# Patient Record
Sex: FEMALE | Race: WHITE | Employment: OTHER | URBAN - METROPOLITAN AREA
[De-identification: names, ages, dates, MRNs, and addresses within clinical notes are randomized per-mention and may not be internally consistent; named-entity substitution may affect disease eponyms.]

---

## 2017-01-05 ENCOUNTER — HOSPITAL ENCOUNTER (EMERGENCY)
Age: 82
Discharge: HOME | End: 2017-01-05
Attending: EMERGENCY MEDICINE
Payer: COMMERCIAL

## 2017-01-05 ENCOUNTER — APPOINTMENT (OUTPATIENT)
Dept: GENERAL RADIOLOGY | Age: 82
End: 2017-01-05
Attending: EMERGENCY MEDICINE
Payer: COMMERCIAL

## 2017-01-05 VITALS
WEIGHT: 157 LBS | DIASTOLIC BLOOD PRESSURE: 62 MMHG | BODY MASS INDEX: 30.82 KG/M2 | SYSTOLIC BLOOD PRESSURE: 123 MMHG | HEART RATE: 66 BPM | TEMPERATURE: 97.5 F | HEIGHT: 60 IN | RESPIRATION RATE: 16 BRPM | OXYGEN SATURATION: 97 %

## 2017-01-05 DIAGNOSIS — I49.3 PVC (PREMATURE VENTRICULAR CONTRACTION): Primary | ICD-10-CM

## 2017-01-05 LAB
ALBUMIN SERPL BCP-MCNC: 3.7 G/DL (ref 3.2–4.6)
ALBUMIN/GLOB SERPL: 1.1 {RATIO} (ref 1.2–3.5)
ALP SERPL-CCNC: 91 U/L (ref 50–136)
ALT SERPL-CCNC: 15 U/L (ref 12–65)
ANION GAP BLD CALC-SCNC: 9 MMOL/L (ref 7–16)
AST SERPL W P-5'-P-CCNC: 16 U/L (ref 15–37)
BASOPHILS # BLD AUTO: 0 K/UL (ref 0–0.2)
BASOPHILS # BLD: 0 % (ref 0–2)
BILIRUB DIRECT SERPL-MCNC: 0.1 MG/DL
BILIRUB SERPL-MCNC: 0.4 MG/DL (ref 0.2–1.1)
BUN SERPL-MCNC: 48 MG/DL (ref 8–23)
CALCIUM SERPL-MCNC: 9.2 MG/DL (ref 8.3–10.4)
CHLORIDE SERPL-SCNC: 108 MMOL/L (ref 98–107)
CO2 SERPL-SCNC: 26 MMOL/L (ref 21–32)
CREAT SERPL-MCNC: 1.64 MG/DL (ref 0.6–1)
DIFFERENTIAL METHOD BLD: ABNORMAL
EOSINOPHIL # BLD: 0.2 K/UL (ref 0–0.8)
EOSINOPHIL NFR BLD: 4 % (ref 0.5–7.8)
ERYTHROCYTE [DISTWIDTH] IN BLOOD BY AUTOMATED COUNT: 14.3 % (ref 11.9–14.6)
GLOBULIN SER CALC-MCNC: 3.4 G/DL (ref 2.3–3.5)
GLUCOSE SERPL-MCNC: 98 MG/DL (ref 65–100)
HCT VFR BLD AUTO: 36.3 % (ref 35.8–46.3)
HGB BLD-MCNC: 11.8 G/DL (ref 11.7–15.4)
IMM GRANULOCYTES # BLD: 0 K/UL (ref 0–0.5)
IMM GRANULOCYTES NFR BLD AUTO: 0 % (ref 0–5)
LYMPHOCYTES # BLD AUTO: 27 % (ref 13–44)
LYMPHOCYTES # BLD: 1.1 K/UL (ref 0.5–4.6)
MCH RBC QN AUTO: 32.2 PG (ref 26.1–32.9)
MCHC RBC AUTO-ENTMCNC: 32.5 G/DL (ref 31.4–35)
MCV RBC AUTO: 99.2 FL (ref 79.6–97.8)
MONOCYTES # BLD: 0.3 K/UL (ref 0.1–1.3)
MONOCYTES NFR BLD AUTO: 8 % (ref 4–12)
NEUTS SEG # BLD: 2.5 K/UL (ref 1.7–8.2)
NEUTS SEG NFR BLD AUTO: 61 % (ref 43–78)
PLATELET # BLD AUTO: 142 K/UL (ref 150–450)
PMV BLD AUTO: 13.6 FL (ref 10.8–14.1)
POTASSIUM SERPL-SCNC: 4.3 MMOL/L (ref 3.5–5.1)
PROT SERPL-MCNC: 7.1 G/DL (ref 6.3–8.2)
RBC # BLD AUTO: 3.66 M/UL (ref 4.05–5.25)
SODIUM SERPL-SCNC: 143 MMOL/L (ref 136–145)
TROPONIN I BLD-MCNC: 0 NG/ML (ref 0–0.08)
WBC # BLD AUTO: 4.1 K/UL (ref 4.3–11.1)

## 2017-01-05 PROCEDURE — 93005 ELECTROCARDIOGRAM TRACING: CPT | Performed by: EMERGENCY MEDICINE

## 2017-01-05 PROCEDURE — 84484 ASSAY OF TROPONIN QUANT: CPT

## 2017-01-05 PROCEDURE — 99284 EMERGENCY DEPT VISIT MOD MDM: CPT | Performed by: EMERGENCY MEDICINE

## 2017-01-05 PROCEDURE — 85025 COMPLETE CBC W/AUTO DIFF WBC: CPT | Performed by: EMERGENCY MEDICINE

## 2017-01-05 PROCEDURE — 80048 BASIC METABOLIC PNL TOTAL CA: CPT | Performed by: EMERGENCY MEDICINE

## 2017-01-05 PROCEDURE — 80076 HEPATIC FUNCTION PANEL: CPT | Performed by: EMERGENCY MEDICINE

## 2017-01-05 PROCEDURE — 71020 XR CHEST PA LAT: CPT

## 2017-01-05 NOTE — ED TRIAGE NOTES
Family reports patient was at PCP and they were unable to detect an oxygen saturation so they did a EKG. States EKG showed \"extra beats. \"  Patient denies chest pain. Oxygen saturation in triage 97% room air. Patient denies any chest pain or palpitations or SOB.

## 2017-01-06 LAB
ATRIAL RATE: 66 BPM
CALCULATED P AXIS, ECG09: 100 DEGREES
CALCULATED R AXIS, ECG10: 8 DEGREES
CALCULATED T AXIS, ECG11: 26 DEGREES
DIAGNOSIS, 93000: NORMAL
DIASTOLIC BP, ECG02: NORMAL MMHG
P-R INTERVAL, ECG05: NORMAL MS
Q-T INTERVAL, ECG07: 416 MS
QRS DURATION, ECG06: 94 MS
QTC CALCULATION (BEZET), ECG08: 436 MS
SYSTOLIC BP, ECG01: NORMAL MMHG
VENTRICULAR RATE, ECG03: 66 BPM

## 2017-01-06 NOTE — ED PROVIDER NOTES
HPI Comments: 51-year-old white female sent from primary care physician for evaluation of a PVC noted on EKG in the office today. Patient was therefore routine follow-up. She has had some nausea but no vomiting. No chest pain or shortness of breath. Felt an irregular beat earlier today. Patient is a 80 y.o. female presenting with abnormal lab results. The history is provided by the patient. Abnormal Lab Results   Pertinent negatives include no chest pain, no abdominal pain, no headaches and no shortness of breath. Past Medical History:   Diagnosis Date    Acute kidney failure, unspecified ([address]     Arthritis     CAD (coronary artery disease)     Cellulitis and abscess of other specified site     Coronary atherosclerosis of native coronary artery     Essential hypertension, benign 3/17/2015    Hypertension     Hypopotassemia     Mixed hyperlipidemia     Other and unspecified hyperlipidemia     Reflux esophagitis     Renal failure, unspecified     Shortness of breath     Unspecified essential hypertension        Past Surgical History:   Procedure Laterality Date    Hx cholecystectomy      Pr layr clos wnd face,facial <2.5cm      Pr cardiac surg procedure unlist       stent    Hx cataract removal Bilateral     Hx hysterectomy       complete         Family History:   Problem Relation Age of Onset    Heart Attack Mother     Heart Attack Father     Heart Disease Brother     Heart Disease Brother        Social History     Social History    Marital status:      Spouse name: N/A    Number of children: N/A    Years of education: N/A     Occupational History    Not on file.      Social History Main Topics    Smoking status: Never Smoker    Smokeless tobacco: Never Used    Alcohol use No    Drug use: No    Sexual activity: Not Currently     Other Topics Concern    Not on file     Social History Narrative         ALLERGIES: Bee pollen and Fire ant    Review of Systems Constitutional: Negative for fever. HENT: Negative for congestion. Respiratory: Negative for cough and shortness of breath. Cardiovascular: Positive for palpitations. Negative for chest pain. Gastrointestinal: Positive for nausea. Negative for abdominal pain and vomiting. Genitourinary: Negative for dysuria. Musculoskeletal: Negative for back pain and neck pain. Skin: Negative for rash. Neurological: Negative for headaches. Vitals:    01/05/17 1634 01/05/17 1947 01/05/17 2015   BP: 119/59 144/66    Pulse: 67 65    Resp: 16 16    Temp: 97.5 °F (36.4 °C)     SpO2: 97% 96% 96%   Weight: 71.2 kg (157 lb)     Height: 5' (1.524 m)              Physical Exam   Constitutional: She appears well-developed and well-nourished. No distress. HENT:   Head: Normocephalic and atraumatic. Eyes: Conjunctivae are normal. Pupils are equal, round, and reactive to light. Neck: Normal range of motion. Neck supple. Cardiovascular: Normal rate and regular rhythm. No murmur heard. Pulmonary/Chest: Effort normal and breath sounds normal.   Abdominal: Soft. She exhibits no distension. There is no tenderness. Musculoskeletal: Normal range of motion. Neurological: She is alert. Skin: Skin is warm and dry. Psychiatric: She has a normal mood and affect. Nursing note and vitals reviewed. MDM  Number of Diagnoses or Management Options  Diagnosis management comments: EKG shows normal sinus rhythm without ectopy or ST changes. Chest x-ray unremarkable. Lab work including troponin normal.  Has had no significant ectopy while being observed. This time she appears safe for discharge home.        Amount and/or Complexity of Data Reviewed  Clinical lab tests: ordered and reviewed  Tests in the radiology section of CPT®: ordered and reviewed    Risk of Complications, Morbidity, and/or Mortality  Presenting problems: moderate  Diagnostic procedures: low  Management options: low      ED Course Procedures

## 2017-01-06 NOTE — ED NOTES
I have reviewed medications, follow up provider options, and discharge instructions with the patient and daughter. The patient and daughter verbalized understanding. Copy of discharge information given to daughter upon discharge. Patient discharged in no distress. Patient taken to waiting area via wheelchair by family.  No questions at this time

## 2017-01-06 NOTE — DISCHARGE INSTRUCTIONS
Premature Heartbeat: Care Instructions  Your Care Instructions    A premature heartbeat happens when the heart beats earlier than it should. This briefly interrupts the heart's rhythm. You do not usually feel the early heartbeat, and the next beat is stronger. To many people, this feels like a skipped heartbeat or a flutter. If you have no heart problems, premature heartbeats are not a cause for concern. Most people have them at some time. They may happen more often if you drink a lot of caffeine or alcohol or are under stress. Usually, no cause for a premature heartbeat is found, and no treatment is needed. Follow-up care is a key part of your treatment and safety. Be sure to make and go to all appointments, and call your doctor if you are having problems. It's also a good idea to know your test results and keep a list of the medicines you take. How can you care for yourself at home? · Limit caffeine and other stimulants if they trigger premature heartbeats. · Reduce stress. Avoid people and places that make you feel anxious, if you can. Learn ways to reduce stress, such as biofeedback, guided imagery, and meditation. · Do not smoke or allow others to smoke around you. If you need help quitting, talk to your doctor about stop-smoking programs and medicines. These can increase your chances of quitting for good. · Get at least 30 minutes of exercise on most days of the week. Walking is a good choice. You also may want to do other activities, such as running, swimming, cycling, or playing tennis or team sports. · Get enough sleep. Keep your room dark and quiet, and try to go to bed at the same time every night. · Limit alcohol to 2 drinks a day for men and 1 drink a day for women. Too much alcohol can cause health problems. If drinking alcohol causes more premature heartbeats, do not drink it. · If your doctor prescribes medicine, take it exactly as prescribed.  Call your doctor if you think you are having a problem with your medicine. When should you call for help? Call 911 anytime you think you may need emergency care. For example, call if:  · You passed out (lost consciousness). · You have severe shortness of breath. Call your doctor now or seek immediate medical care if:  · You have a heart rate that stays above 120 beats per minute for more than a few minutes. · You are suddenly dizzy. Watch closely for changes in your health, and be sure to contact your doctor if you have any problems. Where can you learn more? Go to http://you-demarcus.info/. Enter X714 in the search box to learn more about \"Premature Heartbeat: Care Instructions. \"  Current as of: January 27, 2016  Content Version: 11.1  © 5293-8162 [employer]. Care instructions adapted under license by [employer] (which disclaims liability or warranty for this information). If you have questions about a medical condition or this instruction, always ask your healthcare professional. [address] any warranty or liability for your use of this information.

## 2017-01-07 ENCOUNTER — PATIENT OUTREACH (OUTPATIENT)
Dept: CASE MANAGEMENT | Age: 82
End: 2017-01-07

## 2017-01-07 NOTE — PROGRESS NOTES
Date/Time of Call:   01/06/2017 1:42 PM   What was the patient seen in the ED for? Patient was seen in ED for diagnosis of: PVC   Does the patient understand his/her diagnosis and/or treatment and what happened during the ED visit? Spoke with patient who stated understanding of the diagnosis and treatment plan. Did the patient receive discharge instructions from the ED? Yes discharge instructions received from the ED per patient. Review any discharge instructions (see notes in [facility]). Ask patient if they understand these. Do they have any questions? Care Coordinator reviewed DC instructions. Patient stated understanding. Were home services ordered (nursing, PT, OT, ST, etc.)? No HH services ordered at d/c. If so, has the first visit occurred? If not, why? (Assist with coordination of services if necessary.) N/A   Was any DME ordered? No DME ordered at D/C. If so, has it been received? If not, why?  (Assist with coordination of arranging DME orders if necessary.) N/A   Complete a review of all medications (new, continued and discontinued meds per the D/C instructions and medication tab in [address] [place] [address]). All medications reviewed and current per CC. Were all new prescriptions filled? If not, why?  (Assist with obtainment of medications if necessary.) N/A   Does the patient understand the purpose and dosing instructions for all medications? (If patient has questions, provide explanation and education.) Yes patient verbalized understanding and purpose of medications. Does the patient have any problems in performing ADLs? (If patient is unable to perform ADLs - what is the limiting factor(s)? Do they have a support system that can assist? If no support system is present, discuss possible assistance that they may be able to obtain.) No.  Patient states she is independent with all ADLs. Patient also states she has family support.    Does the patient have all follow-up appointments scheduled? Has transportation been arranged? [facility] follow-up should be within 7 days of discharge; all others should have PCP follow-up within 7 days of discharge; follow-ups with other specialists as appropriate or ordered.) Yes f/u scheduled per patient. Patient states she has transportation. Any other questions or concerns expressed by the patient? No further needs identified at this time. Daughter expressed gratitude for call.           CRISTIANO Call Completed By: Jodi Araiza LPN   Care Coordinator  [facility]

## 2017-07-20 PROBLEM — M19.90 OSTEOARTHRITIS: Status: ACTIVE | Noted: 2017-07-20

## 2017-07-20 PROBLEM — R73.03 PRE-DIABETES: Status: ACTIVE | Noted: 2017-07-20

## 2017-10-15 ENCOUNTER — HOSPITAL ENCOUNTER (EMERGENCY)
Age: 82
Discharge: HOME | End: 2017-10-15
Attending: EMERGENCY MEDICINE
Payer: COMMERCIAL

## 2017-10-15 VITALS
OXYGEN SATURATION: 97 % | BODY MASS INDEX: 31.41 KG/M2 | DIASTOLIC BLOOD PRESSURE: 67 MMHG | SYSTOLIC BLOOD PRESSURE: 146 MMHG | WEIGHT: 160 LBS | RESPIRATION RATE: 20 BRPM | HEIGHT: 60 IN | TEMPERATURE: 97.8 F | HEART RATE: 76 BPM

## 2017-10-15 DIAGNOSIS — N39.0 URINARY TRACT INFECTION WITHOUT HEMATURIA, SITE UNSPECIFIED: Primary | ICD-10-CM

## 2017-10-15 LAB
ALBUMIN SERPL-MCNC: 3.5 G/DL (ref 3.2–4.6)
ALBUMIN/GLOB SERPL: 1 {RATIO} (ref 1.2–3.5)
ALP SERPL-CCNC: 112 U/L (ref 50–136)
ALT SERPL-CCNC: 11 U/L (ref 12–65)
ANION GAP SERPL CALC-SCNC: 8 MMOL/L (ref 7–16)
AST SERPL-CCNC: 17 U/L (ref 15–37)
BACTERIA URNS QL MICRO: NORMAL /HPF
BASOPHILS # BLD: 0 K/UL (ref 0–0.2)
BASOPHILS NFR BLD: 0 % (ref 0–2)
BILIRUB SERPL-MCNC: 0.4 MG/DL (ref 0.2–1.1)
BUN SERPL-MCNC: 38 MG/DL (ref 8–23)
CALCIUM SERPL-MCNC: 9 MG/DL (ref 8.3–10.4)
CASTS URNS QL MICRO: NORMAL /LPF
CHLORIDE SERPL-SCNC: 108 MMOL/L (ref 98–107)
CO2 SERPL-SCNC: 27 MMOL/L (ref 21–32)
CREAT SERPL-MCNC: 1.4 MG/DL (ref 0.6–1)
DIFFERENTIAL METHOD BLD: ABNORMAL
EOSINOPHIL # BLD: 0.1 K/UL (ref 0–0.8)
EOSINOPHIL NFR BLD: 2 % (ref 0.5–7.8)
EPI CELLS #/AREA URNS HPF: NORMAL /HPF
ERYTHROCYTE [DISTWIDTH] IN BLOOD BY AUTOMATED COUNT: 14.9 % (ref 11.9–14.6)
GLOBULIN SER CALC-MCNC: 3.5 G/DL (ref 2.3–3.5)
GLUCOSE SERPL-MCNC: 103 MG/DL (ref 65–100)
HCT VFR BLD AUTO: 34 % (ref 35.8–46.3)
HGB BLD-MCNC: 11.6 G/DL (ref 11.7–15.4)
IMM GRANULOCYTES # BLD: 0 K/UL (ref 0–0.5)
IMM GRANULOCYTES NFR BLD: 0.2 % (ref 0–5)
LYMPHOCYTES # BLD: 1.2 K/UL (ref 0.5–4.6)
LYMPHOCYTES NFR BLD: 22 % (ref 13–44)
MCH RBC QN AUTO: 34.3 PG (ref 26.1–32.9)
MCHC RBC AUTO-ENTMCNC: 34.1 G/DL (ref 31.4–35)
MCV RBC AUTO: 100.6 FL (ref 79.6–97.8)
MONOCYTES # BLD: 0.4 K/UL (ref 0.1–1.3)
MONOCYTES NFR BLD: 7 % (ref 4–12)
NEUTS SEG # BLD: 3.8 K/UL (ref 1.7–8.2)
NEUTS SEG NFR BLD: 69 % (ref 43–78)
PLATELET # BLD AUTO: 147 K/UL (ref 150–450)
PMV BLD AUTO: 11.9 FL (ref 10.8–14.1)
POTASSIUM SERPL-SCNC: 4.5 MMOL/L (ref 3.5–5.1)
PROT SERPL-MCNC: 7 G/DL (ref 6.3–8.2)
RBC # BLD AUTO: 3.38 M/UL (ref 4.05–5.25)
RBC #/AREA URNS HPF: NORMAL /HPF
SODIUM SERPL-SCNC: 143 MMOL/L (ref 136–145)
WBC # BLD AUTO: 5.5 K/UL (ref 4.3–11.1)
WBC URNS QL MICRO: NORMAL /HPF

## 2017-10-15 PROCEDURE — 81015 MICROSCOPIC EXAM OF URINE: CPT | Performed by: EMERGENCY MEDICINE

## 2017-10-15 PROCEDURE — 74011250637 HC RX REV CODE- 250/637: Performed by: EMERGENCY MEDICINE

## 2017-10-15 PROCEDURE — 85025 COMPLETE CBC W/AUTO DIFF WBC: CPT | Performed by: EMERGENCY MEDICINE

## 2017-10-15 PROCEDURE — 80053 COMPREHEN METABOLIC PANEL: CPT | Performed by: EMERGENCY MEDICINE

## 2017-10-15 PROCEDURE — 99284 EMERGENCY DEPT VISIT MOD MDM: CPT | Performed by: EMERGENCY MEDICINE

## 2017-10-15 RX ORDER — CEPHALEXIN 500 MG/1
500 CAPSULE ORAL
Status: COMPLETED | OUTPATIENT
Start: 2017-10-15 | End: 2017-10-15

## 2017-10-15 RX ORDER — CEPHALEXIN 500 MG/1
500 CAPSULE ORAL 3 TIMES DAILY
Qty: 15 CAP | Refills: 0 | Status: SHIPPED | OUTPATIENT
Start: 2017-10-15 | End: 2017-10-20

## 2017-10-15 RX ADMIN — CEPHALEXIN 500 MG: 500 CAPSULE ORAL at 23:36

## 2017-10-16 NOTE — ED NOTES
I have reviewed discharge instructions with the patient and daughter. The patient and daughter verbalized understanding. Patient left ED via Discharge Method: wheelchair to Home with daughter, Jeremy Arthur. Opportunity for questions and clarification provided. Patient given 1 scripts.

## 2017-10-16 NOTE — ED TRIAGE NOTES
Pt arrives per EMS for complaints of lower abdominal pain, frequent and foul smelling urine x 1 week.

## 2017-10-16 NOTE — DISCHARGE INSTRUCTIONS
Urinary Tract Infection in Women: Care Instructions  Your Care Instructions    A urinary tract infection, or UTI, is a general term for an infection anywhere between the kidneys and the urethra (where urine comes out). Most UTIs are bladder infections. They often cause pain or burning when you urinate. UTIs are caused by bacteria and can be cured with antibiotics. Be sure to complete your treatment so that the infection goes away. Follow-up care is a key part of your treatment and safety. Be sure to make and go to all appointments, and call your doctor if you are having problems. It's also a good idea to know your test results and keep a list of the medicines you take. How can you care for yourself at home? · Take your antibiotics as directed. Do not stop taking them just because you feel better. You need to take the full course of antibiotics. · Drink extra water and other fluids for the next day or two. This may help wash out the bacteria that are causing the infection. (If you have kidney, heart, or liver disease and have to limit fluids, talk with your doctor before you increase your fluid intake.)  · Avoid drinks that are carbonated or have caffeine. They can irritate the bladder. · Urinate often. Try to empty your bladder each time. · To relieve pain, take a hot bath or lay a heating pad set on low over your lower belly or genital area. Never go to sleep with a heating pad in place. To prevent UTIs  · Drink plenty of water each day. This helps you urinate often, which clears bacteria from your system. (If you have kidney, heart, or liver disease and have to limit fluids, talk with your doctor before you increase your fluid intake.)  · Urinate when you need to. · Urinate right after you have sex. · Change sanitary pads often. · Avoid douches, bubble baths, feminine hygiene sprays, and other feminine hygiene products that have deodorants.   · After going to the bathroom, wipe from front to back.  When should you call for help? Call your doctor now or seek immediate medical care if:  · Symptoms such as fever, chills, nausea, or vomiting get worse or appear for the first time. · You have new pain in your back just below your rib cage. This is called flank pain. · There is new blood or pus in your urine. · You have any problems with your antibiotic medicine. Watch closely for changes in your health, and be sure to contact your doctor if:  · You are not getting better after taking an antibiotic for 2 days. · Your symptoms go away but then come back. Where can you learn more? Go to http://you-demarcus.info/. Enter P716 in the search box to learn more about \"Urinary Tract Infection in Women: Care Instructions. \"  Current as of: November 28, 2016  Content Version: 11.3  © 3063-2216 [employer]. Care instructions adapted under license by [employer] (which disclaims liability or warranty for this information). If you have questions about a medical condition or this instruction, always ask your healthcare professional. [address] any warranty or liability for your use of this information.

## 2017-10-16 NOTE — ED PROVIDER NOTES
HPI Comments: 51-year-old female presents to the emergency room with complaints of right lower flank pain that has been present for at least the last 3-4 days and is intermittently bothersome. She states that she has no recent trauma. Denies fever or chills. States that she is not having any difficulty with her urine or bowel. Patient states that she is unsure as to the cause of this pain but did note some discomfort in the suprapubic region a few days ago but has resolved in the interval.  Patient reports that the pain is worse with changing position and is better at rest but is never gone completely. Admits to having urinary tract infections in the past but states that she does not usually have very significant symptoms with these and is unsure if related. Patient is a 80 y.o. female presenting with abdominal pain. The history is provided by the patient and a relative. Abdominal Pain    Pertinent negatives include no fever, no frequency, no headaches, no arthralgias, no myalgias and no chest pain.         Past Medical History:   Diagnosis Date    Acute kidney failure, unspecified ([address]     Arthritis     CAD (coronary artery disease)     Cellulitis and abscess of other specified site     Coronary atherosclerosis of native coronary artery     Essential hypertension, benign 3/17/2015    Hypertension     Hypopotassemia     Mixed hyperlipidemia     Osteoarthritis 7/20/2017    Other and unspecified hyperlipidemia     Reflux esophagitis     Renal failure, unspecified     Shortness of breath     Unspecified essential hypertension        Past Surgical History:   Procedure Laterality Date    CARDIAC SURG PROCEDURE UNLIST      stent    HX CATARACT REMOVAL Bilateral     HX CHOLECYSTECTOMY      HX HYSTERECTOMY      complete    LAYR CLOS WND FACE,FACIAL <2.5CM           Family History:   Problem Relation Age of Onset    Heart Attack Mother     Heart Attack Father     Heart Disease Brother     Heart Disease Brother        Social History     Social History    Marital status:      Spouse name: N/A    Number of children: N/A    Years of education: N/A     Occupational History    Not on file. Social History Main Topics    Smoking status: Never Smoker    Smokeless tobacco: Never Used    Alcohol use No    Drug use: No    Sexual activity: Not Currently     Other Topics Concern    Not on file     Social History Narrative         ALLERGIES: Bee pollen and Fire ant    Review of Systems   Constitutional: Negative. Negative for chills and fever. HENT: Negative. Negative for congestion, ear pain, postnasal drip and rhinorrhea. Eyes: Negative for pain and visual disturbance. Respiratory: Negative for cough and wheezing. Cardiovascular: Negative for chest pain and leg swelling. Gastrointestinal: Positive for abdominal pain. Negative for abdominal distention. Endocrine: Negative. Negative for polydipsia, polyphagia and polyuria. Genitourinary: Negative. Negative for difficulty urinating, flank pain and frequency. Musculoskeletal: Negative. Negative for arthralgias and myalgias. Skin: Negative. Neurological: Negative. Negative for dizziness and headaches. Hematological: Negative. Vitals:    10/15/17 2236   BP: 148/76   Pulse: 82   Resp: 16   Temp: 97.6 °F (36.4 °C)   SpO2: 98%   Weight: 72.6 kg (160 lb)   Height: 5' (1.524 m)            Physical Exam   Constitutional: She is oriented to person, place, and time. She appears well-developed and well-nourished. No distress. HENT:   Head: Normocephalic and atraumatic. Cardiovascular: Intact distal pulses. Pulmonary/Chest: Effort normal. No respiratory distress. Abdominal: Soft. She exhibits no distension. There is no tenderness. There is no rebound and no guarding. Musculoskeletal:        Arms:  Area marked is where patient points discomfort, when present.    Neurological: She is alert and oriented to person, place, and time. Skin: Skin is warm and dry. Psychiatric: She has a normal mood and affect. Her behavior is normal.   Nursing note and vitals reviewed. MDM  Number of Diagnoses or Management Options  Urinary tract infection without hematuria, site unspecified: new and requires workup  Diagnosis management comments: Findings in the ER do appear to be most consistent with urinary tract infection. She understands that other concomitant causes of pain such as arthritis, osteoporosis, etc. Are not excluded entirely but at this point appears to be fairly well localized discomfort consistent with possible UTI especially with the preceding suprapubic discomfort earlier in the week. If the etiology of her discomfort, expect to be resolved quickly with antibiotics provided.        Amount and/or Complexity of Data Reviewed  Clinical lab tests: ordered and reviewed (Results for orders placed or performed during the hospital encounter of 10/15/17  -CBC WITH AUTOMATED DIFF       Result                                            Value                         Ref Range                       WBC                                               5.5                           4.3 - 11.1 K/uL                 RBC                                               3.38 (L)                      4.05 - 5.25 M/uL                HGB                                               11.6 (L)                      11.7 - 15.4 g/dL                HCT                                               34.0 (L)                      35.8 - 46.3 %                   MCV                                               100.6 (H)                     79.6 - 97.8 FL                  MCH                                               34.3 (H)                      26.1 - 32.9 PG                  MCHC                                              34.1                          31.4 - 35.0 g/dL                RDW                                               14.9 (H) 11.9 - 14.6 %                   PLATELET                                          147 (L)                       150 - 450 K/uL                  MPV                                               11.9                          10.8 - 14.1 FL                  DF                                                AUTOMATED                                                     NEUTROPHILS                                       69                            43 - 78 %                       LYMPHOCYTES                                       22                            13 - 44 %                       MONOCYTES                                         7                             4.0 - 12.0 %                    EOSINOPHILS                                       2                             0.5 - 7.8 %                     BASOPHILS                                         0                             0.0 - 2.0 %                     IMMATURE GRANULOCYTES                             0.2                           0.0 - 5.0 %                     ABS. NEUTROPHILS                                  3.8                           1.7 - 8.2 K/UL                  ABS. LYMPHOCYTES                                  1.2                           0.5 - 4.6 K/UL                  ABS. MONOCYTES                                    0.4                           0.1 - 1.3 K/UL                  ABS. EOSINOPHILS                                  0.1                           0.0 - 0.8 K/UL                  ABS. BASOPHILS                                    0.0                           0.0 - 0.2 K/UL                  ABS. IMM.  GRANS.                                  0.0                           0.0 - 0.5 K/UL             -METABOLIC PANEL, COMPREHENSIVE       Result                                            Value                         Ref Range                       Sodium                                            143 136 - 145 mmol/L                Potassium                                         4.5                           3.5 - 5.1 mmol/L                Chloride                                          108 (H)                       98 - 107 mmol/L                 CO2                                               27                            21 - 32 mmol/L                  Anion gap                                         8                             7 - 16 mmol/L                   Glucose                                           103 (H)                       65 - 100 mg/dL                  BUN                                               38 (H)                        8 - 23 MG/DL                    Creatinine                                        1.40 (H)                      0.6 - 1.0 MG/DL                 GFR est AA                                        45 (L)                        >60 ml/min/1.73m2               GFR est non-AA                                    38 (L)                        >60 ml/min/1.73m2               Calcium                                           9.0                           8.3 - 10.4 MG/DL                Bilirubin, total                                  PENDING                       MG/DL                           ALT (SGPT)                                        11 (L)                        12 - 65 U/L                     AST (SGOT)                                        17                            15 - 37 U/L                     Alk. phosphatase                                  PENDING                       U/L                             Protein, total                                    PENDING                       g/dL                            Albumin                                           3.5                           3.2 - 4.6 g/dL                  Globulin                                          PENDING                       g/dL                            A-G Ratio                                         PENDING                                                  -URINE MICROSCOPIC       Result                                            Value                         Ref Range                       WBC                                                                       0 /hpf                          RBC                                               0-3                           0 /hpf                          Epithelial cells                                  0-3                           0 /hpf                          Bacteria                                          TRACE                         0 /hpf                          Casts                                             3-5                           0 /lpf                     )      ED Course       Procedures

## 2017-10-27 ENCOUNTER — HOSPITAL ENCOUNTER (EMERGENCY)
Age: 82
Discharge: HOME | End: 2017-10-27
Attending: EMERGENCY MEDICINE
Payer: COMMERCIAL

## 2017-10-27 ENCOUNTER — APPOINTMENT (OUTPATIENT)
Dept: CT IMAGING | Age: 82
End: 2017-10-27
Attending: NURSE PRACTITIONER
Payer: COMMERCIAL

## 2017-10-27 VITALS
HEART RATE: 77 BPM | WEIGHT: 152 LBS | HEIGHT: 60 IN | TEMPERATURE: 98.2 F | OXYGEN SATURATION: 96 % | RESPIRATION RATE: 16 BRPM | BODY MASS INDEX: 29.84 KG/M2

## 2017-10-27 DIAGNOSIS — G89.29 CHRONIC LEFT-SIDED LOW BACK PAIN WITHOUT SCIATICA: Primary | ICD-10-CM

## 2017-10-27 DIAGNOSIS — M54.50 CHRONIC LEFT-SIDED LOW BACK PAIN WITHOUT SCIATICA: Primary | ICD-10-CM

## 2017-10-27 DIAGNOSIS — R30.0 DYSURIA: ICD-10-CM

## 2017-10-27 LAB
ALBUMIN SERPL-MCNC: 3.6 G/DL (ref 3.2–4.6)
ALBUMIN/GLOB SERPL: 1 {RATIO} (ref 1.2–3.5)
ALP SERPL-CCNC: 131 U/L (ref 50–136)
ALT SERPL-CCNC: 14 U/L (ref 12–65)
ANION GAP SERPL CALC-SCNC: 11 MMOL/L (ref 7–16)
AST SERPL-CCNC: 22 U/L (ref 15–37)
BACTERIA URNS QL MICRO: 0 /HPF
BASOPHILS # BLD: 0 K/UL (ref 0–0.2)
BASOPHILS NFR BLD: 1 % (ref 0–2)
BILIRUB SERPL-MCNC: 0.3 MG/DL (ref 0.2–1.1)
BUN SERPL-MCNC: 47 MG/DL (ref 8–23)
CALCIUM SERPL-MCNC: 9.1 MG/DL (ref 8.3–10.4)
CASTS URNS QL MICRO: NORMAL /LPF
CHLORIDE SERPL-SCNC: 106 MMOL/L (ref 98–107)
CO2 SERPL-SCNC: 25 MMOL/L (ref 21–32)
CREAT SERPL-MCNC: 1.75 MG/DL (ref 0.6–1)
DIFFERENTIAL METHOD BLD: ABNORMAL
EOSINOPHIL # BLD: 0.1 K/UL (ref 0–0.8)
EOSINOPHIL NFR BLD: 2 % (ref 0.5–7.8)
EPI CELLS #/AREA URNS HPF: NORMAL /HPF
ERYTHROCYTE [DISTWIDTH] IN BLOOD BY AUTOMATED COUNT: 14.7 % (ref 11.9–14.6)
GLOBULIN SER CALC-MCNC: 3.6 G/DL (ref 2.3–3.5)
GLUCOSE SERPL-MCNC: 107 MG/DL (ref 65–100)
HCT VFR BLD AUTO: 34.3 % (ref 35.8–46.3)
HGB BLD-MCNC: 11.5 G/DL (ref 11.7–15.4)
IMM GRANULOCYTES # BLD: 0 K/UL (ref 0–0.5)
IMM GRANULOCYTES NFR BLD: 0 % (ref 0–5)
LYMPHOCYTES # BLD: 1.2 K/UL (ref 0.5–4.6)
LYMPHOCYTES NFR BLD: 25 % (ref 13–44)
MCH RBC QN AUTO: 33.7 PG (ref 26.1–32.9)
MCHC RBC AUTO-ENTMCNC: 33.5 G/DL (ref 31.4–35)
MCV RBC AUTO: 100.6 FL (ref 79.6–97.8)
MONOCYTES # BLD: 0.3 K/UL (ref 0.1–1.3)
MONOCYTES NFR BLD: 7 % (ref 4–12)
MUCOUS THREADS URNS QL MICRO: 0 /LPF
NEUTS SEG # BLD: 3.1 K/UL (ref 1.7–8.2)
NEUTS SEG NFR BLD: 65 % (ref 43–78)
PLATELET # BLD AUTO: 170 K/UL (ref 150–450)
PMV BLD AUTO: 11.5 FL (ref 10.8–14.1)
POTASSIUM SERPL-SCNC: 4.2 MMOL/L (ref 3.5–5.1)
PROT SERPL-MCNC: 7.2 G/DL (ref 6.3–8.2)
RBC # BLD AUTO: 3.41 M/UL (ref 4.05–5.25)
RBC #/AREA URNS HPF: 0 /HPF
SODIUM SERPL-SCNC: 142 MMOL/L (ref 136–145)
WBC # BLD AUTO: 4.7 K/UL (ref 4.3–11.1)
WBC URNS QL MICRO: 0 /HPF

## 2017-10-27 PROCEDURE — 85025 COMPLETE CBC W/AUTO DIFF WBC: CPT | Performed by: NURSE PRACTITIONER

## 2017-10-27 PROCEDURE — 80053 COMPREHEN METABOLIC PANEL: CPT | Performed by: NURSE PRACTITIONER

## 2017-10-27 PROCEDURE — 74176 CT ABD & PELVIS W/O CONTRAST: CPT

## 2017-10-27 PROCEDURE — 99283 EMERGENCY DEPT VISIT LOW MDM: CPT | Performed by: NURSE PRACTITIONER

## 2017-10-27 PROCEDURE — 81015 MICROSCOPIC EXAM OF URINE: CPT | Performed by: NURSE PRACTITIONER

## 2017-10-27 PROCEDURE — 87086 URINE CULTURE/COLONY COUNT: CPT | Performed by: NURSE PRACTITIONER

## 2017-10-27 NOTE — ED PROVIDER NOTES
HPI Comments: Patient presents with dysuria for over the past 2 weeks. She has been taking keflex and started cipro 3 days ago. She states she continues to have left flank and dysuria. She has been seen by her pcp for problem. Patient states she is also have \"arthritis\" pain in her lower extremities. She states problems is chronic. She denies fever, abdominal pain, nausea and vomiting. Patient is a 80 y.o. female presenting with urinary pain. The history is provided by the patient. Urinary Pain    This is a new problem. The current episode started more than 1 week ago. The problem occurs every urination. The problem has not changed since onset. The quality of the pain is described as burning. The pain is at a severity of 5/10. The pain is moderate. There has been no fever. She is not sexually active. There is no history of pyelonephritis. Associated symptoms include flank pain and back pain. Pertinent negatives include no chills, no sweats, no nausea, no vomiting, no frequency, no hematuria, no hesitancy, no urgency, no vaginal discharge, no penile discharge and no abdominal pain. The patient is not pregnant. She has tried antibiotics for the symptoms. The treatment provided no relief. Her past medical history is significant for recurrent UTIs.         Past Medical History:   Diagnosis Date    Acute kidney failure, unspecified     Arthritis     CAD (coronary artery disease)     Cellulitis and abscess of other specified site     Coronary atherosclerosis of native coronary artery     Essential hypertension, benign 3/17/2015    Hypertension     Hypopotassemia     Mixed hyperlipidemia     Osteoarthritis 7/20/2017    Other and unspecified hyperlipidemia     Reflux esophagitis     Renal failure, unspecified     Shortness of breath     Unspecified essential hypertension        Past Surgical History:   Procedure Laterality Date    CARDIAC SURG PROCEDURE UNLIST      stent    HX CATARACT REMOVAL Bilateral  HX CHOLECYSTECTOMY      HX HYSTERECTOMY      complete    LAYR CLOS WND FACE,FACIAL <2.5CM           Family History:   Problem Relation Age of Onset    Heart Attack Mother     Heart Attack Father     Heart Disease Brother     Heart Disease Brother        Social History     Social History    Marital status:      Spouse name: N/A    Number of children: N/A    Years of education: N/A     Occupational History    Not on file. Social History Main Topics    Smoking status: Never Smoker    Smokeless tobacco: Never Used    Alcohol use No    Drug use: No    Sexual activity: Not Currently     Other Topics Concern    Not on file     Social History Narrative         ALLERGIES: Bee pollen and Fire ant    Review of Systems   Constitutional: Negative for chills and fever. Respiratory: Negative for cough and shortness of breath. Cardiovascular: Negative for chest pain. Gastrointestinal: Negative for abdominal pain, constipation, diarrhea, nausea and vomiting. Genitourinary: Positive for dysuria and flank pain. Negative for frequency, hematuria, hesitancy, penile discharge, urgency and vaginal discharge. Musculoskeletal: Positive for arthralgias and back pain. Neurological: Negative for dizziness, weakness and headaches. Vitals:    10/27/17 1424   Pulse: 77   Resp: 16   Temp: 98.2 °F (36.8 °C)   SpO2: 96%   Weight: 68.9 kg (152 lb)   Height: 5' (1.524 m)            Physical Exam   Constitutional: She is oriented to person, place, and time. No distress. Cardiovascular: Normal rate and regular rhythm. No murmur heard. Pulmonary/Chest: Effort normal and breath sounds normal. No respiratory distress. She has no wheezes. Abdominal: Soft. Bowel sounds are normal. She exhibits no distension. There is no tenderness. Musculoskeletal:        Left hip: She exhibits tenderness. Lumbar back: She exhibits tenderness and pain.         Legs:  Neurological: She is alert and oriented to person, place, and time. Skin: Skin is warm and dry. No rash noted. She is not diaphoretic. No erythema. Psychiatric: She has a normal mood and affect. Her behavior is normal.   Nursing note and vitals reviewed. 2:48 PM-I have reviewed patient's SC  Aware. Her had norco 7.5mg filled 6 days ago. Recent Results (from the past 12 hour(s))   CBC WITH AUTOMATED DIFF    Collection Time: 10/27/17  3:04 PM   Result Value Ref Range    WBC 4.7 4.3 - 11.1 K/uL    RBC 3.41 (L) 4.05 - 5.25 M/uL    HGB 11.5 (L) 11.7 - 15.4 g/dL    HCT 34.3 (L) 35.8 - 46.3 %    .6 (H) 79.6 - 97.8 FL    MCH 33.7 (H) 26.1 - 32.9 PG    MCHC 33.5 31.4 - 35.0 g/dL    RDW 14.7 (H) 11.9 - 14.6 %    PLATELET 299 489 - 799 K/uL    MPV 11.5 10.8 - 14.1 FL    DF AUTOMATED      NEUTROPHILS 65 43 - 78 %    LYMPHOCYTES 25 13 - 44 %    MONOCYTES 7 4.0 - 12.0 %    EOSINOPHILS 2 0.5 - 7.8 %    BASOPHILS 1 0.0 - 2.0 %    IMMATURE GRANULOCYTES 0 0.0 - 5.0 %    ABS. NEUTROPHILS 3.1 1.7 - 8.2 K/UL    ABS. LYMPHOCYTES 1.2 0.5 - 4.6 K/UL    ABS. MONOCYTES 0.3 0.1 - 1.3 K/UL    ABS. EOSINOPHILS 0.1 0.0 - 0.8 K/UL    ABS. BASOPHILS 0.0 0.0 - 0.2 K/UL    ABS. IMM. GRANS. 0.0 0.0 - 0.5 K/UL   METABOLIC PANEL, COMPREHENSIVE    Collection Time: 10/27/17  3:04 PM   Result Value Ref Range    Sodium 142 136 - 145 mmol/L    Potassium 4.2 3.5 - 5.1 mmol/L    Chloride 106 98 - 107 mmol/L    CO2 25 21 - 32 mmol/L    Anion gap 11 7 - 16 mmol/L    Glucose 107 (H) 65 - 100 mg/dL    BUN 47 (H) 8 - 23 MG/DL    Creatinine 1.75 (H) 0.6 - 1.0 MG/DL    GFR est AA 35 (L) >60 ml/min/1.73m2    GFR est non-AA 29 (L) >60 ml/min/1.73m2    Calcium 9.1 8.3 - 10.4 MG/DL    Bilirubin, total 0.3 0.2 - 1.1 MG/DL    ALT (SGPT) 14 12 - 65 U/L    AST (SGOT) 22 15 - 37 U/L    Alk.  phosphatase 131 50 - 136 U/L    Protein, total 7.2 6.3 - 8.2 g/dL    Albumin 3.6 3.2 - 4.6 g/dL    Globulin 3.6 (H) 2.3 - 3.5 g/dL    A-G Ratio 1.0 (L) 1.2 - 3.5     URINE MICROSCOPIC    Collection Time: 10/27/17  3:04 PM   Result Value Ref Range    WBC 0 0 /hpf    RBC 0 0 /hpf    Epithelial cells 0-3 0 /hpf    Bacteria 0 0 /hpf    Casts 5-10 0 /lpf    Mucus 0 0 /lpf     Ct Urogram Wo Cont    Result Date: 10/27/2017  CT of the abdomen and pelvis without contrast. CLINICAL INDICATION: Left flank pain PROCEDURE: Serial thin-section axial images obtained from the upper abdomen through the proximal femurs without the administration of intravenous or oral contrast.  Radiation dose reduction techniques were used for this study. Our CT scanners use one or all of the following: Automated exposure control, adjusted of the mA and/or kV according to patient size, iterative reconstruction COMPARISON: No prior FINDINGS:  Evaluation of the hollow and solid viscera is limited by the lack of intravenous contrast. CT ABDOMEN: Intrahepatic bile duct dilation is appreciated. Patient is status post cholecystectomy. No renal or ureteral calculi evident. Multiple small right-sided renal cysts are present. There is a hyperdense cyst off the lateral aspect of the right kidney measuring 1.1 cm most in keeping with a hemorrhagic cyst. There is likely an additional hemorrhagic cyst off the upper pole measuring only 7 mm. No hydronephrosis or hydroureter. Diverticulosis is noted along the sigmoid and descending colon. No acute diverticulitis evident. CT PELVIS: The appendix is normal. A 5.9 x 4.7 cm right adnexal cyst is present. The bladder is normal. No distal ureteral or UVJ calculi evident. There is no inguinal hernia. No aggressive osseous lesions identified. Degenerative changes are noted the SI joints and lumbar spine. IMPRESSION: 1. No renal or ureteral calculi evident. There is no hydronephrosis or hydroureter. 2. Multiple small right-sided renal cysts. At least two are likely hemorrhagic. 3. Diverticulosis without diverticulitis.  4. Intrahepatic bile duct dilation 5. 5.9 cm right adnexal cyst.    MDM  Number of Diagnoses or Management Options  Chronic left-sided low back pain without sciatica:   Dysuria:   Diagnosis management comments: No acute findings noted on lab results. CT negative for calculi, hydronephrosis and/or hydroureter. Patient discharged home to follow up with her pcp. Patient is to continue taking pain medication that she has at home. Continue cipro until urine culture is finalized.         Amount and/or Complexity of Data Reviewed  Clinical lab tests: ordered and reviewed  Tests in the radiology section of CPT®: ordered and reviewed  Discuss the patient with other providers: yes    Patient Progress  Patient progress: stable    ED Course       Procedures

## 2017-10-30 LAB
BACTERIA SPEC CULT: NORMAL
SERVICE CMNT-IMP: NORMAL

## 2017-11-28 ENCOUNTER — HOSPITAL ENCOUNTER (OUTPATIENT)
Dept: GENERAL RADIOLOGY | Age: 82
Discharge: HOME | End: 2017-11-28
Payer: COMMERCIAL

## 2017-11-28 DIAGNOSIS — M1A.4690 OTHER SECONDARY CHRONIC GOUT OF KNEE WITHOUT TOPHUS, UNSPECIFIED LATERALITY: ICD-10-CM

## 2017-11-28 DIAGNOSIS — M25.561 CHRONIC PAIN OF BOTH KNEES: ICD-10-CM

## 2017-11-28 DIAGNOSIS — G89.29 CHRONIC PAIN OF BOTH KNEES: ICD-10-CM

## 2017-11-28 DIAGNOSIS — M25.562 CHRONIC PAIN OF BOTH KNEES: ICD-10-CM

## 2017-11-28 PROCEDURE — 73562 X-RAY EXAM OF KNEE 3: CPT

## 2017-11-30 NOTE — PROGRESS NOTES
I called and notified the pts daughter, Nathan More (on [facility]), of these results/recommendations. She verbalized understanding and agreed to notify the pt.

## 2017-12-22 ENCOUNTER — HOSPITAL ENCOUNTER (INPATIENT)
Age: 82
LOS: 13 days | Discharge: NURSING, REHAB OR LONG-TERM CARE | DRG: 871 | End: 2018-01-05
Attending: EMERGENCY MEDICINE | Admitting: INTERNAL MEDICINE
Payer: COMMERCIAL

## 2017-12-22 DIAGNOSIS — R77.8 ELEVATED TROPONIN: ICD-10-CM

## 2017-12-22 DIAGNOSIS — A41.9 SEPSIS, DUE TO UNSPECIFIED ORGANISM: Primary | ICD-10-CM

## 2017-12-22 DIAGNOSIS — M17.0 PRIMARY OSTEOARTHRITIS OF BOTH KNEES: ICD-10-CM

## 2017-12-22 PROCEDURE — 99285 EMERGENCY DEPT VISIT HI MDM: CPT | Performed by: EMERGENCY MEDICINE

## 2017-12-22 PROCEDURE — 93005 ELECTROCARDIOGRAM TRACING: CPT | Performed by: EMERGENCY MEDICINE

## 2017-12-22 PROCEDURE — 85379 FIBRIN DEGRADATION QUANT: CPT | Performed by: INTERNAL MEDICINE

## 2017-12-22 NOTE — Clinical Note
Status[de-identified] Inpatient [101] Type of Bed: Medical [8] Inpatient Hospitalization Certified Necessary for the Following Reasons: 3. Patient receiving treatment that can only be provided in an inpatient setting (further clarification in H&P documentation) Admitting Diagnosis: Sepsis ([facility] [address] [[id number]] Admitting Physician: [address] [[id number]] Attending Physician: [address] [[id number]] Estimated Length of Stay: 2 Midnights Discharge Plan[de-identified] Home with Office Follow-up

## 2017-12-23 ENCOUNTER — APPOINTMENT (OUTPATIENT)
Dept: GENERAL RADIOLOGY | Age: 82
DRG: 871 | End: 2017-12-23
Attending: EMERGENCY MEDICINE
Payer: COMMERCIAL

## 2017-12-23 ENCOUNTER — APPOINTMENT (OUTPATIENT)
Dept: ULTRASOUND IMAGING | Age: 82
DRG: 871 | End: 2017-12-23
Attending: INTERNAL MEDICINE
Payer: COMMERCIAL

## 2017-12-23 ENCOUNTER — APPOINTMENT (OUTPATIENT)
Dept: CT IMAGING | Age: 82
DRG: 871 | End: 2017-12-23
Attending: INTERNAL MEDICINE
Payer: COMMERCIAL

## 2017-12-23 PROBLEM — D53.9 MACROCYTIC ANEMIA: Status: ACTIVE | Noted: 2017-12-23

## 2017-12-23 PROBLEM — J96.01 ACUTE RESPIRATORY FAILURE WITH HYPOXIA (HCC): Status: ACTIVE | Noted: 2017-12-23

## 2017-12-23 PROBLEM — I24.8 DEMAND ISCHEMIA (HCC): Status: ACTIVE | Noted: 2017-12-23

## 2017-12-23 PROBLEM — I10 HYPERTENSION: Chronic | Status: ACTIVE | Noted: 2017-12-23

## 2017-12-23 PROBLEM — R79.89 ELEVATED LACTIC ACID LEVEL: Status: ACTIVE | Noted: 2017-12-23

## 2017-12-23 PROBLEM — R77.8 ELEVATED TROPONIN: Status: ACTIVE | Noted: 2017-12-23

## 2017-12-23 PROBLEM — A41.9 SEPSIS (HCC): Status: ACTIVE | Noted: 2017-12-23

## 2017-12-23 PROBLEM — M19.90 OSTEOARTHRITIS: Chronic | Status: ACTIVE | Noted: 2017-07-20

## 2017-12-23 PROBLEM — N18.30 CKD (CHRONIC KIDNEY DISEASE) STAGE 3, GFR 30-59 ML/MIN (HCC): Status: ACTIVE | Noted: 2017-12-23

## 2017-12-23 PROBLEM — M79.604 PAIN OF RIGHT LOWER EXTREMITY: Status: ACTIVE | Noted: 2017-12-23

## 2017-12-23 PROBLEM — R65.10 SIRS (SYSTEMIC INFLAMMATORY RESPONSE SYNDROME) (HCC): Status: ACTIVE | Noted: 2017-12-23

## 2017-12-23 PROBLEM — E87.0 HYPERNATREMIA: Status: ACTIVE | Noted: 2017-12-23

## 2017-12-23 PROBLEM — R50.9 FEVER: Status: ACTIVE | Noted: 2017-12-23

## 2017-12-23 LAB
ALBUMIN SERPL-MCNC: 3.2 G/DL (ref 3.2–4.6)
ALBUMIN/GLOB SERPL: 1 {RATIO} (ref 1.2–3.5)
ALP SERPL-CCNC: 104 U/L (ref 50–136)
ALT SERPL-CCNC: 14 U/L (ref 12–65)
ANION GAP SERPL CALC-SCNC: 10 MMOL/L (ref 7–16)
APTT PPP: >200 SEC (ref 23.2–35.3)
AST SERPL-CCNC: 18 U/L (ref 15–37)
ATRIAL RATE: 112 BPM
BACTERIA URNS QL MICRO: 0 /HPF
BASOPHILS # BLD: 0 K/UL (ref 0–0.2)
BASOPHILS NFR BLD: 0 % (ref 0–2)
BILIRUB SERPL-MCNC: 0.5 MG/DL (ref 0.2–1.1)
BUN SERPL-MCNC: 34 MG/DL (ref 8–23)
CALCIUM SERPL-MCNC: 8.3 MG/DL (ref 8.3–10.4)
CALCULATED P AXIS, ECG09: 77 DEGREES
CALCULATED R AXIS, ECG10: -31 DEGREES
CALCULATED T AXIS, ECG11: 93 DEGREES
CASTS URNS QL MICRO: 0 /LPF
CHLORIDE SERPL-SCNC: 110 MMOL/L (ref 98–107)
CO2 SERPL-SCNC: 26 MMOL/L (ref 21–32)
CREAT SERPL-MCNC: 1.31 MG/DL (ref 0.6–1)
CRYSTALS URNS QL MICRO: 0 /LPF
D DIMER PPP FEU-MCNC: 7.4 UG/ML(FEU)
DIAGNOSIS, 93000: NORMAL
DIFFERENTIAL METHOD BLD: ABNORMAL
EOSINOPHIL # BLD: 0 K/UL (ref 0–0.8)
EOSINOPHIL NFR BLD: 0 % (ref 0.5–7.8)
EPI CELLS #/AREA URNS HPF: NORMAL /HPF
ERYTHROCYTE [DISTWIDTH] IN BLOOD BY AUTOMATED COUNT: 14.9 % (ref 11.9–14.6)
GLOBULIN SER CALC-MCNC: 3.2 G/DL (ref 2.3–3.5)
GLUCOSE SERPL-MCNC: 122 MG/DL (ref 65–100)
HCT VFR BLD AUTO: 32.7 % (ref 35.8–46.3)
HGB BLD-MCNC: 10.8 G/DL (ref 11.7–15.4)
IMM GRANULOCYTES # BLD: 0 K/UL (ref 0–0.5)
IMM GRANULOCYTES NFR BLD AUTO: 0 % (ref 0–5)
LACTATE BLD-SCNC: 2.2 MMOL/L (ref 0.5–1.9)
LACTATE SERPL-SCNC: 2.7 MMOL/L (ref 0.4–2)
LYMPHOCYTES # BLD: 0.3 K/UL (ref 0.5–4.6)
LYMPHOCYTES NFR BLD: 4 % (ref 13–44)
MCH RBC QN AUTO: 34.1 PG (ref 26.1–32.9)
MCHC RBC AUTO-ENTMCNC: 33 G/DL (ref 31.4–35)
MCV RBC AUTO: 103.2 FL (ref 79.6–97.8)
MONOCYTES # BLD: 0.2 K/UL (ref 0.1–1.3)
MONOCYTES NFR BLD: 2 % (ref 4–12)
MUCOUS THREADS URNS QL MICRO: 0 /LPF
NEUTS SEG # BLD: 6.6 K/UL (ref 1.7–8.2)
NEUTS SEG NFR BLD: 94 % (ref 43–78)
OTHER OBSERVATIONS,UCOM: NORMAL
P-R INTERVAL, ECG05: 162 MS
PLATELET # BLD AUTO: 142 K/UL (ref 150–450)
PMV BLD AUTO: 12.6 FL (ref 10.8–14.1)
POTASSIUM SERPL-SCNC: 4.1 MMOL/L (ref 3.5–5.1)
PROCALCITONIN SERPL-MCNC: 0.6 NG/ML
PROT SERPL-MCNC: 6.4 G/DL (ref 6.3–8.2)
Q-T INTERVAL, ECG07: 344 MS
QRS DURATION, ECG06: 94 MS
QTC CALCULATION (BEZET), ECG08: 469 MS
RBC # BLD AUTO: 3.17 M/UL (ref 4.05–5.25)
RBC #/AREA URNS HPF: NORMAL /HPF
SODIUM SERPL-SCNC: 146 MMOL/L (ref 136–145)
TROPONIN I BLD-MCNC: 0.82 NG/ML (ref 0.02–0.05)
TROPONIN I SERPL-MCNC: 1.41 NG/ML (ref 0.02–0.05)
TROPONIN I SERPL-MCNC: 3.72 NG/ML (ref 0.02–0.05)
TROPONIN I SERPL-MCNC: 6.4 NG/ML (ref 0.02–0.05)
TROPONIN I SERPL-MCNC: 6.41 NG/ML (ref 0.02–0.05)
VENTRICULAR RATE, ECG03: 112 BPM
WBC # BLD AUTO: 7.1 K/UL (ref 4.3–11.1)
WBC URNS QL MICRO: 0 /HPF

## 2017-12-23 PROCEDURE — 74011000302 HC RX REV CODE- 302: Performed by: INTERNAL MEDICINE

## 2017-12-23 PROCEDURE — 74011636320 HC RX REV CODE- 636/320: Performed by: INTERNAL MEDICINE

## 2017-12-23 PROCEDURE — 86580 TB INTRADERMAL TEST: CPT | Performed by: INTERNAL MEDICINE

## 2017-12-23 PROCEDURE — 85730 THROMBOPLASTIN TIME PARTIAL: CPT | Performed by: INTERNAL MEDICINE

## 2017-12-23 PROCEDURE — 97161 PT EVAL LOW COMPLEX 20 MIN: CPT

## 2017-12-23 PROCEDURE — 84484 ASSAY OF TROPONIN QUANT: CPT | Performed by: EMERGENCY MEDICINE

## 2017-12-23 PROCEDURE — 74011250637 HC RX REV CODE- 250/637: Performed by: INTERNAL MEDICINE

## 2017-12-23 PROCEDURE — 74011250636 HC RX REV CODE- 250/636: Performed by: INTERNAL MEDICINE

## 2017-12-23 PROCEDURE — 74011250636 HC RX REV CODE- 250/636: Performed by: EMERGENCY MEDICINE

## 2017-12-23 PROCEDURE — 74011000250 HC RX REV CODE- 250: Performed by: INTERNAL MEDICINE

## 2017-12-23 PROCEDURE — 36415 COLL VENOUS BLD VENIPUNCTURE: CPT | Performed by: NURSE PRACTITIONER

## 2017-12-23 PROCEDURE — 65660000000 HC RM CCU STEPDOWN

## 2017-12-23 PROCEDURE — 81003 URINALYSIS AUTO W/O SCOPE: CPT | Performed by: EMERGENCY MEDICINE

## 2017-12-23 PROCEDURE — 83605 ASSAY OF LACTIC ACID: CPT

## 2017-12-23 PROCEDURE — 94760 N-INVAS EAR/PLS OXIMETRY 1: CPT

## 2017-12-23 PROCEDURE — 74011250637 HC RX REV CODE- 250/637: Performed by: EMERGENCY MEDICINE

## 2017-12-23 PROCEDURE — 87205 SMEAR GRAM STAIN: CPT | Performed by: EMERGENCY MEDICINE

## 2017-12-23 PROCEDURE — 71260 CT THORAX DX C+: CPT

## 2017-12-23 PROCEDURE — 97165 OT EVAL LOW COMPLEX 30 MIN: CPT

## 2017-12-23 PROCEDURE — 80053 COMPREHEN METABOLIC PANEL: CPT | Performed by: EMERGENCY MEDICINE

## 2017-12-23 PROCEDURE — 0T9B70Z DRAINAGE OF BLADDER WITH DRAINAGE DEVICE, VIA NATURAL OR ARTIFICIAL OPENING: ICD-10-PCS | Performed by: EMERGENCY MEDICINE

## 2017-12-23 PROCEDURE — 77030011943

## 2017-12-23 PROCEDURE — 87040 BLOOD CULTURE FOR BACTERIA: CPT | Performed by: EMERGENCY MEDICINE

## 2017-12-23 PROCEDURE — 84145 PROCALCITONIN (PCT): CPT | Performed by: EMERGENCY MEDICINE

## 2017-12-23 PROCEDURE — 83605 ASSAY OF LACTIC ACID: CPT | Performed by: INTERNAL MEDICINE

## 2017-12-23 PROCEDURE — 81015 MICROSCOPIC EXAM OF URINE: CPT | Performed by: EMERGENCY MEDICINE

## 2017-12-23 PROCEDURE — 93970 EXTREMITY STUDY: CPT

## 2017-12-23 PROCEDURE — 93306 TTE W/DOPPLER COMPLETE: CPT

## 2017-12-23 PROCEDURE — 51701 INSERT BLADDER CATHETER: CPT | Performed by: EMERGENCY MEDICINE

## 2017-12-23 PROCEDURE — 71010 XR CHEST PORT: CPT

## 2017-12-23 PROCEDURE — 85025 COMPLETE CBC W/AUTO DIFF WBC: CPT | Performed by: EMERGENCY MEDICINE

## 2017-12-23 PROCEDURE — 87076 CULTURE ANAEROBE IDENT EACH: CPT

## 2017-12-23 PROCEDURE — 65270000029 HC RM PRIVATE

## 2017-12-23 PROCEDURE — 74011250636 HC RX REV CODE- 250/636: Performed by: NURSE PRACTITIONER

## 2017-12-23 PROCEDURE — 96360 HYDRATION IV INFUSION INIT: CPT | Performed by: EMERGENCY MEDICINE

## 2017-12-23 PROCEDURE — 77010033678 HC OXYGEN DAILY

## 2017-12-23 PROCEDURE — 87077 CULTURE AEROBIC IDENTIFY: CPT | Performed by: EMERGENCY MEDICINE

## 2017-12-23 PROCEDURE — 87186 SC STD MICRODIL/AGAR DIL: CPT | Performed by: EMERGENCY MEDICINE

## 2017-12-23 PROCEDURE — 87153 DNA/RNA SEQUENCING: CPT

## 2017-12-23 RX ORDER — NALOXONE HYDROCHLORIDE 0.4 MG/ML
0.4 INJECTION, SOLUTION INTRAMUSCULAR; INTRAVENOUS; SUBCUTANEOUS AS NEEDED
Status: DISCONTINUED | OUTPATIENT
Start: 2017-12-23 | End: 2018-01-05 | Stop reason: HOSPADM

## 2017-12-23 RX ORDER — CLOPIDOGREL BISULFATE 75 MG/1
75 TABLET ORAL DAILY
Status: DISCONTINUED | OUTPATIENT
Start: 2017-12-23 | End: 2018-01-05 | Stop reason: HOSPADM

## 2017-12-23 RX ORDER — ZOLPIDEM TARTRATE 5 MG/1
5 TABLET ORAL
Status: DISCONTINUED | OUTPATIENT
Start: 2017-12-23 | End: 2017-12-26

## 2017-12-23 RX ORDER — PRAVASTATIN SODIUM 20 MG/1
20 TABLET ORAL
Status: DISCONTINUED | OUTPATIENT
Start: 2017-12-23 | End: 2018-01-05 | Stop reason: HOSPADM

## 2017-12-23 RX ORDER — GUAIFENESIN/DEXTROMETHORPHAN 100-10MG/5
10 SYRUP ORAL
Status: DISCONTINUED | OUTPATIENT
Start: 2017-12-23 | End: 2018-01-05 | Stop reason: HOSPADM

## 2017-12-23 RX ORDER — POTASSIUM CHLORIDE 750 MG/1
10 TABLET, EXTENDED RELEASE ORAL DAILY
Status: DISCONTINUED | OUTPATIENT
Start: 2017-12-23 | End: 2017-12-28

## 2017-12-23 RX ORDER — ALLOPURINOL 100 MG/1
100 TABLET ORAL 2 TIMES DAILY
Status: DISCONTINUED | OUTPATIENT
Start: 2017-12-23 | End: 2018-01-05 | Stop reason: HOSPADM

## 2017-12-23 RX ORDER — FAMOTIDINE 20 MG/1
20 TABLET, FILM COATED ORAL DAILY
Status: DISCONTINUED | OUTPATIENT
Start: 2017-12-24 | End: 2018-01-02

## 2017-12-23 RX ORDER — ASPIRIN 81 MG/1
81 TABLET ORAL DAILY
Status: DISCONTINUED | OUTPATIENT
Start: 2017-12-23 | End: 2018-01-05 | Stop reason: HOSPADM

## 2017-12-23 RX ORDER — LORAZEPAM 2 MG/ML
1 INJECTION INTRAMUSCULAR
Status: DISCONTINUED | OUTPATIENT
Start: 2017-12-23 | End: 2017-12-26

## 2017-12-23 RX ORDER — POLYETHYLENE GLYCOL 3350 17 G/17G
17 POWDER, FOR SOLUTION ORAL
Status: DISCONTINUED | OUTPATIENT
Start: 2017-12-23 | End: 2018-01-05 | Stop reason: HOSPADM

## 2017-12-23 RX ORDER — GUAIFENESIN 100 MG/5ML
324 LIQUID (ML) ORAL
Status: COMPLETED | OUTPATIENT
Start: 2017-12-23 | End: 2017-12-23

## 2017-12-23 RX ORDER — LORAZEPAM 1 MG/1
1 TABLET ORAL
Status: DISCONTINUED | OUTPATIENT
Start: 2017-12-23 | End: 2017-12-26

## 2017-12-23 RX ORDER — HALOPERIDOL 5 MG/ML
4 INJECTION INTRAMUSCULAR
Status: DISCONTINUED | OUTPATIENT
Start: 2017-12-23 | End: 2017-12-26

## 2017-12-23 RX ORDER — SODIUM CHLORIDE 0.9 % (FLUSH) 0.9 %
10 SYRINGE (ML) INJECTION
Status: ACTIVE | OUTPATIENT
Start: 2017-12-23 | End: 2017-12-24

## 2017-12-23 RX ORDER — ACETAMINOPHEN 325 MG/1
650 TABLET ORAL
Status: DISCONTINUED | OUTPATIENT
Start: 2017-12-23 | End: 2018-01-05 | Stop reason: HOSPADM

## 2017-12-23 RX ORDER — ONDANSETRON 2 MG/ML
4 INJECTION INTRAMUSCULAR; INTRAVENOUS
Status: DISCONTINUED | OUTPATIENT
Start: 2017-12-23 | End: 2018-01-05 | Stop reason: HOSPADM

## 2017-12-23 RX ORDER — SODIUM CHLORIDE 0.9 % (FLUSH) 0.9 %
5-10 SYRINGE (ML) INJECTION EVERY 8 HOURS
Status: DISCONTINUED | OUTPATIENT
Start: 2017-12-23 | End: 2018-01-05 | Stop reason: HOSPADM

## 2017-12-23 RX ORDER — FAMOTIDINE 20 MG/1
20 TABLET, FILM COATED ORAL 2 TIMES DAILY
Status: DISCONTINUED | OUTPATIENT
Start: 2017-12-23 | End: 2017-12-23

## 2017-12-23 RX ORDER — BISACODYL 5 MG
5 TABLET, DELAYED RELEASE (ENTERIC COATED) ORAL DAILY PRN
Status: DISCONTINUED | OUTPATIENT
Start: 2017-12-23 | End: 2018-01-05 | Stop reason: HOSPADM

## 2017-12-23 RX ORDER — HEPARIN SODIUM 5000 [USP'U]/100ML
12-25 INJECTION, SOLUTION INTRAVENOUS
Status: DISCONTINUED | OUTPATIENT
Start: 2017-12-23 | End: 2017-12-25

## 2017-12-23 RX ORDER — HEPARIN SODIUM 5000 [USP'U]/ML
4000 INJECTION, SOLUTION INTRAVENOUS; SUBCUTANEOUS ONCE
Status: COMPLETED | OUTPATIENT
Start: 2017-12-23 | End: 2017-12-23

## 2017-12-23 RX ORDER — SODIUM CHLORIDE 450 MG/100ML
100 INJECTION, SOLUTION INTRAVENOUS CONTINUOUS
Status: DISCONTINUED | OUTPATIENT
Start: 2017-12-23 | End: 2017-12-23

## 2017-12-23 RX ORDER — COLCHICINE 0.6 MG/1
0.6 CAPSULE ORAL DAILY
Status: DISCONTINUED | OUTPATIENT
Start: 2017-12-23 | End: 2018-01-03

## 2017-12-23 RX ORDER — HYDROCODONE BITARTRATE AND ACETAMINOPHEN 5; 325 MG/1; MG/1
1 TABLET ORAL
Status: DISCONTINUED | OUTPATIENT
Start: 2017-12-23 | End: 2017-12-23

## 2017-12-23 RX ORDER — HYDROCODONE BITARTRATE AND ACETAMINOPHEN 7.5; 325 MG/1; MG/1
1 TABLET ORAL
Status: DISCONTINUED | OUTPATIENT
Start: 2017-12-23 | End: 2017-12-26

## 2017-12-23 RX ORDER — SODIUM CHLORIDE 9 MG/ML
1 INJECTION, SOLUTION INTRAVENOUS CONTINUOUS
Status: DISPENSED | OUTPATIENT
Start: 2017-12-23 | End: 2017-12-23

## 2017-12-23 RX ORDER — HEPARIN SODIUM 5000 [USP'U]/ML
5000 INJECTION, SOLUTION INTRAVENOUS; SUBCUTANEOUS EVERY 8 HOURS
Status: DISCONTINUED | OUTPATIENT
Start: 2017-12-23 | End: 2017-12-23

## 2017-12-23 RX ORDER — DEXTROSE MONOHYDRATE AND SODIUM CHLORIDE 5; .45 G/100ML; G/100ML
100 INJECTION, SOLUTION INTRAVENOUS CONTINUOUS
Status: DISPENSED | OUTPATIENT
Start: 2017-12-23 | End: 2017-12-24

## 2017-12-23 RX ORDER — AMOXICILLIN 250 MG
2 CAPSULE ORAL
Status: DISCONTINUED | OUTPATIENT
Start: 2017-12-23 | End: 2018-01-05 | Stop reason: HOSPADM

## 2017-12-23 RX ORDER — SODIUM CHLORIDE 9 MG/ML
1 INJECTION, SOLUTION INTRAVENOUS CONTINUOUS
Status: DISPENSED | OUTPATIENT
Start: 2017-12-23 | End: 2017-12-24

## 2017-12-23 RX ORDER — ACETAMINOPHEN 500 MG
1000 TABLET ORAL
Status: COMPLETED | OUTPATIENT
Start: 2017-12-23 | End: 2017-12-23

## 2017-12-23 RX ORDER — HYDRALAZINE HYDROCHLORIDE 20 MG/ML
20 INJECTION INTRAMUSCULAR; INTRAVENOUS
Status: DISCONTINUED | OUTPATIENT
Start: 2017-12-23 | End: 2018-01-05 | Stop reason: HOSPADM

## 2017-12-23 RX ORDER — DIPHENHYDRAMINE HYDROCHLORIDE 50 MG/ML
25 INJECTION, SOLUTION INTRAMUSCULAR; INTRAVENOUS
Status: DISCONTINUED | OUTPATIENT
Start: 2017-12-23 | End: 2017-12-26

## 2017-12-23 RX ORDER — SODIUM CHLORIDE 0.9 % (FLUSH) 0.9 %
5-10 SYRINGE (ML) INJECTION AS NEEDED
Status: DISCONTINUED | OUTPATIENT
Start: 2017-12-23 | End: 2018-01-05 | Stop reason: HOSPADM

## 2017-12-23 RX ORDER — TORSEMIDE 20 MG/1
20 TABLET ORAL DAILY
Status: DISCONTINUED | OUTPATIENT
Start: 2017-12-23 | End: 2017-12-23

## 2017-12-23 RX ADMIN — SODIUM CHLORIDE 1 ML/KG/HR: 900 INJECTION, SOLUTION INTRAVENOUS at 23:04

## 2017-12-23 RX ADMIN — HEPARIN SODIUM 4000 UNITS: 5000 INJECTION, SOLUTION INTRAVENOUS; SUBCUTANEOUS at 14:33

## 2017-12-23 RX ADMIN — HEPARIN SODIUM AND DEXTROSE 12 UNITS/KG/HR: 5000; 5 INJECTION INTRAVENOUS at 14:35

## 2017-12-23 RX ADMIN — CEFTRIAXONE SODIUM 1 G: 1 INJECTION, POWDER, FOR SOLUTION INTRAMUSCULAR; INTRAVENOUS at 02:39

## 2017-12-23 RX ADMIN — ALLOPURINOL 100 MG: 100 TABLET ORAL at 09:32

## 2017-12-23 RX ADMIN — HYDROCODONE BITARTRATE AND ACETAMINOPHEN 1 TABLET: 7.5; 325 TABLET ORAL at 17:47

## 2017-12-23 RX ADMIN — PRAVASTATIN SODIUM 20 MG: 20 TABLET ORAL at 23:30

## 2017-12-23 RX ADMIN — Medication 10 ML: at 05:18

## 2017-12-23 RX ADMIN — HYDROCODONE BITARTRATE AND ACETAMINOPHEN 1 TABLET: 7.5; 325 TABLET ORAL at 23:30

## 2017-12-23 RX ADMIN — ACETAMINOPHEN 1000 MG: 500 TABLET, FILM COATED ORAL at 00:39

## 2017-12-23 RX ADMIN — POTASSIUM CHLORIDE 10 MEQ: 750 TABLET, EXTENDED RELEASE ORAL at 09:37

## 2017-12-23 RX ADMIN — ACETAMINOPHEN 650 MG: 325 TABLET ORAL at 10:26

## 2017-12-23 RX ADMIN — IOPAMIDOL 100 ML: 755 INJECTION, SOLUTION INTRAVENOUS at 22:27

## 2017-12-23 RX ADMIN — ALLOPURINOL 100 MG: 100 TABLET ORAL at 17:47

## 2017-12-23 RX ADMIN — ASPIRIN 81 MG: 81 TABLET, COATED ORAL at 08:26

## 2017-12-23 RX ADMIN — SODIUM CHLORIDE 1000 ML: 900 INJECTION, SOLUTION INTRAVENOUS at 00:30

## 2017-12-23 RX ADMIN — Medication 10 ML: at 14:00

## 2017-12-23 RX ADMIN — SODIUM CHLORIDE 1 ML/KG/HR: 900 INJECTION, SOLUTION INTRAVENOUS at 14:54

## 2017-12-23 RX ADMIN — CLOPIDOGREL BISULFATE 75 MG: 75 TABLET ORAL at 09:37

## 2017-12-23 RX ADMIN — COLCHICINE 0.6 MG: 0.6 CAPSULE ORAL at 08:26

## 2017-12-23 RX ADMIN — HEPARIN SODIUM 5000 UNITS: 5000 INJECTION, SOLUTION INTRAVENOUS; SUBCUTANEOUS at 05:16

## 2017-12-23 RX ADMIN — TUBERCULIN PURIFIED PROTEIN DERIVATIVE 5 UNITS: 5 INJECTION INTRADERMAL at 05:17

## 2017-12-23 RX ADMIN — FAMOTIDINE 20 MG: 20 TABLET, FILM COATED ORAL at 09:32

## 2017-12-23 RX ADMIN — ASPIRIN 81 MG 324 MG: 81 TABLET ORAL at 00:39

## 2017-12-23 RX ADMIN — HYDROCODONE BITARTRATE AND ACETAMINOPHEN 1 TABLET: 7.5; 325 TABLET ORAL at 05:26

## 2017-12-23 NOTE — PROGRESS NOTES
Problem: Self Care Deficits Care Plan (Adult)  Goal: *Acute Goals and Plan of Care (Insert Text)  1. Patient will complete lower body bathing and dressing with minimal assistance and adaptive equipment as needed. 2. Patient will complete toileting with supervision. 3. Patient will tolerate 25 minutes of OT treatment with 2-3 rest breaks to increase activity tolerance for ADLs. 4. Patient will complete functional transfers with supervision and adaptive equipment as needed. 5. Patient will complete functional mobility with supervision for household distances and with minimal cues for safety. Timeframe: 7 visits       OCCUPATIONAL THERAPY: Initial Assessment and PM 12/23/2017  INPATIENT: Hospital Day: 2  Payor: [employer]US / [place] [employer] [place] / Product Type: Managed Care Medicare /      NAME/AGE/GENDER: Esteban Traore is a 80 y.o. female   PRIMARY DIAGNOSIS:  Sepsis ([address]  Sepsis ([address] Sepsis ([address] Sepsis ([address]        ICD-10: Treatment Diagnosis:    · Generalized Muscle Weakness (M62.81)  · Other lack of cordination (R27.8)   Precautions/Allergies:     Bee pollen and Fire ant      ASSESSMENT:     Ms. Narciso Meyer presents to the hospital with sepsis. Pt's recent ultrasound negative for DVT and nursing approved for therapy to work with patient. Upon arrival pt's IV beeping with nursing staff notified. Pt was alert and cooperative to work with therapy this afternoon. Pt reports she needs to use the restroom. Pt assisted out of bed with CGA to minimal assistance overall. Pt needs moderate safety cueing during functional transfers and for safety with walker management. Pt did well with functional mobility only requiring CGA and no loss of balance observed. Pt was able to complete her own bladder hygiene. Pt did need some additional time and assistance transferring from the toilet.  Pt completed functional mobility and returned back to supine in bed at end of session with bed alarm activated. Pt is currently functioning below baseline for ADL/functional transfers and will benefit from OT services to address stated goals and plan of care. This section established at most recent assessment   PROBLEM LIST (Impairments causing functional limitations):  1. Decreased Strength  2. Decreased ADL/Functional Activities  3. Decreased Transfer Abilities  4. Decreased Ambulation Ability/Technique  5. Decreased Balance  6. Decreased Activity Tolerance  7. Decreased Flexibility/Joint Mobility  8. Edema/Girth  9. Decreased [place] with Home Exercise Program  10. Decreased Cognition   INTERVENTIONS PLANNED: (Benefits and precautions of occupational therapy have been discussed with the patient.)  1. Activities of daily living training  2. Adaptive equipment training  3. Balance training  4. Clothing management  5. Cognitive training  6. Donning&doffing training  7. Group therapy  8. Neuromuscular re-eduation  9. Therapeutic activity  10. Therapeutic exercise     TREATMENT PLAN: Frequency/Duration: Follow patient 3 times per week to address above goals. Rehabilitation Potential For Stated Goals: Excellent     RECOMMENDED REHABILITATION/EQUIPMENT: (at time of discharge pending progress): Due to the probability of continued deficits (see above) this patient will likely need continued skilled occupational therapy after discharge. Equipment:    TBD              OCCUPATIONAL PROFILE AND HISTORY:   History of Present Injury/Illness (Reason for Referral):  See H&P  Past Medical History/Comorbidities:   Ms. Darrell Villatoro  has a past medical history of Acute kidney failure, unspecified; Arthritis; CAD (coronary artery disease); Cellulitis and abscess of other specified site; Coronary atherosclerosis of native coronary artery; Essential hypertension, benign (3/17/2015); Hypertension; Hypopotassemia; Mixed hyperlipidemia; Osteoarthritis (7/20/2017); Other and unspecified hyperlipidemia; Reflux esophagitis;  Renal failure, unspecified; Shortness of breath; and Unspecified essential hypertension. Ms. Jimenez Ours  has a past surgical history that includes hx cholecystectomy; pr layr clos wnd face,facial <2.5cm; pr cardiac surg procedure unlist; hx cataract removal (Bilateral); and hx hysterectomy. Social History/Living Environment:   Home Environment: Apartment  # Steps to Enter: 0  One/Two Story Residence: One story  Living Alone: No  Support Systems: Child(jayleen), Friends \ neighbors, Family member(s), [employer] community  Patient Expects to be Discharged to[de-identified] Private residence  Current DME Used/Available at Home: Monia Lundborg, straight, Shower chair  Tub or Shower Type: Tub/Shower combination  Prior Level of Function/Work/Activity:  Pt lives with her son. Pt's son is at home with her at all times. Pt has a walker but completes functional mobility with a cane primarily. Reports no recent falls. Pt completes ADL with occasional assistance from her daughter. Pt still participated in laundry and cooking tasks. Personal Factors:          Past/Current Experience:  Hx of knee pain with difficulty with prolonged standing        Other factors that influence how disability is experienced by the patient:  Multiple co-morbidities (see above)   Number of Personal Factors/Comorbidities that affect the Plan of Care: Expanded review of therapy/medical records (1-2):  MODERATE COMPLEXITY   ASSESSMENT OF OCCUPATIONAL PERFORMANCE[de-identified]   Activities of Daily Living:             Basic ADLs (From Assessment) Complex ADLs (From Assessment)   Basic ADL  Feeding: Stand-by assistance  Oral Facial Hygiene/Grooming: Minimum assistance  Bathing: Moderate assistance  Upper Body Dressing: Moderate assistance  Lower Body Dressing: Moderate assistance  Toileting:  Moderate assistance Instrumental ADL  Meal Preparation: Moderate assistance  Homemaking: Maximum assistance   Grooming/Bathing/Dressing Activities of Daily Living     Cognitive Retraining  Safety/Judgement: Fall prevention;Home safety                 Functional Transfers  Toilet Transfer : Minimum assistance  Tub Transfer: Moderate assistance  Shower Transfer: Minimum assistance     Bed/Mat Mobility  Rolling: Contact guard assistance  Supine to Sit: Minimum assistance  Sit to Supine: Contact guard assistance  Sit to Stand: Contact guard assistance  Bed to Chair: Contact guard assistance  Scooting: Stand-by asssistance       Most Recent Physical Functioning:   Gross Assessment:  AROM: Generally decreased, functional  Strength: Generally decreased, functional  Coordination: Generally decreased, functional               Posture:  Posture (WDL): Exceptions to WDL  Posture Assessment: Cervical, Forward head, Rounded shoulders  Balance:  Sitting: Impaired  Sitting - Static: Fair (occasional)  Sitting - Dynamic: Fair (occasional)  Standing: Impaired  Standing - Static: Fair  Standing - Dynamic : Fair Bed Mobility:  Rolling: Contact guard assistance  Supine to Sit: Minimum assistance  Sit to Supine: Contact guard assistance  Scooting: Stand-by asssistance  Wheelchair Mobility:     Transfers:  Sit to Stand: Contact guard assistance  Stand to Sit: Contact guard assistance  Bed to Chair: Contact guard assistance              Patient Vitals for the past 6 hrs:   BP BP Patient Position SpO2 Pulse   12/23/17 1122 (!) 87/55 At rest 99 % 72       Mental Status  Neurologic State: Alert  Orientation Level: Oriented to person  Cognition: Decreased attention/concentration, Follows commands, Poor safety awareness  Perception: Appears intact  Perseveration: No perseveration noted  Safety/Judgement: Fall prevention, Home safety                          Physical Skills Involved:  1. Balance  2. Strength  3. Activity Tolerance Cognitive Skills Affected (resulting in the inability to perform in a timely and safe manner):  1. Executive Function  2. Sustained Attention Psychosocial Skills Affected:  1. Habits/Routines  2.  Environmental Adaptation  3. Self-Awareness   Number of elements that affect the Plan of Care: 5+:  HIGH COMPLEXITY   CLINICAL DECISION MAKIN[address] [place] [address] AM-PAC 6 Clicks   Daily Activity Inpatient Short Form  How much help from another person does the patient currently need. .. Total A Lot A Little None   1. Putting on and taking off regular lower body clothing? [] 1   [x] 2   [] 3   [] 4   2. Bathing (including washing, rinsing, drying)? [] 1   [x] 2   [] 3   [] 4   3. Toileting, which includes using toilet, bedpan or urinal?   [] 1   [x] 2   [] 3   [] 4   4. Putting on and taking off regular upper body clothing? [] 1   [x] 2   [] 3   [] 4   5. Taking care of personal grooming such as brushing teeth? [] 1   [] 2   [x] 3   [] 4   6. Eating meals? [] 1   [] 2   [x] 3   [] 4   © , Trustees of [address] [place] [address], under license to [employer]. All rights reserved      Score:  Initial: 14 Most Recent: X (Date: -- )    Interpretation of Tool:  Represents activities that are increasingly more difficult (i.e. Bed mobility, Transfers, Gait). Score 24 23 22-20 19-15 14-10 9-7 6     Modifier CH CI CJ CK CL CM CN      ? Self Care:     - CURRENT STATUS: CL - 60%-79% impaired, limited or restricted    - GOAL STATUS: CK - 40%-59% impaired, limited or restricted    - D/C STATUS:  ---------------To be determined---------------  Payor: [employer]US / [place] [employer] [place] / Product Type: Managed Care Medicare /      Medical Necessity:     · Patient demonstrates excellent rehab potential due to higher previous functional level. Reason for Services/Other Comments:  · Patient continues to require skilled intervention due to decreased independence with ADL/functional transfers.    Use of outcome tool(s) and clinical judgement create a POC that gives a: LOW COMPLEXITY         TREATMENT:   (In addition to Assessment/Re-Assessment sessions the following treatments were rendered)     Pre-treatment Symptoms/Complaints:    Pain: Initial:   Pain Intensity 1: 0  Post Session:  0/10     Assessment/Reassessment only, no treatment provided today    Braces/Orthotics/Lines/Etc:   · IV  Treatment/Session Assessment:    · Response to Treatment:  Evaluation only. · Interdisciplinary Collaboration:   o Occupational Therapist  o Registered Nurse  · After treatment position/precautions:   o Supine in bed  o Bed alarm/tab alert on  o Bed/Chair-wheels locked  o Call light within reach  o RN notified   · Compliance with Program/Exercises: Will assess as treatment progresses. · Recommendations/Intent for next treatment session: \"Next visit will focus on advancements to more challenging activities and reduction in assistance provided\".   Total Treatment Duration:  OT Patient Time In/Time Out  Time In: 1600  Time Out: Marisa Pereira 912, OT

## 2017-12-23 NOTE — CONSULTS
[facility] Cardiology Consult                Date of  Admission: 12/22/2017 11:21 PM     Primary Care Physician: Dr. Sivan Flores  Primary Cardiologist: Dr. Carmen Vega  Referring Physician: [facility]  Consulting Physician: Dr. Carmen Vega    CC/Reason for consult:elevated trop      Nehemiah Mcmanus is a 80 y.o. female with prior h/o cDHF, mod to severe pHTN, CAD with prior stenting many yrs ago but last LHC by Dr. Martin Rodriguez  In 2014 showed mild to mod CAD with patent LAD stent. . Last echo 2016 showed preserved LV function, mod to severe TR and RVSP 60. Patient presented to [facility] with c/o fever, chills, SOB and N/V. She reports jaw/teeth pain at home prior to ED visit. In ED labs showed trop #b 1 of 1.41 then second at 6.40. She was admitted by hospital medicine for ? Sepsis and unclear source of infection. Temp earlier last night 101.9. She denies any chest pain.      Diagnosis    Essential hypertension, benign    Acute kidney failure, unspecified    Reflux esophagitis    Coronary atherosclerosis of native coronary artery    Hypopotassemia    Mixed hyperlipidemia    Cellulitis and abscess of other specified site    Shortness of breath    Diastolic CHF, chronic (HCC)    Pulmonary hypertension    Gout    Osteoarthritis    Pre-diabetes    [facility]    Demand ischemia ([facility]    Acute respiratory failure with hypoxia (HCC)    Fever    Elevated troponin    CKD (chronic kidney disease) stage 3, GFR 30-59 ml/min    Elevated lactic acid level    Hypernatremia    Macrocytic anemia    Pain of right lower extremity       Past Medical History:   Diagnosis Date    Acute kidney failure, unspecified     Arthritis     CAD (coronary artery disease)     Cellulitis and abscess of other specified site     Coronary atherosclerosis of native coronary artery     Essential hypertension, benign 3/17/2015    Hypertension     Hypopotassemia     Mixed hyperlipidemia     Osteoarthritis 7/20/2017    Other and unspecified hyperlipidemia     Reflux esophagitis     Renal failure, unspecified     Shortness of breath     Unspecified essential hypertension       Past Surgical History:   Procedure Laterality Date    CARDIAC SURG PROCEDURE UNLIST      stent    HX CATARACT REMOVAL Bilateral     HX CHOLECYSTECTOMY      HX HYSTERECTOMY      complete    LAYR CLOS WND FACE,FACIAL <2.5CM       Allergies   Allergen Reactions    Bee Pollen Swelling    Fire Ant Itching and Swelling      Family History   Problem Relation Age of Onset    Heart Attack Mother     Heart Attack Father     Heart Disease Brother     Heart Disease Brother         Current Facility-Administered Medications   Medication Dose Route Frequency    allopurinol (ZYLOPRIM) tablet 100 mg  100 mg Oral BID    aspirin delayed-release tablet 81 mg  81 mg Oral DAILY    clopidogrel (PLAVIX) tablet 75 mg  75 mg Oral DAILY    colchicine (MITIGARE) capsule 0.6 mg  0.6 mg Oral DAILY    HYDROcodone-acetaminophen (NORCO) 7.5-325 mg per tablet 1 Tab  1 Tab Oral Q6H PRN    potassium chloride (KLOR-CON) tablet 10 mEq  10 mEq Oral DAILY    pravastatin (PRAVACHOL) tablet 20 mg  20 mg Oral QHS    famotidine (PEPCID) tablet 20 mg  20 mg Oral BID    hydrALAZINE (APRESOLINE) 20 mg/mL injection 20 mg  20 mg IntraVENous Q6H PRN    haloperidol lactate (HALDOL) injection 4 mg  4 mg IntraVENous Q6H PRN    guaiFENesin-dextromethorphan (ROBITUSSIN DM) 100-10 mg/5 mL syrup 10 mL  10 mL Oral Q6H PRN    bisacodyl (DULCOLAX) tablet 5 mg  5 mg Oral DAILY PRN    LORazepam (ATIVAN) injection 1 mg  1 mg IntraVENous Q2H PRN    polyethylene glycol (MIRALAX) packet 17 g  17 g Oral DAILY PRN    sodium chloride (NS) flush 5-10 mL  5-10 mL IntraVENous Q8H    sodium chloride (NS) flush 5-10 mL  5-10 mL IntraVENous PRN    acetaminophen (TYLENOL) tablet 650 mg  650 mg Oral Q4H PRN    naloxone (NARCAN) injection 0.4 mg  0.4 mg IntraVENous PRN    diphenhydrAMINE (BENADRYL) injection 25 mg  25 mg IntraVENous Q4H PRN    ondansetron (ZOFRAN) injection 4 mg  4 mg IntraVENous Q4H PRN    senna-docusate (PERICOLACE) 8.6-50 mg per tablet 2 Tab  2 Tab Oral DAILY PRN    LORazepam (ATIVAN) tablet 1 mg  1 mg Oral Q4H PRN    zolpidem (AMBIEN) tablet 5 mg  5 mg Oral QHS PRN    heparin (porcine) injection 5,000 Units  5,000 Units SubCUTAneous Q8H    cefTRIAXone (ROCEPHIN) 1 g in sterile water (preservative free) 10 mL IV syringe  1 g IntraVENous Q24H    influenza vaccine 2017-18 (3 yrs+)(PF) (FLUZONE QUAD/FLUARIX QUAD) injection 0.5 mL  0.5 mL IntraMUSCular PRIOR TO DISCHARGE       Review of Systems   Constitution: Positive for chills, fever, weakness and malaise/fatigue. Negative for diaphoresis. HENT: Negative for congestion. Cardiovascular: Negative for chest pain, claudication, cyanosis, dyspnea on exertion, irregular heartbeat, leg swelling, near-syncope, orthopnea, palpitations, paroxysmal nocturnal dyspnea and syncope.        + jaw/teeth pain prior to ED visit. Respiratory: Negative for cough, shortness of breath and wheezing. Endocrine: Negative for cold intolerance and heat intolerance. Hematologic/Lymphatic: Does not bruise/bleed easily. Skin: Negative for nail changes. Gastrointestinal: Positive for abdominal pain. Neurological: Negative for dizziness and headaches.         Physical Exam  Vitals:    12/23/17 0259 12/23/17 0437 12/23/17 0740 12/23/17 1122   BP:  109/70 90/57 (!) 87/55   Pulse:  91 74 72   Resp:  20 19 19   Temp:  98.2 °F (36.8 °C) 97.8 °F (36.6 °C) 97.9 °F (36.6 °C)   SpO2: 97% 100% 97% 99%   Weight:       Height:           Physical Exam:  General: Well Developed, Well Nourished, No Acute Distress  HEENT: pupils equal and round, no abnormalities noted  Neck: supple, no JVD, no carotid bruits  Heart: S1S2 with RRR without murmurs or gallops  Lungs: Clear throughout auscultation bilaterally without adventitious sounds  Abd: soft, nontender, nondistended, with good bowel sounds  Ext: warm, no edema, calves supple/nontender, pulses 2+ bilaterally  Skin: warm and dry  Psychiatric: Normal mood and affect  Neurologic: Alert and oriented X 3    Cardiographics    Telemetry: Sr  ECG: ST with NSST   Echocardiogram: ordered    Labs:   Recent Labs      12/23/17   0101  12/23/17   0004   NA  146*   --    K  4.1   --    BUN  34*   --    CREA  1.31*   --    GLU  122*   --    WBC   --   7.1   HGB   --   10.8*   HCT   --   32.7*   PLT   --   142*        Assessment/Plan:     Assessment:      Principal Problem:    Sepsis ([facility] (12/23/2017)- per primary team; ? Source. On Abx    Active Problems:    Essential hypertension, benign (3/17/2015)- controlled      Diastolic CHF, chronic ([facility] (5/17/2016)      Demand ischemia ([facility] (12/23/2017)- see below     Fever (12/23/2017)      Elevated troponin (12/23/2017)- unsure if truly demand ischemia but has sepsis and hypotension. Will check echo, trend troponins. Start hep gtt. Continue asa, plavix, statin. Her ACE-I on hold d/t CKD. Unable to add BB with hypotension. Can reassess need once B/P improves. Also  Primary team working up ? PE. CKD (chronic kidney disease) stage 3, GFR 30-59 ml/min (12/23/2017)- improved; ACE-I on hold      Elevated lactic acid level (12/23/2017)- per primary team      Hypernatremia (12/23/2017)      Macrocytic anemia (12/23/2017)      Pain of right lower extremity (12/23/2017)- per primary team; pending US to r/u DVT, checking D. Dimer      Thank you very much for this referral. We appreciate the opportunity to participate in this patient's care. We will follow along with above stated plan.     Eros Baez, LAURA  Consulting MD: Dr. Azul Anderson

## 2017-12-23 NOTE — ED NOTES
TRANSFER - OUT REPORT:    Verbal report given to SAM Ren(name) on [employer]  being transferred to 729(unit) for routine progression of care       Report consisted of patients Situation, Background, Assessment and   Recommendations(SBAR). Information from the following report(s) SBAR, ED Summary, MAR, Recent Results and Cardiac Rhythm Sinus tach was reviewed with the receiving nurse. Lines:       Opportunity for questions and clarification was provided.       Patient transported with:   O2 @ 2 liters

## 2017-12-23 NOTE — ED PROVIDER NOTES
HPI Comments: Per nurse's notes: \"Patient arrives via EMS for weakness, nausea, vomiting, fever. EMS states this has been on going for the last 6 hours. EMS gave ~500ml NS, 3.75g zosyn. EMS . \"    Patient is a 80 y.o. female presenting with vomiting. The history is provided by the patient and the EMS personnel. Vomiting    This is a new problem. The current episode started 3 to 5 hours ago. The problem occurs 2 to 4 times per day. The problem has been resolved. The emesis has an appearance of stomach contents and clear. Maximum temperature: per EMS, patient 104 axillary on arrival. The fever has been present for less than 1 day. Associated symptoms include chills and a fever. Pertinent negatives include no sweats, no abdominal pain, no diarrhea, no headaches, no arthralgias, no myalgias, no cough, no URI and no headaches. Her pertinent negatives include no irritable bowel syndrome, no inflammatory bowel disease, no short gut syndrome, no bowel resection, no recent abdominal surgery, no malabsorption, no gastric bypass and no DM.         Past Medical History:   Diagnosis Date    Acute kidney failure, unspecified     Arthritis     CAD (coronary artery disease)     Cellulitis and abscess of other specified site     Coronary atherosclerosis of native coronary artery     Essential hypertension, benign 3/17/2015    Hypertension     Hypopotassemia     Mixed hyperlipidemia     Osteoarthritis 7/20/2017    Other and unspecified hyperlipidemia     Reflux esophagitis     Renal failure, unspecified     Shortness of breath     Unspecified essential hypertension        Past Surgical History:   Procedure Laterality Date    CARDIAC SURG PROCEDURE UNLIST      stent    HX CATARACT REMOVAL Bilateral     HX CHOLECYSTECTOMY      HX HYSTERECTOMY      complete    LAYR CLOS WND FACE,FACIAL <2.5CM           Family History:   Problem Relation Age of Onset    Heart Attack Mother     Heart Attack Father     Heart Disease Brother     Heart Disease Brother        Social History     Social History    Marital status:      Spouse name: N/A    Number of children: N/A    Years of education: N/A     Occupational History    Not on file. Social History Main Topics    Smoking status: Never Smoker    Smokeless tobacco: Never Used    Alcohol use No    Drug use: No    Sexual activity: Not Currently     Other Topics Concern    Not on file     Social History Narrative         ALLERGIES: Bee pollen and Fire ant    Review of Systems   Constitutional: Positive for chills, fatigue and fever. Negative for diaphoresis. HENT:        Patient complains of some shortness of breath and jaw pain as well today. Respiratory: Negative for cough. Cardiovascular: Positive for leg swelling (chronic and unchanged). Negative for chest pain and palpitations. Gastrointestinal: Positive for nausea and vomiting. Negative for abdominal pain, blood in stool, constipation and diarrhea. Musculoskeletal: Negative for arthralgias and myalgias. Neurological: Negative for headaches. All other systems reviewed and are negative. Vitals:    12/22/17 2330   BP: 112/65   Pulse: (!) 116   Resp: 24   Temp: 98.3 °F (36.8 °C)   SpO2: 90%   Weight: 69.9 kg (154 lb)   Height: 5' (1.524 m)            Physical Exam   Constitutional: She is oriented to person, place, and time. She appears well-developed and well-nourished. No distress. HENT:   Head: Normocephalic and atraumatic. Right Ear: Tympanic membrane and external ear normal.   Left Ear: Tympanic membrane and external ear normal.   Mouth/Throat: Oropharynx is clear and moist.   Eyes: Conjunctivae and EOM are normal. Pupils are equal, round, and reactive to light. Neck: Normal range of motion. Neck supple. No tracheal deviation present. Cardiovascular: Normal rate, regular rhythm, normal heart sounds and intact distal pulses. Exam reveals no gallop and no friction rub.     No murmur heard. Pulmonary/Chest: Effort normal and breath sounds normal. No respiratory distress. She has no wheezes. Abdominal: Soft. Bowel sounds are normal. She exhibits no distension and no mass. There is no hepatosplenomegaly. There is no tenderness. There is no rebound and no guarding. Musculoskeletal: Normal range of motion. She exhibits edema (1+ bilateral lower extremities). Lymphadenopathy:     She has no cervical adenopathy. Neurological: She is alert and oriented to person, place, and time. She displays normal reflexes. No cranial nerve deficit. Skin: Skin is warm and dry. No rash noted. She is not diaphoretic. No erythema. Psychiatric: She has a normal mood and affect. Nursing note and vitals reviewed. MDM  Number of Diagnoses or Management Options  Elevated troponin: new and requires workup  Sepsis, due to unspecified organism [facility]): new and requires workup     Amount and/or Complexity of Data Reviewed  Clinical lab tests: ordered and reviewed  Tests in the radiology section of CPT®: ordered and reviewed  Decide to obtain previous medical records or to obtain history from someone other than the patient: yes  Obtain history from someone other than the patient: yes  Review and summarize past medical records: yes  Independent visualization of images, tracings, or specimens: yes    Risk of Complications, Morbidity, and/or Mortality  Presenting problems: high  Diagnostic procedures: moderate  Management options: high    Patient Progress  Patient progress: improved    ED Course       Procedures    The patient was observed in the ED. Case was discussed with the hospitalist who will admit for further evaluation and treatment.     Results Reviewed:      Recent Results (from the past 24 hour(s))   CBC WITH AUTOMATED DIFF    Collection Time: 12/23/17 12:04 AM   Result Value Ref Range    WBC 7.1 4.3 - 11.1 K/uL    RBC 3.17 (L) 4.05 - 5.25 M/uL    HGB 10.8 (L) 11.7 - 15.4 g/dL    HCT 32.7 (L) 35.8 - 46.3 %    .2 (H) 79.6 - 97.8 FL    MCH 34.1 (H) 26.1 - 32.9 PG    MCHC 33.0 31.4 - 35.0 g/dL    RDW 14.9 (H) 11.9 - 14.6 %    PLATELET 255 (L) 275 - 450 K/uL    MPV 12.6 10.8 - 14.1 FL    DF AUTOMATED      NEUTROPHILS 94 (H) 43 - 78 %    LYMPHOCYTES 4 (L) 13 - 44 %    MONOCYTES 2 (L) 4.0 - 12.0 %    EOSINOPHILS 0 (L) 0.5 - 7.8 %    BASOPHILS 0 0.0 - 2.0 %    IMMATURE GRANULOCYTES 0 0.0 - 5.0 %    ABS. NEUTROPHILS 6.6 1.7 - 8.2 K/UL    ABS. LYMPHOCYTES 0.3 (L) 0.5 - 4.6 K/UL    ABS. MONOCYTES 0.2 0.1 - 1.3 K/UL    ABS. EOSINOPHILS 0.0 0.0 - 0.8 K/UL    ABS. BASOPHILS 0.0 0.0 - 0.2 K/UL    ABS. IMM. GRANS. 0.0 0.0 - 0.5 K/UL   PROCALCITONIN    Collection Time: 12/23/17 12:04 AM   Result Value Ref Range    Procalcitonin 0.6 ng/mL   TROPONIN I    Collection Time: 12/23/17 12:04 AM   Result Value Ref Range    Troponin-I, Qt. 1.41 (HH) 0.02 - 0.05 NG/ML   POC TROPONIN-I    Collection Time: 12/23/17 12:05 AM   Result Value Ref Range    Troponin-I (POC) 0.82 (H) 0.02 - 0.05 ng/ml   POC LACTIC ACID    Collection Time: 12/23/17 12:07 AM   Result Value Ref Range    Lactic Acid (POC) 2.2 (H) 0.5 - 1.9 mmol/L   METABOLIC PANEL, COMPREHENSIVE    Collection Time: 12/23/17  1:01 AM   Result Value Ref Range    Sodium 146 (H) 136 - 145 mmol/L    Potassium 4.1 3.5 - 5.1 mmol/L    Chloride 110 (H) 98 - 107 mmol/L    CO2 26 21 - 32 mmol/L    Anion gap 10 7 - 16 mmol/L    Glucose 122 (H) 65 - 100 mg/dL    BUN 34 (H) 8 - 23 MG/DL    Creatinine 1.31 (H) 0.6 - 1.0 MG/DL    GFR est AA 49 (L) >60 ml/min/1.73m2    GFR est non-AA 41 (L) >60 ml/min/1.73m2    Calcium 8.3 8.3 - 10.4 MG/DL    Bilirubin, total 0.5 0.2 - 1.1 MG/DL    ALT (SGPT) 14 12 - 65 U/L    AST (SGOT) 18 15 - 37 U/L    Alk.  phosphatase 104 50 - 136 U/L    Protein, total 6.4 6.3 - 8.2 g/dL    Albumin 3.2 3.2 - 4.6 g/dL    Globulin 3.2 2.3 - 3.5 g/dL    A-G Ratio 1.0 (L) 1.2 - 3.5         XR CHEST PORT   Final Result   IMPRESSION: Minimal linear atelectasis of left lower lobe.

## 2017-12-23 NOTE — ED TRIAGE NOTES
Patient arrives via EMS for weakness, nausea, vomiting, fever. EMS states this has been on going for the last 6 hours. EMS gave ~500ml NS, 3.75g zosyn. EMS .

## 2017-12-23 NOTE — PROGRESS NOTES
Problem: Mobility Impaired (Adult and Pediatric)  Goal: *Therapy Goal (Edit Goal, Insert Text)  STG:  (1.)Ms. Jamie Nova will move from supine to sit and sit to supine , scoot up and down and roll side to side with STAND BY ASSIST within 4 day(s). (2.)Ms. Jamie Nova will transfer from bed to chair and chair to bed with STAND BY ASSIST using the least restrictive device within 4 day(s). (3.)Ms. Jamie Nova will ambulate with STAND BY ASSIST for 100 feet with the least restrictive device within 4 day(s). LTG:  (1.)Ms. Jamie Nova will move from supine to sit and sit to supine , scoot up and down and roll side to side in bed with MODIFIED INDEPENDENT within 7 day(s). (2.)Ms. Jamie Nova will transfer from bed to chair and chair to bed with MODIFIED INDEPENDENCE using the least restrictive device within 7 day(s). (3.)Ms. Jamie Nova will ambulate with MODIFIED INDEPENDENCE for 250 feet with the least restrictive device within 7 day(s). ________________________________________________________________________________________________       PHYSICAL THERAPY: Initial Assessment, Treatment Day: Day of Assessment 12/23/2017  INPATIENT: Hospital Day: 2  Payor: [employer] VIP CARE PLUS / [place] [employer] VIP [place]ARE [place]US / Product Type: Managed Care Medicare /      NAME/AGE/GENDER: Austin Velásquez is a 80 y.o. female   PRIMARY DIAGNOSIS: Sepsis ([address]  Sepsis ([address] Sepsis ([address] Sepsis ([address]        ICD-10: Treatment Diagnosis:   · Difficulty in walking, Not elsewhere classified (R26.2)   Precaution/Allergies:  Bee pollen and Fire ant      ASSESSMENT:     Ms. Jamie Nova presents with decreased transfers, ambulation and mobility with above diagnosis. She had a period of hypotension earlier but is not symptomatic sitting or standing and has been cleared from presence of DVT for LE. She demonstrates transfers of SBA x 1 to CGA x 1 out of bed to sit and then SBA x 1 to CGA x 1 to stand.   Then, she ambulates for 40 feet without complaints of increased pain and mild fatigue reported with a 2 wheel rolling walker before return to seated edge of bed and return to supine with SBA x 1 to CGA x 1. Skilled PT is indicated for patient safety and mobility progression during current acute care episode. This section established at most recent assessment   PROBLEM LIST (Impairments causing functional limitations):  1. Decreased Strength  2. Decreased ADL/Functional Activities  3. Decreased Transfer Abilities  4. Decreased Ambulation Ability/Technique  5. Decreased Balance  6. Decreased Activity Tolerance  7. Decreased Pacing Skills  8. Decreased Flexibility/Joint Mobility  9. Decreased Orient with Home Exercise Program   INTERVENTIONS PLANNED: (Benefits and precautions of physical therapy have been discussed with the patient.)  1. Balance Exercise  2. Bed Mobility  3. Family Education  4. Gait Training  5. Home Exercise Program (HEP)  6. Range of Motion (ROM)  7. Therapeutic Activites  8. Therapeutic Exercise/Strengthening  9. Transfer Training     TREATMENT PLAN: Frequency/Duration: 3-5 times a week for duration of hospital stay  Rehabilitation Potential For Stated Goals: Good      RECOMMENDED REHABILITATION/EQUIPMENT: (at time of discharge pending progress): Due to the probability of continued deficits (see above) this patient will likely need continued skilled physical therapy after discharge. HISTORY:   History of Present Injury/Illness (Reason for Referral):  Patient is a 79 y/o F who presented with fever, chills/shaking, SOB, and a NBNB nausea/vomiting episode x1 that occurred a few hours ago. She is a very poor historian, limits history. No formal diagnosis of dementia. Says that she was brought here after she started shaking earlier this evening, and then had vomiting. Said she had a fever but cannot tell me the temperature. Denies any other new symptoms.   Denies CP, palpitations, cough, abd pain, urinary symptoms, diarrhea, new rashes/wounds.       10 systems reviewed and negative except as noted in HPI. Past Medical History/Comorbidities:   Ms. Leonel Romero  has a past medical history of Acute kidney failure, unspecified; Arthritis; CAD (coronary artery disease); Cellulitis and abscess of other specified site; Coronary atherosclerosis of native coronary artery; Essential hypertension, benign (3/17/2015); Hypertension; Hypopotassemia; Mixed hyperlipidemia; Osteoarthritis (7/20/2017); Other and unspecified hyperlipidemia; Reflux esophagitis; Renal failure, unspecified; Shortness of breath; and Unspecified essential hypertension. Ms. Leonel Romero  has a past surgical history that includes hx cholecystectomy; pr layr clos wnd face,facial <2.5cm; pr cardiac surg procedure unlist; hx cataract removal (Bilateral); and hx hysterectomy. Social History/Living Environment:   Home Environment: Apartment  # Steps to Enter: 0  One/Two Story Residence: One story  Living Alone: No  Support Systems: Child(jayleen), Friends \ neighbors, Family member(s), [employer]arsanism / jeffery community  Patient Expects to be Discharged to[de-identified] Private residence  Current DME Used/Available at Home: Cane, straight, Shower chair  Prior Level of Function/Work/Activity:  Patient was utilizing a 2 wheel rolling walker at home with ambulation. Dominant Side:         RIGHT  Personal Factors:          Sex:  female        Age:  80 y.o.    Number of Personal Factors/Comorbidities that affect the Plan of Care: 1-2: MODERATE COMPLEXITY   EXAMINATION:   Most Recent Physical Functioning:   Gross Assessment:  AROM: Generally decreased, functional  Strength: Generally decreased, functional  Coordination: Generally decreased, functional  Tone: Normal  Sensation: Intact               Posture:  Posture (WDL): Exceptions to WDL  Posture Assessment: Cervical, Forward head, Rounded shoulders  Balance:  Sitting: Impaired  Sitting - Static: Fair (occasional)  Sitting - Dynamic: Fair (occasional)  Standing: Impaired  Standing - Static: Fair  Standing - Dynamic : Fair Bed Mobility:  Rolling: Stand-by asssistance;Contact guard assistance  Supine to Sit: Stand-by asssistance;Contact guard assistance  Sit to Supine: Stand-by asssistance;Contact guard assistance  Scooting: Stand-by asssistance;Contact guard assistance  Wheelchair Mobility:     Transfers:  Sit to Stand: Stand-by asssistance;Contact guard assistance  Stand to Sit: Stand-by asssistance;Contact guard assistance  Bed to Chair: Stand-by asssistance;Contact guard assistance  Gait:     Base of Support: Center of gravity altered  Speed/Mary: Fluctuations; Pace decreased (<100 feet/min); Shuffled; Slow  Step Length: Left shortened;Right shortened  Swing Pattern: Left asymmetrical;Right asymmetrical  Stance: Left decreased;Right decreased;Time;Weight shift  Gait Abnormalities: Decreased step clearance;Shuffling gait; Steppage gait; Step to gait;Trunk sway increased  Distance (ft): 40 Feet (ft)  Assistive Device: Walker, rolling  Ambulation - Level of Assistance: Stand-by asssistance;Contact guard assistance  Interventions: Manual cues; Safety awareness training; Tactile cues; Verbal cues; Visual/Demos      Body Structures Involved:  1. Bones  2. Joints  3. Muscles  4. Ligaments Body Functions Affected:  1. Neuromusculoskeletal  2. Movement Related  3. Skin Related  4. Metobolic/Endocrine Activities and Participation Affected:  1. General Tasks and Demands  2. Mobility  3. Self Care  4. Community, Social and [place]   Number of elements that affect the Plan of Care: 1-2: LOW COMPLEXITY   CLINICAL PRESENTATION:   Presentation: Evolving clinical presentation with changing clinical characteristics: MODERATE COMPLEXITY   CLINICAL DECISION MAKIN [facility] Mobility Inpatient Short Form  How much difficulty does the patient currently have. .. Unable A Lot A Little None   1.   Turning over in bed (including adjusting bedclothes, sheets and blankets)? [] 1   [] 2   [x] 3   [] 4   2. Sitting down on and standing up from a chair with arms ( e.g., wheelchair, bedside commode, etc.)   [] 1   [] 2   [x] 3   [] 4   3. Moving from lying on back to sitting on the side of the bed? [] 1   [] 2   [x] 3   [] 4   How much help from another person does the patient currently need. .. Total A Lot A Little None   4. Moving to and from a bed to a chair (including a wheelchair)? [] 1   [] 2   [x] 3   [] 4   5. Need to walk in hospital room? [] 1   [] 2   [x] 3   [] 4   6. Climbing 3-5 steps with a railing? [] 1   [] 2   [x] 3   [] 4   © 2007, Trustees of [address] [place] [address], under license to [employer]. All rights reserved      Score:  Initial: 18 Most Recent: X (Date: -- )    Interpretation of Tool:  Represents activities that are increasingly more difficult (i.e. Bed mobility, Transfers, Gait). Score 24 23 22-20 19-15 14-10 9-7 6     Modifier CH CI CJ CK CL CM CN      ? Mobility - Walking and Moving Around:     - CURRENT STATUS: CK - 40%-59% impaired, limited or restricted    - GOAL STATUS: CJ - 20%-39% impaired, limited or restricted    - D/C STATUS:  ---------------To be determined---------------  Payor: [employer] VIP CARE PLUS [place] [employer] [place] / Product Type: Managed Care Medicare /      Medical Necessity:     · Patient demonstrates good rehab potential due to higher previous functional level. Reason for Services/Other Comments:  · Patient continues to require skilled intervention due to medical complications, patient unable to attend/participate in therapy as expected and decreased transfers, ambulation and mobility.    Use of outcome tool(s) and clinical judgement create a POC that gives a: Clear prediction of patient's progress: LOW COMPLEXITY            TREATMENT:   (In addition to Assessment/Re-Assessment sessions the following treatments were rendered)   Pre-treatment Symptoms/Complaints:  0/10 no specific pain reproted. Pain: Initial:   Pain Intensity 1: 0 (no specific pain reported at this time.  )  Post Session:  0/10 no specific pain reported. Assessment/Reassessment only, no treatment provided today    Braces/Orthotics/Lines/Etc:   · O2 Device: Nasal cannula  Treatment/Session Assessment:    · Response to Treatment:  Initial evaluation completed with successful mobility and ambulation in the room. · Interdisciplinary Collaboration:   o Physical Therapist  o Registered Nurse  · After treatment position/precautions:   o Supine in bed  o Bed alarm/tab alert on  o Bed/Chair-wheels locked  o Bed in low position  o Call light within reach  o RN notified  o Side rails x 3   · Compliance with Program/Exercises: Will assess as treatment progresses. · Recommendations/Intent for next treatment session: \"Next visit will focus on advancements to more challenging activities, reduction in assistance provided and transfers, ambulation and mobility. \".   Total Treatment Duration:  PT Patient Time In/Time Out  Time In: 1525  Time Out: [address] [place], [place]

## 2017-12-23 NOTE — PROGRESS NOTES
Patient is downstairs in radiology having ultrasound to rule out DVT. PT to return in afternoon for evaluation and treatment pending results of doppler ultrasound. Jagdeep Mallory, PT, DPT, OCS.

## 2017-12-23 NOTE — PROGRESS NOTES
Radiology hydration protocol verified with Dr. Aries Jameson , order to hold D5NS1/2 at 100 ml/hr infusion until hydration is finished.

## 2017-12-23 NOTE — PROGRESS NOTES
12/23/2017  Initiated OT evaluation, however nurse reports that patient is going to have ultrasound on LE to rule out DVT. Will hold until ultrasound is read.      Thank you,  Olman Oneill, OT

## 2017-12-23 NOTE — H&P
Hospitalist H&P Note     Admit Date:  2017 11:21 PM   Name:  Antoine Malave   Age:  80 y.o.  :  2/10/1927   MRN:  [id number]   PCP:  Allison Lopez MD  Treatment Team: Attending Provider: Rodo Del Angel MD; Primary Nurse: Mine Chow    HPI:   Patient is a 79 y/o F who presented with fever, chills/shaking, SOB, and a NBNB nausea/vomiting episode x1 that occurred a few hours ago. She is a very poor historian, limits history. No formal diagnosis of dementia. Says that she was brought here after she started shaking earlier this evening, and then had vomiting. Said she had a fever but cannot tell me the temperature. Denies any other new symptoms. Denies CP, palpitations, cough, abd pain, urinary symptoms, diarrhea, new rashes/wounds. 10 systems reviewed and negative except as noted in HPI.   Past Medical History:   Diagnosis Date    Acute kidney failure, unspecified     Arthritis     CAD (coronary artery disease)     Cellulitis and abscess of other specified site     Coronary atherosclerosis of native coronary artery     Essential hypertension, benign 3/17/2015    Hypertension     Hypopotassemia     Mixed hyperlipidemia     Osteoarthritis 2017    Other and unspecified hyperlipidemia     Reflux esophagitis     Renal failure, unspecified     Shortness of breath     Unspecified essential hypertension       Past Surgical History:   Procedure Laterality Date    CARDIAC SURG PROCEDURE UNLIST      stent    HX CATARACT REMOVAL Bilateral     HX CHOLECYSTECTOMY      HX HYSTERECTOMY      complete    LAYR CLOS WND FACE,FACIAL <2.5CM        Allergies   Allergen Reactions    Bee Pollen Swelling    Fire Ant Itching and Swelling      Social History   Substance Use Topics    Smoking status: Never Smoker    Smokeless tobacco: Never Used    Alcohol use No      Family History   Problem Relation Age of Onset    Heart Attack Mother     Heart Attack Father     Heart Disease Brother     Heart Disease Brother       Immunization History   Administered Date(s) Administered    Pneumococcal Conjugate (PCV-13) 03/17/2016    Tetanus Toxoid, Adsorbed 03/17/2016     PTA Medications:  Prior to Admission Medications   Prescriptions Last Dose Informant Patient Reported? Taking? HYDROcodone-acetaminophen (NORCO) 7.5-325 mg per tablet   No No   Sig: Take 1 Tab by mouth every eight (8) hours as needed for Pain. Max Daily Amount: 3 Tabs. allopurinol (ZYLOPRIM) 100 mg tablet   No No   Sig: Take 1 Tab by mouth two (2) times a day. aspirin 81 mg CpDR   Yes No   Sig: Take  by mouth daily. clopidogrel (PLAVIX) 75 mg tab   No No   Sig: Take 1 Tab by mouth daily. clotrimazole (LOTRIMIN AF, CLOTRIMAZOLE,) 1 % topical cream   No No   Sig: Apply  to affected area two (2) times a day. colchicine (MITIGARE) 0.6 mg capsule   No No   Sig: Take 1 Cap by mouth daily. Indications: prevention of acute gout attack   conjugated estrogens (PREMARIN) 0.625 mg/gram vaginal cream   No No   Sig: Insert 0.5 g into vagina daily. lisinopril (PRINIVIL, ZESTRIL) 30 mg tablet   No No   Sig: Take 1 Tab by mouth daily. loratadine (CLARITIN) 10 mg tablet   No No   Sig: Take 1 Tab by mouth daily. potassium chloride (KLOR-CON M10) 10 mEq tablet   No No   Sig: Take 1 Tab by mouth daily. pravastatin (PRAVACHOL) 20 mg tablet   No No   Sig: Take 1 Tab by mouth nightly. raNITIdine (ZANTAC) 150 mg tablet   No No   Sig: Take 1 Tab by mouth two (2) times a day. torsemide (DEMADEX) 20 mg tablet   No No   Sig: Take 1 Tab by mouth daily.       Facility-Administered Medications: None       Objective:     Patient Vitals for the past 24 hrs:   Temp Pulse Resp BP SpO2   12/22/17 2340 (!) 101.9 °F (38.8 °C) - - - -   12/22/17 2330 98.3 °F (36.8 °C) (!) 116 24 112/65 90 %     Oxygen Therapy  O2 Sat (%): 90 % (12/22/17 2330)  O2 Device: Room air (12/22/17 2330)  No intake or output data in the 24 hours ending 12/23/17 0157 Physical Exam:  General:    Well nourished. Alert. Eyes:   Normal sclera. Extraocular movements intact. ENT:  Normocephalic, atraumatic. Moist mucous membranes  CV:   Mild tachy. No m/r/g. Peripheral pulses 2+. Capillary refill <2s. Lungs:  CTAB. No wheezing, rhonchi, or rales. Abdomen: Soft, nontender, nondistended. Bowel sounds normal.   Extremities: Warm and dry. No cyanosis or edema. Neurologic: CN II-XII grossly intact. Sensation intact. Skin:     No rashes or jaundice. Normal coloration  Psych:  Normal mood and affect. I reviewed the labs, imaging, EKGs, telemetry, and other studies done this admission. Data Review:   Recent Results (from the past 24 hour(s))   CBC WITH AUTOMATED DIFF    Collection Time: 12/23/17 12:04 AM   Result Value Ref Range    WBC 7.1 4.3 - 11.1 K/uL    RBC 3.17 (L) 4.05 - 5.25 M/uL    HGB 10.8 (L) 11.7 - 15.4 g/dL    HCT 32.7 (L) 35.8 - 46.3 %    .2 (H) 79.6 - 97.8 FL    MCH 34.1 (H) 26.1 - 32.9 PG    MCHC 33.0 31.4 - 35.0 g/dL    RDW 14.9 (H) 11.9 - 14.6 %    PLATELET 782 (L) 530 - 450 K/uL    MPV 12.6 10.8 - 14.1 FL    DF AUTOMATED      NEUTROPHILS 94 (H) 43 - 78 %    LYMPHOCYTES 4 (L) 13 - 44 %    MONOCYTES 2 (L) 4.0 - 12.0 %    EOSINOPHILS 0 (L) 0.5 - 7.8 %    BASOPHILS 0 0.0 - 2.0 %    IMMATURE GRANULOCYTES 0 0.0 - 5.0 %    ABS. NEUTROPHILS 6.6 1.7 - 8.2 K/UL    ABS. LYMPHOCYTES 0.3 (L) 0.5 - 4.6 K/UL    ABS. MONOCYTES 0.2 0.1 - 1.3 K/UL    ABS. EOSINOPHILS 0.0 0.0 - 0.8 K/UL    ABS. BASOPHILS 0.0 0.0 - 0.2 K/UL    ABS. IMM.  GRANS. 0.0 0.0 - 0.5 K/UL   PROCALCITONIN    Collection Time: 12/23/17 12:04 AM   Result Value Ref Range    Procalcitonin 0.6 ng/mL   TROPONIN I    Collection Time: 12/23/17 12:04 AM   Result Value Ref Range    Troponin-I, Qt. 1.41 (HH) 0.02 - 0.05 NG/ML   POC TROPONIN-I    Collection Time: 12/23/17 12:05 AM   Result Value Ref Range    Troponin-I (POC) 0.82 (H) 0.02 - 0.05 ng/ml   POC LACTIC ACID    Collection Time: 12/23/17 12:07 AM   Result Value Ref Range    Lactic Acid (POC) 2.2 (H) 0.5 - 1.9 mmol/L   METABOLIC PANEL, COMPREHENSIVE    Collection Time: 12/23/17  1:01 AM   Result Value Ref Range    Sodium 146 (H) 136 - 145 mmol/L    Potassium 4.1 3.5 - 5.1 mmol/L    Chloride 110 (H) 98 - 107 mmol/L    CO2 26 21 - 32 mmol/L    Anion gap 10 7 - 16 mmol/L    Glucose 122 (H) 65 - 100 mg/dL    BUN 34 (H) 8 - 23 MG/DL    Creatinine 1.31 (H) 0.6 - 1.0 MG/DL    GFR est AA 49 (L) >60 ml/min/1.73m2    GFR est non-AA 41 (L) >60 ml/min/1.73m2    Calcium 8.3 8.3 - 10.4 MG/DL    Bilirubin, total 0.5 0.2 - 1.1 MG/DL    ALT (SGPT) 14 12 - 65 U/L    AST (SGOT) 18 15 - 37 U/L    Alk. phosphatase 104 50 - 136 U/L    Protein, total 6.4 6.3 - 8.2 g/dL    Albumin 3.2 3.2 - 4.6 g/dL    Globulin 3.2 2.3 - 3.5 g/dL    A-G Ratio 1.0 (L) 1.2 - 3.5         All Micro Results     Procedure Component Value Units Date/Time    CULTURE, BLOOD [[id number]] Collected:  12/23/17 0004    Order Status:  Completed Specimen:  Blood from Blood Updated:  12/23/17 0036    CULTURE, BLOOD [[id number]] Collected:  12/23/17 0004    Order Status:  Completed Specimen:  Blood from Blood Updated:  12/23/17 0036          Other Studies:  Xr Chest Port    Result Date: 12/23/2017  EXAM:  XR CHEST PORT INDICATION:  chest pain COMPARISON:  1/5/2017 FINDINGS: A portable AP radiograph of the chest was obtained at 0025 hours. The patient is on a cardiac monitor. Minimal linear atelectasis of left lower lobe. Mitral annular calcification. The bones and soft tissues are grossly within normal limits. IMPRESSION: Minimal linear atelectasis of left lower lobe.        Assessment and Plan:     Hospital Problems as of 12/23/2017  Date Reviewed: 11/30/2017          Codes Class Noted - Resolved POA    * (Principal)Sepsis ([facility] ICD-10-CM: A41.9  ICD-9-CM: 038.9, 995.91  12/23/2017 - Present Yes        Demand ischemia ([facility] ICD-10-CM: I24.8  ICD-9-CM: 411.89  12/23/2017 - Present Yes        Acute respiratory failure with hypoxia [facility]) ICD-10-CM: J96.01  ICD-9-CM: 518.81  12/23/2017 - Present Yes        Diastolic CHF, chronic (HCC) (Chronic) ICD-10-CM: I50.32  ICD-9-CM: 428.32, 428.0  5/17/2016 - Present Yes        Essential hypertension, benign (Chronic) ICD-10-CM: I10  ICD-9-CM: 401.1  3/17/2015 - Present Yes              PLAN:  · Admit to inpatient  · No clear source of infection. Pt does not appear septic despite meeting criteria. She is hemodynamically stable and does not look bad; smiling and appears comfortable. UA unremarkable. CXR no PNA. No coughing, diarrhea, acute pain (has chronic arthritis pain, not worse). im not convinced she has a bacterial infection but start rocephin for now and monitor. Follow cultures  · Trend troponins, possibly demand ischemia. If she did not have documented fever I might suspect all of her symptoms to be cardiac related e.g. atypical ACS picture. No overt CP     DVT ppx:  heparin  Anticipated DC needs:  Ppd ordered.   PT/OT/SW consulted  Code status:  Full  Estimated LOS:  Greater than 2 midnights  Risk:  high    Signed:  Deondre Antoine MD

## 2017-12-23 NOTE — PROGRESS NOTES
TRANSFER - IN REPORT:    [facility] [employer]  being received from ED for routine progression of care      Report consisted of patients Situation, Background, Assessment and   Recommendations(SBAR). Information from the following report(s) SBAR, Kardex, ED Summary, [facility] and Recent Results was reviewed with the receiving nurse. Opportunity for questions and clarification was provided. Assessment completed upon patients arrival to unit and care assumed.

## 2017-12-23 NOTE — PROGRESS NOTES
Dual skin assessment with this nurse and Fabiola Apgar RN. Patient's upper extremities are discolored/ciera. Patient has scattered moles and varicosities. Patient's bilateral feet are dry and flaky with thickened toenails. Patient has excoriation under her breasts. Patient has irritation/lesions in her nimesh area. Patient has small scars to back and abdomen. No other skin issues noted.

## 2017-12-23 NOTE — PROGRESS NOTES
Hospitalist progress note          Date of service 12/23/17 - 10:25 AM  Date of admission 12/22/2017  LOS: 0 days   Subjective/Interval History   Patient is a 81 y/o F who presented with fever, chills/shaking, SOB. No clear source of infection. UA unremarkable. CXR with no signs of PNA. No coughing, diarrhea, acute pain. She was placed on Rocephin for empiric coverage. Overnight, the patient developed some transient hypoxia and was placed on 2 L NC and had a temp 101.9, and subsequently received tylenol. Her HR has trended down since admission, initially 103, last check 74. Her troponin has trended up overnight from 1.4 to 6.4. Lactic acid was also elevated. She currently reports some RLE pain- which she states is chronic.       Objective/Physical Exam     Vitals:    12/23/17 0241 12/23/17 0259 12/23/17 0437 12/23/17 0740   BP:   109/70 90/57   Pulse: (!) 102  91 74   Resp: 24 20 19   Temp:   98.2 °F (36.8 °C) 97.8 °F (36.6 °C)   SpO2: 96% 97% 100% 97%   Weight:       Height:        No intake or output data in the 24 hours ending 12/23/17 1100 Oxygen Therapy  O2 Sat (%): 97 % (12/23/17 0740)  Pulse via Oximetry: 102 beats per minute (12/23/17 0241)  O2 Device: Nasal cannula (12/23/17 0259)  O2 Flow Rate (L/min): 2 l/min (12/23/17 0259)  General: NAD, conversant    Lungs: CTA bilaterally, nml resp effort    CV: RRR, no MGRs    Abd: soft, NT/ND, +BS    Skin: no rash, lesions, or ulcers    Ext: RLE ttp  Psych: A&O X3, appropriate affect      Recent Results (from the past 48 hour(s))   EKG, 12 LEAD, INITIAL    Collection Time: 12/22/17 11:35 PM   Result Value Ref Range    Ventricular Rate 112 BPM    Atrial Rate 112 BPM    P-R Interval 162 ms    QRS Duration 94 ms    Q-T Interval 344 ms    QTC Calculation (Bezet) 469 ms    Calculated P Axis 77 degrees    Calculated R Axis -31 degrees    Calculated T Axis 93 degrees    Diagnosis       Sinus tachycardia  Left axis deviation  Nonspecific ST and T wave abnormality  Abnormal ECG  When compared with ECG of 05-JAN-2017 19:42,  Vent. rate has increased BY  46 BPM  T wave inversion now evident in Lateral leads     CBC WITH AUTOMATED DIFF    Collection Time: 12/23/17 12:04 AM   Result Value Ref Range    WBC 7.1 4.3 - 11.1 K/uL    RBC 3.17 (L) 4.05 - 5.25 M/uL    HGB 10.8 (L) 11.7 - 15.4 g/dL    HCT 32.7 (L) 35.8 - 46.3 %    .2 (H) 79.6 - 97.8 FL    MCH 34.1 (H) 26.1 - 32.9 PG    MCHC 33.0 31.4 - 35.0 g/dL    RDW 14.9 (H) 11.9 - 14.6 %    PLATELET 775 (L) 147 - 450 K/uL    MPV 12.6 10.8 - 14.1 FL    DF AUTOMATED      NEUTROPHILS 94 (H) 43 - 78 %    LYMPHOCYTES 4 (L) 13 - 44 %    MONOCYTES 2 (L) 4.0 - 12.0 %    EOSINOPHILS 0 (L) 0.5 - 7.8 %    BASOPHILS 0 0.0 - 2.0 %    IMMATURE GRANULOCYTES 0 0.0 - 5.0 %    ABS. NEUTROPHILS 6.6 1.7 - 8.2 K/UL    ABS. LYMPHOCYTES 0.3 (L) 0.5 - 4.6 K/UL    ABS. MONOCYTES 0.2 0.1 - 1.3 K/UL    ABS. EOSINOPHILS 0.0 0.0 - 0.8 K/UL    ABS. BASOPHILS 0.0 0.0 - 0.2 K/UL    ABS. IMM.  GRANS. 0.0 0.0 - 0.5 K/UL   PROCALCITONIN    Collection Time: 12/23/17 12:04 AM   Result Value Ref Range    Procalcitonin 0.6 ng/mL   TROPONIN I    Collection Time: 12/23/17 12:04 AM   Result Value Ref Range    Troponin-I, Qt. 1.41 (HH) 0.02 - 0.05 NG/ML   POC TROPONIN-I    Collection Time: 12/23/17 12:05 AM   Result Value Ref Range    Troponin-I (POC) 0.82 (H) 0.02 - 0.05 ng/ml   POC LACTIC ACID    Collection Time: 12/23/17 12:07 AM   Result Value Ref Range    Lactic Acid (POC) 2.2 (H) 0.5 - 1.9 mmol/L   METABOLIC PANEL, COMPREHENSIVE    Collection Time: 12/23/17  1:01 AM   Result Value Ref Range    Sodium 146 (H) 136 - 145 mmol/L    Potassium 4.1 3.5 - 5.1 mmol/L    Chloride 110 (H) 98 - 107 mmol/L    CO2 26 21 - 32 mmol/L    Anion gap 10 7 - 16 mmol/L    Glucose 122 (H) 65 - 100 mg/dL    BUN 34 (H) 8 - 23 MG/DL    Creatinine 1.31 (H) 0.6 - 1.0 MG/DL    GFR est AA 49 (L) >60 ml/min/1.73m2    GFR est non-AA 41 (L) >60 ml/min/1.73m2    Calcium 8.3 8.3 - 10.4 MG/DL    Bilirubin, total 0.5 0.2 - 1.1 MG/DL    ALT (SGPT) 14 12 - 65 U/L    AST (SGOT) 18 15 - 37 U/L    Alk. phosphatase 104 50 - 136 U/L    Protein, total 6.4 6.3 - 8.2 g/dL    Albumin 3.2 3.2 - 4.6 g/dL    Globulin 3.2 2.3 - 3.5 g/dL    A-G Ratio 1.0 (L) 1.2 - 3.5     URINE MICROSCOPIC    Collection Time: 12/23/17  1:40 AM   Result Value Ref Range    WBC 0 0 /hpf    RBC 0-3 0 /hpf    Epithelial cells 0-3 0 /hpf    Bacteria 0 0 /hpf    Casts 0 0 /lpf    Crystals, urine 0 0 /LPF    Mucus 0 0 /lpf    Other observations RESULTS VERIFIED MANUALLY     TROPONIN I    Collection Time: 12/23/17  8:48 AM   Result Value Ref Range    Troponin-I, Qt. 6.40 (HH) 0.02 - 0.05 NG/ML     All Micro Results     Procedure Component Value Units Date/Time    CULTURE, BLOOD [[id number]] Collected:  12/23/17 0004    Order Status:  Completed Specimen:  Blood from Blood Updated:  12/23/17 0036    CULTURE, BLOOD [[id number]] Collected:  12/23/17 0004    Order Status:  Completed Specimen:  Blood from Blood Updated:  12/23/17 0036          XR CHEST PORT   Final Result   IMPRESSION: Minimal linear atelectasis of left lower lobe. DUPLEX LOWER EXT VENOUS BILAT    (Results Pending)       No results found for this visit on 12/22/17.   Labs and radiology reviewed above  Inpatient medications  Current Facility-Administered Medications   Medication Dose Route Frequency    allopurinol (ZYLOPRIM) tablet 100 mg  100 mg Oral BID    aspirin delayed-release tablet 81 mg  81 mg Oral DAILY    clopidogrel (PLAVIX) tablet 75 mg  75 mg Oral DAILY    colchicine (MITIGARE) capsule 0.6 mg  0.6 mg Oral DAILY    HYDROcodone-acetaminophen (NORCO) 7.5-325 mg per tablet 1 Tab  1 Tab Oral Q6H PRN    potassium chloride (KLOR-CON) tablet 10 mEq  10 mEq Oral DAILY    pravastatin (PRAVACHOL) tablet 20 mg  20 mg Oral QHS    famotidine (PEPCID) tablet 20 mg  20 mg Oral BID    hydrALAZINE (APRESOLINE) 20 mg/mL injection 20 mg  20 mg IntraVENous Q6H PRN    haloperidol lactate (HALDOL) injection 4 mg  4 mg IntraVENous Q6H PRN    guaiFENesin-dextromethorphan (ROBITUSSIN DM) 100-10 mg/5 mL syrup 10 mL  10 mL Oral Q6H PRN    bisacodyl (DULCOLAX) tablet 5 mg  5 mg Oral DAILY PRN    LORazepam (ATIVAN) injection 1 mg  1 mg IntraVENous Q2H PRN    polyethylene glycol (MIRALAX) packet 17 g  17 g Oral DAILY PRN    sodium chloride (NS) flush 5-10 mL  5-10 mL IntraVENous Q8H    sodium chloride (NS) flush 5-10 mL  5-10 mL IntraVENous PRN    acetaminophen (TYLENOL) tablet 650 mg  650 mg Oral Q4H PRN    naloxone (NARCAN) injection 0.4 mg  0.4 mg IntraVENous PRN    diphenhydrAMINE (BENADRYL) injection 25 mg  25 mg IntraVENous Q4H PRN    ondansetron (ZOFRAN) injection 4 mg  4 mg IntraVENous Q4H PRN    senna-docusate (PERICOLACE) 8.6-50 mg per tablet 2 Tab  2 Tab Oral DAILY PRN    LORazepam (ATIVAN) tablet 1 mg  1 mg Oral Q4H PRN    zolpidem (AMBIEN) tablet 5 mg  5 mg Oral QHS PRN    heparin (porcine) injection 5,000 Units  5,000 Units SubCUTAneous Q8H    cefTRIAXone (ROCEPHIN) 1 g in sterile water (preservative free) 10 mL IV syringe  1 g IntraVENous Q24H    influenza vaccine 2017-18 (3 yrs+)(PF) (FLUZONE QUAD/FLUARIX QUAD) injection 0.5 mL  0.5 mL IntraMUSCular PRIOR TO DISCHARGE       Principal Problem:    Sepsis (HCC) (12/23/2017)    Active Problems:    Essential hypertension, benign (1/30/5661)      Diastolic CHF, chronic (HCC) (5/17/2016)      Demand ischemia ([facility] (12/23/2017)      Fever (12/23/2017)      Elevated troponin (12/23/2017)      CKD (chronic kidney disease) stage 3, GFR 30-59 ml/min (12/23/2017)      Elevated lactic acid level (12/23/2017)      Hypernatremia (12/23/2017)      Macrocytic anemia (12/23/2017)      Pain of right lower extremity (12/23/2017)      Plan   1. Sepsis- based on fever, tachycardia- no clear source,?viral infection, cont empiric antibiotics, await culutes, will need to rule out thromboembolic disease as well.       2. Elevated troponin- no current CP, will ask cardiology to evaluate    3. RLE ttp- will check US to r/o DVT, check D-dimer, if elevated will CT to r/o PE    4. Elevated lactic acid- will repeat    5.  Macrocytic anemia- will check Vit B12 in AM    VTE Prophylaxis: heparin/SCDs  Code Status: Full Code   Adele Dior MD   Inpatient Medicine  12/23/17, 10:25 AM

## 2017-12-24 PROBLEM — I21.4 NSTEMI (NON-ST ELEVATED MYOCARDIAL INFARCTION) (HCC): Status: ACTIVE | Noted: 2017-12-24

## 2017-12-24 LAB
ANION GAP SERPL CALC-SCNC: 10 MMOL/L (ref 7–16)
APTT PPP: 159.2 SEC (ref 23.2–35.3)
APTT PPP: 76.9 SEC (ref 23.2–35.3)
BASOPHILS # BLD: 0 K/UL (ref 0–0.2)
BASOPHILS NFR BLD: 0 % (ref 0–2)
BNP SERPL-MCNC: 768 PG/ML
BUN SERPL-MCNC: 28 MG/DL (ref 8–23)
CALCIUM SERPL-MCNC: 8.5 MG/DL (ref 8.3–10.4)
CHLORIDE SERPL-SCNC: 113 MMOL/L (ref 98–107)
CO2 SERPL-SCNC: 22 MMOL/L (ref 21–32)
CREAT SERPL-MCNC: 0.97 MG/DL (ref 0.6–1)
CRP SERPL-MCNC: 5.1 MG/DL (ref 0–0.9)
DIFFERENTIAL METHOD BLD: ABNORMAL
EOSINOPHIL # BLD: 0.1 K/UL (ref 0–0.8)
EOSINOPHIL NFR BLD: 2 % (ref 0.5–7.8)
ERYTHROCYTE [DISTWIDTH] IN BLOOD BY AUTOMATED COUNT: 15.3 % (ref 11.9–14.6)
GLUCOSE SERPL-MCNC: 87 MG/DL (ref 65–100)
HCT VFR BLD AUTO: 32.4 % (ref 35.8–46.3)
HGB BLD-MCNC: 10.4 G/DL (ref 11.7–15.4)
IMM GRANULOCYTES # BLD: 0 K/UL (ref 0–0.5)
IMM GRANULOCYTES NFR BLD AUTO: 0 % (ref 0–5)
LACTATE SERPL-SCNC: 1.5 MMOL/L (ref 0.4–2)
LYMPHOCYTES # BLD: 1 K/UL (ref 0.5–4.6)
LYMPHOCYTES NFR BLD: 14 % (ref 13–44)
MAGNESIUM SERPL-MCNC: 2.2 MG/DL (ref 1.8–2.4)
MCH RBC QN AUTO: 33.2 PG (ref 26.1–32.9)
MCHC RBC AUTO-ENTMCNC: 32.1 G/DL (ref 31.4–35)
MCV RBC AUTO: 103.5 FL (ref 79.6–97.8)
MM INDURATION POC: 0 MM (ref 0–5)
MONOCYTES # BLD: 0.3 K/UL (ref 0.1–1.3)
MONOCYTES NFR BLD: 4 % (ref 4–12)
NEUTS SEG # BLD: 5.5 K/UL (ref 1.7–8.2)
NEUTS SEG NFR BLD: 80 % (ref 43–78)
PLATELET # BLD AUTO: 129 K/UL (ref 150–450)
PMV BLD AUTO: 12.3 FL (ref 10.8–14.1)
POTASSIUM SERPL-SCNC: 4 MMOL/L (ref 3.5–5.1)
PPD POC: NEGATIVE NEGATIVE
RBC # BLD AUTO: 3.13 M/UL (ref 4.05–5.25)
SODIUM SERPL-SCNC: 145 MMOL/L (ref 136–145)
TROPONIN I SERPL-MCNC: 3.63 NG/ML (ref 0.02–0.05)
VIT B12 SERPL-MCNC: 246 PG/ML (ref 193–986)
WBC # BLD AUTO: 6.9 K/UL (ref 4.3–11.1)

## 2017-12-24 PROCEDURE — 83880 ASSAY OF NATRIURETIC PEPTIDE: CPT | Performed by: INTERNAL MEDICINE

## 2017-12-24 PROCEDURE — 84484 ASSAY OF TROPONIN QUANT: CPT | Performed by: INTERNAL MEDICINE

## 2017-12-24 PROCEDURE — 74011000258 HC RX REV CODE- 258: Performed by: INTERNAL MEDICINE

## 2017-12-24 PROCEDURE — 74011000250 HC RX REV CODE- 250: Performed by: INTERNAL MEDICINE

## 2017-12-24 PROCEDURE — 74011250637 HC RX REV CODE- 250/637: Performed by: INTERNAL MEDICINE

## 2017-12-24 PROCEDURE — 74011250636 HC RX REV CODE- 250/636: Performed by: INTERNAL MEDICINE

## 2017-12-24 PROCEDURE — 36415 COLL VENOUS BLD VENIPUNCTURE: CPT | Performed by: INTERNAL MEDICINE

## 2017-12-24 PROCEDURE — 85025 COMPLETE CBC W/AUTO DIFF WBC: CPT | Performed by: INTERNAL MEDICINE

## 2017-12-24 PROCEDURE — 86140 C-REACTIVE PROTEIN: CPT | Performed by: INTERNAL MEDICINE

## 2017-12-24 PROCEDURE — 80048 BASIC METABOLIC PNL TOTAL CA: CPT | Performed by: INTERNAL MEDICINE

## 2017-12-24 PROCEDURE — 83605 ASSAY OF LACTIC ACID: CPT | Performed by: INTERNAL MEDICINE

## 2017-12-24 PROCEDURE — 94760 N-INVAS EAR/PLS OXIMETRY 1: CPT

## 2017-12-24 PROCEDURE — 94640 AIRWAY INHALATION TREATMENT: CPT

## 2017-12-24 PROCEDURE — 85730 THROMBOPLASTIN TIME PARTIAL: CPT | Performed by: INTERNAL MEDICINE

## 2017-12-24 PROCEDURE — 82607 VITAMIN B-12: CPT | Performed by: INTERNAL MEDICINE

## 2017-12-24 PROCEDURE — 83735 ASSAY OF MAGNESIUM: CPT | Performed by: INTERNAL MEDICINE

## 2017-12-24 PROCEDURE — 65660000000 HC RM CCU STEPDOWN

## 2017-12-24 PROCEDURE — 65270000029 HC RM PRIVATE

## 2017-12-24 RX ORDER — SAME BUTANEDISULFONATE/BETAINE 400-600 MG
250 POWDER IN PACKET (EA) ORAL 2 TIMES DAILY
Status: DISCONTINUED | OUTPATIENT
Start: 2017-12-24 | End: 2018-01-05 | Stop reason: HOSPADM

## 2017-12-24 RX ORDER — FUROSEMIDE 20 MG/1
40 TABLET ORAL ONCE
Status: COMPLETED | OUTPATIENT
Start: 2017-12-24 | End: 2017-12-24

## 2017-12-24 RX ORDER — IPRATROPIUM BROMIDE AND ALBUTEROL SULFATE 2.5; .5 MG/3ML; MG/3ML
3 SOLUTION RESPIRATORY (INHALATION)
Status: DISCONTINUED | OUTPATIENT
Start: 2017-12-24 | End: 2017-12-26

## 2017-12-24 RX ADMIN — POTASSIUM CHLORIDE 10 MEQ: 750 TABLET, EXTENDED RELEASE ORAL at 08:31

## 2017-12-24 RX ADMIN — HYDROCODONE BITARTRATE AND ACETAMINOPHEN 1 TABLET: 7.5; 325 TABLET ORAL at 22:17

## 2017-12-24 RX ADMIN — IPRATROPIUM BROMIDE AND ALBUTEROL SULFATE 3 ML: .5; 3 SOLUTION RESPIRATORY (INHALATION) at 22:32

## 2017-12-24 RX ADMIN — Medication 10 ML: at 12:19

## 2017-12-24 RX ADMIN — COLCHICINE 0.6 MG: 0.6 CAPSULE ORAL at 08:30

## 2017-12-24 RX ADMIN — PRAVASTATIN SODIUM 20 MG: 20 TABLET ORAL at 22:17

## 2017-12-24 RX ADMIN — FAMOTIDINE 20 MG: 20 TABLET, FILM COATED ORAL at 08:30

## 2017-12-24 RX ADMIN — DEXTROSE MONOHYDRATE AND SODIUM CHLORIDE 100 ML/HR: 5; .45 INJECTION, SOLUTION INTRAVENOUS at 02:15

## 2017-12-24 RX ADMIN — HYDROCODONE BITARTRATE AND ACETAMINOPHEN 1 TABLET: 7.5; 325 TABLET ORAL at 12:19

## 2017-12-24 RX ADMIN — ALLOPURINOL 100 MG: 100 TABLET ORAL at 08:31

## 2017-12-24 RX ADMIN — VANCOMYCIN HYDROCHLORIDE 1000 MG: 1 INJECTION, POWDER, LYOPHILIZED, FOR SOLUTION INTRAVENOUS at 09:30

## 2017-12-24 RX ADMIN — ALLOPURINOL 100 MG: 100 TABLET ORAL at 17:14

## 2017-12-24 RX ADMIN — Medication 5 ML: at 22:18

## 2017-12-24 RX ADMIN — ASPIRIN 81 MG: 81 TABLET, COATED ORAL at 08:30

## 2017-12-24 RX ADMIN — FUROSEMIDE 40 MG: 20 TABLET ORAL at 09:30

## 2017-12-24 RX ADMIN — RDII 250 MG CAPSULE 250 MG: at 17:14

## 2017-12-24 RX ADMIN — CLOPIDOGREL BISULFATE 75 MG: 75 TABLET ORAL at 08:30

## 2017-12-24 RX ADMIN — RDII 250 MG CAPSULE 250 MG: at 09:31

## 2017-12-24 RX ADMIN — CEFTRIAXONE SODIUM 1 G: 1 INJECTION, POWDER, FOR SOLUTION INTRAMUSCULAR; INTRAVENOUS at 04:04

## 2017-12-24 NOTE — PROGRESS NOTES
Problem: Falls - Risk of  Goal: *Absence of Falls  Document Vanda Fall Risk and appropriate interventions in the flowsheet.    Outcome: Progressing Towards Goal  Fall Risk Interventions:  Mobility Interventions: Bed/chair exit alarm, Assess mobility with egress test, OT consult for ADLs, Patient to call before getting OOB, PT Consult for mobility concerns, PT Consult for assist device competence         Medication Interventions: Bed/chair exit alarm, Evaluate medications/consider consulting pharmacy, Patient to call before getting OOB, Teach patient to arise slowly    Elimination Interventions: Call light in reach, Patient to call for help with toileting needs, Toilet paper/wipes in reach, Toileting schedule/hourly rounds, Bed/chair exit alarm    History of Falls Interventions: Bed/chair exit alarm, Consult care management for discharge planning, Door open when patient unattended, Evaluate medications/consider consulting pharmacy

## 2017-12-24 NOTE — PROGRESS NOTES
Problem: Nutrition Deficit  Goal: *Optimize nutritional status  Nutrition  Reason for assessment: Referral received from nursing admission Malnutrition Screening Tool for recently lost 14-23# \"from cutting out food groups with gout\" without trying and eating poorly due to decreased appetite. Assessment:   Diet order(s): cardiac  Food/Nutrition Patient History:  Patient seen in company of her daughter at bedside. She reports a UBW of ~190 pounds. Per patient, she has not been eating well over the past two days. However, states she had a couple of doughnuts that her daughter brought her this morning. She states that she had to [facility] some weight because of the gout. \"  She does not state specifically what she did. Patient receiving treatment for sepsis of unclear etiology. History notable for CKD, CAD, and CHF. Anthropometrics:Height: 5' (152.4 cm),  Weight: 73.4 kg (161 lb 14.4 oz), Weight Source: Bed, Body mass index is 31.62 kg/(m^2). BMI class of overweight. Edema: 1+ to BLEs  WT / BMI 12/24/2017 11/30/2017 10/27/2017 10/24/2017 10/15/2017   WEIGHT 161 lb 14.4 oz 154 lb 3.2 oz 152 lb 152 lb 160 lb     WT / BMI 8/28/2017 7/24/2017   WEIGHT 154 lb 150 lb   Potential for an ~11 pound weight gain over the past ~5 months. The patient last weighed 190 pounds in 2013. Macronutrient needs:  EER:  1709-4608 kcal /day (15-20 kcal/kg listed BW)  EPR:  41-50 grams protein/day (0.9-1.1 grams/kg IBW)(GFR 57)-h/o CKD  Intake/Comparative Standards: Average intake for past 2 day(s)/2 recorded meal(s): 63%. This potentially meets ~100% of kcal and ~100% of protein needs    Nutrition Diagnosis: No nutrition diagnosis. Intervention:  Meals and snacks: Continue current diet. Discharge nutrition plan: Too soon to determine.     Jason Robles [address], [address] [place] [address], [address] [place] [address], [phone]

## 2017-12-24 NOTE — PROGRESS NOTES
[facility] CARDIOLOGY PROGRESS NOTE           12/24/2017 12:19 PM    Admit Date: 12/22/2017    Subjective:   No CP, SOB better now. On IV heparin. Some back and leg pains now. Echo 12/23/17: EF 55%, mod MR, RVSP 60  5/16 echo showed normal EF, mod to severe TR, RVSP 60. Prior stenting, last [facility] 2014 by Dr. Avelino De La Torre showed mild to mod CAD with patent LAD stent. PCI was yrs and yrs ago. ROS:  GEN:  No fever or chills  Cardiovascular:  As noted above  Pulmonary:  As noted above  Neuro:  No new focal motor or sensory loss    Objective:      Vitals:    12/24/17 1130 12/24/17 1139 12/24/17 1145 12/24/17 1200   BP: 121/58 (!) 149/97 126/45 113/63   Pulse: 96 (!) 45 92 65   Resp:  18     Temp:  98.1 °F (36.7 °C)     SpO2: 100% 95% 94% 98%   Weight:       Height:           Physical Exam:  General-A and O x 3  Neck- supple, no JVD  CV- regular rate and rhythm no MRG  Lung- clear bilaterally  Abd- soft, nontender, nondistended  Ext- no edema bilaterally. Skin- warm and dry  Psychiatric:  Normal mood and affect. Neurologic:  Alert and oriented X 3      Data Review:   Recent Labs      12/24/17   0628  12/23/17   1902   12/23/17   0101  12/23/17   0004   NA  145   --    --   146*   --    K  4.0   --    --   4.1   --    MG  2.2   --    --    --    --    BUN  28*   --    --   34*   --    CREA  0.97   --    --   1.31*   --    GLU  87   --    --   122*   --    WBC  6.9   --    --    --   7.1   HGB  10.4*   --    --    --   10.8*   HCT  32.4*   --    --    --   32.7*   PLT  129*   --    --    --   142*   TROIQ  3.63*  3.72*   < >   --   1.41*    < > = values in this interval not displayed.        TELEMETRY:  sinus    Assessment/Plan:     Principal Problem:    Sepsis ([address] (12/23/2017)   On abx, follow, better now    Active Problems:    Essential hypertension, benign (3/17/2015)  Remain on meds now      Diastolic CHF, chronic ([facility] [address] (5/17/2016)  Follow, likely will need diuresis soon, check labs in the AM.  Follow      Fever (12/23/2017)  As above      Elevated troponin (12/23/2017)  Likely NSTEMI, continue IV heparin for another 24 hours. Remain on DAPT, statin. Follow for now. NO CP, plan for med mgmt for now, no C.   EF normal on the echo      CKD (chronic kidney disease) stage 3, GFR 30-59 ml/min (12/23/2017)  Follow      NSTEMI (non-ST elevated myocardial infarction) ([address] (12/24/2017)  As above          Abby Ponce DO  12/24/2017 12:19 PM

## 2017-12-24 NOTE — PROGRESS NOTES
Problem: Falls - Risk of  Goal: *Absence of Falls  Document Vanda Fall Risk and appropriate interventions in the flowsheet.    Outcome: Progressing Towards Goal  Fall Risk Interventions:  Mobility Interventions: PT Consult for mobility concerns         Medication Interventions: Bed/chair exit alarm, Evaluate medications/consider consulting pharmacy, Teach patient to arise slowly    Elimination Interventions: Bed/chair exit alarm, Call light in reach, Patient to call for help with toileting needs    History of Falls Interventions: Bed/chair exit alarm

## 2017-12-24 NOTE — PROGRESS NOTES
Problem: Falls - Risk of  Goal: *Absence of Falls  Document Vanda Fall Risk and appropriate interventions in the flowsheet.    Outcome: Progressing Towards Goal  Fall Risk Interventions:  Mobility Interventions: OT consult for ADLs, PT Consult for mobility concerns         Medication Interventions: Bed/chair exit alarm, Patient to call before getting OOB    Elimination Interventions: Call light in reach    History of Falls Interventions: Bed/chair exit alarm, Consult care management for discharge planning

## 2017-12-24 NOTE — PROGRESS NOTES
Hospitalist progress note          Date of service 12/24/17 - 10:25 AM  Date of admission 12/22/2017  LOS: 1 day   Subjective/Interval History   Patient is a 79 y/o F who presented with fever, chills/shaking, SOB. No clear source of infection. UA unremarkable. CXR with no signs of PNA. No coughing, diarrhea, acute pain. She was placed on Rocephin for empiric coverage. 12/23/17: the patient developed some transient hypoxia and was placed on 2 L NC and had a temp 101.9. Her HR has trended down since admission, initially 103, last check 74. Her troponin has trended up overnight from 1.4 to 6.4. Lactic acid was also elevated. She currently reports some RLE pain- which she states is chronic. Today, the patient reports a smothering sensation, but denies any other complaints. No CP. She continues to have pain in her legs. US LE was neg.     Objective/Physical Exam     Vitals:    12/23/17 2253 12/24/17 0036 12/24/17 0336 12/24/17 0721   BP: 111/68  92/55 117/80   Pulse: 84  74 (!) 106   Resp: 20  20 19   Temp: 98.2 °F (36.8 °C)  98.1 °F (36.7 °C) 98.5 °F (36.9 °C)   SpO2: 92%  97% 95%   Weight:  73.4 kg (161 lb 14.4 oz)     Height:            Intake/Output Summary (Last 24 hours) at 12/24/17 0401  Last data filed at 12/24/17 0724   Gross per 24 hour   Intake              240 ml   Output                0 ml   Net              240 ml    Oxygen Therapy  O2 Sat (%): 95 % (12/24/17 0721)  Pulse via Oximetry: 102 beats per minute (12/23/17 0241)  O2 Device: Room air (12/24/17 0721)  O2 Flow Rate (L/min): 2 l/min (12/23/17 0259)  General: NAD, conversant    Lungs: faint wheezes bilaterally  CV: RRR, no MGRs    Abd: soft, NT/ND, +BS    Skin: no rash, lesions, or ulcers    Ext: RLE ttp  Psych: A&O X3, appropriate affect      Recent Results (from the past 48 hour(s))   EKG, 12 LEAD, INITIAL    Collection Time: 12/22/17 11:35 PM   Result Value Ref Range    Ventricular Rate 112 BPM    Atrial Rate 112 BPM    P-R Interval 162 ms    QRS Duration 94 ms    Q-T Interval 344 ms    QTC Calculation (Bezet) 469 ms    Calculated P Axis 77 degrees    Calculated R Axis -31 degrees    Calculated T Axis 93 degrees    Diagnosis       Sinus tachycardia  Left axis deviation  Nonspecific ST and T wave abnormality  Abnormal ECG  When compared with ECG of 05-JAN-2017 19:42,  Vent. rate has increased BY  46 BPM  T wave inversion now evident in Lateral leads  Confirmed by Lottie Roman ([id number]) on 12/23/2017 5:42:41 PM     D DIMER    Collection Time: 12/22/17 11:58 PM   Result Value Ref Range    D DIMER 7.40 (HH) <0.56 ug/ml(FEU)   CBC WITH AUTOMATED DIFF    Collection Time: 12/23/17 12:04 AM   Result Value Ref Range    WBC 7.1 4.3 - 11.1 K/uL    RBC 3.17 (L) 4.05 - 5.25 M/uL    HGB 10.8 (L) 11.7 - 15.4 g/dL    HCT 32.7 (L) 35.8 - 46.3 %    .2 (H) 79.6 - 97.8 FL    MCH 34.1 (H) 26.1 - 32.9 PG    MCHC 33.0 31.4 - 35.0 g/dL    RDW 14.9 (H) 11.9 - 14.6 %    PLATELET 253 (L) 528 - 450 K/uL    MPV 12.6 10.8 - 14.1 FL    DF AUTOMATED      NEUTROPHILS 94 (H) 43 - 78 %    LYMPHOCYTES 4 (L) 13 - 44 %    MONOCYTES 2 (L) 4.0 - 12.0 %    EOSINOPHILS 0 (L) 0.5 - 7.8 %    BASOPHILS 0 0.0 - 2.0 %    IMMATURE GRANULOCYTES 0 0.0 - 5.0 %    ABS. NEUTROPHILS 6.6 1.7 - 8.2 K/UL    ABS. LYMPHOCYTES 0.3 (L) 0.5 - 4.6 K/UL    ABS. MONOCYTES 0.2 0.1 - 1.3 K/UL    ABS. EOSINOPHILS 0.0 0.0 - 0.8 K/UL    ABS. BASOPHILS 0.0 0.0 - 0.2 K/UL    ABS. IMM.  GRANS. 0.0 0.0 - 0.5 K/UL   CULTURE, BLOOD    Collection Time: 12/23/17 12:04 AM   Result Value Ref Range    Special Requests: NO SPECIAL REQUESTS  LEFT  Antecubital        Culture result: NO GROWTH 1 DAY     CULTURE, BLOOD    Collection Time: 12/23/17 12:04 AM   Result Value Ref Range    Special Requests: NO SPECIAL REQUESTS  RIGHT  Antecubital        GRAM STAIN GRAM POS COCCI IN CHAINS      GRAM STAIN ANAEROBIC BOTTLE POSITIVE      GRAM STAIN        RESULTS VERIFIED, PHONED TO AND READ BACK BY Robel Valente @ 7795 ON 12/23/17 BY GÉNESIS. Culture result: CULTURE IN PROGRESS,FURTHER UPDATES TO FOLLOW     PROCALCITONIN    Collection Time: 12/23/17 12:04 AM   Result Value Ref Range    Procalcitonin 0.6 ng/mL   TROPONIN I    Collection Time: 12/23/17 12:04 AM   Result Value Ref Range    Troponin-I, Qt. 1.41 (HH) 0.02 - 0.05 NG/ML   POC TROPONIN-I    Collection Time: 12/23/17 12:05 AM   Result Value Ref Range    Troponin-I (POC) 0.82 (H) 0.02 - 0.05 ng/ml   POC LACTIC ACID    Collection Time: 12/23/17 12:07 AM   Result Value Ref Range    Lactic Acid (POC) 2.2 (H) 0.5 - 1.9 mmol/L   METABOLIC PANEL, COMPREHENSIVE    Collection Time: 12/23/17  1:01 AM   Result Value Ref Range    Sodium 146 (H) 136 - 145 mmol/L    Potassium 4.1 3.5 - 5.1 mmol/L    Chloride 110 (H) 98 - 107 mmol/L    CO2 26 21 - 32 mmol/L    Anion gap 10 7 - 16 mmol/L    Glucose 122 (H) 65 - 100 mg/dL    BUN 34 (H) 8 - 23 MG/DL    Creatinine 1.31 (H) 0.6 - 1.0 MG/DL    GFR est AA 49 (L) >60 ml/min/1.73m2    GFR est non-AA 41 (L) >60 ml/min/1.73m2    Calcium 8.3 8.3 - 10.4 MG/DL    Bilirubin, total 0.5 0.2 - 1.1 MG/DL    ALT (SGPT) 14 12 - 65 U/L    AST (SGOT) 18 15 - 37 U/L    Alk.  phosphatase 104 50 - 136 U/L    Protein, total 6.4 6.3 - 8.2 g/dL    Albumin 3.2 3.2 - 4.6 g/dL    Globulin 3.2 2.3 - 3.5 g/dL    A-G Ratio 1.0 (L) 1.2 - 3.5     URINE MICROSCOPIC    Collection Time: 12/23/17  1:40 AM   Result Value Ref Range    WBC 0 0 /hpf    RBC 0-3 0 /hpf    Epithelial cells 0-3 0 /hpf    Bacteria 0 0 /hpf    Casts 0 0 /lpf    Crystals, urine 0 0 /LPF    Mucus 0 0 /lpf    Other observations RESULTS VERIFIED MANUALLY     TROPONIN I    Collection Time: 12/23/17  8:48 AM   Result Value Ref Range    Troponin-I, Qt. 6.40 (HH) 0.02 - 0.05 NG/ML   LACTIC ACID    Collection Time: 12/23/17  8:48 AM   Result Value Ref Range    Lactic acid 2.7 (HH) 0.4 - 2.0 MMOL/L   TROPONIN I    Collection Time: 12/23/17 12:56 PM   Result Value Ref Range    Troponin-I, Qt. 6.41 (HH) 0.02 - 0.05 NG/ML TROPONIN I    Collection Time: 12/23/17  7:02 PM   Result Value Ref Range    Troponin-I, Qt. 3.72 (HH) 0.02 - 0.05 NG/ML   PTT    Collection Time: 12/23/17  8:37 PM   Result Value Ref Range    aPTT >200.0 () 23.2 - 31.4 SEC   METABOLIC PANEL, BASIC    Collection Time: 12/24/17  6:28 AM   Result Value Ref Range    Sodium 145 136 - 145 mmol/L    Potassium 4.0 3.5 - 5.1 mmol/L    Chloride 113 (H) 98 - 107 mmol/L    CO2 22 21 - 32 mmol/L    Anion gap 10 7 - 16 mmol/L    Glucose 87 65 - 100 mg/dL    BUN 28 (H) 8 - 23 MG/DL    Creatinine 0.97 0.6 - 1.0 MG/DL    GFR est AA >60 >60 ml/min/1.73m2    GFR est non-AA 57 (L) >60 ml/min/1.73m2    Calcium 8.5 8.3 - 10.4 MG/DL   CBC WITH AUTOMATED DIFF    Collection Time: 12/24/17  6:28 AM   Result Value Ref Range    WBC 6.9 4.3 - 11.1 K/uL    RBC 3.13 (L) 4.05 - 5.25 M/uL    HGB 10.4 (L) 11.7 - 15.4 g/dL    HCT 32.4 (L) 35.8 - 46.3 %    .5 (H) 79.6 - 97.8 FL    MCH 33.2 (H) 26.1 - 32.9 PG    MCHC 32.1 31.4 - 35.0 g/dL    RDW 15.3 (H) 11.9 - 14.6 %    PLATELET 777 (L) 102 - 450 K/uL    MPV 12.3 10.8 - 14.1 FL    DF AUTOMATED      NEUTROPHILS 80 (H) 43 - 78 %    LYMPHOCYTES 14 13 - 44 %    MONOCYTES 4 4.0 - 12.0 %    EOSINOPHILS 2 0.5 - 7.8 %    BASOPHILS 0 0.0 - 2.0 %    IMMATURE GRANULOCYTES 0 0.0 - 5.0 %    ABS. NEUTROPHILS 5.5 1.7 - 8.2 K/UL    ABS. LYMPHOCYTES 1.0 0.5 - 4.6 K/UL    ABS. MONOCYTES 0.3 0.1 - 1.3 K/UL    ABS. EOSINOPHILS 0.1 0.0 - 0.8 K/UL    ABS. BASOPHILS 0.0 0.0 - 0.2 K/UL    ABS. IMM.  GRANS. 0.0 0.0 - 0.5 K/UL   C REACTIVE PROTEIN, QT    Collection Time: 12/24/17  6:28 AM   Result Value Ref Range    C-Reactive protein 5.1 (H) 0.0 - 0.9 mg/dL   MAGNESIUM    Collection Time: 12/24/17  6:28 AM   Result Value Ref Range    Magnesium 2.2 1.8 - 2.4 mg/dL   TROPONIN I    Collection Time: 12/24/17  6:28 AM   Result Value Ref Range    Troponin-I, Qt. 3.63 (HH) 0.02 - 0.05 NG/ML   PTT    Collection Time: 12/24/17  6:42 AM   Result Value Ref Range    aPTT 159.2 () 23.2 - 35.3 [address] [place] [address]     All Micro Results     Procedure Component Value Units Date/Time    CULTURE, BLOOD [[id number]] Collected:  17 0004    Order Status:  Completed Specimen:  Blood from Blood Updated:  17     Special Requests: --        NO SPECIAL REQUESTS  RIGHT  Antecubital       GRAM STAIN GRAM POS COCCI IN CHAINS         ANAEROBIC BOTTLE POSITIVE                 RESULTS VERIFIED, PHONED TO AND READ BACK BY Sera Hiraltiburcio @ 9656 ON 17 BY NKE. Culture result:         CULTURE IN PROGRESS,FURTHER UPDATES TO FOLLOW    CULTURE, BLOOD [[id number]] Collected:  17 0004    Order Status:  Completed Specimen:  Blood from Blood Updated:  17     Special Requests: --        NO SPECIAL REQUESTS  LEFT  Antecubital       Culture result: NO GROWTH 1 DAY             CT CHEST W CONT   Final Result   IMPRESSION:      No evidence of pulmonary embolism. Coronary artery disease. Small bilateral pleural effusions. Dependent atelectasis in left lower lobe. Status post cholecystectomy. Date of Dictation: 2017 10:46 PM      DUPLEX LOWER EXT VENOUS BILAT   Final Result   IMPRESSION: No evidence of deep venous thrombosis in either lower extremity. XR CHEST PORT   Final Result   IMPRESSION: Minimal linear atelectasis of left lower lobe.                 Results for orders placed or performed during the hospital encounter of 17   2D ECHO COMPLETE ADULT (TTE) W OR [address] [facility]  [address] [facility], [address] [facility]  [phone]    Transthoracic Echocardiogram  2D, M-mode, Doppler, and Color Doppler    Patient: Joseph Bhatia  MR #: [id number]  : 10-Feb-1927  Age: 80 years  Gender: Female  Study date: 23-Dec-2017  Account #: [de-identified]  Height: 60 in  Weight: 152.7 lb  BSA: 1.67 mï¾²  Status:Routine  Location: 729  BP: 87/ 55    Allergies: BEE POLLEN, FIRE ANT    Sonographer:  Beth Corbett [facility]  Group:  [facility]  Referring Physician:  Marcelino Kaplan. Alda Derrell, [address]  Reading Physician:  Abilio Darby. [facility] [place]    INDICATIONS: Chest pain    PROCEDURE: This was a routine study. A transthoracic echocardiogram was  performed. The study included complete 2D imaging, M-mode, complete spectral  Doppler, and color Doppler. Image quality was adequate. LEFT VENTRICLE: Size was normal. Systolic function was normal. Ejection  fraction was estimated in the range of 55 % to 60 %. There were no regional  wall motion abnormalities. Wall thickness was normal. Left ventricular  diastolic function was abnormal.    RIGHT VENTRICLE: The ventricle was mildly dilated. Systolic function was  normal. Estimated peak pressure was in the range of 55-60 mmHg. LEFT ATRIUM: The atrium was markedly dilated. RIGHT ATRIUM: The atrium was mildly to moderately dilated. SYSTEMIC VEINS: IVC: The inferior vena cava was normal in size and course. AORTIC VALVE: The valve was trileaflet. Leaflets exhibited moderate  calcification and reduced mobility. The aortic valve area by the continuity  equation was 1.6 cm2. The peak velocity was 2.28 m/s. The mean pressure  gradient was 11.92 mmHg. The findings were most consistent with mild aortic  stenosis. The peak pressure gradient was 20.75 mmHg. There was no   insufficiency. MITRAL VALVE: There was moderate annular calcification. There was no evidence  for stenosis. There was moderate regurgitation. TRICUSPID VALVE: The valve structure was normal. There was no evidence for  stenosis. There was moderate to severe regurgitation. PULMONIC VALVE: The valve structure was normal. There was no evidence for  stenosis. There was no insufficiency. PERICARDIUM: There was no pericardial effusion. AORTA: The root exhibited normal size. SUMMARY:    -  Left ventricle: Systolic function was normal. Ejection fraction was  estimated in the range of 55 % to 60 %.  There were no regional wall motion  abnormalities. -  Right ventricle: The ventricle was mildly dilated. -  Left atrium: The atrium was markedly dilated. -  Right atrium: The atrium was mildly to moderately dilated. -  Aortic valve: The valve was trileaflet. Leaflets exhibited moderate  calcification and reduced mobility. The aortic valve area by the continuity  equation was 1.6 cm2. The findings were most consistent with mild aortic  stenosis. -  Mitral valve: There was moderate annular calcification. There was moderate  regurgitation.    -  Tricuspid valve: There was moderate to severe regurgitation. SYSTEM MEASUREMENT TABLES    2D  Ao Diam: 2.5 cm  LA Diam: 3.7 cm  %FS: 40.2 %  IVSd: 0.9 cm  LVIDd: 4.6 cm  LVIDs: 2.8 cm  LVOT Diam: 2 cm  LVPWd: 1 cm    CW  TR Vmax: 3.6 m/s  TR maxP.9 mmHg    PW  RVSP: 53.9 mmHg    Prepared and signed by    Rylee Purdy.  Chantale Nieves DO [facility] [place] [facility]  Signed 23-Dec-2017 16:26:05       Labs and radiology reviewed above  Inpatient medications  Current Facility-Administered Medications   Medication Dose Route Frequency    albuterol-ipratropium (DUO-NEB) 2.5 MG-0.5 MG/3 ML  3 mL Nebulization Q4H PRN    allopurinol (ZYLOPRIM) tablet 100 mg  100 mg Oral BID    aspirin delayed-release tablet 81 mg  81 mg Oral DAILY    clopidogrel (PLAVIX) tablet 75 mg  75 mg Oral DAILY    colchicine (MITIGARE) capsule 0.6 mg  0.6 mg Oral DAILY    HYDROcodone-acetaminophen (NORCO) 7.5-325 mg per tablet 1 Tab  1 Tab Oral Q6H PRN    potassium chloride (KLOR-CON) tablet 10 mEq  10 mEq Oral DAILY    pravastatin (PRAVACHOL) tablet 20 mg  20 mg Oral QHS    hydrALAZINE (APRESOLINE) 20 mg/mL injection 20 mg  20 mg IntraVENous Q6H PRN    haloperidol lactate (HALDOL) injection 4 mg  4 mg IntraVENous Q6H PRN    guaiFENesin-dextromethorphan (ROBITUSSIN DM) 100-10 mg/5 mL syrup 10 mL  10 mL Oral Q6H PRN    bisacodyl (DULCOLAX) tablet 5 mg  5 mg Oral DAILY PRN    LORazepam (ATIVAN) injection 1 mg  1 mg IntraVENous Q2H PRN    polyethylene glycol (MIRALAX) packet 17 g  17 g Oral DAILY PRN    sodium chloride (NS) flush 5-10 mL  5-10 mL IntraVENous Q8H    sodium chloride (NS) flush 5-10 mL  5-10 mL IntraVENous PRN    acetaminophen (TYLENOL) tablet 650 mg  650 mg Oral Q4H PRN    naloxone (NARCAN) injection 0.4 mg  0.4 mg IntraVENous PRN    diphenhydrAMINE (BENADRYL) injection 25 mg  25 mg IntraVENous Q4H PRN    ondansetron (ZOFRAN) injection 4 mg  4 mg IntraVENous Q4H PRN    senna-docusate (PERICOLACE) 8.6-50 mg per tablet 2 Tab  2 Tab Oral DAILY PRN    LORazepam (ATIVAN) tablet 1 mg  1 mg Oral Q4H PRN    zolpidem (AMBIEN) tablet 5 mg  5 mg Oral QHS PRN    cefTRIAXone (ROCEPHIN) 1 g in sterile water (preservative free) 10 mL IV syringe  1 g IntraVENous Q24H    influenza vaccine 2017-18 (3 yrs+)(PF) (FLUZONE QUAD/FLUARIX QUAD) injection 0.5 mL  0.5 mL IntraMUSCular PRIOR TO DISCHARGE    heparin 25,000 units in dextrose 500 mL infusion  12-25 Units/kg/hr IntraVENous TITRATE    famotidine (PEPCID) tablet 20 mg  20 mg Oral DAILY       Principal Problem:    Sepsis (HCC) (12/23/2017)    Active Problems:    NSTEMI (non-ST elevated myocardial infarction) ([facility] [address] (12/24/2017)      Essential hypertension, benign (1/64/3981)      Diastolic CHF, chronic (HCC) (5/17/2016)      Fever (12/23/2017)      Elevated troponin (12/23/2017)      CKD (chronic kidney disease) stage 3, GFR 30-59 ml/min (12/23/2017)      Elevated lactic acid level (12/23/2017)      Hypernatremia (12/23/2017)      Macrocytic anemia (12/23/2017)      Pain of right lower extremity (12/23/2017)      Plan   1. Sepsis- based on fever, tachycardia-blood cultures with GPCs, will add vancomycin, cont rocephin. 2. Elevated troponin- CT neg for PE, remains on heparin drip for NSTEMI, echo stable, cardiology following    3. RLE ttp- pt reports chronic, no dvt, continue to monitor, maybe source for sepsis    4. Elevated lactic acid- remains elevated, will recheck    5. Macrocytic anemia- B12 level pending    VTE Prophylaxis: heparin/SCDs  Code Status: Full Code   Graciela Lofton MD   Inpatient Medicine  12/24/17, 10:25 AM

## 2017-12-24 NOTE — PROGRESS NOTES
IV heparin rate has been adjusted based on the most recent PTT results. Heparin drip was stopped for an hour and restarted. Rate was decreased by 3 units/kg/hr. New rate is 9 units/kg/hr which is 12.6 ml/hr. Next aPTT is scheduled for 12/24/17 at 0530. Will continue to monitor.     Lab Results   Component Value Date/Time    aPTT >200.0 12/23/2017 08:37 PM

## 2017-12-24 NOTE — PROGRESS NOTES
Pharmacokinetic Consult to Pharmacist    Daniel Walter is a 80 y.o. female being treated for BSI with vancomycin. Height: 5' (152.4 cm)  Weight: 73.4 kg (161 lb 14.4 oz)  Lab Results   Component Value Date/Time    BUN 28 12/24/2017 06:28 AM    Creatinine 0.97 12/24/2017 06:28 AM    WBC 6.9 12/24/2017 06:28 AM    Procalcitonin 0.6 12/23/2017 12:04 AM    Lactic acid 2.7 12/23/2017 08:48 AM    Lactic acid 2.0 02/08/2015 10:56 AM    Lactic Acid (POC) 2.2 12/23/2017 12:07 AM      Estimated Creatinine Clearance: 34.5 mL/min (based on Cr of 0.97). CULTURES:  pending    Day 1 of vancomycin. Goal trough is 15-20. Vancomycin dose initiated at 1000 mg q 24 hours. Will continue to follow patient.       Thank you,  Virgil Benedict, PharmD  Clinical Pharmacist  [phone]

## 2017-12-25 PROBLEM — R78.81 GRAM-POSITIVE BACTEREMIA: Status: ACTIVE | Noted: 2017-12-25

## 2017-12-25 LAB
ALBUMIN SERPL-MCNC: 2.9 G/DL (ref 3.2–4.6)
ALBUMIN/GLOB SERPL: 0.9 {RATIO} (ref 1.2–3.5)
ALP SERPL-CCNC: 89 U/L (ref 50–136)
ALT SERPL-CCNC: 18 U/L (ref 12–65)
ANION GAP SERPL CALC-SCNC: 9 MMOL/L (ref 7–16)
APTT PPP: 108.2 SEC (ref 23.2–35.3)
APTT PPP: 44.7 SEC (ref 23.2–35.3)
APTT PPP: 75.6 SEC (ref 23.2–35.3)
AST SERPL-CCNC: 35 U/L (ref 15–37)
BASOPHILS # BLD: 0 K/UL (ref 0–0.2)
BASOPHILS NFR BLD: 0 % (ref 0–2)
BILIRUB SERPL-MCNC: 0.4 MG/DL (ref 0.2–1.1)
BNP SERPL-MCNC: 1521 PG/ML
BUN SERPL-MCNC: 23 MG/DL (ref 8–23)
CALCIUM SERPL-MCNC: 8.8 MG/DL (ref 8.3–10.4)
CHLORIDE SERPL-SCNC: 111 MMOL/L (ref 98–107)
CO2 SERPL-SCNC: 25 MMOL/L (ref 21–32)
CREAT SERPL-MCNC: 1 MG/DL (ref 0.6–1)
CRP SERPL-MCNC: 3.5 MG/DL (ref 0–0.9)
DIFFERENTIAL METHOD BLD: ABNORMAL
EOSINOPHIL # BLD: 0 K/UL (ref 0–0.8)
EOSINOPHIL NFR BLD: 1 % (ref 0.5–7.8)
ERYTHROCYTE [DISTWIDTH] IN BLOOD BY AUTOMATED COUNT: 15.1 % (ref 11.9–14.6)
GLOBULIN SER CALC-MCNC: 3.3 G/DL (ref 2.3–3.5)
GLUCOSE SERPL-MCNC: 105 MG/DL (ref 65–100)
HCT VFR BLD AUTO: 30.9 % (ref 35.8–46.3)
HGB BLD-MCNC: 10.1 G/DL (ref 11.7–15.4)
IMM GRANULOCYTES # BLD: 0 K/UL (ref 0–0.5)
IMM GRANULOCYTES NFR BLD AUTO: 0 % (ref 0–5)
LYMPHOCYTES # BLD: 1 K/UL (ref 0.5–4.6)
LYMPHOCYTES NFR BLD: 16 % (ref 13–44)
MAGNESIUM SERPL-MCNC: 2.2 MG/DL (ref 1.8–2.4)
MCH RBC QN AUTO: 33.7 PG (ref 26.1–32.9)
MCHC RBC AUTO-ENTMCNC: 32.7 G/DL (ref 31.4–35)
MCV RBC AUTO: 103 FL (ref 79.6–97.8)
MM INDURATION POC: 0 MM (ref 0–5)
MONOCYTES # BLD: 0.5 K/UL (ref 0.1–1.3)
MONOCYTES NFR BLD: 8 % (ref 4–12)
NEUTS SEG # BLD: 4.5 K/UL (ref 1.7–8.2)
NEUTS SEG NFR BLD: 75 % (ref 43–78)
PLATELET # BLD AUTO: 120 K/UL (ref 150–450)
PMV BLD AUTO: 12.6 FL (ref 10.8–14.1)
POTASSIUM SERPL-SCNC: 3.7 MMOL/L (ref 3.5–5.1)
PPD POC: NEGATIVE NEGATIVE
PROT SERPL-MCNC: 6.2 G/DL (ref 6.3–8.2)
RBC # BLD AUTO: 3 M/UL (ref 4.05–5.25)
SODIUM SERPL-SCNC: 145 MMOL/L (ref 136–145)
WBC # BLD AUTO: 6.1 K/UL (ref 4.3–11.1)

## 2017-12-25 PROCEDURE — 74011000250 HC RX REV CODE- 250: Performed by: INTERNAL MEDICINE

## 2017-12-25 PROCEDURE — 74011250636 HC RX REV CODE- 250/636: Performed by: NURSE PRACTITIONER

## 2017-12-25 PROCEDURE — 83735 ASSAY OF MAGNESIUM: CPT | Performed by: INTERNAL MEDICINE

## 2017-12-25 PROCEDURE — 85730 THROMBOPLASTIN TIME PARTIAL: CPT | Performed by: INTERNAL MEDICINE

## 2017-12-25 PROCEDURE — 86140 C-REACTIVE PROTEIN: CPT | Performed by: INTERNAL MEDICINE

## 2017-12-25 PROCEDURE — 74011250637 HC RX REV CODE- 250/637: Performed by: INTERNAL MEDICINE

## 2017-12-25 PROCEDURE — 80053 COMPREHEN METABOLIC PANEL: CPT | Performed by: INTERNAL MEDICINE

## 2017-12-25 PROCEDURE — 77030034849

## 2017-12-25 PROCEDURE — 74011250636 HC RX REV CODE- 250/636: Performed by: INTERNAL MEDICINE

## 2017-12-25 PROCEDURE — 65660000000 HC RM CCU STEPDOWN

## 2017-12-25 PROCEDURE — 36415 COLL VENOUS BLD VENIPUNCTURE: CPT | Performed by: INTERNAL MEDICINE

## 2017-12-25 PROCEDURE — 83880 ASSAY OF NATRIURETIC PEPTIDE: CPT | Performed by: INTERNAL MEDICINE

## 2017-12-25 PROCEDURE — 65270000029 HC RM PRIVATE

## 2017-12-25 PROCEDURE — 94640 AIRWAY INHALATION TREATMENT: CPT

## 2017-12-25 PROCEDURE — 87040 BLOOD CULTURE FOR BACTERIA: CPT | Performed by: INTERNAL MEDICINE

## 2017-12-25 PROCEDURE — 85025 COMPLETE CBC W/AUTO DIFF WBC: CPT | Performed by: INTERNAL MEDICINE

## 2017-12-25 RX ORDER — LANOLIN ALCOHOL/MO/W.PET/CERES
1000 CREAM (GRAM) TOPICAL DAILY
Status: DISCONTINUED | OUTPATIENT
Start: 2017-12-25 | End: 2018-01-05 | Stop reason: HOSPADM

## 2017-12-25 RX ORDER — HEPARIN SODIUM 5000 [USP'U]/ML
5000 INJECTION, SOLUTION INTRAVENOUS; SUBCUTANEOUS EVERY 8 HOURS
Status: DISCONTINUED | OUTPATIENT
Start: 2017-12-26 | End: 2018-01-05 | Stop reason: HOSPADM

## 2017-12-25 RX ORDER — HEPARIN SODIUM 5000 [USP'U]/ML
1000 INJECTION, SOLUTION INTRAVENOUS; SUBCUTANEOUS ONCE
Status: COMPLETED | OUTPATIENT
Start: 2017-12-25 | End: 2017-12-25

## 2017-12-25 RX ORDER — FUROSEMIDE 10 MG/ML
40 INJECTION INTRAMUSCULAR; INTRAVENOUS 2 TIMES DAILY
Status: COMPLETED | OUTPATIENT
Start: 2017-12-25 | End: 2017-12-30

## 2017-12-25 RX ADMIN — HYDROCODONE BITARTRATE AND ACETAMINOPHEN 1 TABLET: 7.5; 325 TABLET ORAL at 09:50

## 2017-12-25 RX ADMIN — HEPARIN SODIUM 1000 UNITS: 5000 INJECTION, SOLUTION INTRAVENOUS; SUBCUTANEOUS at 03:28

## 2017-12-25 RX ADMIN — VANCOMYCIN HYDROCHLORIDE 1000 MG: 1 INJECTION, POWDER, LYOPHILIZED, FOR SOLUTION INTRAVENOUS at 09:55

## 2017-12-25 RX ADMIN — LORAZEPAM 1 MG: 1 TABLET ORAL at 23:43

## 2017-12-25 RX ADMIN — IPRATROPIUM BROMIDE AND ALBUTEROL SULFATE 3 ML: .5; 3 SOLUTION RESPIRATORY (INHALATION) at 17:04

## 2017-12-25 RX ADMIN — RDII 250 MG CAPSULE 250 MG: at 16:49

## 2017-12-25 RX ADMIN — RDII 250 MG CAPSULE 250 MG: at 09:50

## 2017-12-25 RX ADMIN — HYDROCODONE BITARTRATE AND ACETAMINOPHEN 1 TABLET: 7.5; 325 TABLET ORAL at 16:49

## 2017-12-25 RX ADMIN — LORAZEPAM 1 MG: 1 TABLET ORAL at 16:49

## 2017-12-25 RX ADMIN — CYANOCOBALAMIN TAB 1000 MCG 1000 MCG: 1000 TAB at 09:50

## 2017-12-25 RX ADMIN — CEFTRIAXONE SODIUM 1 G: 1 INJECTION, POWDER, FOR SOLUTION INTRAMUSCULAR; INTRAVENOUS at 03:27

## 2017-12-25 RX ADMIN — FUROSEMIDE 40 MG: 10 INJECTION, SOLUTION INTRAMUSCULAR; INTRAVENOUS at 20:42

## 2017-12-25 RX ADMIN — ALLOPURINOL 100 MG: 100 TABLET ORAL at 16:47

## 2017-12-25 RX ADMIN — POTASSIUM CHLORIDE 10 MEQ: 750 TABLET, EXTENDED RELEASE ORAL at 09:50

## 2017-12-25 RX ADMIN — HEPARIN SODIUM AND DEXTROSE 10 UNITS/KG/HR: 5000; 5 INJECTION INTRAVENOUS at 07:43

## 2017-12-25 RX ADMIN — Medication 10 ML: at 21:19

## 2017-12-25 RX ADMIN — FAMOTIDINE 20 MG: 20 TABLET, FILM COATED ORAL at 09:50

## 2017-12-25 RX ADMIN — Medication 10 ML: at 03:28

## 2017-12-25 RX ADMIN — HEPARIN SODIUM 5000 UNITS: 5000 INJECTION, SOLUTION INTRAVENOUS; SUBCUTANEOUS at 23:43

## 2017-12-25 RX ADMIN — PRAVASTATIN SODIUM 20 MG: 20 TABLET ORAL at 21:18

## 2017-12-25 RX ADMIN — ASPIRIN 81 MG: 81 TABLET, COATED ORAL at 09:50

## 2017-12-25 RX ADMIN — CLOPIDOGREL BISULFATE 75 MG: 75 TABLET ORAL at 09:50

## 2017-12-25 RX ADMIN — ALLOPURINOL 100 MG: 100 TABLET ORAL at 09:50

## 2017-12-25 RX ADMIN — COLCHICINE 0.6 MG: 0.6 CAPSULE ORAL at 09:49

## 2017-12-25 NOTE — PROGRESS NOTES
IV heparin rate has been adjusted based on the most recent PTT results. Bolus was given and rate was increased by 4 units/kg/hr. New rate is 10 units/kg/hr which is 14 ml/hr. Next aPTT is at 0930. Will continue to monitor.     Lab Results   Component Value Date/Time    aPTT 44.7 12/25/2017 12:54 AM

## 2017-12-25 NOTE — PROGRESS NOTES
Notified Dr. Kylee Barney of patient's aPTT of 44.7 and the need for a bolus dose of heparin. New orders received, see MAR.

## 2017-12-25 NOTE — PROGRESS NOTES
Hospitalist Progress Note     Admit Date:  2017 11:21 PM   Name:  Angela Dumont   Age:  80 y.o.  :  2/10/1927   MRN:  [id number]   PCP:  Leonel Patino MD  Treatment Team: Attending Provider: Gem Corbett MD; Consulting Provider: Indio Shirley DO; Charge Nurse: Jack Lloyd RN    Subjective:     Ms. Neo Garcia is a 79 yo female with PMH of CAD, HTN, admitted  with sepsis and elevated troponin. She has been found to have GPC bacteremia 1/2 BC, rocephin  to present and vancomycin  to present. ECHO no vegetation, mild AS, EF 55-60%. Seen by cardio and felt likely had NSTEMI with plans for medical management/ heparin drip and DAPT. Plans for home once stable        poor sleep overnight, denies dyspnea/cough/anorexia or BM change, no pain     Objective:   Patient Vitals for the past 24 hrs:   Temp Pulse Resp BP SpO2   17 0723 98.1 °F (36.7 °C) 82 17 130/77 97 %   17 0440 97.5 °F (36.4 °C) 81 16 131/82 97 %   17 2348 97.7 °F (36.5 °C) 95 18 124/66 95 %   17 - - - - 92 %   17 1953 97.8 °F (36.6 °C) 84 18 131/78 98 %   17 1438 98 °F (36.7 °C) 60 20 120/62 98 %   17 1200 - 65 - 113/63 98 %   17 1145 - 92 - 126/45 94 %   17 1139 98.1 °F (36.7 °C) (!) 45 18 (!) 149/97 95 %   17 1130 - 96 - 121/58 100 %   17 1115 - 88 - 118/55 100 %     Oxygen Therapy  O2 Sat (%): 97 % (17 07)  Pulse via Oximetry: 99 beats per minute (17)  O2 Device: Room air (17)  O2 Flow Rate (L/min): 2 l/min (17 0259)    Intake/Output Summary (Last 24 hours) at 17 0903  Last data filed at 17 1249   Gross per 24 hour   Intake              120 ml   Output                0 ml   Net              120 ml         General:    Well nourished. Alert.  elderly  CV:   RRR. No murmur, rub, or gallop. Lungs:   CTAB. No wheezing, rhonchi, or rales. Abdomen:   Soft, nontender, nondistended. obese  Extremities: Warm and dry. No cyanosis or edema. Skin:     No rashes or jaundice. Data Review:  I have reviewed all labs, meds, telemetry events, and studies from the last 24 hours. Recent Results (from the past 24 hour(s))   LACTIC ACID    Collection Time: 12/24/17  9:58 AM   Result Value Ref Range    Lactic acid 1.5 0.4 - 2.0 MMOL/L   PTT    Collection Time: 12/24/17  4:07 PM   Result Value Ref Range    aPTT 76.9 (H) 23.2 - 35.3 SEC   PTT    Collection Time: 12/25/17 12:54 AM   Result Value Ref Range    aPTT 44.7 (H) 23.2 - 35.3 SEC   PLEASE READ & DOCUMENT PPD TEST IN 48 HRS    Collection Time: 12/25/17  5:23 AM   Result Value Ref Range    PPD negative Negative    mm Induration 0 mm   CBC WITH AUTOMATED DIFF    Collection Time: 12/25/17  7:10 AM   Result Value Ref Range    WBC 6.1 4.3 - 11.1 K/uL    RBC 3.00 (L) 4.05 - 5.25 M/uL    HGB 10.1 (L) 11.7 - 15.4 g/dL    HCT 30.9 (L) 35.8 - 46.3 %    .0 (H) 79.6 - 97.8 FL    MCH 33.7 (H) 26.1 - 32.9 PG    MCHC 32.7 31.4 - 35.0 g/dL    RDW 15.1 (H) 11.9 - 14.6 %    PLATELET 978 (L) 283 - 450 K/uL    MPV 12.6 10.8 - 14.1 FL    DF AUTOMATED      NEUTROPHILS 75 43 - 78 %    LYMPHOCYTES 16 13 - 44 %    MONOCYTES 8 4.0 - 12.0 %    EOSINOPHILS 1 0.5 - 7.8 %    BASOPHILS 0 0.0 - 2.0 %    IMMATURE GRANULOCYTES 0 0.0 - 5.0 %    ABS. NEUTROPHILS 4.5 1.7 - 8.2 K/UL    ABS. LYMPHOCYTES 1.0 0.5 - 4.6 K/UL    ABS. MONOCYTES 0.5 0.1 - 1.3 K/UL    ABS. EOSINOPHILS 0.0 0.0 - 0.8 K/UL    ABS. BASOPHILS 0.0 0.0 - 0.2 K/UL    ABS. IMM.  GRANS. 0.0 0.0 - 0.5 K/UL   C REACTIVE PROTEIN, QT    Collection Time: 12/25/17  7:10 AM   Result Value Ref Range    C-Reactive protein 3.5 (H) 0.0 - 0.9 mg/dL   BNP    Collection Time: 12/25/17  7:10 AM   Result Value Ref Range    BNP 9372 pg/mL   METABOLIC PANEL, COMPREHENSIVE    Collection Time: 12/25/17  7:10 AM   Result Value Ref Range    Sodium 145 136 - 145 mmol/L    Potassium 3.7 3.5 - 5.1 mmol/L    Chloride 111 (H) 98 - 107 mmol/L    CO2 25 21 - 32 mmol/L    Anion gap 9 7 - 16 mmol/L    Glucose 105 (H) 65 - 100 mg/dL    BUN 23 8 - 23 MG/DL    Creatinine 1.00 0.6 - 1.0 MG/DL    GFR est AA >60 >60 ml/min/1.73m2    GFR est non-AA 55 (L) >60 ml/min/1.73m2    Calcium 8.8 8.3 - 10.4 MG/DL    Bilirubin, total 0.4 0.2 - 1.1 MG/DL    ALT (SGPT) 18 12 - 65 U/L    AST (SGOT) 35 15 - 37 U/L    Alk. phosphatase 89 50 - 136 U/L    Protein, total 6.2 (L) 6.3 - 8.2 g/dL    Albumin 2.9 (L) 3.2 - 4.6 g/dL    Globulin 3.3 2.3 - 3.5 g/dL    A-G Ratio 0.9 (L) 1.2 - 3.5     MAGNESIUM    Collection Time: 12/25/17  7:10 AM   Result Value Ref Range    Magnesium 2.2 1.8 - 2.4 mg/dL        All Micro Results     Procedure Component Value Units Date/Time    CULTURE, BLOOD [[id number]]     Order Status:  Sent Specimen:  Blood from Blood     CULTURE, BLOOD [[id number]]     Order Status:  Sent Specimen:  Blood from Blood     CULTURE, BLOOD [[id number]]  (Abnormal) Collected:  12/23/17 0004    Order Status:  Completed Specimen:  Blood from Blood Updated:  12/25/17 0745     Special Requests: --        NO SPECIAL REQUESTS  RIGHT  Antecubital       GRAM STAIN GRAM POS COCCI IN CHAINS         ANAEROBIC BOTTLE POSITIVE                 RESULTS VERIFIED, PHONED TO AND READ BACK BY Bijal Delcid @ [id number] ON 12/23/17 BY NKE.      Culture result:         ALPHA STREPTOCOCCUS IDENTIFICATION AND SUSCEPTIBILITY TO FOLLOW (A)    CULTURE, BLOOD [[id number]] Collected:  12/23/17 0004    Order Status:  Completed Specimen:  Blood from Blood Updated:  12/25/17 0647     Special Requests: --        NO SPECIAL REQUESTS  LEFT  Antecubital       Culture result: NO GROWTH 2 DAYS             Current Meds:  Current Facility-Administered Medications   Medication Dose Route Frequency    cyanocobalamin tablet 1,000 mcg  1,000 mcg Oral DAILY    albuterol-ipratropium (DUO-NEB) 2.5 MG-0.5 MG/3 ML  3 mL Nebulization Q4H PRN    Saccharomyces boulardii (FLORASTOR) capsule 250 mg  250 mg Oral BID    vancomycin (VANCOCIN) 1,000 mg in 0.9% sodium chloride (MBP/ADV) 250 mL  1 g IntraVENous Q24H    allopurinol (ZYLOPRIM) tablet 100 mg  100 mg Oral BID    aspirin delayed-release tablet 81 mg  81 mg Oral DAILY    clopidogrel (PLAVIX) tablet 75 mg  75 mg Oral DAILY    colchicine (MITIGARE) capsule 0.6 mg  0.6 mg Oral DAILY    HYDROcodone-acetaminophen (NORCO) 7.5-325 mg per tablet 1 Tab  1 Tab Oral Q6H PRN    potassium chloride (KLOR-CON) tablet 10 mEq  10 mEq Oral DAILY    pravastatin (PRAVACHOL) tablet 20 mg  20 mg Oral QHS    hydrALAZINE (APRESOLINE) 20 mg/mL injection 20 mg  20 mg IntraVENous Q6H PRN    haloperidol lactate (HALDOL) injection 4 mg  4 mg IntraVENous Q6H PRN    guaiFENesin-dextromethorphan (ROBITUSSIN DM) 100-10 mg/5 mL syrup 10 mL  10 mL Oral Q6H PRN    bisacodyl (DULCOLAX) tablet 5 mg  5 mg Oral DAILY PRN    LORazepam (ATIVAN) injection 1 mg  1 mg IntraVENous Q2H PRN    polyethylene glycol (MIRALAX) packet 17 g  17 g Oral DAILY PRN    sodium chloride (NS) flush 5-10 mL  5-10 mL IntraVENous Q8H    sodium chloride (NS) flush 5-10 mL  5-10 mL IntraVENous PRN    acetaminophen (TYLENOL) tablet 650 mg  650 mg Oral Q4H PRN    naloxone (NARCAN) injection 0.4 mg  0.4 mg IntraVENous PRN    diphenhydrAMINE (BENADRYL) injection 25 mg  25 mg IntraVENous Q4H PRN    ondansetron (ZOFRAN) injection 4 mg  4 mg IntraVENous Q4H PRN    senna-docusate (PERICOLACE) 8.6-50 mg per tablet 2 Tab  2 Tab Oral DAILY PRN    LORazepam (ATIVAN) tablet 1 mg  1 mg Oral Q4H PRN    zolpidem (AMBIEN) tablet 5 mg  5 mg Oral QHS PRN    cefTRIAXone (ROCEPHIN) 1 g in sterile water (preservative free) 10 mL IV syringe  1 g IntraVENous Q24H    influenza vaccine 2017-18 (3 yrs+)(PF) (FLUZONE QUAD/FLUARIX QUAD) injection 0.5 mL  0.5 mL IntraMUSCular PRIOR TO DISCHARGE    heparin 25,000 units in dextrose 500 mL infusion  12-25 Units/kg/hr IntraVENous TITRATE    famotidine (PEPCID) tablet 20 mg  20 mg Oral DAILY Other Studies (last 24 hours):  No results found.     Assessment and Plan:     Hospital Problems as of 12/25/2017  Date Reviewed: 11/30/2017          Codes Class Noted - Resolved POA    NSTEMI (non-ST elevated myocardial infarction) ([facility] ICD-10-CM: I21.4  ICD-9-CM: 410.70  12/24/2017 - Present Unknown        * (Principal)Sepsis ([facility] ICD-10-CM: A41.9  ICD-9-CM: 038.9, 995.91  12/23/2017 - Present Yes        Demand ischemia ([facility] ICD-10-CM: I24.8  ICD-9-CM: 411.89  12/23/2017 - Present         Acute respiratory failure with hypoxia (HCC) ICD-10-CM: J96.01  ICD-9-CM: 518.81  12/23/2017 - Present         Fever ICD-10-CM: R50.9  ICD-9-CM: 780.60  12/23/2017 - Present Unknown        Elevated troponin ICD-10-CM: R74.8  ICD-9-CM: 790.6  12/23/2017 - Present Unknown        CKD (chronic kidney disease) stage 3, GFR 30-59 ml/min ICD-10-CM: N18.3  ICD-9-CM: 585.3  12/23/2017 - Present Unknown        Elevated lactic acid level ICD-10-CM: R79.89  ICD-9-CM: 276.2  12/23/2017 - Present Unknown        Hypernatremia ICD-10-CM: E87.0  ICD-9-CM: 276.0  12/23/2017 - Present Unknown        Macrocytic anemia ICD-10-CM: D53.9  ICD-9-CM: 281.9  12/23/2017 - Present Unknown        Pain of right lower extremity ICD-10-CM: M79.604  ICD-9-CM: 729.5  12/23/2017 - Present Unknown        Diastolic CHF, chronic (HCC) (Chronic) ICD-10-CM: I50.32  ICD-9-CM: 428.32, 428.0  5/17/2016 - Present Yes        Essential hypertension, benign (Chronic) ICD-10-CM: I10  ICD-9-CM: 401.1  3/17/2015 - Present Yes              PLAN:    · Appreciate cardio for medical management  · Still on heparin drip   · followup positive BC, on vancomycin and rocephin and will repeat BC x 2   · Supplement lower B12 level   · PT/OT/PPD and case management for dispo     DC planning/Dispo:    DVT ppx:  heparin    Signed:  Maryanne Rogers MD

## 2017-12-25 NOTE — PROGRESS NOTES
Problem: Falls - Risk of  Goal: *Absence of Falls  Document Vanda Fall Risk and appropriate interventions in the flowsheet.    Outcome: Progressing Towards Goal  Fall Risk Interventions:  Mobility Interventions: Bed/chair exit alarm, Patient to call before getting OOB, PT Consult for assist device competence         Medication Interventions: Bed/chair exit alarm, Evaluate medications/consider consulting pharmacy    Elimination Interventions: Call light in reach, Patient to call for help with toileting needs    History of Falls Interventions: Bed/chair exit alarm, Consult care management for discharge planning, Evaluate medications/consider consulting pharmacy

## 2017-12-26 ENCOUNTER — APPOINTMENT (OUTPATIENT)
Dept: CT IMAGING | Age: 82
DRG: 871 | End: 2017-12-26
Attending: INTERNAL MEDICINE
Payer: COMMERCIAL

## 2017-12-26 ENCOUNTER — APPOINTMENT (OUTPATIENT)
Dept: GENERAL RADIOLOGY | Age: 82
DRG: 871 | End: 2017-12-26
Attending: INTERNAL MEDICINE
Payer: COMMERCIAL

## 2017-12-26 PROBLEM — I21.4 NSTEMI (NON-ST ELEVATED MYOCARDIAL INFARCTION) (HCC): Status: RESOLVED | Noted: 2017-12-24 | Resolved: 2017-12-26

## 2017-12-26 PROBLEM — I50.31 ACUTE DIASTOLIC CHF (CONGESTIVE HEART FAILURE) (HCC): Status: ACTIVE | Noted: 2017-12-26

## 2017-12-26 PROBLEM — I50.31 ACUTE DIASTOLIC CHF (CONGESTIVE HEART FAILURE) (HCC): Status: RESOLVED | Noted: 2017-12-26 | Resolved: 2017-12-26

## 2017-12-26 LAB
ANION GAP SERPL CALC-SCNC: 11 MMOL/L (ref 7–16)
ARTERIAL PATENCY WRIST A: POSITIVE
BACTERIA SPEC CULT: ABNORMAL
BASE EXCESS BLDA CALC-SCNC: 0.5 MMOL/L (ref 0–3)
BDY SITE: ABNORMAL
BNP SERPL-MCNC: 2754 PG/ML
BUN SERPL-MCNC: 22 MG/DL (ref 8–23)
CALCIUM SERPL-MCNC: 8.9 MG/DL (ref 8.3–10.4)
CHLORIDE SERPL-SCNC: 110 MMOL/L (ref 98–107)
CO2 SERPL-SCNC: 24 MMOL/L (ref 21–32)
COHGB MFR BLD: 0.3 % (ref 0.5–1.5)
CREAT SERPL-MCNC: 1 MG/DL (ref 0.6–1)
DO-HGB BLD-MCNC: 1 % (ref 0–5)
GAS FLOW.O2 O2 DELIVERY SYS: 4 L/MIN
GLUCOSE SERPL-MCNC: 113 MG/DL (ref 65–100)
GRAM STN SPEC: ABNORMAL
HCO3 BLDA-SCNC: 24 MMOL/L (ref 22–26)
HGB BLDMV-MCNC: 10.8 GM/DL (ref 11.7–15)
MAGNESIUM SERPL-MCNC: 2 MG/DL (ref 1.8–2.4)
METHGB MFR BLD: 0.7 % (ref 0–1.5)
OXYHGB MFR BLDA: 97.6 % (ref 94–97)
PCO2 BLDA: 36 MMHG (ref 35–45)
PH BLDA: 7.45 [PH] (ref 7.35–7.45)
PO2 BLDA: 136 MMHG (ref 80–105)
POTASSIUM SERPL-SCNC: 3.6 MMOL/L (ref 3.5–5.1)
SAO2 % BLD: 99 % (ref 92–98.5)
SERVICE CMNT-IMP: ABNORMAL
SODIUM SERPL-SCNC: 145 MMOL/L (ref 136–145)

## 2017-12-26 PROCEDURE — 74011250636 HC RX REV CODE- 250/636: Performed by: INTERNAL MEDICINE

## 2017-12-26 PROCEDURE — 74011000250 HC RX REV CODE- 250: Performed by: INTERNAL MEDICINE

## 2017-12-26 PROCEDURE — 82803 BLOOD GASES ANY COMBINATION: CPT

## 2017-12-26 PROCEDURE — 65660000000 HC RM CCU STEPDOWN

## 2017-12-26 PROCEDURE — 80048 BASIC METABOLIC PNL TOTAL CA: CPT | Performed by: INTERNAL MEDICINE

## 2017-12-26 PROCEDURE — 36600 WITHDRAWAL OF ARTERIAL BLOOD: CPT

## 2017-12-26 PROCEDURE — 83880 ASSAY OF NATRIURETIC PEPTIDE: CPT | Performed by: INTERNAL MEDICINE

## 2017-12-26 PROCEDURE — 74011250637 HC RX REV CODE- 250/637: Performed by: INTERNAL MEDICINE

## 2017-12-26 PROCEDURE — 36415 COLL VENOUS BLD VENIPUNCTURE: CPT | Performed by: INTERNAL MEDICINE

## 2017-12-26 PROCEDURE — 83735 ASSAY OF MAGNESIUM: CPT | Performed by: INTERNAL MEDICINE

## 2017-12-26 PROCEDURE — 94760 N-INVAS EAR/PLS OXIMETRY 1: CPT

## 2017-12-26 PROCEDURE — 94640 AIRWAY INHALATION TREATMENT: CPT

## 2017-12-26 PROCEDURE — 77010033678 HC OXYGEN DAILY

## 2017-12-26 PROCEDURE — 74176 CT ABD & PELVIS W/O CONTRAST: CPT

## 2017-12-26 PROCEDURE — 65270000029 HC RM PRIVATE

## 2017-12-26 PROCEDURE — 71010 XR CHEST SNGL V: CPT

## 2017-12-26 RX ORDER — CARVEDILOL 6.25 MG/1
6.25 TABLET ORAL 2 TIMES DAILY WITH MEALS
Status: DISCONTINUED | OUTPATIENT
Start: 2017-12-26 | End: 2017-12-26

## 2017-12-26 RX ORDER — CARVEDILOL 12.5 MG/1
12.5 TABLET ORAL 2 TIMES DAILY WITH MEALS
Status: DISCONTINUED | OUTPATIENT
Start: 2017-12-26 | End: 2018-01-02

## 2017-12-26 RX ORDER — IPRATROPIUM BROMIDE AND ALBUTEROL SULFATE 2.5; .5 MG/3ML; MG/3ML
3 SOLUTION RESPIRATORY (INHALATION)
Status: DISCONTINUED | OUTPATIENT
Start: 2017-12-26 | End: 2018-01-02

## 2017-12-26 RX ADMIN — ALLOPURINOL 100 MG: 100 TABLET ORAL at 17:35

## 2017-12-26 RX ADMIN — POTASSIUM CHLORIDE 10 MEQ: 750 TABLET, EXTENDED RELEASE ORAL at 08:29

## 2017-12-26 RX ADMIN — Medication 5 ML: at 21:35

## 2017-12-26 RX ADMIN — CEFTRIAXONE SODIUM 1 G: 1 INJECTION, POWDER, FOR SOLUTION INTRAMUSCULAR; INTRAVENOUS at 03:53

## 2017-12-26 RX ADMIN — RDII 250 MG CAPSULE 250 MG: at 08:29

## 2017-12-26 RX ADMIN — CARVEDILOL 12.5 MG: 12.5 TABLET, FILM COATED ORAL at 17:35

## 2017-12-26 RX ADMIN — Medication 5 ML: at 17:36

## 2017-12-26 RX ADMIN — IPRATROPIUM BROMIDE AND ALBUTEROL SULFATE 3 ML: .5; 3 SOLUTION RESPIRATORY (INHALATION) at 15:53

## 2017-12-26 RX ADMIN — ACETAMINOPHEN 650 MG: 325 TABLET ORAL at 14:06

## 2017-12-26 RX ADMIN — RDII 250 MG CAPSULE 250 MG: at 17:35

## 2017-12-26 RX ADMIN — FUROSEMIDE 40 MG: 10 INJECTION, SOLUTION INTRAMUSCULAR; INTRAVENOUS at 08:29

## 2017-12-26 RX ADMIN — IPRATROPIUM BROMIDE AND ALBUTEROL SULFATE 3 ML: .5; 3 SOLUTION RESPIRATORY (INHALATION) at 20:42

## 2017-12-26 RX ADMIN — IPRATROPIUM BROMIDE AND ALBUTEROL SULFATE 3 ML: .5; 3 SOLUTION RESPIRATORY (INHALATION) at 08:34

## 2017-12-26 RX ADMIN — Medication 10 ML: at 06:03

## 2017-12-26 RX ADMIN — PRAVASTATIN SODIUM 20 MG: 20 TABLET ORAL at 21:35

## 2017-12-26 RX ADMIN — HEPARIN SODIUM 5000 UNITS: 5000 INJECTION, SOLUTION INTRAVENOUS; SUBCUTANEOUS at 08:29

## 2017-12-26 RX ADMIN — HEPARIN SODIUM 5000 UNITS: 5000 INJECTION, SOLUTION INTRAVENOUS; SUBCUTANEOUS at 17:36

## 2017-12-26 RX ADMIN — FUROSEMIDE 40 MG: 10 INJECTION, SOLUTION INTRAMUSCULAR; INTRAVENOUS at 21:35

## 2017-12-26 NOTE — CONSULTS
Infectious Disease Consult    Today's Date: 12/26/2017   Admit Date: 12/22/2017    Impression:   · Strep anginosus bacteremia (12/23), unclear source, but possibly urinary given strep viridans in culture 11/27  · NSTEMI    Plan:   · Continue ceftriaxone. · Check CT Urogram  · Follow pending blood cultures. If negative tomorrow, PICC can be placed. · She will likely need a four week course. Anti-infectives:     Ceftriaxone (12/23 -  Vancomycin (12/24 -     Subjective:   Date of Consultation:  December 26, 2017  Referring Physician: Rachel Champagne Hospitalist    Patient is a 80 y.o. female who was admitted through the ED 12/22/17 with a six hour history of fevers, SOB and nausea with vomiting. Temperature on admission was 101.9. WBCs 7K. Blood cultures drawn on admission grew strep anginosus in one of two sets. Repeat cultures 12/25 are pending. CT chest showed small bilateral pleural effusions and LLL atelectasis. BLE venous US was negative for DVT. She was started on ceftriaxone and vancomycin. She had a TTE 12/23 that showed moderate MV /TV regurgitation, MV annular calcification, and AV calcification with reduced mobility. EF with 55% -60%. Cardiology has evaluated and believes she has had NSTEMI.      Patient Active Problem List   Diagnosis Code    Essential hypertension, benign I10    Acute kidney failure, unspecified N17.9    Reflux esophagitis K21.0    Coronary atherosclerosis of native coronary artery I25.10    Hypopotassemia E87.6    Mixed hyperlipidemia E78.2    Cellulitis and abscess of other specified site L03.818, L02.818    Shortness of breath Q92.17    Diastolic CHF, chronic (HCC) I50.32    Pulmonary hypertension I27.20    Gout M10.9    Osteoarthritis M19.90    Pre-diabetes R73.03    Sepsis ([address] A41.9    Demand ischemia ([address] I24.8    Acute respiratory failure with hypoxia ([address] J96.01    Fever R50.9    Elevated troponin R74.8    CKD (chronic kidney disease) stage 3, GFR 30-59 ml/min N18.3    Elevated lactic acid level R79.89    Hypernatremia E87.0    Macrocytic anemia D53.9    Pain of right lower extremity M79.604    NSTEMI (non-ST elevated myocardial infarction) ([facility] I21.4    Gram-positive bacteremia R78.81    Acute diastolic CHF (congestive heart failure) ([facility]) I50.31     Past Medical History:   Diagnosis Date    Acute kidney failure, unspecified     Arthritis     CAD (coronary artery disease)     Cellulitis and abscess of other specified site     Coronary atherosclerosis of native coronary artery     Essential hypertension, benign 3/17/2015    Hypertension     Hypopotassemia     Mixed hyperlipidemia     Osteoarthritis 7/20/2017    Other and unspecified hyperlipidemia     Reflux esophagitis     Renal failure, unspecified     Shortness of breath     Unspecified essential hypertension       Family History   Problem Relation Age of Onset    Heart Attack Mother     Heart Attack Father     Heart Disease Brother     Heart Disease Brother       Social History   Substance Use Topics    Smoking status: Never Smoker    Smokeless tobacco: Never Used    Alcohol use No     Past Surgical History:   Procedure Laterality Date    CARDIAC SURG PROCEDURE UNLIST      stent    HX CATARACT REMOVAL Bilateral     HX CHOLECYSTECTOMY      HX HYSTERECTOMY      complete    LAYR CLOS WND FACE,FACIAL <2.5CM        Prior to Admission medications    Medication Sig Start Date End Date Taking? Authorizing Provider   clotrimazole (LOTRIMIN AF, CLOTRIMAZOLE,) 1 % topical cream Apply  to affected area two (2) times a day. 11/27/17  Yes Diana Mccurdy MD   lisinopril (PRINIVIL, ZESTRIL) 30 mg tablet Take 1 Tab by mouth daily. 11/27/17  Yes Diana Mccurdy MD   torsemide [facility]) 20 mg tablet Take 1 Tab by mouth daily. 11/27/17  Yes Diana Mccurdy MD   allopurinol (ZYLOPRIM) 100 mg tablet Take 1 Tab by mouth two (2) times a day.  10/24/17  Yes Diana Mccurdy MD pravastatin (PRAVACHOL) 20 mg tablet Take 1 Tab by mouth nightly. 10/24/17  Yes Anny Welch MD   raNITIdine (ZANTAC) 150 mg tablet Take 1 Tab by mouth two (2) times a day. 10/24/17  Yes Anny Welch MD   clopidogrel (PLAVIX) 75 mg tab Take 1 Tab by mouth daily. 10/24/17  Yes Anny Welch MD   potassium chloride (KLOR-CON M10) 10 mEq tablet Take 1 Tab by mouth daily. 10/24/17  Yes Anny Welch MD   colchicine (MITIGARE) 0.6 mg capsule Take 1 Cap by mouth daily. Indications: prevention of acute gout attack 17  Yes Anny Welch MD   conjugated estrogens (PREMARIN) 0.625 mg/gram vaginal cream Insert 0.5 g into vagina daily. 17  Yes Anny Welch MD   loratadine (CLARITIN) 10 mg tablet Take 1 Tab by mouth daily. 17  Yes Anny Welch MD   aspirin 81 mg CpDR Take  by mouth daily. Yes Adelaida Tiwari MD   HYDROcodone-acetaminophen (NORCO) 7.5-325 mg per tablet Take 1 Tab by mouth every eight (8) hours as needed for Pain. Max Daily Amount: 3 Tabs. 10/24/17   Anny Welch MD       Allergies   Allergen Reactions    Bee Pollen Swelling    Fire Ant Itching and Swelling        Review of Systems: mild abdominal pain, nausea is improved, but food does not taste good, wheezing was worse this morning; daughter is bedside.  Dry mouth  Respiratory: positive for wheezing or dyspnea on exertion  Gastrointestinal: positive for nausea and abdominal pain  Musculoskeletal:positive for stiff joints and back pain    Objective:     Visit Vitals    BP (!) 166/92 (BP 1 Location: Right arm, BP Patient Position: At rest)    Pulse 100    Temp 99.8 °F (37.7 °C)    Resp 20    Ht 5' (1.524 m)    Wt 73.9 kg (163 lb)    SpO2 98%    Breastfeeding No    BMI 31.83 kg/m2     Temp (24hrs), Av.6 °F (37 °C), Min:97.7 °F (36.5 °C), Max:99.8 °F (37.7 °C)       Lines:  Peripheral IV:            Physical Exam:    General:  Alert, cooperative, overweight, appears stated age   Eyes: Sclera anicteric. Pupils equally round and reactive to light. Mouth/Throat: Mucous membranes dry, oral pharynx clear, edentulous   Neck: Supple   Lungs:   Diminished lung sounds in the bases, some scattered wheezes   CV:  Regular rate and rhythm, no murmur, click, rub or gallop   Abdomen:   Soft, mildly tender, bowel sounds normal. non-distended   Extremities: No cyanosis or edema   Skin: Skin color, texture, turgor normal. no acute rash or lesions   Lymph nodes: Cervical and supraclavicular normal   Musculoskeletal: RLE is slightly larger than LLE, no erythema, warmth (US negative for DVT)   Lines/Devices:  Intact, no erythema, drainage or tenderness   Psych: Alert and oriented, normal mood affect given the setting       Data Review:     CBC:  Recent Labs      12/25/17   0710  12/24/17   0628   WBC  6.1  6.9   GRANS  75  80*   MONOS  8  4   EOS  1  2   ANEU  4.5  5.5   ABL  1.0  1.0   HGB  10.1*  10.4*   HCT  30.9*  32.4*   PLT  120*  129*       BMP:  Recent Labs      12/26/17   0721  12/25/17   0710  12/24/17   0628   CREA  1.00  1.00  0.97   BUN  22  23  28*   NA  145  145  145   K  3.6  3.7  4.0   CL  110*  111*  113*   CO2  24  25  22   AGAP  11  9  10   GLU  113*  105*  87       LFTS:  Recent Labs      12/25/17   0710   TBILI  0.4   ALT  18   SGOT  35   AP  89   TP  6.2*   ALB  2.9*       Microbiology:     All Micro Results     Procedure Component Value Units Date/Time    CULTURE, BLOOD [[id number]]  (Abnormal)  (Susceptibility) Collected:  12/23/17 0004    Order Status:  Completed Specimen:  Blood from Blood Updated:  12/26/17 0701     Special Requests: --        NO SPECIAL REQUESTS  RIGHT  Antecubital       GRAM STAIN GRAM POS COCCI IN CHAINS         ANAEROBIC BOTTLE POSITIVE                 RESULTS VERIFIED, PHONED TO AND READ BACK BY Matty Mosley @ 3453 ON 12/23/17 BY NKE.      Culture result:         STREPTOCOCCUS ANGINOSUS (A)    CULTURE, BLOOD [[id number]] Collected:  12/25/17 1134    Order Status: Completed Specimen:  Blood from Blood Updated:  17     Special Requests: --        RIGHT  HAND       Culture result: NO GROWTH AFTER 18 HOURS       CULTURE, BLOOD [[id number]] Collected:  17 1134    Order Status:  Completed Specimen:  Blood from Blood Updated:  17     Special Requests: --        RIGHT  HAND       Culture result: NO GROWTH AFTER 18 HOURS       CULTURE, BLOOD [[id number]] Collected:  17 0004    Order Status:  Completed Specimen:  Blood from Blood Updated:  17     Special Requests: --        NO SPECIAL REQUESTS  LEFT  Antecubital       Culture result: NO GROWTH 3 DAYS             Imagin/26 CXR   IMPRESSION:  Stable mild volume overload.  CT Chest  IMPRESSION:  No evidence of pulmonary embolism. Coronary artery disease. Small bilateral pleural effusions.   Dependent atelectasis in left lower lobe.   Status post cholecystectomy.       Signed By: Oliva Vital NP     2017

## 2017-12-26 NOTE — PROGRESS NOTES
[facility] CARDIOLOGY PROGRESS NOTE           12/26/2017 10:45 AM    Admit Date: 12/22/2017      Subjective:   Not much output recorded, incontinent, worse SOB this AM placed on 2L O2, states was \"smothering\". No CP.      ROS:  Cardiovascular:  As noted above    Objective:      Vitals:    12/26/17 0017 12/26/17 0506 12/26/17 0738 12/26/17 0834   BP: 152/88 147/85 (!) 166/92    Pulse: (!) 104 (!) 103 100    Resp: 18 18 20    Temp: 99 °F (37.2 °C) 98.6 °F (37 °C) 99.8 °F (37.7 °C)    SpO2: 98% 93% 98% 98%   Weight:       Height:           Physical Exam:  General-No Acute Distress, 2L O2 by NC   Neck- supple, + JVD  CV- regular rate and rhythm   Lung- B crackles mid way up lungs   Abd- soft, nontender, nondistended  Ext- trace edema bilaterally. Skin- warm and dry    Data Review:   Recent Labs      12/26/17   0721  12/25/17   0710  12/24/17   0628   12/23/17   1902   NA  145  145  145   < >   --    K  3.6  3.7  4.0   < >   --    MG  2.0  2.2  2.2   < >   --    BUN  22  23  28*   < >   --    CREA  1.00  1.00  0.97   < >   --    GLU  113*  105*  87   < >   --    WBC   --   6.1  6.9   --    --    HGB   --   10.1*  10.4*   --    --    HCT   --   30.9*  32.4*   --    --    PLT   --   120*  129*   --    --    TROIQ   --    --   3.63*   --   3.72*    < > = values in this interval not displayed. Assessment/Plan:   Sepsis ([facility] (12/23/2017)- GPC bacteremia, cont Rocephin. Essential hypertension, benign (3/17/2015)- hydralazine prn, lisinopril held on admission w renal failure- will add BB w tachycardia. Chronic diastolic CHF, chronic- EF 55% w mod MR, RVSP 60- Lasix IV, add BB. Needs more diuresis w IV lasix. CKD (chronic kidney disease) stage 3, GFR 30-59 ml/min - Improved, ACE-I on hold, monitor w diuresis. Macrocytic anemia (12/23/2017)- Monitor, hgb stable, no signs of acute bleeding. Elevated troponin- [facility] Dr Hannah Rider 2014 w mild-mod CAD and patent stent in LAD.   Med management, cont ASA, plavix, statin, add BB, may try to add ACE-I now that renal function improved. Gram-positive bacteremia (12/25/2017)- SEDA Pelaez  12/26/2017 10:45 AM             [facility]     12/26/2017     3:48 PM    I have personally seen and examined Bossman Brown with [facility]. I agree and confirm findings in history, physical exam, and assessment/plan as outlined above with following pertinent additions/exceptions:   Patient with dyspnea but no chest pain. PE: CV: RRR  L: Coarse BS bilaterally E: No edema. ASS/plan:  Elevated troponin in setting of sepsis and tachycardia. Unlikely to be true acute coronary syndrome or NSTEMI given clinical scenerio and lack of angina. Suspect current volume overload due to chronic diastolic CHF and IV fluids from antibiotics. IV diuresis. Treat sepsis. Increase coreg as tolerated. On ASA and Plavix. IV lasix.        Kunal Dumont MD

## 2017-12-26 NOTE — PROGRESS NOTES
Hospitalist Progress Note     Admit Date:  2017 11:21 PM   Name:  Kelly Campbell   Age:  80 y.o.  :  2/10/1927   MRN:  [id number]   PCP:  Sanjana Zhao MD  Treatment Team: Attending Provider: Javed Rodriguez MD; Consulting Provider: Syed Oneill DO; Charge Nurse: Antonette Avery RN    Subjective:           Ms. Jadiel Junior is a 81 yo female with PMH of CAD, HTN, admitted  with sepsis and elevated troponin. She has been found to have GPC bacteremia 1/2 BC showing strep anginosus, rocephin  to present and vancomycin  to present. ECHO no vegetation, mild AS, EF 55-60%. Seen by cardio and felt likely had NSTEMI with plans for medical management/ heparin drip since stopped and DAPT continued. Receiving IV lasix. Plans for home once stable         Very lethargic today, received ativan overnight, has dyspnea and wheezing , ROS limited as sleepy           Objective:     Patient Vitals for the past 24 hrs:   Temp Pulse Resp BP SpO2   17 - - - - 98 %   17 0738 99.8 °F (37.7 °C) 100 20 (!) 166/92 98 %   17 0506 98.6 °F (37 °C) (!) 103 18 147/85 93 %   17 0017 99 °F (37.2 °C) (!) 104 18 152/88 98 %   17 1946 97.7 °F (36.5 °C) (!) 109 18 131/78 97 %   17 1704 - - - - 96 %   17 1504 98 °F (36.7 °C) 85 17 149/87 94 %   17 1032 98.3 °F (36.8 °C) 81 18 143/79 97 %     Oxygen Therapy  O2 Sat (%): 98 % (17)  Pulse via Oximetry: 91 beats per minute (17)  O2 Device: Nasal cannula (17)  O2 Flow Rate (L/min): 2 l/min (17)    Intake/Output Summary (Last 24 hours) at 17 0976  Last data filed at 17 0739   Gross per 24 hour   Intake              240 ml   Output                0 ml   Net              240 ml         General:    Well nourished. lethargic elderly  CV:   RRR. No murmur, rub, or gallop. Lungs:   Diffuse wheezing  Abdomen:   Soft, nontender, nondistended.  obese  Extremities: Warm and dry. No cyanosis or edema. Skin:     No rashes or jaundice. Data Review:  I have reviewed all labs, meds, telemetry events, and studies from the last 24 hours. Recent Results (from the past 24 hour(s))   PTT    Collection Time: 12/25/17 11:34 AM   Result Value Ref Range    aPTT 75.6 (H) 23.2 - 35.3 SEC   CULTURE, BLOOD    Collection Time: 12/25/17 11:34 AM   Result Value Ref Range    Special Requests: RIGHT  HAND        Culture result: NO GROWTH AFTER 18 HOURS     CULTURE, BLOOD    Collection Time: 12/25/17 11:34 AM   Result Value Ref Range    Special Requests: RIGHT  HAND        Culture result: NO GROWTH AFTER 18 HOURS     PTT    Collection Time: 12/25/17  7:27 PM   Result Value Ref Range    aPTT 108.2 (HH) 23.2 - 35.3 SEC   MAGNESIUM    Collection Time: 12/26/17  7:21 AM   Result Value Ref Range    Magnesium 2.0 1.8 - 2.4 mg/dL   METABOLIC PANEL, BASIC    Collection Time: 12/26/17  7:21 AM   Result Value Ref Range    Sodium 145 136 - 145 mmol/L    Potassium 3.6 3.5 - 5.1 mmol/L    Chloride 110 (H) 98 - 107 mmol/L    CO2 24 21 - 32 mmol/L    Anion gap 11 7 - 16 mmol/L    Glucose 113 (H) 65 - 100 mg/dL    BUN 22 8 - 23 MG/DL    Creatinine 1.00 0.6 - 1.0 MG/DL    GFR est AA >60 >60 ml/min/1.73m2    GFR est non-AA 55 (L) >60 ml/min/1.73m2    Calcium 8.9 8.3 - 10.4 MG/DL   BNP    Collection Time: 12/26/17  7:21 AM   Result Value Ref Range    BNP 2754 pg/mL        All Micro Results     Procedure Component Value Units Date/Time    CULTURE, BLOOD [[id number]]  (Abnormal)  (Susceptibility) Collected:  12/23/17 0004    Order Status:  Completed Specimen:  Blood from Blood Updated:  12/26/17 0701     Special Requests: --        NO SPECIAL REQUESTS  RIGHT  Antecubital       GRAM STAIN GRAM POS COCCI IN CHAINS         ANAEROBIC BOTTLE POSITIVE                 RESULTS VERIFIED, PHONED TO AND READ BACK BY Yessi Ng @ 1329 ON 12/23/17 BY GÉNESIS.      Culture result:         STREPTOCOCCUS ANGINOSUS (A)    CULTURE, BLOOD [[id number]] Collected:  12/25/17 1134    Order Status:  Completed Specimen:  Blood from Blood Updated:  12/26/17 0612     Special Requests: --        RIGHT  HAND       Culture result: NO GROWTH AFTER 18 HOURS       CULTURE, BLOOD [[id number]] Collected:  12/25/17 1134    Order Status:  Completed Specimen:  Blood from Blood Updated:  12/26/17 0612     Special Requests: --        RIGHT  HAND       Culture result: NO GROWTH AFTER 18 HOURS       CULTURE, BLOOD [[id number]] Collected:  12/23/17 0004    Order Status:  Completed Specimen:  Blood from Blood Updated:  12/26/17 0612     Special Requests: --        NO SPECIAL REQUESTS  LEFT  Antecubital       Culture result: NO GROWTH 3 DAYS             Current Meds:  Current Facility-Administered Medications   Medication Dose Route Frequency    albuterol-ipratropium (DUO-NEB) 2.5 MG-0.5 MG/3 ML  3 mL Nebulization Q6H RT    cyanocobalamin tablet 1,000 mcg  1,000 mcg Oral DAILY    heparin (porcine) injection 5,000 Units  5,000 Units SubCUTAneous Q8H    furosemide (LASIX) injection 40 mg  40 mg IntraVENous BID    Saccharomyces boulardii (FLORASTOR) capsule 250 mg  250 mg Oral BID    allopurinol (ZYLOPRIM) tablet 100 mg  100 mg Oral BID    aspirin delayed-release tablet 81 mg  81 mg Oral DAILY    clopidogrel (PLAVIX) tablet 75 mg  75 mg Oral DAILY    colchicine (MITIGARE) capsule 0.6 mg  0.6 mg Oral DAILY    potassium chloride (KLOR-CON) tablet 10 mEq  10 mEq Oral DAILY    pravastatin (PRAVACHOL) tablet 20 mg  20 mg Oral QHS    hydrALAZINE (APRESOLINE) 20 mg/mL injection 20 mg  20 mg IntraVENous Q6H PRN    guaiFENesin-dextromethorphan (ROBITUSSIN DM) 100-10 mg/5 mL syrup 10 mL  10 mL Oral Q6H PRN    bisacodyl (DULCOLAX) tablet 5 mg  5 mg Oral DAILY PRN    polyethylene glycol (MIRALAX) packet 17 g  17 g Oral DAILY PRN    sodium chloride (NS) flush 5-10 mL  5-10 mL IntraVENous Q8H    sodium chloride (NS) flush 5-10 mL  5-10 mL IntraVENous PRN    acetaminophen (TYLENOL) tablet 650 mg  650 mg Oral Q4H PRN    naloxone (NARCAN) injection 0.4 mg  0.4 mg IntraVENous PRN    ondansetron (ZOFRAN) injection 4 mg  4 mg IntraVENous Q4H PRN    senna-docusate (PERICOLACE) 8.6-50 mg per tablet 2 Tab  2 Tab Oral DAILY PRN    cefTRIAXone (ROCEPHIN) 1 g in sterile water (preservative free) 10 mL IV syringe  1 g IntraVENous Q24H    influenza vaccine 2017-18 (3 yrs+)(PF) (FLUZONE QUAD/FLUARIX QUAD) injection 0.5 mL  0.5 mL IntraMUSCular PRIOR TO DISCHARGE    famotidine (PEPCID) tablet 20 mg  20 mg Oral DAILY       Other Studies (last 24 hours):  Xr Chest Sngl V    Result Date: 12/26/2017   Portable view of the chest Comparison: 12/23/2017 Indication: Shortness of breath, wheezing this morning FINDINGS: Similar cardiac enlargement. Pulmonary artery enlargement also noted. Degenerative change shoulders, right greater than left. Congestion with small moderate effusions stable. No pneumothorax. No discrete acute osseous lesion seen. IMPRESSION: Stable mild volume overload.        Assessment and Plan:     Hospital Problems as of 12/26/2017  Date Reviewed: 11/30/2017          Codes Class Noted - Resolved POA    Gram-positive bacteremia ICD-10-CM: R78.81  ICD-9-CM: 790.7  12/25/2017 - Present Yes        NSTEMI (non-ST elevated myocardial infarction) [facility]) ICD-10-CM: I21.4  ICD-9-CM: 410.70  12/24/2017 - Present Unknown        * (Principal)Sepsis ([facility] ICD-10-CM: A41.9  ICD-9-CM: 038.9, 995.91  12/23/2017 - Present Yes        Demand ischemia ([facility] ICD-10-CM: I24.8  ICD-9-CM: 411.89  12/23/2017 - Present         Acute respiratory failure with hypoxia (HCC) ICD-10-CM: J96.01  ICD-9-CM: 518.81  12/23/2017 - Present         Fever ICD-10-CM: R50.9  ICD-9-CM: 780.60  12/23/2017 - Present Unknown        Elevated troponin ICD-10-CM: R74.8  ICD-9-CM: 790.6  12/23/2017 - Present Unknown        CKD (chronic kidney disease) stage 3, GFR 30-59 ml/min ICD-10-CM: N18.3  ICD-9-CM: 585.3  12/23/2017 - Present Unknown        Elevated lactic acid level ICD-10-CM: R79.89  ICD-9-CM: 276.2  12/23/2017 - Present Unknown        Hypernatremia ICD-10-CM: E87.0  ICD-9-CM: 276.0  12/23/2017 - Present Unknown        Macrocytic anemia ICD-10-CM: D53.9  ICD-9-CM: 281.9  12/23/2017 - Present Unknown        Pain of right lower extremity ICD-10-CM: M79.604  ICD-9-CM: 729.5  12/23/2017 - Present Unknown        Diastolic CHF, chronic (HCC) (Chronic) ICD-10-CM: I50.32  ICD-9-CM: 428.32, 428.0  5/17/2016 - Present Yes        Essential hypertension, benign (Chronic) ICD-10-CM: I10  ICD-9-CM: 401.1  3/17/2015 - Present Yes              PLAN:    · Appreciate cardio for medical management, ongoing lasix diuresis, followup In and OUT/ daily BMP  · Will have scheduled nebulizer treatments, CXR now   · Stop sedatives, monitor mentation    · Stop vancomycin and continue rocephin,  followup repeat BC x 2   · Supplementing lower B12 level   · PT/OT/PPD and case management for dispo     DC planning/Dispo:    DVT ppx:  heparin    Signed:  Maryanne Rogers MD

## 2017-12-26 NOTE — PROGRESS NOTES
Problem: Interdisciplinary Rounds  Goal: Interdisciplinary Rounds  Outcome: Progressing Towards Goal  Interdisciplinary team rounds were held 12/26/2017 with the following team members:Care Management, Occupational Therapy, Physician and . Plan of care discussed. See clinical pathway and/or care plan for interventions and desired outcomes.

## 2017-12-26 NOTE — PROGRESS NOTES
CM Specialist Lavern Mckoy met with patient and daughter at bedside. Patient is not alert or oriented. Patient has her own home but adult son lives with her. Patient daughter states that her brother is not a great support system for their mother and that he doesnt assist with her needs. Patient daughter states that she lives close by and assists her mother on a daily basis with her ALDs. Patient is unable to walk or stand for a long period of time. Patient has a cane that is currently broken. Patient daughter asked CM if patient can receive a new cane, walker, shower seat, and a wheelchair if able. Patient PCP is Dr. Annamarie Uribe at [address] [place] [address]. Patient has no breathing assistive or community services. Patient is not on Dialysis. Patient has no issues affording medications. Patient daughter states that [place] [address] [facility] once discharge. CM will relate information to RN Case Manager Tresa Flores. RN Case Manager will continue to follow for discharge needs. .Care Management Interventions  PCP Verified by CM: Yes  Mode of Transport at Discharge:  Other (see comment) (Family)  Transition of Care Consult (CM Consult): Discharge Planning  Physical Therapy Consult: Yes  Occupational Therapy Consult: Yes  Current Support Network: Lives Alone, Own Home, Family Lives Nearby  Confirm Follow Up Transport: Family

## 2017-12-26 NOTE — PROGRESS NOTES
[facility] CARDIOLOGY PROGRESS NOTE           12/25/2017 12:19 PM    Admit Date: 12/22/2017    Subjective:   More orthopnea and SOB now. Echo 12/23/17: EF 55%, mod MR, RVSP 60  5/16 echo showed normal EF, mod to severe TR, RVSP 60. Prior stenting, last [facility] 2014 by Dr. Sydni Aragon showed mild to mod CAD with patent LAD stent. PCI was yrs and yrs ago. ROS:  GEN:  No fever or chills  Cardiovascular:  As noted above  Pulmonary:  As noted above  Neuro:  No new focal motor or sensory loss    Objective:      Vitals:    12/25/17 0723 12/25/17 1032 12/25/17 1504 12/25/17 1704   BP: 130/77 143/79 149/87    Pulse: 82 81 85    Resp: 17 18 17    Temp: 98.1 °F (36.7 °C) 98.3 °F (36.8 °C) 98 °F (36.7 °C)    SpO2: 97% 97% 94% 96%   Weight:       Height:           Physical Exam:  General-A and O x 3  Neck- supple, no JVD  CV- regular rate and rhythm no MRG  Lung- clear bilaterally  Abd- soft, nontender, nondistended  Ext- no edema bilaterally. Skin- warm and dry  Psychiatric:  Normal mood and affect. Neurologic:  Alert and oriented X 3      Data Review:   Recent Labs      12/25/17   0710  12/24/17   0628  12/23/17   1902   NA  145  145   --    K  3.7  4.0   --    MG  2.2  2.2   --    BUN  23  28*   --    CREA  1.00  0.97   --    GLU  105*  87   --    WBC  6.1  6.9   --    HGB  10.1*  10.4*   --    HCT  30.9*  32.4*   --    PLT  120*  129*   --    TROIQ   --   3.63*  3.72*       TELEMETRY:  sinus    Assessment/Plan:     Principal Problem:    Sepsis ([facility] (12/23/2017)   On abx, follow, better now    Active Problems:    Essential hypertension, benign (3/17/2015)  Remain on meds now, follow BP with diuresis      Diastolic CHF, chronic ([facility] (5/17/2016)  WORSENING DHF now, add IV lasix and place travis for comfort with diuresis. Follow AM labs. Elevated troponin (12/23/2017)  Trop peak at 6.4. Likely NSTEMI, stop IV heparin gtt now. Remain on DAPT, statin. Follow for now.  NO CP, plan for med mgmt for now, no LHC. EF normal on the echo. Plan for LHC only for angina, CP. For now, med mgmt is best.       CKD (chronic kidney disease) stage 3, GFR 30-59 ml/min (12/23/2017)  Follow with adding IV lasix as above      NSTEMI (non-ST elevated myocardial infarction) ([facility] (12/24/2017)  As above, med mgmt best for now.            Mary Valdes,   12/25/2017 7:30pm

## 2017-12-26 NOTE — PROGRESS NOTES
Problem: Falls - Risk of  Goal: *Absence of Falls  Document Vanda Fall Risk and appropriate interventions in the flowsheet.    Outcome: Progressing Towards Goal  Fall Risk Interventions:  Mobility Interventions: Bed/chair exit alarm, Communicate number of staff needed for ambulation/transfer, OT consult for ADLs, PT Consult for mobility concerns, PT Consult for assist device competence         Medication Interventions: Bed/chair exit alarm, Evaluate medications/consider consulting pharmacy, Patient to call before getting OOB    Elimination Interventions: Call light in reach, Toileting schedule/hourly rounds    History of Falls Interventions: Bed/chair exit alarm, Consult care management for discharge planning, Door open when patient unattended

## 2017-12-26 NOTE — CDMP QUERY
Please clarify if this patient is being treated/managed for:    ACUTE DIASTOLIC CHF in the setting of NSTEMI requiring treatment with IV Lasix. =>Other Explanation of clinical findings  =>Unable to Determine (no explanation of clinical findings)    The medical record reflects the following:    Risk Factors: chronic diastolic CHF, advanced age, HTN, NSTEMI    Clinical Indicators: worsening SOB, orthopnea, BNP 2754    Treatment: IV Lasix     Please clarify and document your clinical opinion in the progress notes and discharge summary including the definitive and/or presumptive diagnosis, (suspected or probable), related to the above clinical findings. Please include clinical findings supporting your diagnosis.     Thanks,  Nini Corado RN, [address] [place] [address] Documentation Management Program  [phone]

## 2017-12-27 LAB
ANION GAP SERPL CALC-SCNC: 13 MMOL/L (ref 7–16)
BUN SERPL-MCNC: 29 MG/DL (ref 8–23)
CALCIUM SERPL-MCNC: 8.6 MG/DL (ref 8.3–10.4)
CHLORIDE SERPL-SCNC: 110 MMOL/L (ref 98–107)
CO2 SERPL-SCNC: 22 MMOL/L (ref 21–32)
CREAT SERPL-MCNC: 1.04 MG/DL (ref 0.6–1)
GLUCOSE SERPL-MCNC: 82 MG/DL (ref 65–100)
MAGNESIUM SERPL-MCNC: 2 MG/DL (ref 1.8–2.4)
MM INDURATION POC: NORMAL MM (ref 0–5)
POTASSIUM SERPL-SCNC: 4.3 MMOL/L (ref 3.5–5.1)
PPD POC: NORMAL NEGATIVE
SODIUM SERPL-SCNC: 145 MMOL/L (ref 136–145)

## 2017-12-27 PROCEDURE — 74011250637 HC RX REV CODE- 250/637: Performed by: INTERNAL MEDICINE

## 2017-12-27 PROCEDURE — 83735 ASSAY OF MAGNESIUM: CPT | Performed by: INTERNAL MEDICINE

## 2017-12-27 PROCEDURE — 97110 THERAPEUTIC EXERCISES: CPT

## 2017-12-27 PROCEDURE — 74011250636 HC RX REV CODE- 250/636: Performed by: INTERNAL MEDICINE

## 2017-12-27 PROCEDURE — 97530 THERAPEUTIC ACTIVITIES: CPT

## 2017-12-27 PROCEDURE — 74011000250 HC RX REV CODE- 250: Performed by: INTERNAL MEDICINE

## 2017-12-27 PROCEDURE — 65660000000 HC RM CCU STEPDOWN

## 2017-12-27 PROCEDURE — 94760 N-INVAS EAR/PLS OXIMETRY 1: CPT

## 2017-12-27 PROCEDURE — 80048 BASIC METABOLIC PNL TOTAL CA: CPT | Performed by: INTERNAL MEDICINE

## 2017-12-27 PROCEDURE — 36415 COLL VENOUS BLD VENIPUNCTURE: CPT | Performed by: INTERNAL MEDICINE

## 2017-12-27 PROCEDURE — 77010033678 HC OXYGEN DAILY

## 2017-12-27 PROCEDURE — 94640 AIRWAY INHALATION TREATMENT: CPT

## 2017-12-27 RX ADMIN — CARVEDILOL 12.5 MG: 12.5 TABLET, FILM COATED ORAL at 17:35

## 2017-12-27 RX ADMIN — RDII 250 MG CAPSULE 250 MG: at 08:21

## 2017-12-27 RX ADMIN — WATER 2 G: 1 INJECTION INTRAMUSCULAR; INTRAVENOUS; SUBCUTANEOUS at 08:23

## 2017-12-27 RX ADMIN — FUROSEMIDE 40 MG: 10 INJECTION, SOLUTION INTRAMUSCULAR; INTRAVENOUS at 20:38

## 2017-12-27 RX ADMIN — HEPARIN SODIUM 5000 UNITS: 5000 INJECTION, SOLUTION INTRAVENOUS; SUBCUTANEOUS at 17:35

## 2017-12-27 RX ADMIN — HEPARIN SODIUM 5000 UNITS: 5000 INJECTION, SOLUTION INTRAVENOUS; SUBCUTANEOUS at 08:21

## 2017-12-27 RX ADMIN — ACETAMINOPHEN 650 MG: 325 TABLET ORAL at 01:31

## 2017-12-27 RX ADMIN — IPRATROPIUM BROMIDE AND ALBUTEROL SULFATE 3 ML: .5; 3 SOLUTION RESPIRATORY (INHALATION) at 09:16

## 2017-12-27 RX ADMIN — IPRATROPIUM BROMIDE AND ALBUTEROL SULFATE 3 ML: .5; 3 SOLUTION RESPIRATORY (INHALATION) at 14:51

## 2017-12-27 RX ADMIN — ALLOPURINOL 100 MG: 100 TABLET ORAL at 08:22

## 2017-12-27 RX ADMIN — RDII 250 MG CAPSULE 250 MG: at 17:35

## 2017-12-27 RX ADMIN — CARVEDILOL 12.5 MG: 12.5 TABLET, FILM COATED ORAL at 08:22

## 2017-12-27 RX ADMIN — Medication 5 ML: at 05:06

## 2017-12-27 RX ADMIN — FUROSEMIDE 40 MG: 10 INJECTION, SOLUTION INTRAMUSCULAR; INTRAVENOUS at 08:23

## 2017-12-27 RX ADMIN — POTASSIUM CHLORIDE 10 MEQ: 750 TABLET, EXTENDED RELEASE ORAL at 08:21

## 2017-12-27 RX ADMIN — COLCHICINE 0.6 MG: 0.6 CAPSULE ORAL at 08:21

## 2017-12-27 RX ADMIN — Medication 5 ML: at 14:22

## 2017-12-27 RX ADMIN — ACETAMINOPHEN 650 MG: 325 TABLET ORAL at 20:38

## 2017-12-27 RX ADMIN — ASPIRIN 81 MG: 81 TABLET, COATED ORAL at 08:22

## 2017-12-27 RX ADMIN — PRAVASTATIN SODIUM 20 MG: 20 TABLET ORAL at 20:39

## 2017-12-27 RX ADMIN — Medication 10 ML: at 20:39

## 2017-12-27 RX ADMIN — ACETAMINOPHEN 650 MG: 325 TABLET ORAL at 08:22

## 2017-12-27 RX ADMIN — CLOPIDOGREL BISULFATE 75 MG: 75 TABLET ORAL at 08:22

## 2017-12-27 RX ADMIN — IPRATROPIUM BROMIDE AND ALBUTEROL SULFATE 3 ML: .5; 3 SOLUTION RESPIRATORY (INHALATION) at 01:17

## 2017-12-27 RX ADMIN — ACETAMINOPHEN 650 MG: 325 TABLET ORAL at 14:22

## 2017-12-27 RX ADMIN — ALLOPURINOL 100 MG: 100 TABLET ORAL at 17:35

## 2017-12-27 RX ADMIN — FAMOTIDINE 20 MG: 20 TABLET, FILM COATED ORAL at 08:22

## 2017-12-27 RX ADMIN — CYANOCOBALAMIN TAB 1000 MCG 1000 MCG: 1000 TAB at 08:22

## 2017-12-27 RX ADMIN — IPRATROPIUM BROMIDE AND ALBUTEROL SULFATE 3 ML: .5; 3 SOLUTION RESPIRATORY (INHALATION) at 19:47

## 2017-12-27 RX ADMIN — HEPARIN SODIUM 5000 UNITS: 5000 INJECTION, SOLUTION INTRAVENOUS; SUBCUTANEOUS at 00:03

## 2017-12-27 NOTE — PROGRESS NOTES
Problem: Self Care Deficits Care Plan (Adult)  Goal: *Acute Goals and Plan of Care (Insert Text)  1. Patient will complete lower body bathing and dressing with minimal assistance and adaptive equipment as needed. 2. Patient will complete toileting with supervision. 3. Patient will tolerate 25 minutes of OT treatment with 2-3 rest breaks to increase activity tolerance for ADLs. 4. Patient will complete functional transfers with supervision and adaptive equipment as needed. 5. Patient will complete functional mobility with supervision for household distances and with minimal cues for safety. Timeframe: 7 visits       OCCUPATIONAL THERAPY: Daily Note, Treatment Day: 1st and PM    12/27/2017  INPATIENT: Hospital Day: 6  Payor: [employer] VIP CARE PLUS / [place] [employer] VIP C[employer] [place] / Product Type: Managed Care Medicare /      NAME/AGE/GENDER: Gina Gamez is a 80 y.o. female   PRIMARY DIAGNOSIS:  Sepsis ([address]  Sepsis ([address] Sepsis ([address] Sepsis ([address]        ICD-10: Treatment Diagnosis:    · Generalized Muscle Weakness (M62.81)  · Other lack of cordination (R27.8)   Precautions/Allergies:     Bee pollen and Fire ant      ASSESSMENT:     Ms. Radha Pinto presents to the hospital with sepsis. 12/27/2017 Pt presents up in the chair upon arrival. Pt agreeable to treatment. Pt completed sit to stand with a rolling walker and completed functional mobility into the hallway with CGA. Pt returned to the room and was given a seated rest break and then participated in UE exercises to increase ROM and strength. Pt with limited ROM in her shoulders decreasing her ability to complete some exercises without active assist. Pt was left up in the chair with his daughter at her side and posey on. Continue OT POC. This section established at most recent assessment   PROBLEM LIST (Impairments causing functional limitations):  1. Decreased Strength  2. Decreased ADL/Functional Activities  3.  Decreased Transfer Abilities  4. Decreased Ambulation Ability/Technique  5. Decreased Balance  6. Decreased Activity Tolerance  7. Decreased Flexibility/Joint Mobility  8. Edema/Girth  9. Decreased [place] with Home Exercise Program  10. Decreased Cognition   INTERVENTIONS PLANNED: (Benefits and precautions of occupational therapy have been discussed with the patient.)  1. Activities of daily living training  2. Adaptive equipment training  3. Balance training  4. Clothing management  5. Cognitive training  6. Donning&doffing training  7. Group therapy  8. Neuromuscular re-eduation  9. Therapeutic activity  10. Therapeutic exercise     TREATMENT PLAN: Frequency/Duration: Follow patient 3 times per week to address above goals. Rehabilitation Potential For Stated Goals: Excellent     RECOMMENDED REHABILITATION/EQUIPMENT: (at time of discharge pending progress): Due to the probability of continued deficits (see above) this patient will likely need continued skilled occupational therapy after discharge. Equipment:    TBD              OCCUPATIONAL PROFILE AND HISTORY:   History of Present Injury/Illness (Reason for Referral):  See H&P  Past Medical History/Comorbidities:   Ms. Rosalina Talley  has a past medical history of Acute kidney failure, unspecified; Arthritis; CAD (coronary artery disease); Cellulitis and abscess of other specified site; Coronary atherosclerosis of native coronary artery; Essential hypertension, benign (3/17/2015); Hypertension; Hypopotassemia; Mixed hyperlipidemia; Osteoarthritis (7/20/2017); Other and unspecified hyperlipidemia; Reflux esophagitis; Renal failure, unspecified; Shortness of breath; and Unspecified essential hypertension. Ms. Rosalina Talley  has a past surgical history that includes hx cholecystectomy; pr layr clos wnd face,facial <2.5cm; pr cardiac surg procedure unlist; hx cataract removal (Bilateral); and hx hysterectomy.   Social History/Living Environment:   Home Environment: Apartment  # Steps to Enter: 0  One/Two Story Residence: One story  Living Alone: No  Support Systems: Child(jayleen), Friends \ neighbors, Family member(s), Baptism / [employer] community  Patient Expects to be Discharged to[de-identified] Private residence  Current DME Used/Available at Home: [place], straight, Shower chair  Tub or Shower Type: Tub/Shower combination  Prior Level of Function/Work/Activity:  Pt lives with her son. Pt's son is at home with her at all times. Pt has a walker but completes functional mobility with a cane primarily. Reports no recent falls. Pt completes ADL with occasional assistance from her daughter. Pt still participated in laundry and cooking tasks. Personal Factors:          Past/Current Experience:  Hx of knee pain with difficulty with prolonged standing        Other factors that influence how disability is experienced by the patient:  Multiple co-morbidities (see above)   Number of Personal Factors/Comorbidities that affect the Plan of Care: Expanded review of therapy/medical records (1-2):  MODERATE COMPLEXITY   ASSESSMENT OF OCCUPATIONAL PERFORMANCE[de-identified]   Activities of Daily Living:             Basic ADLs (From Assessment) Complex ADLs (From Assessment)   Basic ADL  Feeding: Stand-by assistance  Oral Facial Hygiene/Grooming: Minimum assistance  Bathing: Moderate assistance  Upper Body Dressing: Moderate assistance  Lower Body Dressing: Moderate assistance  Toileting: Moderate assistance Instrumental ADL  Meal Preparation: Moderate assistance  Homemaking: Maximum assistance   Grooming/Bathing/Dressing Activities of Daily Living     Cognitive Retraining  Safety/Judgement: Awareness of environment; Fall prevention                       Bed/Mat Mobility  Rolling: Contact guard assistance  Supine to Sit: Contact guard assistance  Sit to Supine:  (left up in chair)  Sit to Stand: Contact guard assistance  Bed to Chair: Contact guard assistance  Scooting: Stand-by asssistance       Most Recent Physical Functioning:   Gross Assessment:                  Posture:  Posture (WDL): Exceptions to WDL  Posture Assessment: Forward head, Rounded shoulders  Balance:  Sitting: Intact  Sitting - Static: Good (unsupported)  Sitting - Dynamic: Good (unsupported)  Standing: Impaired  Standing - Static: Fair  Standing - Dynamic : Fair Bed Mobility:  Rolling: Contact guard assistance  Supine to Sit: Contact guard assistance  Sit to Supine:  (left up in chair)  Scooting: Stand-by asssistance  Wheelchair Mobility:     Transfers:  Sit to Stand: Contact guard assistance  Stand to Sit: Contact guard assistance  Bed to Chair: Contact guard assistance              Patient Vitals for the past 6 hrs:   BP BP Patient Position SpO2 O2 Flow Rate (L/min) Pulse   17 0952 - - 98 % 1 l/min -   17 1123 108/69 At rest 98 % 1 l/min 63   17 1452 - - 99 % 1 l/min -       Mental Status  Neurologic State: Alert  Orientation Level: Disoriented to time, Oriented to person, Oriented to place, Oriented to situation  Cognition: Follows commands, Appropriate for age attention/concentration  Perception: Appears intact  Perseveration: No perseveration noted  Safety/Judgement: Awareness of environment, Fall prevention                          Physical Skills Involved:  1. Balance  2. Strength  3. Activity Tolerance Cognitive Skills Affected (resulting in the inability to perform in a timely and safe manner):  1. Executive Function  2. Sustained Attention Psychosocial Skills Affected:  1. Habits/Routines  2. Environmental Adaptation  3. Self-Awareness   Number of elements that affect the Plan of Care: 5+:  HIGH COMPLEXITY   CLINICAL DECISION MAKIN [facility] [address] [id number] AM-PAC 6 Clicks   Daily Activity Inpatient Short Form  How much help from another person does the patient currently need. .. Total A Lot A Little None   1. Putting on and taking off regular lower body clothing? [] 1   [x] 2   [] 3   [] 4   2. Bathing (including washing, rinsing, drying)?    [] 1 [x] 2   [] 3   [] 4   3. Toileting, which includes using toilet, bedpan or urinal?   [] 1   [x] 2   [] 3   [] 4   4. Putting on and taking off regular upper body clothing? [] 1   [x] 2   [] 3   [] 4   5. Taking care of personal grooming such as brushing teeth? [] 1   [] 2   [x] 3   [] 4   6. Eating meals? [] 1   [] 2   [x] 3   [] 4   © 2007, Trustees of [employer], under license to [employer]. All rights reserved      Score:  Initial: 14 Most Recent: X (Date: -- )    Interpretation of Tool:  Represents activities that are increasingly more difficult (i.e. Bed mobility, Transfers, Gait). Score 24 23 22-20 19-15 14-10 9-7 6     Modifier CH CI CJ CK CL CM CN      ? Self Care:     - CURRENT STATUS: CL - 60%-79% impaired, limited or restricted    - GOAL STATUS: CK - 40%-59% impaired, limited or restricted    - D/C STATUS:  ---------------To be determined---------------  Payor: FIRST CHO[employer] VIP CARE PLUS / [place] [employer] VIP CARE [place]US / Product Type: Managed Care Medicare /      Medical Necessity:     · Patient demonstrates excellent rehab potential due to higher previous functional level. Reason for Services/Other Comments:  · Patient continues to require skilled intervention due to decreased independence with ADL/functional transfers. Use of outcome tool(s) and clinical judgement create a POC that gives a: LOW COMPLEXITY         TREATMENT:   (In addition to Assessment/Re-Assessment sessions the following treatments were rendered)     Pre-treatment Symptoms/Complaints:    Pain: Initial:   Pain Location 1: Knee  Pain Intervention(s) 1:  (RN administering pain meds upon arrival)  Post Session:  Pt stated that her knee pain was better after walking. Therapeutic Activity: (10 minutes): Therapeutic activities including Chair transfers and static dynamic standing to improve mobility, strength, balance and activity tolerance.   Required minimal Safety awareness training;Verbal cues to promote static and dynamic balance in standing. Therapeutic Exercise: (17 minutes):  Exercises per grid below to improve mobility and strength. Required minimal visual, verbal and manual cues to promote proper body posture and promote proper body mechanics. Progressed resistance, range and repetitions as indicated. Date:  12/27/17 Date:   Date:     Activity/Exercise Parameters Parameters Parameters   Shoulder shrugs 15 reps     Shoulder circumduction 15 reps      Shoulder int/ext rotation 15 reps     Shoulder horizontal add/abd 15 reps     Shoulder presses  15 reps      Elbow flexion/extension 15 reps                       Braces/Orthotics/Lines/Etc:   · IV  Treatment/Session Assessment:    · Response to Treatment:  [address] [email or web address] participation. · Interdisciplinary Collaboration:   o Certified Occupational Therapy Assistant  o Registered Nurse  · After treatment position/precautions:   o Up in chair  o Bed alarm/tab alert on  o Bed/Chair-wheels locked  o Call light within reach  o RN notified  o Family at bedside   · Compliance with Program/Exercises: Will assess as treatment progresses. · Recommendations/Intent for next treatment session: \"Next visit will focus on advancements to more challenging activities and reduction in assistance provided\".   Total Treatment Duration:  OT Patient Time In/Time Out  Time In: 1426  Time Out: [address] Shanthi Gómez

## 2017-12-27 NOTE — PROGRESS NOTES
Infectious Disease Progress Note    Today's Date: 2017   Admit Date: 2017    Impression:   · Strep anginosus bacteremia ();  BCX NGTD  · NSTEMI  · Hospital onset delirium    Plan:   · No clear source of infection. Likely she had mouth source, and could have seeded her heart. · Due to all of the stress of the NSTEMI, I think the best plan for her is to treat empirically for endocarditis and not give her sedation for TRACY. · Will plan ceftriaxone 2gm IV q24hrs with planned EOT 18. · Place PICC today. · Plans noted for SNF discharge. Anti-infectives:   IV ceftriaxone    Subjective:   Date of Consultation:  2017  Referring Physician: Yoselin Moraes, Hospitalist    Less confused today, sitting in chair; worked with PT; no fevers; Allergies   Allergen Reactions    Bee Pollen Swelling    Fire Ant Itching and Swelling        Review of Systems: Pertinent items are noted in the History of Present Illness.     Objective:     Visit Vitals    /69 (BP 1 Location: Right arm, BP Patient Position: At rest)    Pulse 63    Temp 98 °F (36.7 °C)    Resp 20    Ht 5' (1.524 m)    Wt 70.6 kg (155 lb 9.6 oz)    SpO2 98%    Breastfeeding No    BMI 30.39 kg/m2     Temp (24hrs), Av.6 °F (36.4 °C), Min:96.9 °F (36.1 °C), Max:98 °F (36.7 °C)       Lines:  Peripheral IV:            Physical Exam:    General:  Alert, cooperative, overweight, appears stated age   Eyes:  Sclera anicteric   Mouth/Throat: edentulous   Neck: Supple   Lungs:   Diminished lung sounds in the bases, some scattered wheezes   CV:  Regular rate and rhythm, no murmur, click, rub or gallop   Abdomen:   non-distended   Extremities: No cyanosis; mild LE edema   Skin: no acute rash or lesions   Lymph nodes:    Musculoskeletal: Chronic arthritic changes noted   Lines/Devices:  Intact, no erythema, drainage or tenderness   Psych: Alert and responsive;       Data Review:     CBC:  Recent Labs      17   0710 WBC  6.1   GRANS  75   MONOS  8   EOS  1   ANEU  4.5   ABL  1.0   HGB  10.1*   HCT  30.9*   PLT  120*       BMP:  Recent Labs      17   0535  17   CREA  1.04*  1.00  1.00   BUN  29*  22  23   NA  145  145  145   K  4.3  3.6  3.7   CL  110*  110*  111*   CO2  22  24  25   AGAP  13  11  9   GLU  82  113*  105*       LFTS:  Recent Labs      1710   TBILI  0.4   ALT  18   SGOT  35   AP  89   TP  6.2*   ALB  2.9*       Microbiology:     All Micro Results     Procedure Component Value Units Date/Time    CULTURE, BLOOD [[id number]] Collected:  17    Order Status:  Completed Specimen:  Blood from Blood Updated:  17     Special Requests: --        RIGHT  HAND       Culture result: NO GROWTH 2 DAYS       CULTURE, BLOOD [[id number]] Collected:  17    Order Status:  Completed Specimen:  Blood from Blood Updated:  17     Special Requests: --        RIGHT  HAND       Culture result: NO GROWTH 2 DAYS       CULTURE, BLOOD [[id number]] Collected:  17 0004    Order Status:  Completed Specimen:  Blood from Blood Updated:  17     Special Requests: --        NO SPECIAL REQUESTS  LEFT  Antecubital       Culture result: NO GROWTH 4 DAYS       CULTURE, BLOOD [[id number]]  (Abnormal)  (Susceptibility) Collected:  17 0004    Order Status:  Completed Specimen:  Blood from Blood Updated:  17 0701     Special Requests: --        NO SPECIAL REQUESTS  RIGHT  Antecubital       GRAM STAIN GRAM POS COCCI IN CHAINS         ANAEROBIC BOTTLE POSITIVE                 RESULTS VERIFIED, PHONED TO AND READ BACK BY Fede Nunez @ 1804 ON 17 BY NKTAB. Culture result:         STREPTOCOCCUS ANGINOSUS (A)          Imagin17 CT urogram: IMPRESSION:      1. No ureteral calculi or hydronephrosis.     2. Diverticulosis.     3. 5 cm cystic right adnexal lesion.  Routine gynecologic follow-up is  recommended with routine follow-up pelvic sonography.     Signed By: Lucila Rosenberg MD     December 27, 2017

## 2017-12-27 NOTE — PROGRESS NOTES
[facility] CARDIOLOGY PROGRESS NOTE           12/27/2017 9:04 AM    Admit Date: 12/22/2017      Subjective:   HR improved with BB, BP stable, - 690 cc. No CP, breathing better. ROS:  Cardiovascular:  As noted above  Musc; C/O leg and foot pain    Objective:      Vitals:    12/26/17 2346 12/27/17 0123 12/27/17 0324 12/27/17 0720   BP: 114/66  129/72 118/75   Pulse: 69  70 77   Resp: 17 16 19   Temp: 96.9 °F (36.1 °C)  97.8 °F (36.6 °C) 97.5 °F (36.4 °C)   SpO2: 99% 99% 99% 99%   Weight:       Height:           Physical Exam:  General-No Acute Distress, 2L O2 by NC  Neck- supple, + mild JVD  CV- regular rate and rhythm 1/6 murmur   Lung- clear bilaterally anteriorly   Abd- soft, nontender, nondistended  Ext- trace edema bilaterally. Skin- warm and dry    Data Review:   Recent Labs      12/27/17   0535  12/26/17   0721  12/25/17   0710   NA  145  145  145   K  4.3  3.6  3.7   MG  2.0  2.0  2.2   BUN  29*  22  23   CREA  1.04*  1.00  1.00   GLU  82  113*  105*   WBC   --    --   6.1   HGB   --    --   10.1*   HCT   --    --   30.9*   PLT   --    --   120*       Assessment/Plan:   Sepsis (HCC) (12/23/2017)- GPC bacteremia, cont Rocephin.      Essential hypertension, benign (3/17/2015)- hydralazine prn, lisinopril held on admission w renal failure- BP improved with Coreg.      Chronic diastolic CHF, chronic- EF 55% w mod MR, RVSP 60- Lasix IV w - 690 cc output, coreg added, cr stable.      CKD (chronic kidney disease) stage 3, GFR 30-59 ml/min - Improved, ACE-I on hold, monitor w diuresis.      Macrocytic anemia (12/23/2017)- Monitor, hgb stable, no signs of acute bleeding.      Elevated troponin- [facility] Dr Hannah Rider 2014 w mild-mod CAD and patent stent in LAD. Probably demand ischemia, cont ASA, plavix, statin, Coreg added 12-26, may try to add ACE-I now that renal function improved if BP will allow.      Gram-positive bacteremia (12/25/2017)- Rocephin.      SEDA Sue  12/27/2017 9:04 [facility] CARDIOLOGY     12/27/2017     2:47 PM    I have personally seen and examined Luann Reyes with Karina STACK. I agree and confirm findings in history, physical exam, and assessment/plan as outlined above with following pertinent additions/exceptions:   Patient alert and interactive. Family in room. Dyspnea improved. BP well controlled. PE: CV: RRR  L: CTA anteriorly. E: no edema. ASS/Plan: AS above. IV lasix today.   Likely change to PO lasix in AM.       Kaycee Woods MD

## 2017-12-27 NOTE — PROGRESS NOTES
Problem: Mobility Impaired (Adult and Pediatric)  Goal: *Therapy Goal (Edit Goal, Insert Text)  STG:  (1.)Ms. Thu Heath will move from supine to sit and sit to supine , scoot up and down and roll side to side with STAND BY ASSIST within 4 day(s). (2.)Ms. Thu Heath will transfer from bed to chair and chair to bed with STAND BY ASSIST using the least restrictive device within 4 day(s). (3.)Ms. Thu Heath will ambulate with STAND BY ASSIST for 100 feet with the least restrictive device within 4 day(s). LTG:  (1.)Ms. Thu Heath will move from supine to sit and sit to supine , scoot up and down and roll side to side in bed with MODIFIED INDEPENDENT within 7 day(s). (2.)Ms. Thu Heath will transfer from bed to chair and chair to bed with MODIFIED INDEPENDENCE using the least restrictive device within 7 day(s). (3.)Ms. Thu Heath will ambulate with MODIFIED INDEPENDENCE for 250 feet with the least restrictive device within 7 day(s). ________________________________________________________________________________________________       PHYSICAL THERAPY: Daily Note, Treatment Day: 1st, AM 12/27/2017  INPATIENT: Hospital Day: 6  Payor: [employer] VIP C[employer] PLUS / [place] [employer] [place] [employer] / Product Type: Managed Care Medicare /      NAME/AGE/GENDER: Zafar Saxena is a 80 y.o. female   PRIMARY DIAGNOSIS: Sepsis ([address]  Sepsis ([address] Sepsis ([address] Sepsis ([address]        ICD-10: Treatment Diagnosis:   · Difficulty in walking, Not elsewhere classified (R26.2)   Precaution/Allergies:  Bee pollen and Fire ant      ASSESSMENT:     Ms. Thu Heath is supine on arrival, agreeable to PT treatment. Pt A & O x 4, states no pain, on 1 L/min O2, stats 100% at rest. Pt placed on room air during treatment, O2 stats >96% throughout mobility. Pt with increased fatigue with ambulation. Pt required CGA for bed mobility, sit to stand transfers. Pt CGA to SBA with ambulation with RW, minimal distance secondary to pt stating LEs feel \"weak\".  Pt performed seated there ex with SBA for B LE strengthening. Pt then left up in chair, needs in reach, tab alert on. Pt overall making slow progress toward goals, limited by general fatigue. PT to cont to follow for acute care needs. This section established at most recent assessment   PROBLEM LIST (Impairments causing functional limitations):  1. Decreased Strength  2. Decreased ADL/Functional Activities  3. Decreased Transfer Abilities  4. Decreased Ambulation Ability/Technique  5. Decreased Balance  6. Decreased Activity Tolerance  7. Decreased Pacing Skills  8. Decreased Flexibility/Joint Mobility  9. Decreased [place] with Home Exercise Program   INTERVENTIONS PLANNED: (Benefits and precautions of physical therapy have been discussed with the patient.)  1. Balance Exercise  2. Bed Mobility  3. Family Education  4. Gait Training  5. Home Exercise Program (HEP)  6. Range of Motion (ROM)  7. Therapeutic Activites  8. Therapeutic Exercise/Strengthening  9. Transfer Training     TREATMENT PLAN: Frequency/Duration: 3-5 times a week for duration of hospital stay  Rehabilitation Potential For Stated Goals: Good      RECOMMENDED REHABILITATION/EQUIPMENT: (at time of discharge pending progress): Due to the probability of continued deficits (see above) this patient will likely need continued skilled physical therapy after discharge. HISTORY:   History of Present Injury/Illness (Reason for Referral):  Patient is a 81 y/o F who presented with fever, chills/shaking, SOB, and a NBNB nausea/vomiting episode x1 that occurred a few hours ago. She is a very poor historian, limits history. No formal diagnosis of dementia. Says that she was brought here after she started shaking earlier this evening, and then had vomiting. Said she had a fever but cannot tell me the temperature. Denies any other new symptoms.   Denies CP, palpitations, cough, abd pain, urinary symptoms, diarrhea, new rashes/wounds.       10 systems reviewed and negative except as noted in HPI. Past Medical History/Comorbidities:   Ms. Cheikh Egan  has a past medical history of Acute kidney failure, unspecified; Arthritis; CAD (coronary artery disease); Cellulitis and abscess of other specified site; Coronary atherosclerosis of native coronary artery; Essential hypertension, benign (3/17/2015); Hypertension; Hypopotassemia; Mixed hyperlipidemia; Osteoarthritis (7/20/2017); Other and unspecified hyperlipidemia; Reflux esophagitis; Renal failure, unspecified; Shortness of breath; and Unspecified essential hypertension. Ms. Cheikh Egan  has a past surgical history that includes hx cholecystectomy; pr layr clos wnd face,facial <2.5cm; pr cardiac surg procedure unlist; hx cataract removal (Bilateral); and hx hysterectomy. Social History/Living Environment:   Home Environment: Apartment  # Steps to Enter: 0  One/Two Story Residence: One story  Living Alone: No  Support Systems: Child(jayleen), Friends \ neighbors, Family member(s), Scientologist / jeffery community  Patient Expects to be Discharged to[de-identified] Private residence  Current DME Used/Available at Home: Cane, straight, Shower chair  Tub or Shower Type: Tub/Shower combination  Prior Level of Function/Work/Activity:  Patient was utilizing a 2 wheel rolling walker at home with ambulation. Dominant Side:         RIGHT  Personal Factors:          Sex:  female        Age:  80 y.o. Number of Personal Factors/Comorbidities that affect the Plan of Care: 1-2: MODERATE COMPLEXITY   EXAMINATION:   Most Recent Physical Functioning:   Gross Assessment:                  Posture:  Posture Assessment:  Forward head, Rounded shoulders  Balance:  Sitting: Intact  Sitting - Static: Good (unsupported)  Sitting - Dynamic: Good (unsupported)  Standing: Impaired  Standing - Static: Fair  Standing - Dynamic : Fair Bed Mobility:  Rolling: Contact guard assistance  Supine to Sit: Contact guard assistance  Sit to Supine:  (left up in chair)  Scooting: Stand-by asssistance  Wheelchair Mobility:     Transfers:  Sit to Stand: Contact guard assistance  Stand to Sit: Contact guard assistance  Bed to Chair: Contact guard assistance  Gait:     Base of Support: Center of gravity altered;Narrowed  Speed/Mary: Slow  Step Length: Left shortened;Right shortened  Swing Pattern: Left symmetrical;Right symmetrical  Gait Abnormalities: Decreased step clearance;Shuffling gait  Distance (ft): 50 Feet (ft) (fatigues quickly, O2 stats 98% on room air)  Assistive Device: Walker, rolling  Ambulation - Level of Assistance: Stand-by asssistance;Contact guard assistance  Interventions: Safety awareness training;Verbal cues      Body Structures Involved:  1. Bones  2. Joints  3. Muscles  4. Ligaments Body Functions Affected:  1. Neuromusculoskeletal  2. Movement Related  3. Skin Related  4. Metobolic/Endocrine Activities and Participation Affected:  1. General Tasks and Demands  2. Mobility  3. Self Care  4. Community, Social and [place]   Number of elements that affect the Plan of Care: 1-2: LOW COMPLEXITY   CLINICAL PRESENTATION:   Presentation: Evolving clinical presentation with changing clinical characteristics: MODERATE COMPLEXITY   CLINICAL DECISION MAKIN [facility] Mobility Inpatient Short Form  How much difficulty does the patient currently have. .. Unable A Lot A Little None   1. Turning over in bed (including adjusting bedclothes, sheets and blankets)? [] 1   [] 2   [x] 3   [] 4   2. Sitting down on and standing up from a chair with arms ( e.g., wheelchair, bedside commode, etc.)   [] 1   [] 2   [x] 3   [] 4   3. Moving from lying on back to sitting on the side of the bed? [] 1   [] 2   [x] 3   [] 4   How much help from another person does the patient currently need. .. Total A Lot A Little None   4. Moving to and from a bed to a chair (including a wheelchair)? [] 1   [] 2   [x] 3   [] 4   5.   Need to walk in hospital room? [] 1   [] 2   [x] 3   [] 4   6. Climbing 3-5 steps with a railing? [] 1   [] 2   [x] 3   [] 4   © 2007, Trustees of [address] [place] [address] [address] [id number], under license to [employer]. All rights reserved      Score:  Initial: 18 Most Recent: X (Date: -- )    Interpretation of Tool:  Represents activities that are increasingly more difficult (i.e. Bed mobility, Transfers, Gait). Score 24 23 22-20 19-15 14-10 9-7 6     Modifier CH CI CJ CK CL CM CN      ? Mobility - Walking and Moving Around:     - CURRENT STATUS: CK - 40%-59% impaired, limited or restricted    - GOAL STATUS: CJ - 20%-39% impaired, limited or restricted    - D/C STATUS:  ---------------To be determined---------------  Payor: [employer] C[employer] PLUS [place] [employer] [place]US / Product Type: Managed Care Medicare /      Medical Necessity:     · Patient demonstrates good rehab potential due to higher previous functional level. Reason for Services/Other Comments:  · Patient continues to require skilled intervention due to medical complications, patient unable to attend/participate in therapy as expected and decreased transfers, ambulation and mobility. Use of outcome tool(s) and clinical judgement create a POC that gives a: Clear prediction of patient's progress: LOW COMPLEXITY            TREATMENT:   (In addition to Assessment/Re-Assessment sessions the following treatments were rendered)   Pre-treatment Symptoms/Complaints:  0/10 no specific pain reproted. Pain: Initial:   Pain Intensity 1: 0  Post Session:  0/10 no specific pain reported. Therapeutic Activity: (    23 min): Therapeutic activities including Bed transfers, Chair transfers and Ambulation on level ground to improve mobility, strength, balance and coordination. Required minimal Safety awareness training;Verbal cues to promote static and dynamic balance in standing and promote coordination of bilateral, lower extremity(s). Date:  12/27/17 Date:   Date:     Activity/Exercise Parameters Parameters Parameters   Seated AP X 15 B     Seated LAQ X 15 B     Seated Marching X 15 B                                 Braces/Orthotics/Lines/Etc:   · IV  · travis catheter  · O2 Device: Nasal cannula  Treatment/Session Assessment:    · Response to Treatment: pt overall tolerated well; ambulated on room air, O2 stats >98%; fatigued quickly with B LE weakness  · Interdisciplinary Collaboration:   o Physical Therapist  o Registered Nurse  · After treatment position/precautions:   o Up in chair  o Bed alarm/tab alert on  o Bed/Chair-wheels locked  o Bed in low position  o Call light within reach   · Compliance with Program/Exercises: Will assess as treatment progresses. · Recommendations/Intent for next treatment session: \"Next visit will focus on advancements to more challenging activities, reduction in assistance provided and transfers, ambulation and mobility. \".   Total Treatment Duration:  PT Patient Time In/Time Out  Time In: 0952  Time Out: [address] Shanthi Horowitz, PT

## 2017-12-27 NOTE — PROGRESS NOTES
Spoke with daughter Lauren Hogan via phone and discussed STR for iv antibx at DC agreeable to referrals to [address] [place] [address] of [facility] [address] and [facility], referrals completed via CC link and [place].      Nathaniel Teixeira- Please call cell number if needed during day [phone]

## 2017-12-27 NOTE — PROGRESS NOTES
Problem: Falls - Risk of  Goal: *Absence of Falls  Document Vanda Fall Risk and appropriate interventions in the flowsheet.    Outcome: Progressing Towards Goal  Fall Risk Interventions:  Mobility Interventions: Bed/chair exit alarm    Mentation Interventions: Reorient patient, More frequent rounding    Medication Interventions: Bed/chair exit alarm    Elimination Interventions: Call light in reach    History of Falls Interventions: Door open when patient unattended

## 2017-12-27 NOTE — PROGRESS NOTES
Hospitalist Progress Note     Admit Date:  2017 11:21 PM   Name:  Shandra Purdy   Age:  80 y.o.  :  2/10/1927   MRN:  [id number]   PCP:  Erika Barclay MD  Treatment Team: Attending Provider: Samir Elliott MD; Consulting Provider: Bianca Grullon DO; Consulting Provider: Fani Ruvalcaba MD; Utilization Review: Gorge Solorzano; Care Manager: Kyle Choi RN    Subjective:       Ms. David Bernal is a 81 yo female with PMH of CAD, HTN, admitted  with sepsis and elevated troponin. She has been found to have GPC bacteremia 1/ BC showing strep anginosus, rocephin  to present and vancomycin  to . Seen by ID who recommends rocephin 2 gram daily EOT 18. CT urogram shows right  5 cm adnexal lesion that will need GYN/ US followup. ECHO no vegetation, mild AS, EF 55-60%. No plans for TRACY given sedation risk but empirically treating for endocarditis. Seen by cardio and felt likely had NSTEMI with plans for medical management/ heparin drip since stopped and DAPT continued. Receiving IV lasix.   Plans for rehab       much more alert, ate, has some dyspnea and cough, had BM        Objective:     Patient Vitals for the past 24 hrs:   Temp Pulse Resp BP SpO2   17 1123 98 °F (36.7 °C) 63 20 108/69 98 %   17 0952 - - - - 98 %   17 0918 - - - - 100 %   17 0720 97.5 °F (36.4 °C) 77 19 118/75 99 %   17 0324 97.8 °F (36.6 °C) 70 16 129/72 99 %   17 0123 - - - - 99 %   17 2346 96.9 °F (36.1 °C) 69 17 114/66 99 %   17 2107 97.6 °F (36.4 °C) 76 18 116/74 99 %   17 2045 - - - - 99 %   17 1553 - - - - (!) 80 %   17 1448 98 °F (36.7 °C) 95 20 138/83 100 %     Oxygen Therapy  O2 Sat (%): 98 % (17 1123)  Pulse via Oximetry: 69 beats per minute (17 0918)  O2 Device: Nasal cannula (17 1123)  O2 Flow Rate (L/min): 1 l/min (17 1123)    Intake/Output Summary (Last 24 hours) at 17 1325  Last data filed at 12/27/17 1124   Gross per 24 hour   Intake              360 ml   Output             1650 ml   Net            -1290 ml         General:    Well nourished.elderly  CV:   RRR. No murmur, rub, or gallop. Lungs:   Diffuse wheezing though improved  Abdomen:   Soft, nontender, nondistended. obese  Extremities: Warm and dry. No cyanosis or edema. Skin:     No rashes or jaundice. Data Review:  I have reviewed all labs, meds, telemetry events, and studies from the last 24 hours.     Recent Results (from the past 24 hour(s))   BLOOD GAS, ARTERIAL    Collection Time: 12/26/17  7:00 PM   Result Value Ref Range    pH 7.45 7.35 - 7.45      PCO2 36 35 - 45 mmHg    PO2 136 (H) 80 - 105 mmHg    BICARBONATE 24 22 - 26 mmol/L    BASE EXCESS 0.5 0 - 3 mmol/L    TOTAL HEMOGLOBIN 10.8 (L) 11.7 - 15.0 GM/DL    O2 SAT 99 (H) 92 - 98.5 %    Arterial O2 Hgb 97.6 (H) 94 - 97 %    CARBOXYHEMOGLOBIN 0.3 (L) 0.5 - 1.5 %    METHEMOGLOBIN 0.7 0.0 - 1.5 %    DEOXYHEMOGLOBIN 1 0.0 - 5.0 %    SITE LR     ALLENS TEST POSITIVE      O2 FLOW 4.00 L/min   PLEASE READ & DOCUMENT PPD TEST IN 72 HRS    Collection Time: 12/27/17  5:20 AM   Result Value Ref Range    PPD  Negative    mm Induration  mm   MAGNESIUM    Collection Time: 12/27/17  5:35 AM   Result Value Ref Range    Magnesium 2.0 1.8 - 2.4 mg/dL   METABOLIC PANEL, BASIC    Collection Time: 12/27/17  5:35 AM   Result Value Ref Range    Sodium 145 136 - 145 mmol/L    Potassium 4.3 3.5 - 5.1 mmol/L    Chloride 110 (H) 98 - 107 mmol/L    CO2 22 21 - 32 mmol/L    Anion gap 13 7 - 16 mmol/L    Glucose 82 65 - 100 mg/dL    BUN 29 (H) 8 - 23 MG/DL    Creatinine 1.04 (H) 0.6 - 1.0 MG/DL    GFR est AA >60 >60 ml/min/1.73m2    GFR est non-AA 53 (L) >60 ml/min/1.73m2    Calcium 8.6 8.3 - 10.4 MG/DL        All Micro Results     Procedure Component Value Units Date/Time    CULTURE, BLOOD [[id number]] Collected:  12/25/17 1134    Order Status:  Completed Specimen:  Blood from Blood Updated: 12/27/17 0615     Special Requests: --        RIGHT  HAND       Culture result: NO GROWTH 2 DAYS       CULTURE, BLOOD [[id number]] Collected:  12/25/17 1134    Order Status:  Completed Specimen:  Blood from Blood Updated:  12/27/17 0615     Special Requests: --        RIGHT  HAND       Culture result: NO GROWTH 2 DAYS       CULTURE, BLOOD [[id number]] Collected:  12/23/17 0004    Order Status:  Completed Specimen:  Blood from Blood Updated:  12/27/17 0615     Special Requests: --        NO SPECIAL REQUESTS  LEFT  Antecubital       Culture result: NO GROWTH 4 DAYS       CULTURE, BLOOD [[id number]]  (Abnormal)  (Susceptibility) Collected:  12/23/17 0004    Order Status:  Completed Specimen:  Blood from Blood Updated:  12/26/17 0701     Special Requests: --        NO SPECIAL REQUESTS  RIGHT  Antecubital       GRAM STAIN GRAM POS COCCI IN CHAINS         ANAEROBIC BOTTLE POSITIVE                 RESULTS VERIFIED, PHONED TO AND READ BACK BY Bijal Delcid @ 8297 ON 12/23/17 BY NKTAB.      Culture result:         STREPTOCOCCUS ANGINOSUS (A)          Current Meds:  Current Facility-Administered Medications   Medication Dose Route Frequency    albuterol-ipratropium (DUO-NEB) 2.5 MG-0.5 MG/3 ML  3 mL Nebulization Q6H RT    cefTRIAXone (ROCEPHIN) 2 g in sterile water (preservative free) 20 mL IV syringe  2 g IntraVENous Q24H    carvedilol (COREG) tablet 12.5 mg  12.5 mg Oral BID WITH MEALS    cyanocobalamin tablet 1,000 mcg  1,000 mcg Oral DAILY    heparin (porcine) injection 5,000 Units  5,000 Units SubCUTAneous Q8H    furosemide (LASIX) injection 40 mg  40 mg IntraVENous BID    Saccharomyces boulardii (FLORASTOR) capsule 250 mg  250 mg Oral BID    allopurinol (ZYLOPRIM) tablet 100 mg  100 mg Oral BID    aspirin delayed-release tablet 81 mg  81 mg Oral DAILY    clopidogrel (PLAVIX) tablet 75 mg  75 mg Oral DAILY    colchicine (MITIGARE) capsule 0.6 mg  0.6 mg Oral DAILY    potassium chloride (KLOR-CON) tablet 10 mEq 10 mEq Oral DAILY    pravastatin (PRAVACHOL) tablet 20 mg  20 mg Oral QHS    hydrALAZINE (APRESOLINE) 20 mg/mL injection 20 mg  20 mg IntraVENous Q6H PRN    guaiFENesin-dextromethorphan (ROBITUSSIN DM) 100-10 mg/5 mL syrup 10 mL  10 mL Oral Q6H PRN    bisacodyl (DULCOLAX) tablet 5 mg  5 mg Oral DAILY PRN    polyethylene glycol (MIRALAX) packet 17 g  17 g Oral DAILY PRN    sodium chloride (NS) flush 5-10 mL  5-10 mL IntraVENous Q8H    sodium chloride (NS) flush 5-10 mL  5-10 mL IntraVENous PRN    acetaminophen (TYLENOL) tablet 650 mg  650 mg Oral Q4H PRN    naloxone (NARCAN) injection 0.4 mg  0.4 mg IntraVENous PRN    ondansetron (ZOFRAN) injection 4 mg  4 mg IntraVENous Q4H PRN    senna-docusate (PERICOLACE) 8.6-50 mg per tablet 2 Tab  2 Tab Oral DAILY PRN    influenza vaccine 2017-18 (3 yrs+)(PF) (FLUZONE QUAD/FLUARIX QUAD) injection 0.5 mL  0.5 mL IntraMUSCular PRIOR TO DISCHARGE    famotidine (PEPCID) tablet 20 mg  20 mg Oral DAILY       Other Studies (last 24 hours):  Ct Urogram Wo Cont    Result Date: 12/27/2017  CT ABDOMEN AND PELVIS WITHOUT CONTRAST 12/26/2017 6:19 PM History:  Sepsis and lethargy today. UTI. History of stones. Technique: Noncontrast  axial images were obtained through the abdomen and pelvis per the renal stone protocol. Coronal reformatted images were generated. Dose reduction techniques were used such as automated exposure control, adjustment of the MA or KV according to patient size, and iterative reconstruction. Comparison:  October 27, 2017 Findings: Included portions of the lung bases demonstrate trace pleural effusions and lower lobe atelectasis or consolidation. . ABDOMEN:    There are no renal or ureteral calculi. There is no hydronephrosis. Periureteral phleboliths are present along the distal course of the left ureter.  Bilateral renal cortical lesions are unchanged including small hyperdense lesion suggestive of hemorrhagic cyst.  Evaluation of the abdominal viscera is limited without IV contrast.  Minimal low-attenuation in the hepatic parenchyma adjacent to the falciform ligament is unchanged. This may be focal fatty sparing. This could be further characterized with routine follow-up sonography if indicated. Cholecystectomy clips are present. The unenhanced appearance of the pancreas, spleen and adrenal glands is normal. The appendix is normal in appearance. Multiple sigmoid diverticula are present. PELVIS: A Weir balloon is present within the bladder. A 5 cm right adnexal cystic lesion is similar in appearance to the previous study. There is no free pelvic fluid. Bone windows demonstrated no aggressive osseous lesions. IMPRESSION: 1. No ureteral calculi or hydronephrosis. 2. Diverticulosis. 3. 5 cm cystic right adnexal lesion. Routine gynecologic follow-up is recommended with routine follow-up pelvic sonography.        Assessment and Plan:     Hospital Problems as of 12/27/2017  Date Reviewed: 11/30/2017          Codes Class Noted - Resolved POA    Gram-positive bacteremia ICD-10-CM: R78.81  ICD-9-CM: 790.7  12/25/2017 - Present Yes        * (Principal)Sepsis ([facility] ICD-10-CM: A41.9  ICD-9-CM: 038.9, 995.91  12/23/2017 - Present Yes        Demand ischemia ([facility] ICD-10-CM: I24.8  ICD-9-CM: 411.89  12/23/2017 - Present         Acute respiratory failure with hypoxia ([facility] ICD-10-CM: J96.01  ICD-9-CM: 518.81  12/23/2017 - Present         Fever ICD-10-CM: R50.9  ICD-9-CM: 780.60  12/23/2017 - Present Unknown        Elevated troponin ICD-10-CM: R74.8  ICD-9-CM: 790.6  12/23/2017 - Present Unknown        CKD (chronic kidney disease) stage 3, GFR 30-59 ml/min ICD-10-CM: N18.3  ICD-9-CM: 585.3  12/23/2017 - Present Unknown        Elevated lactic acid level ICD-10-CM: R79.89  ICD-9-CM: 276.2  12/23/2017 - Present Unknown        Hypernatremia ICD-10-CM: E87.0  ICD-9-CM: 276.0  12/23/2017 - Present Unknown        Macrocytic anemia ICD-10-CM: D53.9  ICD-9-CM: 281.9  12/23/2017 - Present Unknown        Pain of right lower extremity ICD-10-CM: M79.604  ICD-9-CM: 729.5  12/23/2017 - Present Unknown        Diastolic CHF, chronic (HCC) (Chronic) ICD-10-CM: I50.32  ICD-9-CM: 428.32, 428.0  5/17/2016 - Present Yes        Essential hypertension, benign (Chronic) ICD-10-CM: I10  ICD-9-CM: 401.1  3/17/2015 - Present Yes        RESOLVED: Acute diastolic CHF (congestive heart failure) ([facility] ICD-10-CM: I50.31  ICD-9-CM: 428.31, 428.0  12/26/2017 - 12/26/2017 Yes        RESOLVED: NSTEMI (non-ST elevated myocardial infarction) [facility]) ICD-10-CM: I21.4  ICD-9-CM: 410.70  12/24/2017 - 12/26/2017 Unknown              PLAN:    · Appreciate cardio for medical management, ongoing lasix diuresis, followup In and OUT/ daily BMP, wean O2 as tolerant   · continue scheduled nebulizer treatments  · Stopped sedatives, monitor mentation that has improved  · Appreciate ID input, continue rocephin EOT 1-22-18,   followup repeat BC x 2 , PICC ordered  · Supplementing lower B12 level   · Will need followup right adnexal lesion   · PT/OT/PPD and case management for dispo - will need SNF    DC planning/Dispo:    DVT ppx:  heparin    Signed:  Saira Avery MD

## 2017-12-28 ENCOUNTER — APPOINTMENT (OUTPATIENT)
Dept: GENERAL RADIOLOGY | Age: 82
DRG: 871 | End: 2017-12-28
Attending: INTERNAL MEDICINE
Payer: COMMERCIAL

## 2017-12-28 LAB
ANION GAP SERPL CALC-SCNC: 9 MMOL/L (ref 7–16)
BASOPHILS # BLD: 0 K/UL (ref 0–0.2)
BASOPHILS NFR BLD: 0 % (ref 0–2)
BUN SERPL-MCNC: 35 MG/DL (ref 8–23)
CALCIUM SERPL-MCNC: 9.2 MG/DL (ref 8.3–10.4)
CHLORIDE SERPL-SCNC: 108 MMOL/L (ref 98–107)
CO2 SERPL-SCNC: 28 MMOL/L (ref 21–32)
CREAT SERPL-MCNC: 1.2 MG/DL (ref 0.6–1)
DIFFERENTIAL METHOD BLD: ABNORMAL
EOSINOPHIL # BLD: 0.1 K/UL (ref 0–0.8)
EOSINOPHIL NFR BLD: 2 % (ref 0.5–7.8)
ERYTHROCYTE [DISTWIDTH] IN BLOOD BY AUTOMATED COUNT: 14.9 % (ref 11.9–14.6)
GLUCOSE BLD STRIP.AUTO-MCNC: 106 MG/DL (ref 65–100)
GLUCOSE BLD STRIP.AUTO-MCNC: 135 MG/DL (ref 65–100)
GLUCOSE SERPL-MCNC: 101 MG/DL (ref 65–100)
HCT VFR BLD AUTO: 31.5 % (ref 35.8–46.3)
HGB BLD-MCNC: 10.5 G/DL (ref 11.7–15.4)
IMM GRANULOCYTES # BLD: 0 K/UL (ref 0–0.5)
IMM GRANULOCYTES NFR BLD AUTO: 0 % (ref 0–5)
LYMPHOCYTES # BLD: 1 K/UL (ref 0.5–4.6)
LYMPHOCYTES NFR BLD: 15 % (ref 13–44)
MAGNESIUM SERPL-MCNC: 2 MG/DL (ref 1.8–2.4)
MCH RBC QN AUTO: 33.7 PG (ref 26.1–32.9)
MCHC RBC AUTO-ENTMCNC: 33.3 G/DL (ref 31.4–35)
MCV RBC AUTO: 101 FL (ref 79.6–97.8)
MONOCYTES # BLD: 0.5 K/UL (ref 0.1–1.3)
MONOCYTES NFR BLD: 9 % (ref 4–12)
NEUTS SEG # BLD: 4.6 K/UL (ref 1.7–8.2)
NEUTS SEG NFR BLD: 74 % (ref 43–78)
PLATELET # BLD AUTO: 153 K/UL (ref 150–450)
PMV BLD AUTO: 13.2 FL (ref 10.8–14.1)
POTASSIUM SERPL-SCNC: 3.3 MMOL/L (ref 3.5–5.1)
RBC # BLD AUTO: 3.12 M/UL (ref 4.05–5.25)
SODIUM SERPL-SCNC: 145 MMOL/L (ref 136–145)
WBC # BLD AUTO: 6.2 K/UL (ref 4.3–11.1)

## 2017-12-28 PROCEDURE — 74011250637 HC RX REV CODE- 250/637: Performed by: INTERNAL MEDICINE

## 2017-12-28 PROCEDURE — 80048 BASIC METABOLIC PNL TOTAL CA: CPT | Performed by: INTERNAL MEDICINE

## 2017-12-28 PROCEDURE — C1751 CATH, INF, PER/CENT/MIDLINE: HCPCS

## 2017-12-28 PROCEDURE — 85025 COMPLETE CBC W/AUTO DIFF WBC: CPT | Performed by: INTERNAL MEDICINE

## 2017-12-28 PROCEDURE — 77030018786 HC NDL GD F/USND BARD -B

## 2017-12-28 PROCEDURE — 36569 INSJ PICC 5 YR+ W/O IMAGING: CPT | Performed by: INTERNAL MEDICINE

## 2017-12-28 PROCEDURE — 74011250636 HC RX REV CODE- 250/636: Performed by: INTERNAL MEDICINE

## 2017-12-28 PROCEDURE — 65660000000 HC RM CCU STEPDOWN

## 2017-12-28 PROCEDURE — 82962 GLUCOSE BLOOD TEST: CPT

## 2017-12-28 PROCEDURE — 76937 US GUIDE VASCULAR ACCESS: CPT

## 2017-12-28 PROCEDURE — 77030018719 HC DRSG PTCH ANTIMIC J&J -A

## 2017-12-28 PROCEDURE — B548ZZA ULTRASONOGRAPHY OF SUPERIOR VENA CAVA, GUIDANCE: ICD-10-PCS | Performed by: INTERNAL MEDICINE

## 2017-12-28 PROCEDURE — 36415 COLL VENOUS BLD VENIPUNCTURE: CPT | Performed by: INTERNAL MEDICINE

## 2017-12-28 PROCEDURE — 94760 N-INVAS EAR/PLS OXIMETRY 1: CPT

## 2017-12-28 PROCEDURE — 94640 AIRWAY INHALATION TREATMENT: CPT

## 2017-12-28 PROCEDURE — 74011000250 HC RX REV CODE- 250: Performed by: INTERNAL MEDICINE

## 2017-12-28 PROCEDURE — 71020 XR CHEST PA LAT: CPT

## 2017-12-28 PROCEDURE — 02HV33Z INSERTION OF INFUSION DEVICE INTO SUPERIOR VENA CAVA, PERCUTANEOUS APPROACH: ICD-10-PCS | Performed by: INTERNAL MEDICINE

## 2017-12-28 PROCEDURE — 83735 ASSAY OF MAGNESIUM: CPT | Performed by: INTERNAL MEDICINE

## 2017-12-28 PROCEDURE — 77010033678 HC OXYGEN DAILY

## 2017-12-28 PROCEDURE — 51798 US URINE CAPACITY MEASURE: CPT

## 2017-12-28 RX ORDER — HEPARIN 100 UNIT/ML
300 SYRINGE INTRAVENOUS AS NEEDED
Status: DISCONTINUED | OUTPATIENT
Start: 2017-12-28 | End: 2018-01-05 | Stop reason: HOSPADM

## 2017-12-28 RX ORDER — SODIUM CHLORIDE 0.9 % (FLUSH) 0.9 %
10 SYRINGE (ML) INJECTION EVERY 8 HOURS
Status: DISCONTINUED | OUTPATIENT
Start: 2017-12-28 | End: 2018-01-05 | Stop reason: HOSPADM

## 2017-12-28 RX ORDER — SODIUM CHLORIDE 0.9 % (FLUSH) 0.9 %
10 SYRINGE (ML) INJECTION AS NEEDED
Status: DISCONTINUED | OUTPATIENT
Start: 2017-12-28 | End: 2018-01-05 | Stop reason: HOSPADM

## 2017-12-28 RX ORDER — HEPARIN 100 UNIT/ML
300 SYRINGE INTRAVENOUS EVERY 8 HOURS
Status: DISCONTINUED | OUTPATIENT
Start: 2017-12-28 | End: 2018-01-05 | Stop reason: HOSPADM

## 2017-12-28 RX ORDER — POTASSIUM CHLORIDE 20 MEQ/1
20 TABLET, EXTENDED RELEASE ORAL DAILY
Status: DISCONTINUED | OUTPATIENT
Start: 2017-12-29 | End: 2018-01-05 | Stop reason: HOSPADM

## 2017-12-28 RX ADMIN — Medication 10 ML: at 20:55

## 2017-12-28 RX ADMIN — GUAIFENESIN AND DEXTROMETHORPHAN 10 ML: 100; 10 SYRUP ORAL at 18:10

## 2017-12-28 RX ADMIN — HEPARIN SODIUM 5000 UNITS: 5000 INJECTION, SOLUTION INTRAVENOUS; SUBCUTANEOUS at 00:09

## 2017-12-28 RX ADMIN — ACETAMINOPHEN 650 MG: 325 TABLET ORAL at 05:46

## 2017-12-28 RX ADMIN — IPRATROPIUM BROMIDE AND ALBUTEROL SULFATE 3 ML: .5; 3 SOLUTION RESPIRATORY (INHALATION) at 15:07

## 2017-12-28 RX ADMIN — RDII 250 MG CAPSULE 250 MG: at 17:07

## 2017-12-28 RX ADMIN — HEPARIN SODIUM 5000 UNITS: 5000 INJECTION, SOLUTION INTRAVENOUS; SUBCUTANEOUS at 16:20

## 2017-12-28 RX ADMIN — FUROSEMIDE 40 MG: 10 INJECTION, SOLUTION INTRAMUSCULAR; INTRAVENOUS at 20:54

## 2017-12-28 RX ADMIN — PRAVASTATIN SODIUM 20 MG: 20 TABLET ORAL at 20:54

## 2017-12-28 RX ADMIN — CYANOCOBALAMIN TAB 1000 MCG 1000 MCG: 1000 TAB at 08:46

## 2017-12-28 RX ADMIN — IPRATROPIUM BROMIDE AND ALBUTEROL SULFATE 3 ML: .5; 3 SOLUTION RESPIRATORY (INHALATION) at 08:21

## 2017-12-28 RX ADMIN — WATER 2 G: 1 INJECTION INTRAMUSCULAR; INTRAVENOUS; SUBCUTANEOUS at 08:45

## 2017-12-28 RX ADMIN — ALLOPURINOL 100 MG: 100 TABLET ORAL at 08:46

## 2017-12-28 RX ADMIN — ASPIRIN 81 MG: 81 TABLET, COATED ORAL at 08:46

## 2017-12-28 RX ADMIN — COLCHICINE 0.6 MG: 0.6 CAPSULE ORAL at 08:46

## 2017-12-28 RX ADMIN — ALLOPURINOL 100 MG: 100 TABLET ORAL at 17:07

## 2017-12-28 RX ADMIN — IPRATROPIUM BROMIDE AND ALBUTEROL SULFATE 3 ML: .5; 3 SOLUTION RESPIRATORY (INHALATION) at 01:40

## 2017-12-28 RX ADMIN — CARVEDILOL 12.5 MG: 12.5 TABLET, FILM COATED ORAL at 08:46

## 2017-12-28 RX ADMIN — CARVEDILOL 12.5 MG: 12.5 TABLET, FILM COATED ORAL at 17:07

## 2017-12-28 RX ADMIN — Medication 10 ML: at 17:37

## 2017-12-28 RX ADMIN — Medication 300 UNITS: at 20:54

## 2017-12-28 RX ADMIN — HEPARIN SODIUM 5000 UNITS: 5000 INJECTION, SOLUTION INTRAVENOUS; SUBCUTANEOUS at 08:45

## 2017-12-28 RX ADMIN — HEPARIN SODIUM 5000 UNITS: 5000 INJECTION, SOLUTION INTRAVENOUS; SUBCUTANEOUS at 23:36

## 2017-12-28 RX ADMIN — Medication 10 ML: at 04:32

## 2017-12-28 RX ADMIN — RDII 250 MG CAPSULE 250 MG: at 08:46

## 2017-12-28 RX ADMIN — ACETAMINOPHEN 650 MG: 325 TABLET ORAL at 23:36

## 2017-12-28 RX ADMIN — FUROSEMIDE 40 MG: 10 INJECTION, SOLUTION INTRAMUSCULAR; INTRAVENOUS at 08:45

## 2017-12-28 RX ADMIN — FAMOTIDINE 20 MG: 20 TABLET, FILM COATED ORAL at 08:46

## 2017-12-28 RX ADMIN — GUAIFENESIN AND DEXTROMETHORPHAN 10 ML: 100; 10 SYRUP ORAL at 04:31

## 2017-12-28 RX ADMIN — CLOPIDOGREL BISULFATE 75 MG: 75 TABLET ORAL at 08:46

## 2017-12-28 RX ADMIN — POTASSIUM CHLORIDE 10 MEQ: 750 TABLET, EXTENDED RELEASE ORAL at 08:46

## 2017-12-28 RX ADMIN — Medication 10 ML: at 16:23

## 2017-12-28 RX ADMIN — IPRATROPIUM BROMIDE AND ALBUTEROL SULFATE 3 ML: .5; 3 SOLUTION RESPIRATORY (INHALATION) at 21:34

## 2017-12-28 RX ADMIN — Medication 300 UNITS: at 17:37

## 2017-12-28 NOTE — PROGRESS NOTES
[facility] CARDIOLOGY PROGRESS NOTE           12/28/2017 8:46 AM    Admit Date: 12/22/2017      Subjective:   Pt complains of persistent cough and smothering feeling overnight. ROS:  Cardiovascular:  As noted above    Objective:      Vitals:    12/28/17 0156 12/28/17 0305 12/28/17 0736 12/28/17 0822   BP:  131/79 133/80    Pulse:  76 79    Resp:  19 19    Temp:  98.3 °F (36.8 °C) 97.4 °F (36.3 °C)    SpO2:  98% 97% 99%   Weight: 70.3 kg (154 lb 14.4 oz)      Height:           Physical Exam:  General-No Acute Distress  Neck- supple, no JVD  CV- regular rate and rhythm no MRG  Lung- bibasilar crackles. Abd- soft, nontender, nondistended  Ext- no edema bilaterally. Skin- warm and dry    Data Review:   Recent Labs      12/28/17   0729  12/27/17   0535  12/26/17   0721   NA   --   145  145   K   --   4.3  3.6   MG   --   2.0  2.0   BUN   --   29*  22   CREA   --   1.04*  1.00   GLU   --   82  113*   WBC  6.2   --    --    HGB  10.5*   --    --    HCT  31.5*   --    --    PLT  153   --    --        Assessment/Plan:     Principal Problem:    [facility]eps[facility] ([facility] (12/23/2017)  Per primary team.     Active Problems:    Essential hypertension, benign (3/17/2015)  Stable on current meds. Diastolic CHF, chronic ([facility] (5/17/2016)  Still with dyspnea, rales on exam--continue IV lasix today. Echo on 12/23 with normal Systolic function. Phtn-rvsp 55-60--will likely need higher dose diuretics on discharge. Fever (12/23/2017)  Resolved      Elevated troponin (12/23/2017)  Demand ischemia      CKD (chronic kidney disease) stage 3, GFR 30-59 ml/min (12/23/2017)  Monitoring closely, Labs pending      Elevated lactic acid level (12/23/2017)        Hypernatremia (12/23/2017)        Macrocytic anemia (12/23/2017)  Stable on labs. Pain of right lower extremity (12/23/2017)        Gram-positive bacteremia (12/25/2017)--+ strep, ID following and empirically being treated for endocarditis. Brandon Hooks NP  12/28/2017 8:46 AM       [facility] CARDIOLOGY     12/28/2017     8:50 PM    I have personally seen and examined Yarely Pace with  Ila Eugene NP. I agree and confirm findings in history, physical exam, and assessment/plan as outlined above with following pertinent additions/exceptions:   Patient notes nonproductive cough last night. She is currently resting quietly in a supine position. She is in no respiratory distress. She has no underlying edema. Renal function is stable despite IV diuretics. No chest pain. PE: CV; RRR  L: CTA anteriorly. E: No edema. ASS/Plan:  She objectively does not appear to be volume overloaded. No edema. Check chest x-ray given her cough. Check BNP level. Continue IV diuresis today. We'll reassess tomorrow. Monitor renal function closely.       Gregg Bianchi MD

## 2017-12-28 NOTE — PROGRESS NOTES
Infectious Disease Progress Note    Today's Date: 2017   Admit Date: 2017    Impression:   · Strep anginosus bacteremia ();  BCX NGTD, no clear source, TTE with TV/MV regurgitation, will defer TRACY and treat empirically  · NSTEMI  · Hospital onset delirium    Plan:   PICC has been ordered, not yet placed. ID Plan of Care: Please note that ID cannot follow the patient at SNF. · Routine PICC care  · Continue ceftriaxone 2g IV Q24 hr for four weeks, through 18  · Recommend weekly labs:   · CBC with diff, serum creatinine, LFTs  · Will follow up with ID at EOT 18 at 0920    ID will sign off today. Please call with questions, or if there are changes in her condition prior to discharge that would impact our plan of care. Anti-infectives:   IV ceftriaxone    Subjective:   Date of Consultation:  2017  Referring Physician: Damir Clemons, Hospitalist    Resting comfortably with daughter bedside. Denies nausea, vomiting, diarrhea, fevers, chills or sweats. Says she does not want to eat, food does not have much taste, and she does not feel hungry. Having normal bowel movements. Overall feels weak, but did walk with PT yesterday. Still requiring O2, and has some dyspnea when she moves around. Allergies   Allergen Reactions    Bee Pollen Swelling    Fire Ant Itching and Swelling        Review of Systems: Pertinent items are noted in the History of Present Illness.     Objective:     Visit Vitals    /73 (BP 1 Location: Left arm, BP Patient Position: At rest)    Pulse 66    Temp 97.5 °F (36.4 °C)    Resp 18    Ht 5' (1.524 m)    Wt 70.3 kg (154 lb 14.4 oz)    SpO2 98%    Breastfeeding No    BMI 30.25 kg/m2     Temp (24hrs), Av.7 °F (36.5 °C), Min:97.4 °F (36.3 °C), Max:98.3 °F (36.8 °C)       Lines:  Peripheral IV:          Physical Exam:    General:  Alert, cooperative, overweight, appears stated age   Eyes:  Sclera anicteric   Mouth/Throat: edentulous Neck: Supple   Lungs:   Clear uppers, diminished bases   CV:  Regular rate and rhythm, faint murmur   Abdomen:   non-distended, very active bowel sounds   Extremities: No cyanosis; mild LE edema   Skin: no acute rash or lesions   Lymph nodes:    Musculoskeletal: Chronic arthritic changes noted   Lines/Devices:  Intact, no erythema, drainage or tenderness   Psych: Alert, oriented, engaged       Data Review:     CBC:  Recent Labs      12/28/17 0729   WBC  6.2   GRANS  74   MONOS  9   EOS  2   ANEU  4.6   ABL  1.0   HGB  10.5*   HCT  31.5*   PLT  153       BMP:  Recent Labs      12/28/17 0729  12/27/17   0535  12/26/17 0721   CREA  1.20*  1.04*  1.00   BUN  35*  29*  22   NA  145  145  145   K  3.3*  4.3  3.6   CL  108*  110*  110*   CO2  28  22  24   AGAP  9  13  11   GLU  101*  82  113*       LFTS:  No results for input(s): TBILI, ALT, SGOT, AP, TP, ALB in the last 72 hours.     Microbiology:     All Micro Results     Procedure Component Value Units Date/Time    CULTURE, BLOOD [[id number]] Collected:  12/25/17 1134    Order Status:  Completed Specimen:  Blood from Blood Updated:  12/28/17 0625     Special Requests: --        RIGHT  HAND       Culture result: NO GROWTH 3 DAYS       CULTURE, BLOOD [[id number]] Collected:  12/25/17 1134    Order Status:  Completed Specimen:  Blood from Blood Updated:  12/28/17 0625     Special Requests: --        RIGHT  HAND       Culture result: NO GROWTH 3 DAYS       CULTURE, BLOOD [[id number]] Collected:  12/23/17 0004    Order Status:  Completed Specimen:  Blood from Blood Updated:  12/28/17 0625     Special Requests: --        NO SPECIAL REQUESTS  LEFT  Antecubital       Culture result: NO GROWTH 5 DAYS       CULTURE, BLOOD [[id number]]  (Abnormal)  (Susceptibility) Collected:  12/23/17 0004    Order Status:  Completed Specimen:  Blood from Blood Updated:  12/26/17 0701     Special Requests: --        NO SPECIAL REQUESTS  RIGHT  Antecubital       GRAM STAIN GRAM POS COCCI IN CHAINS         ANAEROBIC BOTTLE POSITIVE                 RESULTS VERIFIED, PHONED TO AND READ BACK BY Alexander Guillen @ 0025 ON 17 BY GÉNESIS. Culture result:         STREPTOCOCCUS ANGINOSUS (A)          Imagin17 CT urogram: IMPRESSION:   1. No ureteral calculi or hydronephrosis. 2. Diverticulosis. 3. 5 cm cystic right adnexal lesion.  Routine gynecologic follow-up is  recommended with routine follow-up pelvic sonography.     Signed By: Krishan Hutson NP     2017

## 2017-12-28 NOTE — PROGRESS NOTES
Hospitalist Progress Note     Admit Date:  2017 11:21 PM   Name:  Annamarie Singh   Age:  80 y.o.  :  2/10/1927   MRN:  [id number]   PCP:  Rebecca Kemp MD  Treatment Team: Attending Provider: Angel Pratt MD; Consulting Provider: Patricia Malave DO; Consulting Provider: Sheri Pinto MD; Utilization Review: Violet Ozuna; Care Manager: Luz Vallejo RN; Utilization Review: Bisi Godinez    Subjective:         Ms. Zachery Reddy is a 81 yo female with PMH of CAD, HTN, admitted  with sepsis and elevated troponin. She has been found to have GPC bacteremia 1/ BC showing strep anginosus, rocephin  to present and vancomycin  to . Seen by ID who recommends rocephin 2 gram daily EOT 18. CT urogram shows right  5 cm adnexal lesion that will need GYN/ US followup. ECHO no vegetation, mild AS, EF 55-60%. No plans for TRACY given sedation risk but empirically treating for endocarditis. Seen by cardio and felt likely had NSTEMI with plans for medical management/ heparin drip since stopped and DAPT continued. Receiving IV lasix.   Plans for rehab pending       has cough, less dyspnea, no recent BM, some anorexia, little confused but alert         Objective:     Patient Vitals for the past 24 hrs:   Temp Pulse Resp BP SpO2   17 1055 97.5 °F (36.4 °C) 66 18 119/73 98 %   17 0822 - - - - 99 %   17 0736 97.4 °F (36.3 °C) 79 19 133/80 97 %   17 0305 98.3 °F (36.8 °C) 76 19 131/79 98 %   17 0140 - - - - 99 %   17 2214 97.5 °F (36.4 °C) 73 19 118/71 99 %   17 1948 - - - - 99 %   17 1904 97.5 °F (36.4 °C) 68 19 112/67 98 %   17 1601 98 °F (36.7 °C) 73 19 117/76 99 %   17 1452 - - - - 99 %     Oxygen Therapy  O2 Sat (%): 98 % (17 1055)  Pulse via Oximetry: 77 beats per minute (17)  O2 Device: Nasal cannula (17)  O2 Flow Rate (L/min): 2 l/min (17 7242)    Intake/Output Summary (Last 24 hours) at 12/28/17 1359  Last data filed at 12/28/17 0445   Gross per 24 hour   Intake                0 ml   Output             1000 ml   Net            -1000 ml         General:    Well nourished.elderly  CV:   RRR. No murmur, rub, or gallop. Lungs:   CTAB  Abdomen:   Soft, nontender, nondistended. obese  Extremities: Warm and dry. No cyanosis or edema. Skin:     No rashes or jaundice. Data Review:  I have reviewed all labs, meds, telemetry events, and studies from the last 24 hours. Recent Results (from the past 24 hour(s))   MAGNESIUM    Collection Time: 12/28/17  7:29 AM   Result Value Ref Range    Magnesium 2.0 1.8 - 2.4 mg/dL   METABOLIC PANEL, BASIC    Collection Time: 12/28/17  7:29 AM   Result Value Ref Range    Sodium 145 136 - 145 mmol/L    Potassium 3.3 (L) 3.5 - 5.1 mmol/L    Chloride 108 (H) 98 - 107 mmol/L    CO2 28 21 - 32 mmol/L    Anion gap 9 7 - 16 mmol/L    Glucose 101 (H) 65 - 100 mg/dL    BUN 35 (H) 8 - 23 MG/DL    Creatinine 1.20 (H) 0.6 - 1.0 MG/DL    GFR est AA 54 (L) >60 ml/min/1.73m2    GFR est non-AA 45 (L) >60 ml/min/1.73m2    Calcium 9.2 8.3 - 10.4 MG/DL   CBC WITH AUTOMATED DIFF    Collection Time: 12/28/17  7:29 AM   Result Value Ref Range    WBC 6.2 4.3 - 11.1 K/uL    RBC 3.12 (L) 4.05 - 5.25 M/uL    HGB 10.5 (L) 11.7 - 15.4 g/dL    HCT 31.5 (L) 35.8 - 46.3 %    .0 (H) 79.6 - 97.8 FL    MCH 33.7 (H) 26.1 - 32.9 PG    MCHC 33.3 31.4 - 35.0 g/dL    RDW 14.9 (H) 11.9 - 14.6 %    PLATELET 545 676 - 481 K/uL    MPV 13.2 10.8 - 14.1 FL    DF AUTOMATED      NEUTROPHILS 74 43 - 78 %    LYMPHOCYTES 15 13 - 44 %    MONOCYTES 9 4.0 - 12.0 %    EOSINOPHILS 2 0.5 - 7.8 %    BASOPHILS 0 0.0 - 2.0 %    IMMATURE GRANULOCYTES 0 0.0 - 5.0 %    ABS. NEUTROPHILS 4.6 1.7 - 8.2 K/UL    ABS. LYMPHOCYTES 1.0 0.5 - 4.6 K/UL    ABS. MONOCYTES 0.5 0.1 - 1.3 K/UL    ABS. EOSINOPHILS 0.1 0.0 - 0.8 K/UL    ABS. BASOPHILS 0.0 0.0 - 0.2 K/UL    ABS. IMM.  GRANS. 0.0 0.0 - 0.5 K/UL   GLUCOSE, POC Collection Time: 12/28/17  8:07 AM   Result Value Ref Range    Glucose (POC) 106 (H) 65 - 100 mg/dL        All Micro Results     Procedure Component Value Units Date/Time    CULTURE, BLOOD [[id number]] Collected:  12/25/17 1134    Order Status:  Completed Specimen:  Blood from Blood Updated:  12/28/17 0625     Special Requests: --        RIGHT  HAND       Culture result: NO GROWTH 3 DAYS       CULTURE, BLOOD [[id number]] Collected:  12/25/17 1134    Order Status:  Completed Specimen:  Blood from Blood Updated:  12/28/17 0625     Special Requests: --        RIGHT  HAND       Culture result: NO GROWTH 3 DAYS       CULTURE, BLOOD [[id number]] Collected:  12/23/17 0004    Order Status:  Completed Specimen:  Blood from Blood Updated:  12/28/17 0625     Special Requests: --        NO SPECIAL REQUESTS  LEFT  Antecubital       Culture result: NO GROWTH 5 DAYS       CULTURE, BLOOD [[id number]]  (Abnormal)  (Susceptibility) Collected:  12/23/17 0004    Order Status:  Completed Specimen:  Blood from Blood Updated:  12/26/17 0701     Special Requests: --        NO SPECIAL REQUESTS  RIGHT  Antecubital       GRAM STAIN GRAM POS COCCI IN CHAINS         ANAEROBIC BOTTLE POSITIVE                 RESULTS VERIFIED, PHONED TO AND READ BACK BY Jason Miri @ 8247 ON 12/23/17 BY GÉNESIS.      Culture result:         STREPTOCOCCUS ANGINOSUS (A)          Current Meds:  Current Facility-Administered Medications   Medication Dose Route Frequency    albuterol-ipratropium (DUO-NEB) 2.5 MG-0.5 MG/3 ML  3 mL Nebulization Q6H RT    cefTRIAXone (ROCEPHIN) 2 g in sterile water (preservative free) 20 mL IV syringe  2 g IntraVENous Q24H    carvedilol (COREG) tablet 12.5 mg  12.5 mg Oral BID WITH MEALS    cyanocobalamin tablet 1,000 mcg  1,000 mcg Oral DAILY    heparin (porcine) injection 5,000 Units  5,000 Units SubCUTAneous Q8H    furosemide (LASIX) injection 40 mg  40 mg IntraVENous BID    Saccharomyces boulardii (FLORASTOR) capsule 250 mg 250 mg Oral BID    allopurinol (ZYLOPRIM) tablet 100 mg  100 mg Oral BID    aspirin delayed-release tablet 81 mg  81 mg Oral DAILY    clopidogrel (PLAVIX) tablet 75 mg  75 mg Oral DAILY    colchicine (MITIGARE) capsule 0.6 mg  0.6 mg Oral DAILY    potassium chloride (KLOR-CON) tablet 10 mEq  10 mEq Oral DAILY    pravastatin (PRAVACHOL) tablet 20 mg  20 mg Oral QHS    hydrALAZINE (APRESOLINE) 20 mg/mL injection 20 mg  20 mg IntraVENous Q6H PRN    guaiFENesin-dextromethorphan (ROBITUSSIN DM) 100-10 mg/5 mL syrup 10 mL  10 mL Oral Q6H PRN    bisacodyl (DULCOLAX) tablet 5 mg  5 mg Oral DAILY PRN    polyethylene glycol (MIRALAX) packet 17 g  17 g Oral DAILY PRN    sodium chloride (NS) flush 5-10 mL  5-10 mL IntraVENous Q8H    sodium chloride (NS) flush 5-10 mL  5-10 mL IntraVENous PRN    acetaminophen (TYLENOL) tablet 650 mg  650 mg Oral Q4H PRN    naloxone (NARCAN) injection 0.4 mg  0.4 mg IntraVENous PRN    ondansetron (ZOFRAN) injection 4 mg  4 mg IntraVENous Q4H PRN    senna-docusate (PERICOLACE) 8.6-50 mg per tablet 2 Tab  2 Tab Oral DAILY PRN    influenza vaccine 2017-18 (3 yrs+)(PF) (FLUZONE QUAD/FLUARIX QUAD) injection 0.5 mL  0.5 mL IntraMUSCular PRIOR TO DISCHARGE    famotidine (PEPCID) tablet 20 mg  20 mg Oral DAILY       Other Studies (last 24 hours):  Xr Chest Pa Lat    Result Date: 12/28/2017  Chest X-ray INDICATION:  Cough PA and lateral views of the chest were obtained. FINDINGS: There are small bilateral pleural effusions. There is no significant infiltrate. There is stable cardiomegaly. The bony thorax is intact.       IMPRESSION: Small bilateral pleural effusions       Assessment and Plan:     Hospital Problems as of 12/28/2017  Date Reviewed: 11/30/2017          Codes Class Noted - Resolved POA    Gram-positive bacteremia ICD-10-CM: R78.81  ICD-9-CM: 790.7  12/25/2017 - Present Yes        * (Principal)Sepsis ([facility] ICD-10-CM: A41.9  ICD-9-CM: 038.9, 995.91  12/23/2017 - Present Yes Demand ischemia [facility]) ICD-10-CM: I24.8  ICD-9-CM: 411.89  12/23/2017 - Present         Acute respiratory failure with hypoxia [facility]) ICD-10-CM: J96.01  ICD-9-CM: 518.81  12/23/2017 - Present         Fever ICD-10-CM: R50.9  ICD-9-CM: 780.60  12/23/2017 - Present Unknown        Elevated troponin ICD-10-CM: R74.8  ICD-9-CM: 790.6  12/23/2017 - Present Unknown        CKD (chronic kidney disease) stage 3, GFR 30-59 ml/min ICD-10-CM: N18.3  ICD-9-CM: 585.3  12/23/2017 - Present Unknown        Elevated lactic acid level ICD-10-CM: R79.89  ICD-9-CM: 276.2  12/23/2017 - Present Unknown        Hypernatremia ICD-10-CM: E87.0  ICD-9-CM: 276.0  12/23/2017 - Present Unknown        Macrocytic anemia ICD-10-CM: D53.9  ICD-9-CM: 281.9  12/23/2017 - Present Unknown        Pain of right lower extremity ICD-10-CM: M79.604  ICD-9-CM: 729.5  12/23/2017 - Present Unknown        Diastolic CHF, chronic (HCC) (Chronic) ICD-10-CM: I50.32  ICD-9-CM: 428.32, 428.0  5/17/2016 - Present Yes        Essential hypertension, benign (Chronic) ICD-10-CM: I10  ICD-9-CM: 401.1  3/17/2015 - Present Yes              PLAN:    · Appreciate cardio for medical management, ongoing lasix diuresis, followup In and OUT/ daily BMP, wean O2 as tolerant   · continue scheduled nebulizer treatments  · Stopped sedatives, monitor mentation that has improved  · Appreciate ID input, continue rocephin EOT 1-22-18,   followup repeat BC x 2 , PICC ordered  · Supplementing lower B12 level   · Will need followup right adnexal lesion / GYN eval pending goals  · PT/OT/PPD and case management for dispo - will need SNF    DC planning/Dispo:    DVT ppx:  heparin    Signed:  Rosalie Nogueira MD

## 2017-12-29 LAB
ANION GAP SERPL CALC-SCNC: 8 MMOL/L (ref 7–16)
BNP SERPL-MCNC: 1102 PG/ML
BUN SERPL-MCNC: 38 MG/DL (ref 8–23)
CALCIUM SERPL-MCNC: 9 MG/DL (ref 8.3–10.4)
CHLORIDE SERPL-SCNC: 104 MMOL/L (ref 98–107)
CO2 SERPL-SCNC: 31 MMOL/L (ref 21–32)
CREAT SERPL-MCNC: 1.21 MG/DL (ref 0.6–1)
GLUCOSE BLD STRIP.AUTO-MCNC: 101 MG/DL (ref 65–100)
GLUCOSE BLD STRIP.AUTO-MCNC: 122 MG/DL (ref 65–100)
GLUCOSE BLD STRIP.AUTO-MCNC: 148 MG/DL (ref 65–100)
GLUCOSE BLD STRIP.AUTO-MCNC: 197 MG/DL (ref 65–100)
GLUCOSE SERPL-MCNC: 108 MG/DL (ref 65–100)
MAGNESIUM SERPL-MCNC: 1.9 MG/DL (ref 1.8–2.4)
POTASSIUM SERPL-SCNC: 3.2 MMOL/L (ref 3.5–5.1)
SODIUM SERPL-SCNC: 143 MMOL/L (ref 136–145)

## 2017-12-29 PROCEDURE — 65660000000 HC RM CCU STEPDOWN

## 2017-12-29 PROCEDURE — 94760 N-INVAS EAR/PLS OXIMETRY 1: CPT

## 2017-12-29 PROCEDURE — 74011250637 HC RX REV CODE- 250/637: Performed by: INTERNAL MEDICINE

## 2017-12-29 PROCEDURE — 74011000250 HC RX REV CODE- 250: Performed by: INTERNAL MEDICINE

## 2017-12-29 PROCEDURE — 80048 BASIC METABOLIC PNL TOTAL CA: CPT | Performed by: INTERNAL MEDICINE

## 2017-12-29 PROCEDURE — 83735 ASSAY OF MAGNESIUM: CPT | Performed by: INTERNAL MEDICINE

## 2017-12-29 PROCEDURE — 82962 GLUCOSE BLOOD TEST: CPT

## 2017-12-29 PROCEDURE — 97150 GROUP THERAPEUTIC PROCEDURES: CPT

## 2017-12-29 PROCEDURE — 94640 AIRWAY INHALATION TREATMENT: CPT

## 2017-12-29 PROCEDURE — 74011250636 HC RX REV CODE- 250/636: Performed by: INTERNAL MEDICINE

## 2017-12-29 PROCEDURE — 36592 COLLECT BLOOD FROM PICC: CPT

## 2017-12-29 PROCEDURE — 83880 ASSAY OF NATRIURETIC PEPTIDE: CPT | Performed by: INTERNAL MEDICINE

## 2017-12-29 PROCEDURE — 97530 THERAPEUTIC ACTIVITIES: CPT

## 2017-12-29 RX ORDER — GABAPENTIN 100 MG/1
100 CAPSULE ORAL
Status: DISCONTINUED | OUTPATIENT
Start: 2017-12-29 | End: 2018-01-04

## 2017-12-29 RX ADMIN — GABAPENTIN 100 MG: 100 CAPSULE ORAL at 21:12

## 2017-12-29 RX ADMIN — CARVEDILOL 12.5 MG: 12.5 TABLET, FILM COATED ORAL at 16:43

## 2017-12-29 RX ADMIN — Medication 10 ML: at 13:59

## 2017-12-29 RX ADMIN — PRAVASTATIN SODIUM 20 MG: 20 TABLET ORAL at 21:13

## 2017-12-29 RX ADMIN — GUAIFENESIN AND DEXTROMETHORPHAN 10 ML: 100; 10 SYRUP ORAL at 02:50

## 2017-12-29 RX ADMIN — Medication 10 ML: at 21:13

## 2017-12-29 RX ADMIN — IPRATROPIUM BROMIDE AND ALBUTEROL SULFATE 3 ML: .5; 3 SOLUTION RESPIRATORY (INHALATION) at 01:54

## 2017-12-29 RX ADMIN — FAMOTIDINE 20 MG: 20 TABLET, FILM COATED ORAL at 08:56

## 2017-12-29 RX ADMIN — POTASSIUM CHLORIDE 20 MEQ: 20 TABLET, EXTENDED RELEASE ORAL at 08:56

## 2017-12-29 RX ADMIN — HEPARIN SODIUM 5000 UNITS: 5000 INJECTION, SOLUTION INTRAVENOUS; SUBCUTANEOUS at 16:41

## 2017-12-29 RX ADMIN — HEPARIN SODIUM 5000 UNITS: 5000 INJECTION, SOLUTION INTRAVENOUS; SUBCUTANEOUS at 08:59

## 2017-12-29 RX ADMIN — RDII 250 MG CAPSULE 250 MG: at 16:43

## 2017-12-29 RX ADMIN — CLOPIDOGREL BISULFATE 75 MG: 75 TABLET ORAL at 08:56

## 2017-12-29 RX ADMIN — CYANOCOBALAMIN TAB 1000 MCG 1000 MCG: 1000 TAB at 08:56

## 2017-12-29 RX ADMIN — Medication 10 ML: at 05:01

## 2017-12-29 RX ADMIN — WATER 2 G: 1 INJECTION INTRAMUSCULAR; INTRAVENOUS; SUBCUTANEOUS at 09:03

## 2017-12-29 RX ADMIN — ACETAMINOPHEN 650 MG: 325 TABLET ORAL at 13:57

## 2017-12-29 RX ADMIN — ACETAMINOPHEN 650 MG: 325 TABLET ORAL at 05:27

## 2017-12-29 RX ADMIN — Medication 300 UNITS: at 21:13

## 2017-12-29 RX ADMIN — FUROSEMIDE 40 MG: 10 INJECTION, SOLUTION INTRAMUSCULAR; INTRAVENOUS at 09:00

## 2017-12-29 RX ADMIN — Medication 300 UNITS: at 13:57

## 2017-12-29 RX ADMIN — COLCHICINE 0.6 MG: 0.6 CAPSULE ORAL at 08:56

## 2017-12-29 RX ADMIN — FUROSEMIDE 40 MG: 10 INJECTION, SOLUTION INTRAMUSCULAR; INTRAVENOUS at 21:12

## 2017-12-29 RX ADMIN — Medication 300 UNITS: at 05:01

## 2017-12-29 RX ADMIN — IPRATROPIUM BROMIDE AND ALBUTEROL SULFATE 3 ML: .5; 3 SOLUTION RESPIRATORY (INHALATION) at 20:51

## 2017-12-29 RX ADMIN — ALLOPURINOL 100 MG: 100 TABLET ORAL at 08:56

## 2017-12-29 RX ADMIN — IPRATROPIUM BROMIDE AND ALBUTEROL SULFATE 3 ML: .5; 3 SOLUTION RESPIRATORY (INHALATION) at 14:06

## 2017-12-29 RX ADMIN — RDII 250 MG CAPSULE 250 MG: at 08:56

## 2017-12-29 RX ADMIN — ALLOPURINOL 100 MG: 100 TABLET ORAL at 16:43

## 2017-12-29 RX ADMIN — ASPIRIN 81 MG: 81 TABLET, COATED ORAL at 08:56

## 2017-12-29 RX ADMIN — CARVEDILOL 12.5 MG: 12.5 TABLET, FILM COATED ORAL at 08:56

## 2017-12-29 NOTE — PROGRESS NOTES
Hospitalist Progress Note    2017  Admit Date: 2017 11:21 PM   NAME: Smooth Francisco   :  2/10/1927   MRN:  [id number]   Attending: Davie Dakins, MD  PCP:  Sharyle Salvia, MD    SUBJECTIVE:   As previously documented:  'Ms. Nilo Black is a 79 yo female with PMH of CAD, HTN, admitted  with sepsis and elevated troponin. She has been found to have GPC bacteremia 1/2 BC showing strep anginosus, rocephin  to present and vancomycin  to . Seen by ID who recommends rocephin 2 gram daily EOT 18. CT urogram shows right  5 cm adnexal lesion that will need GYN/ US followup. ECHO no vegetation, mild AS, EF 55-60%. No plans for TRACY given sedation risk but empirically treating for endocarditis. Seen by cardio and felt likely had NSTEMI with plans for medical management/ heparin drip since stopped and DAPT continued. Receiving IV lasix. Plans for rehab pending.'      - seen without family at bedside. Her biggest complaint is that her feet and legs hurt all night long. States they had a numb type feeling and cramping, with sharp pains at times. States she had to move her legs frequently to help. She reports intermittent coughing.       Review of Systems negative with exception of pertinent positives noted above  PHYSICAL EXAM     Visit Vitals    /80 (BP 1 Location: Left arm, BP Patient Position: At rest)    Pulse 80    Temp 97.8 °F (36.6 °C)    Resp 18    Ht 5' (1.524 m)    Wt 70.1 kg (154 lb 8 oz)    LMP Comment: hysterectomy    SpO2 98%    Breastfeeding No    BMI 30.17 kg/m2      Temp (24hrs), Av.1 °F (36.7 °C), Min:97.7 °F (36.5 °C), Max:98.8 °F (37.1 °C)    Oxygen Therapy  O2 Sat (%): 98 % (17 1456)  Pulse via Oximetry: 69 beats per minute (17 1408)  O2 Device: Room air (17 1408)  O2 Flow Rate (L/min): 0 l/min (17 1408)    Intake/Output Summary (Last 24 hours) at 17 1655  Last data filed at 17 1611   Gross per 24 hour Intake              280 ml   Output             1850 ml   Net            -1570 ml      General: No acute distress, elderly  Lungs:  CTA Bilaterally, + cough. Heart:  Regular rate and rhythm,  No murmur, rub, or gallop  Abdomen: Soft, Non distended, Non tender, Positive bowel sounds  Extremities: No cyanosis, clubbing or edema, legs and feet warm  Neurologic:  No focal deficits    ASSESSMENT      Active Hospital Problems    Diagnosis Date Noted    Gram-positive bacteremia 12/25/2017    Sepsis ([facility] 12/23/2017    Fever 12/23/2017    Elevated troponin 12/23/2017    CKD (chronic kidney disease) stage 3, GFR 30-59 ml/min 12/23/2017    Elevated lactic acid level 12/23/2017    Hypernatremia 12/23/2017    Macrocytic anemia 12/23/2017    Pain of right lower extremity 70/83/9426    Diastolic CHF, chronic ([facility] 05/17/2016    Essential hypertension, benign 03/17/2015     Plan:  · Strep anginosus bacteremia- Continue rocephin until 1/22/18 to treat empirically for endocarditis per ID recs- appreciate assistance. · PICC placed 12/28. · Diastolic CHF- IV lasix per cardiology. · Has weaned off of oxygen. · Neuropathic pain- start low dose gabapentin qhs. · R adnexal cyst- outpatient GYN follow up. · Continue PT. Dispo- STR on discharge for PT and continued IV abx; awaiting insurance pre-cert    DVT Prophylaxis: Heparin    Signed By: Kadeem Zapata.  Paulette Parsons MD     December 29, 2017

## 2017-12-29 NOTE — PROGRESS NOTES
Problem: Mobility Impaired (Adult and Pediatric)  Goal: *Therapy Goal (Edit Goal, Insert Text)  STG:  (1.)Ms. Guerita Arango will move from supine to sit and sit to supine , scoot up and down and roll side to side with STAND BY ASSIST within 4 day(s). (2.)Ms. Guerita Arango will transfer from bed to chair and chair to bed with STAND BY ASSIST using the least restrictive device within 4 day(s). (3.)Ms. Guerita Arango will ambulate with STAND BY ASSIST for 100 feet with the least restrictive device within 4 day(s). LTG:  (1.)Ms. Guerita Arango will move from supine to sit and sit to supine , scoot up and down and roll side to side in bed with MODIFIED INDEPENDENT within 7 day(s). (2.)Ms. Guerita Arango will transfer from bed to chair and chair to bed with MODIFIED INDEPENDENCE using the least restrictive device within 7 day(s). (3.)Ms. Guerita Arango will ambulate with MODIFIED INDEPENDENCE for 250 feet with the least restrictive device within 7 day(s). ________________________________________________________________________________________________       PHYSICAL THERAPY: Daily Note, Treatment Day: 2nd, AM 12/29/2017  INPATIENT: Hospital Day: 8  Payor: FIRST CHOICE VIP CARE PLUS / [place] CHO[place] CARE [place] / Product Type: Managed Care Medicare /      NAME/AGE/GENDER: Kendell Milner is a 80 y.o. female   PRIMARY DIAGNOSIS: Sepsis ([address]  Sepsis ([address] Sepsis ([address] Sepsis ([address]        ICD-10: Treatment Diagnosis:   · Difficulty in walking, Not elsewhere classified (R26.2)   Precaution/Allergies:  Bee pollen and Fire ant      ASSESSMENT:     Ms. Guerita Arango is supine on arrival, agreeable to PT treatment. Pt performed supine to sit with CGA, stood and ambulated to the chair, about 10', with min hand held assist.  Pt was able to ambulate about 50' with rolling walker and CGA/SBA. Pt does well using walker and had more normalized gait speed today. Pt was taken to therapy gym where she was able to participate in group exercises as below.   Pt did require increased cues to complete exercises correctly. Pt was returned to room and left up in chair for lunch, daughter attending. Pt is making slow progress with her ambulation. Will continue with POC. This section established at most recent assessment   PROBLEM LIST (Impairments causing functional limitations):  1. Decreased Strength  2. Decreased ADL/Functional Activities  3. Decreased Transfer Abilities  4. Decreased Ambulation Ability/Technique  5. Decreased Balance  6. Decreased Activity Tolerance  7. Decreased Pacing Skills  8. Decreased Flexibility/Joint Mobility  9. Decreased [place] with Home Exercise Program   INTERVENTIONS PLANNED: (Benefits and precautions of physical therapy have been discussed with the patient.)  1. Balance Exercise  2. Bed Mobility  3. Family Education  4. Gait Training  5. Home Exercise Program (HEP)  6. Range of Motion (ROM)  7. Therapeutic Activites  8. Therapeutic Exercise/Strengthening  9. Transfer Training     TREATMENT PLAN: Frequency/Duration: 3-5 times a week for duration of hospital stay  Rehabilitation Potential For Stated Goals: Good      RECOMMENDED REHABILITATION/EQUIPMENT: (at time of discharge pending progress): Due to the probability of continued deficits (see above) this patient will likely need continued skilled physical therapy after discharge. HISTORY:   History of Present Injury/Illness (Reason for Referral):  Patient is a 81 y/o F who presented with fever, chills/shaking, SOB, and a NBNB nausea/vomiting episode x1 that occurred a few hours ago. She is a very poor historian, limits history. No formal diagnosis of dementia. Says that she was brought here after she started shaking earlier this evening, and then had vomiting. Said she had a fever but cannot tell me the temperature. Denies any other new symptoms.   Denies CP, palpitations, cough, abd pain, urinary symptoms, diarrhea, new rashes/wounds.       10 systems reviewed and negative except as noted in HPI. Past Medical History/Comorbidities:   Ms. Sherran Duane  has a past medical history of Acute kidney failure, unspecified; Arthritis; CAD (coronary artery disease); Cellulitis and abscess of other specified site; Coronary atherosclerosis of native coronary artery; Essential hypertension, benign (3/17/2015); Hypertension; Hypopotassemia; Mixed hyperlipidemia; Osteoarthritis (7/20/2017); Other and unspecified hyperlipidemia; Reflux esophagitis; Renal failure, unspecified; Shortness of breath; and Unspecified essential hypertension. Ms. Sherran Duane  has a past surgical history that includes hx cholecystectomy; pr layr clos wnd face,facial <2.5cm; pr cardiac surg procedure unlist; hx cataract removal (Bilateral); and hx hysterectomy. Social History/Living Environment:   Home Environment: Apartment  # Steps to Enter: 0  One/Two Story Residence: One story  Living Alone: No  Support Systems: Child(jayleen), Friends \ neighbors, Family member(s), Religious / jeffery community  Patient Expects to be Discharged to[de-identified] Private residence  Current DME Used/Available at Home: Cane, straight, Shower chair  Tub or Shower Type: Tub/Shower combination  Prior Level of Function/Work/Activity:  Patient was utilizing a 2 wheel rolling walker at home with ambulation. Dominant Side:         RIGHT  Personal Factors:          Sex:  female        Age:  80 y.o. Number of Personal Factors/Comorbidities that affect the Plan of Care: 1-2: MODERATE COMPLEXITY   EXAMINATION:   Most Recent Physical Functioning:   Gross Assessment:                  Posture:     Balance:  Sitting - Static: Good (unsupported)  Sitting - Dynamic: Good (unsupported)  Standing - Static: Fair  Standing - Dynamic : Fair Bed Mobility:  Supine to Sit: Contact guard assistance  Scooting: Stand-by asssistance; Additional time  Wheelchair Mobility:     Transfers:  Sit to Stand: Contact guard assistance  Stand to Sit: Contact guard assistance  Gait:     Juan Diego Alvarez of Support: Center of gravity altered;Narrowed  Speed/Mary: Pace decreased (<100 feet/min)  Step Length: Left shortened;Right shortened  Gait Abnormalities: Decreased step clearance;Shuffling gait  Distance (ft): 50 Feet (ft) (10')  Assistive Device: Walker, rolling  Ambulation - Level of Assistance: Contact guard assistance;Stand-by asssistance  Interventions: Safety awareness training;Verbal cues      Body Structures Involved:  1. Bones  2. Joints  3. Muscles  4. Ligaments Body Functions Affected:  1. Neuromusculoskeletal  2. Movement Related  3. Skin Related  4. Metobolic/Endocrine Activities and Participation Affected:  1. General Tasks and Demands  2. Mobility  3. Self Care  4. Community, Social and [place]   Number of elements that affect the Plan of Care: 1-2: LOW COMPLEXITY   CLINICAL PRESENTATION:   Presentation: Evolving clinical presentation with changing clinical characteristics: MODERATE COMPLEXITY   CLINICAL DECISION MAKIN [facility] Mobility Inpatient Short Form  How much difficulty does the patient currently have. .. Unable A Lot A Little None   1. Turning over in bed (including adjusting bedclothes, sheets and blankets)? [] 1   [] 2   [x] 3   [] 4   2. Sitting down on and standing up from a chair with arms ( e.g., wheelchair, bedside commode, etc.)   [] 1   [] 2   [x] 3   [] 4   3. Moving from lying on back to sitting on the side of the bed? [] 1   [] 2   [x] 3   [] 4   How much help from another person does the patient currently need. .. Total A Lot A Little None   4. Moving to and from a bed to a chair (including a wheelchair)? [] 1   [] 2   [x] 3   [] 4   5. Need to walk in hospital room? [] 1   [] 2   [x] 3   [] 4   6. Climbing 3-5 steps with a railing? [] 1   [] 2   [x] 3   [] 4   © , Trustees of [address] [place] [address] [address] [address], under license to [email or web address].  All rights reserved      Score:  Initial: 18 Most Recent: X (Date: -- )    Interpretation of Tool:  Represents activities that are increasingly more difficult (i.e. Bed mobility, Transfers, Gait). Score 24 23 22-20 19-15 14-10 9-7 6     Modifier CH CI CJ CK CL CM CN      ? Mobility - Walking and Moving Around:     - CURRENT STATUS: CK - 40%-59% impaired, limited or restricted    - GOAL STATUS: CJ - 20%-39% impaired, limited or restricted    - D/C STATUS:  ---------------To be determined---------------  Payor: FIRST CHOICE VIP CARE PLUS / [place] FIRST CHOICE VIP CARE PLUS / Product Type: Managed Care Medicare /      Medical Necessity:     · Patient demonstrates good rehab potential due to higher previous functional level. Reason for Services/Other Comments:  · Patient continues to require skilled intervention due to medical complications, patient unable to attend/participate in therapy as expected and decreased transfers, ambulation and mobility. Use of outcome tool(s) and clinical judgement create a POC that gives a: Clear prediction of patient's progress: LOW COMPLEXITY            TREATMENT:      Pre-treatment Symptoms/Complaints:  No complaints. Pain: Initial:      Post Session:  0/10 no specific pain reported. Therapeutic Activity: (    15 minutes): Therapeutic activities including Bed transfers, Chair transfers and Ambulation on level ground to improve mobility, strength, balance and coordination. Required minimal Safety awareness training;Verbal cues to promote static and dynamic balance in standing and promote coordination of bilateral, lower extremity(s). Group Therapeutic Exercise: (  10 minutes):  Exercises per grid below to improve mobility, strength and balance. Required minimal verbal and manual cues to promote proper body posture and promote proper body mechanics. Progressed range and repetitions as indicated.      Date:  12/27/17 Date:  12/29/17 Date:     Activity/Exercise Parameters Group exs AM Parameters   Seated AP X 15 B X 15 B    Seated LAQ X 15 B X 15 B    Seated Marching X 15 B X 15 B    Seated hip abd  X 15 B                          Braces/Orthotics/Lines/Etc:   · IV  · travis catheter  · O2 Device: Nasal cannula  Treatment/Session Assessment:    · Response to Treatment: pt overall tolerated well;  fatigued fairly quickly   · Interdisciplinary Collaboration:   o Physical Therapy Assistant  o Registered Nurse  · After treatment position/precautions:   o Up in chair  o Bed/Chair-wheels locked  o Call light within reach  o RN notified  o Family at bedside   · Compliance with Program/Exercises: Will assess as treatment progresses. · Recommendations/Intent for next treatment session: \"Next visit will focus on advancements to more challenging activities, reduction in assistance provided and transfers, ambulation and mobility. \".   Total Treatment Duration:  PT Patient Time In/Time Out  Time In: 1110 (1145)  Time Out: 1125 (1155)    [facility]

## 2017-12-29 NOTE — PROGRESS NOTES
Problem: Self Care Deficits Care Plan (Adult)  Goal: *Acute Goals and Plan of Care (Insert Text)  1. Patient will complete lower body bathing and dressing with minimal assistance and adaptive equipment as needed. 2. Patient will complete toileting with supervision. 3. Patient will tolerate 25 minutes of OT treatment with 2-3 rest breaks to increase activity tolerance for ADLs. 4. Patient will complete functional transfers with supervision and adaptive equipment as needed. 5. Patient will complete functional mobility with supervision for household distances and with minimal cues for safety. Timeframe: 7 visits       OCCUPATIONAL THERAPY: Daily Note, Treatment Day: 2nd and AM    12/29/2017  INPATIENT: Hospital Day: 8  Payor: FIRST CHOICE VIP CARE PLUS / [place] CHO[place] VIP CARE [place]US / Product Type: Managed Care Medicare /      NAME/AGE/GENDER: Smooth Francisco is a 80 y.o. female   PRIMARY DIAGNOSIS:  Sepsis ([address]  Sepsis ([address] Sepsis ([address] Sepsis ([address]        ICD-10: Treatment Diagnosis:    · Generalized Muscle Weakness (M62.81)  · Other lack of cordination (R27.8)   Precautions/Allergies:     Bee pollen and Fire ant      ASSESSMENT:     Ms. Nilo Black presents to the hospital with sepsis. 12/29/2017 Pt was transferred down to the gym in her recliner chair to participate in group exercises to increase strength for mobility and ADL's. Pt required visual, verbal, and manual cues to complete exercises with proper form. Pt participated with good effort and was left up in the chair post treatment working with the PTA. Continue OT POC. This section established at most recent assessment   PROBLEM LIST (Impairments causing functional limitations):  1. Decreased Strength  2. Decreased ADL/Functional Activities  3. Decreased Transfer Abilities  4. Decreased Ambulation Ability/Technique  5. Decreased Balance  6. Decreased Activity Tolerance  7.  Decreased Flexibility/Joint Mobility  8. Edema/Girth  9. Decreased [place] with Home Exercise Program  10. Decreased Cognition   INTERVENTIONS PLANNED: (Benefits and precautions of occupational therapy have been discussed with the patient.)  1. Activities of daily living training  2. Adaptive equipment training  3. Balance training  4. Clothing management  5. Cognitive training  6. Donning&doffing training  7. Group therapy  8. Neuromuscular re-eduation  9. Therapeutic activity  10. Therapeutic exercise     TREATMENT PLAN: Frequency/Duration: Follow patient 3 times per week to address above goals. Rehabilitation Potential For Stated Goals: Excellent     RECOMMENDED REHABILITATION/EQUIPMENT: (at time of discharge pending progress): Due to the probability of continued deficits (see above) this patient will likely need continued skilled occupational therapy after discharge. Equipment:    TBD              OCCUPATIONAL PROFILE AND HISTORY:   History of Present Injury/Illness (Reason for Referral):  See H&P  Past Medical History/Comorbidities:   Ms. Saundra Stratton  has a past medical history of Acute kidney failure, unspecified; Arthritis; CAD (coronary artery disease); Cellulitis and abscess of other specified site; Coronary atherosclerosis of native coronary artery; Essential hypertension, benign (3/17/2015); Hypertension; Hypopotassemia; Mixed hyperlipidemia; Osteoarthritis (7/20/2017); Other and unspecified hyperlipidemia; Reflux esophagitis; Renal failure, unspecified; Shortness of breath; and Unspecified essential hypertension. Ms. Saundra Stratton  has a past surgical history that includes hx cholecystectomy; pr layr clos wnd face,facial <2.5cm; pr cardiac surg procedure unlist; hx cataract removal (Bilateral); and hx hysterectomy.   Social History/Living Environment:   Home Environment: Apartment  # Steps to Enter: 0  One/Two Story Residence: One story  Living Alone: No  Support Systems: Child(jayleen), Friends \ neighbors, Family member(s), [employer] [facility]  Patient Expects to be Discharged to[de-identified] Private residence  Current DME Used/Available at Home: Charyl [place], straight, Shower chair  Tub or Shower Type: Tub/Shower combination  Prior Level of Function/Work/Activity:  Pt lives with her son. Pt's son is at home with her at all times. Pt has a walker but completes functional mobility with a cane primarily. Reports no recent falls. Pt completes ADL with occasional assistance from her daughter. Pt still participated in laundry and cooking tasks. Personal Factors:          Past/Current Experience:  Hx of knee pain with difficulty with prolonged standing        Other factors that influence how disability is experienced by the patient:  Multiple co-morbidities (see above)   Number of Personal Factors/Comorbidities that affect the Plan of Care: Expanded review of therapy/medical records (1-2):  MODERATE COMPLEXITY   ASSESSMENT OF OCCUPATIONAL PERFORMANCE[de-identified]   Activities of Daily Living:             Basic ADLs (From Assessment) Complex ADLs (From Assessment)   Basic ADL  Feeding: Stand-by assistance  Oral Facial Hygiene/Grooming: Minimum assistance  Bathing: Moderate assistance  Upper Body Dressing: Moderate assistance  Lower Body Dressing: Moderate assistance  Toileting: Moderate assistance Instrumental ADL  Meal Preparation: Moderate assistance  Homemaking: Maximum assistance   Grooming/Bathing/Dressing Activities of Daily Living                                     Most Recent Physical Functioning:   Gross Assessment:                  Posture:  Posture (WDL): Exceptions to WDL  Posture Assessment:  Forward head, Rounded shoulders  Balance:    Bed Mobility:     Wheelchair Mobility:     Transfers:                 Patient Vitals for the past 6 hrs:   BP BP Patient Position SpO2 Pulse   12/29/17 0736 124/76 At rest 95 % 74   12/29/17 1117 130/76 At rest 96 % 68       Mental Status  Neurologic State: Alert  Orientation Level: Disoriented to situation, Disoriented to time, Oriented to person, Oriented to place, Oriented to time  Cognition: Follows commands, Impaired decision making  Perception: Appears intact  Perseveration: No perseveration noted  Safety/Judgement: Awareness of environment, Fall prevention                          Physical Skills Involved:  1. Balance  2. Strength  3. Activity Tolerance Cognitive Skills Affected (resulting in the inability to perform in a timely and safe manner):  1. Executive Function  2. Sustained Attention Psychosocial Skills Affected:  1. Habits/Routines  2. Environmental Adaptation  3. Self-Awareness   Number of elements that affect the Plan of Care: 5+:  HIGH COMPLEXITY   CLINICAL DECISION MAKIN[address] [place] [address] AM-PAC 6 Clicks   Daily Activity Inpatient Short Form  How much help from another person does the patient currently need. .. Total A Lot A Little None   1. Putting on and taking off regular lower body clothing? [] 1   [x] 2   [] 3   [] 4   2. Bathing (including washing, rinsing, drying)? [] 1   [x] 2   [] 3   [] 4   3. Toileting, which includes using toilet, bedpan or urinal?   [] 1   [x] 2   [] 3   [] 4   4. Putting on and taking off regular upper body clothing? [] 1   [x] 2   [] 3   [] 4   5. Taking care of personal grooming such as brushing teeth? [] 1   [] 2   [x] 3   [] 4   6. Eating meals? [] 1   [] 2   [x] 3   [] 4   © , Trustees of [address] [place] [address], under license to [employer]. All rights reserved      Score:  Initial: 14 Most Recent: X (Date: -- )    Interpretation of Tool:  Represents activities that are increasingly more difficult (i.e. Bed mobility, Transfers, Gait). Score 24 23 22-20 19-15 14-10 9-7 6     Modifier CH CI CJ CK CL CM CN      ?  Self Care:     - CURRENT STATUS: CL - 60%-79% impaired, limited or restricted    - GOAL STATUS: CK - 40%-59% impaired, limited or restricted    - D/C STATUS:  ---------------To be determined---------------  Payor: FIRST CHOICE [facility] CARE PLUS / [place] CARE PLUS / Product Type: Managed Care Medicare /      Medical Necessity:     · Patient demonstrates excellent rehab potential due to higher previous functional level. Reason for Services/Other Comments:  · Patient continues to require skilled intervention due to decreased independence with ADL/functional transfers. Use of outcome tool(s) and clinical judgement create a POC that gives a: LOW COMPLEXITY         TREATMENT:   (In addition to Assessment/Re-Assessment sessions the following treatments were rendered)     Pre-treatment Symptoms/Complaints:    Pain: Initial:   Pain Intensity 1: 0  Pain Location 1: Knee  Pain Intervention(s) 1:  (RN administering pain meds upon arrival)  Post Session:  Pt stated that her knee pain was better after walking. Group Therapeutic Exercise: (10 minutes):  Exercises per grid below to improve mobility and strength. Required maximal visual, verbal and manual cues to promote proper body mechanics. Progressed repetitions as indicated. Date:  12/29/17 Date:   Date:     Activity/Exercise Parameters Parameters Parameters   Shoulder flexion/extension 15 reps with a yellow theraband     Shoulder horizontal add/abb 15 reps with a yellow theraband     Punches 15 reps with a yellow theraband     Elbow flexion/extension 15 reps with a yellow theraband     Tricep extension      Balloon tapping      Ball toss                        Braces/Orthotics/Lines/Etc:   · IV  Treatment/Session Assessment:    · Response to Treatment:  [address] participation. · Interdisciplinary Collaboration:   o Certified Occupational Therapy Assistant  o Registered Nurse  · After treatment position/precautions:   o Up in chair  o RN notified  o working with the PTA   · Compliance with Program/Exercises: Will assess as treatment progresses. · Recommendations/Intent for next treatment session:   \"Next visit will focus on advancements to more challenging activities and reduction in assistance provided\".   Total Treatment Duration:  OT Patient Time In/Time Out  Time In: 1135  Time Out: 615 [facility]

## 2017-12-29 NOTE — PROGRESS NOTES
Problem: Falls - Risk of  Goal: *Absence of Falls  Document Vanda Fall Risk and appropriate interventions in the flowsheet.    Outcome: Progressing Towards Goal  Fall Risk Interventions:  Mobility Interventions: Bed/chair exit alarm, OT consult for ADLs, Patient to call before getting OOB, PT Consult for mobility concerns    Mentation Interventions: Bed/chair exit alarm, Door open when patient unattended, Evaluate medications/consider consulting pharmacy    Medication Interventions: Bed/chair exit alarm, Patient to call before getting OOB    Elimination Interventions: Call light in reach, Patient to call for help with toileting needs    History of Falls Interventions: Bed/chair exit alarm, Consult care management for discharge planning, Door open when patient unattended

## 2017-12-29 NOTE — PROGRESS NOTES
[facility] CARDIOLOGY PROGRESS NOTE           12/29/2017 9:10 AM    Admit Date: 12/22/2017      Subjective:   Tapered to RA, CXR w small B pleural effusions, BNP down to 1102 from 2754. - 2.4 L diuresis. Feels better, no CP or SOB. ROS:  Cardiovascular:  As noted above    Objective:      Vitals:    12/29/17 0154 12/29/17 0251 12/29/17 0252 12/29/17 0736   BP:   126/73 124/76   Pulse:   71 74   Resp:   19 18   Temp:   97.9 °F (36.6 °C) 98.8 °F (37.1 °C)   SpO2: 97%  95% 95%   Weight:  70.1 kg (154 lb 8 oz)     Height:           Physical Exam:  General-No Acute Distress, RA  Neck- supple, no JVD  CV- regular rate and rhythm no MRG  Lung- clear bilaterally  Abd- soft, nontender, nondistended  Ext- no edema bilaterally. Skin- warm and dry    CXR 12-28-17 IMPRESSION: Small bilateral pleural effusions    Data Review:   Recent Labs      12/29/17   0417  12/28/17   0729   NA  143  145   K  3.2*  3.3*   MG  1.9  2.0   BUN  38*  35*   CREA  1.21*  1.20*   GLU  108*  101*   WBC   --   6.2   HGB   --   10.5*   HCT   --   31.5*   PLT   --   153       Assessment/Plan:   Sepsis (HCC) (12/23/2017)- GPC bacteremia, cont Rocephin.       Essential hypertension, benign (3/17/2015)- hydralazine prn, lisinopril held on admission w renal failure- BP improved with Coreg.       Chronic diastolic CHF, chronic- EF 55% w mod MR, RVSP 60- Lasix IV w - 690 cc output, coreg added, cr stable. ? Change to po lasix?       CKD (chronic kidney disease) stage 3, GFR 30-59 ml/min - Improved, ACE-I on hold, monitor w diuresis.     Macrocytic anemia (12/23/2017)- Monitor, hgb stable, no signs of acute bleeding.       Elevated troponin- [facility] Dr Pop Britton 2014 w mild-mod CAD and patent stent in LAD.  Probably demand ischemia, cont ASA, plavix, statin, Coreg added 12-26, may try to add ACE-I now that renal function improved if BP will allow.       Gram-positive bacteremia (12/25/2017)- Rocephin through 12-18, ID empirically treated for endocarditis. Hypokalemia- replaced. SEDA Cortes  12/29/2017 9:10 AM           [facility] CARDIOLOGY     12/29/2017     2:34 PM    I have personally seen and examined Nehemiah Mcmanus with Teresa STACK. I agree and confirm findings in history, physical exam, and assessment/plan as outlined above with following pertinent additions/exceptions:   Patient notes dyspnea improved. Had 850 cc of output over last 24 hours. BNP still elevated at 1102. Renal function stable. Pe: CV: RRR L: CTA bilaterally E: no edema. ASS/Plan:  Feels better with diuresis. BNP decreasing but still moderately elevated. Continue IV lasix today. Monitor BMP daily.        Karlos Santiago MD

## 2017-12-29 NOTE — PROGRESS NOTES
Problem: Nutrition Deficit  Goal: *Optimize nutritional status  Nutrition  Reason for assessment: F/U  Assessment:   Diet order(s): cardiac  Food/Nutrition Patient History:  The patient reports that she is eating a little bit but not everything. She reports that she had some nausea this morning. Per patient, her daughter continues to bring her snacks. Labs are remarkable for hypokalemia. She continues to receive florastor and daily lasix. Anthropometrics:Height: 5' (152.4 cm),  Weight: 70.1 kg (7th floor bed scale, 12/29)  Edema: 1+ to BLEs noted yesterday  Macronutrient needs:  EER:  6091-0437 kcal /day (15-20 kcal/kg listed BW)  EPR:  41-50 grams protein/day (0.9-1.1 grams/kg IBW)(GFR 44)-h/o CKD  Intake/Comparative Standards: Average intake for past 6 day(s)/8 recorded meal(s): 57%. This potentially meets ~100% of kcal and ~100% of protein needs     Nutrition Diagnosis: No nutrition diagnosis.     Intervention:  Meals and snacks: Continue current diet. Discharge nutrition plan:  Too soon to determine.     Jason Hudson [address], [address] [place] [address], [facility] [address], [phone]

## 2017-12-30 PROBLEM — I21.4 NSTEMI (NON-ST ELEVATED MYOCARDIAL INFARCTION) (HCC): Status: ACTIVE | Noted: 2017-12-30

## 2017-12-30 LAB
ANION GAP SERPL CALC-SCNC: 9 MMOL/L (ref 7–16)
BACTERIA SPEC CULT: NORMAL
BACTERIA SPEC CULT: NORMAL
BUN SERPL-MCNC: 33 MG/DL (ref 8–23)
CALCIUM SERPL-MCNC: 9.1 MG/DL (ref 8.3–10.4)
CHLORIDE SERPL-SCNC: 104 MMOL/L (ref 98–107)
CO2 SERPL-SCNC: 32 MMOL/L (ref 21–32)
CREAT SERPL-MCNC: 1 MG/DL (ref 0.6–1)
GLUCOSE BLD STRIP.AUTO-MCNC: 110 MG/DL (ref 65–100)
GLUCOSE BLD STRIP.AUTO-MCNC: 120 MG/DL (ref 65–100)
GLUCOSE BLD STRIP.AUTO-MCNC: 144 MG/DL (ref 65–100)
GLUCOSE BLD STRIP.AUTO-MCNC: 97 MG/DL (ref 65–100)
GLUCOSE SERPL-MCNC: 88 MG/DL (ref 65–100)
MAGNESIUM SERPL-MCNC: 1.9 MG/DL (ref 1.8–2.4)
POTASSIUM SERPL-SCNC: 3 MMOL/L (ref 3.5–5.1)
SERVICE CMNT-IMP: NORMAL
SERVICE CMNT-IMP: NORMAL
SODIUM SERPL-SCNC: 145 MMOL/L (ref 136–145)

## 2017-12-30 PROCEDURE — 74011250636 HC RX REV CODE- 250/636: Performed by: INTERNAL MEDICINE

## 2017-12-30 PROCEDURE — 74011250637 HC RX REV CODE- 250/637: Performed by: INTERNAL MEDICINE

## 2017-12-30 PROCEDURE — 94760 N-INVAS EAR/PLS OXIMETRY 1: CPT

## 2017-12-30 PROCEDURE — 83735 ASSAY OF MAGNESIUM: CPT | Performed by: INTERNAL MEDICINE

## 2017-12-30 PROCEDURE — 65660000000 HC RM CCU STEPDOWN

## 2017-12-30 PROCEDURE — 74011000250 HC RX REV CODE- 250: Performed by: INTERNAL MEDICINE

## 2017-12-30 PROCEDURE — 80048 BASIC METABOLIC PNL TOTAL CA: CPT | Performed by: INTERNAL MEDICINE

## 2017-12-30 PROCEDURE — 94640 AIRWAY INHALATION TREATMENT: CPT

## 2017-12-30 PROCEDURE — 82962 GLUCOSE BLOOD TEST: CPT

## 2017-12-30 RX ORDER — FUROSEMIDE 40 MG/1
40 TABLET ORAL 2 TIMES DAILY
Status: DISCONTINUED | OUTPATIENT
Start: 2017-12-31 | End: 2018-01-01

## 2017-12-30 RX ORDER — POTASSIUM CHLORIDE 20 MEQ/1
40 TABLET, EXTENDED RELEASE ORAL
Status: COMPLETED | OUTPATIENT
Start: 2017-12-30 | End: 2017-12-30

## 2017-12-30 RX ADMIN — ASPIRIN 81 MG: 81 TABLET, COATED ORAL at 08:41

## 2017-12-30 RX ADMIN — CLOPIDOGREL BISULFATE 75 MG: 75 TABLET ORAL at 08:41

## 2017-12-30 RX ADMIN — RDII 250 MG CAPSULE 250 MG: at 17:32

## 2017-12-30 RX ADMIN — POTASSIUM CHLORIDE 40 MEQ: 20 TABLET, EXTENDED RELEASE ORAL at 12:26

## 2017-12-30 RX ADMIN — Medication 300 UNITS: at 05:50

## 2017-12-30 RX ADMIN — WATER 2 G: 1 INJECTION INTRAMUSCULAR; INTRAVENOUS; SUBCUTANEOUS at 08:41

## 2017-12-30 RX ADMIN — IPRATROPIUM BROMIDE AND ALBUTEROL SULFATE 3 ML: .5; 3 SOLUTION RESPIRATORY (INHALATION) at 08:31

## 2017-12-30 RX ADMIN — POTASSIUM CHLORIDE 20 MEQ: 20 TABLET, EXTENDED RELEASE ORAL at 08:41

## 2017-12-30 RX ADMIN — IPRATROPIUM BROMIDE AND ALBUTEROL SULFATE 3 ML: .5; 3 SOLUTION RESPIRATORY (INHALATION) at 19:58

## 2017-12-30 RX ADMIN — CARVEDILOL 12.5 MG: 12.5 TABLET, FILM COATED ORAL at 08:41

## 2017-12-30 RX ADMIN — HEPARIN SODIUM 5000 UNITS: 5000 INJECTION, SOLUTION INTRAVENOUS; SUBCUTANEOUS at 17:32

## 2017-12-30 RX ADMIN — CARVEDILOL 12.5 MG: 12.5 TABLET, FILM COATED ORAL at 17:32

## 2017-12-30 RX ADMIN — ALLOPURINOL 100 MG: 100 TABLET ORAL at 08:41

## 2017-12-30 RX ADMIN — Medication 10 ML: at 05:50

## 2017-12-30 RX ADMIN — Medication 10 ML: at 14:22

## 2017-12-30 RX ADMIN — Medication 10 ML: at 23:12

## 2017-12-30 RX ADMIN — Medication 300 UNITS: at 14:22

## 2017-12-30 RX ADMIN — PRAVASTATIN SODIUM 20 MG: 20 TABLET ORAL at 23:12

## 2017-12-30 RX ADMIN — Medication 300 UNITS: at 22:30

## 2017-12-30 RX ADMIN — CYANOCOBALAMIN TAB 1000 MCG 1000 MCG: 1000 TAB at 08:41

## 2017-12-30 RX ADMIN — IPRATROPIUM BROMIDE AND ALBUTEROL SULFATE 3 ML: .5; 3 SOLUTION RESPIRATORY (INHALATION) at 13:59

## 2017-12-30 RX ADMIN — HEPARIN SODIUM 5000 UNITS: 5000 INJECTION, SOLUTION INTRAVENOUS; SUBCUTANEOUS at 08:41

## 2017-12-30 RX ADMIN — ALLOPURINOL 100 MG: 100 TABLET ORAL at 17:32

## 2017-12-30 RX ADMIN — IPRATROPIUM BROMIDE AND ALBUTEROL SULFATE 3 ML: .5; 3 SOLUTION RESPIRATORY (INHALATION) at 02:35

## 2017-12-30 RX ADMIN — RDII 250 MG CAPSULE 250 MG: at 08:41

## 2017-12-30 RX ADMIN — FUROSEMIDE 40 MG: 10 INJECTION, SOLUTION INTRAMUSCULAR; INTRAVENOUS at 08:41

## 2017-12-30 RX ADMIN — FAMOTIDINE 20 MG: 20 TABLET, FILM COATED ORAL at 08:41

## 2017-12-30 RX ADMIN — COLCHICINE 0.6 MG: 0.6 CAPSULE ORAL at 08:41

## 2017-12-30 RX ADMIN — GABAPENTIN 100 MG: 100 CAPSULE ORAL at 23:12

## 2017-12-30 RX ADMIN — HEPARIN SODIUM 5000 UNITS: 5000 INJECTION, SOLUTION INTRAVENOUS; SUBCUTANEOUS at 01:07

## 2017-12-30 RX ADMIN — FUROSEMIDE 40 MG: 10 INJECTION, SOLUTION INTRAMUSCULAR; INTRAVENOUS at 23:18

## 2017-12-30 RX ADMIN — GUAIFENESIN AND DEXTROMETHORPHAN 10 ML: 100; 10 SYRUP ORAL at 23:18

## 2017-12-30 NOTE — PROGRESS NOTES
Hospitalist Progress Note    2017  Admit Date: 2017 11:21 PM   NAME: Cierra Galvan   :  2/10/1927   MRN:  [id number]   Attending: Tere Ayers MD  PCP:  Franco Zimmerman MD    SUBJECTIVE:   As previously documented:  'Ms. Sherran Duane is a 81 yo female with PMH of CAD, HTN, admitted  with sepsis and elevated troponin. She has been found to have GPC bacteremia 1/2 BC showing strep anginosus, rocephin  to present and vancomycin  to . Seen by ID who recommends rocephin 2 gram daily EOT 18. CT urogram shows right  5 cm adnexal lesion that will need GYN/ US followup. ECHO no vegetation, mild AS, EF 55-60%. No plans for TRACY given sedation risk but empirically treating for endocarditis. Seen by cardio and felt likely had NSTEMI with plans for medical management/ heparin drip since stopped and DAPT continued. Receiving IV lasix. Plans for rehab pending.'      - seen without family at bedside. Her biggest complaint is that her feet and legs hurt all night long. States they had a numb type feeling and cramping, with sharp pains at times. States she had to move her legs frequently to help. She reports intermittent coughing. - reports she is feeling okay. Does report several episodes of diarrhea and RN called me earlier due to watery stools. Being sent for C diff. She reports she slept better last night and her feet did not hurt with the gabapentin. Also cough is much better. Cardiology planning to convert her to PO lasix tomorrow. She is awaiting insurance pre-cert for rehab.        Review of Systems negative with exception of pertinent positives noted above  PHYSICAL EXAM     Visit Vitals    /72 (BP 1 Location: Left arm, BP Patient Position: At rest)    Pulse 72    Temp 97.6 °F (36.4 °C)    Resp 19    Ht 5' (1.524 m)    Wt 70.1 kg (154 lb 8 oz)    LMP Comment: hysterectomy    SpO2 97%    Breastfeeding No    BMI 30.17 kg/m2      Temp (24hrs), Av.1 °F (36.7 °C), Min:97.6 °F (36.4 °C), Max:99.2 °F (37.3 °C)    Oxygen Therapy  O2 Sat (%): 97 % (17 1531)  Pulse via Oximetry: 68 beats per minute (17 1401)  O2 Device: Room air (17 1401)  O2 Flow Rate (L/min): 0 l/min (17 1408)    Intake/Output Summary (Last 24 hours) at 17 1627  Last data filed at 17 1236   Gross per 24 hour   Intake              220 ml   Output             1575 ml   Net            -1355 ml      General: No acute distress, elderly  Lungs:  CTA Bilaterally, + cough. Heart:  Regular rate and rhythm,  No murmur, rub, or gallop  Abdomen: Soft, Non distended, Non tender, Positive bowel sounds  Extremities: No cyanosis, clubbing or edema, legs and feet warm  Neurologic:  No focal deficits    Recent Results (from the past 24 hour(s))   GLUCOSE, POC    Collection Time: 17  8:20 PM   Result Value Ref Range    Glucose (POC) 122 (H) 65 - 100 mg/dL   MAGNESIUM    Collection Time: 17  5:35 AM   Result Value Ref Range    Magnesium 1.9 1.8 - 2.4 mg/dL   METABOLIC PANEL, BASIC    Collection Time: 17  5:35 AM   Result Value Ref Range    Sodium 145 136 - 145 mmol/L    Potassium 3.0 (L) 3.5 - 5.1 mmol/L    Chloride 104 98 - 107 mmol/L    CO2 32 21 - 32 mmol/L    Anion gap 9 7 - 16 mmol/L    Glucose 88 65 - 100 mg/dL    BUN 33 (H) 8 - 23 MG/DL    Creatinine 1.00 0.6 - 1.0 MG/DL    GFR est AA >60 >60 ml/min/1.73m2    GFR est non-AA 55 (L) >60 ml/min/1.73m2    Calcium 9.1 8.3 - 10.4 MG/DL   GLUCOSE, POC    Collection Time: 17  7:49 AM   Result Value Ref Range    Glucose (POC) 120 (H) 65 - 100 mg/dL   GLUCOSE, POC    Collection Time: 17 11:20 AM   Result Value Ref Range    Glucose (POC) 110 (H) 65 - 100 mg/dL     Imaging:    XR CHEST PA LAT   Final Result   IMPRESSION: Small bilateral pleural effusions         CT UROGRAM WO CONT   Final Result   IMPRESSION:       1. No ureteral calculi or hydronephrosis. 2. Diverticulosis.       3. 5 cm cystic right adnexal lesion. Routine gynecologic follow-up is   recommended with routine follow-up pelvic sonography. XR CHEST SNGL V   Final Result   IMPRESSION:   Stable mild volume overload. CT CHEST W CONT   Final Result   IMPRESSION:      No evidence of pulmonary embolism. Coronary artery disease. Small bilateral pleural effusions. Dependent atelectasis in left lower lobe. Status post cholecystectomy. Date of Dictation: 2017 10:46 PM      DUPLEX LOWER EXT VENOUS BILAT   Final Result   IMPRESSION: No evidence of deep venous thrombosis in either lower extremity. XR CHEST PORT   Final Result   IMPRESSION: Minimal linear atelectasis of left lower lobe. Results for orders placed or performed during the hospital encounter of 17   2D ECHO COMPLETE ADULT (TTE) W OR [address] [facility] [address] [facility], [address] [facility]  [phone]    Transthoracic Echocardiogram  2D, M-mode, Doppler, and Color Doppler    Patient: Lauren Odonnell  MR #: [id number]  : 10-Feb-1927  Age: 80 years  Gender: Female  Study date: 23-Dec-2017  Account #: [de-identified]  Height: 60 in  Weight: 152.7 lb  BSA: 1.67 mï¾²  Status:Routine  Location: 729  BP: 87/ 55    Allergies: BEE POLLEN, FIRE ANT    Sonographer:  Charity Mann [facility]  Group:  [facility]  Referring Physician:  Kellen Funes. [facility]  Reading Physician:  Fela Lutz. Tori Luna DO [facility]    INDICATIONS: Chest pain    PROCEDURE: This was a routine study. A transthoracic echocardiogram was  performed. The study included complete 2D imaging, M-mode, complete spectral  Doppler, and color Doppler. Image quality was adequate. LEFT VENTRICLE: Size was normal. Systolic function was normal. Ejection  fraction was estimated in the range of 55 % to 60 %. There were no regional  wall motion abnormalities.  Wall thickness was normal. Left ventricular  diastolic function was abnormal.    RIGHT VENTRICLE: The ventricle was mildly dilated. Systolic function was  normal. Estimated peak pressure was in the range of 55-60 mmHg. LEFT ATRIUM: The atrium was markedly dilated. RIGHT ATRIUM: The atrium was mildly to moderately dilated. SYSTEMIC VEINS: IVC: The inferior vena cava was normal in size and course. AORTIC VALVE: The valve was trileaflet. Leaflets exhibited moderate  calcification and reduced mobility. The aortic valve area by the continuity  equation was 1.6 cm2. The peak velocity was 2.28 m/s. The mean pressure  gradient was 11.92 mmHg. The findings were most consistent with mild aortic  stenosis. The peak pressure gradient was 20.75 mmHg. There was no   insufficiency. MITRAL VALVE: There was moderate annular calcification. There was no evidence  for stenosis. There was moderate regurgitation. TRICUSPID VALVE: The valve structure was normal. There was no evidence for  stenosis. There was moderate to severe regurgitation. PULMONIC VALVE: The valve structure was normal. There was no evidence for  stenosis. There was no insufficiency. PERICARDIUM: There was no pericardial effusion. AORTA: The root exhibited normal size. SUMMARY:    -  Left ventricle: Systolic function was normal. Ejection fraction was  estimated in the range of 55 % to 60 %. There were no regional wall motion  abnormalities. -  Right ventricle: The ventricle was mildly dilated. -  Left atrium: The atrium was markedly dilated. -  Right atrium: The atrium was mildly to moderately dilated. -  Aortic valve: The valve was trileaflet. Leaflets exhibited moderate  calcification and reduced mobility. The aortic valve area by the continuity  equation was 1.6 cm2. The findings were most consistent with mild aortic  stenosis. -  Mitral valve: There was moderate annular calcification. There was moderate  regurgitation.    -  Tricuspid valve:  There was moderate to severe regurgitation. SYSTEM MEASUREMENT TABLES    2D  Ao Diam: 2.5 cm  LA Diam: 3.7 cm  %FS: 40.2 %  IVSd: 0.9 cm  LVIDd: 4.6 cm  LVIDs: 2.8 cm  LVOT Diam: 2 cm  LVPWd: 1 cm    CW  TR Vmax: 3.6 m/s  TR maxP.9 mmHg    PW  RVSP: 53.9 mmHg    Prepared and signed by    Henna Burton. [facility]y [facility]  Signed 23-Dec-2017 16:26:05         ASSESSMENT      Active Hospital Problems    Diagnosis Date Noted    NSTEMI (non-ST elevated myocardial infarction) [facility] [address] 2017    Gram-positive bacteremia 2017    Sepsis ([facility] [address] 2017    Fever 2017    Elevated troponin 2017    CKD (chronic kidney disease) stage 3, GFR 30-59 ml/min 2017    Elevated lactic acid level 2017    Hypernatremia 2017    Macrocytic anemia 2017    Pain of right lower extremity     Diastolic CHF, chronic ([facility] [address] 2016    Essential hypertension, benign 2015     Plan:  · Strep anginosus bacteremia- Continue rocephin until 18 to treat empirically for endocarditis per ID recs- appreciate assistance. · PICC placed . · Diastolic CHF/NSTEMI- IV lasix being switched to PO in AM per cardiology. · Has weaned off of oxygen. · Continue DAPT. · Neuropathic pain- start low dose gabapentin qhs. · R adnexal cyst- outpatient GYN follow up. · Diarrhea- stool sent for C diff . · Continue PT. Dispo- STR on discharge for PT and continued IV abx; awaiting insurance pre-cert    DVT Prophylaxis: Heparin    Signed By: Isabelle Bergman.  Mary Hill MD     2017

## 2017-12-30 NOTE — PROGRESS NOTES
[facility] CARDIOLOGY PROGRESS NOTE           12/30/2017 9:10 AM    Admit Date: 12/22/2017      Subjective:   Patient with persistent cough. Orthopnea has resolved. Had additional 1.7 liters out over last 24 hours. Total of 4.1 liters out. Renal function stable. ROS:  Cardiovascular:  As noted above    Objective:      Vitals:    12/30/17 0346 12/30/17 0757 12/30/17 0832 12/30/17 1119   BP: 130/72 146/79  135/80   Pulse: 76 73  68   Resp: 18 17 18   Temp: 98 °F (36.7 °C) 99.2 °F (37.3 °C)  98.1 °F (36.7 °C)   SpO2: 96% 94% 95% 94%   Weight:       Height:           Physical Exam:  General-No Acute Distress  Neck- supple, no JVD  CV- regular rate and rhythm no MRG  Lung- clear bilaterally  Abd- soft, nontender, nondistended  Ext- no edema bilaterally. Skin- warm and dry      Data Review:   Recent Labs      12/30/17   0535  12/29/17   0417  12/28/17   0729   NA  145  143  145   K  3.0*  3.2*  3.3*   MG  1.9  1.9  2.0   BUN  33*  38*  35*   CREA  1.00  1.21*  1.20*   GLU  88  108*  101*   WBC   --    --   6.2   HGB   --    --   10.5*   HCT   --    --   31.5*   PLT   --    --   153       Assessment/Plan:   Sepsis (HCC) (12/23/2017)- GPC bacteremia, cont Rocephin.       Essential hypertension, benign (3/17/2015)- Controlled on coreg. ACE-I held due to renal failure at admission.       Chronic diastolic CHF, chronic- EF 55% w mod MR, RVSP 60. Change to PO lasix in AM.       CKD (chronic kidney disease) stage 3, GFR 30-59 ml/min - Improved, ACE-I on hold, monitor w diuresis.     Macrocytic anemia (12/23/2017)- Monitor, hgb stable, no signs of acute bleeding.       Elevated troponin- [facility] Dr Zulema Grissom 2014 w mild-mod CAD and patent stent in LAD. Probably demand ischemia, cont ASA, plavix, statin, Coreg.      Gram-positive bacteremia (12/25/2017)- Rocephin through 12-18, ID empirically treated for endocarditis. Hypokalemia- replace.        Fela Geller MD  12/30/2017 9:10 AM

## 2017-12-31 LAB
ANION GAP SERPL CALC-SCNC: 6 MMOL/L (ref 7–16)
BASOPHILS # BLD: 0 K/UL (ref 0–0.2)
BASOPHILS NFR BLD: 0 % (ref 0–2)
BUN SERPL-MCNC: 32 MG/DL (ref 8–23)
CALCIUM SERPL-MCNC: 9.3 MG/DL (ref 8.3–10.4)
CHLORIDE SERPL-SCNC: 106 MMOL/L (ref 98–107)
CO2 SERPL-SCNC: 33 MMOL/L (ref 21–32)
CREAT SERPL-MCNC: 0.99 MG/DL (ref 0.6–1)
DIFFERENTIAL METHOD BLD: ABNORMAL
EOSINOPHIL # BLD: 0.1 K/UL (ref 0–0.8)
EOSINOPHIL NFR BLD: 1 % (ref 0.5–7.8)
ERYTHROCYTE [DISTWIDTH] IN BLOOD BY AUTOMATED COUNT: 14.9 % (ref 11.9–14.6)
GLUCOSE BLD STRIP.AUTO-MCNC: 101 MG/DL (ref 65–100)
GLUCOSE BLD STRIP.AUTO-MCNC: 137 MG/DL (ref 65–100)
GLUCOSE BLD STRIP.AUTO-MCNC: 138 MG/DL (ref 65–100)
GLUCOSE BLD STRIP.AUTO-MCNC: 98 MG/DL (ref 65–100)
GLUCOSE SERPL-MCNC: 93 MG/DL (ref 65–100)
HCT VFR BLD AUTO: 33.5 % (ref 35.8–46.3)
HGB BLD-MCNC: 11.1 G/DL (ref 11.7–15.4)
IMM GRANULOCYTES # BLD: 0 K/UL (ref 0–0.5)
IMM GRANULOCYTES NFR BLD AUTO: 0 % (ref 0–5)
LYMPHOCYTES # BLD: 1.2 K/UL (ref 0.5–4.6)
LYMPHOCYTES NFR BLD: 18 % (ref 13–44)
MAGNESIUM SERPL-MCNC: 1.9 MG/DL (ref 1.8–2.4)
MCH RBC QN AUTO: 34.2 PG (ref 26.1–32.9)
MCHC RBC AUTO-ENTMCNC: 33.1 G/DL (ref 31.4–35)
MCV RBC AUTO: 103.1 FL (ref 79.6–97.8)
MONOCYTES # BLD: 0.5 K/UL (ref 0.1–1.3)
MONOCYTES NFR BLD: 8 % (ref 4–12)
NEUTS SEG # BLD: 4.5 K/UL (ref 1.7–8.2)
NEUTS SEG NFR BLD: 73 % (ref 43–78)
PLATELET # BLD AUTO: 192 K/UL (ref 150–450)
PMV BLD AUTO: 12.1 FL (ref 10.8–14.1)
POTASSIUM SERPL-SCNC: 3.6 MMOL/L (ref 3.5–5.1)
RBC # BLD AUTO: 3.25 M/UL (ref 4.05–5.25)
SODIUM SERPL-SCNC: 145 MMOL/L (ref 136–145)
WBC # BLD AUTO: 6.3 K/UL (ref 4.3–11.1)

## 2017-12-31 PROCEDURE — 74011250636 HC RX REV CODE- 250/636: Performed by: INTERNAL MEDICINE

## 2017-12-31 PROCEDURE — 74011250637 HC RX REV CODE- 250/637: Performed by: INTERNAL MEDICINE

## 2017-12-31 PROCEDURE — 85025 COMPLETE CBC W/AUTO DIFF WBC: CPT | Performed by: INTERNAL MEDICINE

## 2017-12-31 PROCEDURE — 74011000250 HC RX REV CODE- 250: Performed by: INTERNAL MEDICINE

## 2017-12-31 PROCEDURE — 80048 BASIC METABOLIC PNL TOTAL CA: CPT | Performed by: INTERNAL MEDICINE

## 2017-12-31 PROCEDURE — 65660000000 HC RM CCU STEPDOWN

## 2017-12-31 PROCEDURE — 82962 GLUCOSE BLOOD TEST: CPT

## 2017-12-31 PROCEDURE — 83735 ASSAY OF MAGNESIUM: CPT | Performed by: INTERNAL MEDICINE

## 2017-12-31 PROCEDURE — 51798 US URINE CAPACITY MEASURE: CPT

## 2017-12-31 PROCEDURE — 94760 N-INVAS EAR/PLS OXIMETRY 1: CPT

## 2017-12-31 PROCEDURE — 94640 AIRWAY INHALATION TREATMENT: CPT

## 2017-12-31 RX ADMIN — CARVEDILOL 12.5 MG: 12.5 TABLET, FILM COATED ORAL at 09:19

## 2017-12-31 RX ADMIN — ASPIRIN 81 MG: 81 TABLET, COATED ORAL at 09:19

## 2017-12-31 RX ADMIN — HEPARIN SODIUM 5000 UNITS: 5000 INJECTION, SOLUTION INTRAVENOUS; SUBCUTANEOUS at 09:20

## 2017-12-31 RX ADMIN — Medication 10 ML: at 13:45

## 2017-12-31 RX ADMIN — Medication 300 UNITS: at 06:17

## 2017-12-31 RX ADMIN — IPRATROPIUM BROMIDE AND ALBUTEROL SULFATE 3 ML: .5; 3 SOLUTION RESPIRATORY (INHALATION) at 18:28

## 2017-12-31 RX ADMIN — Medication 10 ML: at 21:10

## 2017-12-31 RX ADMIN — COLCHICINE 0.6 MG: 0.6 CAPSULE ORAL at 09:19

## 2017-12-31 RX ADMIN — FUROSEMIDE 40 MG: 40 TABLET ORAL at 17:06

## 2017-12-31 RX ADMIN — Medication 300 UNITS: at 21:11

## 2017-12-31 RX ADMIN — FUROSEMIDE 40 MG: 40 TABLET ORAL at 09:19

## 2017-12-31 RX ADMIN — CARVEDILOL 12.5 MG: 12.5 TABLET, FILM COATED ORAL at 17:06

## 2017-12-31 RX ADMIN — Medication 10 ML: at 06:17

## 2017-12-31 RX ADMIN — RDII 250 MG CAPSULE 250 MG: at 09:19

## 2017-12-31 RX ADMIN — Medication 10 ML: at 21:11

## 2017-12-31 RX ADMIN — IPRATROPIUM BROMIDE AND ALBUTEROL SULFATE 3 ML: .5; 3 SOLUTION RESPIRATORY (INHALATION) at 03:05

## 2017-12-31 RX ADMIN — CYANOCOBALAMIN TAB 1000 MCG 1000 MCG: 1000 TAB at 09:20

## 2017-12-31 RX ADMIN — RDII 250 MG CAPSULE 250 MG: at 17:06

## 2017-12-31 RX ADMIN — HEPARIN SODIUM 5000 UNITS: 5000 INJECTION, SOLUTION INTRAVENOUS; SUBCUTANEOUS at 17:06

## 2017-12-31 RX ADMIN — ALLOPURINOL 100 MG: 100 TABLET ORAL at 09:19

## 2017-12-31 RX ADMIN — POTASSIUM CHLORIDE 20 MEQ: 20 TABLET, EXTENDED RELEASE ORAL at 09:20

## 2017-12-31 RX ADMIN — Medication 300 UNITS: at 13:45

## 2017-12-31 RX ADMIN — FAMOTIDINE 20 MG: 20 TABLET, FILM COATED ORAL at 09:20

## 2017-12-31 RX ADMIN — WATER 2 G: 1 INJECTION INTRAMUSCULAR; INTRAVENOUS; SUBCUTANEOUS at 09:16

## 2017-12-31 RX ADMIN — CLOPIDOGREL BISULFATE 75 MG: 75 TABLET ORAL at 09:20

## 2017-12-31 RX ADMIN — GABAPENTIN 100 MG: 100 CAPSULE ORAL at 21:10

## 2017-12-31 RX ADMIN — IPRATROPIUM BROMIDE AND ALBUTEROL SULFATE 3 ML: .5; 3 SOLUTION RESPIRATORY (INHALATION) at 13:40

## 2017-12-31 RX ADMIN — ALLOPURINOL 100 MG: 100 TABLET ORAL at 17:06

## 2017-12-31 RX ADMIN — PRAVASTATIN SODIUM 20 MG: 20 TABLET ORAL at 21:10

## 2017-12-31 NOTE — PROGRESS NOTES
Hospitalist Progress Note    2017  Admit Date: 2017 11:21 PM   NAME: Annamarie Singh   :  2/10/1927   MRN:  [id number]   Attending: Angel Pratt MD  PCP:  Rebecca Kemp MD    SUBJECTIVE:   As previously documented:  'Ms. Zachery Reddy is a 79 yo female with PMH of CAD, HTN, admitted  with sepsis and elevated troponin. She has been found to have GPC bacteremia 1/2 BC showing strep anginosus, rocephin  to present and vancomycin  to . Seen by ID who recommends rocephin 2 gram daily EOT 18. CT urogram shows right  5 cm adnexal lesion that will need GYN/ US followup. ECHO no vegetation, mild AS, EF 55-60%. No plans for TRACY given sedation risk but empirically treating for endocarditis. Seen by cardio and felt likely had NSTEMI with plans for medical management/ heparin drip since stopped and DAPT continued. Receiving IV lasix. Plans for rehab pending.'      - seen without family at bedside. Her biggest complaint is that her feet and legs hurt all night long. States they had a numb type feeling and cramping, with sharp pains at times. States she had to move her legs frequently to help. She reports intermittent coughing. - reports she is feeling okay. Does report several episodes of diarrhea and RN called me earlier due to watery stools. Being sent for C diff. She reports she slept better last night and her feet did not hurt with the gabapentin. Also cough is much better. Cardiology planning to convert her to PO lasix tomorrow. She is awaiting insurance pre-cert for rehab.   - reports she is feeling okay and wants to go home to 'do some work.'  States she is coughing still but improved. Does report she feels food 'comes back up when she eats.'  Has been going on for a while. Denies coughing immediately after swallowing. Feet are feeling much better. She has diuresed well and is down 15 pounds since admission.          Review of Systems negative with exception of pertinent positives noted above  PHYSICAL EXAM     Visit Vitals    /74 (BP 1 Location: Left arm, BP Patient Position: At rest)    Pulse 68    Temp 99.5 °F (37.5 °C)    Resp 18    Ht 5' (1.524 m)    Wt 66.5 kg (146 lb 9.6 oz)    LMP Comment: hysterectomy    SpO2 95%    Breastfeeding No    BMI 28.63 kg/m2      Temp (24hrs), Av.1 °F (36.7 °C), Min:97.6 °F (36.4 °C), Max:99.5 °F (37.5 °C)    Oxygen Therapy  O2 Sat (%): 95 % (17 1346)  Pulse via Oximetry: 74 beats per minute (17 134)  O2 Device: Room air (17 134)  O2 Flow Rate (L/min): 0 l/min (17 1408)  FIO2 (%): 21 % (17 1346)    Intake/Output Summary (Last 24 hours) at 17 1441  Last data filed at 17 0710   Gross per 24 hour   Intake                0 ml   Output              790 ml   Net             -790 ml      General: No acute distress, elderly  Lungs:  CTA Bilaterally, + cough.    Heart:  Regular rate and rhythm,  No murmur, rub, or gallop  Abdomen: Soft, Non distended, Non tender, Positive bowel sounds  Extremities: No cyanosis, clubbing or edema, legs and feet warm  Neurologic:  No focal deficits    Recent Results (from the past 24 hour(s))   GLUCOSE, POC    Collection Time: 17  5:09 PM   Result Value Ref Range    Glucose (POC) 144 (H) 65 - 100 mg/dL   GLUCOSE, POC    Collection Time: 17  9:07 PM   Result Value Ref Range    Glucose (POC) 97 65 - 100 mg/dL   MAGNESIUM    Collection Time: 17  6:24 AM   Result Value Ref Range    Magnesium 1.9 1.8 - 2.4 mg/dL   METABOLIC PANEL, BASIC    Collection Time: 17  6:24 AM   Result Value Ref Range    Sodium 145 136 - 145 mmol/L    Potassium 3.6 3.5 - 5.1 mmol/L    Chloride 106 98 - 107 mmol/L    CO2 33 (H) 21 - 32 mmol/L    Anion gap 6 (L) 7 - 16 mmol/L    Glucose 93 65 - 100 mg/dL    BUN 32 (H) 8 - 23 MG/DL    Creatinine 0.99 0.6 - 1.0 MG/DL    GFR est AA >60 >60 ml/min/1.73m2    GFR est non-AA 56 (L) >60 ml/min/1.73m2 Calcium 9.3 8.3 - 10.4 MG/DL   CBC WITH AUTOMATED DIFF    Collection Time: 12/31/17  6:24 AM   Result Value Ref Range    WBC 6.3 4.3 - 11.1 K/uL    RBC 3.25 (L) 4.05 - 5.25 M/uL    HGB 11.1 (L) 11.7 - 15.4 g/dL    HCT 33.5 (L) 35.8 - 46.3 %    .1 (H) 79.6 - 97.8 FL    MCH 34.2 (H) 26.1 - 32.9 PG    MCHC 33.1 31.4 - 35.0 g/dL    RDW 14.9 (H) 11.9 - 14.6 %    PLATELET 953 695 - 876 K/uL    MPV 12.1 10.8 - 14.1 FL    DF AUTOMATED      NEUTROPHILS 73 43 - 78 %    LYMPHOCYTES 18 13 - 44 %    MONOCYTES 8 4.0 - 12.0 %    EOSINOPHILS 1 0.5 - 7.8 %    BASOPHILS 0 0.0 - 2.0 %    IMMATURE GRANULOCYTES 0 0.0 - 5.0 %    ABS. NEUTROPHILS 4.5 1.7 - 8.2 K/UL    ABS. LYMPHOCYTES 1.2 0.5 - 4.6 K/UL    ABS. MONOCYTES 0.5 0.1 - 1.3 K/UL    ABS. EOSINOPHILS 0.1 0.0 - 0.8 K/UL    ABS. BASOPHILS 0.0 0.0 - 0.2 K/UL    ABS. IMM. GRANS. 0.0 0.0 - 0.5 K/UL   GLUCOSE, POC    Collection Time: 12/31/17  7:36 AM   Result Value Ref Range    Glucose (POC) 138 (H) 65 - 100 mg/dL   GLUCOSE, POC    Collection Time: 12/31/17 11:06 AM   Result Value Ref Range    Glucose (POC) 101 (H) 65 - 100 mg/dL     Imaging:    XR CHEST PA LAT   Final Result   IMPRESSION: Small bilateral pleural effusions         CT UROGRAM WO CONT   Final Result   IMPRESSION:       1. No ureteral calculi or hydronephrosis. 2. Diverticulosis. 3. 5 cm cystic right adnexal lesion. Routine gynecologic follow-up is   recommended with routine follow-up pelvic sonography. XR CHEST SNGL V   Final Result   IMPRESSION:   Stable mild volume overload. CT CHEST W CONT   Final Result   IMPRESSION:      No evidence of pulmonary embolism. Coronary artery disease. Small bilateral pleural effusions. Dependent atelectasis in left lower lobe. Status post cholecystectomy. Date of Dictation: 12/23/2017 10:46 PM      DUPLEX LOWER EXT VENOUS BILAT   Final Result   IMPRESSION: No evidence of deep venous thrombosis in either lower extremity. XR CHEST PORT   Final Result   IMPRESSION: Minimal linear atelectasis of left lower lobe. Results for orders placed or performed during the hospital encounter of 17   2D ECHO COMPLETE ADULT (TTE) W OR [address] [facility]  [address] [facility], [address] [facility]  [phone]    Transthoracic Echocardiogram  2D, M-mode, Doppler, and Color Doppler    Patient: Raúl Lucas  MR #: [id number]  : 10-Feb-1927  Age: 80 years  Gender: Female  Study date: 23-Dec-2017  Account #: [de-identified]  Height: 60 in  Weight: 152.7 lb  BSA: 1.67 mï¾²  Status:Routine  Location: 729  BP: 87/ 55    Allergies: BEE POLLEN, FIRE ANT    Sonographer:  Lenny Smith [facility]  Group:  [facility]  Referring Physician:  Denisse Faulkner. Maryam Busby [address]  Reading Physician:  Kathy Michele. Diane Rodriguez DO [facility]    INDICATIONS: Chest pain    PROCEDURE: This was a routine study. A transthoracic echocardiogram was  performed. The study included complete 2D imaging, M-mode, complete spectral  Doppler, and color Doppler. Image quality was adequate. LEFT VENTRICLE: Size was normal. Systolic function was normal. Ejection  fraction was estimated in the range of 55 % to 60 %. There were no regional  wall motion abnormalities. Wall thickness was normal. Left ventricular  diastolic function was abnormal.    RIGHT VENTRICLE: The ventricle was mildly dilated. Systolic function was  normal. Estimated peak pressure was in the range of 55-60 mmHg. LEFT ATRIUM: The atrium was markedly dilated. RIGHT ATRIUM: The atrium was mildly to moderately dilated. SYSTEMIC VEINS: IVC: The inferior vena cava was normal in size and course. AORTIC VALVE: The valve was trileaflet. Leaflets exhibited moderate  calcification and reduced mobility. The aortic valve area by the continuity  equation was 1.6 cm2. The peak velocity was 2.28 m/s. The mean pressure  gradient was 11.92 mmHg.  The findings were most consistent with mild aortic  stenosis. The peak pressure gradient was 20.75 mmHg. There was no   insufficiency. MITRAL VALVE: There was moderate annular calcification. There was no evidence  for stenosis. There was moderate regurgitation. TRICUSPID VALVE: The valve structure was normal. There was no evidence for  stenosis. There was moderate to severe regurgitation. PULMONIC VALVE: The valve structure was normal. There was no evidence for  stenosis. There was no insufficiency. PERICARDIUM: There was no pericardial effusion. AORTA: The root exhibited normal size. SUMMARY:    -  Left ventricle: Systolic function was normal. Ejection fraction was  estimated in the range of 55 % to 60 %. There were no regional wall motion  abnormalities. -  Right ventricle: The ventricle was mildly dilated. -  Left atrium: The atrium was markedly dilated. -  Right atrium: The atrium was mildly to moderately dilated. -  Aortic valve: The valve was trileaflet. Leaflets exhibited moderate  calcification and reduced mobility. The aortic valve area by the continuity  equation was 1.6 cm2. The findings were most consistent with mild aortic  stenosis. -  Mitral valve: There was moderate annular calcification. There was moderate  regurgitation.    -  Tricuspid valve: There was moderate to severe regurgitation. SYSTEM MEASUREMENT TABLES    2D  Ao Diam: 2.5 cm  LA Diam: 3.7 cm  %FS: 40.2 %  IVSd: 0.9 cm  LVIDd: 4.6 cm  LVIDs: 2.8 cm  LVOT Diam: 2 cm  LVPWd: 1 cm    CW  TR Vmax: 3.6 m/s  TR maxP.9 mmHg    PW  RVSP: 53.9 mmHg    Prepared and signed by    Nick Gamez.  [facility]  Signed 23-Dec-2017 16:26:05         ASSESSMENT      Active Hospital Problems    Diagnosis Date Noted    NSTEMI (non-ST elevated myocardial infarction) ([facility] [address] 2017    Gram-positive bacteremia 2017    Sepsis ([facility] [address] 2017    Fever 2017    Elevated troponin 2017    CKD (chronic kidney disease) stage 3, GFR 30-59 ml/min 12/23/2017    Elevated lactic acid level 12/23/2017    Hypernatremia 12/23/2017    Macrocytic anemia 12/23/2017    Pain of right lower extremity 46/01/3791    Diastolic CHF, chronic (HCC) 05/17/2016    Essential hypertension, benign 03/17/2015     Plan:  · Strep anginosus bacteremia- Continue rocephin until 1/22/18 to treat empirically for endocarditis per ID recs- appreciate assistance. · PICC placed 12/28. · Diastolic CHF/NSTEMI- down 15 pounds since admission. Appreciate cards input- switched to PO lasix today. · Has weaned off of oxygen. · Continue DAPT. · Neuropathic foot pain- continue low dose gabapentin qhs, which has drastically improved her symptoms by her report. · Swallowing issues- will have ST evaluate her to see if her food consistencies need to be changed. · R adnexal cyst- outpatient GYN follow up. · Diarrhea- resolved per RN. Stool never sent for C diff testing. · Continue PT. Dispo- STR on discharge for PT and continued IV abx; awaiting insurance pre-cert    DVT Prophylaxis: Heparin    Signed By: Cedrick Gómez.  Lorena Richard MD     December 31, 2017

## 2017-12-31 NOTE — PROGRESS NOTES
[facility] CARDIOLOGY PROGRESS NOTE           12/31/2017 9:10 AM    Admit Date: 12/22/2017      Subjective:   Patient with sore throat and persistent cought. Had additional 800 cc out overnight. Total 4.9 liters out since admission. No edema. No chest pain. Renal function stable. ROS:  Cardiovascular:  As noted above    Objective:      Vitals:    12/31/17 0018 12/31/17 0305 12/31/17 0503 12/31/17 0741   BP: 138/72  137/84 144/80   Pulse: 78  72 75   Resp: 20  20 18   Temp: 97.6 °F (36.4 °C)  97.9 °F (36.6 °C) 98 °F (36.7 °C)   SpO2: 93% 93% 92% 95%   Weight:       Height:           Physical Exam:  General-No Acute Distress  Neck- supple, no JVD  CV- regular rate and rhythm no MRG  Lung- clear bilaterally  Abd- soft, nontender, nondistended  Ext- no edema bilaterally. Skin- warm and dry      Data Review:   Recent Labs      12/31/17   0624  12/30/17   0535   NA  145  145   K  3.6  3.0*   MG  1.9  1.9   BUN  32*  33*   CREA  0.99  1.00   GLU  93  88   WBC  6.3   --    HGB  11.1*   --    HCT  33.5*   --    PLT  192   --        Assessment/Plan:   Sepsis ([facility] [address] (12/23/2017)- GPC bacteremia, cont Rocephin.       Essential hypertension, benign (3/17/2015)- Controlled on coreg. ACE-I held due to renal failure at admission.       Chronic diastolic CHF, chronic- EF 55% w mod MR, RVSP 60. Changed to PO lasix today.      CKD (chronic kidney disease) stage 3, GFR 30-59 ml/min - Improved, ACE-I on hold, monitor w diuresis.     Macrocytic anemia (12/23/2017)- Monitor, hgb stable, no signs of acute bleeding.       Elevated troponin- [facility] Dr Arianne Gupta 2014 w mild-mod CAD and patent stent in LAD. Probably demand ischemia, cont ASA, plavix, statin, Coreg.      Gram-positive bacteremia (12/25/2017)- Rocephin through 12-18, ID empirically treated for endocarditis. Hypokalemia- replaced.        Pallavi Julien MD  12/31/2017 9:10 AM

## 2018-01-01 ENCOUNTER — HOME CARE VISIT (OUTPATIENT)
Dept: SCHEDULING | Facility: HOME HEALTH | Age: 83
End: 2018-01-01
Payer: MEDICARE

## 2018-01-01 ENCOUNTER — HOME CARE VISIT (OUTPATIENT)
Dept: HOSPICE | Facility: HOSPICE | Age: 83
End: 2018-01-01
Payer: MEDICARE

## 2018-01-01 ENCOUNTER — HOSPITAL ENCOUNTER (INPATIENT)
Age: 83
LOS: 2 days | Discharge: HOME | End: 2018-12-15
Attending: INTERNAL MEDICINE | Admitting: INTERNAL MEDICINE

## 2018-01-01 VITALS — RESPIRATION RATE: 20 BRPM | HEART RATE: 88 BPM | SYSTOLIC BLOOD PRESSURE: 132 MMHG | DIASTOLIC BLOOD PRESSURE: 64 MMHG

## 2018-01-01 VITALS — DIASTOLIC BLOOD PRESSURE: 70 MMHG | RESPIRATION RATE: 20 BRPM | SYSTOLIC BLOOD PRESSURE: 120 MMHG | HEART RATE: 80 BPM

## 2018-01-01 VITALS — DIASTOLIC BLOOD PRESSURE: 70 MMHG | RESPIRATION RATE: 20 BRPM | SYSTOLIC BLOOD PRESSURE: 134 MMHG | HEART RATE: 84 BPM

## 2018-01-01 VITALS — HEART RATE: 84 BPM | RESPIRATION RATE: 20 BRPM | SYSTOLIC BLOOD PRESSURE: 110 MMHG | DIASTOLIC BLOOD PRESSURE: 70 MMHG

## 2018-01-01 VITALS — DIASTOLIC BLOOD PRESSURE: 70 MMHG | SYSTOLIC BLOOD PRESSURE: 132 MMHG | RESPIRATION RATE: 20 BRPM | HEART RATE: 80 BPM

## 2018-01-01 VITALS — SYSTOLIC BLOOD PRESSURE: 144 MMHG | RESPIRATION RATE: 24 BRPM | DIASTOLIC BLOOD PRESSURE: 70 MMHG | HEART RATE: 84 BPM

## 2018-01-01 VITALS
HEART RATE: 88 BPM | OXYGEN SATURATION: 99 % | TEMPERATURE: 97.6 F | DIASTOLIC BLOOD PRESSURE: 70 MMHG | SYSTOLIC BLOOD PRESSURE: 130 MMHG

## 2018-01-01 VITALS — RESPIRATION RATE: 20 BRPM | DIASTOLIC BLOOD PRESSURE: 64 MMHG | SYSTOLIC BLOOD PRESSURE: 142 MMHG | HEART RATE: 84 BPM

## 2018-01-01 VITALS — RESPIRATION RATE: 20 BRPM | DIASTOLIC BLOOD PRESSURE: 66 MMHG | HEART RATE: 84 BPM | SYSTOLIC BLOOD PRESSURE: 128 MMHG

## 2018-01-01 VITALS — RESPIRATION RATE: 20 BRPM | DIASTOLIC BLOOD PRESSURE: 60 MMHG | SYSTOLIC BLOOD PRESSURE: 102 MMHG | HEART RATE: 84 BPM

## 2018-01-01 VITALS — SYSTOLIC BLOOD PRESSURE: 132 MMHG | RESPIRATION RATE: 20 BRPM | DIASTOLIC BLOOD PRESSURE: 74 MMHG | HEART RATE: 88 BPM

## 2018-01-01 VITALS — SYSTOLIC BLOOD PRESSURE: 100 MMHG | DIASTOLIC BLOOD PRESSURE: 70 MMHG | RESPIRATION RATE: 20 BRPM | HEART RATE: 84 BPM

## 2018-01-01 VITALS
TEMPERATURE: 98 F | RESPIRATION RATE: 20 BRPM | DIASTOLIC BLOOD PRESSURE: 72 MMHG | SYSTOLIC BLOOD PRESSURE: 132 MMHG | HEART RATE: 78 BPM

## 2018-01-01 VITALS
HEART RATE: 80 BPM | RESPIRATION RATE: 18 BRPM | SYSTOLIC BLOOD PRESSURE: 157 MMHG | TEMPERATURE: 95.9 F | DIASTOLIC BLOOD PRESSURE: 91 MMHG

## 2018-01-01 VITALS — HEART RATE: 88 BPM | SYSTOLIC BLOOD PRESSURE: 128 MMHG | DIASTOLIC BLOOD PRESSURE: 70 MMHG | RESPIRATION RATE: 20 BRPM

## 2018-01-01 VITALS — DIASTOLIC BLOOD PRESSURE: 60 MMHG | HEART RATE: 80 BPM | SYSTOLIC BLOOD PRESSURE: 128 MMHG | RESPIRATION RATE: 20 BRPM

## 2018-01-01 VITALS — HEART RATE: 84 BPM | DIASTOLIC BLOOD PRESSURE: 60 MMHG | SYSTOLIC BLOOD PRESSURE: 132 MMHG | RESPIRATION RATE: 20 BRPM

## 2018-01-01 VITALS — HEART RATE: 88 BPM | DIASTOLIC BLOOD PRESSURE: 72 MMHG | RESPIRATION RATE: 20 BRPM | SYSTOLIC BLOOD PRESSURE: 162 MMHG

## 2018-01-01 VITALS — SYSTOLIC BLOOD PRESSURE: 142 MMHG | RESPIRATION RATE: 20 BRPM | HEART RATE: 100 BPM | DIASTOLIC BLOOD PRESSURE: 80 MMHG

## 2018-01-01 LAB
ANION GAP SERPL CALC-SCNC: 6 MMOL/L (ref 7–16)
BUN SERPL-MCNC: 32 MG/DL (ref 8–23)
CALCIUM SERPL-MCNC: 8.9 MG/DL (ref 8.3–10.4)
CHLORIDE SERPL-SCNC: 106 MMOL/L (ref 98–107)
CO2 SERPL-SCNC: 33 MMOL/L (ref 21–32)
CREAT SERPL-MCNC: 0.98 MG/DL (ref 0.6–1)
GLUCOSE BLD STRIP.AUTO-MCNC: 115 MG/DL (ref 65–100)
GLUCOSE BLD STRIP.AUTO-MCNC: 130 MG/DL (ref 65–100)
GLUCOSE BLD STRIP.AUTO-MCNC: 149 MG/DL (ref 65–100)
GLUCOSE BLD STRIP.AUTO-MCNC: 99 MG/DL (ref 65–100)
GLUCOSE SERPL-MCNC: 89 MG/DL (ref 65–100)
MAGNESIUM SERPL-MCNC: 2 MG/DL (ref 1.8–2.4)
POTASSIUM SERPL-SCNC: 3.3 MMOL/L (ref 3.5–5.1)
SODIUM SERPL-SCNC: 145 MMOL/L (ref 136–145)

## 2018-01-01 PROCEDURE — 0651 HSPC ROUTINE HOME CARE

## 2018-01-01 PROCEDURE — T4527 ADULT SIZE PULL-ON LG: HCPCS

## 2018-01-01 PROCEDURE — HOSPICE MEDICATION HC HH HOSPICE MEDICATION

## 2018-01-01 PROCEDURE — G0156 HHCP-SVS OF AIDE,EA 15 MIN: HCPCS

## 2018-01-01 PROCEDURE — A9270 NON-COVERED ITEM OR SERVICE: HCPCS

## 2018-01-01 PROCEDURE — G0299 HHS/HOSPICE OF RN EA 15 MIN: HCPCS

## 2018-01-01 PROCEDURE — 74011000250 HC RX REV CODE- 250: Performed by: INTERNAL MEDICINE

## 2018-01-01 PROCEDURE — A6250 SKIN SEAL PROTECT MOISTURIZR: HCPCS

## 2018-01-01 PROCEDURE — 74011250637 HC RX REV CODE- 250/637: Performed by: NURSE PRACTITIONER

## 2018-01-01 PROCEDURE — A4335 INCONTINENCE SUPPLY: HCPCS

## 2018-01-01 PROCEDURE — G0155 HHCP-SVS OF CSW,EA 15 MIN: HCPCS

## 2018-01-01 PROCEDURE — 94760 N-INVAS EAR/PLS OXIMETRY 1: CPT

## 2018-01-01 PROCEDURE — 74011250637 HC RX REV CODE- 250/637: Performed by: INTERNAL MEDICINE

## 2018-01-01 PROCEDURE — 83735 ASSAY OF MAGNESIUM: CPT | Performed by: INTERNAL MEDICINE

## 2018-01-01 PROCEDURE — 74011250636 HC RX REV CODE- 250/636: Performed by: INTERNAL MEDICINE

## 2018-01-01 PROCEDURE — 74011636637 HC RX REV CODE- 636/637: Performed by: NURSE PRACTITIONER

## 2018-01-01 PROCEDURE — HHS10554 SHAMPOO/BODY WASH 8 OZ ALOE VESTA

## 2018-01-01 PROCEDURE — 65660000000 HC RM CCU STEPDOWN

## 2018-01-01 PROCEDURE — A4927 NON-STERILE GLOVES: HCPCS

## 2018-01-01 PROCEDURE — 94640 AIRWAY INHALATION TREATMENT: CPT

## 2018-01-01 PROCEDURE — 92610 EVALUATE SWALLOWING FUNCTION: CPT

## 2018-01-01 PROCEDURE — T4528 ADULT SIZE PULL-ON XL: HCPCS

## 2018-01-01 PROCEDURE — 80048 BASIC METABOLIC PNL TOTAL CA: CPT | Performed by: INTERNAL MEDICINE

## 2018-01-01 PROCEDURE — 97530 THERAPEUTIC ACTIVITIES: CPT

## 2018-01-01 PROCEDURE — 97150 GROUP THERAPEUTIC PROCEDURES: CPT

## 2018-01-01 PROCEDURE — 82962 GLUCOSE BLOOD TEST: CPT

## 2018-01-01 PROCEDURE — T4526 ADULT SIZE PULL-ON MED: HCPCS

## 2018-01-01 RX ORDER — FUROSEMIDE 40 MG/1
40 TABLET ORAL DAILY
Status: DISCONTINUED | OUTPATIENT
Start: 2018-01-01 | End: 2018-01-01

## 2018-01-01 RX ORDER — HYDROCODONE BITARTRATE AND ACETAMINOPHEN 5; 325 MG/1; MG/1
1 TABLET ORAL
Status: DISCONTINUED | OUTPATIENT
Start: 2018-01-01 | End: 2018-01-01 | Stop reason: HOSPADM

## 2018-01-01 RX ORDER — AMLODIPINE BESYLATE 5 MG/1
5 TABLET ORAL DAILY
Status: DISCONTINUED | OUTPATIENT
Start: 2018-01-01 | End: 2018-01-01

## 2018-01-01 RX ORDER — CLOPIDOGREL BISULFATE 75 MG/1
75 TABLET ORAL DAILY
Status: DISCONTINUED | OUTPATIENT
Start: 2018-01-01 | End: 2018-01-01

## 2018-01-01 RX ORDER — PREDNISONE 10 MG/1
5 TABLET ORAL DAILY
Status: DISCONTINUED | OUTPATIENT
Start: 2018-01-01 | End: 2018-01-01

## 2018-01-01 RX ORDER — HALOPERIDOL 5 MG/1
2.5 TABLET ORAL
Status: DISCONTINUED | OUTPATIENT
Start: 2018-01-01 | End: 2018-01-01 | Stop reason: HOSPADM

## 2018-01-01 RX ORDER — TORSEMIDE 20 MG/1
20 TABLET ORAL DAILY
Status: DISCONTINUED | OUTPATIENT
Start: 2018-01-02 | End: 2018-01-05 | Stop reason: HOSPADM

## 2018-01-01 RX ORDER — LORATADINE 10 MG/1
10 TABLET ORAL DAILY
Status: DISCONTINUED | OUTPATIENT
Start: 2018-01-01 | End: 2018-01-01

## 2018-01-01 RX ORDER — PREDNISONE 10 MG/1
10 TABLET ORAL
Status: DISCONTINUED | OUTPATIENT
Start: 2018-01-01 | End: 2018-01-01 | Stop reason: HOSPADM

## 2018-01-01 RX ORDER — GABAPENTIN 300 MG/1
300 CAPSULE ORAL 2 TIMES DAILY
Status: DISCONTINUED | OUTPATIENT
Start: 2018-01-01 | End: 2018-01-01 | Stop reason: HOSPADM

## 2018-01-01 RX ORDER — SENNOSIDES 8.6 MG/1
2 TABLET ORAL 2 TIMES DAILY
Status: DISCONTINUED | OUTPATIENT
Start: 2018-01-01 | End: 2018-01-01 | Stop reason: HOSPADM

## 2018-01-01 RX ORDER — FUROSEMIDE 40 MG/1
40 TABLET ORAL DAILY
Status: DISCONTINUED | OUTPATIENT
Start: 2018-01-01 | End: 2018-01-01 | Stop reason: HOSPADM

## 2018-01-01 RX ORDER — GABAPENTIN 300 MG/1
600 CAPSULE ORAL
Status: DISCONTINUED | OUTPATIENT
Start: 2018-01-01 | End: 2018-01-01 | Stop reason: HOSPADM

## 2018-01-01 RX ORDER — FAMOTIDINE 20 MG/1
20 TABLET, FILM COATED ORAL 2 TIMES DAILY
Status: DISCONTINUED | OUTPATIENT
Start: 2018-01-01 | End: 2018-01-01

## 2018-01-01 RX ORDER — GABAPENTIN 300 MG/1
300 CAPSULE ORAL 2 TIMES DAILY
Status: DISCONTINUED | OUTPATIENT
Start: 2018-01-01 | End: 2018-01-01

## 2018-01-01 RX ORDER — PREDNISONE 10 MG/1
5 TABLET ORAL
Status: DISCONTINUED | OUTPATIENT
Start: 2018-01-01 | End: 2018-01-01 | Stop reason: HOSPADM

## 2018-01-01 RX ORDER — FAMOTIDINE 20 MG/1
20 TABLET, FILM COATED ORAL 2 TIMES DAILY
Status: DISCONTINUED | OUTPATIENT
Start: 2018-01-01 | End: 2018-01-01 | Stop reason: HOSPADM

## 2018-01-01 RX ORDER — ALBUTEROL SULFATE 0.83 MG/ML
2.5 SOLUTION RESPIRATORY (INHALATION)
Status: DISCONTINUED | OUTPATIENT
Start: 2018-01-01 | End: 2018-01-01 | Stop reason: HOSPADM

## 2018-01-01 RX ORDER — LORATADINE 10 MG/1
10 TABLET ORAL DAILY
Status: DISCONTINUED | OUTPATIENT
Start: 2018-01-01 | End: 2018-01-01 | Stop reason: HOSPADM

## 2018-01-01 RX ORDER — ACETAMINOPHEN 325 MG/1
650 TABLET ORAL
Status: DISCONTINUED | OUTPATIENT
Start: 2018-01-01 | End: 2018-01-01 | Stop reason: HOSPADM

## 2018-01-01 RX ORDER — PREDNISONE 10 MG/1
10 TABLET ORAL
Qty: 1 TAB | Refills: 0 | Status: SHIPPED | OUTPATIENT
Start: 2018-01-01 | End: 2018-01-01

## 2018-01-01 RX ORDER — POLYVINYL ALCOHOL 14 MG/ML
1 SOLUTION/ DROPS OPHTHALMIC AS NEEDED
Status: DISCONTINUED | OUTPATIENT
Start: 2018-01-01 | End: 2018-01-01 | Stop reason: HOSPADM

## 2018-01-01 RX ORDER — HALOPERIDOL 1 MG/1
1 TABLET ORAL
Status: DISCONTINUED | OUTPATIENT
Start: 2018-01-01 | End: 2018-01-01

## 2018-01-01 RX ORDER — POTASSIUM CHLORIDE 750 MG/1
10 CAPSULE, EXTENDED RELEASE ORAL DAILY
Status: DISCONTINUED | OUTPATIENT
Start: 2018-01-01 | End: 2018-01-01 | Stop reason: HOSPADM

## 2018-01-01 RX ORDER — ALLOPURINOL 100 MG/1
100 TABLET ORAL 2 TIMES DAILY
Status: DISCONTINUED | OUTPATIENT
Start: 2018-01-01 | End: 2018-01-01

## 2018-01-01 RX ORDER — PREDNISONE 20 MG/1
20 TABLET ORAL DAILY
Qty: 1 TAB | Refills: 0 | Status: SHIPPED
Start: 2018-01-01 | End: 2018-01-01

## 2018-01-01 RX ORDER — POTASSIUM CHLORIDE 750 MG/1
10 CAPSULE, EXTENDED RELEASE ORAL DAILY
Status: DISCONTINUED | OUTPATIENT
Start: 2018-01-01 | End: 2018-01-01

## 2018-01-01 RX ORDER — PREDNISONE 10 MG/1
20 TABLET ORAL DAILY
Status: DISCONTINUED | OUTPATIENT
Start: 2018-01-01 | End: 2018-01-01 | Stop reason: HOSPADM

## 2018-01-01 RX ORDER — PREDNISONE 10 MG/1
5 TABLET ORAL DAILY
Qty: 1 TAB | Refills: 0 | Status: SHIPPED
Start: 2018-01-01 | End: 2019-01-01

## 2018-01-01 RX ADMIN — GABAPENTIN 300 MG: 300 CAPSULE ORAL at 18:08

## 2018-01-01 RX ADMIN — GABAPENTIN 300 MG: 300 CAPSULE ORAL at 09:27

## 2018-01-01 RX ADMIN — Medication 10 ML: at 15:24

## 2018-01-01 RX ADMIN — Medication 10 ML: at 22:15

## 2018-01-01 RX ADMIN — ALLOPURINOL 100 MG: 100 TABLET ORAL at 16:23

## 2018-01-01 RX ADMIN — SENNOSIDES 17.2 MG: 8.6 TABLET, FILM COATED ORAL at 21:31

## 2018-01-01 RX ADMIN — GUAIFENESIN AND DEXTROMETHORPHAN 10 ML: 100; 10 SYRUP ORAL at 22:28

## 2018-01-01 RX ADMIN — ALLOPURINOL 100 MG: 100 TABLET ORAL at 09:38

## 2018-01-01 RX ADMIN — POTASSIUM CHLORIDE 10 MEQ: 750 CAPSULE, EXTENDED RELEASE ORAL at 09:26

## 2018-01-01 RX ADMIN — CYANOCOBALAMIN TAB 1000 MCG 1000 MCG: 1000 TAB at 09:39

## 2018-01-01 RX ADMIN — WATER 2 G: 1 INJECTION INTRAMUSCULAR; INTRAVENOUS; SUBCUTANEOUS at 09:43

## 2018-01-01 RX ADMIN — ASPIRIN 81 MG: 81 TABLET, COATED ORAL at 09:38

## 2018-01-01 RX ADMIN — HYDROCODONE BITARTRATE AND ACETAMINOPHEN 1 TABLET: 5; 325 TABLET ORAL at 04:55

## 2018-01-01 RX ADMIN — GABAPENTIN 300 MG: 300 CAPSULE ORAL at 17:43

## 2018-01-01 RX ADMIN — COLCHICINE 0.6 MG: 0.6 CAPSULE ORAL at 09:38

## 2018-01-01 RX ADMIN — HYDROCODONE BITARTRATE AND ACETAMINOPHEN 1 TABLET: 5; 325 TABLET ORAL at 21:52

## 2018-01-01 RX ADMIN — GABAPENTIN 600 MG: 300 CAPSULE ORAL at 21:52

## 2018-01-01 RX ADMIN — HEPARIN SODIUM 5000 UNITS: 5000 INJECTION, SOLUTION INTRAVENOUS; SUBCUTANEOUS at 15:23

## 2018-01-01 RX ADMIN — HALOPERIDOL 2.5 MG: 5 TABLET ORAL at 09:26

## 2018-01-01 RX ADMIN — RDII 250 MG CAPSULE 250 MG: at 09:39

## 2018-01-01 RX ADMIN — HALOPERIDOL 2.5 MG: 5 TABLET ORAL at 19:27

## 2018-01-01 RX ADMIN — HYDROCODONE BITARTRATE AND ACETAMINOPHEN 1 TABLET: 5; 325 TABLET ORAL at 14:54

## 2018-01-01 RX ADMIN — Medication 10 ML: at 05:52

## 2018-01-01 RX ADMIN — SENNOSIDES 17.2 MG: 8.6 TABLET, FILM COATED ORAL at 08:25

## 2018-01-01 RX ADMIN — FAMOTIDINE 20 MG: 20 TABLET, FILM COATED ORAL at 08:24

## 2018-01-01 RX ADMIN — PREDNISONE 5 MG: 10 TABLET ORAL at 09:28

## 2018-01-01 RX ADMIN — HEPARIN SODIUM 5000 UNITS: 5000 INJECTION, SOLUTION INTRAVENOUS; SUBCUTANEOUS at 00:07

## 2018-01-01 RX ADMIN — HYDROCODONE BITARTRATE AND ACETAMINOPHEN 1 TABLET: 5; 325 TABLET ORAL at 09:28

## 2018-01-01 RX ADMIN — CARVEDILOL 12.5 MG: 12.5 TABLET, FILM COATED ORAL at 09:39

## 2018-01-01 RX ADMIN — IPRATROPIUM BROMIDE AND ALBUTEROL SULFATE 3 ML: .5; 3 SOLUTION RESPIRATORY (INHALATION) at 15:18

## 2018-01-01 RX ADMIN — LORATADINE 10 MG: 10 TABLET ORAL at 09:27

## 2018-01-01 RX ADMIN — HYDROCODONE BITARTRATE AND ACETAMINOPHEN 1 TABLET: 5; 325 TABLET ORAL at 16:10

## 2018-01-01 RX ADMIN — PRAVASTATIN SODIUM 20 MG: 20 TABLET ORAL at 22:14

## 2018-01-01 RX ADMIN — FUROSEMIDE 40 MG: 40 TABLET ORAL at 08:24

## 2018-01-01 RX ADMIN — Medication 300 UNITS: at 05:52

## 2018-01-01 RX ADMIN — PREDNISONE 20 MG: 10 TABLET ORAL at 08:25

## 2018-01-01 RX ADMIN — CLOPIDOGREL BISULFATE 75 MG: 75 TABLET ORAL at 09:38

## 2018-01-01 RX ADMIN — FAMOTIDINE 20 MG: 20 TABLET, FILM COATED ORAL at 17:43

## 2018-01-01 RX ADMIN — LORATADINE 10 MG: 10 TABLET ORAL at 08:25

## 2018-01-01 RX ADMIN — POTASSIUM CHLORIDE 10 MEQ: 750 CAPSULE, EXTENDED RELEASE ORAL at 08:25

## 2018-01-01 RX ADMIN — SENNOSIDES 17.2 MG: 8.6 TABLET, FILM COATED ORAL at 21:54

## 2018-01-01 RX ADMIN — HYDROCODONE BITARTRATE AND ACETAMINOPHEN 1 TABLET: 5; 325 TABLET ORAL at 18:10

## 2018-01-01 RX ADMIN — HYDROCODONE BITARTRATE AND ACETAMINOPHEN 1 TABLET: 5; 325 TABLET ORAL at 02:01

## 2018-01-01 RX ADMIN — GABAPENTIN 300 MG: 300 CAPSULE ORAL at 08:24

## 2018-01-01 RX ADMIN — ACETAMINOPHEN 650 MG: 325 TABLET ORAL at 19:26

## 2018-01-01 RX ADMIN — ACETAMINOPHEN 650 MG: 325 TABLET ORAL at 17:43

## 2018-01-01 RX ADMIN — GABAPENTIN 100 MG: 100 CAPSULE ORAL at 22:14

## 2018-01-01 RX ADMIN — POTASSIUM CHLORIDE 20 MEQ: 20 TABLET, EXTENDED RELEASE ORAL at 09:39

## 2018-01-01 RX ADMIN — FUROSEMIDE 40 MG: 40 TABLET ORAL at 09:39

## 2018-01-01 RX ADMIN — FUROSEMIDE 40 MG: 40 TABLET ORAL at 09:27

## 2018-01-01 RX ADMIN — GABAPENTIN 600 MG: 300 CAPSULE ORAL at 21:32

## 2018-01-01 RX ADMIN — Medication 300 UNITS: at 14:23

## 2018-01-01 RX ADMIN — HALOPERIDOL 2.5 MG: 5 TABLET ORAL at 04:55

## 2018-01-01 RX ADMIN — FAMOTIDINE 20 MG: 20 TABLET, FILM COATED ORAL at 09:27

## 2018-01-01 RX ADMIN — RDII 250 MG CAPSULE 250 MG: at 16:22

## 2018-01-01 RX ADMIN — CARVEDILOL 12.5 MG: 12.5 TABLET, FILM COATED ORAL at 16:22

## 2018-01-01 RX ADMIN — ACETAMINOPHEN 650 MG: 325 TABLET ORAL at 00:07

## 2018-01-01 RX ADMIN — SENNOSIDES 17.2 MG: 8.6 TABLET, FILM COATED ORAL at 09:28

## 2018-01-01 RX ADMIN — HALOPERIDOL 2.5 MG: 5 TABLET ORAL at 14:54

## 2018-01-01 RX ADMIN — HEPARIN SODIUM 5000 UNITS: 5000 INJECTION, SOLUTION INTRAVENOUS; SUBCUTANEOUS at 09:40

## 2018-01-01 RX ADMIN — PREDNISONE 20 MG: 10 TABLET ORAL at 12:41

## 2018-01-01 RX ADMIN — IPRATROPIUM BROMIDE AND ALBUTEROL SULFATE 3 ML: .5; 3 SOLUTION RESPIRATORY (INHALATION) at 20:49

## 2018-01-01 RX ADMIN — FAMOTIDINE 20 MG: 20 TABLET, FILM COATED ORAL at 09:38

## 2018-01-01 RX ADMIN — Medication 300 UNITS: at 22:14

## 2018-01-01 RX ADMIN — HYDROCODONE BITARTRATE AND ACETAMINOPHEN 1 TABLET: 5; 325 TABLET ORAL at 23:19

## 2018-01-01 RX ADMIN — FAMOTIDINE 20 MG: 20 TABLET, FILM COATED ORAL at 18:08

## 2018-01-01 NOTE — PROGRESS NOTES
Problem: Mobility Impaired (Adult and Pediatric)  Goal: *Therapy Goal (Edit Goal, Insert Text)  STG:  (1.)Ms. Aleks Le will move from supine to sit and sit to supine , scoot up and down and roll side to side with STAND BY ASSIST within 4 day(s). (2.)Ms. Aleks Le will transfer from bed to chair and chair to bed with STAND BY ASSIST using the least restrictive device within 4 day(s). (3.)Ms. Aleks Le will ambulate with STAND BY ASSIST for 100 feet with the least restrictive device within 4 day(s). LTG:  (1.)Ms. Aleks Le will move from supine to sit and sit to supine , scoot up and down and roll side to side in bed with MODIFIED INDEPENDENT within 7 day(s). (2.)Ms. Aleks Le will transfer from bed to chair and chair to bed with MODIFIED INDEPENDENCE using the least restrictive device within 7 day(s). (3.)Ms. Aleks Le will ambulate with MODIFIED INDEPENDENCE for 250 feet with the least restrictive device within 7 day(s). ________________________________________________________________________________________________       PHYSICAL THERAPY: Daily Note, Treatment Day: 3rd, PM 1/1/2018  INPATIENT: Hospital Day: 11  Payor: [employer] C[employer] PLUS / [place] [employer] [place]US / Product Type: Managed Care Medicare /      NAME/AGE/GENDER: Luann Reyes is a 80 y.o. female   PRIMARY DIAGNOSIS: Sepsis ([address]  Sepsis ([address] Sepsis ([address] Sepsis ([address]        ICD-10: Treatment Diagnosis:   · Difficulty in walking, Not elsewhere classified (R26.2)   Precaution/Allergies:  Bee pollen and Fire ant      ASSESSMENT:     Ms. Aleks Le is supine on arrival, agreeable to PT treatment wit some encouragement from the RN and this writer. Pt performed supine to sit with CGA,  Sit to stand with min assist.  Gait training with rolling walker x 30 feet with contact guard assist.  Patient is returned to the recliner with alarm intact and needs within reach. RN aware.   Patient required additional time for task completions as mobility is slow. Good session and patient making progress towards goals. Will continue with POC. This section established at most recent assessment   PROBLEM LIST (Impairments causing functional limitations):  1. Decreased Strength  2. Decreased ADL/Functional Activities  3. Decreased Transfer Abilities  4. Decreased Ambulation Ability/Technique  5. Decreased Balance  6. Decreased Activity Tolerance  7. Decreased Pacing Skills  8. Decreased Flexibility/Joint Mobility  9. Decreased Kinney with Home Exercise Program   INTERVENTIONS PLANNED: (Benefits and precautions of physical therapy have been discussed with the patient.)  1. Balance Exercise  2. Bed Mobility  3. Family Education  4. Gait Training  5. Home Exercise Program (HEP)  6. Range of Motion (ROM)  7. Therapeutic Activites  8. Therapeutic Exercise/Strengthening  9. Transfer Training     TREATMENT PLAN: Frequency/Duration: 3-5 times a week for duration of hospital stay  Rehabilitation Potential For Stated Goals: Good      RECOMMENDED REHABILITATION/EQUIPMENT: (at time of discharge pending progress): Due to the probability of continued deficits (see above) this patient will likely need continued skilled physical therapy after discharge. HISTORY:   History of Present Injury/Illness (Reason for Referral):  Patient is a 79 y/o F who presented with fever, chills/shaking, SOB, and a NBNB nausea/vomiting episode x1 that occurred a few hours ago. She is a very poor historian, limits history. No formal diagnosis of dementia. Says that she was brought here after she started shaking earlier this evening, and then had vomiting. Said she had a fever but cannot tell me the temperature. Denies any other new symptoms. Denies CP, palpitations, cough, abd pain, urinary symptoms, diarrhea, new rashes/wounds.       10 systems reviewed and negative except as noted in HPI.   Past Medical History/Comorbidities:   Ms. Hidalgo Citilorena  has a past medical history of Acute kidney failure, unspecified; Arthritis; CAD (coronary artery disease); Cellulitis and abscess of other specified site; Coronary atherosclerosis of native coronary artery; Essential hypertension, benign (3/17/2015); Hypertension; Hypopotassemia; Mixed hyperlipidemia; Osteoarthritis (7/20/2017); Other and unspecified hyperlipidemia; Reflux esophagitis; Renal failure, unspecified; Shortness of breath; and Unspecified essential hypertension. Ms. Rose Perez  has a past surgical history that includes hx cholecystectomy; pr layr clos wnd face,facial <2.5cm; pr cardiac surg procedure unlist; hx cataract removal (Bilateral); and hx hysterectomy. Social History/Living Environment:   Home Environment: Apartment  # Steps to Enter: 0  One/Two Story Residence: One story  Living Alone: No  Support Systems: Child(jayleen), Friends \ neighbors, Family member(s), Spiritism / jeffery community  Patient Expects to be Discharged to[de-identified] Private residence  Current DME Used/Available at Home: Cane, straight, Shower chair  Tub or Shower Type: Tub/Shower combination  Prior Level of Function/Work/Activity:  Patient was utilizing a 2 wheel rolling walker at home with ambulation. Dominant Side:         RIGHT  Personal Factors:          Sex:  female        Age:  80 y.o. Number of Personal Factors/Comorbidities that affect the Plan of Care: 1-2: MODERATE COMPLEXITY   EXAMINATION:   Most Recent Physical Functioning:   Gross Assessment:                  Posture:     Balance:  Sitting: Intact  Sitting - Static: Good (unsupported)  Sitting - Dynamic: Good (unsupported)  Standing: Intact  Standing - Static: Good  Standing - Dynamic : Fair Bed Mobility:  Rolling: Contact guard assistance  Supine to Sit: Minimum assistance  Scooting: Minimum assistance  Wheelchair Mobility:     Transfers:  Sit to Stand: Minimum assistance  Stand to Sit: Minimum assistance  Gait:     Base of Support: Center of gravity altered  Speed/Mary: Shuffled; Slow  Distance (ft): 30 Feet (ft)  Assistive Device: Walker, rolling      Body Structures Involved:  1. Bones  2. Joints  3. Muscles  4. Ligaments Body Functions Affected:  1. Neuromusculoskeletal  2. Movement Related  3. Skin Related  4. Metobolic/Endocrine Activities and Participation Affected:  1. General Tasks and Demands  2. Mobility  3. Self Care  4. Community, Social and [place]   Number of elements that affect the Plan of Care: 1-2: LOW COMPLEXITY   CLINICAL PRESENTATION:   Presentation: Evolving clinical presentation with changing clinical characteristics: MODERATE COMPLEXITY   CLINICAL DECISION MAKIN [facility] Inpatient Short Form  How much difficulty does the patient currently have. .. Unable A Lot A Little None   1. Turning over in bed (including adjusting bedclothes, sheets and blankets)? [] 1   [] 2   [x] 3   [] 4   2. Sitting down on and standing up from a chair with arms ( e.g., wheelchair, bedside commode, etc.)   [] 1   [] 2   [x] 3   [] 4   3. Moving from lying on back to sitting on the side of the bed? [] 1   [] 2   [x] 3   [] 4   How much help from another person does the patient currently need. .. Total A Lot A Little None   4. Moving to and from a bed to a chair (including a wheelchair)? [] 1   [] 2   [x] 3   [] 4   5. Need to walk in hospital room? [] 1   [] 2   [x] 3   [] 4   6. Climbing 3-5 steps with a railing? [] 1   [] 2   [x] 3   [] 4   © , Trustees of [address] [place] [address], under license to [employer]. All rights reserved      Score:  Initial: 18 Most Recent: X (Date: -- )    Interpretation of Tool:  Represents activities that are increasingly more difficult (i.e. Bed mobility, Transfers, Gait). Score 24 23 22-20 19-15 14-10 9-7 6     Modifier CH CI CJ CK CL CM CN      ?  Mobility - Walking and Moving Around:     - CURRENT STATUS: CK - 40%-59% impaired, limited or restricted    - GOAL STATUS: CJ - 20%-39% impaired, limited or restricted    - D/C STATUS:  ---------------To be determined---------------  Payor: [employer] C[employer] PLUS [place] [employer] [place]US / Product Type: Managed Care Medicare /      Medical Necessity:     · Patient demonstrates good rehab potential due to higher previous functional level. Reason for Services/Other Comments:  · Patient continues to require skilled intervention due to medical complications, patient unable to attend/participate in therapy as expected and decreased transfers, ambulation and mobility. Use of outcome tool(s) and clinical judgement create a POC that gives a: Clear prediction of patient's progress: LOW COMPLEXITY            TREATMENT:      Pre-treatment Symptoms/Complaints: \"How long do I have to stay up this time! \"  Pain: Initial:   Pain Intensity 1: 0  Post Session:  0/10        Therapeutic Activity: (    24 minutes): Therapeutic activities including Bed transfers, Chair transfers and Ambulation on level ground to improve mobility, strength, balance and coordination. Required minimal assist    to promote static and dynamic balance in standing and promote coordination of bilateral, lower extremity(s). Group Therapeutic Exercise: (   minutes):  Exercises per grid below to improve mobility, strength and balance. Required minimal verbal and manual cues to promote proper body posture and promote proper body mechanics. Progressed range and repetitions as indicated.      Date:  12/27/17 Date:  12/29/17 Date:     Activity/Exercise Parameters Group exs AM Parameters   Seated AP X 15 B X 15 B    Seated LAQ X 15 B X 15 B    Seated Marching X 15 B X 15 B    Seated hip abd  X 15 B                          Braces/Orthotics/Lines/Etc:   · travis catheter  Treatment/Session Assessment:    · Response to Treatment: Tolerated well  · Interdisciplinary Collaboration:   o Physical Therapy Assistant  o Registered Nurse  · After treatment position/precautions:   o Up in chair  o Bed alarm/tab alert on  o Bed/Chair-wheels locked  o Call light within reach  o RN notified   · Compliance with Program/Exercises: compliant with encouragement  · Recommendations/Intent for next treatment session: \"Next visit will focus on advancements to more challenging activities, reduction in assistance provided and transfers, ambulation and mobility. \".   Total Treatment Duration:  PT Patient Time In/Time Out  Time In: 1622  Time Out: 1646    Melinda Evans PTA

## 2018-01-01 NOTE — PROGRESS NOTES
Problem: Dysphagia (Adult)  Goal: *Acute Goals and Plan of Care (Insert Text)  STG:  Pt will consume a regular diet without signs/sx aspiration 100%. LTG:  Pt will tolerate least restrictive diet without signs/sx aspiration 100% for safe swallow function. Speech language pathology: bedside swallow note: Initial Assessment    NAME/AGE/GENDER: Lukas Hayward is a 80 y.o. female  DATE: 1/1/2018  PRIMARY DIAGNOSIS: Sepsis ([facility] [address]  Sepsis ([facility] [address]       ICD-10: Treatment Diagnosis: dysphagia, pharyngeal 13.13  INTERDISCIPLINARY COLLABORATION: Physician  PRECAUTIONS/ALLERGIES: Bee pollen and Fire ant  ASSESSMENT:Based on the objective data described below, Ms. Kj Mcdermott presents with dysphagia. Pt reported at times food comes back up into her pharynx. She reported it doesn't happen all the time. Pt with baseline coughing. Pt given trials thin liquids, mixed consistency and solids. Mastication WFL. Burping noted x2 after consuming liquids. She reported liquids did feel like they came back up once. Noted she does take Zantac for GERD. Delayed coughing observed x1 at the end of the assessment. It was not directly related to any consistency though thin liquids were presented last.  Will follow 1-2 times for diet tolerance. Patient will benefit from skilled intervention to address the below impairments. ?????? ? ? This section established at most recent assessment??????????  PROBLEM LIST (Impairments causing functional limitations):  1. Dysphagia   REHABILITATION POTENTIAL FOR STATED GOALS: Good  PLAN OF CARE:   Patient will benefit from skilled intervention to address the following impairments.   RECOMMENDATIONS AND PLANNED INTERVENTIONS (Benefits and precautions of therapy have been discussed with the patient.):  · continue prescribed diet  MEDICATIONS:  · With liquid  COMPENSATORY STRATEGIES/MODIFICATIONS INCLUDING:  · None  OTHER RECOMMENDATIONS (including follow up treatment recommendations):   · po trials  RECOMMENDED DIET MODIFICATIONS DISCUSSED WITH:  · Hospitalist  · Patient  FREQUENCY/DURATION: Continue to follow patient 3 times a week for duration of hospital stay to address above goals. RECOMMENDED REHABILITATION/EQUIPMENT: (at time of discharge pending progress): Due to the probability of continued deficits (see above) this patient will not likely need continued skilled speech therapy after discharge. SUBJECTIVE:   Pt cooperative. History of Present Injury/Illness: Ms. Ileana Castanon  has a past medical history of Acute kidney failure, unspecified; Arthritis; CAD (coronary artery disease); Cellulitis and abscess of other specified site; Coronary atherosclerosis of native coronary artery; Essential hypertension, benign (3/17/2015); Hypertension; Hypopotassemia; Mixed hyperlipidemia; Osteoarthritis (7/20/2017); Other and unspecified hyperlipidemia; Reflux esophagitis; Renal failure, unspecified; Shortness of breath; and Unspecified essential hypertension. .   She also  has a past surgical history that includes hx cholecystectomy; pr layr clos wnd face,facial <2.5cm; pr cardiac surg procedure unlist; hx cataract removal (Bilateral); and hx hysterectomy. Present Symptoms: foods coming back up per pt report    Pain Intensity 1: 2  Pain Location 1: Foot (both)  Pain Orientation 1: Left, Right  Pain Intervention(s) 1: Medication (see MAR)  Current Medications:   No current facility-administered medications on file prior to encounter. Current Outpatient Prescriptions on File Prior to Encounter   Medication Sig Dispense Refill    clotrimazole (LOTRIMIN AF, CLOTRIMAZOLE,) 1 % topical cream Apply  to affected area two (2) times a day. 60 g 3    lisinopril (PRINIVIL, ZESTRIL) 30 mg tablet Take 1 Tab by mouth daily. 30 Tab 4    torsemide (DEMADEX) 20 mg tablet Take 1 Tab by mouth daily. 30 Tab 2    allopurinol (ZYLOPRIM) 100 mg tablet Take 1 Tab by mouth two (2) times a day.  60 Tab 3    pravastatin (PRAVACHOL) 20 mg tablet Take 1 Tab by mouth nightly. 30 Tab 4    raNITIdine (ZANTAC) 150 mg tablet Take 1 Tab by mouth two (2) times a day. 60 Tab 4    clopidogrel (PLAVIX) 75 mg tab Take 1 Tab by mouth daily. 30 Tab 4    potassium chloride (KLOR-CON M10) 10 mEq tablet Take 1 Tab by mouth daily. 30 Tab 4    colchicine (MITIGARE) 0.6 mg capsule Take 1 Cap by mouth daily. Indications: prevention of acute gout attack 30 Cap 3    conjugated estrogens (PREMARIN) 0.625 mg/gram vaginal cream Insert 0.5 g into vagina daily. 45 g 2    loratadine (CLARITIN) 10 mg tablet Take 1 Tab by mouth daily. 30 Tab 4    aspirin 81 mg CpDR Take  by mouth daily.  HYDROcodone-acetaminophen (NORCO) 7.5-325 mg per tablet Take 1 Tab by mouth every eight (8) hours as needed for Pain. Max Daily Amount: 3 Tabs.  90 Tab 0     Current Dietary Status:  Regular       History of reflux:  YES    Reflux medication: Zantac  Social History/Home Situation: residing with her son   Home Environment: Apartment  # Steps to Enter: 0  One/Two Story Residence: One story  Living Alone: No  Support Systems: Child(jayleen), Friends \ neighbors, Family member(s), [employer] / jeffery community  Patient Expects to be Discharged to[de-identified] Private residence  Current DME Used/Available at Home: Cane, straight, Shower chair  Tub or Shower Type: Tub/Shower combination  OBJECTIVE:   Respiratory Status:        CXR Results: Small bilateral pleural effusions  MRI/CT Results: not ordered  Oral Motor Structure/Speech:  Oral-Motor Structure/Motor Speech  Labial: No impairment  Dentition: Upper & lower dentures  Oral Hygiene: adequate  Lingual: No impairment    Cognitive and Communication Status:  Neurologic State: Alert  Orientation Level: Disoriented to situation;Oriented to person;Oriented to place;Oriented to time                BEDSIDE SWALLOW EVALUATION  Oral Assessment:  Oral Assessment  Labial: No impairment  Dentition: Upper & lower dentures  Oral Hygiene: adequate  Lingual: No impairment  P.O. Trials:  Patient Position: upright in bed    The patient was given teaspoon to straw amounts of the following:   Consistency Presented: Mixed consistency; Solid; Thin liquid  How Presented: Self-fed/presented;Cup/sip;Spoon;Straw;Successive swallows    ORAL PHASE:  Bolus Acceptance: No impairment  Bolus Formation/Control: No impairment  Propulsion: No impairment     Oral Residue: None    PHARYNGEAL PHASE:  Initiation of Swallow: No impairment  Laryngeal Elevation: Functional  Aspiration Signs/Symptoms: Delayed cough/throat clear  Vocal Quality: No impairment                OTHER OBSERVATIONS:  Rate/bite size: WNL   Endurance: WNL   Comments: Tool Used: Dysphagia Outcome and Severity Scale (ROBERT)    Score Comments   Normal Diet  [] 7 With no strategies or extra time needed   Functional Swallow  [] 6 May have mild oral or pharyngeal delay       Mild Dysphagia    [] 5 Which may require one diet consistency restricted (those who demonstrate penetration which is entirely cleared on MBS would be included)   Mild-Moderate Dysphagia  [] 4 With 1-2 diet consistencies restricted       Moderate Dysphagia  [] 3 With 2 or more diet consistencies restricted       Moderately Severe Dysphagia  [] 2 With partial PO strategies (trials with ST only)       Severe Dysphagia  [] 1 With inability to tolerate any PO safely          Score:  Initial: 6 Most Recent: X (Date: -- )   Interpretation of Tool: The Dysphagia Outcome and Severity Scale (ROBERT) is a simple, easy-to-use, 7-point scale developed to systematically rate the functional severity of dysphagia based on objective assessment and make recommendations for diet level, independence level, and type of nutrition. Score 7 6 5 4 3 2 1   Modifier CH CI CJ CK CL CM CN   ?  Swallowing:     - CURRENT STATUS: CI - 1%-19% impaired, limited or restricted    - GOAL STATUS:  CH - 0% impaired, limited or restricted    - D/C STATUS:  ---------------To be determined---------------  Payor: [employer] C[employer] PLUS / [place] [employer] [place]US / Product Type: Managed Care Medicare /     TREATMENT:    (In addition to Assessment/Re-Assessment sessions the following treatments were rendered)  Assessment/Reassessment only, no treatment provided today    __________________________________________________________________________________________________  Safety:   After treatment position/precautions:  · Upright in Bed  Treatment Assessment:   . Progression/Medical Necessity:   · Skilled intervention continues to be required due to medical complications. Compliance with Program/Exercises: Will assess as treatment progresses. Reason for Continuation of Services/Other Comments:  · Patient continues to require skilled intervention due to dysphagia. Recommendations/Intent for next treatment session: \"Treatment next visit will focus on po trials\".      Total Treatment Duration:  Time In: 1788  Time Out: [address], MSP, CCC-SLP

## 2018-01-01 NOTE — PROGRESS NOTES
Problem: Self Care Deficits Care Plan (Adult)  Goal: *Acute Goals and Plan of Care (Insert Text)  1. Patient will complete lower body bathing and dressing with minimal assistance and adaptive equipment as needed. 2. Patient will complete toileting with supervision. 3. Patient will tolerate 25 minutes of OT treatment with 2-3 rest breaks to increase activity tolerance for ADLs. 4. Patient will complete functional transfers with supervision and adaptive equipment as needed. 5. Patient will complete functional mobility with supervision for household distances and with minimal cues for safety. Timeframe: 7 visits       OCCUPATIONAL THERAPY: Daily Note, Treatment Day: 3rd and AM    1/1/2018  INPATIENT: Hospital Day: 11  Payor: [employer] PLUS / [place] [employer] [place] / Product Type: Managed Care Medicare /      NAME/AGE/GENDER: Morro Salcedo is a 80 y.o. female   PRIMARY DIAGNOSIS:  Sepsis ([address]  Sepsis ([address] Sepsis ([address] Sepsis ([address]        ICD-10: Treatment Diagnosis:    · Generalized Muscle Weakness (M62.81)  · Other lack of cordination (R27.8)   Precautions/Allergies:     Bee pollen and Fire ant      ASSESSMENT:     Ms. Charlee Haines presents to the hospital with sepsis. 1/1/2018 Pt was presented in supine upon arrival. Pt transferred to sitting with CGA and stood and completed FM in her room with CGA and a rolling walker. Pt was transferred down to the gym in her recliner chair to participate in group exercises to increase strength for mobility and ADL's. Pt required visual, verbal, and manual cues to complete exercises with proper form. Pt participated with good effort and was left up in the chair post treatment working with the PTA. Continue OT POC. This section established at most recent assessment   PROBLEM LIST (Impairments causing functional limitations):  1. Decreased Strength  2. Decreased ADL/Functional Activities  3. Decreased Transfer Abilities  4.  Decreased Ambulation Ability/Technique  5. Decreased Balance  6. Decreased Activity Tolerance  7. Decreased Flexibility/Joint Mobility  8. Edema/Girth  9. Decreased Marathon with Home Exercise Program  10. Decreased Cognition   INTERVENTIONS PLANNED: (Benefits and precautions of occupational therapy have been discussed with the patient.)  1. Activities of daily living training  2. Adaptive equipment training  3. Balance training  4. Clothing management  5. Cognitive training  6. Donning&doffing training  7. Group therapy  8. Neuromuscular re-eduation  9. Therapeutic activity  10. Therapeutic exercise     TREATMENT PLAN: Frequency/Duration: Follow patient 3 times per week to address above goals. Rehabilitation Potential For Stated Goals: Excellent     RECOMMENDED REHABILITATION/EQUIPMENT: (at time of discharge pending progress): Due to the probability of continued deficits (see above) this patient will likely need continued skilled occupational therapy after discharge. Equipment:    TBD              OCCUPATIONAL PROFILE AND HISTORY:   History of Present Injury/Illness (Reason for Referral):  See H&P  Past Medical History/Comorbidities:   Ms. Jacob Hall  has a past medical history of Acute kidney failure, unspecified; Arthritis; CAD (coronary artery disease); Cellulitis and abscess of other specified site; Coronary atherosclerosis of native coronary artery; Essential hypertension, benign (3/17/2015); Hypertension; Hypopotassemia; Mixed hyperlipidemia; Osteoarthritis (7/20/2017); Other and unspecified hyperlipidemia; Reflux esophagitis; Renal failure, unspecified; Shortness of breath; and Unspecified essential hypertension. Ms. Jacob Hall  has a past surgical history that includes hx cholecystectomy; pr layr clos wnd face,facial <2.5cm; pr cardiac surg procedure unlist; hx cataract removal (Bilateral); and hx hysterectomy.   Social History/Living Environment:   Home Environment: Apartment  # Steps to Enter: 0  One/Two Story Residence: One story  Living Alone: No  Support Systems: Child(jayleen), Friends \ neighbors, Family member(s), [employer] / jeffery community  Patient Expects to be Discharged to[de-identified] Private residence  Current DME Used/Available at Home: [address], [address], straight, Shower chair  Tub or Shower Type: Tub/Shower combination  Prior Level of Function/Work/Activity:  Pt lives with her son. Pt's son is at home with her at all times. Pt has a walker but completes functional mobility with a cane primarily. Reports no recent falls. Pt completes ADL with occasional assistance from her daughter. Pt still participated in laundry and cooking tasks. Personal Factors:          Past/Current Experience:  Hx of knee pain with difficulty with prolonged standing        Other factors that influence how disability is experienced by the patient:  Multiple co-morbidities (see above)   Number of Personal Factors/Comorbidities that affect the Plan of Care: Expanded review of therapy/medical records (1-2):  MODERATE COMPLEXITY   ASSESSMENT OF OCCUPATIONAL PERFORMANCE[de-identified]   Activities of Daily Living:             Basic ADLs (From Assessment) Complex ADLs (From Assessment)   Basic ADL  Feeding: Stand-by assistance  Oral Facial Hygiene/Grooming: Minimum assistance  Bathing: Moderate assistance  Upper Body Dressing: Moderate assistance  Lower Body Dressing: Moderate assistance  Toileting: Moderate assistance Instrumental ADL  Meal Preparation: Moderate assistance  Homemaking: Maximum assistance   Grooming/Bathing/Dressing Activities of Daily Living                             Bed/Mat Mobility  Supine to Sit: Contact guard assistance; Additional time  Sit to Stand: Contact guard assistance  Scooting: Additional time       Most Recent Physical Functioning:   Gross Assessment:                  Posture:  Posture (WDL): Exceptions to WDL  Posture Assessment:  Forward head, Rounded shoulders  Balance:  Sitting: Intact  Sitting - Static: Good (unsupported)  Sitting - Dynamic: Good (unsupported)  Standing: Impaired  Standing - Static: Fair  Standing - Dynamic : Fair Bed Mobility:  Supine to Sit: Contact guard assistance; Additional time  Scooting: Additional time  Wheelchair Mobility:     Transfers:  Sit to Stand: Contact guard assistance  Stand to Sit: Contact guard assistance              Patient Vitals for the past 6 hrs:   BP BP Patient Position SpO2 Pulse   18 0754 122/76 At rest 95 % 72       Mental Status  Neurologic State: Alert  Orientation Level: Disoriented to situation, Oriented to person, Oriented to place, Oriented to time  Cognition: Follows commands  Perception: Appears intact  Perseveration: No perseveration noted  Safety/Judgement: Awareness of environment, Fall prevention                          Physical Skills Involved:  1. Balance  2. Strength  3. Activity Tolerance Cognitive Skills Affected (resulting in the inability to perform in a timely and safe manner):  1. Executive Function  2. Sustained Attention Psychosocial Skills Affected:  1. Habits/Routines  2. Environmental Adaptation  3. Self-Awareness   Number of elements that affect the Plan of Care: 5+:  HIGH COMPLEXITY   CLINICAL DECISION MAKIN[address] [place] [id number] AM-PAC 6 Clicks   Daily Activity Inpatient Short Form  How much help from another person does the patient currently need. .. Total A Lot A Little None   1. Putting on and taking off regular lower body clothing? [] 1   [x] 2   [] 3   [] 4   2. Bathing (including washing, rinsing, drying)? [] 1   [x] 2   [] 3   [] 4   3. Toileting, which includes using toilet, bedpan or urinal?   [] 1   [x] 2   [] 3   [] 4   4. Putting on and taking off regular upper body clothing? [] 1   [x] 2   [] 3   [] 4   5. Taking care of personal grooming such as brushing teeth? [] 1   [] 2   [x] 3   [] 4   6. Eating meals? [] 1   [] 2   [x] 3   [] 4   © , Trustees of [address] [place] [id number], under license to [employer].  All rights reserved      Score:  Initial: 14 Most Recent: X (Date: -- )    Interpretation of Tool:  Represents activities that are increasingly more difficult (i.e. Bed mobility, Transfers, Gait). Score 24 23 22-20 19-15 14-10 9-7 6     Modifier CH CI CJ CK CL CM CN      ? Self Care:     - CURRENT STATUS: CL - 60%-79% impaired, limited or restricted    - GOAL STATUS: CK - 40%-59% impaired, limited or restricted    - D/C STATUS:  ---------------To be determined---------------  Payor: [employer] VIP CARE PLUS / [place] [employer] VIP [place]US / Product Type: Managed Care Medicare /      Medical Necessity:     · Patient demonstrates excellent rehab potential due to higher previous functional level. Reason for Services/Other Comments:  · Patient continues to require skilled intervention due to decreased independence with ADL/functional transfers. Use of outcome tool(s) and clinical judgement create a POC that gives a: LOW COMPLEXITY         TREATMENT:   (In addition to Assessment/Re-Assessment sessions the following treatments were rendered)     Pre-treatment Symptoms/Complaints:    Pain: Initial:   Pain Intensity 1: 0  Pain Location 1: Knee  Pain Intervention(s) 1:  (RN administering pain meds upon arrival)  Post Session:  Pt stated that her knee pain was better after walking. Therapeutic Activity: (10 minutes): Therapeutic activities including Bed transfers, Chair transfers and static/dynamic standing  to improve mobility, strength and balance. Required CGA to promote static and dynamic balance in standing. Group Therapeutic Exercise: (10 minutes):  Exercises per grid below to improve mobility and strength. Required maximal visual, verbal and manual cues to promote proper body mechanics. Progressed repetitions as indicated.    Date:  12/29/17 Date:  1/1/18 Date:     Activity/Exercise Parameters Parameters Parameters   Shoulder flexion/extension 15 reps with a yellow theraband 15 reps with a dowel    Shoulder horizontal add/abb 15 reps with a yellow theraband 15 reps with a yellow theraband    Punches 15 reps with a yellow theraband 15 reps with a dowel    Elbow flexion/extension 15 reps with a yellow theraband 15 reps with a dowel    Tricep extension      Balloon tapping      Ball toss                        Braces/Orthotics/Lines/Etc:   · IV  Treatment/Session Assessment:    · Response to Treatment:  Good  participation. · Interdisciplinary Collaboration:   o Certified Occupational Therapy Assistant  o Registered Nurse  · After treatment position/precautions:   o Up in chair  o Bed alarm/tab alert on  o Bed/Chair-wheels locked  o Call light within reach  o RN notified  o Family at bedside   · Compliance with Program/Exercises: Will assess as treatment progresses. · Recommendations/Intent for next treatment session: \"Next visit will focus on advancements to more challenging activities and reduction in assistance provided\".   Total Treatment Duration:  OT Patient Time In/Time Out  Time In: 4428 (4379)  Time Out: 1401 [phone]) [facility]

## 2018-01-01 NOTE — PROGRESS NOTES
[facility] CARDIOLOGY PROGRESS NOTE           1/1/2018 9:10 AM    Admit Date: 12/22/2017      Subjective:   Patient notes cough improved. No chest pain. Had additional 875 cc out overnight. Total 5.8 liters out since admission. Renal function stable. ROS:  Cardiovascular:  As noted above    Objective:      Vitals:    12/31/17 2248 01/01/18 0511 01/01/18 0754 01/01/18 0938   BP: 126/73 133/78 122/76    Pulse: 70 70 72    Resp: 20 20 18    Temp: 98.1 °F (36.7 °C) 97.9 °F (36.6 °C) 97.2 °F (36.2 °C)    SpO2: 95% 96% 95%    Weight:    67.8 kg (149 lb 8 oz)   Height:           Physical Exam:  General-No Acute Distress  Neck- supple, no JVD  CV- regular rate and rhythm no MRG  Lung- clear bilaterally  Abd- soft, nontender, nondistended  Ext- no edema bilaterally. Skin- warm and dry      Data Review:   Recent Labs      01/01/18   0436  12/31/17   0624   NA  145  145   K  3.3*  3.6   MG  2.0  1.9   BUN  32*  32*   CREA  0.98  0.99   GLU  89  93   WBC   --   6.3   HGB   --   11.1*   HCT   --   33.5*   PLT   --   192       Assessment/Plan:   Sepsis ([facility] [address] (12/23/2017)- GPC bacteremia, cont Rocephin.       Essential hypertension, benign (3/17/2015)- Controlled on coreg.        Chronic diastolic CHF, chronic- EF 55% w mod MR, RVSP 60. Changed to home demadex dose in AM.       CKD (chronic kidney disease) stage 3, GFR 30-59 ml/min - Improved. No ACE-I.      Macrocytic anemia (12/23/2017)- Monitor, hgb stable, no signs of acute bleeding.       Elevated troponin- [facility] Dr Elinor Carmichael 2014 w mild-mod CAD and patent stent in LAD. Probably demand ischemia, cont ASA, plavix, statin, Coreg.      Gram-positive bacteremia (12/25/2017)- Rocephin through 12-18, ID empirically treated for endocarditis. Cardiac condition stable. Will sign off. Please call with any questions or clinical changes. Appreciate consultation. Please arrange follow up with Dr. Deondre Garcia in 2 weeks after discharge.         Abiel Andrews MD [place]  1/1/2018 9:10 AM

## 2018-01-02 LAB
GLUCOSE BLD STRIP.AUTO-MCNC: 100 MG/DL (ref 65–100)
GLUCOSE BLD STRIP.AUTO-MCNC: 124 MG/DL (ref 65–100)
GLUCOSE BLD STRIP.AUTO-MCNC: 149 MG/DL (ref 65–100)
GLUCOSE BLD STRIP.AUTO-MCNC: 90 MG/DL (ref 65–100)
MAGNESIUM SERPL-MCNC: 1.9 MG/DL (ref 1.8–2.4)

## 2018-01-02 PROCEDURE — 74011250637 HC RX REV CODE- 250/637: Performed by: INTERNAL MEDICINE

## 2018-01-02 PROCEDURE — 65660000000 HC RM CCU STEPDOWN

## 2018-01-02 PROCEDURE — 74011250636 HC RX REV CODE- 250/636: Performed by: INTERNAL MEDICINE

## 2018-01-02 PROCEDURE — 94760 N-INVAS EAR/PLS OXIMETRY 1: CPT

## 2018-01-02 PROCEDURE — 97530 THERAPEUTIC ACTIVITIES: CPT

## 2018-01-02 PROCEDURE — 94640 AIRWAY INHALATION TREATMENT: CPT

## 2018-01-02 PROCEDURE — 92526 ORAL FUNCTION THERAPY: CPT

## 2018-01-02 PROCEDURE — 82962 GLUCOSE BLOOD TEST: CPT

## 2018-01-02 PROCEDURE — 74011000250 HC RX REV CODE- 250: Performed by: INTERNAL MEDICINE

## 2018-01-02 PROCEDURE — 36592 COLLECT BLOOD FROM PICC: CPT

## 2018-01-02 PROCEDURE — 83735 ASSAY OF MAGNESIUM: CPT | Performed by: INTERNAL MEDICINE

## 2018-01-02 RX ORDER — CARVEDILOL 3.12 MG/1
3.12 TABLET ORAL 2 TIMES DAILY WITH MEALS
Status: DISCONTINUED | OUTPATIENT
Start: 2018-01-02 | End: 2018-01-05 | Stop reason: HOSPADM

## 2018-01-02 RX ORDER — FAMOTIDINE 20 MG/1
20 TABLET, FILM COATED ORAL
Status: DISCONTINUED | OUTPATIENT
Start: 2018-01-02 | End: 2018-01-05 | Stop reason: HOSPADM

## 2018-01-02 RX ORDER — IPRATROPIUM BROMIDE AND ALBUTEROL SULFATE 2.5; .5 MG/3ML; MG/3ML
3 SOLUTION RESPIRATORY (INHALATION)
Status: DISCONTINUED | OUTPATIENT
Start: 2018-01-02 | End: 2018-01-05 | Stop reason: HOSPADM

## 2018-01-02 RX ADMIN — RDII 250 MG CAPSULE 250 MG: at 08:57

## 2018-01-02 RX ADMIN — CARVEDILOL 12.5 MG: 12.5 TABLET, FILM COATED ORAL at 08:57

## 2018-01-02 RX ADMIN — POTASSIUM CHLORIDE 20 MEQ: 20 TABLET, EXTENDED RELEASE ORAL at 08:57

## 2018-01-02 RX ADMIN — PRAVASTATIN SODIUM 20 MG: 20 TABLET ORAL at 22:02

## 2018-01-02 RX ADMIN — GABAPENTIN 100 MG: 100 CAPSULE ORAL at 22:03

## 2018-01-02 RX ADMIN — IPRATROPIUM BROMIDE AND ALBUTEROL SULFATE 3 ML: .5; 3 SOLUTION RESPIRATORY (INHALATION) at 14:57

## 2018-01-02 RX ADMIN — Medication 10 ML: at 05:20

## 2018-01-02 RX ADMIN — ACETAMINOPHEN 650 MG: 325 TABLET ORAL at 05:16

## 2018-01-02 RX ADMIN — ASPIRIN 81 MG: 81 TABLET, COATED ORAL at 08:57

## 2018-01-02 RX ADMIN — TORSEMIDE 20 MG: 20 TABLET ORAL at 08:57

## 2018-01-02 RX ADMIN — WATER 2 G: 1 INJECTION INTRAMUSCULAR; INTRAVENOUS; SUBCUTANEOUS at 09:08

## 2018-01-02 RX ADMIN — Medication 300 UNITS: at 22:04

## 2018-01-02 RX ADMIN — ALLOPURINOL 100 MG: 100 TABLET ORAL at 08:57

## 2018-01-02 RX ADMIN — ACETAMINOPHEN 650 MG: 325 TABLET ORAL at 22:02

## 2018-01-02 RX ADMIN — IPRATROPIUM BROMIDE AND ALBUTEROL SULFATE 3 ML: .5; 3 SOLUTION RESPIRATORY (INHALATION) at 08:13

## 2018-01-02 RX ADMIN — ONDANSETRON 4 MG: 2 INJECTION INTRAMUSCULAR; INTRAVENOUS at 11:50

## 2018-01-02 RX ADMIN — FAMOTIDINE 20 MG: 20 TABLET, FILM COATED ORAL at 08:57

## 2018-01-02 RX ADMIN — ALLOPURINOL 100 MG: 100 TABLET ORAL at 17:24

## 2018-01-02 RX ADMIN — ACETAMINOPHEN 650 MG: 325 TABLET ORAL at 00:04

## 2018-01-02 RX ADMIN — COLCHICINE 0.6 MG: 0.6 CAPSULE ORAL at 09:06

## 2018-01-02 RX ADMIN — FAMOTIDINE 20 MG: 20 TABLET, FILM COATED ORAL at 22:03

## 2018-01-02 RX ADMIN — HEPARIN SODIUM 5000 UNITS: 5000 INJECTION, SOLUTION INTRAVENOUS; SUBCUTANEOUS at 00:04

## 2018-01-02 RX ADMIN — Medication 10 ML: at 15:10

## 2018-01-02 RX ADMIN — HEPARIN SODIUM 5000 UNITS: 5000 INJECTION, SOLUTION INTRAVENOUS; SUBCUTANEOUS at 08:56

## 2018-01-02 RX ADMIN — CYANOCOBALAMIN TAB 1000 MCG 1000 MCG: 1000 TAB at 08:56

## 2018-01-02 RX ADMIN — SODIUM CHLORIDE 250 ML: 900 INJECTION, SOLUTION INTRAVENOUS at 11:47

## 2018-01-02 RX ADMIN — Medication 300 UNITS: at 05:16

## 2018-01-02 RX ADMIN — Medication 10 ML: at 22:07

## 2018-01-02 RX ADMIN — Medication 300 UNITS: at 15:09

## 2018-01-02 RX ADMIN — HEPARIN SODIUM 5000 UNITS: 5000 INJECTION, SOLUTION INTRAVENOUS; SUBCUTANEOUS at 15:11

## 2018-01-02 RX ADMIN — CLOPIDOGREL BISULFATE 75 MG: 75 TABLET ORAL at 08:57

## 2018-01-02 RX ADMIN — RDII 250 MG CAPSULE 250 MG: at 17:28

## 2018-01-02 NOTE — PROGRESS NOTES
Respiratory Care Services     Policy Number: -[id number]    Title: Aerosolized Medication Protocol    Effective Date: 10/1998    Revised Date: 06/2013, 03/2016    Reviewed Date: 05/2014/ 03/2015 , 06/2017       I. Policy: The Aerosolized Medication Protocol shall by implemented by Respiratory Care              Practitioners (RCP) for patients with orders to receive aerosol therapy with medication. II. Purpose: To open and maintain obstructed airways, the RCP, will utilize the following   protocol to select the indicated aerosolized medication(s) and determine the most effective method of delivery to the patient. III. Patient Type: All patients who are determined to meet aerosolized medication criteria as          outlined in this protocol. IV. Responsibility: Director, [address], registered Respiratory Care                                                     Practitioners (RCPs) with documented competency in the performance of                                     respiratory therapeutic techniques. V. Equipment needed:  A. Stethoscope  B. Pulse oximeter  C. IPPB machine and circuit  D. Aerosol nebulizer  E. MDI Inhaler     VI. Protocol:   A. The following conditions are accepted indications for aerosolized medication therapy. 1. Bronchospasm/wheezing  2. Impaired mucociliary clearance  3. Tracheobronchial mucosal congestion/and laryngeal stridor  4. Diseases which commonly require aerosolized medication therapy include, but are not limited to:  a. Asthma/reactive airway disease  b. Bronchitis/emphysema (COPD)  c. Cystic fibrosis  d. Severe laryngitis/tracheitis  e. Bronchiectasis  f. Smoke inhalation or chemical trauma to the lung or upper airway  g. Physical trauma to the upper airway  h. Laryngotracheobronchitis  i. Bronchiolitis  j. Non-specific wheezing              B. Indications for bronchodilator medications will include:  a.  Bronchospasm/ wheezing  b. Asthma/reactive airway disease  c. Chronic obstructive pulmonary disease  d. Obstructive defect on pulmonary function testing  C. Administration of medications  1. If a bronchodilator or any other type of respiratory medication is needed, a physician order must be indicated in the medication section in the patients EMR. 2. When the physician specifies the medication and dosage at the time of request, the ordered medication will be used as part of the care plan. D. The following guidelines will be utilized in the evaluation and selection of the         appropriate delivery device for indicated medication(s):  1. IPPB  a. Ventilation is inadequate (rapid shallow breathing)  b. Refractory atelectasis has developed, other forms of therapy are unsuccessful  c. Inability to clear secretions  d. Need to improve lung expansion  2. Unassisted aerosol (UA) is the preferred method of aerosol delivery and indicated if  a. Ventilation is inadequate  b. Patient demonstrates wheezing   c. patient is unable to perform MDI effectively   d. Patient preference  3. Metered Dose Inhaler (MDI)   a. Patient is alert/cooperative  b. Medication(s) available in this delivery method. c. Able to perform 3 second breath hold. d. Patient has demonstrated ability to use MDI effectively  e. Patient has used MDI therapy previously, either at home or in the hospital.  f. Note: The only approved inhalers on formulary are albuterol and Spiriva. VII. Guidelines:   Monitor patients vital signs and evaluate patients clinical status. The need to change medication and/or modality may be indicated by:  1. A pulse greater than 120 bpm, or if a pulse increase of 20 bpm occurs with bronchodilator medications. 2. Significant worsening of dyspnea or wheezing occurring during or within 30 minutes of discontinuing therapy.   3. Worsening of patients sensorium (e.g. patient becomes confused or obtunded, and unable to follow directions). 4. Worsening of patients chest x-ray. 5. Change in sputum (e.g. increased pulmonary infiltrate, which might indicate need for volume expansion therapy). 6. Patient has difficulty coughing up secretions, which might indicate need for acetylcysteine and/or bronchial hygiene therapy. 7. Call physician immediately if dyspnea worsens and is not responsive to modifications allowed by protocol. VIII. Clinical Responsibility:  A. The therapy assessment guidelines will be used to evaluate all patients receiving aerosolized medications with the exception of critical care areas. 1. RCPs will perform changes in therapy per protocol. 2. It will be the responsibility of RCP to provide instruction regarding respiratory medications, aerosol therapy and proper MDI technique, as well as, spacer usage to patients ordered MDI therapy. 3. Current therapy that is part of a patients home regimen will not be discontinued. IX. Documentation  A. Document assessment findings in the respiratory assessment section of the patients EMR. B. Document changes in therapy per protocol in the respiratory orders section and in the care plan section of the patients EMR. C. Document patient education in the patient education section of the patients EMR. X. Outcome Criteria:  A. Relief of wheezes and obstruction  B. Improved cough and sputum color and consistency  C. Improved chest x-ray  D. Improved arterial oxygen tension and or SaO2  E. Improved Peak Flow on asthmatic patients        XI. Related Protocols:  A. Respiratory Patient Care Protocols  B. Bronchial Hygiene Therapy  C.  Oxygen Protocol    Reference:

## 2018-01-02 NOTE — PROGRESS NOTES
Problem: Mobility Impaired (Adult and Pediatric)  Goal: *Therapy Goal (Edit Goal, Insert Text)  STG:  (1.)Ms. Jamie Nova will move from supine to sit and sit to supine , scoot up and down and roll side to side with STAND BY ASSIST within 4 day(s). (2.)Ms. Jamie Nova will transfer from bed to chair and chair to bed with STAND BY ASSIST using the least restrictive device within 4 day(s). (3.)Ms. Jamie Nova will ambulate with STAND BY ASSIST for 100 feet with the least restrictive device within 4 day(s). LTG:  (1.)Ms. Jamie Nova will move from supine to sit and sit to supine , scoot up and down and roll side to side in bed with MODIFIED INDEPENDENT within 7 day(s). (2.)Ms. Jamie Nova will transfer from bed to chair and chair to bed with MODIFIED INDEPENDENCE using the least restrictive device within 7 day(s). (3.)Ms. Jamie Nova will ambulate with MODIFIED INDEPENDENCE for 250 feet with the least restrictive device within 7 day(s). ________________________________________________________________________________________________       PHYSICAL THERAPY: Daily Note, Treatment Day: 4th, PM 1/2/2018  INPATIENT: Hospital Day: 12  Payor: [employer] VIP CARE PLUS / [place] [employer] VIP CARE [place]US / Product Type: Managed Care Medicare /      NAME/AGE/GENDER: Austin Velásquez is a 80 y.o. female   PRIMARY DIAGNOSIS: Sepsis ([address]  Sepsis ([address] Sepsis ([address] Sepsis ([address]        ICD-10: Treatment Diagnosis:   · Difficulty in walking, Not elsewhere classified (R26.2)   Precaution/Allergies:  Bee pollen and Fire ant      ASSESSMENT:     Ms. Jamie Nova is supine on arrival, agreeable to PT. Per RN, pt had been sitting up most of the morning. Pt was able to perform supine to sit with min/CGA. Pt stood and was able to increase her ambulation to 6' with rolling walker and min/CGA. Pt required slightly more assist with turning. Pt returned to room and wanted to sit back up in the chair.   Pt was left up with her daughter attending and needs in reach, alarm on. Pt is making progress with her ambulation this afternoon. Will continue with POC. This section established at most recent assessment   PROBLEM LIST (Impairments causing functional limitations):  1. Decreased Strength  2. Decreased ADL/Functional Activities  3. Decreased Transfer Abilities  4. Decreased Ambulation Ability/Technique  5. Decreased Balance  6. Decreased Activity Tolerance  7. Decreased Pacing Skills  8. Decreased Flexibility/Joint Mobility  9. Decreased [place] with Home Exercise Program   INTERVENTIONS PLANNED: (Benefits and precautions of physical therapy have been discussed with the patient.)  1. Balance Exercise  2. Bed Mobility  3. Family Education  4. Gait Training  5. Home Exercise Program (HEP)  6. Range of Motion (ROM)  7. Therapeutic Activites  8. Therapeutic Exercise/Strengthening  9. Transfer Training     TREATMENT PLAN: Frequency/Duration: 3-5 times a week for duration of hospital stay  Rehabilitation Potential For Stated Goals: Good      RECOMMENDED REHABILITATION/EQUIPMENT: (at time of discharge pending progress): Due to the probability of continued deficits (see above) this patient will likely need continued skilled physical therapy after discharge. HISTORY:   History of Present Injury/Illness (Reason for Referral):  Patient is a 81 y/o F who presented with fever, chills/shaking, SOB, and a NBNB nausea/vomiting episode x1 that occurred a few hours ago. She is a very poor historian, limits history. No formal diagnosis of dementia. Says that she was brought here after she started shaking earlier this evening, and then had vomiting. Said she had a fever but cannot tell me the temperature. Denies any other new symptoms. Denies CP, palpitations, cough, abd pain, urinary symptoms, diarrhea, new rashes/wounds.       10 systems reviewed and negative except as noted in HPI.   Past Medical History/Comorbidities:   Ms. Thu Heath  has a past medical history of Acute kidney failure, unspecified; Arthritis; CAD (coronary artery disease); Cellulitis and abscess of other specified site; Coronary atherosclerosis of native coronary artery; Essential hypertension, benign (3/17/2015); Hypertension; Hypopotassemia; Mixed hyperlipidemia; Osteoarthritis (7/20/2017); Other and unspecified hyperlipidemia; Reflux esophagitis; Renal failure, unspecified; Shortness of breath; and Unspecified essential hypertension. Ms. Saundra Stratton  has a past surgical history that includes hx cholecystectomy; pr layr clos wnd face,facial <2.5cm; pr cardiac surg procedure unlist; hx cataract removal (Bilateral); and hx hysterectomy. Social History/Living Environment:   Home Environment: Apartment  # Steps to Enter: 0  One/Two Story Residence: One story  Living Alone: No  Support Systems: Child(jayleen), Friends \ neighbors, Family member(s), [employer]  Patient Expects to be Discharged to[de-identified] Private residence  Current DME Used/Available at Home: Cane, straight, Shower chair  Tub or Shower Type: Tub/Shower combination  Prior Level of Function/Work/Activity:  Patient was utilizing a 2 wheel rolling walker at home with ambulation. Dominant Side:         RIGHT  Personal Factors:          Sex:  female        Age:  80 y.o.    Number of Personal Factors/Comorbidities that affect the Plan of Care: 1-2: MODERATE COMPLEXITY   EXAMINATION:   Most Recent Physical Functioning:   Gross Assessment:                  Posture:     Balance:  Sitting - Static: Good (unsupported)  Sitting - Dynamic: Good (unsupported)  Standing - Static: Good  Standing - Dynamic : Fair Bed Mobility:  Supine to Sit: Minimum assistance;Contact guard assistance  Sit to Supine: Contact guard assistance  Wheelchair Mobility:     Transfers:  Sit to Stand: Minimum assistance  Stand to Sit: Minimum assistance  Gait:     Base of Support: Narrowed  Speed/Mary: Pace decreased (<100 feet/min)  Gait Abnormalities: Decreased step clearance  Distance (ft): 60 Feet (ft)  Assistive Device: Walker, rolling  Ambulation - Level of Assistance: Contact guard assistance;Minimal assistance  Interventions: Safety awareness training;Verbal cues      Body Structures Involved:  1. Bones  2. Joints  3. Muscles  4. Ligaments Body Functions Affected:  1. Neuromusculoskeletal  2. Movement Related  3. Skin Related  4. Metobolic/Endocrine Activities and Participation Affected:  1. General Tasks and Demands  2. Mobility  3. Self Care  4. Community, Social and [place]   Number of elements that affect the Plan of Care: 1-2: LOW COMPLEXITY   CLINICAL PRESENTATION:   Presentation: Evolving clinical presentation with changing clinical characteristics: MODERATE COMPLEXITY   CLINICAL DECISION MAKIN [facility] Inpatient Short Form  How much difficulty does the patient currently have. .. Unable A Lot A Little None   1. Turning over in bed (including adjusting bedclothes, sheets and blankets)? [] 1   [] 2   [x] 3   [] 4   2. Sitting down on and standing up from a chair with arms ( e.g., wheelchair, bedside commode, etc.)   [] 1   [] 2   [x] 3   [] 4   3. Moving from lying on back to sitting on the side of the bed? [] 1   [] 2   [x] 3   [] 4   How much help from another person does the patient currently need. .. Total A Lot A Little None   4. Moving to and from a bed to a chair (including a wheelchair)? [] 1   [] 2   [x] 3   [] 4   5. Need to walk in hospital room? [] 1   [] 2   [x] 3   [] 4   6. Climbing 3-5 steps with a railing? [] 1   [] 2   [x] 3   [] 4   © , Trustees of [address] [place] [address] [address] [address], under license to [employer]. All rights reserved      Score:  Initial: 18 Most Recent: X (Date: -- )    Interpretation of Tool:  Represents activities that are increasingly more difficult (i.e. Bed mobility, Transfers, Gait). Score 24 23 22-20 19-15 14-10 9-7 6     Modifier CH CI CJ CK CL CM CN      ?  Mobility - Walking and Moving Around:     - CURRENT STATUS: CK - 40%-59% impaired, limited or restricted    - GOAL STATUS: CJ - 20%-39% impaired, limited or restricted    - D/C STATUS:  ---------------To be determined---------------  Payor: [employer] VIP CARE PLUS [place] [employer] [place] / Product Type: Managed Care Medicare /      Medical Necessity:     · Patient demonstrates good rehab potential due to higher previous functional level. Reason for Services/Other Comments:  · Patient continues to require skilled intervention due to medical complications, patient unable to attend/participate in therapy as expected and decreased transfers, ambulation and mobility. Use of outcome tool(s) and clinical judgement create a POC that gives a: Clear prediction of patient's progress: LOW COMPLEXITY            TREATMENT:      Pre-treatment Symptoms/Complaints: pt very pleasant and talkative. Pain: Initial:      Post Session:  0/10        Therapeutic Activity: (    15 minutes): Therapeutic activities including Bed transfers, Chair transfers and Ambulation on level ground to improve mobility, strength, balance and coordination. Required minimal assist  Safety awareness training;Verbal cues to promote static and dynamic balance in standing and promote coordination of bilateral, lower extremity(s). Group Therapeutic Exercise: (   minutes):  Exercises per grid below to improve mobility, strength and balance. Required minimal verbal and manual cues to promote proper body posture and promote proper body mechanics. Progressed range and repetitions as indicated.      Date:  12/27/17 Date:  12/29/17 Date:     Activity/Exercise Parameters Group exs AM Parameters   Seated AP X 15 B X 15 B    Seated LAQ X 15 B X 15 B    Seated Marching X 15 B X 15 B    Seated hip abd  X 15 B                          Braces/Orthotics/Lines/Etc:   · travis catheter  Treatment/Session Assessment:    · Response to Treatment: Tolerated well  · Interdisciplinary Collaboration:   o Physical Therapy Assistant  o Registered Nurse  · After treatment position/precautions:   o Up in chair  o Bed alarm/tab alert on  o Bed/Chair-wheels locked  o Call light within reach  o RN notified  o Family at bedside   · Compliance with Program/Exercises: compliant with encouragement  · Recommendations/Intent for next treatment session: \"Next visit will focus on advancements to more challenging activities, reduction in assistance provided and transfers, ambulation and mobility. \".   Total Treatment Duration:  PT Patient Time In/Time Out  Time In: 1420  Time Out: [address], PTA

## 2018-01-02 NOTE — PROGRESS NOTES
Dr. Fatemeh Smiley informed of low BP. BP was checked a couple of times for accuracy. Patient is sitting up in recliner, alert and oriented with no needs.

## 2018-01-02 NOTE — PROGRESS NOTES
Hospitalist Progress Note    2018  Admit Date: 2017 11:21 PM   NAME: Lukas Hayward   :  2/10/1927   MRN:  [id number]   Attending: Rah Choudhary MD  PCP:  Meliza Lawrence MD    SUBJECTIVE:   As previously documented:  'Ms. Kj Mcdermott is a 81 yo female with PMH of CAD, HTN, admitted  with sepsis and elevated troponin. She has been found to have GPC bacteremia 1/ BC showing strep anginosus, rocephin  to present and vancomycin  to . Seen by ID who recommends rocephin 2 gram daily EOT 18. CT urogram shows right  5 cm adnexal lesion that will need GYN/ US followup. ECHO no vegetation, mild AS, EF 55-60%. No plans for TRACY given sedation risk but empirically treating for endocarditis. Seen by cardio and felt likely had NSTEMI with plans for medical management/ heparin drip since stopped and DAPT continued. Receiving IV lasix. Plans for rehab pending.'      - seen without family at bedside. Her biggest complaint is that her feet and legs hurt all night long. States they had a numb type feeling and cramping, with sharp pains at times. States she had to move her legs frequently to help. She reports intermittent coughing. - reports she is feeling okay. Does report several episodes of diarrhea and RN called me earlier due to watery stools. Being sent for C diff. She reports she slept better last night and her feet did not hurt with the gabapentin. Also cough is much better. Cardiology planning to convert her to PO lasix tomorrow. She is awaiting insurance pre-cert for rehab.   /49- reports she is feeling okay and wants to go home to 'do some work.'  States she is coughing still but improved. Does report she feels food 'comes back up when she eats.'  Has been going on for a while. Denies coughing immediately after swallowing. Feet are feeling much better. She has diuresed well and is down 15 pounds since admission.    18- reports she is doing okay today, but complains of getting weaker as she is not getting out of bed. Last PT evaluation was on . Evaluated by ST and diet recommendation the same and she had no immediate cough/aspiration symptoms with swallowing. She denies new concerns. She is awaiting insurance pre-cert for STR.  98- sitting in chair. Reports she feels slightly dizzy this AM and bp 83/52. She denies other new concerns. The insurance pre-certification was actually never started last week, and I was just notified of this.          Review of Systems negative with exception of pertinent positives noted above  PHYSICAL EXAM     Visit Vitals    BP (!) 83/52 (BP 1 Location: Left arm, BP Patient Position: Sitting)  Comment (BP Patient Position): up in the chair    Pulse 71    Temp 98.1 °F (36.7 °C)    Resp 18    Ht 5' (1.524 m)    Wt 67.8 kg (149 lb 8 oz)    LMP Comment: hysterectomy    SpO2 94%    Breastfeeding No    BMI 29.2 kg/m2      Temp (24hrs), Av.8 °F (36.6 °C), Min:97.5 °F (36.4 °C), Max:98.1 °F (36.7 °C)    Patient Vitals for the past 24 hrs:   Temp Pulse Resp BP SpO2   18 1030 98.1 °F (36.7 °C) 71 18 (!) 83/52 94 %   18 0813 - - - - 96 %   18 0733 97.9 °F (36.6 °C) 68 18 119/73 94 %   18 0507 97.8 °F (36.6 °C) 71 20 126/75 96 %   18 2325 97.5 °F (36.4 °C) 77 20 136/81 95 %   18 2049 - - - - 97 %   18 1936 97.8 °F (36.6 °C) 70 20 130/80 96 %   18 1657 97.8 °F (36.6 °C) (!) 105 18 110/72 97 %   18 1518 - - - - 99 %   18 1242 97.6 °F (36.4 °C) 92 18 110/67 96 %       Oxygen Therapy  O2 Sat (%): 94 % (18 1030)  Pulse via Oximetry: 69 beats per minute (18 0813)  O2 Device: Room air (18)  O2 Flow Rate (L/min): 0 l/min (17 1408)  FIO2 (%): 21 % (17 1830)    Intake/Output Summary (Last 24 hours) at 18 1133  Last data filed at 18 0512   Gross per 24 hour   Intake                0 ml   Output             1250 ml   Net -1250 ml      General: No acute distress, elderly  Lungs:  CTA Bilaterally. Heart:  Regular rate and rhythm,  No murmur, rub, or gallop  Abdomen: Soft, Non distended, Non tender, Positive bowel sounds  Extremities: No cyanosis, clubbing or edema, legs and feet warm  Neurologic:  No focal deficits    Recent Results (from the past 24 hour(s))   GLUCOSE, POC    Collection Time: 01/01/18 12:41 PM   Result Value Ref Range    Glucose (POC) 149 (H) 65 - 100 mg/dL   GLUCOSE, POC    Collection Time: 01/01/18  5:07 PM   Result Value Ref Range    Glucose (POC) 115 (H) 65 - 100 mg/dL   GLUCOSE, POC    Collection Time: 01/01/18  9:32 PM   Result Value Ref Range    Glucose (POC) 99 65 - 100 mg/dL   MAGNESIUM    Collection Time: 01/02/18  5:32 AM   Result Value Ref Range    Magnesium 1.9 1.8 - 2.4 mg/dL   GLUCOSE, POC    Collection Time: 01/02/18  8:11 AM   Result Value Ref Range    Glucose (POC) 90 65 - 100 mg/dL   GLUCOSE, POC    Collection Time: 01/02/18 11:05 AM   Result Value Ref Range    Glucose (POC) 149 (H) 65 - 100 mg/dL     Imaging:    XR CHEST PA LAT   Final Result   IMPRESSION: Small bilateral pleural effusions         CT UROGRAM WO CONT   Final Result   IMPRESSION:       1. No ureteral calculi or hydronephrosis. 2. Diverticulosis. 3. 5 cm cystic right adnexal lesion. Routine gynecologic follow-up is   recommended with routine follow-up pelvic sonography. XR CHEST SNGL V   Final Result   IMPRESSION:   Stable mild volume overload. CT CHEST W CONT   Final Result   IMPRESSION:      No evidence of pulmonary embolism. Coronary artery disease. Small bilateral pleural effusions. Dependent atelectasis in left lower lobe. Status post cholecystectomy. Date of Dictation: 12/23/2017 10:46 PM      DUPLEX LOWER EXT VENOUS BILAT   Final Result   IMPRESSION: No evidence of deep venous thrombosis in either lower extremity.          XR CHEST PORT   Final Result IMPRESSION: Minimal linear atelectasis of left lower lobe. Results for orders placed or performed during the hospital encounter of 17   2D ECHO COMPLETE ADULT (TTE) W OR [address] [facility]  [address] [facility], [address] [facility]  [phone]    Transthoracic Echocardiogram  2D, M-mode, Doppler, and Color Doppler    Patient: Bao Desir  MR #: [id number]  : 10-Feb-1927  Age: 80 years  Gender: Female  Study date: 23-Dec-2017  Account #: [de-identified]  Height: 60 in  Weight: 152.7 lb  BSA: 1.67 mï¾²  Status:Routine  Location: 729  BP: 87/ 55    Allergies: BEE POLLEN, FIRE ANT    Sonographer:  [facility]  Group:  [facility]  Referring Physician:  Mirta Ramesh [address]  Reading Physician:  Osvaldo Hernandez DO [facility]    INDICATIONS: Chest pain    PROCEDURE: This was a routine study. A transthoracic echocardiogram was  performed. The study included complete 2D imaging, M-mode, complete spectral  Doppler, and color Doppler. Image quality was adequate. LEFT VENTRICLE: Size was normal. Systolic function was normal. Ejection  fraction was estimated in the range of 55 % to 60 %. There were no regional  wall motion abnormalities. Wall thickness was normal. Left ventricular  diastolic function was abnormal.    RIGHT VENTRICLE: The ventricle was mildly dilated. Systolic function was  normal. Estimated peak pressure was in the range of 55-60 mmHg. LEFT ATRIUM: The atrium was markedly dilated. RIGHT ATRIUM: The atrium was mildly to moderately dilated. SYSTEMIC VEINS: IVC: The inferior vena cava was normal in size and course. AORTIC VALVE: The valve was trileaflet. Leaflets exhibited moderate  calcification and reduced mobility. The aortic valve area by the continuity  equation was 1.6 cm2. The peak velocity was 2.28 m/s. The mean pressure  gradient was 11.92 mmHg. The findings were most consistent with mild aortic  stenosis. The peak pressure gradient was 20.75 mmHg. There was no   insufficiency. MITRAL VALVE: There was moderate annular calcification. There was no evidence  for stenosis. There was moderate regurgitation. TRICUSPID VALVE: The valve structure was normal. There was no evidence for  stenosis. There was moderate to severe regurgitation. PULMONIC VALVE: The valve structure was normal. There was no evidence for  stenosis. There was no insufficiency. PERICARDIUM: There was no pericardial effusion. AORTA: The root exhibited normal size. SUMMARY:    -  Left ventricle: Systolic function was normal. Ejection fraction was  estimated in the range of 55 % to 60 %. There were no regional wall motion  abnormalities. -  Right ventricle: The ventricle was mildly dilated. -  Left atrium: The atrium was markedly dilated. -  Right atrium: The atrium was mildly to moderately dilated. -  Aortic valve: The valve was trileaflet. Leaflets exhibited moderate  calcification and reduced mobility. The aortic valve area by the continuity  equation was 1.6 cm2. The findings were most consistent with mild aortic  stenosis. -  Mitral valve: There was moderate annular calcification. There was moderate  regurgitation.    -  Tricuspid valve: There was moderate to severe regurgitation. SYSTEM MEASUREMENT TABLES    2D  Ao Diam: 2.5 cm  LA Diam: 3.7 cm  %FS: 40.2 %  IVSd: 0.9 cm  LVIDd: 4.6 cm  LVIDs: 2.8 cm  LVOT Diam: 2 cm  LVPWd: 1 cm    CW  TR Vmax: 3.6 m/s  TR maxP.9 mmHg    PW  RVSP: 53.9 mmHg    Prepared and signed by    Nick Gamez.  [facility]  Signed 23-Dec-2017 16:26:05         ASSESSMENT      Active Hospital Problems    Diagnosis Date Noted    NSTEMI (non-ST elevated myocardial infarction) ([facility] [address] 2017    Gram-positive bacteremia 2017    Sepsis ([facility] [address] 2017    Fever 2017    Elevated troponin 2017    CKD (chronic kidney disease) stage 3, GFR 30-59 ml/min 2017    Elevated lactic acid level 12/23/2017    Hypernatremia 12/23/2017    Macrocytic anemia 12/23/2017    Pain of right lower extremity 58/65/7801    Diastolic CHF, chronic (HCC) 05/17/2016    Essential hypertension, benign 03/17/2015     Plan:  · Strep anginosus bacteremia- Continue rocephin until 1/22/18 to treat empirically for endocarditis per ID recs- appreciate assistance. · PICC placed 12/28. · Diastolic CHF/NSTEMI- down 15 pounds since admission. Appreciate cards input- switched to PO lasix 12/31 and changed to demadex on 1/2 per cardiology (signed off 1/1). · Has weaned off of oxygen. · Continue DAPT. · Check BMP in AM.  · Hypotension- suspect combination of diuresis in combination with carvedilol. ·  ml bolus x 1.  · Reduce carvedilol to 3.125 mg BID. · Neuropathic foot pain- continue low dose gabapentin qhs, which has drastically improved her symptoms by her report. · Swallowing issues- ST following. Feels reflux an issue. Increase pepcid to BID. · R adnexal cyst- outpatient GYN follow up. · Diarrhea- resolved. · Continue PT and have her up OOB TID with meals. Dispo- STR on discharge for PT and continued IV abx; awaiting insurance pre-cert    DVT Prophylaxis: Heparin    Signed By: Estel Shone.  Jama Junior MD     January 2, 2018

## 2018-01-02 NOTE — PROGRESS NOTES
Problem: Falls - Risk of  Goal: *Absence of Falls  Document Vanda Fall Risk and appropriate interventions in the flowsheet.    Outcome: Progressing Towards Goal  Fall Risk Interventions:  Mobility Interventions: Bed/chair exit alarm, Patient to call before getting OOB    Mentation Interventions: Bed/chair exit alarm, Door open when patient unattended    Medication Interventions: Bed/chair exit alarm, Patient to call before getting OOB    Elimination Interventions: Bed/chair exit alarm, Call light in reach, Patient to call for help with toileting needs    History of Falls Interventions: Bed/chair exit alarm, Door open when patient unattended

## 2018-01-02 NOTE — PROGRESS NOTES
Problem: Dysphagia (Adult)  Goal: *Acute Goals and Plan of Care (Insert Text)  STG:  Pt will consume a regular diet without signs/sx aspiration 100%. (GOAL MET 1/2/18)    LTG:  Pt will tolerate least restrictive diet without signs/sx aspiration 100% for safe swallow function. Speech language pathology: bedside swallow note: Daily Note1 and DISCHARGE    NAME/AGE/GENDER: Trista Diallo is a 80 y.o. female  DATE: 1/2/2018  PRIMARY DIAGNOSIS: Sepsis ([facility] [address]  Sepsis ([facility] [address]       ICD-10: Treatment Diagnosis: dysphagia, pharyngeal 13.13  INTERDISCIPLINARY COLLABORATION: Physician  PRECAUTIONS/ALLERGIES: Bee pollen and Fire ant  ASSESSMENT:Based on the objective data described below, Ms. Anand Vanegas presents with no signs/sx of aspiration. Dry baseline cough. Patient with pleural effusions without infiltrates and CHF. She denies coughing when eating/drinking but complains of the sensation of food/liquids \"coming back up\". Orders for SLP consult specify complaints of regurgitation. Patient demonstrated no overt signs/sx of aspiration with thin liquids via the straw. Tolerated regular textures without incident. Patient with belching after drinking consistent with bedside assessment yesterday and complaints of esophageal dysphagia. She is not currently on a PPI and takes Zantac as needed. Recommend continue cardiac diet/thin liquids. Full upright during and following po intake. Patient may benefit from GI consult related to complaints of regurgitation and belching noted following intake during bedside assessments. No further speech therapy is indicated at this time. ?????? ? ? This section established at most recent assessment??????????  PROBLEM LIST (Impairments causing functional limitations):  1. Dysphagia   REHABILITATION POTENTIAL FOR STATED GOALS: Good  PLAN OF CARE:   Patient will benefit from skilled intervention to address the following impairments.   RECOMMENDATIONS AND PLANNED INTERVENTIONS (Benefits and precautions of therapy have been discussed with the patient.):  · continue prescribed diet  MEDICATIONS:  · With liquid  COMPENSATORY STRATEGIES/MODIFICATIONS INCLUDING:  · None  OTHER RECOMMENDATIONS (including follow up treatment recommendations):   · po trials  RECOMMENDED DIET MODIFICATIONS DISCUSSED WITH:  · Hospitalist  · Patient  FREQUENCY/DURATION: Continue to follow patient 3 times a week for duration of hospital stay to address above goals. RECOMMENDED REHABILITATION/EQUIPMENT: (at time of discharge pending progress): Due to the probability of continued deficits (see above) this patient will not likely need continued skilled speech therapy after discharge. SUBJECTIVE:   Pt was sitting up in the chair. History of Present Injury/Illness: Ms. Chalree Haines  has a past medical history of Acute kidney failure, unspecified; Arthritis; CAD (coronary artery disease); Cellulitis and abscess of other specified site; Coronary atherosclerosis of native coronary artery; Essential hypertension, benign (3/17/2015); Hypertension; Hypopotassemia; Mixed hyperlipidemia; Osteoarthritis (7/20/2017); Other and unspecified hyperlipidemia; Reflux esophagitis; Renal failure, unspecified; Shortness of breath; and Unspecified essential hypertension. .   She also  has a past surgical history that includes hx cholecystectomy; pr layr clos wnd face,facial <2.5cm; pr cardiac surg procedure unlist; hx cataract removal (Bilateral); and hx hysterectomy. Present Symptoms: foods coming back up per pt report    Pain Intensity 1: 0  Pain Location 1: Foot (both )  Pain Orientation 1: Lower, Right  Pain Intervention(s) 1: Medication (see MAR)  Current Medications:   No current facility-administered medications on file prior to encounter. Current Outpatient Prescriptions on File Prior to Encounter   Medication Sig Dispense Refill    clotrimazole (LOTRIMIN AF, CLOTRIMAZOLE,) 1 % topical cream Apply  to affected area two (2) times a day.  60 g 3  lisinopril (PRINIVIL, ZESTRIL) 30 mg tablet Take 1 Tab by mouth daily. 30 Tab 4    torsemide (DEMADEX) 20 mg tablet Take 1 Tab by mouth daily. 30 Tab 2    allopurinol (ZYLOPRIM) 100 mg tablet Take 1 Tab by mouth two (2) times a day. 60 Tab 3    pravastatin (PRAVACHOL) 20 mg tablet Take 1 Tab by mouth nightly. 30 Tab 4    raNITIdine (ZANTAC) 150 mg tablet Take 1 Tab by mouth two (2) times a day. 60 Tab 4    clopidogrel (PLAVIX) 75 mg tab Take 1 Tab by mouth daily. 30 Tab 4    potassium chloride (KLOR-CON M10) 10 mEq tablet Take 1 Tab by mouth daily. 30 Tab 4    colchicine (MITIGARE) 0.6 mg capsule Take 1 Cap by mouth daily. Indications: prevention of acute gout attack 30 Cap 3    conjugated estrogens (PREMARIN) 0.625 mg/gram vaginal cream Insert 0.5 g into vagina daily. 45 g 2    loratadine (CLARITIN) 10 mg tablet Take 1 Tab by mouth daily. 30 Tab 4    aspirin 81 mg CpDR Take  by mouth daily.  HYDROcodone-acetaminophen (NORCO) 7.5-325 mg per tablet Take 1 Tab by mouth every eight (8) hours as needed for Pain. Max Daily Amount: 3 Tabs.  90 Tab 0     Current Dietary Status:  Regular       History of reflux:  YES    Reflux medication: Zantac  Social History/Home Situation: residing with her son   Home Environment: Apartment  # Steps to Enter: 0  One/Two Story Residence: One story  Living Alone: No  Support Systems: Child(jayleen), Friends \ neighbors, Family member(s), [employer] / jeff[employer] community  Patient Expects to be Discharged to[de-identified] Private residence  Current DME Used/Available at Home: Cane, straight, Shower chair  Tub or Shower Type: Tub/Shower combination  OBJECTIVE:   Respiratory Status:  Room air     CXR Results: Small bilateral pleural effusions  MRI/CT Results: not ordered  Oral Motor Structure/Speech:  Oral-Motor Structure/Motor Speech  Labial: No impairment  Dentition: Upper & lower dentures  Oral Hygiene: adequate  Lingual: No impairment    Cognitive and Communication Status:  Neurologic State: Alert  Orientation Level: Oriented to person;Oriented to place;Oriented to situation  Cognition: Appropriate for age attention/concentration  Perception: Appears intact  Perseveration: No perseveration noted  Safety/Judgement: Awareness of environment    BEDSIDE SWALLOW EVALUATION  Oral Assessment:  Oral Assessment  Labial: No impairment  Dentition: Upper & lower dentures  Lingual: No impairment  P.O. Trials:  Patient Position: up in chair    The patient was given teaspoon to straw amounts of the following:   Consistency Presented: Solid; Thin liquid  How Presented: Straw;Successive swallows; Self-fed/presented    ORAL PHASE:  Bolus Acceptance: No impairment  Bolus Formation/Control: No impairment  Propulsion: No impairment     Oral Residue: None;Pocketing    PHARYNGEAL PHASE:  Initiation of Swallow: No impairment  Laryngeal Elevation: Functional  Aspiration Signs/Symptoms: None  Vocal Quality: No impairment           Pharyngeal Phase Characteristics: No impairment, issues, or problems     OTHER OBSERVATIONS:  Rate/bite size: WNL   Endurance: WNL   Comments:        Tool Used: Dysphagia Outcome and Severity Scale (ROBERT)    Score Comments   Normal Diet  [] 7 With no strategies or extra time needed   Functional Swallow  [] 6 May have mild oral or pharyngeal delay       Mild Dysphagia    [] 5 Which may require one diet consistency restricted (those who demonstrate penetration which is entirely cleared on MBS would be included)   Mild-Moderate Dysphagia  [] 4 With 1-2 diet consistencies restricted       Moderate Dysphagia  [] 3 With 2 or more diet consistencies restricted       Moderately Severe Dysphagia  [] 2 With partial PO strategies (trials with ST only)       Severe Dysphagia  [] 1 With inability to tolerate any PO safely          Score:  Initial: 6 Most Recent: 7 (Date: 1/2/17 )   Interpretation of Tool: The Dysphagia Outcome and Severity Scale (ROBERT) is a simple, easy-to-use, 7-point scale developed to systematically rate the functional severity of dysphagia based on objective assessment and make recommendations for diet level, independence level, and type of nutrition. Score 7 6 5 4 3 2 1   Modifier CH CI CJ CK CL CM CN   ? Swallowing:     - CURRENT STATUS: CI - 1%-19% impaired, limited or restricted    - GOAL STATUS:  CH - 0% impaired, limited or restricted    - D/C STATUS:  CH - 0% impaired, limited or restricted  Payor: [employer] / Plan: [place] [employer] / Product Type: Managed Care Medicare /     TREATMENT:    (In addition to Assessment/Re-Assessment sessions the following treatments were rendered)  Dysphagia Activities: Activities/Procedures listed utilized to improve progress in swallow function, diet tolerance and swallow safety.  Required no cueing to improve swallow safety.    __________________________________________________________________________________________________  Safety:   After treatment position/precautions:  · Up in chair   · RN notified    Total Treatment Duration:  Time In: 0940  Time Out: [address] [place], CCC-SLP

## 2018-01-03 LAB
ANION GAP SERPL CALC-SCNC: 8 MMOL/L (ref 7–16)
BUN SERPL-MCNC: 31 MG/DL (ref 8–23)
CALCIUM SERPL-MCNC: 9 MG/DL (ref 8.3–10.4)
CHLORIDE SERPL-SCNC: 104 MMOL/L (ref 98–107)
CO2 SERPL-SCNC: 31 MMOL/L (ref 21–32)
CREAT SERPL-MCNC: 1.33 MG/DL (ref 0.6–1)
GLUCOSE BLD STRIP.AUTO-MCNC: 126 MG/DL (ref 65–100)
GLUCOSE BLD STRIP.AUTO-MCNC: 127 MG/DL (ref 65–100)
GLUCOSE BLD STRIP.AUTO-MCNC: 158 MG/DL (ref 65–100)
GLUCOSE BLD STRIP.AUTO-MCNC: 86 MG/DL (ref 65–100)
GLUCOSE SERPL-MCNC: 79 MG/DL (ref 65–100)
MAGNESIUM SERPL-MCNC: 2 MG/DL (ref 1.8–2.4)
POTASSIUM SERPL-SCNC: 3.8 MMOL/L (ref 3.5–5.1)
SODIUM SERPL-SCNC: 143 MMOL/L (ref 136–145)

## 2018-01-03 PROCEDURE — 74011250636 HC RX REV CODE- 250/636: Performed by: INTERNAL MEDICINE

## 2018-01-03 PROCEDURE — 94640 AIRWAY INHALATION TREATMENT: CPT

## 2018-01-03 PROCEDURE — 97530 THERAPEUTIC ACTIVITIES: CPT

## 2018-01-03 PROCEDURE — 74011000250 HC RX REV CODE- 250: Performed by: INTERNAL MEDICINE

## 2018-01-03 PROCEDURE — 83735 ASSAY OF MAGNESIUM: CPT | Performed by: INTERNAL MEDICINE

## 2018-01-03 PROCEDURE — 74011250637 HC RX REV CODE- 250/637: Performed by: INTERNAL MEDICINE

## 2018-01-03 PROCEDURE — 97150 GROUP THERAPEUTIC PROCEDURES: CPT

## 2018-01-03 PROCEDURE — 36592 COLLECT BLOOD FROM PICC: CPT

## 2018-01-03 PROCEDURE — 65660000000 HC RM CCU STEPDOWN

## 2018-01-03 PROCEDURE — 80048 BASIC METABOLIC PNL TOTAL CA: CPT | Performed by: INTERNAL MEDICINE

## 2018-01-03 PROCEDURE — 94760 N-INVAS EAR/PLS OXIMETRY 1: CPT

## 2018-01-03 PROCEDURE — 82962 GLUCOSE BLOOD TEST: CPT

## 2018-01-03 RX ORDER — COLCHICINE 0.6 MG/1
0.3 TABLET ORAL DAILY
Status: DISCONTINUED | OUTPATIENT
Start: 2018-01-04 | End: 2018-01-05 | Stop reason: HOSPADM

## 2018-01-03 RX ADMIN — Medication 10 ML: at 13:56

## 2018-01-03 RX ADMIN — RDII 250 MG CAPSULE 250 MG: at 16:35

## 2018-01-03 RX ADMIN — CYANOCOBALAMIN TAB 1000 MCG 1000 MCG: 1000 TAB at 08:36

## 2018-01-03 RX ADMIN — HEPARIN SODIUM 5000 UNITS: 5000 INJECTION, SOLUTION INTRAVENOUS; SUBCUTANEOUS at 23:49

## 2018-01-03 RX ADMIN — POTASSIUM CHLORIDE 20 MEQ: 20 TABLET, EXTENDED RELEASE ORAL at 08:36

## 2018-01-03 RX ADMIN — Medication 300 UNITS: at 13:56

## 2018-01-03 RX ADMIN — Medication 10 ML: at 05:48

## 2018-01-03 RX ADMIN — HEPARIN SODIUM 5000 UNITS: 5000 INJECTION, SOLUTION INTRAVENOUS; SUBCUTANEOUS at 08:35

## 2018-01-03 RX ADMIN — ASPIRIN 81 MG: 81 TABLET, COATED ORAL at 08:36

## 2018-01-03 RX ADMIN — FAMOTIDINE 20 MG: 20 TABLET, FILM COATED ORAL at 21:44

## 2018-01-03 RX ADMIN — HEPARIN SODIUM 5000 UNITS: 5000 INJECTION, SOLUTION INTRAVENOUS; SUBCUTANEOUS at 16:34

## 2018-01-03 RX ADMIN — Medication 300 UNITS: at 05:48

## 2018-01-03 RX ADMIN — HEPARIN SODIUM 5000 UNITS: 5000 INJECTION, SOLUTION INTRAVENOUS; SUBCUTANEOUS at 00:26

## 2018-01-03 RX ADMIN — PRAVASTATIN SODIUM 20 MG: 20 TABLET ORAL at 22:34

## 2018-01-03 RX ADMIN — RDII 250 MG CAPSULE 250 MG: at 08:35

## 2018-01-03 RX ADMIN — IPRATROPIUM BROMIDE AND ALBUTEROL SULFATE 3 ML: .5; 3 SOLUTION RESPIRATORY (INHALATION) at 22:43

## 2018-01-03 RX ADMIN — GABAPENTIN 100 MG: 100 CAPSULE ORAL at 21:44

## 2018-01-03 RX ADMIN — ACETAMINOPHEN 650 MG: 325 TABLET ORAL at 05:47

## 2018-01-03 RX ADMIN — TORSEMIDE 20 MG: 20 TABLET ORAL at 08:36

## 2018-01-03 RX ADMIN — Medication 10 ML: at 22:37

## 2018-01-03 RX ADMIN — ALLOPURINOL 100 MG: 100 TABLET ORAL at 16:35

## 2018-01-03 RX ADMIN — ACETAMINOPHEN 650 MG: 325 TABLET ORAL at 21:44

## 2018-01-03 RX ADMIN — CARVEDILOL 3.12 MG: 3.12 TABLET, FILM COATED ORAL at 16:34

## 2018-01-03 RX ADMIN — WATER 2 G: 1 INJECTION INTRAMUSCULAR; INTRAVENOUS; SUBCUTANEOUS at 08:35

## 2018-01-03 RX ADMIN — CARVEDILOL 3.12 MG: 3.12 TABLET, FILM COATED ORAL at 08:36

## 2018-01-03 RX ADMIN — ACETAMINOPHEN 650 MG: 325 TABLET ORAL at 02:01

## 2018-01-03 RX ADMIN — Medication 300 UNITS: at 22:37

## 2018-01-03 RX ADMIN — GUAIFENESIN AND DEXTROMETHORPHAN 10 ML: 100; 10 SYRUP ORAL at 19:29

## 2018-01-03 RX ADMIN — COLCHICINE 0.6 MG: 0.6 CAPSULE ORAL at 08:36

## 2018-01-03 RX ADMIN — ALLOPURINOL 100 MG: 100 TABLET ORAL at 08:36

## 2018-01-03 RX ADMIN — CLOPIDOGREL BISULFATE 75 MG: 75 TABLET ORAL at 08:36

## 2018-01-03 RX ADMIN — Medication 10 ML: at 21:44

## 2018-01-03 NOTE — PROGRESS NOTES
Hospitalist Progress Note    Patient: Emeterio Claudio MRN: [id number]  SSN: [id number]    YOB: 1927  Age: 80 y.o. Sex: female      Admit Date: 12/22/2017    LOS: 11 days     Subjective:     80year old CF with a PMH of CAD, HTN, gout, and peripheral neuropathy that presented to the ER with sepsis from Strep bacteremia and was also found to have an NSTEMI. Cardiology evaluated her and treated conservatively. ID evaluated the patient and will treat with IV Rocephin until 1/22/18. 1/3 - The patient feels good today. A little tired after working with PT. Denies CP/SOB. Denies F/C/N/V. Review of systems negative except stated above. Objective:     Visit Vitals    /69 (BP 1 Location: Left arm, BP Patient Position: At rest)    Pulse 66    Temp 97.5 °F (36.4 °C)    Resp 18    Ht 5' (1.524 m)    Wt 67.7 kg (149 lb 3.2 oz)    LMP Comment: hysterectomy    SpO2 97%    Breastfeeding No    BMI 29.14 kg/m2      Oxygen Therapy  O2 Sat (%): 97 % (01/03/18 0721)  Pulse via Oximetry: 63 beats per minute (01/02/18 1457)  O2 Device: Room air (01/02/18 1457)  O2 Flow Rate (L/min): 0 l/min (12/29/17 1408)  FIO2 (%): 21 % (12/31/17 1830)      Intake and Output:   Intake/Output Summary (Last 24 hours) at 01/03/18 1109  Last data filed at 01/03/18 0620   Gross per 24 hour   Intake              850 ml   Output              800 ml   Net               50 ml         Physical Exam:   GENERAL: alert, cooperative, no distress, appears stated age  EYE: conjunctivae/corneas clear. PERRL. THROAT & NECK: normal and no erythema or exudates noted. LUNG: clear to auscultation bilaterally  HEART: regular rate and rhythm, S1S2, no murmur, no JVD  ABDOMEN: soft, non-tender, non-distended. Bowel sounds normal.   EXTREMITIES:  No edema, 2+ pedal/radial pulses bilaterally  SKIN: no rash or abnormalities  NEUROLOGIC: Alert. Cranial nerves 2-12 grossly intact.     Lab/Data Review:  Recent Results (from the past 24 hour(s))   GLUCOSE, POC    Collection Time: 01/02/18  4:21 PM   Result Value Ref Range    Glucose (POC) 124 (H) 65 - 100 mg/dL   GLUCOSE, POC    Collection Time: 01/02/18  9:03 PM   Result Value Ref Range    Glucose (POC) 100 65 - 100 mg/dL   MAGNESIUM    Collection Time: 01/03/18  6:11 AM   Result Value Ref Range    Magnesium 2.0 1.8 - 2.4 mg/dL   METABOLIC PANEL, BASIC    Collection Time: 01/03/18  6:11 AM   Result Value Ref Range    Sodium 143 136 - 145 mmol/L    Potassium 3.8 3.5 - 5.1 mmol/L    Chloride 104 98 - 107 mmol/L    CO2 31 21 - 32 mmol/L    Anion gap 8 7 - 16 mmol/L    Glucose 79 65 - 100 mg/dL    BUN 31 (H) 8 - 23 MG/DL    Creatinine 1.33 (H) 0.6 - 1.0 MG/DL    GFR est AA 48 (L) >60 ml/min/1.73m2    GFR est non-AA 40 (L) >60 ml/min/1.73m2    Calcium 9.0 8.3 - 10.4 MG/DL   GLUCOSE, POC    Collection Time: 01/03/18  7:20 AM   Result Value Ref Range    Glucose (POC) 86 65 - 100 mg/dL       Imaging:  Xr Chest Sngl V    Result Date: 12/26/2017  IMPRESSION: Stable mild volume overload. Xr Chest Pa Lat    Result Date: 12/28/2017  IMPRESSION: Small bilateral pleural effusions     Ct Chest W Cont    Result Date: 12/23/2017  IMPRESSION: No evidence of pulmonary embolism. Coronary artery disease. Small bilateral pleural effusions. Dependent atelectasis in left lower lobe. Status post cholecystectomy. Date of Dictation: 12/23/2017 10:46 PM    Xr Chest Port    Result Date: 12/23/2017  IMPRESSION: Minimal linear atelectasis of left lower lobe. Ct Urogram Wo Cont    Result Date: 12/27/2017  IMPRESSION: 1. No ureteral calculi or hydronephrosis. 2. Diverticulosis. 3. 5 cm cystic right adnexal lesion. Routine gynecologic follow-up is recommended with routine follow-up pelvic sonography. Duplex Lower Ext Venous Bilat    Result Date: 12/23/2017  IMPRESSION: No evidence of deep venous thrombosis in either lower extremity. Cultures:   All Micro Results     Procedure Component Value Units Date/Time CULTURE, BLOOD [[id number]]  (Abnormal) Collected:  12/23/17 0004    Order Status:  Completed Specimen:  Blood from Blood Updated:  01/02/18 1255     Special Requests: --        LEFT  Antecubital       GRAM STAIN GRAM NEGATIVE RODS         ANAEROBIC BOTTLE POSITIVE                 RESULTS VERIFIED, PHONED TO AND READ BACK BY Shani Vasquez RN, [id number] ON 1/2/18 AK. Culture result:         ANAEROBIC GRAM NEGATIVE RODS (A)            SENT TO [facility] FOR TESTING  ON 1/2/18 AK. CULTURE IN PROGRESS,FURTHER UPDATES TO FOLLOW    C. DIFFICILE/EPI PCR [[id number]]     Order Status:  Canceled Specimen:  Stool     CULTURE, BLOOD [[id number]] Collected:  12/25/17 1134    Order Status:  Completed Specimen:  Blood from Blood Updated:  12/30/17 0814     Special Requests: --        RIGHT  HAND       Culture result: NO GROWTH 5 DAYS       CULTURE, BLOOD [[id number]] Collected:  12/25/17 1134    Order Status:  Completed Specimen:  Blood from Blood Updated:  12/30/17 0814     Special Requests: --        RIGHT  HAND       Culture result: NO GROWTH 5 DAYS       CULTURE, BLOOD [[id number]]  (Abnormal)  (Susceptibility) Collected:  12/23/17 0004    Order Status:  Completed Specimen:  Blood from Blood Updated:  12/26/17 0701     Special Requests: --        NO SPECIAL REQUESTS  RIGHT  Antecubital       GRAM STAIN GRAM POS COCCI IN CHAINS         ANAEROBIC BOTTLE POSITIVE                 RESULTS VERIFIED, PHONED TO AND READ BACK BY Jocelyne Goodrich @ 2337 ON 12/23/17 BY GÉNESIS. Culture result:         STREPTOCOCCUS ANGINOSUS (A)          Assessment & Plan:     1. Strep Anginosus Bacteremia - Will continue Rocephin - EOT 1/22/18 - to treat empirically for possible endocarditis. Blood cultures from 12/25/17 negative. PICC line placed. 2. Acute on Chronic Diastolic HF - Stable. +290 in last 24 hours due to giving IVFs for hypotension. Will continue Coreg and Demadex PO.    3. NSTEMI/CAD - Stable.  Will continue ASA/Plavix. Continue Pravastatin. 4. Hypotension - Resolved. Likely from over diuresis. 5. Peripheral Neuropathy - Continue Gabapentin. 6. Dysphagia - Stable. SLP evaluated. Likely due to #7.    7. GERD - Continue Pepcid    8. Right Adnexal Cyst - Will need out patient gyn follow up    9.  Acute Diarrhea - Resolved    DIET CARDIAC Regular      Signed By: Coni Warren DO     January 3, 2018

## 2018-01-03 NOTE — PROGRESS NOTES
Problem: Self Care Deficits Care Plan (Adult)  Goal: *Acute Goals and Plan of Care (Insert Text)  1. Patient will complete lower body bathing and dressing with minimal assistance and adaptive equipment as needed. 2. Patient will complete toileting with supervision. 3. Patient will tolerate 25 minutes of OT treatment with 2-3 rest breaks to increase activity tolerance for ADLs. 4. Patient will complete functional transfers with supervision and adaptive equipment as needed. 5. Patient will complete functional mobility with supervision for household distances and with minimal cues for safety. Timeframe: 7 visits       OCCUPATIONAL THERAPY: Daily Note, Treatment Day: 4th and AM    1/3/2018  INPATIENT: Hospital Day: 13  Payor: [employer] VIP CARE PLUS / [place] [employer] [place] / Product Type: Managed Care Medicare /      NAME/AGE/GENDER: Clark Mcdaniel is a 80 y.o. female   PRIMARY DIAGNOSIS:  Sepsis ([address]  Sepsis ([address] Sepsis ([address] Sepsis ([address]        ICD-10: Treatment Diagnosis:    · Generalized Muscle Weakness (M62.81)  · Other lack of cordination (R27.8)   Precautions/Allergies:     Bee pollen and Fire ant      ASSESSMENT:     Ms. Fred Sheikh presents to the hospital with sepsis. 1/3/2018 Pt was presented up in the chair upon arrival. Pt transferred to standing with a rolling walker and CGA and completed functional mobility in the bathroom with CGA. Pt required max a with clothing management and SBA with bowel and bladder hygiene. Pt completed functional mobility to sink side with CGA and completed grooming and hygiene listed in grid below. Pt ambulated down to the gym with a rolling walker and CGA  to participate in group exercises to increase strength for mobility and ADL's. Pt required visual, verbal, and manual cues to complete exercises with proper form. Pt participated with good effort and was left up in the chair post treatment working with the PTA. Continue OT POC.       This section established at most recent assessment   PROBLEM LIST (Impairments causing functional limitations):  1. Decreased Strength  2. Decreased ADL/Functional Activities  3. Decreased Transfer Abilities  4. Decreased Ambulation Ability/Technique  5. Decreased Balance  6. Decreased Activity Tolerance  7. Decreased Flexibility/Joint Mobility  8. Edema/Girth  9. Decreased Canones with Home Exercise Program  10. Decreased Cognition   INTERVENTIONS PLANNED: (Benefits and precautions of occupational therapy have been discussed with the patient.)  1. Activities of daily living training  2. Adaptive equipment training  3. Balance training  4. Clothing management  5. Cognitive training  6. Donning&doffing training  7. Group therapy  8. Neuromuscular re-eduation  9. Therapeutic activity  10. Therapeutic exercise     TREATMENT PLAN: Frequency/Duration: Follow patient 3 times per week to address above goals. Rehabilitation Potential For Stated Goals: Excellent     RECOMMENDED REHABILITATION/EQUIPMENT: (at time of discharge pending progress): Due to the probability of continued deficits (see above) this patient will likely need continued skilled occupational therapy after discharge. Equipment:    TBD              OCCUPATIONAL PROFILE AND HISTORY:   History of Present Injury/Illness (Reason for Referral):  See H&P  Past Medical History/Comorbidities:   Ms. Guerita Arango  has a past medical history of Acute kidney failure, unspecified; Arthritis; CAD (coronary artery disease); Cellulitis and abscess of other specified site; Coronary atherosclerosis of native coronary artery; Essential hypertension, benign (3/17/2015); Hypertension; Hypopotassemia; Mixed hyperlipidemia; Osteoarthritis (7/20/2017); Other and unspecified hyperlipidemia; Reflux esophagitis; Renal failure, unspecified; Shortness of breath; and Unspecified essential hypertension.   Ms. Guerita Arango  has a past surgical history that includes hx cholecystectomy; pr [place] face,facial <2.5cm; pr cardiac surg procedure unlist; hx cataract removal (Bilateral); and hx hysterectomy. Social History/Living Environment:   Home Environment: Apartment  # Steps to Enter: 0  One/Two Story Residence: One story  Living Alone: No  Support Systems: Child(jayleen), Friends \ neighbors, Family member(s), [employer] community  Patient Expects to be Discharged to[de-identified] Private residence  Current DME Used/Available at Home: Anna Meo, straight, Shower chair  Tub or Shower Type: Tub/Shower combination  Prior Level of Function/Work/Activity:  Pt lives with her son. Pt's son is at home with her at all times. Pt has a walker but completes functional mobility with a cane primarily. Reports no recent falls. Pt completes ADL with occasional assistance from her daughter. Pt still participated in laundry and cooking tasks. Personal Factors:          Past/Current Experience:  Hx of knee pain with difficulty with prolonged standing        Other factors that influence how disability is experienced by the patient:  Multiple co-morbidities (see above)   Number of Personal Factors/Comorbidities that affect the Plan of Care: Expanded review of therapy/medical records (1-2):  MODERATE COMPLEXITY   ASSESSMENT OF OCCUPATIONAL PERFORMANCE[de-identified]   Activities of Daily Living:             Basic ADLs (From Assessment) Complex ADLs (From Assessment)   Basic ADL  Feeding: Stand-by assistance  Oral Facial Hygiene/Grooming: Minimum assistance  Bathing: Moderate assistance  Upper Body Dressing: Moderate assistance  Lower Body Dressing: Moderate assistance  Toileting:  Moderate assistance Instrumental ADL  Meal Preparation: Moderate assistance  Homemaking: Maximum assistance   Grooming/Bathing/Dressing Activities of Daily Living   Grooming  Grooming Assistance: Stand-by assistance  Washing Face: Stand-by assistance  Washing Hands: Stand-by assistance  Brushing Teeth: Stand-by assistance             Toileting  Toileting Assistance: Stand-by assistance  Bladder Hygiene: Stand-by assistance  Bowel Hygiene: Stand-by assistance  Clothing Management: Maximum assistance     Functional Transfers  Bathroom Mobility: Contact guard assistance  Toilet Transfer : Contact guard assistance  Adaptive Equipment: Walker (comment)     Bed/Mat Mobility  Supine to Sit: Minimum assistance;Contact guard assistance  Sit to Stand: Minimum assistance;Contact guard assistance       Most Recent Physical Functioning:   Gross Assessment:                  Posture:  Posture (WDL): Exceptions to WDL  Posture Assessment: Forward head, Rounded shoulders  Balance:  Sitting: Intact  Sitting - Static: Good (unsupported)  Sitting - Dynamic: Good (unsupported)  Standing: Intact  Standing - Static: Good  Standing - Dynamic : Fair Bed Mobility:  Supine to Sit: Minimum assistance;Contact guard assistance  Wheelchair Mobility:     Transfers:  Sit to Stand: Minimum assistance;Contact guard assistance  Stand to Sit: Minimum assistance;Contact guard assistance              Patient Vitals for the past 6 hrs:   BP BP Patient Position SpO2 Pulse   18 1150 101/67 Sitting 95 % 75       Mental Status  Neurologic State: Alert, Confused  Orientation Level: Disoriented to situation, Disoriented to time  Cognition: Follows commands  Perception: Appears intact  Perseveration: No perseveration noted  Safety/Judgement: Awareness of environment                          Physical Skills Involved:  1. Balance  2. Strength  3. Activity Tolerance Cognitive Skills Affected (resulting in the inability to perform in a timely and safe manner):  1. Executive Function  2. Sustained Attention Psychosocial Skills Affected:  1. Habits/Routines  2. Environmental Adaptation  3.  Self-Awareness   Number of elements that affect the Plan of Care: 5+:  HIGH COMPLEXITY   CLINICAL DECISION MAKIN \[facility] [place] [facility]\"" [address] [address] AM-PAC 6 Clicks   Daily Activity Inpatient Short Form  How much help from another person does the patient currently need. .. Total A Lot A Little None   1. Putting on and taking off regular lower body clothing? [] 1   [x] 2   [] 3   [] 4   2. Bathing (including washing, rinsing, drying)? [] 1   [x] 2   [] 3   [] 4   3. Toileting, which includes using toilet, bedpan or urinal?   [] 1   [x] 2   [] 3   [] 4   4. Putting on and taking off regular upper body clothing? [] 1   [x] 2   [] 3   [] 4   5. Taking care of personal grooming such as brushing teeth? [] 1   [] 2   [x] 3   [] 4   6. Eating meals? [] 1   [] 2   [x] 3   [] 4   © 2007, Trustees of [address] [place] [address] [address] [address], under license to [employer]. All rights reserved      Score:  Initial: 14 Most Recent: X (Date: -- )    Interpretation of Tool:  Represents activities that are increasingly more difficult (i.e. Bed mobility, Transfers, Gait). Score 24 23 22-20 19-15 14-10 9-7 6     Modifier CH CI CJ CK CL CM CN      ? Self Care:     - CURRENT STATUS: CL - 60%-79% impaired, limited or restricted    - GOAL STATUS: CK - 40%-59% impaired, limited or restricted    - D/C STATUS:  ---------------To be determined---------------  Payor: [employer] VIP CARE PLUS / [place] [employer] [place] [place] / Product Type: Managed Care Medicare /      Medical Necessity:     · Patient demonstrates excellent rehab potential due to higher previous functional level. Reason for Services/Other Comments:  · Patient continues to require skilled intervention due to decreased independence with ADL/functional transfers. Use of outcome tool(s) and clinical judgement create a POC that gives a: LOW COMPLEXITY         TREATMENT:   (In addition to Assessment/Re-Assessment sessions the following treatments were rendered)     Pre-treatment Symptoms/Complaints:    Pain: Initial:   Pain Intensity 1: 0  Pain Location 1: Knee  Pain Intervention(s) 1:  (RN administering pain meds upon arrival)  Post Session:  Pt stated that her knee pain was better after walking. Therapeutic Activity: (15 minutes): Therapeutic activities including Chair transfers, Toilet transfers, bladder/bowel hygiene, sink side ADL's  and static/dynamic standing to improve mobility, strength and balance. Required minimal Safety awareness training;Verbal cues to promote static and dynamic balance in standing. Group Therapeutic Exercise: (10 minutes):  Exercises per grid below to improve mobility and strength. Required maximal visual, verbal and manual cues to promote proper body mechanics. Progressed repetitions as indicated. Date:  12/29/17 Date:  1/1/18 Date:  1/3/18   Activity/Exercise Parameters Parameters Parameters   Shoulder flexion/extension 15 reps with a yellow theraband 15 reps with a dowel 15 reps with a dowel   Shoulder horizontal add/abb 15 reps with a yellow theraband 15 reps with a yellow theraband 15 reps with a yellow theraband   Punches 15 reps with a yellow theraband 15 reps with a dowel 15 reps with a dowel   Elbow flexion/extension 15 reps with a yellow theraband 15 reps with a dowel 15 reps with a dowel   Tricep extension      Balloon tapping      Ball toss                        Braces/Orthotics/Lines/Etc:   · IV  Treatment/Session Assessment:    · Response to Treatment:  Good  participation. · Interdisciplinary Collaboration:   o Certified Occupational Therapy Assistant  o Registered Nurse  · After treatment position/precautions:   o Up in chair  o RN notified  o working with the PTA   · Compliance with Program/Exercises: Will assess as treatment progresses. · Recommendations/Intent for next treatment session: \"Next visit will focus on advancements to more challenging activities and reduction in assistance provided\".   Total Treatment Duration:  OT Patient Time In/Time Out  Time In: 0721 (2269)  Time Out: 3468 [id number] [phone]) [facility]

## 2018-01-03 NOTE — PROGRESS NOTES
Problem: Mobility Impaired (Adult and Pediatric)  Goal: *Therapy Goal (Edit Goal, Insert Text)  STG:  (1.)Ms. Rose Perez will move from supine to sit and sit to supine , scoot up and down and roll side to side with STAND BY ASSIST within 4 day(s). (2.)Ms. Rose Perez will transfer from bed to chair and chair to bed with STAND BY ASSIST using the least restrictive device within 4 day(s). (3.)Ms. Rose Perez will ambulate with STAND BY ASSIST for 100 feet with the least restrictive device within 4 day(s). LTG:  (1.)Ms. Rose Perez will move from supine to sit and sit to supine , scoot up and down and roll side to side in bed with MODIFIED INDEPENDENT within 7 day(s). (2.)Ms. Rose Perez will transfer from bed to chair and chair to bed with MODIFIED INDEPENDENCE using the least restrictive device within 7 day(s). (3.)Ms. Rose Perez will ambulate with MODIFIED INDEPENDENCE for 250 feet with the least restrictive device within 7 day(s). ________________________________________________________________________________________________       PHYSICAL THERAPY: Daily Note, Treatment Day: 5th, AM 1/3/2018  INPATIENT: Hospital Day: 13  Payor: FIRST [employer] VIP CARE PLUS / [place] [employer] [place] / Product Type: Managed Care Medicare /      NAME/AGE/GENDER: Hari Marrero is a 80 y.o. female   PRIMARY DIAGNOSIS: Sepsis ([address]  Sepsis ([address] Sepsis ([address] Sepsis ([address]        ICD-10: Treatment Diagnosis:   · Difficulty in walking, Not elsewhere classified (R26.2)   Precaution/Allergies:  Bee pollen and Fire ant      ASSESSMENT:     Ms. Rose Perez is supine on arrival, agreeable to PT. Pt was able to perform supine to sit with min/CGA, donned additional robe, and then stood with min/CGA. Pt was able to increase her ambulation to 100' using rolling walker and min/CGA. Pt continues to required slightly more assist for turning as well as verbal cues for safety and to look forward.   Pt returned to chair in room and was left up with needs in reach. Pt was later taken to therapy gym where she was able to participate in group exercises as below. Pt did require some increased verbal and manual assist to complete exercises correctly and thru full range. Pt returned to room and was left up in chair with needs in reach. Pt is making progress with her ambulation distance. Will continue with POC. This section established at most recent assessment   PROBLEM LIST (Impairments causing functional limitations):  1. Decreased Strength  2. Decreased ADL/Functional Activities  3. Decreased Transfer Abilities  4. Decreased Ambulation Ability/Technique  5. Decreased Balance  6. Decreased Activity Tolerance  7. Decreased Pacing Skills  8. Decreased Flexibility/Joint Mobility  9. Decreased Barry with Home Exercise Program   INTERVENTIONS PLANNED: (Benefits and precautions of physical therapy have been discussed with the patient.)  1. Balance Exercise  2. Bed Mobility  3. Family Education  4. Gait Training  5. Home Exercise Program (HEP)  6. Range of Motion (ROM)  7. Therapeutic Activites  8. Therapeutic Exercise/Strengthening  9. Transfer Training     TREATMENT PLAN: Frequency/Duration: 3-5 times a week for duration of hospital stay  Rehabilitation Potential For Stated Goals: Good      RECOMMENDED REHABILITATION/EQUIPMENT: (at time of discharge pending progress): Due to the probability of continued deficits (see above) this patient will likely need continued skilled physical therapy after discharge. HISTORY:   History of Present Injury/Illness (Reason for Referral):  Patient is a 81 y/o F who presented with fever, chills/shaking, SOB, and a NBNB nausea/vomiting episode x1 that occurred a few hours ago. She is a very poor historian, limits history. No formal diagnosis of dementia. Says that she was brought here after she started shaking earlier this evening, and then had vomiting. Said she had a fever but cannot tell me the temperature. Denies any other new symptoms. Denies CP, palpitations, cough, abd pain, urinary symptoms, diarrhea, new rashes/wounds.       10 systems reviewed and negative except as noted in HPI. Past Medical History/Comorbidities:   Ms. [facility]  has a past medical history of Acute kidney failure, unspecified; Arthritis; CAD (coronary artery disease); Cellulitis and abscess of other specified site; Coronary atherosclerosis of native coronary artery; Essential hypertension, benign (3/17/2015); Hypertension; Hypopotassemia; Mixed hyperlipidemia; Osteoarthritis (7/20/2017); Other and unspecified hyperlipidemia; Reflux esophagitis; Renal failure, unspecified; Shortness of breath; and Unspecified essential hypertension. [facility]  has a past surgical history that includes hx cholecystectomy; pr layr clos wnd face,facial <2.5cm; pr cardiac surg procedure unlist; hx cataract removal (Bilateral); and hx hysterectomy. Social History/Living Environment:   Home Environment: Apartment  # Steps to Enter: 0  One/Two Story Residence: One story  Living Alone: No  Support Systems: Child(jayleen), Friends \ neighbors, Family member(s), [employer] / [employer] community  Patient Expects to be Discharged to[de-identified] Private residence  Current DME Used/Available at Home: Cane, straight, Shower chair  Tub or Shower Type: Tub/Shower combination  Prior Level of Function/Work/Activity:  Patient was utilizing a 2 wheel rolling walker at home with ambulation. Dominant Side:         RIGHT  Personal Factors:          Sex:  female        Age:  80 y.o.    Number of Personal Factors/Comorbidities that affect the Plan of Care: 1-2: MODERATE COMPLEXITY   EXAMINATION:   Most Recent Physical Functioning:   Gross Assessment:                  Posture:     Balance:  Sitting - Static: Good (unsupported)  Sitting - Dynamic: Good (unsupported)  Standing - Static: Good  Standing - Dynamic : Fair Bed Mobility:  Supine to Sit: Minimum assistance;Contact guard assistance  Wheelchair Mobility:     Transfers:  Sit to Stand: Minimum assistance;Contact guard assistance  Stand to Sit: Minimum assistance;Contact guard assistance  Gait:     Base of Support: Narrowed  Speed/Mary: Pace decreased (<100 feet/min)  Gait Abnormalities: Decreased step clearance  Distance (ft): 100 Feet (ft)  Assistive Device: Walker, rolling  Ambulation - Level of Assistance: Contact guard assistance;Minimal assistance  Interventions: Safety awareness training;Verbal cues      Body Structures Involved:  1. Bones  2. Joints  3. Muscles  4. Ligaments Body Functions Affected:  1. Neuromusculoskeletal  2. Movement Related  3. Skin Related  4. Metobolic/Endocrine Activities and Participation Affected:  1. General Tasks and Demands  2. Mobility  3. Self Care  4. Community, Social and [place]   Number of elements that affect the Plan of Care: 1-2: LOW COMPLEXITY   CLINICAL PRESENTATION:   Presentation: Evolving clinical presentation with changing clinical characteristics: MODERATE COMPLEXITY   CLINICAL DECISION MAKIN [facility] Mobility Inpatient Short Form  How much difficulty does the patient currently have. .. Unable A Lot A Little None   1. Turning over in bed (including adjusting bedclothes, sheets and blankets)? [] 1   [] 2   [x] 3   [] 4   2. Sitting down on and standing up from a chair with arms ( e.g., wheelchair, bedside commode, etc.)   [] 1   [] 2   [x] 3   [] 4   3. Moving from lying on back to sitting on the side of the bed? [] 1   [] 2   [x] 3   [] 4   How much help from another person does the patient currently need. .. Total A Lot A Little None   4. Moving to and from a bed to a chair (including a wheelchair)? [] 1   [] 2   [x] 3   [] 4   5. Need to walk in hospital room? [] 1   [] 2   [x] 3   [] 4   6. Climbing 3-5 steps with a railing? [] 1   [] 2   [x] 3   [] 4   © , Trustees of [address] [place] [address] [address] [address], under license to [employer].  All rights reserved Score:  Initial: 18 Most Recent: X (Date: -- )    Interpretation of Tool:  Represents activities that are increasingly more difficult (i.e. Bed mobility, Transfers, Gait). Score 24 23 22-20 19-15 14-10 9-7 6     Modifier CH CI CJ CK CL CM CN      ? Mobility - Walking and Moving Around:     - CURRENT STATUS: CK - 40%-59% impaired, limited or restricted    - GOAL STATUS: CJ - 20%-39% impaired, limited or restricted    - D/C STATUS:  ---------------To be determined---------------  Payor: FIRST [employer] VIP CARE PLUS [place] [employer] VIP CARE [place]US / Product Type: Medical Direct Club Care Medicare /      Medical Necessity:     · Patient demonstrates good rehab potential due to higher previous functional level. Reason for Services/Other Comments:  · Patient continues to require skilled intervention due to medical complications, patient unable to attend/participate in therapy as expected and decreased transfers, ambulation and mobility. Use of outcome tool(s) and clinical judgement create a POC that gives a: Clear prediction of patient's progress: LOW COMPLEXITY            TREATMENT:      Pre-treatment Symptoms/Complaints: pt very pleasant and talkative. Pain: Initial:      Post Session:  0/10        Therapeutic Activity: (   14 minutes): Therapeutic activities including Bed transfers, Chair transfers and Ambulation on level ground to improve mobility, strength, balance and coordination. Required minimal assist  Safety awareness training;Verbal cues to promote static and dynamic balance in standing and promote coordination of bilateral, lower extremity(s). Group Therapeutic Exercise: ( 10 minutes):  Exercises per grid below to improve mobility, strength and balance. Required minimal verbal and manual cues to promote proper body posture and promote proper body mechanics. Progressed range and repetitions as indicated.      Date:  12/27/17 Date:  12/29/17 Date:  1/3/18   Activity/Exercise Parameters Group exs AM Group exs AM   Seated AP X 15 B X 15 B X 15 B   Seated LAQ X 15 B X 15 B X 20 B   Seated Marching X 15 B X 15 B X 20 B   Seated hip abd  X 15 B X 15 B                         Braces/Orthotics/Lines/Etc:   · travis catheter  Treatment/Session Assessment:    · Response to Treatment: Tolerated well  · Interdisciplinary Collaboration:   o Physical Therapy Assistant  o Registered Nurse  · After treatment position/precautions:   o Up in chair  o Bed alarm/tab alert on  o Bed/Chair-wheels locked  o Call light within reach  o RN notified   · Compliance with Program/Exercises: compliant with encouragement  · Recommendations/Intent for next treatment session: \"Next visit will focus on advancements to more challenging activities, reduction in assistance provided and transfers, ambulation and mobility. \".   Total Treatment Duration:  PT Patient Time In/Time Out  Time In: 5244 (1125)  Time Out: 4880 (1135)    Jennifer Vega, PTA

## 2018-01-04 LAB
GLUCOSE BLD STRIP.AUTO-MCNC: 106 MG/DL (ref 65–100)
GLUCOSE BLD STRIP.AUTO-MCNC: 113 MG/DL (ref 65–100)
GLUCOSE BLD STRIP.AUTO-MCNC: 118 MG/DL (ref 65–100)
GLUCOSE BLD STRIP.AUTO-MCNC: 86 MG/DL (ref 65–100)
MAGNESIUM SERPL-MCNC: 2.1 MG/DL (ref 1.8–2.4)

## 2018-01-04 PROCEDURE — 36592 COLLECT BLOOD FROM PICC: CPT

## 2018-01-04 PROCEDURE — 74011250637 HC RX REV CODE- 250/637: Performed by: INTERNAL MEDICINE

## 2018-01-04 PROCEDURE — 82962 GLUCOSE BLOOD TEST: CPT

## 2018-01-04 PROCEDURE — 97530 THERAPEUTIC ACTIVITIES: CPT

## 2018-01-04 PROCEDURE — 74011250636 HC RX REV CODE- 250/636: Performed by: INTERNAL MEDICINE

## 2018-01-04 PROCEDURE — 83735 ASSAY OF MAGNESIUM: CPT | Performed by: INTERNAL MEDICINE

## 2018-01-04 PROCEDURE — 65660000000 HC RM CCU STEPDOWN

## 2018-01-04 PROCEDURE — 97150 GROUP THERAPEUTIC PROCEDURES: CPT

## 2018-01-04 PROCEDURE — 74011000250 HC RX REV CODE- 250: Performed by: INTERNAL MEDICINE

## 2018-01-04 RX ORDER — GABAPENTIN 100 MG/1
200 CAPSULE ORAL
Status: DISCONTINUED | OUTPATIENT
Start: 2018-01-04 | End: 2018-01-05 | Stop reason: HOSPADM

## 2018-01-04 RX ADMIN — HEPARIN SODIUM 5000 UNITS: 5000 INJECTION, SOLUTION INTRAVENOUS; SUBCUTANEOUS at 23:25

## 2018-01-04 RX ADMIN — Medication 10 ML: at 06:03

## 2018-01-04 RX ADMIN — HEPARIN SODIUM 5000 UNITS: 5000 INJECTION, SOLUTION INTRAVENOUS; SUBCUTANEOUS at 09:12

## 2018-01-04 RX ADMIN — Medication 300 UNITS: at 06:04

## 2018-01-04 RX ADMIN — ASPIRIN 81 MG: 81 TABLET, COATED ORAL at 09:12

## 2018-01-04 RX ADMIN — PRAVASTATIN SODIUM 20 MG: 20 TABLET ORAL at 22:16

## 2018-01-04 RX ADMIN — CARVEDILOL 3.12 MG: 3.12 TABLET, FILM COATED ORAL at 16:52

## 2018-01-04 RX ADMIN — Medication 10 ML: at 14:14

## 2018-01-04 RX ADMIN — COLCHICINE 0.3 MG: 0.6 TABLET, FILM COATED ORAL at 09:12

## 2018-01-04 RX ADMIN — GUAIFENESIN AND DEXTROMETHORPHAN 10 ML: 100; 10 SYRUP ORAL at 22:25

## 2018-01-04 RX ADMIN — HEPARIN SODIUM 5000 UNITS: 5000 INJECTION, SOLUTION INTRAVENOUS; SUBCUTANEOUS at 16:52

## 2018-01-04 RX ADMIN — WATER 2 G: 1 INJECTION INTRAMUSCULAR; INTRAVENOUS; SUBCUTANEOUS at 09:11

## 2018-01-04 RX ADMIN — ACETAMINOPHEN 650 MG: 325 TABLET ORAL at 23:29

## 2018-01-04 RX ADMIN — POTASSIUM CHLORIDE 20 MEQ: 20 TABLET, EXTENDED RELEASE ORAL at 09:12

## 2018-01-04 RX ADMIN — FAMOTIDINE 20 MG: 20 TABLET, FILM COATED ORAL at 22:17

## 2018-01-04 RX ADMIN — Medication 300 UNITS: at 22:17

## 2018-01-04 RX ADMIN — Medication 10 ML: at 22:18

## 2018-01-04 RX ADMIN — ALLOPURINOL 100 MG: 100 TABLET ORAL at 16:52

## 2018-01-04 RX ADMIN — TORSEMIDE 20 MG: 20 TABLET ORAL at 09:12

## 2018-01-04 RX ADMIN — RDII 250 MG CAPSULE 250 MG: at 16:52

## 2018-01-04 RX ADMIN — Medication 300 UNITS: at 14:13

## 2018-01-04 RX ADMIN — RDII 250 MG CAPSULE 250 MG: at 09:12

## 2018-01-04 RX ADMIN — ACETAMINOPHEN 650 MG: 325 TABLET ORAL at 14:13

## 2018-01-04 RX ADMIN — GABAPENTIN 200 MG: 100 CAPSULE ORAL at 22:16

## 2018-01-04 RX ADMIN — CYANOCOBALAMIN TAB 1000 MCG 1000 MCG: 1000 TAB at 09:12

## 2018-01-04 RX ADMIN — CLOPIDOGREL BISULFATE 75 MG: 75 TABLET ORAL at 09:12

## 2018-01-04 RX ADMIN — ALLOPURINOL 100 MG: 100 TABLET ORAL at 09:12

## 2018-01-04 RX ADMIN — CARVEDILOL 3.12 MG: 3.12 TABLET, FILM COATED ORAL at 09:12

## 2018-01-04 NOTE — PROGRESS NOTES
Hospitalist Progress Note    Patient: Annamarie Singh MRN: [id number]  SSN: [id number]    YOB: 1927  Age: 80 y.o. Sex: female      Admit Date: 12/22/2017    LOS: 12 days     Subjective:     80year old CF with a PMH of CAD, HTN, gout, and peripheral neuropathy that presented to the ER with sepsis from Strep bacteremia and was also found to have an NSTEMI. Cardiology evaluated her and treated conservatively. ID evaluated the patient and will treat with IV Rocephin until 1/22/18. 1/3 - The patient feels good today. A little tired after working with PT. Denies CP/SOB. Denies F/C/N/V.  1/4 - The patient feels better today. Denies CP/SOB. Denies dysuria. Denies F/C/N/V. Review of systems negative except stated above. Objective:     Visit Vitals    /68 (BP 1 Location: Left arm, BP Patient Position: Sitting)  Comment (BP Patient Position): Sitting up in the recliner    Pulse 82    Temp 97.8 °F (36.6 °C)    Resp 18    Ht 5' (1.524 m)    Wt 67.7 kg (149 lb 3.2 oz)    LMP Comment: hysterectomy    SpO2 94%    Breastfeeding No    BMI 29.14 kg/m2      Oxygen Therapy  O2 Sat (%): 94 % (01/04/18 1135)  Pulse via Oximetry: 67 beats per minute (01/04/18 0348)  O2 Device: Room air (01/04/18 0348)  O2 Flow Rate (L/min): 0 l/min (12/29/17 1408)  FIO2 (%): 21 % (12/31/17 1830)      Intake and Output:     Intake/Output Summary (Last 24 hours) at 01/04/18 1241  Last data filed at 01/04/18 0926   Gross per 24 hour   Intake              135 ml   Output              600 ml   Net             -465 ml         Physical Exam:   GENERAL: alert, cooperative, no distress, appears stated age  EYE: conjunctivae/corneas clear. PERRL. THROAT & NECK: normal and no erythema or exudates noted. LUNG: clear to auscultation bilaterally  HEART: regular rate and rhythm, S1S2, no murmur, no JVD  ABDOMEN: soft, non-tender, non-distended.  Bowel sounds normal.   EXTREMITIES:  No edema, 2+ pedal/radial pulses bilaterally  SKIN: no rash or abnormalities  NEUROLOGIC: Alert. Cranial nerves 2-12 grossly intact. Lab/Data Review:  Recent Results (from the past 24 hour(s))   GLUCOSE, POC    Collection Time: 01/03/18  5:21 PM   Result Value Ref Range    Glucose (POC) 158 (H) 65 - 100 mg/dL   GLUCOSE, POC    Collection Time: 01/03/18  9:25 PM   Result Value Ref Range    Glucose (POC) 126 (H) 65 - 100 mg/dL   MAGNESIUM    Collection Time: 01/04/18  6:02 AM   Result Value Ref Range    Magnesium 2.1 1.8 - 2.4 mg/dL   GLUCOSE, POC    Collection Time: 01/04/18  7:20 AM   Result Value Ref Range    Glucose (POC) 86 65 - 100 mg/dL   GLUCOSE, POC    Collection Time: 01/04/18 11:17 AM   Result Value Ref Range    Glucose (POC) 118 (H) 65 - 100 mg/dL       Imaging:  Xr Chest Sngl V    Result Date: 12/26/2017  IMPRESSION: Stable mild volume overload. Xr Chest Pa Lat    Result Date: 12/28/2017  IMPRESSION: Small bilateral pleural effusions     Ct Chest W Cont    Result Date: 12/23/2017  IMPRESSION: No evidence of pulmonary embolism. Coronary artery disease. Small bilateral pleural effusions. Dependent atelectasis in left lower lobe. Status post cholecystectomy. Date of Dictation: 12/23/2017 10:46 PM    Xr Chest Port    Result Date: 12/23/2017  IMPRESSION: Minimal linear atelectasis of left lower lobe. Ct Urogram Wo Cont    Result Date: 12/27/2017  IMPRESSION: 1. No ureteral calculi or hydronephrosis. 2. Diverticulosis. 3. 5 cm cystic right adnexal lesion. Routine gynecologic follow-up is recommended with routine follow-up pelvic sonography. Duplex Lower Ext Venous Bilat    Result Date: 12/23/2017  IMPRESSION: No evidence of deep venous thrombosis in either lower extremity. Cultures:   All Micro Results     Procedure Component Value Units Date/Time    CULTURE, BLOOD [[id number]]  (Abnormal) Collected:  12/23/17 0004    Order Status:  Completed Specimen:  Blood from Blood Updated:  01/02/18 1255     Special Requests: -- LEFT  Antecubital       GRAM STAIN GRAM NEGATIVE RODS         ANAEROBIC BOTTLE POSITIVE                 RESULTS VERIFIED, PHONED TO AND READ BACK BY Betsey Camacho RN, [id number] ON 1/2/18 AK. Culture result:         ANAEROBIC GRAM NEGATIVE RODS (A)            SENT TO [facility] FOR TESTING  ON 1/2/18 AK. CULTURE IN PROGRESS,FURTHER UPDATES TO FOLLOW    C. DIFFICILE/EPI PCR [[id number]]     Order Status:  Canceled Specimen:  Stool     CULTURE, BLOOD [[id number]] Collected:  12/25/17 1134    Order Status:  Completed Specimen:  Blood from Blood Updated:  12/30/17 0814     Special Requests: --        RIGHT  HAND       Culture result: NO GROWTH 5 DAYS       CULTURE, BLOOD [[id number]] Collected:  12/25/17 1134    Order Status:  Completed Specimen:  Blood from Blood Updated:  12/30/17 0814     Special Requests: --        RIGHT  HAND       Culture result: NO GROWTH 5 DAYS       CULTURE, BLOOD [[id number]]  (Abnormal)  (Susceptibility) Collected:  12/23/17 0004    Order Status:  Completed Specimen:  Blood from Blood Updated:  12/26/17 0701     Special Requests: --        NO SPECIAL REQUESTS  RIGHT  Antecubital       GRAM STAIN GRAM POS COCCI IN CHAINS         ANAEROBIC BOTTLE POSITIVE                 RESULTS VERIFIED, PHONED TO AND READ BACK BY Anatoliy Fees @ 8326 ON 12/23/17 BY GÉNESIS. Culture result:         STREPTOCOCCUS ANGINOSUS (A)          Assessment & Plan:     1. Strep Anginosus Bacteremia - Will continue Rocephin - EOT 1/22/18 - to treat empirically for possible endocarditis. Blood cultures from 12/25/17 negative. PICC line placed. 2. Acute on Chronic Diastolic HF - Stable. +290 in last 24 hours due to giving IVFs for hypotension. Will continue Coreg and Demadex PO.    3. NSTEMI/CAD - Stable. Will continue ASA/Plavix. Continue Pravastatin. 4. Hypotension - Resolved. Likely from over diuresis. 5. Peripheral Neuropathy - Continue Gabapentin. 6. Dysphagia - Stable. SLP evaluated. Likely due to #7.    7. GERD - Continue Pepcid    8. Right Adnexal Cyst - Will need out patient gyn follow up    9.  Acute Diarrhea - Resolved    DIET CARDIAC Regular      Signed By: Venice Hankins DO     January 4, 2018

## 2018-01-04 NOTE — PROGRESS NOTES
Problem: Falls - Risk of  Goal: *Absence of Falls  Document Vanda Fall Risk and appropriate interventions in the flowsheet.     Outcome: Progressing Towards Goal  Fall Risk Interventions:  Mobility Interventions: Bed/chair exit alarm, Communicate number of staff needed for ambulation/transfer, PT Consult for assist device competence, Utilize walker, cane, or other assitive device, Strengthening exercises (ROM-active/passive), OT consult for ADLs, Utilize gait belt for transfers/ambulation, PT Consult for mobility concerns, Patient to call before getting OOB    Mentation Interventions: Bed/chair exit alarm, Door open when patient unattended, Evaluate medications/consider consulting pharmacy, Familiar objects from home, Family/sitter at bedside, Eyeglasses and hearing aids, Gait belt with transfers/ambulation, Increase mobility, More frequent rounding, Reorient patient, Self-releasing belt, Toileting rounds, Update white board, Room close to nurse's station    Medication Interventions: Bed/chair exit alarm, Patient to call before getting OOB    Elimination Interventions: Call light in reach, Elevated toilet seat, Patient to call for help with toileting needs, Toileting schedule/hourly rounds, Toilet paper/wipes in reach, Bed/chair exit alarm    History of Falls Interventions: Bed/chair exit alarm, Evaluate medications/consider consulting pharmacy, Door open when patient unattended, Consult care management for discharge planning, Investigate reason for fall

## 2018-01-04 NOTE — PROGRESS NOTES
Problem: Mobility Impaired (Adult and Pediatric)  Goal: *Therapy Goal (Edit Goal, Insert Text)  STG:  (1.)Ms. Galilea Chisholm will move from supine to sit and sit to supine , scoot up and down and roll side to side with STAND BY ASSIST within 4 day(s). (2.)Ms. Galilea Chisholm will transfer from bed to chair and chair to bed with STAND BY ASSIST using the least restrictive device within 4 day(s). (3.)Ms. Galilea Chisholm will ambulate with STAND BY ASSIST for 100 feet with the least restrictive device within 4 day(s). LTG:  (1.)Ms. Galilea Chisholm will move from supine to sit and sit to supine , scoot up and down and roll side to side in bed with MODIFIED INDEPENDENT within 7 day(s). (2.)Ms. Galilea Chisholm will transfer from bed to chair and chair to bed with MODIFIED INDEPENDENCE using the least restrictive device within 7 day(s). (3.)Ms. Galilea Chisholm will ambulate with MODIFIED INDEPENDENCE for 250 feet with the least restrictive device within 7 day(s). ________________________________________________________________________________________________       PHYSICAL THERAPY: Daily Note, Treatment Day: 6th, AM 1/4/2018  INPATIENT: Hospital Day: 14  Payor: FIRST CHOICE VIP CARE PLUS / [place] FIRST [place] C[place] / Product Type: Managed Care Medicare /      NAME/AGE/GENDER: Cornell Pack is a 80 y.o. female   PRIMARY DIAGNOSIS: Sepsis ([address]  Sepsis ([address] Sepsis ([address] Sepsis ([address]        ICD-10: Treatment Diagnosis:   · Difficulty in walking, Not elsewhere classified (R26.2)   Precaution/Allergies:  Bee pollen and Fire ant      ASSESSMENT:     Ms. Galilea Chisholm is supine on arrival, agreeable to PT. Pt performed supine to sit with min/CGA and then stood with min/CGA. Pt was able to ambulate 80' using rolling walker and min/CGA. Pt did better with turing today. Cues to keep looking forward during ambulation. Pt returned to room and was later taken to therapy gym where she was able to participate in group exercises as below.   Pt did require some increased verbal and manual assist to complete exercises correctly and thru full range. Pt was returned to room and left up in chair with needs in reach. Pt is making progress with overall mobiltiy. Will continue with POC. This section established at most recent assessment   PROBLEM LIST (Impairments causing functional limitations):  1. Decreased Strength  2. Decreased ADL/Functional Activities  3. Decreased Transfer Abilities  4. Decreased Ambulation Ability/Technique  5. Decreased Balance  6. Decreased Activity Tolerance  7. Decreased Pacing Skills  8. Decreased Flexibility/Joint Mobility  9. Decreased [place] with Home Exercise Program   INTERVENTIONS PLANNED: (Benefits and precautions of physical therapy have been discussed with the patient.)  1. Balance Exercise  2. Bed Mobility  3. Family Education  4. Gait Training  5. Home Exercise Program (HEP)  6. Range of Motion (ROM)  7. Therapeutic Activites  8. Therapeutic Exercise/Strengthening  9. Transfer Training     TREATMENT PLAN: Frequency/Duration: 3-5 times a week for duration of hospital stay  Rehabilitation Potential For Stated Goals: Good      RECOMMENDED REHABILITATION/EQUIPMENT: (at time of discharge pending progress): Due to the probability of continued deficits (see above) this patient will likely need continued skilled physical therapy after discharge. HISTORY:   History of Present Injury/Illness (Reason for Referral):  Patient is a 79 y/o F who presented with fever, chills/shaking, SOB, and a NBNB nausea/vomiting episode x1 that occurred a few hours ago. She is a very poor historian, limits history. No formal diagnosis of dementia. Says that she was brought here after she started shaking earlier this evening, and then had vomiting. Said she had a fever but cannot tell me the temperature. Denies any other new symptoms.   Denies CP, palpitations, cough, abd pain, urinary symptoms, diarrhea, new rashes/wounds.       10 systems reviewed and negative except as noted in HPI. Past Medical History/Comorbidities:   Ms. Saundra Stratton  has a past medical history of Acute kidney failure, unspecified; Arthritis; CAD (coronary artery disease); Cellulitis and abscess of other specified site; Coronary atherosclerosis of native coronary artery; Essential hypertension, benign (3/17/2015); Hypertension; Hypopotassemia; Mixed hyperlipidemia; Osteoarthritis (7/20/2017); Other and unspecified hyperlipidemia; Reflux esophagitis; Renal failure, unspecified; Shortness of breath; and Unspecified essential hypertension. Ms. Saundra Stratton  has a past surgical history that includes hx cholecystectomy; pr layr clos wnd face,facial <2.5cm; pr cardiac surg procedure unlist; hx cataract removal (Bilateral); and hx hysterectomy. Social History/Living Environment:   Home Environment: Apartment  # Steps to Enter: 0  One/Two Story Residence: One story  Living Alone: No  Support Systems: Child(jayleen), Friends \ neighbors, Family member(s), Rastafarian / jeffery community  Patient Expects to be Discharged to[de-identified] Private residence  Current DME Used/Available at Home: Cane, straight, Shower chair  Tub or Shower Type: Tub/Shower combination  Prior Level of Function/Work/Activity:  Patient was utilizing a 2 wheel rolling walker at home with ambulation. Dominant Side:         RIGHT  Personal Factors:          Sex:  female        Age:  80 y.o. Number of Personal Factors/Comorbidities that affect the Plan of Care: 1-2: MODERATE COMPLEXITY   EXAMINATION:   Most Recent Physical Functioning:   Gross Assessment:                  Posture:     Balance:  Sitting: Intact  Standing - Static: Good  Standing - Dynamic : Fair (to fair+) Bed Mobility:  Supine to Sit: Contact guard assistance;Minimum assistance  Scooting: Contact guard assistance  Wheelchair Mobility:     Transfers:  Sit to Stand: Contact guard assistance;Minimum assistance; Additional time  Stand to Sit: Contact guard assistance;Minimum assistance  Gait:     Base of Support: Narrowed  Speed/Mary: Pace decreased (<100 feet/min)  Step Length: Left shortened;Right shortened  Gait Abnormalities: Decreased step clearance  Distance (ft): 80 Feet (ft)  Assistive Device: Walker, rolling  Ambulation - Level of Assistance: Contact guard assistance;Minimal assistance  Interventions: Safety awareness training;Verbal cues      Body Structures Involved:  1. Bones  2. Joints  3. Muscles  4. Ligaments Body Functions Affected:  1. Neuromusculoskeletal  2. Movement Related  3. Skin Related  4. Metobolic/Endocrine Activities and Participation Affected:  1. General Tasks and Demands  2. Mobility  3. Self Care  4. Community, Social and [place]   Number of elements that affect the Plan of Care: 1-2: LOW COMPLEXITY   CLINICAL PRESENTATION:   Presentation: Evolving clinical presentation with changing clinical characteristics: MODERATE COMPLEXITY   CLINICAL DECISION MAKIN [facility] Mobility Inpatient Short Form  How much difficulty does the patient currently have. .. Unable A Lot A Little None   1. Turning over in bed (including adjusting bedclothes, sheets and blankets)? [] 1   [] 2   [x] 3   [] 4   2. Sitting down on and standing up from a chair with arms ( e.g., wheelchair, bedside commode, etc.)   [] 1   [] 2   [x] 3   [] 4   3. Moving from lying on back to sitting on the side of the bed? [] 1   [] 2   [x] 3   [] 4   How much help from another person does the patient currently need. .. Total A Lot A Little None   4. Moving to and from a bed to a chair (including a wheelchair)? [] 1   [] 2   [x] 3   [] 4   5. Need to walk in hospital room? [] 1   [] 2   [x] 3   [] 4   6. Climbing 3-5 steps with a railing? [] 1   [] 2   [x] 3   [] 4   © , Trustees of [address] [place] [address] [address] [address], under license to [employer].  All rights reserved      Score:  Initial: 18 Most Recent: X (Date: -- )    Interpretation of Tool:  Represents activities that are increasingly more difficult (i.e. Bed mobility, Transfers, Gait). Score 24 23 22-20 19-15 14-10 9-7 6     Modifier CH CI CJ CK CL CM CN      ? Mobility - Walking and Moving Around:     - CURRENT STATUS: CK - 40%-59% impaired, limited or restricted    - GOAL STATUS: CJ - 20%-39% impaired, limited or restricted    - D/C STATUS:  ---------------To be determined---------------  Payor: FIRST CHOICE VIP CARE PLUS [place]ARE [place]US / Product Type: Managed Care Medicare /      Medical Necessity:     · Patient demonstrates good rehab potential due to higher previous functional level. Reason for Services/Other Comments:  · Patient continues to require skilled intervention due to medical complications, patient unable to attend/participate in therapy as expected and decreased transfers, ambulation and mobility. Use of outcome tool(s) and clinical judgement create a POC that gives a: Clear prediction of patient's progress: LOW COMPLEXITY            TREATMENT:      Pre-treatment Symptoms/Complaints: pt very pleasant and talkative. Pain: Initial:      Post Session:  0/10        Therapeutic Activity: (   15 minutes): Therapeutic activities including Bed transfers, Chair transfers and Ambulation on level ground to improve mobility, strength, balance and coordination. Required minimal assist  Safety awareness training;Verbal cues to promote static and dynamic balance in standing and promote coordination of bilateral, lower extremity(s). Group Therapeutic Exercise: ( 10 minutes):  Exercises per grid below to improve mobility, strength and balance. Required minimal verbal and manual cues to promote proper body posture and promote proper body mechanics. Progressed range and repetitions as indicated.      Date:  12/27/17 Date:  12/29/17 Date:  1/3/18 Date:  1/4/18   Activity/Exercise Parameters Group exs AM Group exs AM Group exs AM   Seated AP X 15 B X 15 B X 15 B X 20 B   Seated LAQ X 15 B X 15 B X 20 B X 20 B   Seated Marching X 15 B X 15 B X 20 B X 20 B   Seated hip abd  X 15 B X 15 B X 20 B                            Braces/Orthotics/Lines/Etc:   · travis catheter  Treatment/Session Assessment:    · Response to Treatment: Tolerated well  · Interdisciplinary Collaboration:   o Physical Therapy Assistant  o Registered Nurse  · After treatment position/precautions:   o Up in chair  o Bed alarm/tab alert on  o Bed/Chair-wheels locked  o Call light within reach  o RN notified   · Compliance with Program/Exercises: compliant with encouragement  · Recommendations/Intent for next treatment session: \"Next visit will focus on advancements to more challenging activities, reduction in assistance provided and transfers, ambulation and mobility. \".   Total Treatment Duration:  PT Patient Time In/Time Out  Time In: 0945 (1150)  Time Out: 1000 (1200)    [facility]

## 2018-01-05 VITALS
BODY MASS INDEX: 29.76 KG/M2 | TEMPERATURE: 97.7 F | HEART RATE: 70 BPM | OXYGEN SATURATION: 98 % | RESPIRATION RATE: 17 BRPM | WEIGHT: 151.6 LBS | SYSTOLIC BLOOD PRESSURE: 126 MMHG | HEIGHT: 60 IN | DIASTOLIC BLOOD PRESSURE: 67 MMHG

## 2018-01-05 LAB
GLUCOSE BLD STRIP.AUTO-MCNC: 82 MG/DL (ref 65–100)
GLUCOSE BLD STRIP.AUTO-MCNC: 99 MG/DL (ref 65–100)

## 2018-01-05 PROCEDURE — 74011250637 HC RX REV CODE- 250/637: Performed by: INTERNAL MEDICINE

## 2018-01-05 PROCEDURE — 97530 THERAPEUTIC ACTIVITIES: CPT

## 2018-01-05 PROCEDURE — 82962 GLUCOSE BLOOD TEST: CPT

## 2018-01-05 PROCEDURE — 97150 GROUP THERAPEUTIC PROCEDURES: CPT

## 2018-01-05 PROCEDURE — 74011250636 HC RX REV CODE- 250/636: Performed by: INTERNAL MEDICINE

## 2018-01-05 PROCEDURE — 74011000250 HC RX REV CODE- 250: Performed by: INTERNAL MEDICINE

## 2018-01-05 RX ORDER — SAME BUTANEDISULFONATE/BETAINE 400-600 MG
250 POWDER IN PACKET (EA) ORAL 2 TIMES DAILY
Qty: 60 CAP | Refills: 0 | Status: SHIPPED
Start: 2018-01-05 | End: 2018-03-30

## 2018-01-05 RX ORDER — CARVEDILOL 3.12 MG/1
3.12 TABLET ORAL 2 TIMES DAILY WITH MEALS
Qty: 60 TAB | Refills: 1 | Status: SHIPPED
Start: 2018-01-05 | End: 2018-02-11

## 2018-01-05 RX ORDER — GABAPENTIN 100 MG/1
200 CAPSULE ORAL
Qty: 90 CAP | Refills: 0 | Status: SHIPPED | OUTPATIENT
Start: 2018-01-05 | End: 2018-02-19

## 2018-01-05 RX ORDER — LANOLIN ALCOHOL/MO/W.PET/CERES
1000 CREAM (GRAM) TOPICAL DAILY
Qty: 30 TAB | Refills: 0 | Status: SHIPPED
Start: 2018-01-06 | End: 2018-04-09

## 2018-01-05 RX ORDER — HYDROCODONE BITARTRATE AND ACETAMINOPHEN 7.5; 325 MG/1; MG/1
1 TABLET ORAL
Qty: 24 TAB | Refills: 0 | Status: SHIPPED | OUTPATIENT
Start: 2018-01-05 | End: 2018-02-19

## 2018-01-05 RX ORDER — COLCHICINE 0.6 MG/1
0.3 TABLET ORAL DAILY
Qty: 30 TAB | Refills: 0 | Status: SHIPPED
Start: 2018-01-06 | End: 2018-04-09

## 2018-01-05 RX ADMIN — ALLOPURINOL 100 MG: 100 TABLET ORAL at 08:31

## 2018-01-05 RX ADMIN — HEPARIN SODIUM 5000 UNITS: 5000 INJECTION, SOLUTION INTRAVENOUS; SUBCUTANEOUS at 16:00

## 2018-01-05 RX ADMIN — RDII 250 MG CAPSULE 250 MG: at 08:31

## 2018-01-05 RX ADMIN — ACETAMINOPHEN 650 MG: 325 TABLET ORAL at 05:40

## 2018-01-05 RX ADMIN — Medication 300 UNITS: at 14:00

## 2018-01-05 RX ADMIN — TORSEMIDE 20 MG: 20 TABLET ORAL at 08:31

## 2018-01-05 RX ADMIN — Medication 10 ML: at 05:43

## 2018-01-05 RX ADMIN — WATER 2 G: 1 INJECTION INTRAMUSCULAR; INTRAVENOUS; SUBCUTANEOUS at 08:36

## 2018-01-05 RX ADMIN — COLCHICINE 0.3 MG: 0.6 TABLET, FILM COATED ORAL at 08:32

## 2018-01-05 RX ADMIN — CYANOCOBALAMIN TAB 1000 MCG 1000 MCG: 1000 TAB at 08:31

## 2018-01-05 RX ADMIN — Medication 300 UNITS: at 05:40

## 2018-01-05 RX ADMIN — CLOPIDOGREL BISULFATE 75 MG: 75 TABLET ORAL at 08:31

## 2018-01-05 RX ADMIN — CARVEDILOL 3.12 MG: 3.12 TABLET, FILM COATED ORAL at 08:31

## 2018-01-05 RX ADMIN — HEPARIN SODIUM 5000 UNITS: 5000 INJECTION, SOLUTION INTRAVENOUS; SUBCUTANEOUS at 08:32

## 2018-01-05 RX ADMIN — ASPIRIN 81 MG: 81 TABLET, COATED ORAL at 08:31

## 2018-01-05 RX ADMIN — POTASSIUM CHLORIDE 20 MEQ: 20 TABLET, EXTENDED RELEASE ORAL at 08:31

## 2018-01-05 RX ADMIN — Medication 10 ML: at 14:00

## 2018-01-05 RX ADMIN — Medication 300 UNITS: at 06:00

## 2018-01-05 NOTE — PROGRESS NOTES
Physical Therapy Note:    PT present during PTA treatment to re-evaluate goals. Goals continue to be appropriate, so will continue current POC. No re-evaluation charged.     Thank you,  FUNMILAYO PulidoT

## 2018-01-05 NOTE — PROGRESS NOTES
Problem: Nutrition Deficit  Goal: *Optimize nutritional status  Nutrition  Reason for assessment: F/U  Assessment:   Diet order(s): cardiac  Food/Nutrition Patient History:  The patient reports a good appetite. No nutrition related questions/concerns. She continues to be diuresed, receiving demadex. Anthropometrics:Height: 5' (152.4 cm),  Weight: 68.8 kg (7th floor bed scale, 1/5)  Edema: 1+ to BLEs noted 1/4. Macronutrient needs:  EER:  7900-9809 kcal /day (15-20 kcal/kg listed BW)  EPR:  41-50 grams protein/day (0.9-1.1 grams/kg IBW)(GFR 40 as of 1/3)-h/o CKD  Intake/Comparative Standards: Average intake for past 7 day(s)/11 recorded meal(s): 70%. This potentially meets ~100% of kcal and ~100% of protein needs      Nutrition Diagnosis: No nutrition diagnosis.     Intervention:  Meals and snacks: Continue current diet. Discharge nutrition plan:  Too soon to determine.  Jason Sidhu [address], [address], [address] [place] [address], [phone]

## 2018-01-05 NOTE — PROGRESS NOTES
Problem: Mobility Impaired (Adult and Pediatric)  Goal: *Therapy Goal (Edit Goal, Insert Text)  STG:  (1.)Ms. Kj Mcdermott will move from supine to sit and sit to supine , scoot up and down and roll side to side with STAND BY ASSIST within 4 day(s). (2.)Ms. Kj Mcdermott will transfer from bed to chair and chair to bed with STAND BY ASSIST using the least restrictive device within 4 day(s). (3.)Ms. Kj Mcdermott will ambulate with STAND BY ASSIST for 100 feet with the least restrictive device within 4 day(s). LTG:  (1.)Ms. Kj Mcdermott will move from supine to sit and sit to supine , scoot up and down and roll side to side in bed with MODIFIED INDEPENDENT within 7 day(s). (2.)Ms. Kj Mcdermott will transfer from bed to chair and chair to bed with MODIFIED INDEPENDENCE using the least restrictive device within 7 day(s). (3.)Ms. Kj Mcdermott will ambulate with MODIFIED INDEPENDENCE for 250 feet with the least restrictive device within 7 day(s). ________________________________________________________________________________________________       PHYSICAL THERAPY: Treatment Day: Day of Assessment, AM 1/5/2018  INPATIENT: Hospital Day: 15  Payor: FIRST CHOICE VIP CARE PLUS [place] / Product Type: Managed Care Medicare /      NAME/AGE/GENDER: Lukas Hayward is a 80 y.o. female   PRIMARY DIAGNOSIS: Sepsis ([address]  Sepsis ([address] Sepsis ([address] Sepsis ([address]        ICD-10: Treatment Diagnosis:   · Difficulty in walking, Not elsewhere classified (R26.2)   Precaution/Allergies:  Bee pollen and Fire ant      ASSESSMENT:     Ms. Kj Mcdermott is supine on arrival, agreeable to PT. Pt is seen this morning with Andi Claudio PT. Pt performed supine to sit with SBA and additional time. Pt stood and was able to ambulate about 110' with rolling walker and CGA. Pt with increased gait speed this morning. Pt returned to chair in room. Pt was later taken to therapy gym where she was able to participate in group exercises as below.   Pt continues to make good progress with therapy and overall mobility. Will continue with POC. This section established at most recent assessment   PROBLEM LIST (Impairments causing functional limitations):  1. Decreased Strength  2. Decreased ADL/Functional Activities  3. Decreased Transfer Abilities  4. Decreased Ambulation Ability/Technique  5. Decreased Balance  6. Decreased Activity Tolerance  7. Decreased Pacing Skills  8. Decreased Flexibility/Joint Mobility  9. Decreased [place] with Home Exercise Program   INTERVENTIONS PLANNED: (Benefits and precautions of physical therapy have been discussed with the patient.)  1. Balance Exercise  2. Bed Mobility  3. Family Education  4. Gait Training  5. Home Exercise Program (HEP)  6. Range of Motion (ROM)  7. Therapeutic Activites  8. Therapeutic Exercise/Strengthening  9. Transfer Training     TREATMENT PLAN: Frequency/Duration: 3-5 times a week for duration of hospital stay  Rehabilitation Potential For Stated Goals: Good      RECOMMENDED REHABILITATION/EQUIPMENT: (at time of discharge pending progress): Due to the probability of continued deficits (see above) this patient will likely need continued skilled physical therapy after discharge. HISTORY:   History of Present Injury/Illness (Reason for Referral):  Patient is a 81 y/o F who presented with fever, chills/shaking, SOB, and a NBNB nausea/vomiting episode x1 that occurred a few hours ago. She is a very poor historian, limits history. No formal diagnosis of dementia. Says that she was brought here after she started shaking earlier this evening, and then had vomiting. Said she had a fever but cannot tell me the temperature. Denies any other new symptoms. Denies CP, palpitations, cough, abd pain, urinary symptoms, diarrhea, new rashes/wounds.       10 systems reviewed and negative except as noted in HPI.   Past Medical History/Comorbidities:   Ms. Dexter Postal  has a past medical history of Acute kidney failure, unspecified; Arthritis; CAD (coronary artery disease); Cellulitis and abscess of other specified site; Coronary atherosclerosis of native coronary artery; Essential hypertension, benign (3/17/2015); Hypertension; Hypopotassemia; Mixed hyperlipidemia; Osteoarthritis (7/20/2017); Other and unspecified hyperlipidemia; Reflux esophagitis; Renal failure, unspecified; Shortness of breath; and Unspecified essential hypertension. Ms. David Bernal  has a past surgical history that includes hx cholecystectomy; pr layr clos wnd face,facial <2.5cm; pr cardiac surg procedure unlist; hx cataract removal (Bilateral); and hx hysterectomy. Social History/Living Environment:   Home Environment: Apartment  # Steps to Enter: 0  One/Two Story Residence: One story  Living Alone: No  Support Systems: Child(jayleen), Friends \ neighbors, Family member(s), Jew / jeffery community  Patient Expects to be Discharged to[de-identified] Private residence  Current DME Used/Available at Home: Cane, straight, Shower chair  Tub or Shower Type: Tub/Shower combination  Prior Level of Function/Work/Activity:  Patient was utilizing a 2 wheel rolling walker at home with ambulation. Dominant Side:         RIGHT  Personal Factors:          Sex:  female        Age:  80 y.o.    Number of Personal Factors/Comorbidities that affect the Plan of Care: 1-2: MODERATE COMPLEXITY   EXAMINATION:   Most Recent Physical Functioning:   Gross Assessment:                  Posture:     Balance:  Sitting: Intact  Sitting - Static: Good (unsupported)  Sitting - Dynamic: Good (unsupported)  Standing - Static: Good  Standing - Dynamic : Fair (to fair+) Bed Mobility:  Supine to Sit: Stand-by asssistance  Wheelchair Mobility:     Transfers:  Sit to Stand: Contact guard assistance  Stand to Sit: Contact guard assistance  Gait:     Base of Support: Narrowed  Speed/Mary: Accelerated  Step Length: Left shortened;Right shortened  Gait Abnormalities: Decreased step clearance  Distance (ft): 110 Feet (ft)  Assistive Device: Walker, rolling  Ambulation - Level of Assistance: Contact guard assistance  Interventions: Safety awareness training;Verbal cues      Body Structures Involved:  1. Bones  2. Joints  3. Muscles  4. Ligaments Body Functions Affected:  1. Neuromusculoskeletal  2. Movement Related  3. Skin Related  4. Metobolic/Endocrine Activities and Participation Affected:  1. General Tasks and Demands  2. Mobility  3. Self Care  4. Community, Social and [place]   Number of elements that affect the Plan of Care: 1-2: LOW COMPLEXITY   CLINICAL PRESENTATION:   Presentation: Evolving clinical presentation with changing clinical characteristics: MODERATE COMPLEXITY   CLINICAL DECISION MAKIN [facility] Mobility Inpatient Short Form  How much difficulty does the patient currently have. .. Unable A Lot A Little None   1. Turning over in bed (including adjusting bedclothes, sheets and blankets)? [] 1   [] 2   [x] 3   [] 4   2. Sitting down on and standing up from a chair with arms ( e.g., wheelchair, bedside commode, etc.)   [] 1   [] 2   [x] 3   [] 4   3. Moving from lying on back to sitting on the side of the bed? [] 1   [] 2   [x] 3   [] 4   How much help from another person does the patient currently need. .. Total A Lot A Little None   4. Moving to and from a bed to a chair (including a wheelchair)? [] 1   [] 2   [x] 3   [] 4   5. Need to walk in hospital room? [] 1   [] 2   [x] 3   [] 4   6. Climbing 3-5 steps with a railing? [] 1   [] 2   [x] 3   [] 4   © , Trustees of [address] [place] [id number], under license to [employer]. All rights reserved      Score:  Initial: 18 Most Recent: X (Date: -- )    Interpretation of Tool:  Represents activities that are increasingly more difficult (i.e. Bed mobility, Transfers, Gait). Score 24 23 22-20 19-15 14-10 9-7 6     Modifier CH CI CJ CK CL CM CN      ?  Mobility - Walking and Moving Around:     - CURRENT STATUS: CK - 40%-59% impaired, limited or restricted    - GOAL STATUS: CJ - 20%-39% impaired, limited or restricted    - D/C STATUS:  ---------------To be determined---------------  Payor: FIRST CHOICE VIP CARE PLUS [place] VIP CARE PLUS / Product Type: Managed Care Medicare /      Medical Necessity:     · Patient demonstrates good rehab potential due to higher previous functional level. Reason for Services/Other Comments:  · Patient continues to require skilled intervention due to medical complications, patient unable to attend/participate in therapy as expected and decreased transfers, ambulation and mobility. Use of outcome tool(s) and clinical judgement create a POC that gives a: Clear prediction of patient's progress: LOW COMPLEXITY            TREATMENT:      Pre-treatment Symptoms/Complaints: pt very pleasant and talkative. Pain: Initial:      Post Session:  0/10        Therapeutic Activity: (   13 minutes): Therapeutic activities including Bed transfers, Chair transfers and Ambulation on level ground to improve mobility, strength, balance and coordination. Required minimal assist  Safety awareness training;Verbal cues to promote static and dynamic balance in standing and promote coordination of bilateral, lower extremity(s). Group Therapeutic Exercise: ( 10 minutes):  Exercises per grid below to improve mobility, strength and balance. Required minimal verbal and manual cues to promote proper body posture and promote proper body mechanics. Progressed range and repetitions as indicated.      Date:  12/27/17 Date:  12/29/17 Date:  1/3/18 Date:  1/4/18 Date:  1/5/18   Activity/Exercise Parameters Group exs AM Group exs AM Group exs AM Group exs AM   Seated AP X 15 B X 15 B X 15 B X 20 B X 20 B   Seated LAQ X 15 B X 15 B X 20 B X 20 B X 20 B   Seated Marching X 15 B X 15 B X 20 B X 20 B X 20 B   Seated hip abd  X 15 B X 15 B X 20 B X 20 B Braces/Orthotics/Lines/Etc:   · travis catheter  Treatment/Session Assessment:    · Response to Treatment: Tolerated well  · Interdisciplinary Collaboration:   o Physical Therapy Assistant  o Registered Nurse  · After treatment position/precautions:   o Up in chair  o Bed alarm/tab alert on  o Bed/Chair-wheels locked  o Call light within reach  o RN notified   · Compliance with Program/Exercises: compliant with encouragement  · Recommendations/Intent for next treatment session: \"Next visit will focus on advancements to more challenging activities, reduction in assistance provided and transfers, ambulation and mobility. \".   Total Treatment Duration:  PT Patient Time In/Time Out  Time In: 1015 (1145)  Time Out: 1028 (1155)    [facility]

## 2018-01-05 NOTE — PROGRESS NOTES
Visit with patient to build rapport with . Patient is calm. Receptive to  presence. Encouraged and assured patient of our continued care.     Fabian Bergman,  Staff   C: [phone]  /  [email or web address]

## 2018-01-05 NOTE — PROGRESS NOTES
Problem: Self Care Deficits Care Plan (Adult)  Goal: *Acute Goals and Plan of Care (Insert Text)  1. Patient will complete lower body bathing and dressing with minimal assistance and adaptive equipment as needed. 2. Patient will complete toileting with supervision. 3. Patient will tolerate 25 minutes of OT treatment with 2-3 rest breaks to increase activity tolerance for ADLs. 4. Patient will complete functional transfers with supervision and adaptive equipment as needed. 5. Patient will complete functional mobility with supervision for household distances and with minimal cues for safety. Timeframe: 7 visits       OCCUPATIONAL THERAPY: Daily Note, Treatment Day: 5th and AM    1/5/2018  INPATIENT: Hospital Day: 15  Payor: FIRST CHOICE VIP CARE PLUS / [place] FIRST CHOICE VIP CARE PLUS / Product Type: Managed Care Medicare /      NAME/AGE/GENDER: Angela Dumont is a 80 y.o. female   PRIMARY DIAGNOSIS:  Sepsis ([address]  Sepsis ([address] Sepsis ([address] Sepsis ([address]        ICD-10: Treatment Diagnosis:    · Generalized Muscle Weakness (M62.81)  · Other lack of cordination (R27.8)   Precautions/Allergies:     Bee pollen and Fire ant      ASSESSMENT:     Ms. Neo Garcia presents to the hospital with sepsis. 1/5/2018 Pt was presented up in the chair upon arrival. Pt transferred to standing with a rolling walker and CGA and completed functional mobility to the gym to participate  in group exercises to increase strength for mobility and ADL's. Pt required visual, verbal, and manual cues to complete exercises with proper form. Pt participated with good effort and was taken back to her room post treatment with belongings in reach and posey on. Pt will continue to benefit from OT to increase progression toward above goals. Continue POC. This section established at most recent assessment   PROBLEM LIST (Impairments causing functional limitations):  1. Decreased Strength  2. Decreased ADL/Functional Activities  3.  Decreased Transfer Abilities  4. Decreased Ambulation Ability/Technique  5. Decreased Balance  6. Decreased Activity Tolerance  7. Decreased Flexibility/Joint Mobility  8. Edema/Girth  9. Decreased [place] with Home Exercise Program  10. Decreased Cognition   INTERVENTIONS PLANNED: (Benefits and precautions of occupational therapy have been discussed with the patient.)  1. Activities of daily living training  2. Adaptive equipment training  3. Balance training  4. Clothing management  5. Cognitive training  6. Donning&doffing training  7. Group therapy  8. Neuromuscular re-eduation  9. Therapeutic activity  10. Therapeutic exercise     TREATMENT PLAN: Frequency/Duration: Follow patient 3 times per week to address above goals. Rehabilitation Potential For Stated Goals: Excellent     RECOMMENDED REHABILITATION/EQUIPMENT: (at time of discharge pending progress): Due to the probability of continued deficits (see above) this patient will likely need continued skilled occupational therapy after discharge. Equipment:    TBD              OCCUPATIONAL PROFILE AND HISTORY:   History of Present Injury/Illness (Reason for Referral):  See H&P  Past Medical History/Comorbidities:   Ms. David Bernal  has a past medical history of Acute kidney failure, unspecified; Arthritis; CAD (coronary artery disease); Cellulitis and abscess of other specified site; Coronary atherosclerosis of native coronary artery; Essential hypertension, benign (3/17/2015); Hypertension; Hypopotassemia; Mixed hyperlipidemia; Osteoarthritis (7/20/2017); Other and unspecified hyperlipidemia; Reflux esophagitis; Renal failure, unspecified; Shortness of breath; and Unspecified essential hypertension. Ms. David Bernal  has a past surgical history that includes hx cholecystectomy; pr layr clos wnd face,facial <2.5cm; pr cardiac surg procedure unlist; hx cataract removal (Bilateral); and hx hysterectomy.   Social History/Living Environment:   Home Environment: Apartment  # Steps to Enter: 0  One/Two Story Residence: One story  Living Alone: No  Support Systems: Child(jayleen), Friends \ neighbors, Family member(s), [employer] community  Patient Expects to be Discharged to[de-identified] Private residence  Current DME Used/Available at Home: [employer], straight, Shower chair  Tub or Shower Type: Tub/Shower combination  Prior Level of Function/Work/Activity:  Pt lives with her son. Pt's son is at home with her at all times. Pt has a walker but completes functional mobility with a cane primarily. Reports no recent falls. Pt completes ADL with occasional assistance from her daughter. Pt still participated in laundry and cooking tasks. Personal Factors:          Past/Current Experience:  Hx of knee pain with difficulty with prolonged standing        Other factors that influence how disability is experienced by the patient:  Multiple co-morbidities (see above)   Number of Personal Factors/Comorbidities that affect the Plan of Care: Expanded review of therapy/medical records (1-2):  MODERATE COMPLEXITY   ASSESSMENT OF OCCUPATIONAL PERFORMANCE[de-identified]   Activities of Daily Living:             Basic ADLs (From Assessment) Complex ADLs (From Assessment)   Basic ADL  Feeding: Stand-by assistance  Oral Facial Hygiene/Grooming: Minimum assistance  Bathing: Moderate assistance  Upper Body Dressing: Moderate assistance  Lower Body Dressing: Moderate assistance  Toileting: Moderate assistance Instrumental ADL  Meal Preparation: Moderate assistance  Homemaking: Maximum assistance   Grooming/Bathing/Dressing Activities of Daily Living                             Bed/Mat Mobility  Supine to Sit: Stand-by asssistance  Sit to Stand: Contact guard assistance       Most Recent Physical Functioning:   Gross Assessment:                  Posture:  Posture (WDL): Exceptions to WDL  Posture Assessment:  Forward head, Rounded shoulders  Balance:  Sitting: Intact  Sitting - Static: Good (unsupported)  Sitting - Dynamic: Good (unsupported)  Standing - Static: Good  Standing - Dynamic : Fair (to fair+) Bed Mobility:  Supine to Sit: Stand-by asssistance  Wheelchair Mobility:     Transfers:  Sit to Stand: Contact guard assistance  Stand to Sit: Contact guard assistance              Patient Vitals for the past 6 hrs:   BP BP Patient Position SpO2 Pulse   18 1128 114/61 Sitting 95 % (!) 106       Mental Status  Neurologic State: Alert, Confused  Orientation Level: Disoriented to time, Oriented to person, Oriented to place, Oriented to situation  Cognition: Follows commands  Perception: Appears intact  Perseveration: No perseveration noted  Safety/Judgement: Awareness of environment                          Physical Skills Involved:  1. Balance  2. Strength  3. Activity Tolerance Cognitive Skills Affected (resulting in the inability to perform in a timely and safe manner):  1. Executive Function  2. Sustained Attention Psychosocial Skills Affected:  1. Habits/Routines  2. Environmental Adaptation  3. Self-Awareness   Number of elements that affect the Plan of Care: 5+:  HIGH COMPLEXITY   CLINICAL DECISION MAKIN[address] [place] [id number] AM-PAC 6 Clicks   Daily Activity Inpatient Short Form  How much help from another person does the patient currently need. .. Total A Lot A Little None   1. Putting on and taking off regular lower body clothing? [] 1   [x] 2   [] 3   [] 4   2. Bathing (including washing, rinsing, drying)? [] 1   [x] 2   [] 3   [] 4   3. Toileting, which includes using toilet, bedpan or urinal?   [] 1   [x] 2   [] 3   [] 4   4. Putting on and taking off regular upper body clothing? [] 1   [x] 2   [] 3   [] 4   5. Taking care of personal grooming such as brushing teeth? [] 1   [] 2   [x] 3   [] 4   6. Eating meals? [] 1   [] 2   [x] 3   [] 4   © , Trustees of [address] [place] [address] [address] [address], under license to [employer].  All rights reserved      Score:  Initial: 14 Most Recent: X (Date: -- )    Interpretation of Tool: Represents activities that are increasingly more difficult (i.e. Bed mobility, Transfers, Gait). Score 24 23 22-20 19-15 14-10 9-7 6     Modifier CH CI CJ CK CL CM CN      ? Self Care:     - CURRENT STATUS: CL - 60%-79% impaired, limited or restricted    - GOAL STATUS: CK - 40%-59% impaired, limited or restricted    - D/C STATUS:  ---------------To be determined---------------  Payor: FIRST CHOICE VIP CARE PLUS / [place] FIRST CHOICE VIP CARE PLUS / Product Type: Managed Care Medicare /      Medical Necessity:     · Patient demonstrates excellent rehab potential due to higher previous functional level. Reason for Services/Other Comments:  · Patient continues to require skilled intervention due to decreased independence with ADL/functional transfers. Use of outcome tool(s) and clinical judgement create a POC that gives a: LOW COMPLEXITY         TREATMENT:   (In addition to Assessment/Re-Assessment sessions the following treatments were rendered)     Pre-treatment Symptoms/Complaints:    Pain: Initial:   Pain Intensity 1: 0  Pain Location 1: Knee  Pain Intervention(s) 1:  (RN administering pain meds upon arrival)  Post Session:  Pt stated that her knee pain was better after walking. Group Therapeutic Exercise: (10 minutes):  Exercises per grid below to improve mobility and strength. Required maximal visual, verbal and manual cues to promote proper body mechanics. Progressed repetitions as indicated.    Date:  12/29/17 Date:  1/1/18 Date:  1/3/18 Date:  1/5/18   Activity/Exercise Parameters Parameters Parameters    Shoulder flexion/extension 15 reps with a yellow theraband 15 reps with a dowel 15 reps with a dowel 15 reps with a dowel   Shoulder horizontal add/abb 15 reps with a yellow theraband 15 reps with a yellow theraband 15 reps with a yellow theraband 15 reps with a yellow theraband   Punches 15 reps with a yellow theraband 15 reps with a dowel 15 reps with a dowel 15 reps with a dowel   Elbow flexion/extension 15 reps with a yellow theraband 15 reps with a dowel 15 reps with a dowel 15 reps with a dowel   Tricep extension       Balloon tapping       Ball toss                         Braces/Orthotics/Lines/Etc:   · IV  Treatment/Session Assessment:    · Response to Treatment:  Good  participation. · Interdisciplinary Collaboration:   o Certified Occupational Therapy Assistant  o Registered Nurse  · After treatment position/precautions:   o Up in chair  o Bed alarm/tab alert on  o Bed/Chair-wheels locked  o Call light within reach  o RN notified   · Compliance with Program/Exercises: Will assess as treatment progresses. · Recommendations/Intent for next treatment session: \"Next visit will focus on advancements to more challenging activities and reduction in assistance provided\".   Total Treatment Duration:  OT Patient Time In/Time Out  Time In: 1135  Time Out: [facility]

## 2018-01-05 NOTE — PROGRESS NOTES
Hospitalist Progress Note    Patient: Marshall Dowling MRN: [id number]  SSN: [id number]    YOB: 1927  Age: 80 y.o. Sex: female      Admit Date: 12/22/2017    LOS: 13 days     Subjective:     80year old CF with a PMH of CAD, HTN, gout, and peripheral neuropathy that presented to the ER with sepsis from Strep bacteremia and was also found to have an NSTEMI. Cardiology evaluated her and treated conservatively. ID evaluated the patient and will treat with IV Rocephin until 1/22/18. 1/3 - The patient feels good today. A little tired after working with PT. Denies CP/SOB. Denies F/C/N/V.  1/4 - The patient feels better today. Denies CP/SOB. Denies dysuria. Denies F/C/N/V.  1/5 - She continues to feel good. No new acute complaints. Review of systems negative except stated above. Objective:     Visit Vitals    /61 (BP 1 Location: Left arm, BP Patient Position: Sitting)  Comment (BP Patient Position): Sitting up in the recliner    Pulse (!) 106    Temp 98.1 °F (36.7 °C)    Resp 18    Ht 5' (1.524 m)    Wt 68.8 kg (151 lb 9.6 oz)    LMP Comment: hysterectomy    SpO2 95%    Breastfeeding No    BMI 29.61 kg/m2      Oxygen Therapy  O2 Sat (%): 95 % (01/05/18 1128)  Pulse via Oximetry: 69 beats per minute (01/05/18 0551)  O2 Device: Room air (01/05/18 0551)  O2 Flow Rate (L/min): 0 l/min (12/29/17 1408)  FIO2 (%): 21 % (12/31/17 1830)      Intake and Output:     Intake/Output Summary (Last 24 hours) at 01/05/18 1203  Last data filed at 01/05/18 0906   Gross per 24 hour   Intake              180 ml   Output              720 ml   Net             -540 ml         Physical Exam:   GENERAL: alert, cooperative, no distress, appears stated age  EYE: conjunctivae/corneas clear. PERRL. THROAT & NECK: normal and no erythema or exudates noted. LUNG: clear to auscultation bilaterally  HEART: regular rate and rhythm, S1S2, no murmur, no JVD  ABDOMEN: soft, non-tender, non-distended.  Bowel sounds normal.   EXTREMITIES:  No edema, 2+ pedal/radial pulses bilaterally  SKIN: no rash or abnormalities  NEUROLOGIC: Alert. Cranial nerves 2-12 grossly intact. Lab/Data Review:  Recent Results (from the past 24 hour(s))   GLUCOSE, POC    Collection Time: 01/04/18  4:28 PM   Result Value Ref Range    Glucose (POC) 106 (H) 65 - 100 mg/dL   GLUCOSE, POC    Collection Time: 01/04/18  9:52 PM   Result Value Ref Range    Glucose (POC) 113 (H) 65 - 100 mg/dL   GLUCOSE, POC    Collection Time: 01/05/18  7:13 AM   Result Value Ref Range    Glucose (POC) 82 65 - 100 mg/dL   GLUCOSE, POC    Collection Time: 01/05/18 11:27 AM   Result Value Ref Range    Glucose (POC) 99 65 - 100 mg/dL       Imaging:  Xr Chest Sngl V    Result Date: 12/26/2017  IMPRESSION: Stable mild volume overload. Xr Chest Pa Lat    Result Date: 12/28/2017  IMPRESSION: Small bilateral pleural effusions     Ct Chest W Cont    Result Date: 12/23/2017  IMPRESSION: No evidence of pulmonary embolism. Coronary artery disease. Small bilateral pleural effusions. Dependent atelectasis in left lower lobe. Status post cholecystectomy. Date of Dictation: 12/23/2017 10:46 PM    Xr Chest Port    Result Date: 12/23/2017  IMPRESSION: Minimal linear atelectasis of left lower lobe. Ct Urogram Wo Cont    Result Date: 12/27/2017  IMPRESSION: 1. No ureteral calculi or hydronephrosis. 2. Diverticulosis. 3. 5 cm cystic right adnexal lesion. Routine gynecologic follow-up is recommended with routine follow-up pelvic sonography. Duplex Lower Ext Venous Bilat    Result Date: 12/23/2017  IMPRESSION: No evidence of deep venous thrombosis in either lower extremity. Cultures:   All Micro Results     Procedure Component Value Units Date/Time    CULTURE, BLOOD [[id number]]  (Abnormal) Collected:  12/23/17 0004    Order Status:  Completed Specimen:  Blood from Blood Updated:  01/02/18 1255     Special Requests: --        LEFT  Antecubital       GRAM STAIN GRAM NEGATIVE RODS ANAEROBIC BOTTLE POSITIVE                 RESULTS VERIFIED, PHONED TO AND READ BACK BY Tiana Cam RN, [id number] ON 1/2/18 AK. Culture result:         ANAEROBIC GRAM NEGATIVE RODS (A)            SENT TO [facility] [employer] FOR TESTING  ON 1/2/18 AK. CULTURE IN PROGRESS,FURTHER UPDATES TO FOLLOW    C. DIFFICILE/EPI PCR [[id number]]     Order Status:  Canceled Specimen:  Stool     CULTURE, BLOOD [[id number]] Collected:  12/25/17 1134    Order Status:  Completed Specimen:  Blood from Blood Updated:  12/30/17 0814     Special Requests: --        RIGHT  HAND       Culture result: NO GROWTH 5 DAYS       CULTURE, BLOOD [[id number]] Collected:  12/25/17 1134    Order Status:  Completed Specimen:  Blood from Blood Updated:  12/30/17 0814     Special Requests: --        RIGHT  HAND       Culture result: NO GROWTH 5 DAYS       CULTURE, BLOOD [[id number]]  (Abnormal)  (Susceptibility) Collected:  12/23/17 0004    Order Status:  Completed Specimen:  Blood from Blood Updated:  12/26/17 0701     Special Requests: --        NO SPECIAL REQUESTS  RIGHT  Antecubital       GRAM STAIN GRAM POS COCCI IN CHAINS         ANAEROBIC BOTTLE POSITIVE                 RESULTS VERIFIED, PHONED TO AND READ BACK BY Jason Miri @ 5427 ON 12/23/17 BY NKTAB. Culture result:         STREPTOCOCCUS ANGINOSUS (A)          Assessment & Plan:     1. Strep Anginosus Bacteremia - Will continue Rocephin - EOT 1/22/18 - to treat empirically for possible endocarditis. Blood cultures from 12/25/17 negative. PICC line placed. 2. Acute on Chronic Diastolic HF - Stable. -575ml in last 24 hours. Will continue Coreg and Demadex PO.    3. NSTEMI/CAD - Stable. Will continue ASA/Plavix. Continue Pravastatin. 4. Hypotension - Resolved. Likely from over diuresis. 5. Peripheral Neuropathy - Continue Gabapentin. 6. Dysphagia - Stable. SLP evaluated. Likely due to #7.    7. GERD - Continue Pepcid    8.  Right Adnexal Cyst - Will need out patient gyn follow up    9.  Acute Diarrhea - Resolved    DIET CARDIAC Regular      Signed By: Margareth Stark DO     January 5, 2018

## 2018-01-05 NOTE — PROGRESS NOTES
Problem: Falls - Risk of  Goal: *Absence of Falls  Document Vanda Fall Risk and appropriate interventions in the flowsheet.     Outcome: Progressing Towards Goal  Fall Risk Interventions:  Mobility Interventions: Bed/chair exit alarm, Patient to call before getting OOB, Strengthening exercises (ROM-active/passive)    Mentation Interventions: Bed/chair exit alarm, More frequent rounding, Door open when patient unattended    Medication Interventions: Assess postural VS orthostatic hypotension, Bed/chair exit alarm, Patient to call before getting OOB, Teach patient to arise slowly    Elimination Interventions: Call light in reach, Toileting schedule/hourly rounds, Patient to call for help with toileting needs    History of Falls Interventions: Bed/chair exit alarm

## 2018-01-05 NOTE — PROGRESS NOTES
TRANSFER - OUT REPORT:    Verbal report given to SAM Hernandes(name) on [place]  being transferred to [facility](unit) for routine progression of care       Report consisted of patients Situation, Background, Assessment and   Recommendations(SBAR). Information from the following report(s) SBAR, Kardex, [facility] and Recent Results was reviewed with the receiving nurse. Lines:   PICC Single Lumen 59/47/14 Right;Basilic (Active)   Central Line Being Utilized Yes 1/5/2018 12:45 PM   Criteria for Appropriate Use Long term IV/antibiotic administration 1/5/2018 12:45 PM   Site Assessment Clean, dry, & intact 1/5/2018 12:45 PM   Phlebitis Assessment 0 1/5/2018 12:45 PM   Infiltration Assessment 0 1/5/2018 12:45 PM   Arm Circumference (cm) 28 cm 12/28/2017  4:43 PM   Date of Last Dressing Change 01/04/18 1/5/2018 12:45 PM   Dressing Status Clean, dry, & intact 1/5/2018 12:45 PM   External Catheter Length (cm) 0 centimeters 12/28/2017  4:43 PM   Dressing Type Disk with Chlorhexadine gluconate (CHG); Transparent;Stabilization/securement device 1/5/2018 12:45 PM   Hub Color/Line Status Patent; Flushed 1/5/2018 12:45 PM   Action Taken Dressing changed 1/4/2018 12:30 PM   Positive Blood Return (Site #1) Yes 1/5/2018 12:45 PM   Alcohol Cap Used No 1/5/2018 12:54 AM        Opportunity for questions and clarification was provided.       Patient transported with:   [employer]

## 2018-01-05 NOTE — PROGRESS NOTES
Scheduled for transport today at 1530 via stretcher by [facility] ambulance to [facility]. Family and patient aware. Care Management Interventions  PCP Verified by CM: Yes  Mode of Transport at Discharge:  Other (see comment) (Family)  Transition of Care Consult (CM Consult): Discharge Planning  Physical Therapy Consult: Yes  Occupational Therapy Consult: Yes  Current Support Network: Lives Alone, Own Home, Family Lives Nearby  Confirm Follow Up Transport: Family  Discharge Location  Discharge Placement: Rehab Unit Subacute ([facility])

## 2018-01-08 ENCOUNTER — HOSPITAL ENCOUNTER (OUTPATIENT)
Dept: LAB | Age: 83
Discharge: HOME | End: 2018-01-08

## 2018-01-08 ENCOUNTER — PATIENT OUTREACH (OUTPATIENT)
Dept: CASE MANAGEMENT | Age: 83
End: 2018-01-08

## 2018-01-08 LAB
BACTERIA SPEC CULT: NORMAL
GRAM STN SPEC: NORMAL
SERVICE CMNT-IMP: NORMAL

## 2018-01-08 PROCEDURE — 80048 BASIC METABOLIC PNL TOTAL CA: CPT

## 2018-01-08 NOTE — PROGRESS NOTES
Patient discharged to a SNF Preferred Provider Network. Patient will be included in weekly care coordination calls. CM will forward to Tien Angeles RN to follow. This note will not be viewable in [address]

## 2018-01-09 LAB
ANION GAP SERPL CALC-SCNC: 8 MMOL/L (ref 7–16)
BUN SERPL-MCNC: 43 MG/DL (ref 8–23)
CALCIUM SERPL-MCNC: 8.9 MG/DL (ref 8.3–10.4)
CHLORIDE SERPL-SCNC: 104 MMOL/L (ref 98–107)
CO2 SERPL-SCNC: 32 MMOL/L (ref 21–32)
CREAT SERPL-MCNC: 1.3 MG/DL (ref 0.6–1)
GLUCOSE SERPL-MCNC: 82 MG/DL (ref 65–100)
Lab: NORMAL
POTASSIUM SERPL-SCNC: 4.4 MMOL/L (ref 3.5–5.1)
REFERENCE LAB,REFLB: NORMAL
SODIUM SERPL-SCNC: 144 MMOL/L (ref 136–145)
TEST DESCRIPTION:,ATST: NORMAL

## 2018-01-14 ENCOUNTER — APPOINTMENT (OUTPATIENT)
Dept: GENERAL RADIOLOGY | Age: 83
End: 2018-01-14
Payer: COMMERCIAL

## 2018-01-14 ENCOUNTER — HOSPITAL ENCOUNTER (EMERGENCY)
Age: 83
Discharge: NURSING, REHAB OR LONG-TERM CARE | End: 2018-01-14
Payer: COMMERCIAL

## 2018-01-14 VITALS
SYSTOLIC BLOOD PRESSURE: 105 MMHG | HEIGHT: 60 IN | RESPIRATION RATE: 16 BRPM | HEART RATE: 66 BPM | DIASTOLIC BLOOD PRESSURE: 54 MMHG | OXYGEN SATURATION: 94 % | TEMPERATURE: 97.8 F | BODY MASS INDEX: 29.64 KG/M2 | WEIGHT: 151 LBS

## 2018-01-14 DIAGNOSIS — J10.1 INFLUENZA A: Primary | ICD-10-CM

## 2018-01-14 LAB
ALBUMIN SERPL-MCNC: 2.6 G/DL (ref 3.2–4.6)
ALBUMIN/GLOB SERPL: 0.7 {RATIO} (ref 1.2–3.5)
ALP SERPL-CCNC: 78 U/L (ref 50–136)
ALT SERPL-CCNC: 20 U/L (ref 12–65)
ANION GAP SERPL CALC-SCNC: 1 MMOL/L (ref 7–16)
AST SERPL-CCNC: 24 U/L (ref 15–37)
ATRIAL RATE: 63 BPM
BASOPHILS # BLD: 0 K/UL (ref 0–0.2)
BASOPHILS NFR BLD: 0 % (ref 0–2)
BILIRUB SERPL-MCNC: 0.2 MG/DL (ref 0.2–1.1)
BUN SERPL-MCNC: 53 MG/DL (ref 8–23)
CALCIUM SERPL-MCNC: 9.2 MG/DL (ref 8.3–10.4)
CALCULATED P AXIS, ECG09: 51 DEGREES
CALCULATED R AXIS, ECG10: 11 DEGREES
CALCULATED T AXIS, ECG11: 43 DEGREES
CHLORIDE SERPL-SCNC: 107 MMOL/L (ref 98–107)
CO2 SERPL-SCNC: 36 MMOL/L (ref 21–32)
CREAT SERPL-MCNC: 1.44 MG/DL (ref 0.6–1)
DIAGNOSIS, 93000: NORMAL
DIFFERENTIAL METHOD BLD: ABNORMAL
EOSINOPHIL # BLD: 0.2 K/UL (ref 0–0.8)
EOSINOPHIL NFR BLD: 7 % (ref 0.5–7.8)
ERYTHROCYTE [DISTWIDTH] IN BLOOD BY AUTOMATED COUNT: 16.2 % (ref 11.9–14.6)
FLUAV AG NPH QL IA: POSITIVE
FLUBV AG NPH QL IA: NEGATIVE
GLOBULIN SER CALC-MCNC: 3.5 G/DL (ref 2.3–3.5)
GLUCOSE SERPL-MCNC: 128 MG/DL (ref 65–100)
HCT VFR BLD AUTO: 30.6 % (ref 35.8–46.3)
HGB BLD-MCNC: 9.5 G/DL (ref 11.7–15.4)
IMM GRANULOCYTES # BLD: 0 K/UL (ref 0–0.5)
IMM GRANULOCYTES NFR BLD AUTO: 0 % (ref 0–5)
LACTATE BLD-SCNC: 0.8 MMOL/L (ref 0.5–1.9)
LYMPHOCYTES # BLD: 1 K/UL (ref 0.5–4.6)
LYMPHOCYTES NFR BLD: 34 % (ref 13–44)
MAGNESIUM SERPL-MCNC: 2.1 MG/DL (ref 1.8–2.4)
MCH RBC QN AUTO: 33.2 PG (ref 26.1–32.9)
MCHC RBC AUTO-ENTMCNC: 31 G/DL (ref 31.4–35)
MCV RBC AUTO: 107 FL (ref 79.6–97.8)
MONOCYTES # BLD: 0.3 K/UL (ref 0.1–1.3)
MONOCYTES NFR BLD: 11 % (ref 4–12)
NEUTS SEG # BLD: 1.3 K/UL (ref 1.7–8.2)
NEUTS SEG NFR BLD: 48 % (ref 43–78)
P-R INTERVAL, ECG05: 150 MS
PLATELET # BLD AUTO: 128 K/UL (ref 150–450)
PMV BLD AUTO: 12.9 FL (ref 10.8–14.1)
POTASSIUM SERPL-SCNC: 4.6 MMOL/L (ref 3.5–5.1)
PROT SERPL-MCNC: 6.1 G/DL (ref 6.3–8.2)
Q-T INTERVAL, ECG07: 454 MS
QRS DURATION, ECG06: 94 MS
QTC CALCULATION (BEZET), ECG08: 464 MS
RBC # BLD AUTO: 2.86 M/UL (ref 4.05–5.25)
SODIUM SERPL-SCNC: 144 MMOL/L (ref 136–145)
TROPONIN I SERPL-MCNC: 0.04 NG/ML (ref 0.02–0.05)
VENTRICULAR RATE, ECG03: 63 BPM
WBC # BLD AUTO: 2.8 K/UL (ref 4.3–11.1)

## 2018-01-14 PROCEDURE — 74011000250 HC RX REV CODE- 250

## 2018-01-14 PROCEDURE — 83605 ASSAY OF LACTIC ACID: CPT

## 2018-01-14 PROCEDURE — 83735 ASSAY OF MAGNESIUM: CPT

## 2018-01-14 PROCEDURE — 96361 HYDRATE IV INFUSION ADD-ON: CPT

## 2018-01-14 PROCEDURE — 93005 ELECTROCARDIOGRAM TRACING: CPT

## 2018-01-14 PROCEDURE — 87804 INFLUENZA ASSAY W/OPTIC: CPT

## 2018-01-14 PROCEDURE — 84484 ASSAY OF TROPONIN QUANT: CPT

## 2018-01-14 PROCEDURE — 96374 THER/PROPH/DIAG INJ IV PUSH: CPT

## 2018-01-14 PROCEDURE — 71046 X-RAY EXAM CHEST 2 VIEWS: CPT

## 2018-01-14 PROCEDURE — 85025 COMPLETE CBC W/AUTO DIFF WBC: CPT

## 2018-01-14 PROCEDURE — 80053 COMPREHEN METABOLIC PANEL: CPT

## 2018-01-14 PROCEDURE — 74011250637 HC RX REV CODE- 250/637

## 2018-01-14 PROCEDURE — 94664 DEMO&/EVAL PT USE INHALER: CPT

## 2018-01-14 PROCEDURE — 74011250636 HC RX REV CODE- 250/636

## 2018-01-14 PROCEDURE — 94640 AIRWAY INHALATION TREATMENT: CPT

## 2018-01-14 PROCEDURE — 99285 EMERGENCY DEPT VISIT HI MDM: CPT

## 2018-01-14 RX ORDER — OSELTAMIVIR PHOSPHATE 75 MG/1
75 CAPSULE ORAL 2 TIMES DAILY
Qty: 10 CAP | Refills: 0 | Status: SHIPPED | OUTPATIENT
Start: 2018-01-14 | End: 2018-01-14

## 2018-01-14 RX ORDER — ALBUTEROL SULFATE 0.83 MG/ML
2.5 SOLUTION RESPIRATORY (INHALATION)
Qty: 24 EACH | Refills: 0 | Status: ON HOLD | OUTPATIENT
Start: 2018-01-14 | End: 2018-02-19

## 2018-01-14 RX ORDER — SODIUM CHLORIDE 0.9 % (FLUSH) 0.9 %
5-10 SYRINGE (ML) INJECTION AS NEEDED
Status: DISCONTINUED | OUTPATIENT
Start: 2018-01-14 | End: 2018-01-14 | Stop reason: HOSPADM

## 2018-01-14 RX ORDER — OSELTAMIVIR PHOSPHATE 75 MG/1
75 CAPSULE ORAL 2 TIMES DAILY
Qty: 10 CAP | Refills: 0 | Status: SHIPPED | OUTPATIENT
Start: 2018-01-14 | End: 2018-01-19

## 2018-01-14 RX ORDER — IPRATROPIUM BROMIDE AND ALBUTEROL SULFATE 2.5; .5 MG/3ML; MG/3ML
3 SOLUTION RESPIRATORY (INHALATION)
Status: COMPLETED | OUTPATIENT
Start: 2018-01-14 | End: 2018-01-14

## 2018-01-14 RX ORDER — OSELTAMIVIR PHOSPHATE 30 MG/1
30 CAPSULE ORAL
Status: COMPLETED | OUTPATIENT
Start: 2018-01-14 | End: 2018-01-14

## 2018-01-14 RX ORDER — SODIUM CHLORIDE 0.9 % (FLUSH) 0.9 %
5-10 SYRINGE (ML) INJECTION EVERY 8 HOURS
Status: DISCONTINUED | OUTPATIENT
Start: 2018-01-14 | End: 2018-01-14 | Stop reason: HOSPADM

## 2018-01-14 RX ORDER — OSELTAMIVIR PHOSPHATE 75 MG/1
75 CAPSULE ORAL
Status: DISCONTINUED | OUTPATIENT
Start: 2018-01-14 | End: 2018-01-14 | Stop reason: SDUPTHER

## 2018-01-14 RX ADMIN — IPRATROPIUM BROMIDE AND ALBUTEROL SULFATE 3 ML: 2.5; .5 SOLUTION RESPIRATORY (INHALATION) at 12:05

## 2018-01-14 RX ADMIN — OSELTAMIVIR PHOSPHATE 30 MG: 30 CAPSULE ORAL at 14:28

## 2018-01-14 RX ADMIN — METHYLPREDNISOLONE SODIUM SUCCINATE 125 MG: 125 INJECTION, POWDER, FOR SOLUTION INTRAMUSCULAR; INTRAVENOUS at 12:26

## 2018-01-14 RX ADMIN — SODIUM CHLORIDE 1000 ML: 900 INJECTION, SOLUTION INTRAVENOUS at 11:36

## 2018-01-14 NOTE — DISCHARGE INSTRUCTIONS

## 2018-01-14 NOTE — ED PROVIDER NOTES
HPI Comments: 80year-old female sent from the nursing home for cough congestion possible flu. Patient is a 80 y.o. female presenting with shortness of breath. The history is provided by the patient. Shortness of Breath   This is a new problem. The problem occurs intermittently. The current episode started more than 2 days ago. The problem has not changed since onset. Associated symptoms include cough and wheezing. Pertinent negatives include no fever, no headaches and no coryza. She has tried nothing for the symptoms. She has had prior hospitalizations. Associated medical issues do not include recent surgery. Past Medical History:   Diagnosis Date    Acute kidney failure, unspecified     Arthritis     CAD (coronary artery disease)     Cellulitis and abscess of other specified site     Coronary atherosclerosis of native coronary artery     Essential hypertension, benign 3/17/2015    Hypertension     Hypopotassemia     Mixed hyperlipidemia     Osteoarthritis 7/20/2017    Other and unspecified hyperlipidemia     Reflux esophagitis     Renal failure, unspecified     Shortness of breath     Unspecified essential hypertension        Past Surgical History:   Procedure Laterality Date    CARDIAC SURG PROCEDURE UNLIST      stent    HX CATARACT REMOVAL Bilateral     HX CHOLECYSTECTOMY      HX HYSTERECTOMY      complete    LAYR CLOS WND FACE,FACIAL <2.5CM           Family History:   Problem Relation Age of Onset    Heart Attack Mother     Heart Attack Father     Heart Disease Brother     Heart Disease Brother        Social History     Social History    Marital status:      Spouse name: N/A    Number of children: N/A    Years of education: N/A     Occupational History    Not on file.      Social History Main Topics    Smoking status: Never Smoker    Smokeless tobacco: Never Used    Alcohol use No    Drug use: No    Sexual activity: Not Currently     Other Topics Concern    Not on file     Social History Narrative         ALLERGIES: Bee pollen and Fire ant    Review of Systems   Constitutional: Negative. Negative for activity change and fever. HENT: Negative. Eyes: Negative. Respiratory: Positive for cough, shortness of breath and wheezing. Cardiovascular: Negative. Gastrointestinal: Negative. Genitourinary: Negative. Musculoskeletal: Negative. Skin: Negative. Neurological: Negative. Negative for headaches. Psychiatric/Behavioral: Negative. All other systems reviewed and are negative. Vitals:    01/14/18 1116   BP: (!) 86/60   Pulse: 66   Resp: 16   Temp: 97.8 °F (36.6 °C)   SpO2: 100%   Weight: 68.5 kg (151 lb)   Height: 5' (1.524 m)            Physical Exam   Constitutional: She is oriented to person, place, and time. She appears well-developed and well-nourished. No distress. HENT:   Head: Normocephalic and atraumatic. Right Ear: External ear normal.   Left Ear: External ear normal.   Nose: Nose normal.   Mouth/Throat: Oropharynx is clear and moist. No oropharyngeal exudate. Eyes: Conjunctivae and EOM are normal. Pupils are equal, round, and reactive to light. Right eye exhibits no discharge. Left eye exhibits no discharge. No scleral icterus. Neck: Normal range of motion. Neck supple. No JVD present. No tracheal deviation present. Cardiovascular: Normal rate, regular rhythm and intact distal pulses. Pulmonary/Chest: Effort normal and breath sounds normal. No stridor. No respiratory distress. She has no wheezes. She exhibits no tenderness. Abdominal: Soft. Bowel sounds are normal. She exhibits no distension and no mass. There is no tenderness. Musculoskeletal: Normal range of motion. She exhibits no edema or tenderness. Neurological: She is alert and oriented to person, place, and time. No cranial nerve deficit. Skin: Skin is warm and dry. No rash noted. She is not diaphoretic. No erythema. No pallor.    Psychiatric: She has a normal mood and affect. Her behavior is normal. Thought content normal.   Nursing note and vitals reviewed. MDM  Number of Diagnoses or Management Options  Influenza A:   Diagnosis management comments: Patient's chest x-ray is improved from last x-ray with less congestion. Small effusions and atelectasis noted. Patient's sats well on supplemental oxygen she's been on for the last week. Patient's bronchospasms improved. Discussed with the hospitalist about admission and he did not fill if necessary this time. With Tamiflu as an outpatient CLOSELY.   She was advised to home with instructions to return anytime she has worsening of her symptoms       Amount and/or Complexity of Data Reviewed  Clinical lab tests: ordered and reviewed  Tests in the radiology section of CPT®: ordered and reviewed  Tests in the medicine section of CPT®: ordered and reviewed    Risk of Complications, Morbidity, and/or Mortality  Presenting problems: high  Diagnostic procedures: high  Management options: high    Patient Progress  Patient progress: stable    ED Course       Procedures

## 2018-01-14 NOTE — ED TRIAGE NOTES
[place] [facility] [place] SOB with walking and generalized weakness 2 days. /64, HR 68 SR, RR 20, SAT 98% 4L NC, wears O2 since last week. [address] last week.

## 2018-01-14 NOTE — ED NOTES
I have reviewed discharge instructions with the patient and caregiver. The patient and caregiver verbalized understanding. Patient left ED via Discharge Method: stretcher to Rehab with [facility]. Opportunity for questions and clarification provided. Patient given 2 scripts. To continue your aftercare when you leave the hospital, you may receive an automated call from our care team to check in on how you are doing. This is a free service and part of our promise to provide the best care and service to meet your aftercare needs.  If you have questions, or wish to unsubscribe from this service please call [phone]. Thank you for Choosing our [facility] Emergency Department.

## 2018-01-15 ENCOUNTER — HOSPITAL ENCOUNTER (EMERGENCY)
Age: 83
Discharge: HOME | End: 2018-01-16
Attending: EMERGENCY MEDICINE
Payer: COMMERCIAL

## 2018-01-15 ENCOUNTER — APPOINTMENT (OUTPATIENT)
Dept: GENERAL RADIOLOGY | Age: 83
End: 2018-01-15
Attending: EMERGENCY MEDICINE
Payer: COMMERCIAL

## 2018-01-15 VITALS
RESPIRATION RATE: 18 BRPM | TEMPERATURE: 97.7 F | HEIGHT: 60 IN | HEART RATE: 86 BPM | BODY MASS INDEX: 29.64 KG/M2 | DIASTOLIC BLOOD PRESSURE: 85 MMHG | OXYGEN SATURATION: 98 % | WEIGHT: 151 LBS | SYSTOLIC BLOOD PRESSURE: 152 MMHG

## 2018-01-15 DIAGNOSIS — R06.00 DYSPNEA, UNSPECIFIED TYPE: Primary | ICD-10-CM

## 2018-01-15 DIAGNOSIS — J10.1 INFLUENZA A: ICD-10-CM

## 2018-01-15 PROCEDURE — 71046 X-RAY EXAM CHEST 2 VIEWS: CPT

## 2018-01-15 PROCEDURE — 99283 EMERGENCY DEPT VISIT LOW MDM: CPT | Performed by: EMERGENCY MEDICINE

## 2018-01-16 NOTE — DISCHARGE INSTRUCTIONS
Influenza (Flu): Care Instructions  Your Care Instructions    Influenza (flu) is an infection in the lungs and breathing passages. It is caused by the influenza virus. There are different strains, or types, of the flu virus from year to year. Unlike the common cold, the flu comes on suddenly and the symptoms, such as a cough, congestion, fever, chills, fatigue, aches, and pains, are more severe. These symptoms may last up to 10 days. Although the flu can make you feel very sick, it usually doesn't cause serious health problems. Home treatment is usually all you need for flu symptoms. But your doctor may prescribe antiviral medicine to prevent other health problems, such as pneumonia, from developing. Older people and those who have a long-term health condition, such as lung disease, are most at risk for having pneumonia or other health problems. Follow-up care is a key part of your treatment and safety. Be sure to make and go to all appointments, and call your doctor if you are having problems. It's also a good idea to know your test results and keep a list of the medicines you take. How can you care for yourself at home? · Get plenty of rest.  · Drink plenty of fluids, enough so that your urine is light yellow or clear like water. If you have kidney, heart, or liver disease and have to limit fluids, talk with your doctor before you increase the amount of fluids you drink. · Take an over-the-counter pain medicine if needed, such as acetaminophen (Tylenol), ibuprofen (Advil, Motrin), or naproxen (Aleve), to relieve fever, headache, and muscle aches. Read and follow all instructions on the label. No one younger than 20 should take aspirin. It has been linked to Reye syndrome, a serious illness. · Do not smoke. Smoking can make the flu worse. If you need help quitting, talk to your doctor about stop-smoking programs and medicines. These can increase your chances of quitting for good.   · Breathe moist air from a hot shower or from a sink filled with hot water to help clear a stuffy nose. · Before you use cough and cold medicines, check the label. These medicines may not be safe for young children or for people with certain health problems. · If the skin around your nose and lips becomes sore, put some petroleum jelly on the area. · To ease coughing:  ¨ Drink fluids to soothe a scratchy throat. ¨ Suck on cough drops or plain hard candy. ¨ Take an over-the-counter cough medicine that contains dextromethorphan to help you get some sleep. Read and follow all instructions on the label. ¨ Raise your head at night with an extra pillow. This may help you rest if coughing keeps you awake. · Take any prescribed medicine exactly as directed. Call your doctor if you think you are having a problem with your medicine. To avoid spreading the flu  · Wash your hands regularly, and keep your hands away from your face. · Stay home from school, work, and other public places until you are feeling better and your fever has been gone for at least 24 hours. The fever needs to have gone away on its own without the help of medicine. · Ask people living with you to talk to their doctors about preventing the flu. They may get antiviral medicine to keep from getting the flu from you. · To prevent the flu in the future, get a flu vaccine every fall. Encourage people living with you to get the vaccine. · Cover your mouth when you cough or sneeze. When should you call for help? Call 911 anytime you think you may need emergency care. For example, call if:  ? · You have severe trouble breathing. ?Call your doctor now or seek immediate medical care if:  ? · You have new or worse trouble breathing. ? · You seem to be getting much sicker. ? · You feel very sleepy or confused. ? · You have a new or higher fever. ? · You get a new rash. ? Watch closely for changes in your health, and be sure to contact your doctor if:  ? · You begin to get better and then get worse. ? · You are not getting better after 1 week. Where can you learn more? Go to http://you-demarcus.info/. Enter H299 in the search box to learn more about \"Influenza (Flu): Care Instructions. \"  Current as of: May 12, 2017  Content Version: 11.4  © 8918-2154 [employer]. Care instructions adapted under license by [employer] (which disclaims liability or warranty for this information). If you have questions about a medical condition or this instruction, always ask your healthcare professional. [place] [address] any warranty or liability for your use of this information. Influenza (Flu) in Children: Care Instructions  Your Care Instructions    Flu, also called influenza, is caused by a virus. Flu tends to come on more quickly and is usually worse than a cold. Your child may suddenly develop a fever, chills, body aches, a headache, and a cough. The fever, chills, and body aches can last for 5 to 7 days. Your child may have a cough, a runny nose, and a sore throat for another week or more. Family members can get the flu from coughs or sneezes or by touching something that your child has coughed or sneezed on. Most of the time, the flu does not need any medicine other than acetaminophen (Tylenol). But sometimes doctors prescribe antiviral medicines. If started within 2 days of your child getting the flu, these medicines can help prevent problems from the flu and help your child get better a day or two sooner than he or she would without the medicine. Your doctor will not prescribe an antibiotic for the flu, because antibiotics do not work for viruses. But sometimes children get an ear infection or other bacterial infections with the flu. Antibiotics may be used in these cases. Follow-up care is a key part of your child's treatment and safety.  Be sure to make and go to all appointments, and call your doctor if your child is having problems. It's also a good idea to know your child's test results and keep a list of the medicines your child takes. How can you care for your child at home? · Give your child acetaminophen (Tylenol) or ibuprofen (Advil, Motrin) for fever, pain, or fussiness. Read and follow all instructions on the label. Do not give aspirin to anyone younger than 20. It has been linked to Reye syndrome, a serious illness. · Be careful with cough and cold medicines. Don't give them to children younger than 6, because they don't work for children that age and can even be harmful. For children 6 and older, always follow all the instructions carefully. Make sure you know how much medicine to give and how long to use it. And use the dosing device if one is included. · Be careful when giving your child over-the-counter cold or flu medicines and Tylenol at the same time. Many of these medicines have acetaminophen, which is Tylenol. Read the labels to make sure that you are not giving your child more than the recommended dose. Too much Tylenol can be harmful. · Keep children home from school and other public places until they have had no fever for 24 hours. The fever needs to have gone away on its own without the help of medicine. · If your child has problems breathing because of a stuffy nose, squirt a few saline (saltwater) nasal drops in one nostril. For older children, have your child blow his or her nose. Repeat for the other nostril. For infants, put a drop or two in one nostril. Using a soft rubber suction bulb, squeeze air out of the bulb, and gently place the tip of the bulb inside the baby's nose. Relax your hand to suck the mucus from the nose. Repeat in the other nostril. · Place a humidifier by your child's bed or close to your child. This may make it easier for your child to breathe. Follow the directions for cleaning the machine. · Keep your child away from smoke.  Do not smoke or let anyone else smoke in your house. · Wash your hands and your child's hands often so you do not spread the flu. · Have your child take medicines exactly as prescribed. Call your doctor if you think your child is having a problem with his or her medicine. When should you call for help? Call 911 anytime you think your child may need emergency care. For example, call if:  ? · Your child has severe trouble breathing. Signs may include the chest sinking in, using belly muscles to breathe, or nostrils flaring while your child is struggling to breathe. ?Call your doctor now or seek immediate medical care if:  ? · Your child has a fever with a stiff neck or a severe headache. ? · Your child is confused, does not know where he or she is, or is extremely sleepy or hard to wake up. ? · Your child has trouble breathing, breathes very fast, or coughs all the time. ? · Your child has a high fever. ? · Your child has signs of needing more fluids. These signs include sunken eyes with few tears, dry mouth with little or no spit, and little or no urine for 6 hours. ? Watch closely for changes in your child's health, and be sure to contact your doctor if:  ? · Your child has new symptoms, such as a rash, an earache, or a sore throat. ? · Your child cannot keep down medicine or liquids. ? · Your child does not get better after 5 to 7 days. Where can you learn more? Go to http://you-demarcus.info/. Enter 96 [id number] in the search box to learn more about \"Influenza (Flu) in Children: Care Instructions. \"  Current as of: May 12, 2017  Content Version: 11.4  © 4251-8133 [employer]. Care instructions adapted under license by [employer] (which disclaims liability or warranty for this information).  If you have questions about a medical condition or this instruction, always ask your healthcare professional. [address] any warranty or liability for your use of this information.

## 2018-01-16 NOTE — ED PROVIDER NOTES
HPI Comments: Patient presents to the ER complaining of some increased shortness of breath earlier this evening. Patient was seen here yesterday and diagnosed with influenza. Started on Tamiflu as well as nebulizer treatments. Apparently she had a worsening of her shortness of breath earlier today. She was given a breathing treatment at the facility as well as by EMS with improvement in symptoms. Denies any worsening fevers or vomiting    Patient is a 80 y.o. female presenting with flu symptoms. The history is provided by the patient. Flu   This is a new problem. The current episode started more than 2 days ago. The problem has been gradually worsening. There has been no fever. Associated symptoms include shortness of breath and wheezing. Pertinent negatives include no eye redness, no nausea, no vomiting and no confusion. Her past medical history is significant for pneumonia.         Past Medical History:   Diagnosis Date    Acute kidney failure, unspecified     Arthritis     CAD (coronary artery disease)     Cellulitis and abscess of other specified site     Coronary atherosclerosis of native coronary artery     Essential hypertension, benign 3/17/2015    Hypertension     Hypopotassemia     Mixed hyperlipidemia     Osteoarthritis 7/20/2017    Other and unspecified hyperlipidemia     Reflux esophagitis     Renal failure, unspecified     Shortness of breath     Unspecified essential hypertension        Past Surgical History:   Procedure Laterality Date    CARDIAC SURG PROCEDURE UNLIST      stent    HX CATARACT REMOVAL Bilateral     HX CHOLECYSTECTOMY      HX HYSTERECTOMY      complete    LAYR CLOS WND FACE,FACIAL <2.5CM           Family History:   Problem Relation Age of Onset    Heart Attack Mother     Heart Attack Father     Heart Disease Brother     Heart Disease Brother        Social History     Social History    Marital status:      Spouse name: N/A    Number of children: N/A    Years of education: N/A     Occupational History    Not on file. Social History Main Topics    Smoking status: Never Smoker    Smokeless tobacco: Never Used    Alcohol use No    Drug use: No    Sexual activity: Not Currently     Other Topics Concern    Not on file     Social History Narrative         ALLERGIES: Bee pollen and Fire ant    Review of Systems   Constitutional: Positive for fatigue. Negative for fever and unexpected weight change. HENT: Negative for congestion and dental problem. Eyes: Negative for photophobia, redness and visual disturbance. Respiratory: Positive for shortness of breath and wheezing. Negative for choking and chest tightness. Cardiovascular: Negative for palpitations and leg swelling. Gastrointestinal: Negative for abdominal pain, nausea and vomiting. Endocrine: Negative for polydipsia, polyphagia and polyuria. Genitourinary: Negative for dysuria, flank pain and urgency. Musculoskeletal: Negative for back pain and gait problem. Skin: Negative for pallor and rash. Allergic/Immunologic: Negative for food allergies and immunocompromised state. Neurological: Negative for light-headedness and numbness. Hematological: Negative for adenopathy. Does not bruise/bleed easily. Psychiatric/Behavioral: Negative for behavioral problems and confusion. All other systems reviewed and are negative. Vitals:    01/15/18 2220   BP: 144/70   Pulse: 88   Resp: 18   Temp: 97.7 °F (36.5 °C)   SpO2: 98%   Weight: 68.5 kg (151 lb)   Height: 5' (1.524 m)            Physical Exam   Constitutional: She is oriented to person, place, and time. She appears well-developed and well-nourished. HENT:   Head: Normocephalic and atraumatic. Mouth/Throat: Oropharynx is clear and moist.   Eyes: Conjunctivae and EOM are normal. Pupils are equal, round, and reactive to light. No scleral icterus. Neck: Normal range of motion. Neck supple. No JVD present.  No tracheal deviation present. No thyromegaly present. Cardiovascular: Normal rate and intact distal pulses. Pulmonary/Chest: Effort normal. No respiratory distress. She has wheezes. Abdominal: Soft. Bowel sounds are normal. She exhibits no distension. There is no rebound. Neurological: She is alert and oriented to person, place, and time. She has normal reflexes. Nursing note and vitals reviewed. MDM  Number of Diagnoses or Management Options  Diagnosis management comments: Still with some minimal wheezing currently however patient is resting comfortably, no tachypnea, tachycardia or hypoxia noted. She is afebrile here    Will obtain chest x-ray today    11:39 PM  Oxygen saturations remain stable. Chest x-ray remain stable. Patient remains asymptomatic.   We'll discharge back to nursing facility       Amount and/or Complexity of Data Reviewed  Tests in the radiology section of CPT®: ordered and reviewed    Risk of Complications, Morbidity, and/or Mortality  Presenting problems: moderate  Diagnostic procedures: low  Management options: low    Patient Progress  Patient progress: stable    ED Course       Procedures

## 2018-01-16 NOTE — ED TRIAGE NOTES
Pt presents to ER via EMS from [address] for increased SOB after an albuterol Tx tonight, Dx w/ Flu A yesterday. Received DuoNeb en route.

## 2018-01-25 PROBLEM — I21.4 NSTEMI (NON-ST ELEVATED MYOCARDIAL INFARCTION) (HCC): Status: RESOLVED | Noted: 2017-12-30 | Resolved: 2018-01-25

## 2018-01-25 PROBLEM — J96.11 CHRONIC RESPIRATORY FAILURE WITH HYPOXIA (HCC): Status: ACTIVE | Noted: 2018-01-25

## 2018-01-25 PROBLEM — I24.8 DEMAND ISCHEMIA (HCC): Status: RESOLVED | Noted: 2017-12-23 | Resolved: 2018-01-25

## 2018-02-06 ENCOUNTER — HOSPITAL ENCOUNTER (OUTPATIENT)
Age: 83
Setting detail: OBSERVATION
Discharge: HOME HEALTH | DRG: 690 | End: 2018-02-11
Attending: EMERGENCY MEDICINE | Admitting: INTERNAL MEDICINE
Payer: COMMERCIAL

## 2018-02-06 ENCOUNTER — APPOINTMENT (OUTPATIENT)
Dept: GENERAL RADIOLOGY | Age: 83
DRG: 690 | End: 2018-02-06
Attending: EMERGENCY MEDICINE
Payer: COMMERCIAL

## 2018-02-06 ENCOUNTER — PATIENT OUTREACH (OUTPATIENT)
Dept: CASE MANAGEMENT | Age: 83
End: 2018-02-06

## 2018-02-06 DIAGNOSIS — J96.11 CHRONIC RESPIRATORY FAILURE WITH HYPOXIA (HCC): ICD-10-CM

## 2018-02-06 DIAGNOSIS — I95.9 HYPOTENSION, UNSPECIFIED HYPOTENSION TYPE: Primary | ICD-10-CM

## 2018-02-06 LAB
ALBUMIN SERPL-MCNC: 2.7 G/DL (ref 3.2–4.6)
ALBUMIN/GLOB SERPL: 0.7 {RATIO} (ref 1.2–3.5)
ALP SERPL-CCNC: 107 U/L (ref 50–136)
ALT SERPL-CCNC: 17 U/L (ref 12–65)
ANION GAP SERPL CALC-SCNC: 6 MMOL/L (ref 7–16)
AST SERPL-CCNC: 24 U/L (ref 15–37)
ATRIAL RATE: 69 BPM
BACTERIA URNS QL MICRO: 0 /HPF
BASOPHILS # BLD: 0 K/UL (ref 0–0.2)
BASOPHILS NFR BLD: 0 % (ref 0–2)
BILIRUB SERPL-MCNC: 0.3 MG/DL (ref 0.2–1.1)
BNP SERPL-MCNC: 86 PG/ML
BUN SERPL-MCNC: 49 MG/DL (ref 8–23)
CALCIUM SERPL-MCNC: 9.1 MG/DL (ref 8.3–10.4)
CALCULATED P AXIS, ECG09: 68 DEGREES
CALCULATED R AXIS, ECG10: 41 DEGREES
CALCULATED T AXIS, ECG11: 87 DEGREES
CASTS URNS QL MICRO: ABNORMAL /LPF
CHLORIDE SERPL-SCNC: 101 MMOL/L (ref 98–107)
CO2 SERPL-SCNC: 36 MMOL/L (ref 21–32)
CREAT SERPL-MCNC: 1.78 MG/DL (ref 0.6–1)
DIAGNOSIS, 93000: NORMAL
DIFFERENTIAL METHOD BLD: ABNORMAL
EOSINOPHIL # BLD: 0.3 K/UL (ref 0–0.8)
EOSINOPHIL NFR BLD: 5 % (ref 0.5–7.8)
EPI CELLS #/AREA URNS HPF: ABNORMAL /HPF
ERYTHROCYTE [DISTWIDTH] IN BLOOD BY AUTOMATED COUNT: 15.5 % (ref 11.9–14.6)
GLOBULIN SER CALC-MCNC: 3.9 G/DL (ref 2.3–3.5)
GLUCOSE SERPL-MCNC: 86 MG/DL (ref 65–100)
HCT VFR BLD AUTO: 31.8 % (ref 35.8–46.3)
HGB BLD-MCNC: 9.8 G/DL (ref 11.7–15.4)
IMM GRANULOCYTES # BLD: 0 K/UL (ref 0–0.5)
IMM GRANULOCYTES NFR BLD AUTO: 0 % (ref 0–5)
LACTATE BLD-SCNC: 1.8 MMOL/L (ref 0.5–1.9)
LYMPHOCYTES # BLD: 1.1 K/UL (ref 0.5–4.6)
LYMPHOCYTES NFR BLD: 21 % (ref 13–44)
MAGNESIUM SERPL-MCNC: 2.2 MG/DL (ref 1.8–2.4)
MCH RBC QN AUTO: 32.9 PG (ref 26.1–32.9)
MCHC RBC AUTO-ENTMCNC: 30.8 G/DL (ref 31.4–35)
MCV RBC AUTO: 106.7 FL (ref 79.6–97.8)
MONOCYTES # BLD: 0.6 K/UL (ref 0.1–1.3)
MONOCYTES NFR BLD: 11 % (ref 4–12)
NEUTS SEG # BLD: 3.5 K/UL (ref 1.7–8.2)
NEUTS SEG NFR BLD: 63 % (ref 43–78)
P-R INTERVAL, ECG05: 150 MS
PHOSPHATE SERPL-MCNC: 3.6 MG/DL (ref 2.3–3.7)
PLATELET # BLD AUTO: 208 K/UL (ref 150–450)
PMV BLD AUTO: 11.5 FL (ref 10.8–14.1)
POTASSIUM SERPL-SCNC: 4.6 MMOL/L (ref 3.5–5.1)
PROT SERPL-MCNC: 6.6 G/DL (ref 6.3–8.2)
Q-T INTERVAL, ECG07: 388 MS
QRS DURATION, ECG06: 88 MS
QTC CALCULATION (BEZET), ECG08: 415 MS
RBC # BLD AUTO: 2.98 M/UL (ref 4.05–5.25)
RBC #/AREA URNS HPF: ABNORMAL /HPF
SODIUM SERPL-SCNC: 143 MMOL/L (ref 136–145)
VENTRICULAR RATE, ECG03: 69 BPM
WBC # BLD AUTO: 5.5 K/UL (ref 4.3–11.1)
WBC URNS QL MICRO: >100 /HPF

## 2018-02-06 PROCEDURE — 74011250636 HC RX REV CODE- 250/636: Performed by: INTERNAL MEDICINE

## 2018-02-06 PROCEDURE — 81003 URINALYSIS AUTO W/O SCOPE: CPT | Performed by: EMERGENCY MEDICINE

## 2018-02-06 PROCEDURE — 84100 ASSAY OF PHOSPHORUS: CPT | Performed by: EMERGENCY MEDICINE

## 2018-02-06 PROCEDURE — 96360 HYDRATION IV INFUSION INIT: CPT | Performed by: EMERGENCY MEDICINE

## 2018-02-06 PROCEDURE — 93005 ELECTROCARDIOGRAM TRACING: CPT | Performed by: EMERGENCY MEDICINE

## 2018-02-06 PROCEDURE — 83605 ASSAY OF LACTIC ACID: CPT

## 2018-02-06 PROCEDURE — 96361 HYDRATE IV INFUSION ADD-ON: CPT | Performed by: EMERGENCY MEDICINE

## 2018-02-06 PROCEDURE — 80053 COMPREHEN METABOLIC PANEL: CPT | Performed by: EMERGENCY MEDICINE

## 2018-02-06 PROCEDURE — 51701 INSERT BLADDER CATHETER: CPT | Performed by: EMERGENCY MEDICINE

## 2018-02-06 PROCEDURE — 85025 COMPLETE CBC W/AUTO DIFF WBC: CPT | Performed by: EMERGENCY MEDICINE

## 2018-02-06 PROCEDURE — 87040 BLOOD CULTURE FOR BACTERIA: CPT | Performed by: EMERGENCY MEDICINE

## 2018-02-06 PROCEDURE — 87086 URINE CULTURE/COLONY COUNT: CPT | Performed by: EMERGENCY MEDICINE

## 2018-02-06 PROCEDURE — 99285 EMERGENCY DEPT VISIT HI MDM: CPT | Performed by: EMERGENCY MEDICINE

## 2018-02-06 PROCEDURE — 87205 SMEAR GRAM STAIN: CPT | Performed by: EMERGENCY MEDICINE

## 2018-02-06 PROCEDURE — 87077 CULTURE AEROBIC IDENTIFY: CPT | Performed by: EMERGENCY MEDICINE

## 2018-02-06 PROCEDURE — 74011250636 HC RX REV CODE- 250/636: Performed by: EMERGENCY MEDICINE

## 2018-02-06 PROCEDURE — 83735 ASSAY OF MAGNESIUM: CPT | Performed by: EMERGENCY MEDICINE

## 2018-02-06 PROCEDURE — 87641 MR-STAPH DNA AMP PROBE: CPT | Performed by: EMERGENCY MEDICINE

## 2018-02-06 PROCEDURE — 87186 SC STD MICRODIL/AGAR DIL: CPT | Performed by: EMERGENCY MEDICINE

## 2018-02-06 PROCEDURE — 71045 X-RAY EXAM CHEST 1 VIEW: CPT

## 2018-02-06 PROCEDURE — 96372 THER/PROPH/DIAG INJ SC/IM: CPT | Performed by: EMERGENCY MEDICINE

## 2018-02-06 PROCEDURE — 83880 ASSAY OF NATRIURETIC PEPTIDE: CPT | Performed by: EMERGENCY MEDICINE

## 2018-02-06 PROCEDURE — 81015 MICROSCOPIC EXAM OF URINE: CPT | Performed by: EMERGENCY MEDICINE

## 2018-02-06 PROCEDURE — 99218 HC RM OBSERVATION: CPT

## 2018-02-06 RX ORDER — HEPARIN SODIUM 5000 [USP'U]/ML
5000 INJECTION, SOLUTION INTRAVENOUS; SUBCUTANEOUS EVERY 8 HOURS
Status: DISCONTINUED | OUTPATIENT
Start: 2018-02-06 | End: 2018-02-11 | Stop reason: HOSPADM

## 2018-02-06 RX ORDER — ACETAMINOPHEN 325 MG/1
650 TABLET ORAL
Status: DISCONTINUED | OUTPATIENT
Start: 2018-02-06 | End: 2018-02-11 | Stop reason: HOSPADM

## 2018-02-06 RX ORDER — SODIUM CHLORIDE 9 MG/ML
75 INJECTION, SOLUTION INTRAVENOUS CONTINUOUS
Status: DISCONTINUED | OUTPATIENT
Start: 2018-02-06 | End: 2018-02-07

## 2018-02-06 RX ORDER — ONDANSETRON 2 MG/ML
4 INJECTION INTRAMUSCULAR; INTRAVENOUS
Status: DISCONTINUED | OUTPATIENT
Start: 2018-02-06 | End: 2018-02-11 | Stop reason: HOSPADM

## 2018-02-06 RX ORDER — SODIUM CHLORIDE 0.9 % (FLUSH) 0.9 %
5-10 SYRINGE (ML) INJECTION EVERY 8 HOURS
Status: DISCONTINUED | OUTPATIENT
Start: 2018-02-06 | End: 2018-02-11 | Stop reason: HOSPADM

## 2018-02-06 RX ORDER — SODIUM CHLORIDE 0.9 % (FLUSH) 0.9 %
5-10 SYRINGE (ML) INJECTION AS NEEDED
Status: DISCONTINUED | OUTPATIENT
Start: 2018-02-06 | End: 2018-02-11 | Stop reason: HOSPADM

## 2018-02-06 RX ADMIN — SODIUM CHLORIDE 1000 ML: 900 INJECTION, SOLUTION INTRAVENOUS at 17:30

## 2018-02-06 RX ADMIN — SODIUM CHLORIDE 75 ML/HR: 900 INJECTION, SOLUTION INTRAVENOUS at 22:06

## 2018-02-06 NOTE — Clinical Note
Patient Class[de-identified] Observation [829] Type of Bed: Medical [8] Reason for Observation: hypotension Admitting Diagnosis: Hypotension [[id number]] Admitting Physician: Stella Baer [[id number]] Attending Physician: Stella Baer [[id number]]

## 2018-02-06 NOTE — ED TRIAGE NOTES
Pt sent from Dr. Maury Shane office for hypotension and hypoxia. Pt does not appear hypoxic at this time. Pt on home O2 at 2.5 LPM. Pt tank became empty in triage and her sat dropped to 90%. Another tank provided for patient.

## 2018-02-06 NOTE — PROGRESS NOTES
SNF d/c f/u call not completed as patient is presently an ED admission. Nova Lepe LPN/ Care Coordinator  [facility] [address] / [place] [address] [address] [facility]  [email or web address]. [facility] note will not be viewable in [address]

## 2018-02-06 NOTE — IP AVS SNAPSHOT
[address] [place]  
[phone] Patient: Luann Reyes MRN: [id number] :2/10/1927 A check michel indicates which time of day the medication should be taken. My Medications CONTINUE taking these medications Instructions Each Dose to Equal  
 Morning Noon Evening Bedtime  
 albuterol 2.5 mg /3 mL (0.083 %) nebulizer solution Commonly known as:  PROVENTIL VENTOLIN Your last dose was: Your next dose is:    
   
   
 3 mL by Nebulization route every four (4) hours as needed for Wheezing. 2.5 mg  
    
   
   
   
  
 allopurinol 100 mg tablet Commonly known as:  Kash Card Your last dose was: Your next dose is: Take 1 Tab by mouth two (2) times a day. 100 mg  
    
   
   
   
  
 aspirin 81 mg Cpdr  
   
Your last dose was: Your next dose is: Take  by mouth daily. clopidogrel 75 mg Tab Commonly known as:  PLAVIX Your last dose was: Your next dose is: Take 1 Tab by mouth daily. 75 mg  
    
   
   
   
  
 clotrimazole 1 % topical cream  
Commonly known as:  LOTRIMIN AF (CLOTRIMAZOLE) Your last dose was: Your next dose is:    
   
   
 Apply  to affected area two (2) times a day. colchicine 0.6 mg tablet Your last dose was: Your next dose is: Take 0.5 Tabs by mouth daily. 0.3 mg  
    
   
   
   
  
 cyanocobalamin 1,000 mcg tablet Your last dose was: Your next dose is: Take 1 Tab by mouth daily. 1000 mcg  
    
   
   
   
  
 gabapentin 100 mg capsule Commonly known as:  NEURONTIN Your last dose was: Your next dose is: Take 2 Caps by mouth nightly. Indications: NEUROPATHIC PAIN  
 200 mg HYDROcodone-acetaminophen 7.5-325 mg per tablet Commonly known as:  Bruno Hugo Your last dose was: Your next dose is: Take 1 Tab by mouth every eight (8) hours as needed for Pain. Max Daily Amount: 3 Tabs. 1 Tab  
    
   
   
   
  
 loratadine 10 mg tablet Commonly known as:  Manuel Miles Your last dose was: Your next dose is: Take 1 Tab by mouth daily. 10 mg OXYGEN-AIR DELIVERY SYSTEMS Your last dose was: Your next dose is:    
   
   
 by Does Not Apply route. potassium chloride 10 mEq tablet Commonly known as:  KLOR-CON M10 Your last dose was: Your next dose is: Take 1 Tab by mouth daily. 10 mEq  
    
   
   
   
  
 pravastatin 20 mg tablet Commonly known as:  PRAVACHOL Your last dose was: Your next dose is: Take 1 Tab by mouth nightly. 20 mg  
    
   
   
   
  
 raNITIdine 150 mg tablet Commonly known as:  ZANTAC Your last dose was: Your next dose is: Take 1 Tab by mouth two (2) times a day. 150 mg Saccharomyces boulardii 250 mg capsule Commonly known as:  Keron Ramírez Your last dose was: Your next dose is: Take 1 Cap by mouth two (2) times a day. 250 mg Senna 8.6 mg Cap Generic drug:  sennosides Your last dose was: Your next dose is: Take  by mouth. STOP taking these medications   
 carvedilol 3.125 mg tablet Commonly known as:  COREG  
   
  
 conjugated estrogens 0.625 mg/gram vaginal cream  
Commonly known as:  PREMARIN  
   
  
 lisinopril 30 mg tablet Commonly known as:  PRINIVIL, ZESTRIL  
   
  
 torsemide 20 mg tablet Commonly known as:  DEMADEX  
   
  
 TUBERSOL 5 tub. unit /0.1 mL injection Generic drug:  tuberculin ASK your doctor about these medications Instructions Each Dose to Equal  
 Morning Noon Evening Bedtime  
 guaiFENesin  mg SR tablet Commonly known as:  Keron & Keron Your last dose was: Your next dose is: Take 600 mg by mouth two (2) times a day. 600 mg  
    
   
   
   
  
 oseltamivir 75 mg capsule Commonly known as:  TAMIFLU Your last dose was: Your next dose is: Take  by mouth.

## 2018-02-06 NOTE — IP AVS SNAPSHOT
[address] 
[phone] Patient: Annamarie Singh MRN: [id number] :2/10/1927 About your hospitalization You were admitted on:  2018 You last received care in the:  [address] [place] [address] You were discharged on:  2018 Why you were hospitalized Your primary diagnosis was:  Hypotension Your diagnoses also included:  Chronic Respiratory Failure With Hypoxia (Hcc), Ckd (Chronic Kidney Disease) Stage 3, Gfr 28-77 Ml/Min, Diastolic Chf, Chronic (Hcc), Acute Uti Follow-up Information Follow up With Details Comments Contact Info Rebecca Kemp MD   [id number] [address] [facility] 160 
[phone] Patricia Malave DO Call in 1 day [facility] [address] [facility] Cardiology [facility] [address] 
[phone] Your Scheduled Appointments 2018  2:30 PM EST Office Visit with Patricia Malave DO  
[facility] CARDIOLOGY [place] OFFICE ([address] [facility]) [facility] 
[address] [place] [address]  
[phone] Discharge Orders None A check michel indicates which time of day the medication should be taken. My Medications CONTINUE taking these medications Instructions Each Dose to Equal  
 Morning Noon Evening Bedtime  
 albuterol 2.5 mg /3 mL (0.083 %) nebulizer solution Commonly known as:  PROVENTIL VENTOLIN Your last dose was: Your next dose is:    
   
   
 3 mL by Nebulization route every four (4) hours as needed for Wheezing. 2.5 mg  
    
   
   
   
  
 allopurinol 100 mg tablet Commonly known as:  Amairani Gallus Your last dose was: Your next dose is: Take 1 Tab by mouth two (2) times a day. 100 mg  
    
   
   
   
  
 aspirin 81 mg Cpdr  
   
Your last dose was: Your next dose is: Take  by mouth daily. clopidogrel 75 mg Tab Commonly known as:  PLAVIX Your last dose was: Your next dose is: Take 1 Tab by mouth daily. 75 mg  
    
   
   
   
  
 clotrimazole 1 % topical cream  
Commonly known as:  LOTRIMIN AF (CLOTRIMAZOLE) Your last dose was: Your next dose is:    
   
   
 Apply  to affected area two (2) times a day. colchicine 0.6 mg tablet Your last dose was: Your next dose is: Take 0.5 Tabs by mouth daily. 0.3 mg  
    
   
   
   
  
 cyanocobalamin 1,000 mcg tablet Your last dose was: Your next dose is: Take 1 Tab by mouth daily. 1000 mcg  
    
   
   
   
  
 gabapentin 100 mg capsule Commonly known as:  NEURONTIN Your last dose was: Your next dose is: Take 2 Caps by mouth nightly. Indications: NEUROPATHIC PAIN  
 200 mg HYDROcodone-acetaminophen 7.5-325 mg per tablet Commonly known as:  Dafne  Your last dose was: Your next dose is: Take 1 Tab by mouth every eight (8) hours as needed for Pain. Max Daily Amount: 3 Tabs. 1 Tab  
    
   
   
   
  
 loratadine 10 mg tablet Commonly known as:  Ronnell Media Your last dose was: Your next dose is: Take 1 Tab by mouth daily. 10 mg OXYGEN-AIR DELIVERY SYSTEMS Your last dose was: Your next dose is:    
   
   
 by Does Not Apply route. potassium chloride 10 mEq tablet Commonly known as:  KLOR-CON M10 Your last dose was: Your next dose is: Take 1 Tab by mouth daily. 10 mEq  
    
   
   
   
  
 pravastatin 20 mg tablet Commonly known as:  PRAVACHOL Your last dose was: Your next dose is: Take 1 Tab by mouth nightly.   
 20 mg  
 raNITIdine 150 mg tablet Commonly known as:  ZANTAC Your last dose was: Your next dose is: Take 1 Tab by mouth two (2) times a day. 150 mg Saccharomyces boulardii 250 mg capsule Commonly known as:  Keron Ramírez Your last dose was: Your next dose is: Take 1 Cap by mouth two (2) times a day. 250 mg Senna 8.6 mg Cap Generic drug:  sennosides Your last dose was: Your next dose is: Take  by mouth. STOP taking these medications   
 carvedilol 3.125 mg tablet Commonly known as:  COREG  
   
  
 conjugated estrogens 0.625 mg/gram vaginal cream  
Commonly known as:  PREMARIN  
   
  
 lisinopril 30 mg tablet Commonly known as:  PRINIVIL, ZESTRIL  
   
  
 torsemide 20 mg tablet Commonly known as:  DEMADEX  
   
  
 TUBERSOL 5 tub. unit /0.1 mL injection Generic drug:  tuberculin ASK your doctor about these medications Instructions Each Dose to Equal  
 Morning Noon Evening Bedtime  
 guaiFENesin  mg SR tablet Commonly known as:  Keron & Keron Your last dose was: Your next dose is: Take 600 mg by mouth two (2) times a day. 600 mg  
    
   
   
   
  
 oseltamivir 75 mg capsule Commonly known as:  TAMIFLU Your last dose was: Your next dose is: Take  by mouth. Discharge Instructions DISCHARGE SUMMARY from Nurse PATIENT INSTRUCTIONS: 
 
After general anesthesia or intravenous sedation, for 24 hours or while taking prescription Narcotics: · Limit your activities · Do not drive and operate hazardous machinery · Do not make important personal or business decisions · Do  not drink alcoholic beverages · If you have not urinated within 8 hours after discharge, please contact your surgeon on call. Report the following to your surgeon: 
· Excessive pain, swelling, redness or odor of or around the surgical area · Temperature over 100.5 · Nausea and vomiting lasting longer than 4 hours or if unable to take medications · Any signs of decreased circulation or nerve impairment to extremity: change in color, persistent  numbness, tingling, coldness or increase pain · Any questions What to do at Home: 
Recommended activity: Activity as tolerated, recommended by MD 
 
If you experience any of the following symptoms as listed below, please follow up with MD. 
 
*  Please give a list of your current medications to your Primary Care Provider. *  Please update this list whenever your medications are discontinued, doses are 
    changed, or new medications (including over-the-counter products) are added. *  Please carry medication information at all times in case of emergency situations. These are general instructions for a healthy lifestyle: No smoking/ No tobacco products/ Avoid exposure to second hand smoke Surgeon General's Warning:  Quitting smoking now greatly reduces serious risk to your health. Obesity, smoking, and sedentary lifestyle greatly increases your risk for illness A healthy diet, regular physical exercise & weight monitoring are important for maintaining a healthy lifestyle You may be retaining fluid if you have a history of heart failure or if you experience any of the following symptoms:  Weight gain of 3 pounds or more overnight or 5 pounds in a week, increased swelling in our hands or feet or shortness of breath while lying flat in bed. Please call your doctor as soon as you notice any of these symptoms; do not wait until your next office visit. Recognize signs and symptoms of STROKE: 
 
F-face looks uneven A-arms unable to move or move unevenly S-speech slurred or non-existent T-time-call 911 as soon as signs and symptoms begin-DO NOT go  
 Back to bed or wait to see if you get better-TIME IS BRAIN. Warning Signs of HEART ATTACK Call 911 if you have these symptoms: 
? Chest discomfort. Most heart attacks involve discomfort in the center of the chest that lasts more than a few minutes, or that goes away and comes back. It can feel like uncomfortable pressure, squeezing, fullness, or pain. ? Discomfort in other areas of the upper body. Symptoms can include pain or discomfort in one or both arms, the back, neck, jaw, or stomach. ? Shortness of breath with or without chest discomfort. ? Other signs may include breaking out in a cold sweat, nausea, or lightheadedness. Don't wait more than five minutes to call [address]! Fast action can save your life. Calling 911 is almost always the fastest way to get lifesaving treatment. Emergency Medical Services staff can begin treatment when they arrive  up to an hour sooner than if someone gets to the hospital by car. The discharge information has been reviewed with the patient. The patient verbalized understanding. Discharge medications reviewed with the patient and appropriate educational materials and side effects teaching were provided. ___________________________________________________________________________________________________________________________________ Beemhart Announcement We are excited to announce that we are making your provider's discharge notes available to you in Beemhart. You will see these notes when they are completed and signed by the physician that discharged you from your recent hospital stay. If you have any questions or concerns about any information you see in Beemhart, please call the Health Information Department where you were seen or reach out to your Primary Care Provider for more information about your plan of care. Unresulted Labs-Please follow up with your PCP about these lab tests Order Current Status CULTURE, BLOOD Preliminary result CULTURE, BLOOD Preliminary result CULTURE, BLOOD Preliminary result Providers Seen During Your Hospitalization Provider Specialty Primary office phone Jeremiah Costello MD Emergency Medicine [phone] Diana Garland MD Internal Medicine [phone] Immunizations Administered for This Admission Name Date Influenza Vaccine (Quad) PF  Deferred () TB Skin Test (PPD) Intradermal 2/7/2018 Your Primary Care Physician (PCP) Primary Care Physician Office Phone Office Fax [address] [facility] [[email or web address] Dr, [address] [phone] You are allergic to the following Allergen Reactions Bee Pollen Swelling [employer] Interactive Experience Itching Swelling Recent Documentation Height Weight BMI OB Status Smoking Status 1.499 m 69 kg 30.72 kg/m2 Hysterectomy Never Smoker Emergency Contacts Name Discharge Info Relation Home Work Mobile Tresa Whaley  Daughter [21] [phone] Lana Chow  Daughter [21] [phone] Patient Belongings The following personal items are in your possession at time of discharge: 
                   Clothing: With patient Discharge Instructions Attachments/References HYPOTENSION (ENGLISH) SECONDHAND SMOKE (ENGLISH) HAND-WASHING (ENGLISH) Patient Handouts Low Blood Pressure: Care Instructions Your Care Instructions Blood pressure is a measurement of the force of the blood against the walls of the blood vessels during and after each beat of the heart. Low blood pressure is also called hypotension. It means that your blood pressure is much lower than normal. Some people, especially young, slim women, may have slightly low blood pressure without symptoms. But in many people, low blood pressure can cause symptoms such as feeling dizzy or lightheaded.  When your blood pressure is too low, your heart, brain, and other organs do not get enough blood. Low blood pressure can be caused by many things, including heart problems and some medicines. Diabetes that is not under control can cause your blood pressure to drop. And so can a severe allergic reaction or infection. Another cause is dehydration, which is when your body loses too much fluid. Treatment for low blood pressure depends on the cause. Follow-up care is a key part of your treatment and safety. Be sure to make and go to all appointments, and call your doctor if you are having problems. It's also a good idea to know your test results and keep a list of the medicines you take. How can you care for yourself at home? · Drink plenty of fluids, enough so that your urine is light yellow or clear like water. If you have kidney, heart, or liver disease and have to limit fluids, talk with your doctor before you increase the amount of fluids you drink. · Be safe with medicines. Call your doctor if you think you are having a problem with your medicine. You will get more details on the specific medicines your doctor prescribes. · Stand up or get out of bed very slowly to allow your body to adjust. 
· Get plenty of rest. 
· Do not smoke. Smoking increases your risk of heart attack. If you need help quitting, talk to your doctor about stop-smoking programs and medicines. These can increase your chances of quitting for good. · Limit alcohol to 2 drinks a day for men and 1 drink a day for women. Alcohol may interfere with your medicine. In addition, alcohol can make your low blood pressure worse by causing your body to lose water. When should you call for help? Call 911 anytime you think you may need emergency care. For example, call if: 
? · You passed out (lost consciousness). ?Call your doctor now or seek immediate medical care if: 
? · You are dizzy or lightheaded, or you feel like you may faint. ? Watch closely for changes in your health, and be sure to contact your doctor if you have any problems. Where can you learn more? Go to http://you-demarcus.info/. Enter C304 in the search box to learn more about \"Low Blood Pressure: Care Instructions. \" Current as of: September 21, 2016 Content Version: 11.4 © 5023-5113 Posterous. Care instructions adapted under license by [employer] (which disclaims liability or warranty for this information). If you have questions about a medical condition or this instruction, always ask your healthcare professional. [place] [address] any warranty or liability for your use of this information. Secondhand Smoke: Care Instructions Your Care Instructions Secondhand smoke comes from the burning end of a cigarette, cigar, or pipe and the smoke that a smoker exhales. The smoke contains nicotine and many other harmful chemicals. Breathing secondhand smoke can cause or worsen health problems including cancer, asthma, coronary artery disease, and respiratory infections. It can make your eyes and nose burn and cause a sore throat. Secondhand smoke is especially bad for babies and young children whose lungs are still developing. Babies whose parents smoke are more likely to have ear infections, pneumonia, and bronchitis in the first few years of their lives. Secondhand smoke can make asthma symptoms worse in children. If you are pregnant, it is important that you not smoke and that you avoid secondhand smoke. You are more likely to give birth to a baby who weighs less than expected (low birth weight) if you smoke. And your baby may have a greater risk for sudden infant death syndrome (SIDS). Babies whose mothers are exposed to secondhand smoke during pregnancy have a higher risk for health problems. Follow-up care is a key part of your treatment and safety.  Be sure to make and go to all appointments, and call your doctor if you are having problems. It's also a good idea to know your test results and keep a list of the medicines you take. How can you care for yourself at home? · Do not smoke or let anyone else smoke in your home. If people must smoke, ask them to go outside. · If people do smoke in your home, choose a room where you can open a window or use a fan to get the smoke outside. · Do not let anyone smoke in your car. If someone must smoke, pull over in a safe place and let him or her smoke away from the car. · Ask your employer to make sure that you have a smoke-free work area. · Make sure that your children are not exposed to secondhand smoke at day care, school, and after-school programs. · Try to choose nonsmoking bars, restaurants, and other public places when you go out. · Help your family and friends who smoke to quit by encouraging them to try. Tell them about treatment resources. Having support from others often helps. · If you smoke, quit. Quitting is hard, but there are ways to boost your chance of quitting tobacco for good. ¨ Use nicotine gum, patches, or lozenges. Call a quitline. Ask your doctor about stop-smoking programs and medicines. ¨ Keep trying. When should you call for help? Watch closely for changes in your health, and be sure to contact your doctor if you have any problems. Where can you learn more? Go to http://you-demarcus.info/. Enter L004 in the search box to learn more about \"Secondhand Smoke: Care Instructions. \" Current as of: March 20, 2017 Content Version: 11.4 © 8439-7308 [employer]. Care instructions adapted under license by [employer] (which disclaims liability or warranty for this information). If you have questions about a medical condition or this instruction, always ask your healthcare professional. [address] any warranty or liability for your use of this information. Hand-Washing: Care Instructions Your Care Instructions It is important for caregivers to wash their hands properly. This is the single best way to prevent the spread of infections. Hand-washing can help keep you from getting sick. It is easy, doesn't cost much, and it works. Make sure that you and your caregivers follow safe hand-washing routines. Caregivers may include health care workers or family members at home or in a care facility. You can talk to them about this information on hand-washing. Follow-up care is a key part of your treatment and safety. Be sure to make and go to all appointments, and call your doctor if you are having problems. It's also a good idea to know your test results and keep a list of the medicines you take. How can you care for yourself at home? · Caregivers should wash their hands with soap and water: ¨ When their hands are dirty, especially after being exposed to body fluids. This includes blood. ¨ When their hands may have been exposed to germs that could spread infection. ¨ After they touch broken skin, sores, or wound bandages. ¨ After they use the bathroom. · At other times, caregivers can use an alcohol-based gel  or soap and water to clean hands. This should be done: ¨ Before and after any contact with you. ¨ After they take off gloves. ¨ Before they handle a device that touches your body (even if gloves are used). ¨ After they touch any objects near you, such as medical equipment, lights, or doorknobs. ¨ Before they handle medicine or prepare food. Proper hand-washing for caregivers · When using an alcohol-based gel , fill your palm with the gel. Then spread it all over your hands. Rub your hands together until they are dry. · When washing hands with soap and water: ¨ Wet your hands with running water, and apply soap. ¨ Rub your hands together to make a lather. Scrub well for at least 20 seconds.  
¨ Pay special attention to your wrists, the backs of your hands, between your fingers, and under your fingernails. ¨ Rinse your hands well under running water. ¨ Use a clean towel to dry your hands, or air-dry your hands. You may want to use a clean towel as a barrier between the faucet and your clean hands when you turn off the water. · If you use bar soap, use small bars. Set the soap on a rack that lets water drain. Where can you learn more? Go to http://you-demarcus.info/. Enter Z668 in the search box to learn more about \"Hand-Washing: Care Instructions. \" Current as of: March 3, 2017 Content Version: 11.4 © 9135-2683 [employer]. Care instructions adapted under license by "[employer] (which disclaims liability or warranty for this information). If you have questions about a medical condition or this instruction, always ask your healthcare professional. [address] any warranty or liability for your use of this information. Please provide this summary of care documentation to your next provider. Signatures-by signing, you are acknowledging that this After Visit Summary has been reviewed with you and you have received a copy. Patient Signature:  ____________________________________________________________ Date:  ____________________________________________________________  
  
Jomar Payor Provider Signature:  ____________________________________________________________ Date:  ____________________________________________________________

## 2018-02-06 NOTE — ED PROVIDER NOTES
Patient is a 80 y.o. female presenting with hypotension. The history is provided by the patient, a relative, the EMS personnel and a caregiver. Hypotension    This is a new problem. The current episode started more than 2 days ago. The problem has been gradually worsening. Associated symptoms include confusion and weakness. Pertinent negatives include no violence. Mental status baseline is mild dementia. Risk factors include dementia. Her past medical history is significant for hypertension and heart disease. Her past medical history does not include diabetes, seizures, liver disease, psychotropic medication treatment or head trauma. Past Medical History:   Diagnosis Date    Acute kidney failure, unspecified     Arthritis     CAD (coronary artery disease)     Cellulitis and abscess of other specified site     Coronary atherosclerosis of native coronary artery     Essential hypertension, benign 3/17/2015    Hypertension     Hypopotassemia     Mixed hyperlipidemia     Osteoarthritis 7/20/2017    Other and unspecified hyperlipidemia     Reflux esophagitis     Renal failure, unspecified     Shortness of breath     Unspecified essential hypertension        Past Surgical History:   Procedure Laterality Date    CARDIAC SURG PROCEDURE UNLIST      stent    HX CATARACT REMOVAL Bilateral     HX CHOLECYSTECTOMY      HX HYSTERECTOMY      complete    OR LAYR CLOS WND FACE,FACIAL <2.5CM           Family History:   Problem Relation Age of Onset    Heart Attack Mother     Heart Attack Father     Heart Disease Brother     Heart Disease Brother        Social History     Social History    Marital status:      Spouse name: N/A    Number of children: N/A    Years of education: N/A     Occupational History    Not on file.      Social History Main Topics    Smoking status: Never Smoker    Smokeless tobacco: Never Used    Alcohol use No    Drug use: No    Sexual activity: Not Currently Other Topics Concern    Not on file     Social History Narrative         ALLERGIES: Bee pollen and Fire ant    Review of Systems   Unable to perform ROS: Dementia   Constitutional: Negative for chills and fever. HENT: Negative for congestion, ear pain and rhinorrhea. Eyes: Negative for photophobia and discharge. Respiratory: Negative for cough and shortness of breath. Cardiovascular: Negative for chest pain and palpitations. Gastrointestinal: Negative for abdominal pain, constipation, diarrhea and vomiting. Endocrine: Negative for cold intolerance and heat intolerance. Genitourinary: Negative for dysuria and flank pain. Musculoskeletal: Negative for arthralgias, myalgias and neck pain. Skin: Negative for rash and wound. Allergic/Immunologic: Negative for environmental allergies and food allergies. Neurological: Positive for weakness. Negative for syncope and headaches. Hematological: Negative for adenopathy. Does not bruise/bleed easily. Psychiatric/Behavioral: Positive for confusion. Negative for dysphoric mood. The patient is not nervous/anxious. All other systems reviewed and are negative. Vitals:    02/06/18 1525   BP: (!) 89/54   Resp: 22   Temp: 97.3 °F (36.3 °C)   SpO2: 99%   Weight: 68 kg (150 lb)   Height: 4' 11\" (1.499 m)            Physical Exam   Constitutional: She is oriented to person, place, and time. She appears well-developed and well-nourished. She appears distressed. HENT:   Head: Normocephalic and atraumatic. Mouth/Throat: Oropharynx is clear and moist.   Eyes: Conjunctivae and EOM are normal. Pupils are equal, round, and reactive to light. Right eye exhibits no discharge. Left eye exhibits no discharge. No scleral icterus. Neck: Normal range of motion. Neck supple. No thyromegaly present. Cardiovascular: Normal rate, regular rhythm, normal heart sounds and intact distal pulses. Exam reveals no gallop and no friction rub.     No murmur heard.  Pulmonary/Chest: Effort normal and breath sounds normal. No respiratory distress. She has no wheezes. She exhibits no tenderness. Abdominal: Soft. Bowel sounds are normal. She exhibits no distension. There is no hepatosplenomegaly. There is no tenderness. There is no rebound and no guarding. No hernia. Musculoskeletal: Normal range of motion. She exhibits no edema or tenderness. Neurological: She is alert and oriented to person, place, and time. No cranial nerve deficit. She exhibits normal muscle tone. Skin: Skin is warm. No rash noted. She is not diaphoretic. No erythema. Psychiatric: Judgment and thought content normal. Her affect is blunt. Her speech is delayed. She is slowed. She exhibits abnormal recent memory. Nursing note and vitals reviewed. MDM  Number of Diagnoses or Management Options  Chronic respiratory failure with hypoxia ([facility] [address]): established and worsening  Hypotension, unspecified hypotension type: new and requires workup  Diagnosis management comments: EKG reviewed  Sinus rhythm with normal intervals and normal axis   Single PVC noted  No acute ischemic changes         Amount and/or Complexity of Data Reviewed  Clinical lab tests: ordered and reviewed  Tests in the radiology section of CPT®: ordered and reviewed  Tests in the medicine section of CPT®: ordered and reviewed  Obtain history from someone other than the patient: yes  Review and summarize past medical records: yes  Discuss the patient with other providers: yes    Risk of Complications, Morbidity, and/or Mortality  Presenting problems: high  Diagnostic procedures: high  Management options: high  General comments: Elements of this note have been dictated via voice recognition software. Text and phrases may be limited by the accuracy of the software. The chart has been reviewed, but errors may still be present.       Patient Progress  Patient progress: stable        ED Course       Procedures

## 2018-02-07 PROBLEM — N39.0 ACUTE UTI: Status: ACTIVE | Noted: 2018-02-07

## 2018-02-07 LAB
ANION GAP SERPL CALC-SCNC: 4 MMOL/L (ref 7–16)
BUN SERPL-MCNC: 44 MG/DL (ref 8–23)
CALCIUM SERPL-MCNC: 9 MG/DL (ref 8.3–10.4)
CHLORIDE SERPL-SCNC: 108 MMOL/L (ref 98–107)
CO2 SERPL-SCNC: 35 MMOL/L (ref 21–32)
CREAT SERPL-MCNC: 1.51 MG/DL (ref 0.6–1)
ERYTHROCYTE [DISTWIDTH] IN BLOOD BY AUTOMATED COUNT: 15.6 % (ref 11.9–14.6)
FLUAV AG NPH QL IA: NEGATIVE
FLUBV AG NPH QL IA: NEGATIVE
GLUCOSE SERPL-MCNC: 100 MG/DL (ref 65–100)
HCT VFR BLD AUTO: 29.2 % (ref 35.8–46.3)
HGB BLD-MCNC: 9.2 G/DL (ref 11.7–15.4)
MCH RBC QN AUTO: 33.5 PG (ref 26.1–32.9)
MCHC RBC AUTO-ENTMCNC: 31.5 G/DL (ref 31.4–35)
MCV RBC AUTO: 106.2 FL (ref 79.6–97.8)
PLATELET # BLD AUTO: 198 K/UL (ref 150–450)
PMV BLD AUTO: 12.1 FL (ref 10.8–14.1)
POTASSIUM SERPL-SCNC: 4.5 MMOL/L (ref 3.5–5.1)
RBC # BLD AUTO: 2.75 M/UL (ref 4.05–5.25)
SODIUM SERPL-SCNC: 147 MMOL/L (ref 136–145)
WBC # BLD AUTO: 4.5 K/UL (ref 4.3–11.1)

## 2018-02-07 PROCEDURE — 97162 PT EVAL MOD COMPLEX 30 MIN: CPT

## 2018-02-07 PROCEDURE — 77010033678 HC OXYGEN DAILY

## 2018-02-07 PROCEDURE — 96372 THER/PROPH/DIAG INJ SC/IM: CPT | Performed by: EMERGENCY MEDICINE

## 2018-02-07 PROCEDURE — 74011250636 HC RX REV CODE- 250/636: Performed by: INTERNAL MEDICINE

## 2018-02-07 PROCEDURE — 96374 THER/PROPH/DIAG INJ IV PUSH: CPT | Performed by: EMERGENCY MEDICINE

## 2018-02-07 PROCEDURE — 99218 HC RM OBSERVATION: CPT

## 2018-02-07 PROCEDURE — 87804 INFLUENZA ASSAY W/OPTIC: CPT | Performed by: NURSE PRACTITIONER

## 2018-02-07 PROCEDURE — 74011000250 HC RX REV CODE- 250: Performed by: INTERNAL MEDICINE

## 2018-02-07 PROCEDURE — 80048 BASIC METABOLIC PNL TOTAL CA: CPT | Performed by: INTERNAL MEDICINE

## 2018-02-07 PROCEDURE — 94760 N-INVAS EAR/PLS OXIMETRY 1: CPT

## 2018-02-07 PROCEDURE — G8987 SELF CARE CURRENT STATUS: HCPCS

## 2018-02-07 PROCEDURE — G8988 SELF CARE GOAL STATUS: HCPCS

## 2018-02-07 PROCEDURE — 85027 COMPLETE CBC AUTOMATED: CPT | Performed by: INTERNAL MEDICINE

## 2018-02-07 PROCEDURE — 96361 HYDRATE IV INFUSION ADD-ON: CPT | Performed by: EMERGENCY MEDICINE

## 2018-02-07 PROCEDURE — 94640 AIRWAY INHALATION TREATMENT: CPT

## 2018-02-07 PROCEDURE — 86580 TB INTRADERMAL TEST: CPT | Performed by: NURSE PRACTITIONER

## 2018-02-07 PROCEDURE — 74011250636 HC RX REV CODE- 250/636: Performed by: NURSE PRACTITIONER

## 2018-02-07 PROCEDURE — G8978 MOBILITY CURRENT STATUS: HCPCS

## 2018-02-07 PROCEDURE — 74011000258 HC RX REV CODE- 258: Performed by: NURSE PRACTITIONER

## 2018-02-07 PROCEDURE — G8979 MOBILITY GOAL STATUS: HCPCS

## 2018-02-07 PROCEDURE — 74011000250 HC RX REV CODE- 250: Performed by: NURSE PRACTITIONER

## 2018-02-07 PROCEDURE — 97165 OT EVAL LOW COMPLEX 30 MIN: CPT

## 2018-02-07 PROCEDURE — 74011250637 HC RX REV CODE- 250/637: Performed by: INTERNAL MEDICINE

## 2018-02-07 PROCEDURE — 65270000029 HC RM PRIVATE

## 2018-02-07 PROCEDURE — 74011000302 HC RX REV CODE- 302: Performed by: NURSE PRACTITIONER

## 2018-02-07 RX ORDER — COLCHICINE 0.6 MG/1
0.3 TABLET ORAL DAILY
Status: DISCONTINUED | OUTPATIENT
Start: 2018-02-07 | End: 2018-02-11 | Stop reason: HOSPADM

## 2018-02-07 RX ORDER — FAMOTIDINE 20 MG/1
20 TABLET, FILM COATED ORAL DAILY
Status: DISCONTINUED | OUTPATIENT
Start: 2018-02-07 | End: 2018-02-11 | Stop reason: HOSPADM

## 2018-02-07 RX ORDER — ASPIRIN 81 MG/1
81 TABLET ORAL DAILY
Status: DISCONTINUED | OUTPATIENT
Start: 2018-02-07 | End: 2018-02-11 | Stop reason: HOSPADM

## 2018-02-07 RX ORDER — ALBUTEROL SULFATE 0.83 MG/ML
2.5 SOLUTION RESPIRATORY (INHALATION)
Status: DISCONTINUED | OUTPATIENT
Start: 2018-02-07 | End: 2018-02-11 | Stop reason: HOSPADM

## 2018-02-07 RX ORDER — CLOPIDOGREL BISULFATE 75 MG/1
75 TABLET ORAL DAILY
Status: DISCONTINUED | OUTPATIENT
Start: 2018-02-07 | End: 2018-02-11 | Stop reason: HOSPADM

## 2018-02-07 RX ORDER — PRAVASTATIN SODIUM 20 MG/1
20 TABLET ORAL
Status: DISCONTINUED | OUTPATIENT
Start: 2018-02-07 | End: 2018-02-11 | Stop reason: HOSPADM

## 2018-02-07 RX ORDER — FAMOTIDINE 20 MG/1
20 TABLET, FILM COATED ORAL 2 TIMES DAILY
Status: DISCONTINUED | OUTPATIENT
Start: 2018-02-07 | End: 2018-02-07 | Stop reason: SDUPTHER

## 2018-02-07 RX ORDER — SODIUM CHLORIDE 450 MG/100ML
50 INJECTION, SOLUTION INTRAVENOUS CONTINUOUS
Status: DISCONTINUED | OUTPATIENT
Start: 2018-02-07 | End: 2018-02-09

## 2018-02-07 RX ORDER — ALLOPURINOL 100 MG/1
100 TABLET ORAL 2 TIMES DAILY
Status: DISCONTINUED | OUTPATIENT
Start: 2018-02-07 | End: 2018-02-11 | Stop reason: HOSPADM

## 2018-02-07 RX ORDER — GABAPENTIN 100 MG/1
200 CAPSULE ORAL
Status: DISCONTINUED | OUTPATIENT
Start: 2018-02-07 | End: 2018-02-11 | Stop reason: HOSPADM

## 2018-02-07 RX ORDER — SODIUM CHLORIDE 450 MG/100ML
75 INJECTION, SOLUTION INTRAVENOUS CONTINUOUS
Status: DISCONTINUED | OUTPATIENT
Start: 2018-02-07 | End: 2018-02-07

## 2018-02-07 RX ADMIN — COLCHICINE 0.3 MG: 0.6 TABLET, FILM COATED ORAL at 10:20

## 2018-02-07 RX ADMIN — FAMOTIDINE 20 MG: 20 TABLET, FILM COATED ORAL at 10:36

## 2018-02-07 RX ADMIN — ACETAMINOPHEN 650 MG: 325 TABLET ORAL at 11:06

## 2018-02-07 RX ADMIN — CLOPIDOGREL BISULFATE 75 MG: 75 TABLET ORAL at 10:36

## 2018-02-07 RX ADMIN — SODIUM CHLORIDE 50 ML/HR: 450 INJECTION, SOLUTION INTRAVENOUS at 18:50

## 2018-02-07 RX ADMIN — GABAPENTIN 200 MG: 100 CAPSULE ORAL at 21:33

## 2018-02-07 RX ADMIN — PRAVASTATIN SODIUM 20 MG: 20 TABLET ORAL at 21:33

## 2018-02-07 RX ADMIN — ASPIRIN 81 MG: 81 TABLET, COATED ORAL at 10:36

## 2018-02-07 RX ADMIN — HEPARIN SODIUM 5000 UNITS: 5000 INJECTION, SOLUTION INTRAVENOUS; SUBCUTANEOUS at 06:49

## 2018-02-07 RX ADMIN — WATER 1 G: 1 INJECTION INTRAMUSCULAR; INTRAVENOUS; SUBCUTANEOUS at 17:46

## 2018-02-07 RX ADMIN — SODIUM CHLORIDE 75 ML/HR: 450 INJECTION, SOLUTION INTRAVENOUS at 11:00

## 2018-02-07 RX ADMIN — ALLOPURINOL 100 MG: 100 TABLET ORAL at 19:25

## 2018-02-07 RX ADMIN — TUBERCULIN PURIFIED PROTEIN DERIVATIVE 5 UNITS: 5 INJECTION, SOLUTION INTRADERMAL at 17:40

## 2018-02-07 RX ADMIN — HEPARIN SODIUM 5000 UNITS: 5000 INJECTION, SOLUTION INTRAVENOUS; SUBCUTANEOUS at 17:00

## 2018-02-07 RX ADMIN — ALBUTEROL SULFATE 2.5 MG: 2.5 SOLUTION RESPIRATORY (INHALATION) at 11:35

## 2018-02-07 RX ADMIN — ALLOPURINOL 100 MG: 100 TABLET ORAL at 10:20

## 2018-02-07 NOTE — H&P
HOSPITALIST H&P      NAME:  Rosanne Nichole   Age:  80 y.o.  :   2/10/1927   MRN:   [id number]    PCP: Ivone Barkley MD    Attending MD: Sofie Savage MD    Treatment Team: Attending Provider: Sofie Savage MD    HPI:     Rosanne Nichole is a 80yoF with CKD, chronic dCHF, HTN, chronic O2 who presented to [facility] ED from her PCP's office with hypotension. Patient was admitted from - for Strep bacteremia, NSTEMI. She completed Rocephin on  in a rehab and was about to go home, but she got the flu there. She has completely recovered and has been home for 1-2 weeks, but has been more weak since coming home from rehab. Her [facility] nurse told her that she had low BP about 3 days ago (SBP 90s) and she went to see her PCP about it today. There her SBP was in the 70s. She is not accompanied by family during my interview, but the ED provider reports that the family may have accidentally doubled up on her demadex for the last few days. She was initially a little confused but that has improved while in the ED. BP initially improved to 120/70 with IVF, but then dropped to 88/50. Hospitalist asked to admit for OBS to monitor BP overnight.       Complete ROS done and is as stated in HPI or otherwise negative    Past Medical History:   Diagnosis Date    Acute kidney failure, unspecified     Arthritis     CAD (coronary artery disease)     Cellulitis and abscess of other specified site     Coronary atherosclerosis of native coronary artery     Essential hypertension, benign 3/17/2015    Hypertension     Hypopotassemia     Mixed hyperlipidemia     Osteoarthritis 2017    Other and unspecified hyperlipidemia     Reflux esophagitis     Renal failure, unspecified     Shortness of breath     Unspecified essential hypertension         Past Surgical History:   Procedure Laterality Date    CARDIAC SURG PROCEDURE UNLIST      stent    HX CATARACT REMOVAL Bilateral     HX CHOLECYSTECTOMY      HX HYSTERECTOMY complete    RI LAYR CLOS WND FACE,FACIAL <2.5CM          Prior to Admission Medications   Prescriptions Last Dose Informant Patient Reported? Taking? HYDROcodone-acetaminophen (NORCO) 7.5-325 mg per tablet   No No   Sig: Take 1 Tab by mouth every eight (8) hours as needed for Pain. Max Daily Amount: 3 Tabs. OXYGEN-AIR DELIVERY SYSTEMS   Yes No   Sig: by Does Not Apply route. Saccharomyces boulardii (FLORASTOR) 250 mg capsule   No No   Sig: Take 1 Cap by mouth two (2) times a day. albuterol (PROVENTIL VENTOLIN) 2.5 mg /3 mL (0.083 %) nebulizer solution   No No   Sig: 3 mL by Nebulization route every four (4) hours as needed for Wheezing. allopurinol (ZYLOPRIM) 100 mg tablet   No No   Sig: Take 1 Tab by mouth two (2) times a day. aspirin 81 mg CpDR   Yes No   Sig: Take  by mouth daily. carvedilol (COREG) 3.125 mg tablet   No No   Sig: Take 1 Tab by mouth two (2) times daily (with meals). Indications: hypertension   clopidogrel (PLAVIX) 75 mg tab   No No   Sig: Take 1 Tab by mouth daily. clotrimazole (LOTRIMIN AF, CLOTRIMAZOLE,) 1 % topical cream   No No   Sig: Apply  to affected area two (2) times a day. colchicine 0.6 mg tablet   No No   Sig: Take 0.5 Tabs by mouth daily. conjugated estrogens (PREMARIN) 0.625 mg/gram vaginal cream   No No   Sig: Insert 0.5 g into vagina daily. cyanocobalamin 1,000 mcg tablet   No No   Sig: Take 1 Tab by mouth daily. gabapentin (NEURONTIN) 100 mg capsule   No No   Sig: Take 2 Caps by mouth nightly. Indications: NEUROPATHIC PAIN   guaiFENesin SR (MUCINEX) 600 mg SR tablet   Yes No   Sig: Take 600 mg by mouth two (2) times a day. lisinopril (PRINIVIL, ZESTRIL) 30 mg tablet   No No   Sig: Take 1 Tab by mouth daily. loratadine (CLARITIN) 10 mg tablet   No No   Sig: Take 1 Tab by mouth daily. oseltamivir (TAMIFLU) 75 mg capsule   Yes No   Sig: Take  by mouth.   potassium chloride (KLOR-CON M10) 10 mEq tablet   No No   Sig: Take 1 Tab by mouth daily. pravastatin (PRAVACHOL) 20 mg tablet   No No   Sig: Take 1 Tab by mouth nightly. raNITIdine (ZANTAC) 150 mg tablet   No No   Sig: Take 1 Tab by mouth two (2) times a day. sennosides (SENNA) 8.6 mg cap   Yes No   Sig: Take  by mouth. torsemide (DEMADEX) 20 mg tablet   No No   Sig: Take 1 Tab by mouth daily. tuberculin (TUBERSOL) 5 tub. unit /0.1 mL injection   Yes No   Si Units by IntraDERMal route Once. Facility-Administered Medications: None       Allergies   Allergen Reactions    Bee Pollen Swelling    Fire Ant Itching and Swelling        Social History   Substance Use Topics    Smoking status: Never Smoker    Smokeless tobacco: Never Used    Alcohol use No        Family History   Problem Relation Age of Onset    Heart Attack Mother     Heart Attack Father     Heart Disease Brother     Heart Disease Brother         Objective:       Visit Vitals    BP 95/54    Pulse 71    Temp 97.3 °F (36.3 °C)    Resp 16    Ht 4' 11\" (1.499 m)    Wt 68 kg (150 lb)    SpO2 93%    BMI 30.3 kg/m2        Temp (24hrs), Av.5 °F (36.4 °C), Min:97.3 °F (36.3 °C), Max:97.7 °F (36.5 °C)      Oxygen Therapy  O2 Sat (%): 93 % (18 1722)  Pulse via Oximetry: 87 beats per minute (18 1722)  O2 Device: Nasal cannula (18 1600)  O2 Flow Rate (L/min): 3 l/min (18 1600)      Physical Exam:      General:    Alert, cooperative, NAD    Eyes:   PERRL Anicteric  Head:   Normocephalic, without obvious abnormality, atraumatic. ENT:  2L NC in place, dry MM  Lungs:   Clear to auscultation bilaterally. No Wheezing  Heart:   Regular rate and rhythm, no BLE edema  Abdomen:   Soft, NTTP  MSK:  Spontaneously moves extremities. No deformities/lesions. Skin:     Texture, turgor normal. Not Jaundiced. Neurologic: Alert and oriented x 3, no focal deficits   Psychiatry:      No depression/anxiety. Mood congruent for context.   Heme/Lymph/Immune: No petechiae, echymoses, overt signs of bleeding or lymphadenopathy. Data Review:   Recent Results (from the past 24 hour(s))   CBC WITH AUTOMATED DIFF    Collection Time: 02/06/18  3:50 PM   Result Value Ref Range    WBC 5.5 4.3 - 11.1 K/uL    RBC 2.98 (L) 4.05 - 5.25 M/uL    HGB 9.8 (L) 11.7 - 15.4 g/dL    HCT 31.8 (L) 35.8 - 46.3 %    .7 (H) 79.6 - 97.8 FL    MCH 32.9 26.1 - 32.9 PG    MCHC 30.8 (L) 31.4 - 35.0 g/dL    RDW 15.5 (H) 11.9 - 14.6 %    PLATELET 697 784 - 328 K/uL    MPV 11.5 10.8 - 14.1 FL    DF AUTOMATED      NEUTROPHILS 63 43 - 78 %    LYMPHOCYTES 21 13 - 44 %    MONOCYTES 11 4.0 - 12.0 %    EOSINOPHILS 5 0.5 - 7.8 %    BASOPHILS 0 0.0 - 2.0 %    IMMATURE GRANULOCYTES 0 0.0 - 5.0 %    ABS. NEUTROPHILS 3.5 1.7 - 8.2 K/UL    ABS. LYMPHOCYTES 1.1 0.5 - 4.6 K/UL    ABS. MONOCYTES 0.6 0.1 - 1.3 K/UL    ABS. EOSINOPHILS 0.3 0.0 - 0.8 K/UL    ABS. BASOPHILS 0.0 0.0 - 0.2 K/UL    ABS. IMM. GRANS. 0.0 0.0 - 0.5 K/UL   METABOLIC PANEL, COMPREHENSIVE    Collection Time: 02/06/18  3:50 PM   Result Value Ref Range    Sodium 143 136 - 145 mmol/L    Potassium 4.6 3.5 - 5.1 mmol/L    Chloride 101 98 - 107 mmol/L    CO2 36 (H) 21 - 32 mmol/L    Anion gap 6 (L) 7 - 16 mmol/L    Glucose 86 65 - 100 mg/dL    BUN 49 (H) 8 - 23 MG/DL    Creatinine 1.78 (H) 0.6 - 1.0 MG/DL    GFR est AA 34 (L) >60 ml/min/1.73m2    GFR est non-AA 28 (L) >60 ml/min/1.73m2    Calcium 9.1 8.3 - 10.4 MG/DL    Bilirubin, total 0.3 0.2 - 1.1 MG/DL    ALT (SGPT) 17 12 - 65 U/L    AST (SGOT) 24 15 - 37 U/L    Alk.  phosphatase 107 50 - 136 U/L    Protein, total 6.6 6.3 - 8.2 g/dL    Albumin 2.7 (L) 3.2 - 4.6 g/dL    Globulin 3.9 (H) 2.3 - 3.5 g/dL    A-G Ratio 0.7 (L) 1.2 - 3.5     BNP    Collection Time: 02/06/18  3:50 PM   Result Value Ref Range    BNP 86 pg/mL   MAGNESIUM    Collection Time: 02/06/18  3:50 PM   Result Value Ref Range    Magnesium 2.2 1.8 - 2.4 mg/dL   PHOSPHORUS    Collection Time: 02/06/18  3:50 PM   Result Value Ref Range    Phosphorus 3.6 2.3 - 3.7 MG/DL   POC LACTIC ACID    Collection Time: 02/06/18  3:55 PM   Result Value Ref Range    Lactic Acid (POC) 1.8 0.5 - 1.9 mmol/L   EKG, 12 LEAD, INITIAL    Collection Time: 02/06/18  3:58 PM   Result Value Ref Range    Ventricular Rate 69 BPM    Atrial Rate 69 BPM    P-R Interval 150 ms    QRS Duration 88 ms    Q-T Interval 388 ms    QTC Calculation (Bezet) 415 ms    Calculated P Axis 68 degrees    Calculated R Axis 41 degrees    Calculated T Axis 87 degrees    Diagnosis       !! AGE AND GENDER SPECIFIC ECG ANALYSIS !! Sinus rhythm with occasional Premature ventricular complexes  Nonspecific ST and T wave abnormality  Abnormal ECG  When compared with ECG of 14-JAN-2018 11:39,  Premature ventricular complexes are now Present     URINE MICROSCOPIC    Collection Time: 02/06/18  5:27 PM   Result Value Ref Range    WBC >100 (H) 0 /hpf    RBC 0-3 0 /hpf    Epithelial cells 0-3 0 /hpf    Bacteria 0 0 /hpf    Casts 0-3 0 /lpf       Imaging /Procedures /Studies   XR CHEST PORT   Final Result   IMPRESSION:    1. No evolving acute changes. Only improving cardiomegaly is seen. Assessment and Plan: Active Hospital Problems    Diagnosis Date Noted    Hypotension 02/06/2018    Chronic respiratory failure with hypoxia (HCC) 01/25/2018    CKD (chronic kidney disease) stage 3, GFR 30-59 ml/min 74/88/3251    Diastolic CHF, chronic (HCC) 05/17/2016       PLAN  - Hypotension:  Suspect 2/2 overmedication  Infectious workup neg, asymptomatic  Hold anti-HTN for now  Can likely restart coreg in AM  Check orthostatics    -  CKD:  Cr a little higher than baseline  Hold lisinopril  Monitor for improvement with IVF    - chronic dCHF:  Not in exacerbation  Holding BB, ACE-I and demadex  Monitor closely while hydrating    - Chronic resp failure:   On home O2 since most recent hospitalization  Now stable on her current home amount   Albuterol prn    Code Status: FULL  DVT Prophylaxis: hep SQ    Anticipated discharge: 1-2 days when BP improves; PT/OT consult for [facility] at discharge      Lizett Jerome MD  7:40 PM

## 2018-02-07 NOTE — PROGRESS NOTES
Visited with patient who is A&Ox3. Daughter Mikayla Suarez ([phone]) at bedside. Demographics on face sheet verified. Patient and daughter equally contribute to conversation. State patient lives at home with her son Ana Camarena. Daughter states that she is in and out of the home frequently and helps out quite a bit. Son Ana Camarena also has a friend, Versa Keepers, who is a friend and comes 'just about every other day' as a favor to help patient with bathing, etc.  State patient continues on home O2 which was originally arranged after her last admission here. [facility] was nice and clean but staff, especially at night, were not good. Noted in previous notes that patient finished IV Rocephin at [facility] but got the flu while she was there. Has been seen in ED X 3, including today, since [facility] discharge. Per patient and daughter, she is fairly independent in her ADLs but does require assistance sometimes. State she still was cooking up till recent illness and son has been doing it recently. State she mostly uses a walker to ambulate but occasionally uses a cane. Patient is quite adamant that she will not return to [facility] or any other rehab facility and that she intends to go home. Daughter states, laughing, that patient told her 'you can cut my head off' before she'll go back to rehab. Per daughter, patient is current with [facility] and they would like to resume at discharge. Referral sent to SAM Mims CM to review chart for complex medical outpatient management.

## 2018-02-07 NOTE — ED NOTES
Sodium elevated at 147 and Chloride elevated at 108. Normal saline infusion stopped. Call placed to [place], NP, Hospitalist.  Verbal order received to stop NS and change to 0.45% NS.

## 2018-02-07 NOTE — PROGRESS NOTES
Problem: Self Care Deficits Care Plan (Adult)  Goal: *Acute Goals and Plan of Care (Insert Text)  1. Patient will perform grooming with supervision. 2. Patient will perform Upper body dressing with supervision  3. Patient will perform lower body dressing with minimal assist  4. Patient will perform upper and lower body bathing with minimal assist.  5. Patient will perform toilet transfers with CGA. 6. Patient will perform shower transfer with CGA. 7. Patient will participate in 30 + minutes of ADL/ therapeutic exercise/therapeutic activity with min rest breaks to increase activity tolerance for self care. 8. Patient will perform ADL functional mobility in room with CGA. Goals to be achieved in 7 days. OCCUPATIONAL THERAPY: Initial Assessment 2/7/2018  OBSERVATION: Hospital Day: 2  Payor: [employer] VIP CARE PLUS / [place] [employer] VIP CARE PLUS / Product Type: [employer] Care Medicare /      NAME/AGE/GENDER: Cierra Galvan is a 80 y.o. female   PRIMARY DIAGNOSIS:  Hypotension Hypotension Hypotension        ICD-10: Treatment Diagnosis:    · Generalized Muscle Weakness (M62.81)  · Other lack of cordination (R27.8)  · Difficulty in walking, Not elsewhere classified (R26.2)   Precautions/Allergies:     Bee pollen and Fire ant      ASSESSMENT:     Ms. Sherran Duane admitted with above diagnosis. Pt presents with generalized weakness and decreased self care and functional mobility. Pt would benefit from skilled OT to increase independence. This section established at most recent assessment   PROBLEM LIST (Impairments causing functional limitations):  1. Decreased Strength  2. Decreased ADL/Functional Activities  3. Decreased Transfer Abilities  4. Decreased Ambulation Ability/Technique  5. Decreased Balance  6. Decreased Activity Tolerance   INTERVENTIONS PLANNED: (Benefits and precautions of occupational therapy have been discussed with the patient.)  1. Activities of daily living training  2.  Adaptive equipment training  3. Balance training  4. Clothing management  5. Therapeutic activity  6. Therapeutic exercise     TREATMENT PLAN: Frequency/Duration: Follow patient 4x/week to address above goals. Rehabilitation Potential For Stated Goals: Good     RECOMMENDED REHABILITATION/EQUIPMENT: (at time of discharge pending progress): Due to the probability of continued deficits (see above) this patient will likely need continued skilled occupational therapy after discharge. Equipment:   Minor Romberg   wheelchair              OCCUPATIONAL PROFILE AND HISTORY:   History of Present Injury/Illness (Reason for Referral):  80yoF with CKD, chronic dCHF, HTN, chronic O2 who presented to [facility] ED from her PCP's office with hypotension. Patient was admitted from 12/22-1/5 for Strep bacteremia, NSTEMI. She completed Rocephin on 1/22 in a rehab and was about to go home, but she got the flu there. She has completely recovered and has been home for 1-2 weeks, but has been more weak since coming home from rehab. [facility] told her that she had low BP about 3 days ago (SBP 90s) and she went to see her PCP about it today. There her SBP was in the 70s. She is not accompanied by family during my interview, but the ED provider reports that the family may have accidentally doubled up on her demadex for the last few days. She was initially a little confused but that has improved while in the ED. BP initially improved to 120/70 with IVF, but then dropped to 88/50. Hospitalist asked to admit for OBS to monitor BP overnight. Past Medical History/Comorbidities:   Ms. Neo Garcia  has a past medical history of Acute kidney failure, unspecified; Arthritis; CAD (coronary artery disease); Cellulitis and abscess of other specified site; Coronary atherosclerosis of native coronary artery; Essential hypertension, benign (3/17/2015); Hypertension; Hypopotassemia; Mixed hyperlipidemia; Osteoarthritis (7/20/2017);  Other and unspecified hyperlipidemia; Reflux esophagitis; Renal failure, unspecified; Shortness of breath; and Unspecified essential hypertension. Ms. Henrietta Rossi  has a past surgical history that includes hx cholecystectomy; pr layr clos wnd face,facial <2.5cm; pr cardiac surg procedure unlist; hx cataract removal (Bilateral); and hx hysterectomy. Social History/Living Environment:   Home Environment: Private residence  Wheelchair Ramp: Yes  One/Two Story Residence: One story  Living Alone: No  Support Systems: Child(jayleen), Family member(s)  Patient Expects to be Discharged to[de-identified] Private residence  Current DME Used/Available at Home: Wheelchair, Jackeline Farrow, rolling  Prior Level of Function/Work/Activity:lives with son; assist with ADLs and ambulation     Number of Personal Factors/Comorbidities that affect the Plan of Care: Brief history (0):  LOW COMPLEXITY   ASSESSMENT OF OCCUPATIONAL PERFORMANCE[de-identified]   Activities of Daily Living  Basic ADLs (From Assessment) Complex ADLs (From Assessment)   Basic ADL  Feeding: Setup  Oral Facial Hygiene/Grooming: Minimum assistance  Bathing: Moderate assistance  Upper Body Dressing: Moderate assistance  Lower Body Dressing: Maximum assistance  Toileting: Maximum assistance     Grooming/Bathing/Dressing Activities of Daily Living     Cognitive Retraining  Safety/Judgement: Fall prevention                 Functional Transfers  Toilet Transfer : Minimum assistance;Assist x2  Shower Transfer: Minimum assistance;Assist x2     Bed/Mat Mobility  Supine to Sit: Moderate assistance  Sit to Supine:  Moderate assistance  Sit to Stand: Minimum assistance;Assist x2  Bed to Chair: Minimum assistance;Assist x2       Most Recent Physical Functioning:   Gross Assessment:  AROM: Generally decreased, functional (BUE)  Strength: Generally decreased, functional (BUE)  Coordination: Generally decreased, functional (BUE)  Tone: Normal  Sensation: Intact               Posture:     Balance:  Sitting: Intact  Standing: With support;Pull to stand Bed Mobility:  Supine to Sit: Moderate assistance  Sit to Supine: Moderate assistance  Wheelchair Mobility:     Transfers:  Sit to Stand: Minimum assistance;Assist x2  Stand to Sit: Minimum assistance;Assist x2  Bed to Chair: Minimum assistance;Assist x2              Patient Vitals for the past 6 hrs:   BP SpO2 Pulse   18 0149 98/54 98 % 79   18 0319 120/65 99 % 75   18 0349 122/60 99 % 76   18 0419 132/74 96 % 80   18 0449 121/60 95 % 74   18 0519 127/60 96 % 78   18 0549 130/62 95 % 82   18 0619 138/65 96 % 83       Mental Status  Neurologic State: Alert  Orientation Level: Oriented to person, Oriented to place  Cognition: Follows commands  Perception: Appears intact  Perseveration: No perseveration noted  Safety/Judgement: Fall prevention                          Physical Skills Involved:  1. Balance  2. Strength  3. Activity Tolerance Cognitive Skills Affected (resulting in the inability to perform in a timely and safe manner): 1. none Psychosocial Skills Affected:  1. none   Number of elements that affect the Plan of Care: 1-3:  LOW COMPLEXITY   CLINICAL DECISION MAKIN[address] [place] [id number] AM-PAC 6 Clicks   Daily Activity Inpatient Short Form  How much help from another person does the patient currently need. .. Total A Lot A Little None   1. Putting on and taking off regular lower body clothing? [] 1   [x] 2   [] 3   [] 4   2. Bathing (including washing, rinsing, drying)? [] 1   [x] 2   [] 3   [] 4   3. Toileting, which includes using toilet, bedpan or urinal?   [] 1   [x] 2   [] 3   [] 4   4. Putting on and taking off regular upper body clothing? [] 1   [] 2   [x] 3   [] 4   5. Taking care of personal grooming such as brushing teeth? [] 1   [] 2   [x] 3   [] 4   6. Eating meals? [] 1   [] 2   [x] 3   [] 4   © , Trustees of [address] [place] [address] [address] [address], under license to [employer].  All rights reserved      Score:  Initial: 15 Most Recent: X (Date: -- ) Interpretation of Tool:  Represents activities that are increasingly more difficult (i.e. Bed mobility, Transfers, Gait). Score 24 23 22-20 19-15 14-10 9-7 6     Modifier CH CI CJ CK CL CM CN      ? Self Care:     - CURRENT STATUS: CK - 40%-59% impaired, limited or restricted    - GOAL STATUS: CJ - 20%-39% impaired, limited or restricted    - D/C STATUS:  ---------------To be determined---------------  Payor: [employer] VIP CARE PLUS [place] [employer]ARE [place]US / Product Type: Managed Care Medicare /      Medical Necessity:     · Patient is expected to demonstrate progress in strength, balance, coordination and functional technique to increase independence with self care and functional mobility. Reason for Services/Other Comments:  · Patient continues to require skilled intervention due to decrease self care and functional mobility. Use of outcome tool(s) and clinical judgement create a POC that gives a: LOW COMPLEXITY         TREATMENT:   (In addition to Assessment/Re-Assessment sessions the following treatments were rendered)     Pre-treatment Symptoms/Complaints:  No complaints  Pain: Initial:   Pain Intensity 1: 0  Post Session:  0     Assessment/Reassessment only, no treatment provided today    Braces/Orthotics/Lines/Etc:   · telemetry  · O2 Device: Nasal cannula  Treatment/Session Assessment:    · Response to Treatment:  Tolerated well  · Interdisciplinary Collaboration:   o Physical Therapist  o Registered Nurse  · After treatment position/precautions:   o Supine in bed  o Bed/Chair-wheels locked  o Bed in low position  o Call light within reach  o RN notified  o Side rails x 2   · Compliance with Program/Exercises: compliant all of the time. · Recommendations/Intent for next treatment session: \"Next visit will focus on advancements to more challenging activities and reduction in assistance provided\".   Total Treatment Duration:  OT Patient Time In/Time Out  Time In: 0478  Time Out: [address]

## 2018-02-08 ENCOUNTER — PATIENT OUTREACH (OUTPATIENT)
Dept: CASE MANAGEMENT | Age: 83
End: 2018-02-08

## 2018-02-08 ENCOUNTER — APPOINTMENT (OUTPATIENT)
Dept: GENERAL RADIOLOGY | Age: 83
DRG: 690 | End: 2018-02-08
Payer: COMMERCIAL

## 2018-02-08 LAB
ANION GAP SERPL CALC-SCNC: 7 MMOL/L (ref 7–16)
BASOPHILS # BLD: 0 K/UL (ref 0–0.2)
BASOPHILS NFR BLD: 0 % (ref 0–2)
BNP SERPL-MCNC: 419 PG/ML
BUN SERPL-MCNC: 40 MG/DL (ref 8–23)
CALCIUM SERPL-MCNC: 9.4 MG/DL (ref 8.3–10.4)
CHLORIDE SERPL-SCNC: 106 MMOL/L (ref 98–107)
CO2 SERPL-SCNC: 33 MMOL/L (ref 21–32)
CREAT SERPL-MCNC: 1.37 MG/DL (ref 0.6–1)
DIFFERENTIAL METHOD BLD: ABNORMAL
EOSINOPHIL # BLD: 0.2 K/UL (ref 0–0.8)
EOSINOPHIL NFR BLD: 3 % (ref 0.5–7.8)
ERYTHROCYTE [DISTWIDTH] IN BLOOD BY AUTOMATED COUNT: 15.2 % (ref 11.9–14.6)
GLUCOSE SERPL-MCNC: 111 MG/DL (ref 65–100)
HCT VFR BLD AUTO: 28.8 % (ref 35.8–46.3)
HGB BLD-MCNC: 9.1 G/DL (ref 11.7–15.4)
IMM GRANULOCYTES # BLD: 0 K/UL (ref 0–0.5)
IMM GRANULOCYTES NFR BLD AUTO: 0 % (ref 0–5)
LYMPHOCYTES # BLD: 1.2 K/UL (ref 0.5–4.6)
LYMPHOCYTES NFR BLD: 20 % (ref 13–44)
MAGNESIUM SERPL-MCNC: 2.1 MG/DL (ref 1.8–2.4)
MCH RBC QN AUTO: 32.9 PG (ref 26.1–32.9)
MCHC RBC AUTO-ENTMCNC: 31.6 G/DL (ref 31.4–35)
MCV RBC AUTO: 104 FL (ref 79.6–97.8)
MM INDURATION POC: 0 MM (ref 0–5)
MONOCYTES # BLD: 0.5 K/UL (ref 0.1–1.3)
MONOCYTES NFR BLD: 9 % (ref 4–12)
NEUTS SEG # BLD: 4 K/UL (ref 1.7–8.2)
NEUTS SEG NFR BLD: 68 % (ref 43–78)
PLATELET # BLD AUTO: 199 K/UL (ref 150–450)
PMV BLD AUTO: 11.8 FL (ref 10.8–14.1)
POTASSIUM SERPL-SCNC: 4.1 MMOL/L (ref 3.5–5.1)
PPD POC: NORMAL NEGATIVE
RBC # BLD AUTO: 2.77 M/UL (ref 4.05–5.25)
SODIUM SERPL-SCNC: 146 MMOL/L (ref 136–145)
WBC # BLD AUTO: 5.9 K/UL (ref 4.3–11.1)

## 2018-02-08 PROCEDURE — 85025 COMPLETE CBC W/AUTO DIFF WBC: CPT | Performed by: NURSE PRACTITIONER

## 2018-02-08 PROCEDURE — 80048 BASIC METABOLIC PNL TOTAL CA: CPT | Performed by: NURSE PRACTITIONER

## 2018-02-08 PROCEDURE — 74011250637 HC RX REV CODE- 250/637: Performed by: INTERNAL MEDICINE

## 2018-02-08 PROCEDURE — 74011250636 HC RX REV CODE- 250/636: Performed by: INTERNAL MEDICINE

## 2018-02-08 PROCEDURE — 94760 N-INVAS EAR/PLS OXIMETRY 1: CPT

## 2018-02-08 PROCEDURE — 99218 HC RM OBSERVATION: CPT

## 2018-02-08 PROCEDURE — 74011000250 HC RX REV CODE- 250: Performed by: NURSE PRACTITIONER

## 2018-02-08 PROCEDURE — 74011250636 HC RX REV CODE- 250/636: Performed by: NURSE PRACTITIONER

## 2018-02-08 PROCEDURE — 90471 IMMUNIZATION ADMIN: CPT

## 2018-02-08 PROCEDURE — 74011000258 HC RX REV CODE- 258: Performed by: NURSE PRACTITIONER

## 2018-02-08 PROCEDURE — 97535 SELF CARE MNGMENT TRAINING: CPT

## 2018-02-08 PROCEDURE — 94640 AIRWAY INHALATION TREATMENT: CPT

## 2018-02-08 PROCEDURE — 90686 IIV4 VACC NO PRSV 0.5 ML IM: CPT | Performed by: INTERNAL MEDICINE

## 2018-02-08 PROCEDURE — 83735 ASSAY OF MAGNESIUM: CPT | Performed by: NURSE PRACTITIONER

## 2018-02-08 PROCEDURE — 96375 TX/PRO/DX INJ NEW DRUG ADDON: CPT

## 2018-02-08 PROCEDURE — 74011000250 HC RX REV CODE- 250: Performed by: INTERNAL MEDICINE

## 2018-02-08 PROCEDURE — 97116 GAIT TRAINING THERAPY: CPT

## 2018-02-08 PROCEDURE — 96372 THER/PROPH/DIAG INJ SC/IM: CPT | Performed by: EMERGENCY MEDICINE

## 2018-02-08 PROCEDURE — 65270000029 HC RM PRIVATE

## 2018-02-08 PROCEDURE — 77010033678 HC OXYGEN DAILY

## 2018-02-08 PROCEDURE — 83880 ASSAY OF NATRIURETIC PEPTIDE: CPT | Performed by: NURSE PRACTITIONER

## 2018-02-08 PROCEDURE — 74011250637 HC RX REV CODE- 250/637: Performed by: PHYSICIAN ASSISTANT

## 2018-02-08 PROCEDURE — 71046 X-RAY EXAM CHEST 2 VIEWS: CPT

## 2018-02-08 RX ORDER — DOCUSATE SODIUM 100 MG/1
100 CAPSULE, LIQUID FILLED ORAL 2 TIMES DAILY
Status: DISCONTINUED | OUTPATIENT
Start: 2018-02-08 | End: 2018-02-11 | Stop reason: HOSPADM

## 2018-02-08 RX ADMIN — ALLOPURINOL 100 MG: 100 TABLET ORAL at 16:50

## 2018-02-08 RX ADMIN — ACETAMINOPHEN 650 MG: 325 TABLET ORAL at 09:28

## 2018-02-08 RX ADMIN — HEPARIN SODIUM 5000 UNITS: 5000 INJECTION, SOLUTION INTRAVENOUS; SUBCUTANEOUS at 01:08

## 2018-02-08 RX ADMIN — DOCUSATE SODIUM 100 MG: 100 CAPSULE, LIQUID FILLED ORAL at 16:51

## 2018-02-08 RX ADMIN — ALLOPURINOL 100 MG: 100 TABLET ORAL at 09:30

## 2018-02-08 RX ADMIN — GABAPENTIN 200 MG: 100 CAPSULE ORAL at 21:01

## 2018-02-08 RX ADMIN — FAMOTIDINE 20 MG: 20 TABLET, FILM COATED ORAL at 09:29

## 2018-02-08 RX ADMIN — HEPARIN SODIUM 5000 UNITS: 5000 INJECTION, SOLUTION INTRAVENOUS; SUBCUTANEOUS at 10:00

## 2018-02-08 RX ADMIN — ASPIRIN 81 MG: 81 TABLET, COATED ORAL at 09:29

## 2018-02-08 RX ADMIN — ALBUTEROL SULFATE 2.5 MG: 2.5 SOLUTION RESPIRATORY (INHALATION) at 18:45

## 2018-02-08 RX ADMIN — CLOPIDOGREL BISULFATE 75 MG: 75 TABLET ORAL at 09:28

## 2018-02-08 RX ADMIN — COLCHICINE 0.3 MG: 0.6 TABLET, FILM COATED ORAL at 09:30

## 2018-02-08 RX ADMIN — PRAVASTATIN SODIUM 20 MG: 20 TABLET ORAL at 21:01

## 2018-02-08 RX ADMIN — SODIUM CHLORIDE 50 ML/HR: 450 INJECTION, SOLUTION INTRAVENOUS at 23:55

## 2018-02-08 RX ADMIN — WATER 1 G: 1 INJECTION INTRAMUSCULAR; INTRAVENOUS; SUBCUTANEOUS at 16:50

## 2018-02-08 NOTE — PROGRESS NOTES
Patient received from ER to room 350  stretcher. Patient alert & oriented x2, respirations easy & regular with O2 @ 3L via nasal cannula sats on 99%, diminished breath sounds. Admission and dual skin assessment done with Shawn Watkins RN. Skin dry, fragile & intact, with scattered ecchymosis noted. Call light within reach, instructed to call for assistance as needed. Bed low & locked, side rails x3 up, with call light within reach, pt instructed to call for assistance as needed. Bed alarm set.

## 2018-02-08 NOTE — PROGRESS NOTES
Hospitalist Progress Note    2018  Admit Date: 2018  3:34 PM   NAME: Luann Reyes   :  2/10/1927   MRN:  [id number]   Attending: Román Rowley MD  PCP:  Charles Valladares MD    SUBJECTIVE:   Admitting Note:    80year old  female with extensive medical Hx to include Chronic DHF sent to ER  afternoon from Dr Kong Erwin office for hypotension and hypoxia on chronic home oxygen at 2.5 liters. She has been weak and confused for about 2 days with gradual worsening. She has mild dementia at baseline. Blood pressure on admission to ER is 89/54. She is oriented and alert, forgetful on admission. : Pt. Is sitting up in bed with staff assisting her to the bathroom. Her BNP is elevated today. She is somewhat confused and does not answer any specific questions except for telling me she has to urinate. Unable to obtain ROS.   PHYSICAL EXAM   Patient Vitals for the past 24 hrs:   Temp Pulse Resp BP SpO2   18 1845 - - - - 99 %   18 1639 98.2 °F (36.8 °C) 67 20 119/69 100 %   18 1116 96.4 °F (35.8 °C) 76 20 133/66 100 %   18 1054 98.2 °F (36.8 °C) 79 20 124/58 100 %   18 1052 - 73 17 - 100 %   18 0600 - 83 (!) 41 134/72 100 %   18 0500 - 88 30 143/67 -   18 0400 - 85 17 143/65 -   18 0335 - 88 19 - 97 %   18 0300 - 90 22 153/69 96 %   18 0200 - 90 19 151/71 99 %   18 0100 - 90 20 144/68 99 %   18 0000 - 91 27 155/89 90 %   18 2300 - 95 24 (!) 156/107 95 %   18 2200 - 88 27 155/79 98 %   18 2100 - 80 - 155/70 100 %   18 -  - 124/68 100 %   18 1900 - 85 - 139/73 99 %     Temp (24hrs), Av.6 °F (36.4 °C), Min:96.4 °F (35.8 °C), Max:98.2 °F (36.8 °C)    Oxygen Therapy  O2 Sat (%): 99 % (18)  Pulse via Oximetry: 85 beats per minute (18)  O2 Device: Nasal cannula (18)  O2 Flow Rate (L/min): 2 l/min (18)  FIO2 (%): 24 % (18)  No intake or output data in the 24 hours ending 02/08/18 6387     Physical Exam:   General:  Elderly female. Awake. Sitting up in bed in NAD. Eyes:  Conjunctivae/corneas clear. PERRL, EOMs intact. Nose: Nares normal. Septum midline. Mucosa normal. No drainage or sinus tenderness. Mouth/Throat: Lips, mucosa, and tongue normal. Teeth and gums normal.     Neck: Supple, symmetrical, trachea midline, no adenopathy, thyroid: no enlargment/tenderness/nodules, no carotid bruit and no JVD. Back:   Symmetric, no curvature. ROM normal. No CVA tenderness. Lungs:   Clear to auscultation bilaterally. No R/R/W/S or rubs. Heart:  Regular rate and rhythm, S1, S2 normal, no murmur, click, rub or gallop. Abdomen:   Soft, non-tender. Bowel sounds normal. No masses,  No organomegaly. Extremities: Extremities normal, atraumatic, no cyanosis or edema. Pulses: 2+ and symmetric all extremities. Skin: Skin color, texture, turgor normal. No rashes or lesions       Neurologic: No gross focal neurologic deficits.        Data Review:  I have reviewed all labs, meds, telemetry events, and studies from the last 24 hours. ASSESSMENT /PLAN     1. Hypotension: BP medications held. BP has stabilized and improved. She is no longer symptomatic. 2. Acute UTI: Continue Rocephin. Await final urine culture. Thus far NGTD. Blood culture positive x 1 gram positive cocci. Await final results. Could be a contaminant. 3. CKD: Stable and fairly baseline. 4. Chronic DHF: BNP elevated. No clinical findings otherwise to suggest acute HF. Will check CXR. 5. Debility: PT/OT/CM for possible rehab placement.           DVT Prophylaxis: Heparin    SEDA Diaz

## 2018-02-08 NOTE — ROUTINE PROCESS
TRANSFER - OUT REPORT:    Verbal report given to Ariana Jacobs RN(name) on [employer]  being transferred to room #350(unit) for routine progression of care       Report consisted of patients Situation, Background, Assessment and   Recommendations(SBAR). Information from the following report(s) ED Summary was reviewed with the receiving nurse. Lines:   PICC Single Lumen 89/74/82 Right;Basilic (Active)       Peripheral IV 02/06/18 Right Antecubital (Active)   Site Assessment Clean, dry, & intact 2/6/2018  4:05 PM   Phlebitis Assessment 0 2/6/2018  4:05 PM   Infiltration Assessment 0 2/6/2018  4:05 PM        Opportunity for questions and clarification was provided.       Patient transported with: hospital transport

## 2018-02-08 NOTE — PROGRESS NOTES
TRANSFER - IN REPORT:    Verbal report received from Jazmín RN(name) on Angela Dumont  being received from ER(unit) for routine progression of care      Report consisted of patients Situation, Background, Assessment and   Recommendations(SBAR). Information from the following report(s) SBAR and Kardex was reviewed with the receiving nurse. Opportunity for questions and clarification was provided. Assessment completed upon patients arrival to unit and care assumed.

## 2018-02-08 NOTE — PROGRESS NOTES
CCM chart review completed. Patient has had 6 ED visits in a short period of time, two resulting in admissions. Patient currently being admitted today. She previously had been in rehab at [facility]. Lives with son. I will monitor her admission and work with in patient CM/SW to assist in discharge plan.

## 2018-02-08 NOTE — PROGRESS NOTES
I called Robel gaytan the patients daughter to inform her that her mom was in a room now. I also asked her if someone could bring a list of her medications when they come to visit.

## 2018-02-08 NOTE — PHYSICIAN ADVISORY
Letter of Determination:  Outpatient status receiving Observation Services    This patient was originally hospitalized as inpatient status on 2/72/2018 for symptomatic hypotension. At this time this patient does not appear to meet the medical necessity requirements in CMS regulation Section 43 .3 to support an inpatient level of care. It is our recommendation that this patient's hospitalization status should be changed from INPATIENT to OUTPATIENT receiving OBSERVATION services via Condition Code 44.      This may change due to the medical condition of the patient and new clinical evidence as the patients care progreses. The final decision regarding the patient's hospitalization status depends on the attending physician's judgement.     Jean Carlos Singleton MD, GABRIELLA,   Physician Sky Esposito.

## 2018-02-08 NOTE — Clinical Note
FYI, this one may be one you possibly may help with seeing if LTC is an option? ?  Keep this one on radar

## 2018-02-08 NOTE — PROGRESS NOTES
Problem: Self Care Deficits Care Plan (Adult)  Goal: *Acute Goals and Plan of Care (Insert Text)  1. Patient will perform grooming with supervision. 2. Patient will perform Upper body dressing with supervision  3. Patient will perform lower body dressing with minimal assist  4. Patient will perform upper and lower body bathing with minimal assist.  5. Patient will perform toilet transfers with CGA. 6. Patient will perform shower transfer with CGA. 7. Patient will participate in 30 + minutes of ADL/ therapeutic exercise/therapeutic activity with min rest breaks to increase activity tolerance for self care. 8. Patient will perform ADL functional mobility in room with CGA. Goals to be achieved in 7 days. OCCUPATIONAL THERAPY: Daily Note, Treatment Day: 1st and AM 2/8/2018  INPATIENT: Hospital Day: 3  Payor: FIRST CHOICE VIP CARE PLUS / [place] FIRST CHOICE VIP CARE PLUS / Product Type: PO-MO Care Medicare /      NAME/AGE/GENDER: Eddie Hendrickson is a 80 y.o. female   PRIMARY DIAGNOSIS:  Hypotension  Acute UTI Hypotension Hypotension        ICD-10: Treatment Diagnosis:    · Generalized Muscle Weakness (M62.81)  · Other lack of cordination (R27.8)  · Difficulty in walking, Not elsewhere classified (R26.2)   Precautions/Allergies:     Bee pollen and Fire ant      ASSESSMENT:     Ms. Vinicio Chávez admitted with above diagnosis. Pt completed simple grooming and dressing tasks this am. Pt progressing well. Continue OT. This section established at most recent assessment   PROBLEM LIST (Impairments causing functional limitations):  1. Decreased Strength  2. Decreased ADL/Functional Activities  3. Decreased Transfer Abilities  4. Decreased Ambulation Ability/Technique  5. Decreased Balance  6. Decreased Activity Tolerance   INTERVENTIONS PLANNED: (Benefits and precautions of occupational therapy have been discussed with the patient.)  1. Activities of daily living training  2. Adaptive equipment training  3.  Balance training  4. Clothing management  5. Therapeutic activity  6. Therapeutic exercise     TREATMENT PLAN: Frequency/Duration: Follow patient 4x/week to address above goals. Rehabilitation Potential For Stated Goals: Good     RECOMMENDED REHABILITATION/EQUIPMENT: (at time of discharge pending progress): Due to the probability of continued deficits (see above) this patient will likely need continued skilled occupational therapy after discharge. Equipment:   [place]   wheelchair              OCCUPATIONAL PROFILE AND HISTORY:   History of Present Injury/Illness (Reason for Referral):  80yoF with CKD, chronic dCHF, HTN, chronic O2 who presented to [facility] ED from her PCP's office with hypotension. Patient was admitted from 12/22-1/5 for Strep bacteremia, NSTEMI. She completed Rocephin on 1/22 in a rehab and was about to go home, but she got the flu there. She has completely recovered and has been home for 1-2 weeks, but has been more weak since coming home from rehab. Her [facility] nurse told her that she had low BP about 3 days ago (SBP 90s) and she went to see her PCP about it today. There her SBP was in the 70s. She is not accompanied by family during my interview, but the ED provider reports that the family may have accidentally doubled up on her demadex for the last few days. She was initially a little confused but that has improved while in the ED. BP initially improved to 120/70 with IVF, but then dropped to 88/50. Hospitalist asked to admit for OBS to monitor BP overnight. Past Medical History/Comorbidities:   Ms. Henrietta Rossi  has a past medical history of Acute kidney failure, unspecified; Arthritis; CAD (coronary artery disease); Cellulitis and abscess of other specified site; Coronary atherosclerosis of native coronary artery; Essential hypertension, benign (3/17/2015); Hypertension; Hypopotassemia; Mixed hyperlipidemia; Osteoarthritis (7/20/2017); Other and unspecified hyperlipidemia; Reflux esophagitis;  Renal failure, unspecified; Shortness of breath; and Unspecified essential hypertension. Ms. Willy Cannon  has a past surgical history that includes hx cholecystectomy; pr layr clos wnd face,facial <2.5cm; pr cardiac surg procedure unlist; hx cataract removal (Bilateral); and hx hysterectomy. Social History/Living Environment:   Home Environment: Private residence  # Steps to Enter: 0  Wheelchair Ramp: Yes  One/Two Story Residence: One story  Living Alone: No  Support Systems: Child(jayleen)  Patient Expects to be Discharged to[de-identified] Private residence  Current DME Used/Available at Home: Tricia Natacha, rolling, Wheelchair, [address] [email or web address] chair  Tub or Shower Type: Shower  Prior Level of Function/Work/Activity:lives with son; assist with ADLs and ambulation     Number of Personal Factors/Comorbidities that affect the Plan of Care: Brief history (0):  LOW COMPLEXITY   ASSESSMENT OF OCCUPATIONAL PERFORMANCE[de-identified]   Activities of Daily Living  Basic ADLs (From Assessment) Complex ADLs (From Assessment)   Basic ADL  Feeding: Setup  Oral Facial Hygiene/Grooming: Minimum assistance  Bathing: Moderate assistance  Upper Body Dressing: Moderate assistance  Lower Body Dressing: Maximum assistance  Toileting: Maximum assistance     Grooming/Bathing/Dressing Activities of Daily Living   Grooming  Grooming Assistance: Supervision/set up  Washing Face: Supervision/set-up  Washing Hands: Supervision/set-up Cognitive Retraining  Safety/Judgement: Fall prevention     Feeding  Feeding Assistance: Supervision/set-up  Drink to Mouth: Supervision/set-up         Upper Body Dressing Assistance  Dressing Assistance: Minimum assistance  Hospital Gown: Minimum  assistance     Lower Body Dressing Assistance  Dressing Assistance: Total assistance(dependent)  Socks:  Total assistance (dependent) Bed/Mat Mobility  Sit to Stand: Minimum assistance       Most Recent Physical Functioning:   Gross Assessment:  AROM: Generally decreased, functional (BUE)  Strength: Generally decreased, functional (BUE)  Coordination: Generally decreased, functional (BUE)  Tone: Normal  Sensation: Intact               Posture:  Posture Assessment: Forward head, Rounded shoulders  Balance:  Sitting: Intact  Standing: Pull to stand; With support Bed Mobility:     Wheelchair Mobility:     Transfers:  Sit to Stand: Minimum assistance              Patient Vitals for the past 6 hrs:   BP SpO2 Pulse   18 0300 153/69 96 % 90   18 0335 - 97 % 88   18 0400 143/65 - 85   18 0500 143/67 - 88   18 0600 134/72 100 % 83       Mental Status  Neurologic State: Alert  Orientation Level: Oriented to person, Oriented to place  Cognition: Follows commands  Perception: Appears intact  Perseveration: No perseveration noted  Safety/Judgement: Fall prevention                          Physical Skills Involved:  1. Balance  2. Strength  3. Activity Tolerance Cognitive Skills Affected (resulting in the inability to perform in a timely and safe manner): 1. none Psychosocial Skills Affected:  1. none   Number of elements that affect the Plan of Care: 1-3:  LOW COMPLEXITY   CLINICAL DECISION MAKIN[address] [place] [id number] AM-PAC 6 Clicks   Daily Activity Inpatient Short Form  How much help from another person does the patient currently need. .. Total A Lot A Little None   1. Putting on and taking off regular lower body clothing? [] 1   [x] 2   [] 3   [] 4   2. Bathing (including washing, rinsing, drying)? [] 1   [x] 2   [] 3   [] 4   3. Toileting, which includes using toilet, bedpan or urinal?   [] 1   [x] 2   [] 3   [] 4   4. Putting on and taking off regular upper body clothing? [] 1   [] 2   [x] 3   [] 4   5. Taking care of personal grooming such as brushing teeth? [] 1   [] 2   [x] 3   [] 4   6. Eating meals? [] 1   [] 2   [x] 3   [] 4   © , Trustees of [address] [place] [id number], under license to [employer].  All rights reserved      Score:  Initial: 15 Most Recent: X (Date: -- )    Interpretation of Tool: Represents activities that are increasingly more difficult (i.e. Bed mobility, Transfers, Gait). Score 24 23 22-20 19-15 14-10 9-7 6     Modifier CH CI CJ CK CL CM CN      ? Self Care:     - CURRENT STATUS: CK - 40%-59% impaired, limited or restricted    - GOAL STATUS: CJ - 20%-39% impaired, limited or restricted    - D/C STATUS:  ---------------To be determined---------------  Payor: FIRST CHOICE VIP CARE PLUS [place] FIRST CHOICE VIP CARE PLUS / Product Type: Managed Care Medicare /      Medical Necessity:     · Patient is expected to demonstrate progress in strength, balance, coordination and functional technique to increase independence with self care and functional mobility. Reason for Services/Other Comments:  · Patient continues to require skilled intervention due to decrease self care and functional mobility. Use of outcome tool(s) and clinical judgement create a POC that gives a: LOW COMPLEXITY         TREATMENT:   (In addition to Assessment/Re-Assessment sessions the following treatments were rendered)     Pre-treatment Symptoms/Complaints:  No complaints  Pain: Initial:   Pain Intensity 1: 0  Post Session:  0     Self Care: (10): Procedure(s) (per grid) utilized to improve and/or restore self-care/home management as related to dressing and grooming. Required minimal verbal and   cueing to facilitate activities of daily living skills and compensatory activities. Braces/Orthotics/Lines/Etc:   · telemetry  · O2 Device: Nasal cannula  Treatment/Session Assessment:    · Response to Treatment:  Tolerated well  · Interdisciplinary Collaboration:   o Physical Therapist  o Registered Nurse  · After treatment position/precautions:   o Supine in bed  o Bed/Chair-wheels locked  o Bed in low position  o Call light within reach  o RN notified  o Side rails x 2   · Compliance with Program/Exercises: compliant all of the time. · Recommendations/Intent for next treatment session:   \"Next visit will focus on advancements to more challenging activities and reduction in assistance provided\".   Total Treatment Duration:  OT Patient Time In/Time Out  Time In: 0720  Time Out: [address]obedo, OT

## 2018-02-08 NOTE — PROGRESS NOTES
Problem: Mobility Impaired (Adult and Pediatric)  Goal: *Acute Goals and Plan of Care (Insert Text)  LTG:  (1.)Ms. Frederico Severe will move from supine to sit and sit to supine  in bed with SUPERVISION within 7 day(s). (2.)Ms. Frederico Severe will transfer from bed to chair and chair to bed with SUPERVISION using the least restrictive device within 7 day(s). (3.)Ms. Frederico Severe will ambulate with CONTACT GUARD ASSIST for 25 feet with the least restrictive device within 7 day(s). (4.)pt. Will increase B LE strength 1/2rade to improve functional mobility within 7 days  ________________________________________________________________________________________________    PHYSICAL THERAPY: Daily Note, Treatment Day: 1st, AM 2/8/2018  INPATIENT: Hospital Day: 3  Payor: FIRST CHOICE VIP CARE PLUS / [place] FIRST CHOICE VIP CARE PLUS / Product Type: Managed Care Medicare /      NAME/AGE/GENDER: Emeterio Claudio is a 80 y.o. female   PRIMARY DIAGNOSIS: Hypotension  Acute UTI Hypotension Hypotension        ICD-10: Treatment Diagnosis:   · Generalized Muscle Weakness (M62.81)  · Difficulty in walking, Not elsewhere classified (R26.2)  · Other abnormalities of gait and mobility (R26.89)   Precaution/Allergies:  Bee pollen and Fire ant      ASSESSMENT:     Ms. Frederico Severe presents with hypotension and demonstrates decreased general strength and endurance, functional mobility and standing balance. She would benefit from further PT while here to address these issues. It sounds like she uses the wc for most locomotion, but the RW to get into the restroom at home. She plans on going home at [place]. She lives with her son and her dtr is in/out to offer assistance as well. I would recommend [facility] PT to insure a safe transition. She is a little more confused this morning. She did well with ambulation and increased her distance as far the O2 cord would allow her. RN said she hasn't slept well. I think once she gets in a regular room, she will be better.     This section established at most recent assessment   PROBLEM LIST (Impairments causing functional limitations):  1. Decreased Strength  2. Decreased Transfer Abilities  3. Decreased Ambulation Ability/Technique  4. Decreased Balance  5. Decreased Activity Tolerance  6. Increased Fatigue  7. Decreased Flexibility/Joint Mobility  8. Decreased Knowledge of Precautions  9. Decreased [place] with Home Exercise Program   INTERVENTIONS PLANNED: (Benefits and precautions of physical therapy have been discussed with the patient.)  1. Balance Exercise  2. Bed Mobility  3. Family Education  4. Gait Training  5. Range of Motion (ROM)  6. Therapeutic Activites  7. Therapeutic Exercise/Strengthening  8. Transfer Training  9. endurance activities     TREATMENT PLAN: Frequency/Duration: daily for duration of hospital stay  Rehabilitation Potential For Stated Goals: Good     RECOMMENDED REHABILITATION/EQUIPMENT: (at time of discharge pending progress): Due to the probability of continued deficits (see above) this patient will likely need continued skilled physical therapy after discharge. Equipment:    has a wc, RW and shower chair at home              HISTORY:   History of Present Injury/Illness (Reason for Referral): Admitted with hypotension. Past Medical History/Comorbidities:   Ms. Henrietta Rossi  has a past medical history of Acute kidney failure, unspecified; Arthritis; CAD (coronary artery disease); Cellulitis and abscess of other specified site; Coronary atherosclerosis of native coronary artery; Essential hypertension, benign (3/17/2015); Hypertension; Hypopotassemia; Mixed hyperlipidemia; Osteoarthritis (7/20/2017); Other and unspecified hyperlipidemia; Reflux esophagitis; Renal failure, unspecified; Shortness of breath; and Unspecified essential hypertension.   Ms. Henrietta Rossi  has a past surgical history that includes hx cholecystectomy; pr layr clos wnd face,facial <2.5cm; pr cardiac surg procedure unlist; hx cataract removal (Bilateral); and hx hysterectomy. Social History/Living Environment:   Home Environment: Private residence  # Steps to Enter: 0  Wheelchair Ramp: Yes  One/Two Story Residence: One story  Living Alone: No  Support Systems: Child(jayleen)  Patient Expects to be Discharged to[de-identified] Private residence  Current DME Used/Available at Home: Walker, rolling, Wheelchair, [address] [employer] chair  Tub or Shower Type: Shower  Prior Level of Function/Work/Activity:  Lives with her son, She uses a wc for most locomotion and is able to use the RW to get in the restroom. Her family does assist with her ADLs  Dominant Side:         RIGHT   Number of Personal Factors/Comorbidities that affect the Plan of Care: 1-2: MODERATE COMPLEXITY   EXAMINATION:   Most Recent Physical Functioning:   Gross Assessment:  AROM: Generally decreased, functional (B LEs)  Strength: Generally decreased, functional (B LEs grossly 3+/5)  Sensation: Impaired (B lower legs)               Posture:  Posture Assessment: Forward head, Rounded shoulders  Balance:  Sitting: Intact  Standing: Pull to stand; With support Bed Mobility:  Supine to Sit: Moderate assistance  Sit to Supine: Moderate assistance  Scooting: Minimum assistance  Wheelchair Mobility:     Transfers:  Sit to Stand: Minimum assistance  Stand to Sit: Minimum assistance  Stand Pivot Transfers: Minimal assistance  Gait:     Speed/Mary: Shuffled; Slow  Gait Abnormalities: Decreased step clearance;Shuffling gait  Distance (ft): 30 Feet (ft)  Assistive Device: Walker, rolling  Ambulation - Level of Assistance: Minimal assistance  Interventions: Safety awareness training; Tactile cues; Verbal cues; Visual/Demos  Duration: 10 Minutes      Body Structures Involved:  1. Lungs  2. Joints  3. Muscles Body Functions Affected:  1. Respiratory  2. Neuromusculoskeletal  3. Movement Related Activities and Participation Affected:  1. Mobility  2.  Self Care   Number of elements that affect the Plan of Care: 4+: HIGH COMPLEXITY CLINICAL PRESENTATION:   Presentation: Evolving clinical presentation with changing clinical characteristics: MODERATE COMPLEXITY   CLINICAL DECISION MAKIN [address] Mobility Inpatient Short Form  How much difficulty does the patient currently have. .. Unable A Lot A Little None   1. Turning over in bed (including adjusting bedclothes, sheets and blankets)? [] 1   [] 2   [x] 3   [] 4   2. Sitting down on and standing up from a chair with arms ( e.g., wheelchair, bedside commode, etc.)   [] 1   [] 2   [x] 3   [] 4   3. Moving from lying on back to sitting on the side of the bed? [] 1   [x] 2   [] 3   [] 4   How much help from another person does the patient currently need. .. Total A Lot A Little None   4. Moving to and from a bed to a chair (including a wheelchair)? [] 1   [] 2   [x] 3   [] 4   5. Need to walk in hospital room? [] 1   [] 2   [x] 3   [] 4   6. Climbing 3-5 steps with a railing? [] 1   [x] 2   [] 3   [] 4   © 2007, Trustees of [address] [place] [address], under license to [employer]. All rights reserved      Score:  Initial: 16 Most Recent: X (Date: -- )    Interpretation of Tool:  Represents activities that are increasingly more difficult (i.e. Bed mobility, Transfers, Gait). Score 24 23 22-20 19-15 14-10 9-7 6     Modifier CH CI CJ CK CL CM CN      ? Mobility - Walking and Moving Around:     - CURRENT STATUS: CK - 40%-59% impaired, limited or restricted    - GOAL STATUS: CJ - 20%-39% impaired, limited or restricted    - D/C STATUS:  ---------------To be determined---------------  Payor: FIRST CHOICE VIP CARE PLUS [place] FIRST CHOICE VIP CARE PLUS / Product Type: Managed Care Medicare /      Medical Necessity:     · Patient demonstrates fair rehab potential due to higher previous functional level.   Reason for Services/Other Comments:  · Patient continues to require present interventions due to patient's inability to perform functional mobility with CGA. Use of outcome tool(s) and clinical judgement create a POC that gives a: Questionable prediction of patient's progress: MODERATE COMPLEXITY            TREATMENT:   (In addition to Assessment/Re-Assessment sessions the following treatments were rendered)   Pre-treatment Symptoms/Complaints:  Wants to put her own gown on. She has an IV so we put a hospital gown on  Pain: Initial:   Pain Intensity 1: 0  Post Session:  0     Gait Training (10 Minutes):  Gait training to improve and/or restore physical functioning as related to mobility, strength, balance and endurance. Ambulated 30 Feet (ft) with Minimal assistance using a Walker, rolling and minimal Safety awareness training; Tactile cues; Verbal cues; Visual/Demos related to their stride length and posture and proper breathing to promote proper body posture and promote proper body breathing techniques. Braces/Orthotics/Lines/Etc:   · IV  · telemetry lines  · O2 Device: Nasal cannula  Treatment/Session Assessment:    · Response to Treatment:  Showing gains with amb distance  · Interdisciplinary Collaboration:   o Physical Therapist  o Occupational Therapist  o Registered Nurse  · After treatment position/precautions:   o Supine in bed  o Bed/Chair-wheels locked  o Bed in low position  o Call light within reach  o RN notified  o Side rails x 2   · Compliance with Program/Exercises: Will assess as treatment progresses. · Recommendations/Intent for next treatment session: \"Next visit will focus on advancements to more challenging activities, reduction in assistance provided and focus on bed mobitliy, transfers, gait, endurance and strength training\".    Total Treatment Duration:  PT Patient Time In/Time Out  Time In: 0730  Time Out: [address] [place], PT

## 2018-02-09 LAB
BACTERIA SPEC CULT: NORMAL
MM INDURATION POC: NORMAL MM (ref 0–5)
PPD POC: NORMAL NEGATIVE
SERVICE CMNT-IMP: NORMAL

## 2018-02-09 PROCEDURE — 65270000029 HC RM PRIVATE

## 2018-02-09 PROCEDURE — 94760 N-INVAS EAR/PLS OXIMETRY 1: CPT

## 2018-02-09 PROCEDURE — 74011250636 HC RX REV CODE- 250/636: Performed by: NURSE PRACTITIONER

## 2018-02-09 PROCEDURE — 96376 TX/PRO/DX INJ SAME DRUG ADON: CPT

## 2018-02-09 PROCEDURE — 74011000250 HC RX REV CODE- 250: Performed by: INTERNAL MEDICINE

## 2018-02-09 PROCEDURE — 77010033678 HC OXYGEN DAILY

## 2018-02-09 PROCEDURE — 74011000250 HC RX REV CODE- 250: Performed by: NURSE PRACTITIONER

## 2018-02-09 PROCEDURE — 96375 TX/PRO/DX INJ NEW DRUG ADDON: CPT

## 2018-02-09 PROCEDURE — 74011250636 HC RX REV CODE- 250/636: Performed by: INTERNAL MEDICINE

## 2018-02-09 PROCEDURE — 97110 THERAPEUTIC EXERCISES: CPT

## 2018-02-09 PROCEDURE — 74011250637 HC RX REV CODE- 250/637: Performed by: INTERNAL MEDICINE

## 2018-02-09 PROCEDURE — 96372 THER/PROPH/DIAG INJ SC/IM: CPT

## 2018-02-09 PROCEDURE — 36415 COLL VENOUS BLD VENIPUNCTURE: CPT | Performed by: NURSE PRACTITIONER

## 2018-02-09 PROCEDURE — 94664 DEMO&/EVAL PT USE INHALER: CPT

## 2018-02-09 PROCEDURE — 99218 HC RM OBSERVATION: CPT

## 2018-02-09 PROCEDURE — 74011250637 HC RX REV CODE- 250/637: Performed by: PHYSICIAN ASSISTANT

## 2018-02-09 PROCEDURE — 87040 BLOOD CULTURE FOR BACTERIA: CPT | Performed by: NURSE PRACTITIONER

## 2018-02-09 PROCEDURE — 94640 AIRWAY INHALATION TREATMENT: CPT

## 2018-02-09 RX ORDER — FUROSEMIDE 10 MG/ML
20 INJECTION INTRAMUSCULAR; INTRAVENOUS ONCE
Status: COMPLETED | OUTPATIENT
Start: 2018-02-09 | End: 2018-02-09

## 2018-02-09 RX ADMIN — ASPIRIN 81 MG: 81 TABLET, COATED ORAL at 09:00

## 2018-02-09 RX ADMIN — WATER 1 G: 1 INJECTION INTRAMUSCULAR; INTRAVENOUS; SUBCUTANEOUS at 17:48

## 2018-02-09 RX ADMIN — GABAPENTIN 200 MG: 100 CAPSULE ORAL at 21:27

## 2018-02-09 RX ADMIN — DOCUSATE SODIUM 100 MG: 100 CAPSULE, LIQUID FILLED ORAL at 09:12

## 2018-02-09 RX ADMIN — HEPARIN SODIUM 5000 UNITS: 5000 INJECTION, SOLUTION INTRAVENOUS; SUBCUTANEOUS at 17:48

## 2018-02-09 RX ADMIN — ALLOPURINOL 100 MG: 100 TABLET ORAL at 17:48

## 2018-02-09 RX ADMIN — Medication 5 ML: at 21:29

## 2018-02-09 RX ADMIN — FAMOTIDINE 20 MG: 20 TABLET, FILM COATED ORAL at 09:12

## 2018-02-09 RX ADMIN — HEPARIN SODIUM 5000 UNITS: 5000 INJECTION, SOLUTION INTRAVENOUS; SUBCUTANEOUS at 02:05

## 2018-02-09 RX ADMIN — Medication 10 ML: at 13:43

## 2018-02-09 RX ADMIN — CLOPIDOGREL BISULFATE 75 MG: 75 TABLET ORAL at 09:12

## 2018-02-09 RX ADMIN — COLCHICINE 0.3 MG: 0.6 TABLET, FILM COATED ORAL at 09:12

## 2018-02-09 RX ADMIN — ALLOPURINOL 100 MG: 100 TABLET ORAL at 09:00

## 2018-02-09 RX ADMIN — FUROSEMIDE 20 MG: 10 INJECTION, SOLUTION INTRAMUSCULAR; INTRAVENOUS at 13:42

## 2018-02-09 RX ADMIN — ALBUTEROL SULFATE 2.5 MG: 2.5 SOLUTION RESPIRATORY (INHALATION) at 01:54

## 2018-02-09 RX ADMIN — HEPARIN SODIUM 5000 UNITS: 5000 INJECTION, SOLUTION INTRAVENOUS; SUBCUTANEOUS at 09:15

## 2018-02-09 RX ADMIN — DOCUSATE SODIUM 100 MG: 100 CAPSULE, LIQUID FILLED ORAL at 17:48

## 2018-02-09 RX ADMIN — PRAVASTATIN SODIUM 20 MG: 20 TABLET ORAL at 21:27

## 2018-02-09 RX ADMIN — ALBUTEROL SULFATE 2.5 MG: 2.5 SOLUTION RESPIRATORY (INHALATION) at 09:16

## 2018-02-09 NOTE — PROGRESS NOTES
Sister Zach Moreno and Sunil Grove distributed a Bible to the patient's room and made a spiritual care visit to the patient and family member.        [employer]

## 2018-02-09 NOTE — PROGRESS NOTES
Progress Note    2018  Admit Date: 2018  3:34 PM   NAME: Mahogany Russell   :  2/10/1927   MRN:  [id number]   Attending: Roman Brennan MD  PCP:  Fer Anderson MD  Treatment Team: Attending Provider: Roman Brennan MD; Utilization Review: Timoteo Fontenot    Full Code   SUBJECTIVE:   Ms. Barbara Adams is a 81 yo female with PMH diastolic CHF, CKD stage 3, HTN, CAD, pulmonary HTN, gout, NSTEMI who presented from PCP office for hypotension, hypoxia on home O2 at 2.5 L NC. She has been generally weak and confused for 2 days which gradually worsened prior to admission. She was found to be hypotensive with systolic BP in the 82J. Her BNP was elevated yesterday with small pleural effusion in CXR. She has been on IVF and off her home diuretics. 18 BC with [facility]. Of note she was tx in December for strep bacteremia with 2 gm rocephin by ID EOT 18. She is confused. She is afebrile without leukocytosis. She was found to have a UTI, urine cx sent. She was started on rocephin on admission. Past Medical History:   Diagnosis Date    Acute kidney failure, unspecified     Arthritis     CAD (coronary artery disease)     Cellulitis and abscess of other specified site     Coronary atherosclerosis of native coronary artery     Essential hypertension, benign 3/17/2015    Hypertension     Hypopotassemia     Mixed hyperlipidemia     Osteoarthritis 2017    Other and unspecified hyperlipidemia     Reflux esophagitis     Renal failure, unspecified     Shortness of breath     Unspecified essential hypertension      No results found for this or any previous visit (from the past 25 hour(s)).   Allergies   Allergen Reactions    Bee Pollen Swelling    Fire Ant Itching and Swelling     Current Facility-Administered Medications   Medication Dose Route Frequency Provider Last Rate Last Dose    furosemide (LASIX) injection 20 mg  20 mg IntraVENous ONCE Tania Branch NP        docusate sodium (COLACE) capsule 100 mg  100 mg Oral BID SEDA Mitchell   100 mg at 18 09    albuterol (PROVENTIL VENTOLIN) nebulizer solution 2.5 mg  2.5 mg Nebulization Q4H PRN Aram Hanna MD   2.5 mg at 18 0916    allopurinol (ZYLOPRIM) tablet 100 mg  100 mg Oral BID Aram Hanna MD   100 mg at 18 0900    aspirin delayed-release tablet 81 mg  81 mg Oral DAILY Aram Hanna MD   81 mg at 18 09    clopidogrel (PLAVIX) tablet 75 mg  75 mg Oral DAILY Aram Hanna MD   75 mg at 18    colchicine tablet 0.3 mg  0.3 mg Oral DAILY Aram Hanna MD   0.3 mg at 18    gabapentin (NEURONTIN) capsule 200 mg  200 mg Oral QHS Aram Hanna MD   200 mg at 18    pravastatin (PRAVACHOL) tablet 20 mg  20 mg Oral QHS Aram Hanna MD   20 mg at 18    famotidine (PEPCID) tablet 20 mg  20 mg Oral DAILY Aram Hanna MD   20 mg at 18    cefTRIAXone (ROCEPHIN) 1 g in sterile water (preservative free) 10 mL IV syringe  1 g IntraVENous Q24H Sam Ferrer NP   1 g at 18 1650    sodium chloride (NS) flush 5-10 mL  5-10 mL IntraVENous Q8H Aram Hanna MD        sodium chloride (NS) flush 5-10 mL  5-10 mL IntraVENous PRN Aram Hanna MD        acetaminophen (TYLENOL) tablet 650 mg  650 mg Oral Q4H PRN Aram Hanna MD   650 mg at 18 0928    ondansetron (ZOFRAN) injection 4 mg  4 mg IntraVENous Q4H PRN Aram Hanna MD        heparin (porcine) injection 5,000 Units  5,000 Units SubCUTAneous Shamir Salazar MD   5,000 Units at 18 0915       Review of Systems negative with exception of pertinent positives noted above  PHYSICAL EXAM     Visit Vitals    /68 (BP 1 Location: Left arm, BP Patient Position: At rest)    Pulse 76    Temp 97.6 °F (36.4 °C)    Resp 18    Ht 4' 11\" (1.499 m)    Wt 68.3 kg (150 lb 9.6 oz)    SpO2 100%    BMI 30.42 kg/m2      Temp (24hrs), Av.9 °F (36.6 °C), Min:97.4 °F (36.3 °C), Max:98.4 °F (36.9 °C)    Oxygen Therapy  O2 Sat (%): 100 % (02/09/18 1221)  Pulse via Oximetry: 91 beats per minute (02/09/18 0154)  O2 Device: Nasal cannula (02/09/18 0917)  O2 Flow Rate (L/min): 2 l/min (02/09/18 0917)  FIO2 (%): 28 % (02/09/18 0154)    Intake/Output Summary (Last 24 hours) at 02/09/18 1322  Last data filed at 02/09/18 0308   Gross per 24 hour   Intake              559 ml   Output              400 ml   Net              159 ml      General: No acute distress, confused    Lungs: Diminished bases bilaterally. Resp even and nonlabored  Heart:  S1S2 present without murmurs rubs gallops. RRR. Trace bilateral LE pedal edema  Abdomen: Soft, non tender, non distended. BS present  Extremities: Moves ext well  Neurologic:  Confused but follows commands. Results summary of Diagnostic Studies/Procedures copied from within [facility] EMR:         [address] Problems    Diagnosis Date Noted    Acute UTI 02/07/2018    Hypotension 02/06/2018    Chronic respiratory failure with hypoxia ([facility] [address] 01/25/2018    CKD (chronic kidney disease) stage 3, GFR 30-59 ml/min 00/15/5967    Diastolic CHF, chronic ([facility] [address] 05/17/2016     Plan:    UTI:  Continue Rocephin. Follow urine cx    Hypotension:  Resolved    Chronic Diastolic CHF:  BNP elevated. CXR with pleural effusion. Stop IVF, one time dose lasix. Monitor I/O.   EF 55-60% in December 2017 [address] [place] Blood cultures:  Repeat BC today. Completed Rocephin course EOT 1/22/18 for strep bacteremia.   Consult ID given recent bacteremia      Notes, labs, VS, diagnostic testing reviewed  Time spent with pt 20 min      DVT Prophylaxis: heparin sq  Plan of Care Discussed with: Supervising MD Dr. Dennise Flores, care team, pt   Abram Raphael NP

## 2018-02-09 NOTE — PROGRESS NOTES
PM assessment complete. Patient alert, oriented to person, place and time. Respirations even and unlabored, no distress noted. IVF infusing without difficulty. Abdomen soft, bowel sounds active in all 4 quadrants. Bed alarm on. Denies needs or pain.

## 2018-02-09 NOTE — PROGRESS NOTES
Care Management Interventions  PCP Verified by CM: Yes  Mode of Transport at Discharge: Other (see comment)  Transition of Care Consult (CM Consult): Discharge Planning, [address]: Yes  Physical Therapy Consult: Yes  Current Support Network: Own Home, Relative's Home  Confirm Follow Up Transport: Family  Plan discussed with Pt/Family/Caregiver: Yes  Freedom of Choice Offered: Yes  Discharge Location  Discharge Placement: Home with home health        Chart reviewed. Please see Halima Craft note. SW speaks with pt who is A&O. Pt lives with her son Nigel Messina. Pt is current with [facility] & has home O2. Pt plans to return home at d/c with [facility].

## 2018-02-09 NOTE — PROGRESS NOTES
Problem: Mobility Impaired (Adult and Pediatric)  Goal: *Acute Goals and Plan of Care (Insert Text)  LTG:  (1.)Ms. Odell Underwood will move from supine to sit and sit to supine  in bed with SUPERVISION within 7 day(s). (2.)Ms. Odell Underwood will transfer from bed to chair and chair to bed with SUPERVISION using the least restrictive device within 7 day(s). (3.)Ms. Odell Underwood will ambulate with CONTACT GUARD ASSIST for 25 feet with the least restrictive device within 7 day(s). (4.)pt. Will increase B LE strength 1/2rade to improve functional mobility within 7 days  ________________________________________________________________________________________________    PHYSICAL THERAPY: Daily Note, Treatment Day: 2nd, AM 2/9/2018  INPATIENT: Hospital Day: 4  Payor: [employer] C[employer] PLUS / [place] [employer] [place]US / Product Type: Managed Care Medicare /      NAME/AGE/GENDER: Antoine Malave is a 80 y.o. female   PRIMARY DIAGNOSIS: Hypotension  Acute UTI Hypotension Hypotension        ICD-10: Treatment Diagnosis:   · Generalized Muscle Weakness (M62.81)  · Difficulty in walking, Not elsewhere classified (R26.2)  · Other abnormalities of gait and mobility (R26.89)   Precaution/Allergies:  Bee pollen and Fire ant      ASSESSMENT:     Ms. Odell Underwood presents with hypotension and demonstrates decreased general strength and endurance, functional mobility and standing balance. She would benefit from further PT while here to address these issues. It sounds like she uses the wc for most locomotion, but the RW to get into the restroom at home. She plans on going home at [place]. She lives with her son and her dtr is in/out to offer assistance as well. I would recommend [facility] PT to insure a safe transition. She is very sleepy this morning, but will do exercises with me. She performs exercises in the bed without any problems.     This section established at most recent assessment   PROBLEM LIST (Impairments causing functional limitations):  1. Decreased Strength  2. Decreased Transfer Abilities  3. Decreased Ambulation Ability/Technique  4. Decreased Balance  5. Decreased Activity Tolerance  6. Increased Fatigue  7. Decreased Flexibility/Joint Mobility  8. Decreased Knowledge of Precautions  9. Decreased [place] with Home Exercise Program   INTERVENTIONS PLANNED: (Benefits and precautions of physical therapy have been discussed with the patient.)  1. Balance Exercise  2. Bed Mobility  3. Family Education  4. Gait Training  5. Range of Motion (ROM)  6. Therapeutic Activites  7. Therapeutic Exercise/Strengthening  8. Transfer Training  9. endurance activities     TREATMENT PLAN: Frequency/Duration: daily for duration of hospital stay  Rehabilitation Potential For Stated Goals: Good     RECOMMENDED REHABILITATION/EQUIPMENT: (at time of discharge pending progress): Due to the probability of continued deficits (see above) this patient will likely need continued skilled physical therapy after discharge. Equipment:    has a wc, RW and shower chair at home              HISTORY:   History of Present Injury/Illness (Reason for Referral): Admitted with hypotension. Past Medical History/Comorbidities:   Ms. Anand Vanegas  has a past medical history of Acute kidney failure, unspecified; Arthritis; CAD (coronary artery disease); Cellulitis and abscess of other specified site; Coronary atherosclerosis of native coronary artery; Essential hypertension, benign (3/17/2015); Hypertension; Hypopotassemia; Mixed hyperlipidemia; Osteoarthritis (7/20/2017); Other and unspecified hyperlipidemia; Reflux esophagitis; Renal failure, unspecified; Shortness of breath; and Unspecified essential hypertension. Ms. Anand Vanegas  has a past surgical history that includes hx cholecystectomy; pr layr clos wnd face,facial <2.5cm; pr cardiac surg procedure unlist; hx cataract removal (Bilateral); and hx hysterectomy.   Social History/Living Environment:   Home Environment: Private residence  # Steps to Enter: 0  Wheelchair Ramp: Yes  One/Two Story Residence: One story  Living Alone: No  Support Systems: Child(jayleen)  Patient Expects to be Discharged to[de-identified] Private residence  Current DME Used/Available at Home: Walker, rolling, [address] [place] chair, Wheelchair  Tub or Shower Type: Shower  Prior Level of Function/Work/Activity:  Lives with her son, She uses a wc for most locomotion and is able to use the RW to get in the restroom. Her family does assist with her ADLs  Dominant Side:         RIGHT   Number of Personal Factors/Comorbidities that affect the Plan of Care: 1-2: MODERATE COMPLEXITY   EXAMINATION:   Most Recent Physical Functioning:   Gross Assessment:                  Posture:     Balance:    Bed Mobility:     Wheelchair Mobility:     Transfers:     Gait:            Body Structures Involved:  1. Lungs  2. Joints  3. Muscles Body Functions Affected:  1. Respiratory  2. Neuromusculoskeletal  3. Movement Related Activities and Participation Affected:  1. Mobility  2. Self Care   Number of elements that affect the Plan of Care: 4+: HIGH COMPLEXITY   CLINICAL PRESENTATION:   Presentation: Evolving clinical presentation with changing clinical characteristics: MODERATE COMPLEXITY   CLINICAL DECISION MAKIN [facility] Mobility Inpatient Short Form  How much difficulty does the patient currently have. .. Unable A Lot A Little None   1. Turning over in bed (including adjusting bedclothes, sheets and blankets)? [] 1   [] 2   [x] 3   [] 4   2. Sitting down on and standing up from a chair with arms ( e.g., wheelchair, bedside commode, etc.)   [] 1   [] 2   [x] 3   [] 4   3. Moving from lying on back to sitting on the side of the bed? [] 1   [x] 2   [] 3   [] 4   How much help from another person does the patient currently need. .. Total A Lot A Little None   4. Moving to and from a bed to a chair (including a wheelchair)? [] 1   [] 2   [x] 3   [] 4   5. Need to walk in hospital room? [] 1   [] 2   [x] 3   [] 4   6. Climbing 3-5 steps with a railing? [] 1   [x] 2   [] 3   [] 4   © 2007, Trustees of [address] [place] [address] [address] [address], under license to [employer]. All rights reserved      Score:  Initial: 16 Most Recent: X (Date: -- )    Interpretation of Tool:  Represents activities that are increasingly more difficult (i.e. Bed mobility, Transfers, Gait). Score 24 23 22-20 19-15 14-10 9-7 6     Modifier CH CI CJ CK CL CM CN      ? Mobility - Walking and Moving Around:     - CURRENT STATUS: CK - 40%-59% impaired, limited or restricted    - GOAL STATUS: CJ - 20%-39% impaired, limited or restricted    - D/C STATUS:  ---------------To be determined---------------  Payor: [employer] PLUS / [place] [employer] [place]US / Product Type: Managed Care Medicare /      Medical Necessity:     · Patient demonstrates fair rehab potential due to higher previous functional level. Reason for Services/Other Comments:  · Patient continues to require present interventions due to patient's inability to perform functional mobility with CGA. Use of outcome tool(s) and clinical judgement create a POC that gives a: Questionable prediction of patient's progress: MODERATE COMPLEXITY            TREATMENT:   (In addition to Assessment/Re-Assessment sessions the following treatments were rendered)   Pre-treatment Symptoms/Complaints:  Wants to put her own gown on. She has an IV so we put a hospital gown on  Pain: Initial:   Pain Intensity 1: 0 (0/10 after therapy)  Post Session:       Gait Training ( ):  Gait training to improve and/or restore physical functioning as related to mobility, strength, balance and endurance. Ambulated   with   using a   and minimal   related to their stride length and posture and proper breathing to promote proper body posture and promote proper body breathing techniques.  Therapeutic Exercise: (23 Minutes):  Exercises per grid below to improve strength. Required minimal verbal cues to promote proper body alignment. Progressed range as indicated. Date:  2/9 Date:   Date:     Activity/Exercise Parameters Parameters Parameters   Ankle pumps 10     heelslides 10     Hip abd/add 10     SLR 10     Shoulder flex/ext 10     Elbow flex/ext 10               Assessment/Reassessment only, no treatment provided today  Braces/Orthotics/Lines/Etc:   · IV  · telemetry lines  · O2 Device: Nasal cannula  Treatment/Session Assessment:    · Response to Treatment:  She tolerated exercises well. · Interdisciplinary Collaboration:   o Registered Nurse  · After treatment position/precautions:   o Supine in bed  o Bed/Chair-wheels locked  o Bed in low position  o Call light within reach  o RN notified  o Side rails x 2   · Compliance with Program/Exercises: Will assess as treatment progresses. · Recommendations/Intent for next treatment session: \"Next visit will focus on advancements to more challenging activities, reduction in assistance provided and focus on bed mobitliy, transfers, gait, endurance and strength training\".    Total Treatment Duration:  PT Patient Time In/Time Out  Time In: 1045  Time Out: [facility] MIMI Torres

## 2018-02-10 LAB
ANION GAP SERPL CALC-SCNC: 5 MMOL/L (ref 7–16)
BUN SERPL-MCNC: 24 MG/DL (ref 8–23)
CALCIUM SERPL-MCNC: 9 MG/DL (ref 8.3–10.4)
CHLORIDE SERPL-SCNC: 109 MMOL/L (ref 98–107)
CO2 SERPL-SCNC: 33 MMOL/L (ref 21–32)
CREAT SERPL-MCNC: 1.11 MG/DL (ref 0.6–1)
ERYTHROCYTE [DISTWIDTH] IN BLOOD BY AUTOMATED COUNT: 15.4 % (ref 11.9–14.6)
GLUCOSE SERPL-MCNC: 88 MG/DL (ref 65–100)
HCT VFR BLD AUTO: 29 % (ref 35.8–46.3)
HGB BLD-MCNC: 9.2 G/DL (ref 11.7–15.4)
MCH RBC QN AUTO: 33.1 PG (ref 26.1–32.9)
MCHC RBC AUTO-ENTMCNC: 31.7 G/DL (ref 31.4–35)
MCV RBC AUTO: 104.3 FL (ref 79.6–97.8)
PLATELET # BLD AUTO: 198 K/UL (ref 150–450)
PMV BLD AUTO: 11.7 FL (ref 10.8–14.1)
POTASSIUM SERPL-SCNC: 3.7 MMOL/L (ref 3.5–5.1)
RBC # BLD AUTO: 2.78 M/UL (ref 4.05–5.25)
SODIUM SERPL-SCNC: 147 MMOL/L (ref 136–145)
WBC # BLD AUTO: 6 K/UL (ref 4.3–11.1)

## 2018-02-10 PROCEDURE — 77010033678 HC OXYGEN DAILY

## 2018-02-10 PROCEDURE — 94760 N-INVAS EAR/PLS OXIMETRY 1: CPT

## 2018-02-10 PROCEDURE — 97530 THERAPEUTIC ACTIVITIES: CPT

## 2018-02-10 PROCEDURE — 99218 HC RM OBSERVATION: CPT

## 2018-02-10 PROCEDURE — 97110 THERAPEUTIC EXERCISES: CPT

## 2018-02-10 PROCEDURE — 36415 COLL VENOUS BLD VENIPUNCTURE: CPT | Performed by: NURSE PRACTITIONER

## 2018-02-10 PROCEDURE — 74011250636 HC RX REV CODE- 250/636: Performed by: INTERNAL MEDICINE

## 2018-02-10 PROCEDURE — 94640 AIRWAY INHALATION TREATMENT: CPT

## 2018-02-10 PROCEDURE — 74011250637 HC RX REV CODE- 250/637: Performed by: INTERNAL MEDICINE

## 2018-02-10 PROCEDURE — 94664 DEMO&/EVAL PT USE INHALER: CPT

## 2018-02-10 PROCEDURE — 80048 BASIC METABOLIC PNL TOTAL CA: CPT | Performed by: NURSE PRACTITIONER

## 2018-02-10 PROCEDURE — 74011250637 HC RX REV CODE- 250/637: Performed by: PHYSICIAN ASSISTANT

## 2018-02-10 PROCEDURE — 65270000029 HC RM PRIVATE

## 2018-02-10 PROCEDURE — 74011000250 HC RX REV CODE- 250: Performed by: NURSE PRACTITIONER

## 2018-02-10 PROCEDURE — 97535 SELF CARE MNGMENT TRAINING: CPT

## 2018-02-10 PROCEDURE — 96372 THER/PROPH/DIAG INJ SC/IM: CPT

## 2018-02-10 PROCEDURE — 74011250636 HC RX REV CODE- 250/636: Performed by: NURSE PRACTITIONER

## 2018-02-10 PROCEDURE — 74011000250 HC RX REV CODE- 250: Performed by: INTERNAL MEDICINE

## 2018-02-10 PROCEDURE — 96376 TX/PRO/DX INJ SAME DRUG ADON: CPT

## 2018-02-10 PROCEDURE — 85027 COMPLETE CBC AUTOMATED: CPT | Performed by: NURSE PRACTITIONER

## 2018-02-10 RX ADMIN — ALLOPURINOL 100 MG: 100 TABLET ORAL at 08:45

## 2018-02-10 RX ADMIN — COLCHICINE 0.3 MG: 0.6 TABLET, FILM COATED ORAL at 08:43

## 2018-02-10 RX ADMIN — Medication 10 ML: at 14:00

## 2018-02-10 RX ADMIN — ALBUTEROL SULFATE 2.5 MG: 2.5 SOLUTION RESPIRATORY (INHALATION) at 19:59

## 2018-02-10 RX ADMIN — ALLOPURINOL 100 MG: 100 TABLET ORAL at 17:13

## 2018-02-10 RX ADMIN — GABAPENTIN 200 MG: 100 CAPSULE ORAL at 21:51

## 2018-02-10 RX ADMIN — FAMOTIDINE 20 MG: 20 TABLET, FILM COATED ORAL at 08:43

## 2018-02-10 RX ADMIN — Medication 5 ML: at 05:24

## 2018-02-10 RX ADMIN — Medication 5 ML: at 21:51

## 2018-02-10 RX ADMIN — WATER 1 G: 1 INJECTION INTRAMUSCULAR; INTRAVENOUS; SUBCUTANEOUS at 17:21

## 2018-02-10 RX ADMIN — CLOPIDOGREL BISULFATE 75 MG: 75 TABLET ORAL at 08:43

## 2018-02-10 RX ADMIN — DOCUSATE SODIUM 100 MG: 100 CAPSULE, LIQUID FILLED ORAL at 17:13

## 2018-02-10 RX ADMIN — HEPARIN SODIUM 5000 UNITS: 5000 INJECTION, SOLUTION INTRAVENOUS; SUBCUTANEOUS at 17:13

## 2018-02-10 RX ADMIN — HEPARIN SODIUM 5000 UNITS: 5000 INJECTION, SOLUTION INTRAVENOUS; SUBCUTANEOUS at 10:44

## 2018-02-10 RX ADMIN — PRAVASTATIN SODIUM 20 MG: 20 TABLET ORAL at 21:51

## 2018-02-10 RX ADMIN — HEPARIN SODIUM 5000 UNITS: 5000 INJECTION, SOLUTION INTRAVENOUS; SUBCUTANEOUS at 01:55

## 2018-02-10 RX ADMIN — DOCUSATE SODIUM 100 MG: 100 CAPSULE, LIQUID FILLED ORAL at 08:43

## 2018-02-10 RX ADMIN — ALBUTEROL SULFATE 2.5 MG: 2.5 SOLUTION RESPIRATORY (INHALATION) at 08:22

## 2018-02-10 RX ADMIN — ASPIRIN 81 MG: 81 TABLET, COATED ORAL at 08:43

## 2018-02-10 NOTE — PROGRESS NOTES
Problem: Mobility Impaired (Adult and Pediatric)  Goal: *Acute Goals and Plan of Care (Insert Text)  LTG:  (1.)Ms. Fred Sheikh will move from supine to sit and sit to supine  in bed with SUPERVISION within 7 day(s). (2.)Ms. Fred Sheikh will transfer from bed to chair and chair to bed with SUPERVISION using the least restrictive device within 7 day(s). (3.)Ms. Fred Sheikh will ambulate with CONTACT GUARD ASSIST for 25 feet with the least restrictive device within 7 day(s). (4.)pt. Will increase B LE strength 1/2rade to improve functional mobility within 7 days  ________________________________________________________________________________________________    PHYSICAL THERAPY: Daily Note, Treatment Day: 2nd, AM 2/10/2018  INPATIENT: Hospital Day: 5  Payor: FIRST CHOICE VIP CARE PLUS / [place] FIRST CHOICE VIP CARE PLUS / Product Type: Managed Care Medicare /      NAME/AGE/GENDER: Clark Mcdaniel is a 80 y.o. female   PRIMARY DIAGNOSIS: Hypotension  Acute UTI Hypotension Hypotension        ICD-10: Treatment Diagnosis:   · Generalized Muscle Weakness (M62.81)  · Difficulty in walking, Not elsewhere classified (R26.2)  · Other abnormalities of gait and mobility (R26.89)   Precaution/Allergies:  Bee pollen and Fire ant      ASSESSMENT:     Ms. Fred Sheikh presents with hypotension and demonstrates decreased general strength and endurance, functional mobility and standing balance. She would benefit from further PT while here to address these issues. It sounds like she uses the wc for most locomotion, but the RW to get into the restroom at home. She plans on going home at [place]. She lives with her son and her dtr is in/out to offer assistance as well. I would recommend [facility] PT to insure a safe transition. She is on the [facility] upon arrival.  She goes from sit to stand with Min A, therapist clean pt up. She walks 18 ft using RW with Min A. She return to supine and practice rolling from L-R x 3 to get a new brief on.   She performs exercises in the bed without any problems. This section established at most recent assessment   PROBLEM LIST (Impairments causing functional limitations):  1. Decreased Strength  2. Decreased Transfer Abilities  3. Decreased Ambulation Ability/Technique  4. Decreased Balance  5. Decreased Activity Tolerance  6. Increased Fatigue  7. Decreased Flexibility/Joint Mobility  8. Decreased Knowledge of Precautions  9. Decreased [place] with Home Exercise Program   INTERVENTIONS PLANNED: (Benefits and precautions of physical therapy have been discussed with the patient.)  1. Balance Exercise  2. Bed Mobility  3. Family Education  4. Gait Training  5. Range of Motion (ROM)  6. Therapeutic Activites  7. Therapeutic Exercise/Strengthening  8. Transfer Training  9. endurance activities     TREATMENT PLAN: Frequency/Duration: daily for duration of hospital stay  Rehabilitation Potential For Stated Goals: Good     RECOMMENDED REHABILITATION/EQUIPMENT: (at time of discharge pending progress): Due to the probability of continued deficits (see above) this patient will likely need continued skilled physical therapy after discharge. Equipment:    has a wc, RW and shower chair at home              HISTORY:   History of Present Injury/Illness (Reason for Referral): Admitted with hypotension. Past Medical History/Comorbidities:   Ms. Charlee Haines  has a past medical history of Acute kidney failure, unspecified; Arthritis; CAD (coronary artery disease); Cellulitis and abscess of other specified site; Coronary atherosclerosis of native coronary artery; Essential hypertension, benign (3/17/2015); Hypertension; Hypopotassemia; Mixed hyperlipidemia; Osteoarthritis (7/20/2017); Other and unspecified hyperlipidemia; Reflux esophagitis; Renal failure, unspecified; Shortness of breath; and Unspecified essential hypertension.   Ms. Charlee Haines  has a past surgical history that includes hx cholecystectomy; pr layr clos wnd face,facial <2.5cm; pr cardiac surg procedure unlist; hx cataract removal (Bilateral); and hx hysterectomy. Social History/Living Environment:   Home Environment: Private residence  # Steps to Enter: 0  Wheelchair Ramp: Yes  One/Two Story Residence: One story  Living Alone: No  Support Systems: Child(jayleen)  Patient Expects to be Discharged to[de-identified] Private residence  Current DME Used/Available at Home: Mindi Randall, Ric Hernandez, [address] [place] chair, Wheelchair  Tub or Shower Type: Shower  Prior Level of Function/Work/Activity:  Lives with her son, She uses a wc for most locomotion and is able to use the RW to get in the restroom. Her family does assist with her ADLs  Dominant Side:         RIGHT   Number of Personal Factors/Comorbidities that affect the Plan of Care: 1-2: MODERATE COMPLEXITY   EXAMINATION:   Most Recent Physical Functioning:   Gross Assessment:                  Posture:     Balance:    Bed Mobility:  Sit to Supine: Minimum assistance  Wheelchair Mobility:     Transfers:  Sit to Stand: Minimum assistance  Stand to Sit: Minimum assistance  Stand Pivot Transfers: Minimal assistance  Bed to Chair: Minimum assistance  Duration: 15 Minutes  Gait:     Speed/Mary: Slow  Gait Abnormalities: Decreased step clearance  Distance (ft): 18 Feet (ft)  Assistive Device: Walker, rolling  Ambulation - Level of Assistance: Minimal assistance  Interventions: Safety awareness training      Body Structures Involved:  1. Lungs  2. Joints  3. Muscles Body Functions Affected:  1. Respiratory  2. Neuromusculoskeletal  3. Movement Related Activities and Participation Affected:  1. Mobility  2. Self Care   Number of elements that affect the Plan of Care: 4+: HIGH COMPLEXITY   CLINICAL PRESENTATION:   Presentation: Evolving clinical presentation with changing clinical characteristics: MODERATE COMPLEXITY   CLINICAL DECISION MAKIN [facility] Mobility Inpatient Short Form  How much difficulty does the patient currently have. ..  Unable A Lot A Little None   1. Turning over in bed (including adjusting bedclothes, sheets and blankets)? [] 1   [] 2   [x] 3   [] 4   2. Sitting down on and standing up from a chair with arms ( e.g., wheelchair, bedside commode, etc.)   [] 1   [] 2   [x] 3   [] 4   3. Moving from lying on back to sitting on the side of the bed? [] 1   [x] 2   [] 3   [] 4   How much help from another person does the patient currently need. .. Total A Lot A Little None   4. Moving to and from a bed to a chair (including a wheelchair)? [] 1   [] 2   [x] 3   [] 4   5. Need to walk in hospital room? [] 1   [] 2   [x] 3   [] 4   6. Climbing 3-5 steps with a railing? [] 1   [x] 2   [] 3   [] 4   © 2007, Trustees of [address] [place] [address], under license to [employer]. All rights reserved      Score:  Initial: 16 Most Recent: X (Date: -- )    Interpretation of Tool:  Represents activities that are increasingly more difficult (i.e. Bed mobility, Transfers, Gait). Score 24 23 22-20 19-15 14-10 9-7 6     Modifier CH CI CJ CK CL CM CN      ? Mobility - Walking and Moving Around:     - CURRENT STATUS: CK - 40%-59% impaired, limited or restricted    - GOAL STATUS: CJ - 20%-39% impaired, limited or restricted    - D/C STATUS:  ---------------To be determined---------------  Payor: FIRST CHOICE VIP CARE PLUS / [place] FIRST CHOICE VIP CARE PLUS / Product Type: Managed Care Medicare /      Medical Necessity:     · Patient demonstrates fair rehab potential due to higher previous functional level. Reason for Services/Other Comments:  · Patient continues to require present interventions due to patient's inability to perform functional mobility with CGA.    Use of outcome tool(s) and clinical judgement create a POC that gives a: Questionable prediction of patient's progress: MODERATE COMPLEXITY            TREATMENT:   (In addition to Assessment/Re-Assessment sessions the following treatments were rendered)   Pre-treatment Symptoms/Complaints:  Wants to put her own gown on. She has an IV so we put a hospital gown on  Pain: Initial:   Pain Intensity 1: 0  Post Session:       Gait Training ( ):  Gait training to improve and/or restore physical functioning as related to mobility, strength, balance and endurance. Ambulated 18 Feet (ft) with Minimal assistance using a Walker, rolling and minimal Safety awareness training related to their stride length and posture and proper breathing to promote proper body posture and promote proper body breathing techniques. Therapeutic Exercise: (15 Minutes):  Exercises per grid below to improve strength. Required minimal verbal cues to promote proper body alignment. Progressed range as indicated. Date:  2/9 Date:  2/10   Date:     Activity/Exercise Parameters Parameters Parameters   Ankle pumps 10 12    heelslides 10 12    Hip abd/add 10 12    SLR 10 12    Shoulder flex/ext 10     Elbow flex/ext 10               Assessment/Reassessment only, no treatment provided today  Braces/Orthotics/Lines/Etc:   · IV  · O2 Device: Nasal cannula  Treatment/Session Assessment:    · Response to Treatment:  She tolerated exercises well. · Interdisciplinary Collaboration:   o Registered Nurse  · After treatment position/precautions:   o Supine in bed  o Bed/Chair-wheels locked  o Bed in low position  o Call light within reach  o RN notified  o Side rails x 2   · Compliance with Program/Exercises: Will assess as treatment progresses. · Recommendations/Intent for next treatment session: \"Next visit will focus on advancements to more challenging activities, reduction in assistance provided and focus on bed mobitliy, transfers, gait, endurance and strength training\".    Total Treatment Duration:  PT Patient Time In/Time Out  Time In: 0945  Time Out: [address] Scooby Torres PTA

## 2018-02-10 NOTE — PROGRESS NOTES
Problem: Self Care Deficits Care Plan (Adult)  Goal: *Acute Goals and Plan of Care (Insert Text)  1. Patient will perform grooming with supervision. 2. Patient will perform Upper body dressing with supervision  3. Patient will perform lower body dressing with minimal assist  4. Patient will perform upper and lower body bathing with minimal assist.  5. Patient will perform toilet transfers with CGA. 6. Patient will perform shower transfer with CGA. 7. Patient will participate in 30 + minutes of ADL/ therapeutic exercise/therapeutic activity with min rest breaks to increase activity tolerance for self care. 8. Patient will perform ADL functional mobility in room with CGA. Goals to be achieved in 7 days. OCCUPATIONAL THERAPY: Daily Note, Treatment Day: 2nd and AM 2/10/2018  INPATIENT: Hospital Day: 5  Payor: FIRST CHOICE VIP CARE PLUS / [place] FIRST CHOICE VIP CARE PLUS / Product Type: Managed Care Medicare /      NAME/AGE/GENDER: Zafar Saxena is a 80 y.o. female   PRIMARY DIAGNOSIS:  Hypotension  Acute UTI Hypotension Hypotension        ICD-10: Treatment Diagnosis:    · Generalized Muscle Weakness (M62.81)  · Other lack of cordination (R27.8)  · Difficulty in walking, Not elsewhere classified (R26.2)   Precautions/Allergies:     Bee pollen and Fire ant      ASSESSMENT:     Ms. Thu Heath admitted with above diagnosis. Pt completed simple grooming and toileting with the assistance listed below. Pt completed bed mobility and functional transfer with the assistance listed below. Pt progressing well. Continue OT. This section established at most recent assessment   PROBLEM LIST (Impairments causing functional limitations):  1. Decreased Strength  2. Decreased ADL/Functional Activities  3. Decreased Transfer Abilities  4. Decreased Ambulation Ability/Technique  5. Decreased Balance  6.  Decreased Activity Tolerance   INTERVENTIONS PLANNED: (Benefits and precautions of occupational therapy have been discussed with the patient.)  1. Activities of daily living training  2. Adaptive equipment training  3. Balance training  4. Clothing management  5. Therapeutic activity  6. Therapeutic exercise     TREATMENT PLAN: Frequency/Duration: Follow patient 4x/week to address above goals. Rehabilitation Potential For Stated Goals: Good     RECOMMENDED REHABILITATION/EQUIPMENT: (at time of discharge pending progress): Due to the probability of continued deficits (see above) this patient will likely need continued skilled occupational therapy after discharge. Equipment:   Juan A Vieira   wheelchair              OCCUPATIONAL PROFILE AND HISTORY:   History of Present Injury/Illness (Reason for Referral):  80yoF with CKD, chronic dCHF, HTN, chronic O2 who [facility] ED from her PCP's office with hypotension. Patient was admitted from 12/22-1/5 for Strep bacteremia, NSTEMI. She completed Rocephin on 1/22 in a rehab and was about to go home, but she got the flu there. She has completely recovered and has been home for 1-2 weeks, but has been more weak since coming home from rehab. [facility] told her that she had low BP about 3 days ago (SBP 90s) and she went to see her PCP about it today. There her SBP was in the 70s. She is not accompanied by family during my interview, but the ED provider reports that the family may have accidentally doubled up on her demadex for the last few days. She was initially a little confused but that has improved while in the ED. BP initially improved to 120/70 with IVF, but then dropped to 88/50. Hospitalist asked to admit for OBS to monitor BP overnight. Past Medical History/Comorbidities:   Ms. Cristy Garza  has a past medical history of Acute kidney failure, unspecified; Arthritis; CAD (coronary artery disease); Cellulitis and abscess of other specified site; Coronary atherosclerosis of native coronary artery; Essential hypertension, benign (3/17/2015); Hypertension; Hypopotassemia;  Mixed hyperlipidemia; Osteoarthritis (7/20/2017); Other and unspecified hyperlipidemia; Reflux esophagitis; Renal failure, unspecified; Shortness of breath; and Unspecified essential hypertension. Ms. Jamie Nova  has a past surgical history that includes hx cholecystectomy; pr layr clos wnd face,facial <2.5cm; pr cardiac surg procedure unlist; hx cataract removal (Bilateral); and hx hysterectomy. Social History/Living Environment:   Home Environment: Private residence  # Steps to Enter: 0  Wheelchair Ramp: Yes  One/Two Story Residence: One story  Living Alone: No  Support Systems: Child(jayleen)  Patient Expects to be Discharged to[de-identified] Private residence  Current DME Used/Available at Home: [address], [place], [address] [place] chair, Wheelchair  Tub or Shower Type: Shower  Prior Level of Function/Work/Activity:lives with son; assist with ADLs and ambulation     Number of Personal Factors/Comorbidities that affect the Plan of Care: Brief history (0):  LOW COMPLEXITY   ASSESSMENT OF OCCUPATIONAL PERFORMANCE[de-identified]   Activities of Daily Living  Basic ADLs (From Assessment) Complex ADLs (From Assessment)   Basic ADL  Feeding: Setup  Oral Facial Hygiene/Grooming: Minimum assistance  Bathing: Moderate assistance  Upper Body Dressing: Moderate assistance  Lower Body Dressing: Maximum assistance  Toileting: Maximum assistance     Grooming/Bathing/Dressing Activities of Daily Living   Grooming  Washing Face: Supervision/set-up Cognitive Retraining  Safety/Judgement: Fall prevention           Toileting  Toileting Assistance: Minimum assistance  Bladder Hygiene: Supervision/set-up  Clothing Management: Supervision/set-up  Adaptive Equipment: Elevated seat     Functional Transfers  Toilet Transfer : Minimum assistance     Bed/Mat Mobility  Supine to Sit: Minimum assistance  Sit to Stand: Minimum assistance  Bed to Chair: Minimum assistance       Most Recent Physical Functioning:   Gross Assessment:                  Posture:  Posture Assessment:  Forward head, Rounded shoulders  Balance:  Sitting: Intact  Standing: With support Bed Mobility:  Supine to Sit: Minimum assistance  Wheelchair Mobility:     Transfers:  Sit to Stand: Minimum assistance  Bed to Chair: Minimum assistance              Patient Vitals for the past 6 hrs:   BP SpO2 O2 Flow Rate (L/min) Pulse   02/10/18 0405 144/82 98 % - 84   02/10/18 0630 146/74 98 % - 90   02/10/18 0822 - - 2 l/min -       Mental Status  Neurologic State: Alert  Orientation Level: Oriented to person  Cognition: Follows commands  Perception: Verbal, Tactile  Perseveration: Verbal cues provided, Tactile cues provided  Safety/Judgement: Fall prevention                          Physical Skills Involved:  1. Balance  2. Strength  3. Activity Tolerance Cognitive Skills Affected (resulting in the inability to perform in a timely and safe manner): 1. none Psychosocial Skills Affected:  1. none   Number of elements that affect the Plan of Care: 1-3:  LOW COMPLEXITY   CLINICAL DECISION MAKIN[address] [place] [id number] AM-PAC 6 Clicks   Daily Activity Inpatient Short Form  How much help from another person does the patient currently need. .. Total A Lot A Little None   1. Putting on and taking off regular lower body clothing? [] 1   [x] 2   [] 3   [] 4   2. Bathing (including washing, rinsing, drying)? [] 1   [x] 2   [] 3   [] 4   3. Toileting, which includes using toilet, bedpan or urinal?   [] 1   [x] 2   [] 3   [] 4   4. Putting on and taking off regular upper body clothing? [] 1   [] 2   [x] 3   [] 4   5. Taking care of personal grooming such as brushing teeth? [] 1   [] 2   [x] 3   [] 4   6. Eating meals? [] 1   [] 2   [x] 3   [] 4   © , Trustees of [facility] [address], under license to [employer]. All rights reserved      Score:  Initial: 15 Most Recent: X (Date: -- )    Interpretation of Tool:  Represents activities that are increasingly more difficult (i.e. Bed mobility, Transfers, Gait).    Score 24 23 22-20 19-15 14-10 9-7 6 Modifier CH CI CJ CK CL CM CN      ? Self Care:     - CURRENT STATUS: CK - 40%-59% impaired, limited or restricted    - GOAL STATUS: CJ - 20%-39% impaired, limited or restricted    - D/C STATUS:  ---------------To be determined---------------  Payor: FIRST CHOICE VIP CARE PLUS [place] FIRST CHOICE VIP CARE PLUS / Product Type: Managed Care Medicare /      Medical Necessity:     · Patient is expected to demonstrate progress in strength, balance, coordination and functional technique to increase independence with self care and functional mobility. Reason for Services/Other Comments:  · Patient continues to require skilled intervention due to decrease self care and functional mobility. Use of outcome tool(s) and clinical judgement create a POC that gives a: LOW COMPLEXITY         TREATMENT:   (In addition to Assessment/Re-Assessment sessions the following treatments were rendered)     Pre-treatment Symptoms/Complaints:  No complaints  Pain: Initial:   Pain Intensity 1: 0  Post Session:  0     Self Care: (15): Procedure(s) (per grid) utilized to improve and/or restore self-care/home management as related to toileting and grooming. Required min verbal and manual cueing to facilitate activities of daily living skills. Therapeutic Activity: (    10): Therapeutic activities including bed mobility and functional tranfer to improve mobility and strength. Required min   to promote motor control of bilateral, upper extremity(s), lower extremity(s).        Braces/Orthotics/Lines/Etc:   · O2 Device: Nasal cannula  Treatment/Session Assessment:    · Response to Treatment:  Tolerated well  · Interdisciplinary Collaboration:   o Certified Occupational Therapy Assistant  o Registered Nurse  · After treatment position/precautions:   o Up in chair  o Bed alarm/tab alert on  o Bed/Chair-wheels locked  o Bed in low position  o Call light within reach  o RN notified   · Compliance with Program/Exercises: compliant all of the time. · Recommendations/Intent for next treatment session: \"Next visit will focus on advancements to more challenging activities and reduction in assistance provided\".   Total Treatment Duration:  OT Patient Time In/Time Out  Time In: 0805  Time Out: [address] [facility]

## 2018-02-10 NOTE — PROGRESS NOTES
Problem: Falls - Risk of  Goal: *Absence of Falls  Document Vanda Fall Risk and appropriate interventions in the flowsheet.    Outcome: Progressing Towards Goal  Fall Risk Interventions:  Mobility Interventions: Bed/chair exit alarm    Mentation Interventions: Adequate sleep, hydration, pain control, Bed/chair exit alarm    Medication Interventions: Evaluate medications/consider consulting pharmacy    Elimination Interventions: Bed/chair exit alarm

## 2018-02-10 NOTE — PROGRESS NOTES
Shift assessment complete. RR even and unlabored. HR regular. No distress noted. Abd soft, active bowel sounds. Bed LL. All needs met at this time.

## 2018-02-11 ENCOUNTER — HOME HEALTH ADMISSION (OUTPATIENT)
Dept: HOME HEALTH SERVICES | Facility: HOME HEALTH | Age: 83
End: 2018-02-11

## 2018-02-11 VITALS
HEIGHT: 59 IN | OXYGEN SATURATION: 93 % | WEIGHT: 152.12 LBS | HEART RATE: 80 BPM | DIASTOLIC BLOOD PRESSURE: 80 MMHG | SYSTOLIC BLOOD PRESSURE: 130 MMHG | TEMPERATURE: 97.7 F | BODY MASS INDEX: 30.67 KG/M2 | RESPIRATION RATE: 18 BRPM

## 2018-02-11 LAB
ANION GAP SERPL CALC-SCNC: 8 MMOL/L (ref 7–16)
BACTERIA SPEC CULT: ABNORMAL
BUN SERPL-MCNC: 20 MG/DL (ref 8–23)
CALCIUM SERPL-MCNC: 9.3 MG/DL (ref 8.3–10.4)
CHLORIDE SERPL-SCNC: 106 MMOL/L (ref 98–107)
CO2 SERPL-SCNC: 31 MMOL/L (ref 21–32)
CREAT SERPL-MCNC: 0.93 MG/DL (ref 0.6–1)
GLUCOSE SERPL-MCNC: 87 MG/DL (ref 65–100)
GRAM STN SPEC: ABNORMAL
POTASSIUM SERPL-SCNC: 3.3 MMOL/L (ref 3.5–5.1)
SERVICE CMNT-IMP: ABNORMAL
SODIUM SERPL-SCNC: 145 MMOL/L (ref 136–145)

## 2018-02-11 PROCEDURE — 97530 THERAPEUTIC ACTIVITIES: CPT

## 2018-02-11 PROCEDURE — 74011250636 HC RX REV CODE- 250/636: Performed by: INTERNAL MEDICINE

## 2018-02-11 PROCEDURE — 94760 N-INVAS EAR/PLS OXIMETRY 1: CPT

## 2018-02-11 PROCEDURE — 74011250637 HC RX REV CODE- 250/637: Performed by: PHYSICIAN ASSISTANT

## 2018-02-11 PROCEDURE — 36415 COLL VENOUS BLD VENIPUNCTURE: CPT | Performed by: NURSE PRACTITIONER

## 2018-02-11 PROCEDURE — 96372 THER/PROPH/DIAG INJ SC/IM: CPT

## 2018-02-11 PROCEDURE — 99218 HC RM OBSERVATION: CPT

## 2018-02-11 PROCEDURE — 77010033678 HC OXYGEN DAILY

## 2018-02-11 PROCEDURE — 97110 THERAPEUTIC EXERCISES: CPT

## 2018-02-11 PROCEDURE — 74011250637 HC RX REV CODE- 250/637: Performed by: INTERNAL MEDICINE

## 2018-02-11 PROCEDURE — 80048 BASIC METABOLIC PNL TOTAL CA: CPT | Performed by: NURSE PRACTITIONER

## 2018-02-11 RX ADMIN — Medication 10 ML: at 05:42

## 2018-02-11 RX ADMIN — ASPIRIN 81 MG: 81 TABLET, COATED ORAL at 09:55

## 2018-02-11 RX ADMIN — COLCHICINE 0.3 MG: 0.6 TABLET, FILM COATED ORAL at 09:55

## 2018-02-11 RX ADMIN — DOCUSATE SODIUM 100 MG: 100 CAPSULE, LIQUID FILLED ORAL at 09:55

## 2018-02-11 RX ADMIN — HEPARIN SODIUM 5000 UNITS: 5000 INJECTION, SOLUTION INTRAVENOUS; SUBCUTANEOUS at 09:56

## 2018-02-11 RX ADMIN — CLOPIDOGREL BISULFATE 75 MG: 75 TABLET ORAL at 09:55

## 2018-02-11 RX ADMIN — FAMOTIDINE 20 MG: 20 TABLET, FILM COATED ORAL at 09:55

## 2018-02-11 RX ADMIN — ALLOPURINOL 100 MG: 100 TABLET ORAL at 09:55

## 2018-02-11 RX ADMIN — HEPARIN SODIUM 5000 UNITS: 5000 INJECTION, SOLUTION INTRAVENOUS; SUBCUTANEOUS at 01:48

## 2018-02-11 RX ADMIN — Medication 10 ML: at 14:34

## 2018-02-11 NOTE — DISCHARGE INSTRUCTIONS
DISCHARGE SUMMARY from Nurse    PATIENT INSTRUCTIONS:    After general anesthesia or intravenous sedation, for 24 hours or while taking prescription Narcotics:  · Limit your activities  · Do not drive and operate hazardous machinery  · Do not make important personal or business decisions  · Do  not drink alcoholic beverages  · If you have not urinated within 8 hours after discharge, please contact your surgeon on call. Report the following to your surgeon:  · Excessive pain, swelling, redness or odor of or around the surgical area  · Temperature over 100.5  · Nausea and vomiting lasting longer than 4 hours or if unable to take medications  · Any signs of decreased circulation or nerve impairment to extremity: change in color, persistent  numbness, tingling, coldness or increase pain  · Any questions    What to do at Home:  Recommended activity: Activity as tolerated, recommended by MD    If you experience any of the following symptoms as listed below, please follow up with MD.    *  Please give a list of your current medications to your Primary Care Provider. *  Please update this list whenever your medications are discontinued, doses are      changed, or new medications (including over-the-counter products) are added. *  Please carry medication information at all times in case of emergency situations. These are general instructions for a healthy lifestyle:    No smoking/ No tobacco products/ Avoid exposure to second hand smoke  Surgeon General's Warning:  Quitting smoking now greatly reduces serious risk to your health.     Obesity, smoking, and sedentary lifestyle greatly increases your risk for illness    A healthy diet, regular physical exercise & weight monitoring are important for maintaining a healthy lifestyle    You may be retaining fluid if you have a history of heart failure or if you experience any of the following symptoms:  Weight gain of 3 pounds or more overnight or 5 pounds in a week, increased swelling in our hands or feet or shortness of breath while lying flat in bed. Please call your doctor as soon as you notice any of these symptoms; do not wait until your next office visit. Recognize signs and symptoms of STROKE:    F-face looks uneven    A-arms unable to move or move unevenly    S-speech slurred or non-existent    T-time-call 911 as soon as signs and symptoms begin-DO NOT go       Back to bed or wait to see if you get better-TIME IS BRAIN. Warning Signs of HEART ATTACK     Call 911 if you have these symptoms:   Chest discomfort. Most heart attacks involve discomfort in the center of the chest that lasts more than a few minutes, or that goes away and comes back. It can feel like uncomfortable pressure, squeezing, fullness, or pain.  Discomfort in other areas of the upper body. Symptoms can include pain or discomfort in one or both arms, the back, neck, jaw, or stomach.  Shortness of breath with or without chest discomfort.  Other signs may include breaking out in a cold sweat, nausea, or lightheadedness. Don't wait more than five minutes to call 911 - MINUTES MATTER! Fast action can save your life. Calling 911 is almost always the fastest way to get lifesaving treatment. Emergency Medical Services staff can begin treatment when they arrive -- up to an hour sooner than if someone gets to the hospital by car. The discharge information has been reviewed with the patient. The patient verbalized understanding. Discharge medications reviewed with the patient and appropriate educational materials and side effects teaching were provided.     ___________________________________________________________________________________________________________________________________

## 2018-02-11 NOTE — DISCHARGE SUMMARY
Hospitalist Discharge Summary     Patient ID:  Smooth Francisco  [id number]  49 y.o.  2/10/1927  Admit date: 2/6/2018  3:34 PM  Discharge date and time: 2/11/2018  Attending: Sarah Gallegos MD  PCP:  Sharyle Salvia, MD  Treatment Team: Attending Provider: Sarah Gallegos MD; Utilization Review: Starla Childs    Principal Diagnosis Hypotension   Principal Problem:    Hypotension (2/6/2018)    Active Problems:    Diastolic CHF, chronic ([address] (5/17/2016)      CKD (chronic kidney disease) stage 3, GFR 30-59 ml/min (12/23/2017)      Chronic respiratory failure with hypoxia ([address] (1/25/2018)      Acute UTI (2/7/2018)             Hospital Course:  Please refer to the admission H&P for details of presentation. In summary, the patient is Ms. Nilo Black, a 81 yo female with PMH diastolic CHF, CKD stage 3, HTN, CAD, pulmonary HTN, gout, NSTEMI who presented from PCP office for hypotension, hypoxia on home O2 at 2.5 L NC.  She has been generally weak and confused for 2 days prior to this which gradually worsened prior to admission.  She was found to be hypotensive with systolic BP in the 82X.  Her BNP was elevated with small pleural effusion in CXR.  She had been on IVF and off her home diuretics.  2/6/18 BC with staph epidermis 1/2 BC likely contaminent. 2/9/18 repeat BC NGTD.  Of note she was tx in December for strep bacteremia with 2 gm rocephin by ID EOT 1/22/18.  She is confused.  She is afebrile without leukocytosis.  She was found to have a UTI, urine cx sent NGTD.  She was started on rocephin on admission which has since been stopped. Pt. Is seen today, vital signs reviewed and she is not found to be hypotensive any longer. She will not resume BP medications at discharge until she is seen by her cardiologist as an outpatient. She will also hold her Demadex for now. Nursing notes reviewed and labs stable. Pt. Is determined to be ready for discharge.       Significant Diagnostic Studies: CXR x 2. EKG.       Labs: Results: Chemistry Recent Labs      02/11/18   0551  02/10/18   0652   GLU  87  88   NA  145  147*   K  3.3*  3.7   CL  106  109*   CO2  31  33*   BUN  20  24*   CREA  0.93  1.11*   CA  9.3  9.0   AGAP  8  5*      CBC w/Diff Recent Labs      02/10/18   0652   WBC  6.0   RBC  2.78*   HGB  9.2*   HCT  29.0*   PLT  198      Cardiac Enzymes No results for input(s): CPK, CKND1, BRIELLE in the last 72 hours. No lab exists for component: CKRMB, TROIP   Coagulation No results for input(s): PTP, INR, APTT in the last 72 hours. No lab exists for component: INREXT    Lipid Panel Lab Results   Component Value Date/Time    Cholesterol, total 118 10/24/2017 02:08 PM    HDL Cholesterol 53 10/24/2017 02:08 PM    LDL, calculated 52 10/24/2017 02:08 PM    VLDL, calculated 13 10/24/2017 02:08 PM    Triglyceride 63 10/24/2017 02:08 PM      BNP No results for input(s): BNPP in the last 72 hours. Liver Enzymes No results for input(s): TP, ALB, TBIL, AP, SGOT, GPT in the last 72 hours. No lab exists for component: DBIL   Thyroid Studies No results found for: T4, T3U, TSH, TSHEXT         Discharge Exam:  Visit Vitals    /80 (BP 1 Location: Left arm, BP Patient Position: At rest)    Pulse 80    Temp 97.7 °F (36.5 °C)    Resp 18    Ht 4' 11\" (1.499 m)    Wt 69 kg (152 lb 1.9 oz)    SpO2 93%    BMI 30.72 kg/m2     General:                 No acute distress, confused. She is ambulatory and gets herself back in bed without difficulty.    Lungs:                    Diminished bases bilaterally. Resp even and nonlabored  Heart:                     S1S2 present without murmurs rubs gallops. RRR. No bilateral LE pedal edema  Abdomen:              Soft, non tender, non distended.  BS present  Extremities:           Moves ext well  Neurologic:           Confused but follows commands.          Disposition: home  Discharge Condition: stable  Patient Instructions:   Current Discharge Medication List      CONTINUE these medications which have NOT CHANGED    Details   sennosides (SENNA) 8.6 mg cap Take  by mouth. OXYGEN-AIR DELIVERY SYSTEMS by Does Not Apply route. albuterol (PROVENTIL VENTOLIN) 2.5 mg /3 mL (0.083 %) nebulizer solution 3 mL by Nebulization route every four (4) hours as needed for Wheezing. Qty: 24 Each, Refills: 0      HYDROcodone-acetaminophen (NORCO) 7.5-325 mg per tablet Take 1 Tab by mouth every eight (8) hours as needed for Pain. Max Daily Amount: 3 Tabs. Qty: 24 Tab, Refills: 0    Associated Diagnoses: Primary osteoarthritis of both knees      colchicine 0.6 mg tablet Take 0.5 Tabs by mouth daily. Qty: 30 Tab, Refills: 0      cyanocobalamin 1,000 mcg tablet Take 1 Tab by mouth daily. Qty: 30 Tab, Refills: 0      gabapentin (NEURONTIN) 100 mg capsule Take 2 Caps by mouth nightly. Indications: NEUROPATHIC PAIN  Qty: 90 Cap, Refills: 0      Saccharomyces boulardii (FLORASTOR) 250 mg capsule Take 1 Cap by mouth two (2) times a day. Qty: 60 Cap, Refills: 0      clotrimazole (LOTRIMIN AF, CLOTRIMAZOLE,) 1 % topical cream Apply  to affected area two (2) times a day. Qty: 60 g, Refills: 3    Associated Diagnoses: Rash      allopurinol (ZYLOPRIM) 100 mg tablet Take 1 Tab by mouth two (2) times a day. Qty: 60 Tab, Refills: 3    Associated Diagnoses: Hyperuricemia; Idiopathic chronic gout of multiple sites without tophus      pravastatin (PRAVACHOL) 20 mg tablet Take 1 Tab by mouth nightly. Qty: 30 Tab, Refills: 4    Associated Diagnoses: Mixed hyperlipidemia      raNITIdine (ZANTAC) 150 mg tablet Take 1 Tab by mouth two (2) times a day. Qty: 60 Tab, Refills: 4    Associated Diagnoses: Gastroesophageal reflux disease without esophagitis      clopidogrel (PLAVIX) 75 mg tab Take 1 Tab by mouth daily. Qty: 30 Tab, Refills: 4    Associated Diagnoses: Coronary artery disease due to lipid rich plaque      potassium chloride (KLOR-CON M10) 10 mEq tablet Take 1 Tab by mouth daily.   Qty: 30 Tab, Refills: 4    Associated Diagnoses: Hypokalemia      loratadine (CLARITIN) 10 mg tablet Take 1 Tab by mouth daily. Qty: 30 Tab, Refills: 4    Associated Diagnoses: Non-seasonal allergic rhinitis due to other allergic trigger      aspirin 81 mg CpDR Take  by mouth daily.          STOP taking these medications       tuberculin (TUBERSOL) 5 tub. unit /0.1 mL injection Comments:   Reason for Stopping:         carvedilol (COREG) 3.125 mg tablet Comments:   Reason for Stopping:         lisinopril (PRINIVIL, ZESTRIL) 30 mg tablet Comments:   Reason for Stopping:         torsemide (DEMADEX) 20 mg tablet Comments:   Reason for Stopping:         conjugated estrogens (PREMARIN) 0.625 mg/gram vaginal cream Comments:   Reason for Stopping:               Activity: PT/OT per Home Health  Diet: Cardiac Diet      Follow-up  ·   Dr. Madrigal Reason  · Dr. Darlene Gaspar  ·   Time spent to discharge patient greater than 30 minutes  Signed:  Howie S SEDA Turcios  2/11/2018  12:03 PM

## 2018-02-11 NOTE — PROGRESS NOTES
[address]  Face to Face Encounter    Patients Name: Kendell Milner    YOB: 1927    Ordering Physician: Carito Beck    Primary Diagnosis: Hypotension  Acute UTI    Date of Face to Face:   2/11/2018         Face to Face Encounter findings are related to primary reason for home care:   yes. 1. I certify that the patient needs intermittent care as follows: physical therapy: strengthening    2. I certify that this patient is homebound, that is: 1) patient requires the use of a walker device, special transportation, or assistance of another to leave the home; or 2) patient's condition makes leaving the home medically contraindicated; and 3) patient has a normal inability to leave the home and leaving the home requires considerable and taxing effort. Patient may leave the home for infrequent and short duration for medical reasons, and occasional absences for non-medical reasons. Homebound status is due to the following functional limitations: Patient with strength deficits limiting the performance of all ADL's without caregiver assistance or the use of an assistive device. 3. I certify that this patient is under my care and that I, or a nurse practitioner or  [id number], or clinical nurse specialist, or certified nurse midwife, working with me, had a Face-to-Face Encounter that meets the physician Face-to-Face Encounter requirements. The following are the clinical findings from the [address] [place] [address] encounter that support the need for skilled services and is a summary of the encounter: See hospital chart. See attached progess note      Denver Figueroa LMSW  5/31/2017      THE FOLLOWING TO BE COMPLETED BY THE COMMUNITY PHYSICIAN:    I concur with the findings described above from the [facility] encounter that this patient is homebound and in need of a skilled service.     Certifying Physician: _____________________________________      Printed Certifying Physician Name: _____________________________________    Date: _________________

## 2018-02-11 NOTE — PROGRESS NOTES
Shift assessment complete. RR even and unlabored at rest. HR regular. abd soft active bowels sounds. Positive pedal pulses. Bed LL. All needs met at this time. Will monitor hourly while on duty. Pt expressed a desire to return home after her stay here instead of going to a rehab center.

## 2018-02-11 NOTE — PROGRESS NOTES
Pt discharged per MD orders. I have reviewed discharge instructions with the patient. The patient verbalized understanding. Opportunity for questions and clarification provide. IV removed.

## 2018-02-11 NOTE — PROGRESS NOTES
Problem: Mobility Impaired (Adult and Pediatric)  Goal: *Acute Goals and Plan of Care (Insert Text)  LTG:  (1.)Ms. Radha Pinto will move from supine to sit and sit to supine  in bed with SUPERVISION within 7 day(s). (2.)Ms. Radha Pinto will transfer from bed to chair and chair to bed with SUPERVISION using the least restrictive device within 7 day(s). (3.)Ms. Radha Pinto will ambulate with CONTACT GUARD ASSIST for 25 feet with the least restrictive device within 7 day(s). (4.)pt. Will increase B LE strength 1/2rade to improve functional mobility within 7 days  ________________________________________________________________________________________________    PHYSICAL THERAPY: Daily Note, Treatment Day: 2nd, AM 2/11/2018  INPATIENT: Hospital Day: 6  Payor: [employer] VIP CARE PLUS / [place] [employer] VIP CARE PLUS / Product Type: Managed Care Medicare /      NAME/AGE/GENDER: Gina Gamez is a 80 y.o. female   PRIMARY DIAGNOSIS: Hypotension  Acute UTI Hypotension Hypotension        ICD-10: Treatment Diagnosis:   · Generalized Muscle Weakness (M62.81)  · Difficulty in walking, Not elsewhere classified (R26.2)  · Other abnormalities of gait and mobility (R26.89)   Precaution/Allergies:  Bee pollen and Fire ant      ASSESSMENT:     Ms. Radha Pinto presents with hypotension and demonstrates decreased general strength and endurance, functional mobility and standing balance. She would benefit from further PT while here to address these issues. It sounds like she uses the wc for most locomotion, but the RW to get into the restroom at home. She plans on going home at [place]. She lives with her son and her dtr is in/out to offer assistance as well. I would recommend [facility] PT to insure a safe transition. She is supine upon arrival.  She performs exercises in the bed without any problems. She increase her distance using RW with Min A. She return to the chair with alarm on and instructed to use call light.     This section established at most recent assessment   PROBLEM LIST (Impairments causing functional limitations):  1. Decreased Strength  2. Decreased Transfer Abilities  3. Decreased Ambulation Ability/Technique  4. Decreased Balance  5. Decreased Activity Tolerance  6. Increased Fatigue  7. Decreased Flexibility/Joint Mobility  8. Decreased Knowledge of Precautions  9. Decreased [place] with Home Exercise Program   INTERVENTIONS PLANNED: (Benefits and precautions of physical therapy have been discussed with the patient.)  1. Balance Exercise  2. Bed Mobility  3. Family Education  4. Gait Training  5. Range of Motion (ROM)  6. Therapeutic Activites  7. Therapeutic Exercise/Strengthening  8. Transfer Training  9. endurance activities     TREATMENT PLAN: Frequency/Duration: daily for duration of hospital stay  Rehabilitation Potential For Stated Goals: Good     RECOMMENDED REHABILITATION/EQUIPMENT: (at time of discharge pending progress): Due to the probability of continued deficits (see above) this patient will likely need continued skilled physical therapy after discharge. Equipment:    has a wc, RW and shower chair at home              HISTORY:   History of Present Injury/Illness (Reason for Referral): Admitted with hypotension. Past Medical History/Comorbidities:   Ms. Neo Garcia  has a past medical history of Acute kidney failure, unspecified; Arthritis; CAD (coronary artery disease); Cellulitis and abscess of other specified site; Coronary atherosclerosis of native coronary artery; Essential hypertension, benign (3/17/2015); Hypertension; Hypopotassemia; Mixed hyperlipidemia; Osteoarthritis (7/20/2017); Other and unspecified hyperlipidemia; Reflux esophagitis; Renal failure, unspecified; Shortness of breath; and Unspecified essential hypertension.   Ms. Neo Garcia  has a past surgical history that includes hx cholecystectomy; pr layr clos wnd face,facial <2.5cm; pr cardiac surg procedure unlist; hx cataract removal (Bilateral); and hx hysterectomy. Social History/Living Environment:   Home Environment: Private residence  # Steps to Enter: 0  Wheelchair Ramp: Yes  One/Two Story Residence: One story  Living Alone: No  Support Systems: Child(jayleen)  Patient Expects to be Discharged to[de-identified] Private residence  Current DME Used/Available at Home: [employer], [facility] [place] chair, Wheelchair  Tub or Shower Type: Shower  Prior Level of Function/Work/Activity:  Lives with her son, She uses a wc for most locomotion and is able to use the RW to get in the restroom. Her family does assist with her ADLs  Dominant Side:         RIGHT   Number of Personal Factors/Comorbidities that affect the Plan of Care: 1-2: MODERATE COMPLEXITY   EXAMINATION:   Most Recent Physical Functioning:   Gross Assessment:                  Posture:     Balance:    Bed Mobility:  Supine to Sit: Minimum assistance  Sit to Supine:  (left up in chair)  Wheelchair Mobility:     Transfers:  Sit to Stand: Contact guard assistance  Stand to Sit: Contact guard assistance  Stand Pivot Transfers: Contact guard assistance  Bed to Chair: Contact guard assistance  Gait:     Speed/Mary: Slow  Gait Abnormalities: Decreased step clearance  Distance (ft): 40 Feet (ft)  Assistive Device: Walker, rolling  Ambulation - Level of Assistance: Minimal assistance  Interventions: Safety awareness training  Duration: 15 Minutes      Body Structures Involved:  1. Lungs  2. Joints  3. Muscles Body Functions Affected:  1. Respiratory  2. Neuromusculoskeletal  3. Movement Related Activities and Participation Affected:  1. Mobility  2. Self Care   Number of elements that affect the Plan of Care: 4+: HIGH COMPLEXITY   CLINICAL PRESENTATION:   Presentation: Evolving clinical presentation with changing clinical characteristics: MODERATE COMPLEXITY   CLINICAL DECISION MAKIN [facility] Mobility Inpatient Short Form  How much difficulty does the patient currently have. ..  Unable A Lot A Little None   1. Turning over in bed (including adjusting bedclothes, sheets and blankets)? [] 1   [] 2   [x] 3   [] 4   2. Sitting down on and standing up from a chair with arms ( e.g., wheelchair, bedside commode, etc.)   [] 1   [] 2   [x] 3   [] 4   3. Moving from lying on back to sitting on the side of the bed? [] 1   [x] 2   [] 3   [] 4   How much help from another person does the patient currently need. .. Total A Lot A Little None   4. Moving to and from a bed to a chair (including a wheelchair)? [] 1   [] 2   [x] 3   [] 4   5. Need to walk in hospital room? [] 1   [] 2   [x] 3   [] 4   6. Climbing 3-5 steps with a railing? [] 1   [x] 2   [] 3   [] 4   © 2007, Trustees of [address] [place] [address], under license to [employer]. All rights reserved      Score:  Initial: 16 Most Recent: X (Date: -- )    Interpretation of Tool:  Represents activities that are increasingly more difficult (i.e. Bed mobility, Transfers, Gait). Score 24 23 22-20 19-15 14-10 9-7 6     Modifier CH CI CJ CK CL CM CN      ? Mobility - Walking and Moving Around:     - CURRENT STATUS: CK - 40%-59% impaired, limited or restricted    - GOAL STATUS: CJ - 20%-39% impaired, limited or restricted    - D/C STATUS:  ---------------To be determined---------------  Payor: FIRST CHOICE VIP CARE PLUS [place] [employer] [place]US / Product Type: Managed Care Medicare /      Medical Necessity:     · Patient demonstrates fair rehab potential due to higher previous functional level. Reason for Services/Other Comments:  · Patient continues to require present interventions due to patient's inability to perform functional mobility with CGA.    Use of outcome tool(s) and clinical judgement create a POC that gives a: Questionable prediction of patient's progress: MODERATE COMPLEXITY            TREATMENT:   (In addition to Assessment/Re-Assessment sessions the following treatments were rendered)   Pre-treatment Symptoms/Complaints:  Wants to put her own gown on. She has an IV so we put a hospital gown on  Pain: Initial:   Pain Intensity 1: 0 (0/10 after therapy)  Post Session:       Gait Training (15 Minutes):  Gait training to improve and/or restore physical functioning as related to mobility, strength, balance and endurance. Ambulated 40 Feet (ft) with Minimal assistance using a Walker, rolling and minimal Safety awareness training related to their stride length and posture and proper breathing to promote proper body posture and promote proper body breathing techniques. Therapeutic Exercise: (15 Minutes):  Exercises per grid below to improve strength. Required minimal verbal cues to promote proper body alignment. Progressed range as indicated. Date:  2/9 Date:  2/10   Date:  2/11       Activity/Exercise Parameters Parameters Parameters   Ankle pumps 10 12 12   heelslides 10 12 12   Hip abd/add 10 12 12   SLR 10 12 12   Shoulder flex/ext 10     Elbow flex/ext 10               Assessment/Reassessment only, no treatment provided today  Braces/Orthotics/Lines/Etc:   · IV  · O2 Device: Nasal cannula  Treatment/Session Assessment:    · Response to Treatment:  She tolerated session well. · Interdisciplinary Collaboration:   o Registered Nurse  · After treatment position/precautions:   o Supine in bed  o Bed/Chair-wheels locked  o Bed in low position  o Call light within reach  o RN notified  o Side rails x 2   · Compliance with Program/Exercises: Will assess as treatment progresses. · Recommendations/Intent for next treatment session: \"Next visit will focus on advancements to more challenging activities, reduction in assistance provided and focus on bed mobitliy, transfers, gait, endurance and strength training\".    Total Treatment Duration:  PT Patient Time In/Time Out  Time In: 0900  Time Out: 0930    Araceli Torres, PTA

## 2018-02-12 ENCOUNTER — PATIENT OUTREACH (OUTPATIENT)
Dept: CASE MANAGEMENT | Age: 83
End: 2018-02-12

## 2018-02-12 LAB
BACTERIA SPEC CULT: NORMAL
SERVICE CMNT-IMP: NORMAL

## 2018-02-12 NOTE — PROGRESS NOTES
Transition of Care Discharge Follow-up Questionnaire   Date/Time of Call:   2/12/2018 10:20am   What was the patient hospitalized for? Hypotension       Does the patient understand his/her diagnosis and/or treatment and what happened during the hospitalization? Yes - spoke with son, patient asleep   Did the patient receive discharge instructions? Yes   Review any discharge instructions (see notes in [facility]). Ask patient if they understand these. Do they have any questions? Reviewed with patient's son, he has some confusion about medications. I did go over the medications that she is supposed to stop. He got a call from another nurse while talking to me and had to stop reviewing medications. Were home services ordered (nursing, PT, OT, ST, etc.)? Yes - [facility], says they are calling him now   If so, has the first visit occurred? If not, why? (Assist with coordination of services if necessary.) No   Was any DME ordered? No   If so, has it been received? If not, why?  (Assist with coordination of arranging DME orders if necessary.)          Complete a review of all medications (new, continued and discontinued meds per the D/C instructions and medication tab in [facility]). Reviewed the ones that were discontinued. Patient's son to call me back later to finish   Were all new prescriptions filled? If not, why?  (Assist with obtainment of medications if necessary.) Yes   Does the patient understand the purpose and dosing instructions for all medications? (If patient has questions, provide explanation and education.) Yes   Does the patient have any problems in performing ADLs? (If patient is unable to perform ADLs  what is the limiting factor(s)?   Do they have a support system that can assist? If no support system is present, discuss possible assistance that they may be able to obtain.) Yes, needs help but has family assistance and will be getting home health   Does the patient have all follow-up appointments scheduled? Has transportation been arranged? [facility] Pulmonary follow-up should be within 7 days of discharge; all others should have PCP follow-up within 7 days of discharge; follow-ups with other specialists as appropriate or ordered.) Yes - Dr. Stephany Leger on 2/16/18 @ 2:30pm  Cardiology on 2/27/18 @ 2:30pm  Son says she has transportation   Any other questions or concerns expressed by the patient? No    Schedule next appointment with GRISELDA PERRY Coordinator or refer to RN Case Manager/  as appropriate.  Schedule a call back again in 2 days to follow up on home health   CRISTIANO Call Completed By:

## 2018-02-12 NOTE — PROGRESS NOTES
Patient engaged with RN RAFI, Katie Aguilar due to multiple ED visits and hospital admissions. RN CM to complete CRISTIANO outreach. This note will not be viewable in [address]

## 2018-02-13 ENCOUNTER — HOSPITAL ENCOUNTER (INPATIENT)
Age: 83
LOS: 6 days | Discharge: HOME HEALTH | DRG: 193 | End: 2018-02-19
Attending: EMERGENCY MEDICINE | Admitting: INTERNAL MEDICINE
Payer: COMMERCIAL

## 2018-02-13 ENCOUNTER — APPOINTMENT (OUTPATIENT)
Dept: GENERAL RADIOLOGY | Age: 83
DRG: 193 | End: 2018-02-13
Attending: EMERGENCY MEDICINE
Payer: COMMERCIAL

## 2018-02-13 ENCOUNTER — HOME CARE VISIT (OUTPATIENT)
Dept: HOME HEALTH SERVICES | Facility: HOME HEALTH | Age: 83
End: 2018-02-13

## 2018-02-13 ENCOUNTER — HOME CARE VISIT (OUTPATIENT)
Dept: SCHEDULING | Facility: HOME HEALTH | Age: 83
End: 2018-02-13

## 2018-02-13 DIAGNOSIS — R06.2 WHEEZING: Primary | ICD-10-CM

## 2018-02-13 DIAGNOSIS — J96.21 ACUTE ON CHRONIC RESPIRATORY FAILURE WITH HYPOXIA (HCC): ICD-10-CM

## 2018-02-13 PROBLEM — J90 PLEURAL EFFUSION: Status: ACTIVE | Noted: 2018-02-13

## 2018-02-13 PROBLEM — J18.9 PNEUMONIA: Status: ACTIVE | Noted: 2018-02-13

## 2018-02-13 PROBLEM — R06.89 HYPERCAPNIA: Status: ACTIVE | Noted: 2018-02-13

## 2018-02-13 LAB
ANION GAP SERPL CALC-SCNC: 8 MMOL/L (ref 7–16)
ARTERIAL PATENCY WRIST A: POSITIVE
ATRIAL RATE: 98 BPM
BASE EXCESS BLDA CALC-SCNC: 2.6 MMOL/L (ref 0–3)
BASOPHILS # BLD: 0 K/UL (ref 0–0.2)
BASOPHILS NFR BLD: 0 % (ref 0–2)
BDY SITE: ABNORMAL
BNP SERPL-MCNC: 536 PG/ML
BUN SERPL-MCNC: 16 MG/DL (ref 8–23)
CALCIUM SERPL-MCNC: 9.4 MG/DL (ref 8.3–10.4)
CALCULATED P AXIS, ECG09: 75 DEGREES
CALCULATED R AXIS, ECG10: 23 DEGREES
CALCULATED T AXIS, ECG11: 101 DEGREES
CHLORIDE SERPL-SCNC: 105 MMOL/L (ref 98–107)
CO2 SERPL-SCNC: 31 MMOL/L (ref 21–32)
COHGB MFR BLD: 0.3 % (ref 0.5–1.5)
CREAT SERPL-MCNC: 0.94 MG/DL (ref 0.6–1)
DIAGNOSIS, 93000: NORMAL
DIFFERENTIAL METHOD BLD: ABNORMAL
DO-HGB BLD-MCNC: 4 % (ref 0–5)
EOSINOPHIL # BLD: 0.2 K/UL (ref 0–0.8)
EOSINOPHIL NFR BLD: 2 % (ref 0.5–7.8)
ERYTHROCYTE [DISTWIDTH] IN BLOOD BY AUTOMATED COUNT: 15.6 % (ref 11.9–14.6)
GAS FLOW.O2 O2 DELIVERY SYS: 4 L/MIN
GLUCOSE SERPL-MCNC: 112 MG/DL (ref 65–100)
HCO3 BLDA-SCNC: 28 MMOL/L (ref 22–26)
HCT VFR BLD AUTO: 30.3 % (ref 35.8–46.3)
HGB BLD-MCNC: 9.4 G/DL (ref 11.7–15.4)
HGB BLDMV-MCNC: 10.2 GM/DL (ref 11.7–15)
IMM GRANULOCYTES # BLD: 0 K/UL (ref 0–0.5)
IMM GRANULOCYTES NFR BLD AUTO: 0 % (ref 0–5)
LACTATE BLD-SCNC: 3 MMOL/L (ref 0.5–1.9)
LACTATE BLD-SCNC: 3.7 MMOL/L (ref 0.5–1.9)
LACTATE SERPL-SCNC: 2 MMOL/L (ref 0.4–2)
LYMPHOCYTES # BLD: 1 K/UL (ref 0.5–4.6)
LYMPHOCYTES NFR BLD: 15 % (ref 13–44)
MCH RBC QN AUTO: 33.1 PG (ref 26.1–32.9)
MCHC RBC AUTO-ENTMCNC: 31 G/DL (ref 31.4–35)
MCV RBC AUTO: 106.7 FL (ref 79.6–97.8)
METHGB MFR BLD: 0.3 % (ref 0–1.5)
MONOCYTES # BLD: 0.4 K/UL (ref 0.1–1.3)
MONOCYTES NFR BLD: 6 % (ref 4–12)
NEUTS SEG # BLD: 5.3 K/UL (ref 1.7–8.2)
NEUTS SEG NFR BLD: 77 % (ref 43–78)
OXYHGB MFR BLDA: 95.9 % (ref 94–97)
P-R INTERVAL, ECG05: 144 MS
PCO2 BLDA: 50 MMHG (ref 35–45)
PH BLDA: 7.37 [PH] (ref 7.35–7.45)
PLATELET # BLD AUTO: 202 K/UL (ref 150–450)
PMV BLD AUTO: 11.9 FL (ref 10.8–14.1)
PO2 BLDA: 85 MMHG (ref 80–105)
POTASSIUM SERPL-SCNC: 4 MMOL/L (ref 3.5–5.1)
PROCALCITONIN SERPL-MCNC: <0.1 NG/ML
Q-T INTERVAL, ECG07: 334 MS
QRS DURATION, ECG06: 88 MS
QTC CALCULATION (BEZET), ECG08: 426 MS
RBC # BLD AUTO: 2.84 M/UL (ref 4.05–5.25)
SAO2 % BLD: 97 % (ref 92–98.5)
SERVICE CMNT-IMP: ABNORMAL
SODIUM SERPL-SCNC: 144 MMOL/L (ref 136–145)
TROPONIN I BLD-MCNC: 0.02 NG/ML (ref 0.02–0.05)
VENTILATION MODE VENT: ABNORMAL
VENTRICULAR RATE, ECG03: 98 BPM
WBC # BLD AUTO: 6.9 K/UL (ref 4.3–11.1)

## 2018-02-13 PROCEDURE — 74011000258 HC RX REV CODE- 258: Performed by: EMERGENCY MEDICINE

## 2018-02-13 PROCEDURE — 36600 WITHDRAWAL OF ARTERIAL BLOOD: CPT

## 2018-02-13 PROCEDURE — 99218 HC RM OBSERVATION: CPT

## 2018-02-13 PROCEDURE — 94640 AIRWAY INHALATION TREATMENT: CPT

## 2018-02-13 PROCEDURE — 83880 ASSAY OF NATRIURETIC PEPTIDE: CPT | Performed by: EMERGENCY MEDICINE

## 2018-02-13 PROCEDURE — 74011250636 HC RX REV CODE- 250/636: Performed by: INTERNAL MEDICINE

## 2018-02-13 PROCEDURE — 94760 N-INVAS EAR/PLS OXIMETRY 1: CPT

## 2018-02-13 PROCEDURE — 83605 ASSAY OF LACTIC ACID: CPT | Performed by: INTERNAL MEDICINE

## 2018-02-13 PROCEDURE — 96375 TX/PRO/DX INJ NEW DRUG ADDON: CPT | Performed by: EMERGENCY MEDICINE

## 2018-02-13 PROCEDURE — 77010033678 HC OXYGEN DAILY

## 2018-02-13 PROCEDURE — 93005 ELECTROCARDIOGRAM TRACING: CPT | Performed by: INTERNAL MEDICINE

## 2018-02-13 PROCEDURE — 74011250637 HC RX REV CODE- 250/637: Performed by: INTERNAL MEDICINE

## 2018-02-13 PROCEDURE — 85025 COMPLETE CBC W/AUTO DIFF WBC: CPT | Performed by: EMERGENCY MEDICINE

## 2018-02-13 PROCEDURE — 80048 BASIC METABOLIC PNL TOTAL CA: CPT | Performed by: EMERGENCY MEDICINE

## 2018-02-13 PROCEDURE — 96365 THER/PROPH/DIAG IV INF INIT: CPT | Performed by: EMERGENCY MEDICINE

## 2018-02-13 PROCEDURE — 83605 ASSAY OF LACTIC ACID: CPT

## 2018-02-13 PROCEDURE — 96366 THER/PROPH/DIAG IV INF ADDON: CPT | Performed by: EMERGENCY MEDICINE

## 2018-02-13 PROCEDURE — 74011250636 HC RX REV CODE- 250/636: Performed by: EMERGENCY MEDICINE

## 2018-02-13 PROCEDURE — 87040 BLOOD CULTURE FOR BACTERIA: CPT | Performed by: EMERGENCY MEDICINE

## 2018-02-13 PROCEDURE — 71045 X-RAY EXAM CHEST 1 VIEW: CPT

## 2018-02-13 PROCEDURE — 84484 ASSAY OF TROPONIN QUANT: CPT

## 2018-02-13 PROCEDURE — 74011000250 HC RX REV CODE- 250: Performed by: INTERNAL MEDICINE

## 2018-02-13 PROCEDURE — 65270000029 HC RM PRIVATE

## 2018-02-13 PROCEDURE — 84145 PROCALCITONIN (PCT): CPT | Performed by: INTERNAL MEDICINE

## 2018-02-13 PROCEDURE — 36415 COLL VENOUS BLD VENIPUNCTURE: CPT | Performed by: INTERNAL MEDICINE

## 2018-02-13 PROCEDURE — 99285 EMERGENCY DEPT VISIT HI MDM: CPT | Performed by: EMERGENCY MEDICINE

## 2018-02-13 PROCEDURE — 74011000250 HC RX REV CODE- 250: Performed by: EMERGENCY MEDICINE

## 2018-02-13 PROCEDURE — 82803 BLOOD GASES ANY COMBINATION: CPT

## 2018-02-13 RX ORDER — LORATADINE 10 MG/1
10 TABLET ORAL DAILY
Status: DISCONTINUED | OUTPATIENT
Start: 2018-02-14 | End: 2018-02-19 | Stop reason: HOSPADM

## 2018-02-13 RX ORDER — COLCHICINE 0.6 MG/1
0.3 TABLET ORAL DAILY
Status: DISCONTINUED | OUTPATIENT
Start: 2018-02-14 | End: 2018-02-19 | Stop reason: HOSPADM

## 2018-02-13 RX ORDER — VANCOMYCIN/0.9 % SOD CHLORIDE 1.5G/250ML
1500 PLASTIC BAG, INJECTION (ML) INTRAVENOUS ONCE
Status: DISPENSED | OUTPATIENT
Start: 2018-02-13 | End: 2018-02-14

## 2018-02-13 RX ORDER — ASPIRIN 81 MG/1
81 TABLET ORAL DAILY
Status: DISCONTINUED | OUTPATIENT
Start: 2018-02-14 | End: 2018-02-19 | Stop reason: HOSPADM

## 2018-02-13 RX ORDER — CLOPIDOGREL BISULFATE 75 MG/1
75 TABLET ORAL DAILY
Status: DISCONTINUED | OUTPATIENT
Start: 2018-02-14 | End: 2018-02-19 | Stop reason: HOSPADM

## 2018-02-13 RX ORDER — ALBUTEROL SULFATE 2.5 MG/.5ML
10 SOLUTION RESPIRATORY (INHALATION)
Status: COMPLETED | OUTPATIENT
Start: 2018-02-13 | End: 2018-02-13

## 2018-02-13 RX ORDER — SAME BUTANEDISULFONATE/BETAINE 400-600 MG
250 POWDER IN PACKET (EA) ORAL 2 TIMES DAILY
Status: DISCONTINUED | OUTPATIENT
Start: 2018-02-13 | End: 2018-02-19 | Stop reason: HOSPADM

## 2018-02-13 RX ORDER — IPRATROPIUM BROMIDE AND ALBUTEROL SULFATE 2.5; .5 MG/3ML; MG/3ML
3 SOLUTION RESPIRATORY (INHALATION)
Status: DISCONTINUED | OUTPATIENT
Start: 2018-02-13 | End: 2018-02-14

## 2018-02-13 RX ORDER — SODIUM CHLORIDE 0.9 % (FLUSH) 0.9 %
5-10 SYRINGE (ML) INJECTION AS NEEDED
Status: DISCONTINUED | OUTPATIENT
Start: 2018-02-13 | End: 2018-02-19 | Stop reason: HOSPADM

## 2018-02-13 RX ORDER — LEVOFLOXACIN 5 MG/ML
750 INJECTION, SOLUTION INTRAVENOUS
Status: COMPLETED | OUTPATIENT
Start: 2018-02-13 | End: 2018-02-13

## 2018-02-13 RX ORDER — FUROSEMIDE 40 MG/1
20 TABLET ORAL ONCE
Status: COMPLETED | OUTPATIENT
Start: 2018-02-13 | End: 2018-02-13

## 2018-02-13 RX ORDER — SODIUM CHLORIDE 0.9 % (FLUSH) 0.9 %
5-10 SYRINGE (ML) INJECTION EVERY 8 HOURS
Status: DISCONTINUED | OUTPATIENT
Start: 2018-02-13 | End: 2018-02-19 | Stop reason: HOSPADM

## 2018-02-13 RX ORDER — ACETAMINOPHEN 325 MG/1
650 TABLET ORAL
Status: DISCONTINUED | OUTPATIENT
Start: 2018-02-13 | End: 2018-02-19 | Stop reason: HOSPADM

## 2018-02-13 RX ORDER — PRAVASTATIN SODIUM 20 MG/1
20 TABLET ORAL
Status: DISCONTINUED | OUTPATIENT
Start: 2018-02-13 | End: 2018-02-19 | Stop reason: HOSPADM

## 2018-02-13 RX ORDER — SODIUM CHLORIDE 9 MG/ML
500 INJECTION, SOLUTION INTRAVENOUS ONCE
Status: COMPLETED | OUTPATIENT
Start: 2018-02-13 | End: 2018-02-13

## 2018-02-13 RX ORDER — ALLOPURINOL 100 MG/1
100 TABLET ORAL 2 TIMES DAILY
Status: DISCONTINUED | OUTPATIENT
Start: 2018-02-13 | End: 2018-02-19 | Stop reason: HOSPADM

## 2018-02-13 RX ORDER — FUROSEMIDE 20 MG/1
20 TABLET ORAL DAILY
Status: DISCONTINUED | OUTPATIENT
Start: 2018-02-14 | End: 2018-02-15

## 2018-02-13 RX ORDER — HEPARIN SODIUM 5000 [USP'U]/ML
5000 INJECTION, SOLUTION INTRAVENOUS; SUBCUTANEOUS EVERY 12 HOURS
Status: DISCONTINUED | OUTPATIENT
Start: 2018-02-13 | End: 2018-02-19 | Stop reason: HOSPADM

## 2018-02-13 RX ADMIN — METHYLPREDNISOLONE SODIUM SUCCINATE 125 MG: 125 INJECTION, POWDER, FOR SOLUTION INTRAMUSCULAR; INTRAVENOUS at 10:24

## 2018-02-13 RX ADMIN — PIPERACILLIN SODIUM,TAZOBACTAM SODIUM 3.38 G: 3; .375 INJECTION, POWDER, FOR SOLUTION INTRAVENOUS at 18:21

## 2018-02-13 RX ADMIN — Medication 5 ML: at 23:30

## 2018-02-13 RX ADMIN — PRAVASTATIN SODIUM 20 MG: 20 TABLET ORAL at 23:29

## 2018-02-13 RX ADMIN — RDII 250 MG CAPSULE 250 MG: at 18:21

## 2018-02-13 RX ADMIN — ALBUTEROL SULFATE 10 MG: 2.5 SOLUTION RESPIRATORY (INHALATION) at 10:13

## 2018-02-13 RX ADMIN — FUROSEMIDE 20 MG: 40 TABLET ORAL at 13:28

## 2018-02-13 RX ADMIN — LEVOFLOXACIN 750 MG: 5 INJECTION, SOLUTION INTRAVENOUS at 12:03

## 2018-02-13 RX ADMIN — ALLOPURINOL 100 MG: 100 TABLET ORAL at 18:21

## 2018-02-13 RX ADMIN — SODIUM CHLORIDE 500 ML: 900 INJECTION, SOLUTION INTRAVENOUS at 18:22

## 2018-02-13 RX ADMIN — ACETAMINOPHEN 650 MG: 325 TABLET ORAL at 23:29

## 2018-02-13 RX ADMIN — IPRATROPIUM BROMIDE AND ALBUTEROL SULFATE 3 ML: 2.5; .5 SOLUTION RESPIRATORY (INHALATION) at 21:00

## 2018-02-13 RX ADMIN — HEPARIN SODIUM 5000 UNITS: 5000 INJECTION, SOLUTION INTRAVENOUS; SUBCUTANEOUS at 18:21

## 2018-02-13 NOTE — IP AVS SNAPSHOT
Lyudmila Fields 
 
 
 [address] 
[phone] Patient: Aden Dorantes MRN: [id number] :2/10/1927 About your hospitalization You were admitted on:  2018 You last received care in the:  [address] [place] [address] You were discharged on:  2018 Why you were hospitalized Your primary diagnosis was:  Not on File Your diagnoses also included:  Pneumonia, Diastolic Chf, Chronic (Hcc), Gout, Mixed Hyperlipidemia, Ckd (Chronic Kidney Disease) Stage 3, Gfr 30-59 Ml/Min, Essential Hypertension, Benign, Pleural Effusion, Hypercapnia, Acute Metabolic Encephalopathy, Hcap (Healthcare-Associated Pneumonia) Follow-up Information Follow up With Details Comments Contact Info Karlene Puentes MD In 1 week APPT. Cookie Cheung.  @ 9:40 [id number] [address] [address] [facility] 160 
[phone] [address] [place] [address] 
[phone] Your Scheduled Appointments 2018  9:40 AM EST Office Visit with Karlene Puentes MD  
[address] ([address] [place] [address]) [address] [place] [address]  
[phone] 2018  2:30 PM EST Office Visit with Javad Azul DO  
[facility] CARDIOLOGY [place] OFFICE ([address] [place] [address]) [address] [place] [address] 
[address] [place] [address]  
[phone] Discharge Orders None A check michel indicates which time of day the medication should be taken. My Medications START taking these medications Instructions Each Dose to Equal  
 Morning Noon Evening Bedtime  
 acetaminophen 325 mg tablet Commonly known as:  TYLENOL Take 2 Tabs by mouth every six (6) hours as needed. 650 mg  
    
   
   
   
  
 amLODIPine 5 mg tablet Commonly known as:  Niru Flanagan Your next dose is:  TOMORROW Take 1 Tab by mouth daily. 5 mg  
    
   
   
   
  
 furosemide 20 mg tablet Commonly known as:  LASIX Your next dose is:  TOMORROW Take 1 Tab by mouth daily. 20 mg  
    
   
   
   
  
 levoFLOXacin 750 mg tablet Commonly known as:  Christianne Lia Start taking on:  2/20/2018 Your next dose is:  TOMORROW Take 1 Tab by mouth every fourty-eight (48) hours for 2 days. 750 mg CHANGE how you take these medications Instructions Each Dose to Equal  
 Morning Noon Evening Bedtime * albuterol 2.5 mg /3 mL (0.083 %) nebulizer solution Commonly known as:  PROVENTIL VENTOLIN What changed:  Another medication with the same name was added. Make sure you understand how and when to take each. 3 mL by Nebulization route every four (4) hours as needed for Wheezing. 2.5 mg  
    
   
   
   
  
 * albuterol 90 mcg/actuation inhaler Commonly known as:  PROVENTIL HFA, VENTOLIN HFA, PROAIR HFA What changed: You were already taking a medication with the same name, and this prescription was added. Make sure you understand how and when to take each. Take 1 Puff by inhalation every six (6) hours as needed for Wheezing. 1 Puff * Notice: This list has 2 medication(s) that are the same as other medications prescribed for you. Read the directions carefully, and ask your doctor or other care provider to review them with you. CONTINUE taking these medications Instructions Each Dose to Equal  
 Morning Noon Evening Bedtime  
 allopurinol 100 mg tablet Commonly known as:  Ozella Leonardorow Your next dose is:  TODAY Take 1 Tab by mouth two (2) times a day. 100 mg  
    
   
   
  
   
  
 aspirin 81 mg Cpdr  
Your next dose is:  Nusrat Take  by mouth daily. clopidogrel 75 mg Tab Commonly known as:  PLAVIX Your next dose is:  TOMORROW Take 1 Tab by mouth daily. 75 mg  
    
   
   
   
  
 clotrimazole 1 % topical cream  
Commonly known as:  LOTRIMIN AF (CLOTRIMAZOLE) Apply  to affected area two (2) times a day. colchicine 0.6 mg tablet Your next dose is:  TOMORROW Take 0.5 Tabs by mouth daily. 0.3 mg  
    
   
   
   
  
 cyanocobalamin 1,000 mcg tablet Notes to Patient:  TOMORROW Take 1 Tab by mouth daily. 1000 mcg  
    
   
   
   
  
 loratadine 10 mg tablet Commonly known as:  Juliane Ru Your next dose is:  TOMORROW Take 1 Tab by mouth daily. 10 mg  
    
   
   
   
  
 pravastatin 20 mg tablet Commonly known as:  PRAVACHOL Your next dose is:  TODAY Take 1 Tab by mouth nightly. 20 mg  
    
   
   
   
  
  
 raNITIdine 150 mg tablet Commonly known as:  ZANTAC Your next dose is:  TODAY Take 1 Tab by mouth two (2) times a day. 150 mg Saccharomyces boulardii 250 mg capsule Commonly known as:  Keron Controls Your next dose is:  TODAY Take 1 Cap by mouth two (2) times a day. 250 mg Senna 8.6 mg Cap Generic drug:  sennosides Take  by mouth. STOP taking these medications   
 gabapentin 100 mg capsule Commonly known as:  NEURONTIN HYDROcodone-acetaminophen 7.5-325 mg per tablet Commonly known as:  Licha Black [employer]IR DEL[employer]  
   
  
 potassium chloride 10 mEq tablet Commonly known as:  KLOR-CON M10 Where to Get Your Medications Information on where to get these meds will be given to you by the nurse or doctor. ! Ask your nurse or doctor about these medications  
  acetaminophen 325 mg tablet  
 albuterol 2.5 mg /3 mL (0.083 %) nebulizer solution  
 albuterol 90 mcg/actuation inhaler  
 amLODIPine 5 mg tablet  
 furosemide 20 mg tablet  
 levoFLOXacin 750 mg tablet Discharge Instructions DISCHARGE SUMMARY from Nurse The following personal items are in your possession at time of discharge: 
 
  
  
  
  
  
Clothing: With patient PATIENT INSTRUCTIONS: 
 
 
F-face looks uneven A-arms unable to move or move unevenly S-speech slurred or non-existent T-time-call 911 as soon as signs and symptoms begin-DO NOT go Back to bed or wait to see if you get better-TIME IS BRAIN. Warning Signs of HEART ATTACK Call 911 if you have these symptoms: 
? Chest discomfort. Most heart attacks involve discomfort in the center of the chest that lasts more than a few minutes, or that goes away and comes back. It can feel like uncomfortable pressure, squeezing, fullness, or pain. ? Discomfort in other areas of the upper body. Symptoms can include pain or discomfort in one or both arms, the back, neck, jaw, or stomach. ? Shortness of breath with or without chest discomfort. ? Other signs may include breaking out in a cold sweat, nausea, or lightheadedness. Don't wait more than five minutes to call [address]! Fast action can save your life. Calling 911 is almost always the fastest way to get lifesaving treatment. Emergency Medical Services staff can begin treatment when they arrive  up to an hour sooner than if someone gets to the hospital by car. The discharge information has been reviewed with the patient. The patient verbalized understanding. Discharge medications reviewed with the patient and appropriate educational materials and side effects teaching were provided. Pneumonia: Care Instructions Your Care Instructions Pneumonia is an infection of the lungs. Most cases are caused by infections from bacteria or viruses. Pneumonia may be mild or very severe. If it is caused by bacteria, you will be treated with antibiotics. It may take a few weeks to a few months to recover fully from pneumonia, depending on how sick you were and whether your overall health is good. Follow-up care is a key part of your treatment and safety. Be sure to make and go to all appointments, and call your doctor if you are having problems. It's also a good idea to know your test results and keep a list of the medicines you take. How can you care for yourself at home? · Take your antibiotics exactly as directed. Do not stop taking the medicine just because you are feeling better. You need to take the full course of antibiotics. · Take your medicines exactly as prescribed. Call your doctor if you think you are having a problem with your medicine. · Get plenty of rest and sleep. You may feel weak and tired for a while, but your energy level will improve with time. · To prevent dehydration, drink plenty of fluids, enough so that your urine is light yellow or clear like water. Choose water and other caffeine-free clear liquids until you feel better. If you have kidney, heart, or liver disease and have to limit fluids, talk with your doctor before you increase the amount of fluids you drink. · Take care of your cough so you can rest. A cough that brings up mucus from your lungs is common with pneumonia. It is one way your body gets rid of the infection. But if coughing keeps you from resting or causes severe fatigue and chest-wall pain, talk to your doctor. He or she may suggest that you take a medicine to reduce the cough. · Use a vaporizer or humidifier to add moisture to your bedroom. Follow the directions for cleaning the machine. · Do not smoke or allow others to smoke around you.  Smoke will make your cough last longer. If you need help quitting, talk to your doctor about stop-smoking programs and medicines. These can increase your chances of quitting for good. · Take an over-the-counter pain medicine, such as acetaminophen (Tylenol), ibuprofen (Advil, Motrin), or naproxen (Aleve). Read and follow all instructions on the label. · Do not take two or more pain medicines at the same time unless the doctor told you to. Many pain medicines have acetaminophen, which is Tylenol. Too much acetaminophen (Tylenol) can be harmful. · If you were given a spirometer to measure how well your lungs are working, use it as instructed. This can help your doctor tell how your recovery is going. · To prevent pneumonia in the future, talk to your doctor about getting a flu vaccine (once a year) and a pneumococcal vaccine (one time only for most people). When should you call for help? Call 911 anytime you think you may need emergency care. For example, call if: 
? · You have severe trouble breathing. ?Call your doctor now or seek immediate medical care if: 
? · You cough up dark brown or bloody mucus (sputum). ? · You have new or worse trouble breathing. ? · You are dizzy or lightheaded, or you feel like you may faint. ? Watch closely for changes in your health, and be sure to contact your doctor if: 
? · You have a new or higher fever. ? · You are coughing more deeply or more often. ? · You are not getting better after 2 days (48 hours). ? · You do not get better as expected. Where can you learn more? Go to http://you-demarcus.info/. Enter 01.84.63.10.33 in the search box to learn more about \"Pneumonia: Care Instructions. \" Current as of: May 12, 2017 Content Version: 11.4 © 3773-6998 [employer]wise, [employer]. Care instructions adapted under license by [employer] (which disclaims liability or warranty for this information).  If you have questions about a medical condition or this instruction, always ask your healthcare professional. [address]  any warranty or liability for your use of this information. Unresulted Labs-Please follow up with your PCP about these lab tests Order Current Status EKG, 12 LEAD, INITIAL Preliminary result Providers Seen During Your Hospitalization Provider Specialty Primary office phone [address], [place] Emergency Medicine [phone] Jacey Ruano MD Internal Medicine [phone] Your Primary Care Physician (PCP) Primary Care Physician Office Phone Office Fax [address], [address] [phone] You are allergic to the following Allergen Reactions Bee Pollen Swelling Fire SproutBox Itching Swelling Recent Documentation Height Weight BMI OB Status Smoking Status 1.499 m 66.9 kg 29.79 kg/m2 Hysterectomy Never Smoker Emergency Contacts Name Discharge Info Relation Home Work Mobile Tresa Whaley  Daughter [21] [phone] Jose Blancas  Daughter [21] [phone] Patient Belongings The following personal items are in your possession at time of discharge: 
                   Clothing: With patient Please provide this summary of care documentation to your next provider. Signatures-by signing, you are acknowledging that this After Visit Summary has been reviewed with you and you have received a copy. Patient Signature:  ____________________________________________________________ Date:  ____________________________________________________________  
  
Rohan Bateman Provider Signature:  ____________________________________________________________ Date:  ____________________________________________________________

## 2018-02-13 NOTE — ED NOTES
Pt resting on stretcher. RR even and non labored. Pt on oxygen and cardiac monitors in NAD. Pt on 4L 02 [place]. Pt verbal with call light within reach. Pt's abx continues to run with no signs of reddness, swelling, or infiltration.  CBrn

## 2018-02-13 NOTE — ROUTINE PROCESS
TRANSFER - OUT REPORT:    Verbal report given to Martha Riggs RN on [employer]  being transferred to [facility] 18  for routine progression of care       Report consisted of patients Situation, Background, Assessment and   Recommendations(SBAR). Information from the following report(s) ED Summary was reviewed with the receiving nurse. Lines:   Peripheral IV 02/13/18 Right Antecubital (Active)   Site Assessment Clean, dry, & intact 2/13/2018 10:28 AM   Phlebitis Assessment 0 2/13/2018 10:28 AM   Infiltration Assessment 0 2/13/2018 10:28 AM   Dressing Status Clean, dry, & intact 2/13/2018 10:28 AM       Peripheral IV 02/13/18 Right Forearm (Active)   Site Assessment Clean, dry, & intact 2/13/2018  5:08 PM   Phlebitis Assessment 0 2/13/2018  5:08 PM   Infiltration Assessment 0 2/13/2018  5:08 PM   Dressing Status Clean, dry, & intact 2/13/2018  5:08 PM   Action Taken Blood drawn 2/13/2018  5:08 PM        Opportunity for questions and clarification was provided.       Patient transported with:

## 2018-02-13 NOTE — ED TRIAGE NOTES
Pt in via [facility] from home c/o sob. Ems gave 5mg albuterol, 3.5 duoneb. Pt on 4 liters NC at home. Ems states pt d/c from this facility 2 days ago after admission for sepsis. States pt unsure how to use inhaler at home. Pt states she feels better after treatment. Denies n/v/f/cp.

## 2018-02-13 NOTE — H&P
History and Physical    Patient: Nehemiah Mcmanus MRN: [id number]  SSN: [id number]    YOB: 1927  Age: 80 y.o. Sex: female      Subjective:    cc: \" I have difficulty breathing\"    Nehemiah Mcmanus is a 80 y.o. female who has a PMH of diastolic HF with preserved EF, home oxygen, HTN, CKD stage III, Gout, dyslipidemia, CAD, NSTEMI who was brought in by ems after she was noted with sob and wheezing by her son at home. Of note, she has had 2 admissions this year ( the first on Dec/17 due to strep bacteremia sent to [facility] ), her last 2 days ago for hypotension and JOE. acei and torsemide where stopped back then. Patient stated her sob started this morning, despite using her daily pumps at home. She had a negative flu test on 2/7/18. Denies cough, fever, chills, diarrhea, chest pain, dizziness, syncope, or any other symptoms. Upon arrival to ER VS stable. While en-route she received albuterol nebulizers, with improvement. Pertinent labs: lactic acid 3,  no leukocytosis, no electrolyte imbalances, normal kidney function. CXR showed RLL-R middle lobe infiltrate, bilateral pleural effusions-mild. She received levaquin x 1. Her daughter was present in the room. [place] Lomeli: [phone] / mobile: [phone]     Hospitalist was contacted for admission for HCAP.    ROS: all pertinent findings described in my note.     PMH: as above  Social hx: non smoker, no etoh  Family hx: her mother and father had a heart attack     Past Medical History:   Diagnosis Date    Acute kidney failure, unspecified     Arthritis     CAD (coronary artery disease)     Cellulitis and abscess of other specified site     Coronary atherosclerosis of native coronary artery     Essential hypertension, benign 3/17/2015    Hypertension     Hypopotassemia     Mixed hyperlipidemia     Osteoarthritis 7/20/2017    Other and unspecified hyperlipidemia     Reflux esophagitis     Renal failure, unspecified     Shortness of breath  Unspecified essential hypertension      Past Surgical History:   Procedure Laterality Date    CARDIAC SURG PROCEDURE UNLIST      stent    HX CATARACT REMOVAL Bilateral     HX CHOLECYSTECTOMY      HX HYSTERECTOMY      complete    [place] LAYR CLOS WND FACE,FACIAL <2.5CM        Family History   Problem Relation Age of Onset    Heart Attack Mother     Heart Attack Father     Heart Disease Brother     Heart Disease Brother      Social History   Substance Use Topics    Smoking status: Never Smoker    Smokeless tobacco: Never Used    Alcohol use No      Prior to Admission medications    Medication Sig Start Date End Date Taking? Authorizing Provider   sennosides (SENNA) 8.6 mg cap Take  by mouth. Yes Historical Provider   OXYGEN-AIR DELIVERY SYSTEMS by Does Not Apply route. Yes Historical Provider   albuterol (PROVENTIL VENTOLIN) 2.5 mg /3 mL (0.083 %) nebulizer solution 3 mL by Nebulization route every four (4) hours as needed for Wheezing. 1/14/18  Yes Fanny Blank MD   HYDROcodone-acetaminophen [facility]) 7.5-325 mg per tablet Take 1 Tab by mouth every eight (8) hours as needed for Pain. Max Daily Amount: 3 Tabs. 1/5/18  Yes Tomás Abebe DO   colchicine 0.6 mg tablet Take 0.5 Tabs by mouth daily. 1/6/18  Yes Tomás Abebe, DO   cyanocobalamin 1,000 mcg tablet Take 1 Tab by mouth daily. 1/6/18  Yes Tomás Abebe DO   gabapentin (NEURONTIN) 100 mg capsule Take 2 Caps by mouth nightly. Indications: NEUROPATHIC PAIN 1/5/18  Yes Alen Ballard, DO   Saccharomyces boulardii (FLORASTOR) 250 mg capsule Take 1 Cap by mouth two (2) times a day. 1/5/18  Yes Tomás Abebe, DO   clotrimazole (LOTRIMIN AF, CLOTRIMAZOLE,) 1 % topical cream Apply  to affected area two (2) times a day. 11/27/17  Yes Anny Welch MD   allopurinol (ZYLOPRIM) 100 mg tablet Take 1 Tab by mouth two (2) times a day.  10/24/17  Yes Anny Welch MD   pravastatin (PRAVACHOL) 20 mg tablet Take 1 Tab by mouth nightly. 10/24/17  Yes Hernandez Mantilla MD   raNITIdine (ZANTAC) 150 mg tablet Take 1 Tab by mouth two (2) times a day. 10/24/17  Yes Hernandez Mantilla MD   clopidogrel (PLAVIX) 75 mg tab Take 1 Tab by mouth daily. 10/24/17  Yes Hernandez Mantilla MD   potassium chloride (KLOR-CON M10) 10 mEq tablet Take 1 Tab by mouth daily. 10/24/17  Yes Hernandez Mantilla MD   loratadine (CLARITIN) 10 mg tablet Take 1 Tab by mouth daily. 7/24/17  Yes Hernandez Mantilla MD   aspirin 81 mg CpDR Take  by mouth daily. Yes Adelaida Tiwari MD        Allergies   Allergen Reactions    Bee Pollen Swelling    Fire Ant Itching and Swelling       Review of Systems:  A comprehensive review of systems was negative except for that written in the History of Present Illness. Objective:     Vitals:    02/13/18 0944 02/13/18 1013 02/13/18 1147   BP: 149/78  138/63   Pulse: 95  93   Resp: 20  21   Temp: 98.1 °F (36.7 °C)     SpO2: 98% 96% 96%   Weight: 68.9 kg (152 lb)     Height: 4' 11\" (1.499 m)          Physical Exam:  GENERAL: alert, cooperative, no distress, appears stated age  EYE: negative  LYMPHATIC: Cervical, supraclavicular, and axillary nodes normal.   THROAT & NECK: normal and no erythema or exudates noted. LUNG: minimal rales at R lower base. No wheezing, no rhonchi. HEART: regular rate and rhythm, S1, S2 normal, no murmur, click, rub or gallop  ABDOMEN: soft, non-tender.  Bowel sounds normal. No masses,  no organomegaly  EXTREMITIES: bilateral mild pedal edema, atraumatic, no cyanosis   SKIN: Normal.  NEUROLOGIC: negative  PSYCHIATRIC: non focal    Assessment:     Hospital Problems  Date Reviewed: 2/13/2018          Codes Class Noted POA    Pneumonia ICD-10-CM: J18.9  ICD-9-CM: 563  2/13/2018 Yes        Pleural effusion ICD-10-CM: J90  ICD-9-CM: 511.9  2/13/2018 Yes        Hypercapnia ICD-10-CM: R06.89  ICD-9-CM: 786.09  2/13/2018 Yes        CKD (chronic kidney disease) stage 3, GFR 30-59 ml/min ICD-10-CM: N18.3  ICD-9-CM: 585.3  12/23/2017 Yes        Gout ICD-10-CM: M10.9  ICD-9-CM: 274.9  10/6/2016 Yes        Diastolic CHF, chronic (HCC) (Chronic) ICD-10-CM: I50.32  ICD-9-CM: 428.32, 428.0  5/17/2016 Yes        Essential hypertension, benign (Chronic) ICD-10-CM: I10  ICD-9-CM: 401.1  3/17/2015 Yes        Mixed hyperlipidemia ICD-10-CM: S10.7  ICD-9-CM: 272.2  Unknown Yes              Plan: 1. Acute hypoxic, hypercapnic respiratory failure likely due to Healthcare associated pneumonia   2. acute diastolic HF with bilateral pleural effusions / moderate to severe Tric valve regurgitation   3. History of home oxygen    Plan:    -Admit under medicine  -Cardiac diet   -continue supplemental oxygen at 4 liters  -duonebs  -Start van/zosyn  -Resume home meds  -start lasix at 20 mg po daily in light pleural effusions, pedal edema  -Hold gabapentin for now  -Check procalcitonin, ekg   -Monitor lactic acid q 6hrs   -cbc and chem am  -repeat cxr tomorrow  -IP PT/OT  -Care manager, may need STR upon DC since this is her 3rd hospitalization in 2 months period  -DVT ppx: heparin, GCS    Full code: her daughter will discuss advance directives with relatives     Estimated LOS < 2MN  Risk: high  Estimated DC planning: home PT versus STR  Goals of care discussed with patient and daughter      *In case of emergency: Daughter: Sylvia [facility]: [phone] / [place]: [phone]   Daughter: Abel [facility]: [phone]       Signed By: Jessenia Vo MD     February 13, 2018

## 2018-02-13 NOTE — ED NOTES
Pt resting on stretcher. RR even and non labored. Pt on 4L 02 NC and 02 monitor in NAD. Pt verbal with admitting MD at bedside and family. Pt states she does not need anything at this time. Pt has levaquin running on di la flow at 100 ml/hr.  CBRN

## 2018-02-13 NOTE — PROGRESS NOTES
TRANSFER - IN REPORT:    Verbal report received from [facility] RN(name) on [employer]  being received from ED(unit) for routine progression of care      Report consisted of patients Situation, Background, Assessment and   Recommendations(SBAR). Information from the following report(s) SBAR was reviewed with the receiving nurse. Opportunity for questions and clarification was provided. Assessment completed upon patients arrival to unit and care assumed.

## 2018-02-13 NOTE — ED PROVIDER NOTES
HPI:  77-year-old female history of CAD, hypertension, recent CHF, recent AK I, recent respiratory failure is here with shortness of breath. Recently admitted and discharged home. For the past 2 days has had shortness of breath. On 4 L nasal cannula. Has been using albuterol every 12 hours instead of every 4 hours as needed. On EMS arrival patient was having retraction, difficulty breathing. No air movement. 5 albuterol, one duo neb given with some improvement. Patient stated she feels somewhat better. Positive coughing. No fever. + Shortness of breath. No chest pain     ROS  Constitutional: No fever  Skin: no rash  Eye: No vision changes  ENMT: No sore throat  Respiratory: + shortness of breath, + cough  Cardiovascular: No chest pain  Gastrointestinal: No vomiting, no nausea, no diarrhea, no abdominal pain  : No dysuria  MSK: No back pain, no muscle pain  Neuro: No headache, no change in mental status, no numbness, no tingling, no weakness    All other review of systems positive per history of present illness and the above otherwise negative or noncontributory.     Visit Vitals    /78 (BP 1 Location: Left arm, BP Patient Position: At rest)    Pulse 95    Temp 98.1 °F (36.7 °C)    Resp 20    Ht 4' 11\" (1.499 m)    Wt 68.9 kg (152 lb)    SpO2 98%    BMI 30.7 kg/m2     Past Medical History:   Diagnosis Date    Acute kidney failure, unspecified     Arthritis     CAD (coronary artery disease)     Cellulitis and abscess of other specified site     Coronary atherosclerosis of native coronary artery     Essential hypertension, benign 3/17/2015    Hypertension     Hypopotassemia     Mixed hyperlipidemia     Osteoarthritis 7/20/2017    Other and unspecified hyperlipidemia     Reflux esophagitis     Renal failure, unspecified     Shortness of breath     Unspecified essential hypertension      Past Surgical History:   Procedure Laterality Date    CARDIAC SURG PROCEDURE UNLIST      stent  HX CATARACT REMOVAL Bilateral     HX CHOLECYSTECTOMY      HX HYSTERECTOMY      complete    [place] LAYR CLOS WND FACE,FACIAL <2.5CM       Prior to Admission Medications   Prescriptions Last Dose Informant Patient Reported? Taking? HYDROcodone-acetaminophen ([facility]CO) 7.5-325 mg per tablet   No Yes   Sig: Take 1 Tab by mouth every eight (8) hours as needed for Pain. Max Daily Amount: 3 Tabs. OXYGEN-AIR DELIVERY SYSTEMS   Yes Yes   Sig: by Does Not Apply route. Saccharomyces boulardii (FLORASTOR) 250 mg capsule   No Yes   Sig: Take 1 Cap by mouth two (2) times a day. albuterol (PROVENTIL VENTOLIN) 2.5 mg /3 mL (0.083 %) nebulizer solution   No Yes   Sig: 3 mL by Nebulization route every four (4) hours as needed for Wheezing. allopurinol (ZYLOPRIM) 100 mg tablet   No Yes   Sig: Take 1 Tab by mouth two (2) times a day. aspirin 81 mg CpDR   Yes Yes   Sig: Take  by mouth daily. clopidogrel (PLAVIX) 75 mg tab   No Yes   Sig: Take 1 Tab by mouth daily. clotrimazole (LOTRIMIN AF, CLOTRIMAZOLE,) 1 % topical cream   No Yes   Sig: Apply  to affected area two (2) times a day. colchicine 0.6 mg tablet   No Yes   Sig: Take 0.5 Tabs by mouth daily. cyanocobalamin 1,000 mcg tablet   No Yes   Sig: Take 1 Tab by mouth daily. gabapentin (NEURONTIN) 100 mg capsule   No Yes   Sig: Take 2 Caps by mouth nightly. Indications: NEUROPATHIC PAIN   loratadine (CLARITIN) 10 mg tablet   No Yes   Sig: Take 1 Tab by mouth daily. potassium chloride (KLOR-CON M10) 10 mEq tablet   No Yes   Sig: Take 1 Tab by mouth daily. pravastatin (PRAVACHOL) 20 mg tablet   No Yes   Sig: Take 1 Tab by mouth nightly. raNITIdine (ZANTAC) 150 mg tablet   No Yes   Sig: Take 1 Tab by mouth two (2) times a day. sennosides (SENNA) 8.6 mg cap   Yes Yes   Sig: Take  by mouth.       Facility-Administered Medications: None         Adult Exam   General: alert, mild respiratory distress   Head: normocephalic, atraumatic  ENT: moist mucous membranes  Neck: supple, non-tender; full range of motion  Cardiovascular: regular rate and rhythm, normal peripheral perfusion, no edema  Respiratory: severely diminished air movement bilaterally. Very minimal wheezes noted throughout. Likely secondary to decreased air movements  Positive retractions of the abdomen and chest wall  Gastrointestinal: soft, non-tender; no rebound or guarding, no peritoneal signs, no distension  Back: non-tender, full range of motion  Musculoskeletal: normal range of motion, normal strength, no gross deformities  Neurological: alert and oriented x 4, no gross focal deficits; normal speech  Psychiatric: cooperative; appropriate mood and affect    MDM:EKG obtained interpreted by me rate of 98 sinus rhythm normal axis normal interval.  No STEMI noted. Nonspecific ST and T-wave changes. No ectopy or Brug[place]  Very minimal air movements. Worsening shortness of breath again. We'll obtain chest x-ray, lab work. EKG without signs of acute STEMI. Concern about respiratory distress. We'll place on our continuous Albuterol, ABG, chest x-ray, lab for reassessment. Due to minimal air movement patient may need to be readmitted to the hospital for further management    Xr Chest Pa Lat    Result Date: 2/8/2018  Chest X-ray INDICATION:  Shortness of breath, hypertension PA and lateral views of the chest were obtained. FINDINGS: There is a small left pleural effusion. There is no definite acute infiltrate in either lung. There is stable cardiomegaly. There is an old compression fracture in the mid thoracic spine. IMPRESSION: Small left pleural effusion     Xr Chest Pa Lat    Result Date: 1/15/2018  EXAM:  XR CHEST PA LAT INDICATION:   worsening dyspnea and cough COMPARISON: 1/14/2018. FINDINGS: PA and lateral radiographs of the chest demonstrate clear lungs. The cardiac and mediastinal contours and pulmonary vascularity are normal.  The bones and soft tissues are within normal limits. PICC line has its tip in the SVC. IMPRESSION: Normal chest.     Xr Chest Pa Lat    Result Date: 1/14/2018  TWO-VIEW CHEST: CLINICAL HISTORY: Cough for 2-3 weeks with shortness of breath on exertion for 2 days. COMPARISON:  December 28, 2017. FINDINGS: PA and lateral chest images demonstrate no confluent pneumonic infiltrate. Small left pleural effusion and mild adjacent basilar atelectasis/infiltrate are new. Right lung remains clear. The heart is mildly enlarged without evidence of congestive heart failure or pneumothorax. The bony thorax appears intact on these views. There are overlying radiopaque support devices. IMPRESSION:  MILD CARDIOMEGALY WITH NEW SMALL LEFT PLEURAL EFFUSION AND ADJACENT BASILAR ATELECTASIS/INFILTRATE. Xr Chest Port    Result Date: 2/13/2018  Portable Chest  X-ray  2/13/2018 at 1014 hours Indication: Shortness of breath Comparison:  2/8/2018 Findings:  Portable AP view demonstrates borderline cardiomegaly. Mild prominence of interstitial lung markings. Focally prominent markings present at the right lung base, in part related to patient rotation. Question small right pleural effusion. Impression:  Bibasilar most suggestive of mild CHF. Cannot totally exclude early infiltrate at the right lung base. Xr Chest Port    Result Date: 2/6/2018  CHEST X-RAY, single portable view  2/6/2018 History: Chest pain. Technique: Single frontal view of the chest. Comparison: Chest x-ray 1/15/2018 Findings: The cardiac silhouette is mildly enlarged although decreased in size from the prior study. The lungs are expanded without evidence for pneumothorax. No consolidative airspace process or pleural effusion is seen. IMPRESSION: 1. No evolving acute changes. Only improving cardiomegaly is seen.      Recent Results (from the past 12 hour(s))   CBC WITH AUTOMATED DIFF    Collection Time: 02/13/18  9:55 AM   Result Value Ref Range    WBC 6.9 4.3 - 11.1 K/uL    RBC 2.84 (L) 4.05 - 5.25 M/uL    HGB 9.4 (L) 11.7 - 15.4 g/dL    HCT 30.3 (L) 35.8 - 46.3 %    .7 (H) 79.6 - 97.8 FL    MCH 33.1 (H) 26.1 - 32.9 PG    MCHC 31.0 (L) 31.4 - 35.0 g/dL    RDW 15.6 (H) 11.9 - 14.6 %    PLATELET 661 540 - 423 K/uL    MPV 11.9 10.8 - 14.1 FL    DF AUTOMATED      NEUTROPHILS 77 43 - 78 %    LYMPHOCYTES 15 13 - 44 %    MONOCYTES 6 4.0 - 12.0 %    EOSINOPHILS 2 0.5 - 7.8 %    BASOPHILS 0 0.0 - 2.0 %    IMMATURE GRANULOCYTES 0 0.0 - 5.0 %    ABS. NEUTROPHILS 5.3 1.7 - 8.2 K/UL    ABS. LYMPHOCYTES 1.0 0.5 - 4.6 K/UL    ABS. MONOCYTES 0.4 0.1 - 1.3 K/UL    ABS. EOSINOPHILS 0.2 0.0 - 0.8 K/UL    ABS. BASOPHILS 0.0 0.0 - 0.2 K/UL    ABS. IMM. GRANS. 0.0 0.0 - 0.5 K/UL   METABOLIC PANEL, BASIC    Collection Time: 02/13/18  9:55 AM   Result Value Ref Range    Sodium 144 136 - 145 mmol/L    Potassium 4.0 3.5 - 5.1 mmol/L    Chloride 105 98 - 107 mmol/L    CO2 31 21 - 32 mmol/L    Anion gap 8 7 - 16 mmol/L    Glucose 112 (H) 65 - 100 mg/dL    BUN 16 8 - 23 MG/DL    Creatinine 0.94 0.6 - 1.0 MG/DL    GFR est AA >60 >60 ml/min/1.73m2    GFR est non-AA 59 (L) >60 ml/min/1.73m2    Calcium 9.4 8.3 - 10.4 MG/DL   BNP    Collection Time: 02/13/18  9:55 AM   Result Value Ref Range     pg/mL   POC TROPONIN-I    Collection Time: 02/13/18  9:58 AM   Result Value Ref Range    Troponin-I (POC) 0.02 0.02 - 0.05 ng/ml   BLOOD GAS, ARTERIAL    Collection Time: 02/13/18 10:33 AM   Result Value Ref Range    pH 7.37 7.35 - 7.45      PCO2 50 (H) 35 - 45 mmHg    PO2 85 80 - 105 mmHg    BICARBONATE 28 (H) 22 - 26 mmol/L    BASE EXCESS 2.6 0 - 3 mmol/L    TOTAL HEMOGLOBIN 10.2 (L) 11.7 - 15.0 GM/DL    O2 SAT 97 92 - 98.5 %    Arterial O2 Hgb 95.9 94 - 97 %    CARBOXYHEMOGLOBIN 0.3 (L) 0.5 - 1.5 %    METHEMOGLOBIN 0.3 0.0 - 1.5 %    DEOXYHEMOGLOBIN 4 0.0 - 5.0 %    SITE LR     ALLENS TEST POSITIVE      MODE NC     O2 FLOW 4.00 L/min    Respiratory comment: dr. Mariam Schrader at 2 13 2018 10 37 18 AM. Read back.       After continuous albuterol patient M movement improved slightly however it still seemed very restrictive. Chest x-ray showing possible right lower lobe infiltrate with bilateral pleural effusion. Recent hospitalization now worsening shortness of breath, diminished breath sound, difficulty breathing and recommended admission. We'll obtain blood culture, lactic acid and placed on Levaquin after discussion with Hospitalist for treatment of possible hospital-acquired pneumonia      Dragon voice recognition software was used to create this note. Although the note has been reviewed and corrected where necessary, additional errors may have been overlooked and remain in the text.

## 2018-02-13 NOTE — PROGRESS NOTES
Pharmacokinetic Consult to Pharmacist    Hari Marrero is a 80 y.o. female being treated for HAP with zosyn and vancomcyin. Height: 4' 11\" (149.9 cm)  Weight: 68.9 kg (152 lb)  Lab Results   Component Value Date/Time    BUN 16 02/13/2018 09:55 AM    Creatinine 0.94 02/13/2018 09:55 AM    WBC 6.9 02/13/2018 09:55 AM    Procalcitonin <0.1 02/13/2018 09:55 AM    Lactic acid 1.5 12/24/2017 09:58 AM    Lactic acid 2.0 02/08/2015 10:56 AM    Lactic Acid (POC) 3.7 (H) 02/13/2018 04:23 PM      Estimated Creatinine Clearance: 35 mL/min (based on Cr of 0.94). CULTURES:  2/13 BC x 2 : pending        Day 1 of vancomycin. Goal trough is 15-20. We will initiate therapy with vancomycin 1500mg x 1 followed by 1g q12h. We will continue to follow the patient and adjust the dose as necessary per guidelines.   Thank you,  Nam Johns, PharmD

## 2018-02-13 NOTE — ED NOTES
Pt resting on stretcher with family at bedside. RR even and no labored. Pt on 4L 02 NC in NAD Pt on cardiac and o2 monitors .  Pt given lunch meal tray Pt verbal with family at bedside CBrn

## 2018-02-14 ENCOUNTER — APPOINTMENT (OUTPATIENT)
Dept: GENERAL RADIOLOGY | Age: 83
DRG: 193 | End: 2018-02-14
Attending: INTERNAL MEDICINE
Payer: COMMERCIAL

## 2018-02-14 LAB
ANION GAP SERPL CALC-SCNC: 8 MMOL/L (ref 7–16)
BACTERIA SPEC CULT: NORMAL
BACTERIA SPEC CULT: NORMAL
BASOPHILS # BLD: 0 K/UL (ref 0–0.2)
BASOPHILS NFR BLD: 0 % (ref 0–2)
BUN SERPL-MCNC: 23 MG/DL (ref 8–23)
CALCIUM SERPL-MCNC: 9.2 MG/DL (ref 8.3–10.4)
CHLORIDE SERPL-SCNC: 108 MMOL/L (ref 98–107)
CO2 SERPL-SCNC: 29 MMOL/L (ref 21–32)
CREAT SERPL-MCNC: 1.17 MG/DL (ref 0.6–1)
DIFFERENTIAL METHOD BLD: ABNORMAL
EOSINOPHIL # BLD: 0 K/UL (ref 0–0.8)
EOSINOPHIL NFR BLD: 0 % (ref 0.5–7.8)
ERYTHROCYTE [DISTWIDTH] IN BLOOD BY AUTOMATED COUNT: 15.4 % (ref 11.9–14.6)
GLUCOSE SERPL-MCNC: 132 MG/DL (ref 65–100)
HCT VFR BLD AUTO: 26.7 % (ref 35.8–46.3)
HGB BLD-MCNC: 8.3 G/DL (ref 11.7–15.4)
IMM GRANULOCYTES # BLD: 0 K/UL (ref 0–0.5)
IMM GRANULOCYTES NFR BLD AUTO: 1 % (ref 0–5)
LACTATE SERPL-SCNC: 1.1 MMOL/L (ref 0.4–2)
LYMPHOCYTES # BLD: 0.5 K/UL (ref 0.5–4.6)
LYMPHOCYTES NFR BLD: 5 % (ref 13–44)
MCH RBC QN AUTO: 32.3 PG (ref 26.1–32.9)
MCHC RBC AUTO-ENTMCNC: 31.1 G/DL (ref 31.4–35)
MCV RBC AUTO: 103.9 FL (ref 79.6–97.8)
MONOCYTES # BLD: 0.3 K/UL (ref 0.1–1.3)
MONOCYTES NFR BLD: 3 % (ref 4–12)
NEUTS SEG # BLD: 8.1 K/UL (ref 1.7–8.2)
NEUTS SEG NFR BLD: 91 % (ref 43–78)
PLATELET # BLD AUTO: 180 K/UL (ref 150–450)
PMV BLD AUTO: 11.9 FL (ref 10.8–14.1)
POTASSIUM SERPL-SCNC: 3.9 MMOL/L (ref 3.5–5.1)
RBC # BLD AUTO: 2.57 M/UL (ref 4.05–5.25)
SERVICE CMNT-IMP: NORMAL
SERVICE CMNT-IMP: NORMAL
SODIUM SERPL-SCNC: 145 MMOL/L (ref 136–145)
WBC # BLD AUTO: 8.9 K/UL (ref 4.3–11.1)

## 2018-02-14 PROCEDURE — 74011000250 HC RX REV CODE- 250: Performed by: INTERNAL MEDICINE

## 2018-02-14 PROCEDURE — 74011250636 HC RX REV CODE- 250/636: Performed by: INTERNAL MEDICINE

## 2018-02-14 PROCEDURE — 77030027138 HC INCENT SPIROMETER -A

## 2018-02-14 PROCEDURE — 83605 ASSAY OF LACTIC ACID: CPT | Performed by: INTERNAL MEDICINE

## 2018-02-14 PROCEDURE — 74011250637 HC RX REV CODE- 250/637: Performed by: INTERNAL MEDICINE

## 2018-02-14 PROCEDURE — G8978 MOBILITY CURRENT STATUS: HCPCS

## 2018-02-14 PROCEDURE — 65270000029 HC RM PRIVATE

## 2018-02-14 PROCEDURE — 99218 HC RM OBSERVATION: CPT

## 2018-02-14 PROCEDURE — 85025 COMPLETE CBC W/AUTO DIFF WBC: CPT | Performed by: INTERNAL MEDICINE

## 2018-02-14 PROCEDURE — 74011250636 HC RX REV CODE- 250/636: Performed by: EMERGENCY MEDICINE

## 2018-02-14 PROCEDURE — 36415 COLL VENOUS BLD VENIPUNCTURE: CPT | Performed by: INTERNAL MEDICINE

## 2018-02-14 PROCEDURE — 74011000258 HC RX REV CODE- 258: Performed by: EMERGENCY MEDICINE

## 2018-02-14 PROCEDURE — G8979 MOBILITY GOAL STATUS: HCPCS

## 2018-02-14 PROCEDURE — 77030021668 HC NEB PREFIL KT VYRM -A

## 2018-02-14 PROCEDURE — 94640 AIRWAY INHALATION TREATMENT: CPT

## 2018-02-14 PROCEDURE — 97161 PT EVAL LOW COMPLEX 20 MIN: CPT

## 2018-02-14 PROCEDURE — 77010033678 HC OXYGEN DAILY

## 2018-02-14 PROCEDURE — 80048 BASIC METABOLIC PNL TOTAL CA: CPT | Performed by: INTERNAL MEDICINE

## 2018-02-14 PROCEDURE — 94760 N-INVAS EAR/PLS OXIMETRY 1: CPT

## 2018-02-14 PROCEDURE — 71045 X-RAY EXAM CHEST 1 VIEW: CPT

## 2018-02-14 RX ORDER — IPRATROPIUM BROMIDE AND ALBUTEROL SULFATE 2.5; .5 MG/3ML; MG/3ML
3 SOLUTION RESPIRATORY (INHALATION)
Status: DISCONTINUED | OUTPATIENT
Start: 2018-02-15 | End: 2018-02-16

## 2018-02-14 RX ORDER — ALBUTEROL SULFATE 0.83 MG/ML
2.5 SOLUTION RESPIRATORY (INHALATION)
Status: COMPLETED | OUTPATIENT
Start: 2018-02-14 | End: 2018-02-14

## 2018-02-14 RX ORDER — FUROSEMIDE 10 MG/ML
40 INJECTION INTRAMUSCULAR; INTRAVENOUS ONCE
Status: COMPLETED | OUTPATIENT
Start: 2018-02-14 | End: 2018-02-14

## 2018-02-14 RX ADMIN — Medication 10 ML: at 05:50

## 2018-02-14 RX ADMIN — HEPARIN SODIUM 5000 UNITS: 5000 INJECTION, SOLUTION INTRAVENOUS; SUBCUTANEOUS at 05:49

## 2018-02-14 RX ADMIN — FUROSEMIDE 20 MG: 20 TABLET ORAL at 09:38

## 2018-02-14 RX ADMIN — ALLOPURINOL 100 MG: 100 TABLET ORAL at 09:39

## 2018-02-14 RX ADMIN — IPRATROPIUM BROMIDE AND ALBUTEROL SULFATE 3 ML: 2.5; .5 SOLUTION RESPIRATORY (INHALATION) at 07:50

## 2018-02-14 RX ADMIN — IPRATROPIUM BROMIDE AND ALBUTEROL SULFATE 3 ML: 2.5; .5 SOLUTION RESPIRATORY (INHALATION) at 03:01

## 2018-02-14 RX ADMIN — PIPERACILLIN SODIUM,TAZOBACTAM SODIUM 3.38 G: 3; .375 INJECTION, POWDER, FOR SOLUTION INTRAVENOUS at 17:55

## 2018-02-14 RX ADMIN — IPRATROPIUM BROMIDE AND ALBUTEROL SULFATE 3 ML: 2.5; .5 SOLUTION RESPIRATORY (INHALATION) at 21:02

## 2018-02-14 RX ADMIN — RDII 250 MG CAPSULE 250 MG: at 17:55

## 2018-02-14 RX ADMIN — Medication 10 ML: at 22:00

## 2018-02-14 RX ADMIN — CLOPIDOGREL BISULFATE 75 MG: 75 TABLET ORAL at 09:39

## 2018-02-14 RX ADMIN — RDII 250 MG CAPSULE 250 MG: at 09:39

## 2018-02-14 RX ADMIN — HEPARIN SODIUM 5000 UNITS: 5000 INJECTION, SOLUTION INTRAVENOUS; SUBCUTANEOUS at 17:55

## 2018-02-14 RX ADMIN — ASPIRIN 81 MG: 81 TABLET, COATED ORAL at 09:40

## 2018-02-14 RX ADMIN — ALBUTEROL SULFATE 2.5 MG: 2.5 SOLUTION RESPIRATORY (INHALATION) at 22:34

## 2018-02-14 RX ADMIN — VANCOMYCIN HYDROCHLORIDE 1000 MG: 1 INJECTION, POWDER, LYOPHILIZED, FOR SOLUTION INTRAVENOUS at 05:48

## 2018-02-14 RX ADMIN — COLCHICINE 0.3 MG: 0.6 TABLET, FILM COATED ORAL at 09:39

## 2018-02-14 RX ADMIN — ACETAMINOPHEN 650 MG: 325 TABLET ORAL at 06:08

## 2018-02-14 RX ADMIN — LORATADINE 10 MG: 10 TABLET ORAL at 09:40

## 2018-02-14 RX ADMIN — PIPERACILLIN SODIUM,TAZOBACTAM SODIUM 3.38 G: 3; .375 INJECTION, POWDER, FOR SOLUTION INTRAVENOUS at 09:41

## 2018-02-14 RX ADMIN — ALLOPURINOL 100 MG: 100 TABLET ORAL at 17:55

## 2018-02-14 RX ADMIN — FUROSEMIDE 40 MG: 10 INJECTION, SOLUTION INTRAMUSCULAR; INTRAVENOUS at 23:08

## 2018-02-14 RX ADMIN — IPRATROPIUM BROMIDE AND ALBUTEROL SULFATE 3 ML: 2.5; .5 SOLUTION RESPIRATORY (INHALATION) at 13:26

## 2018-02-14 RX ADMIN — PIPERACILLIN SODIUM,TAZOBACTAM SODIUM 3.38 G: 3; .375 INJECTION, POWDER, FOR SOLUTION INTRAVENOUS at 02:00

## 2018-02-14 NOTE — PROGRESS NOTES
Patient complained of shortness of breath. Patient on 2L nasal cannula and her oxygen saturation is 97%. Incentive spirometer given to patient and instructions explained to patient and her granddaughter. She performed the IS relatively well. Reported to primary nurse, Clare Burrows RN.

## 2018-02-14 NOTE — PROGRESS NOTES
Progress Note    Patient: Esteban Traore MRN: [id number]  SSN: [id number]    YOB: 1927  Age: 80 y.o. Sex: female      Admit Date: 2/13/2018    LOS: 0 days     Subjective:   Patient examined sitting in a chair. Daughter present in the room and all questions answered. She stated feeling well, had no complains. Clinically improving. Lactic acid trended down. Working with PT.    ROS: all pertinent findings described in my note. Objective:     Vitals:    02/13/18 2100 02/13/18 2300 02/14/18 0301 02/14/18 0443   BP:  103/62  139/73   Pulse:  96  98   Resp:  20  18   Temp:  97.8 °F (36.6 °C)  97.7 °F (36.5 °C)   SpO2: 95% 98% 98% 97%   Weight:       Height:            Intake and Output:  Current Shift:    Last three shifts:      Physical Exam:   GENERAL: alert, cooperative, no distress, appears stated age  EYE: negative  LYMPHATIC: Cervical, supraclavicular, and axillary nodes normal.   THROAT & NECK: normal and no erythema or exudates noted. LUNG: no rales, No wheezing, no rhonchi. HEART: regular rate and rhythm, S1, S2 normal, no murmur, click, rub or gallop  ABDOMEN: soft, non-tender. Bowel sounds normal. No masses,  no organomegaly  EXTREMITIES: bilateral mild pedal edema, atraumatic, no cyanosis   SKIN: Normal.  NEUROLOGIC: negative  PSYCHIATRIC: non focal    Lab/Data Review: All lab results for the last 24 hours reviewed. Assessment:     Active Problems:    Essential hypertension, benign (3/17/2015)      Mixed hyperlipidemia ()      Diastolic CHF, chronic (HCC) (5/17/2016)      Gout (10/6/2016)      CKD (chronic kidney disease) stage 3, GFR 30-59 ml/min (12/23/2017)      Pneumonia (2/13/2018)      Pleural effusion (2/13/2018)      Hypercapnia (2/13/2018)        Plan: 1. Acute hypoxic, hypercapnic respiratory failure likely due to Healthcare associated pneumonia: improving- lactic acid normal now- continue vanc/zosyn- will change to levaquin tomorrow- monitor cbc daily - continue paul    2. 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 History of home oxygen: monitor    4. Gout: continue allopurinol, colchicine      -IP PT/OT  -Care manager, may need STR upon DC since this is her 3rd hospitalization in 2 months period    -DVT ppx: heparin, GCS     Full code       Signed By: Jacey Ruano MD     February 14, 2018

## 2018-02-14 NOTE — PROGRESS NOTES
Shift assessment complete. Patient alert and oriented x 4. Respirations even, regular, and non-labored. Lung sounds diminished. Bowel sounds active and abdomen soft and intact. Denies pain. Bed low, locked, and call light within reach.

## 2018-02-14 NOTE — PROGRESS NOTES
Am assessment completed. Pt is alert and oriented and family is sitting with patient. Verbalizes needs. Takes meds whole with thin liquids. Denies pain. Continue to monitor.

## 2018-02-14 NOTE — PROGRESS NOTES
Problem: Mobility Impaired (Adult and Pediatric)  Goal: *Acute Goals and Plan of Care (Insert Text)  STG:  (1.)Ms. Neo Garcia will move from supine to sit and sit to supine  with CONTACT GUARD ASSIST within 3 treatment day(s). (2.)Ms. Neo Garcia will transfer from bed to chair and chair to bed with CONTACT GUARD ASSIST using the least restrictive device within 3 treatment day(s). (3.)Ms. Neo Garcia will ambulate with CONTACT GUARD ASSIST for 30 feet with the least restrictive device within 3 treatment day(s). LTG:  (1.)Ms. Neo Garcia will move from supine to sit and sit to supine  in bed with STAND BY ASSIST within 7 treatment day(s). (2.)Ms. Neo Garcia will transfer from bed to chair and chair to bed with STAND BY ASSIST using the least restrictive device within 7 treatment day(s). (3.)Ms. Neo Garcia will ambulate with STAND BY ASSIST for 100 feet with the least restrictive device within 7 treatment day(s). (4)Ms. Neo Garcia will perform HEP with cues and assistance to increase safety on her feet in 7 days. ________________________________________________________________________________________________      PHYSICAL THERAPY: Initial Assessment, Treatment Day: Day of Assessment, AM 2/14/2018  OBSERVATION: Hospital Day: 2  Payor: FIRST [employer] VIP CARE PLUS / [place] [employer] [place] / Product Type: Managed Care Medicare /      NAME/AGE/GENDER: Angela Dumont is a 80 y.o. female   PRIMARY DIAGNOSIS: Pneumonia <principal problem not specified> <principal problem not specified>        ICD-10: Treatment Diagnosis:   · Generalized Muscle Weakness (M62.81)  · Other abnormalities of gait and mobility (R26.89)   Precaution/Allergies:  Bee pollen and Fire ant      ASSESSMENT:     Ms. Neo Garcia presents with general weakness and debility. She sat up, stood with walker, and took some steps with walker to the chair. Pt. With general fatigue and SOB on 2 liters. She did not want to walk further due to fatigue.   Daughter and grand daughter present in room. Will follow. This section established at most recent assessment   PROBLEM LIST (Impairments causing functional limitations):  1. Decreased Strength  2. Decreased ADL/Functional Activities  3. Decreased Transfer Abilities  4. Decreased Ambulation Ability/Technique  5. Decreased Balance  6. Decreased Activity Tolerance  7. Increased Fatigue  8. Increased Shortness of Breath  9. Decreased Flexibility/Joint Mobility  10. Decreased [place] with Home Exercise Program   INTERVENTIONS PLANNED: (Benefits and precautions of physical therapy have been discussed with the patient.)  1. Balance Exercise  2. Bed Mobility  3. Family Education  4. Gait Training  5. Home Exercise Program (HEP)  6. Range of Motion (ROM)  7. Therapeutic Activites  8. Therapeutic Exercise/Strengthening  9. Transfer Training     TREATMENT PLAN: Frequency/Duration: daily for duration of hospital stay  Rehabilitation Potential For Stated Goals: Good     RECOMMENDED REHABILITATION/EQUIPMENT: (at time of discharge pending progress): Due to the probability of continued deficits (see above) this patient will likely need continued skilled physical therapy after discharge. Equipment:    None at this time              HISTORY:   History of Present Injury/Illness (Reason for Referral):  Lukas Hayward is a 80 y.o. female who has a PMH of diastolic HF with preserved EF, home oxygen, HTN, CKD stage III, Gout, dyslipidemia, CAD, NSTEMI who was brought in by ems after she was noted with sob and wheezing by her son at home. Of note, she has had 2 admissions this year ( the first on Dec/17 due to strep bacteremia sent to [facility] ), her last 2 days ago for hypotension and JOE. acei and torsemide where stopped back then. Patient stated her sob started this morning, despite using her daily pumps at home. She had a negative flu test on 2/7/18.  Denies cough, fever, chills, diarrhea, chest pain, dizziness, syncope, or any other symptoms. Upon arrival to ER VS stable. While en-route she received albuterol nebulizers, with improvement. Pertinent labs: lactic acid 3,  no leukocytosis, no electrolyte imbalances, normal kidney function. CXR showed RLL-R middle lobe infiltrate, bilateral pleural effusions-mild. She received levaquin x 1. Past Medical History/Comorbidities:   Ms. Jacob Hall  has a past medical history of Acute kidney failure, unspecified; Arthritis; CAD (coronary artery disease); Cellulitis and abscess of other specified site; Coronary atherosclerosis of native coronary artery; Essential hypertension, benign (3/17/2015); Hypertension; Hypopotassemia; Mixed hyperlipidemia; Osteoarthritis (7/20/2017); Other and unspecified hyperlipidemia; Reflux esophagitis; Renal failure, unspecified; Shortness of breath; and Unspecified essential hypertension. Ms. Jacob Hall  has a past surgical history that includes hx cholecystectomy; pr layr clos wnd face,facial <2.5cm; pr cardiac surg procedure unlist; hx cataract removal (Bilateral); and hx hysterectomy. Social History/Living Environment:   Home Environment: Private residence  Wheelchair Ramp: Yes  One/Two Story Residence: One story  Living Alone: No  Support Systems: Spouse/Significant Other/Partner  Patient Expects to be Discharged to[de-identified] Private residence  Current DME Used/Available at Home: Cane, straight  Prior Level of Function/Work/Activity:  Using rolling walker for gait short distances, some assist with adl's   Number of Personal Factors/Comorbidities that affect the Plan of Care: 3+: HIGH COMPLEXITY   EXAMINATION:   Most Recent Physical Functioning:   Gross Assessment:  AROM: Generally decreased, functional (LE's, shoulders very limited)  Strength: Generally decreased, functional (LE's, shoulders very limited)  Sensation: Intact               Posture:  Posture (WDL): Exceptions to WDL  Posture Assessment: Rounded shoulders  Balance:  Sitting: Intact; High guard  Standing: With support;Pull to stand Bed Mobility:  Supine to Sit: Additional time;Minimum assistance  Wheelchair Mobility:     Transfers:  Sit to Stand: Additional time;Minimum assistance  Stand to Sit: Minimum assistance; Additional time  Bed to Chair: Additional time;Minimum assistance  Gait:     Speed/Mary: Delayed  Step Length: Left shortened;Right shortened  Gait Abnormalities: Decreased step clearance  Distance (ft): 4 Feet (ft)  Assistive Device: Walker, rolling  Ambulation - Level of Assistance: Minimal assistance  Interventions: Verbal cues; Tactile cues; Safety awareness training;Manual cues      Body Structures Involved:  1. Bones  2. Joints  3. Muscles  4. Ligaments Body Functions Affected:  1. Movement Related Activities and Participation Affected:  1. Mobility   Number of elements that affect the Plan of Care: 3: MODERATE COMPLEXITY   CLINICAL PRESENTATION:   Presentation: Stable and uncomplicated: LOW COMPLEXITY   CLINICAL DECISION MAKIN[address] [place] [id number] AM-PAC 6 Clicks   Basic Mobility Inpatient Short Form  How much difficulty does the patient currently have. .. Unable A Lot A Little None   1. Turning over in bed (including adjusting bedclothes, sheets and blankets)? [] 1   [] 2   [x] 3   [] 4   2. Sitting down on and standing up from a chair with arms ( e.g., wheelchair, bedside commode, etc.)   [] 1   [] 2   [x] 3   [] 4   3. Moving from lying on back to sitting on the side of the bed? [] 1   [] 2   [x] 3   [] 4   How much help from another person does the patient currently need. .. Total A Lot A Little None   4. Moving to and from a bed to a chair (including a wheelchair)? [] 1   [] 2   [x] 3   [] 4   5. Need to walk in hospital room? [] 1   [] 2   [x] 3   [] 4   6. Climbing 3-5 steps with a railing? [] 1   [x] 2   [] 3   [] 4   © , Trustees of [address] [place], [place] [address], under license to [employer].  All rights reserved      Score:  Initial: 17 Most Recent: X (Date: -- )    Interpretation of Tool:  Represents activities that are increasingly more difficult (i.e. Bed mobility, Transfers, Gait). Score 24 23 22-20 19-15 14-10 9-7 6     Modifier CH CI CJ CK CL CM CN      ? Mobility - Walking and Moving Around:     - CURRENT STATUS: CK - 40%-59% impaired, limited or restricted    - GOAL STATUS: CJ - 20%-39% impaired, limited or restricted    - D/C STATUS:  ---------------To be determined---------------  Payor: FIRST [employer] VIP CARE PLUS / [place] [employer] VIP CARE PLUS / Product Type: Managed Care Medicare /      Medical Necessity:     · Patient is expected to demonstrate progress in strength, range of motion and balance to decrease assistance required with theraputic exercises and functional mobility. Reason for Services/Other Comments:  · Patient continues to require present interventions due to patient's inability to perform theraputic exercises and functional mobility independently. Use of outcome tool(s) and clinical judgement create a POC that gives a: Clear prediction of patient's progress: LOW COMPLEXITY            TREATMENT:   (In addition to Assessment/Re-Assessment sessions the following treatments were rendered)   Pre-treatment Symptoms/Complaints:  Fatigue, sob  Pain: Initial:      Post Session:  0     Assessment/Reassessment only, no treatment provided today    Braces/Orthotics/Lines/Etc:   · IV  · O2 Device: Nasal cannula  Treatment/Session Assessment:    · Response to Treatment:  Pt. Did ok, debility  · Interdisciplinary Collaboration:   o Occupational Therapist  o Registered Nurse  · After treatment position/precautions:   o Up in chair  o Bed alarm/tab alert on  o Bed/Chair-wheels locked  o Bed in low position  o Caregiver at bedside  o Call light within reach  o Family at bedside   · Compliance with Program/Exercises: Will assess as treatment progresses. · Recommendations/Intent for next treatment session:   \"Next visit will focus on reduction in assistance provided\".   Total Treatment Duration:  PT Patient Time In/Time Out  Time In: 1015  Time Out: Dean Mohan 61, PT

## 2018-02-14 NOTE — PROGRESS NOTES
Discussed pt in rounds- she has had multiple ER visits and readmit after just a few days at home. Pt is OBV this admit however just had an inpt stay 2/6/18-2/11/18. Pt was adamant about not returning to rehab last admit. It is documented pt lives with her son and has a dtr that is also involved. [facility] facilities are full -will have to check on other facilities in network with pt's insurance. SW will also confirm pt is willing to go to [facility].   Kelli Samaniego

## 2018-02-14 NOTE — PROGRESS NOTES
Pharmacokinetic Consult to Pharmacist    Erin Otto is a 80 y.o. female being treated for sepsis with Vancomycin and Zosyn. Height: 4' 11\" (149.9 cm)  Weight: 68.9 kg (152 lb)  Lab Results   Component Value Date/Time    BUN 23 02/14/2018 06:09 AM    Creatinine 1.17 (H) 02/14/2018 06:09 AM    WBC 8.9 02/14/2018 06:09 AM    Procalcitonin <0.1 02/13/2018 09:55 AM    Lactic acid 1.1 02/14/2018 06:09 AM    Lactic acid 2.0 02/08/2015 10:56 AM    Lactic Acid (POC) 3.7 (H) 02/13/2018 04:23 PM      Estimated Creatinine Clearance: 28.1 mL/min (based on Cr of 1.17). CULTURES:  All Micro Results     Procedure Component Value Units Date/Time    CULTURE, BLOOD [[id number]] Collected:  02/13/18 1201    Order Status:  Completed Specimen:  Blood from Blood Updated:  02/14/18 0733     Special Requests: --        LEFT  Antecubital       Culture result: NO GROWTH AFTER 19 HOURS       CULTURE, BLOOD [[id number]] Collected:  02/13/18 1201    Order Status:  Completed Specimen:  Blood from Blood Updated:  02/14/18 0733     Special Requests: --        LEFT  Antecubital       Culture result: NO GROWTH AFTER 19 HOURS               No results found for: Yonathan Sic    Day 2 of vancomycin. Goal trough is 15 -20. Vancomycin trough ordered for Friday 2/16 at 5 am.  Will continue with 1 gram IV every 24 hours. Will continue to follow patient.       Thank you,  Nicolasa ChangD, BCPS

## 2018-02-15 PROBLEM — G93.41 ACUTE METABOLIC ENCEPHALOPATHY: Status: ACTIVE | Noted: 2018-02-15

## 2018-02-15 LAB
ANION GAP SERPL CALC-SCNC: 11 MMOL/L (ref 7–16)
BASOPHILS # BLD: 0 K/UL (ref 0–0.2)
BASOPHILS NFR BLD: 0 % (ref 0–2)
BUN SERPL-MCNC: 27 MG/DL (ref 8–23)
CALCIUM SERPL-MCNC: 9.1 MG/DL (ref 8.3–10.4)
CHLORIDE SERPL-SCNC: 106 MMOL/L (ref 98–107)
CO2 SERPL-SCNC: 29 MMOL/L (ref 21–32)
CREAT SERPL-MCNC: 1.22 MG/DL (ref 0.6–1)
DIFFERENTIAL METHOD BLD: ABNORMAL
EOSINOPHIL # BLD: 0 K/UL (ref 0–0.8)
EOSINOPHIL NFR BLD: 0 % (ref 0.5–7.8)
ERYTHROCYTE [DISTWIDTH] IN BLOOD BY AUTOMATED COUNT: 15.7 % (ref 11.9–14.6)
GLUCOSE SERPL-MCNC: 106 MG/DL (ref 65–100)
HCT VFR BLD AUTO: 28.2 % (ref 35.8–46.3)
HGB BLD-MCNC: 8.9 G/DL (ref 11.7–15.4)
IMM GRANULOCYTES # BLD: 0 K/UL (ref 0–0.5)
IMM GRANULOCYTES NFR BLD AUTO: 0 % (ref 0–5)
LYMPHOCYTES # BLD: 0.9 K/UL (ref 0.5–4.6)
LYMPHOCYTES NFR BLD: 7 % (ref 13–44)
MAGNESIUM SERPL-MCNC: 1.8 MG/DL (ref 1.8–2.4)
MCH RBC QN AUTO: 32.2 PG (ref 26.1–32.9)
MCHC RBC AUTO-ENTMCNC: 31.6 G/DL (ref 31.4–35)
MCV RBC AUTO: 102.2 FL (ref 79.6–97.8)
MONOCYTES # BLD: 0.9 K/UL (ref 0.1–1.3)
MONOCYTES NFR BLD: 7 % (ref 4–12)
NEUTS SEG # BLD: 11.3 K/UL (ref 1.7–8.2)
NEUTS SEG NFR BLD: 86 % (ref 43–78)
PLATELET # BLD AUTO: 223 K/UL (ref 150–450)
PMV BLD AUTO: 11.8 FL (ref 10.8–14.1)
POTASSIUM SERPL-SCNC: 3.5 MMOL/L (ref 3.5–5.1)
RBC # BLD AUTO: 2.76 M/UL (ref 4.05–5.25)
SODIUM SERPL-SCNC: 146 MMOL/L (ref 136–145)
WBC # BLD AUTO: 13.1 K/UL (ref 4.3–11.1)

## 2018-02-15 PROCEDURE — 74011000250 HC RX REV CODE- 250: Performed by: INTERNAL MEDICINE

## 2018-02-15 PROCEDURE — 85025 COMPLETE CBC W/AUTO DIFF WBC: CPT | Performed by: INTERNAL MEDICINE

## 2018-02-15 PROCEDURE — 74011250636 HC RX REV CODE- 250/636: Performed by: INTERNAL MEDICINE

## 2018-02-15 PROCEDURE — 74011000258 HC RX REV CODE- 258: Performed by: EMERGENCY MEDICINE

## 2018-02-15 PROCEDURE — 74011250637 HC RX REV CODE- 250/637: Performed by: INTERNAL MEDICINE

## 2018-02-15 PROCEDURE — 36415 COLL VENOUS BLD VENIPUNCTURE: CPT | Performed by: INTERNAL MEDICINE

## 2018-02-15 PROCEDURE — 65270000029 HC RM PRIVATE

## 2018-02-15 PROCEDURE — 74011250637 HC RX REV CODE- 250/637

## 2018-02-15 PROCEDURE — 94640 AIRWAY INHALATION TREATMENT: CPT

## 2018-02-15 PROCEDURE — 94760 N-INVAS EAR/PLS OXIMETRY 1: CPT

## 2018-02-15 PROCEDURE — 74011250636 HC RX REV CODE- 250/636: Performed by: EMERGENCY MEDICINE

## 2018-02-15 PROCEDURE — 99218 HC RM OBSERVATION: CPT

## 2018-02-15 PROCEDURE — 80048 BASIC METABOLIC PNL TOTAL CA: CPT | Performed by: INTERNAL MEDICINE

## 2018-02-15 PROCEDURE — 83735 ASSAY OF MAGNESIUM: CPT | Performed by: INTERNAL MEDICINE

## 2018-02-15 RX ORDER — AMLODIPINE BESYLATE 5 MG/1
5 TABLET ORAL DAILY
Status: DISCONTINUED | OUTPATIENT
Start: 2018-02-15 | End: 2018-02-19 | Stop reason: HOSPADM

## 2018-02-15 RX ORDER — FUROSEMIDE 10 MG/ML
40 INJECTION INTRAMUSCULAR; INTRAVENOUS DAILY
Status: DISCONTINUED | OUTPATIENT
Start: 2018-02-15 | End: 2018-02-17

## 2018-02-15 RX ORDER — ALPRAZOLAM 0.5 MG/1
0.5 TABLET ORAL ONCE
Status: COMPLETED | OUTPATIENT
Start: 2018-02-15 | End: 2018-02-15

## 2018-02-15 RX ORDER — HYDRALAZINE HYDROCHLORIDE 20 MG/ML
10 INJECTION INTRAMUSCULAR; INTRAVENOUS
Status: DISCONTINUED | OUTPATIENT
Start: 2018-02-15 | End: 2018-02-19 | Stop reason: HOSPADM

## 2018-02-15 RX ORDER — HALOPERIDOL 5 MG/ML
0.5 INJECTION INTRAMUSCULAR
Status: DISCONTINUED | OUTPATIENT
Start: 2018-02-15 | End: 2018-02-19 | Stop reason: HOSPADM

## 2018-02-15 RX ORDER — ALPRAZOLAM 0.5 MG/1
TABLET ORAL
Status: COMPLETED
Start: 2018-02-15 | End: 2018-02-15

## 2018-02-15 RX ADMIN — FUROSEMIDE 40 MG: 10 INJECTION, SOLUTION INTRAMUSCULAR; INTRAVENOUS at 09:23

## 2018-02-15 RX ADMIN — LORATADINE 10 MG: 10 TABLET ORAL at 09:23

## 2018-02-15 RX ADMIN — ASPIRIN 81 MG: 81 TABLET, COATED ORAL at 09:23

## 2018-02-15 RX ADMIN — AMLODIPINE BESYLATE 5 MG: 5 TABLET ORAL at 09:23

## 2018-02-15 RX ADMIN — IPRATROPIUM BROMIDE AND ALBUTEROL SULFATE 3 ML: 2.5; .5 SOLUTION RESPIRATORY (INHALATION) at 07:42

## 2018-02-15 RX ADMIN — PRAVASTATIN SODIUM 20 MG: 20 TABLET ORAL at 22:52

## 2018-02-15 RX ADMIN — IPRATROPIUM BROMIDE AND ALBUTEROL SULFATE 3 ML: 2.5; .5 SOLUTION RESPIRATORY (INHALATION) at 20:02

## 2018-02-15 RX ADMIN — PIPERACILLIN SODIUM,TAZOBACTAM SODIUM 3.38 G: 3; .375 INJECTION, POWDER, FOR SOLUTION INTRAVENOUS at 17:58

## 2018-02-15 RX ADMIN — HALOPERIDOL LACTATE 0.5 MG: 5 INJECTION, SOLUTION INTRAMUSCULAR at 14:33

## 2018-02-15 RX ADMIN — VANCOMYCIN HYDROCHLORIDE 1000 MG: 1 INJECTION, POWDER, LYOPHILIZED, FOR SOLUTION INTRAVENOUS at 05:54

## 2018-02-15 RX ADMIN — ALLOPURINOL 100 MG: 100 TABLET ORAL at 17:56

## 2018-02-15 RX ADMIN — ALPRAZOLAM 0.5 MG: 0.5 TABLET ORAL at 01:28

## 2018-02-15 RX ADMIN — HEPARIN SODIUM 5000 UNITS: 5000 INJECTION, SOLUTION INTRAVENOUS; SUBCUTANEOUS at 17:57

## 2018-02-15 RX ADMIN — RDII 250 MG CAPSULE 250 MG: at 17:56

## 2018-02-15 RX ADMIN — PIPERACILLIN SODIUM,TAZOBACTAM SODIUM 3.38 G: 3; .375 INJECTION, POWDER, FOR SOLUTION INTRAVENOUS at 01:28

## 2018-02-15 RX ADMIN — Medication 10 ML: at 22:52

## 2018-02-15 RX ADMIN — ACETAMINOPHEN 650 MG: 325 TABLET ORAL at 20:40

## 2018-02-15 RX ADMIN — ALPRAZOLAM: 0.5 TABLET ORAL at 02:00

## 2018-02-15 RX ADMIN — COLCHICINE 0.3 MG: 0.6 TABLET, FILM COATED ORAL at 09:22

## 2018-02-15 RX ADMIN — CLOPIDOGREL BISULFATE 75 MG: 75 TABLET ORAL at 09:23

## 2018-02-15 RX ADMIN — Medication 10 ML: at 06:02

## 2018-02-15 RX ADMIN — PIPERACILLIN SODIUM,TAZOBACTAM SODIUM 3.38 G: 3; .375 INJECTION, POWDER, FOR SOLUTION INTRAVENOUS at 09:29

## 2018-02-15 RX ADMIN — ALLOPURINOL 100 MG: 100 TABLET ORAL at 09:23

## 2018-02-15 RX ADMIN — HEPARIN SODIUM 5000 UNITS: 5000 INJECTION, SOLUTION INTRAVENOUS; SUBCUTANEOUS at 05:55

## 2018-02-15 RX ADMIN — HYDRALAZINE HYDROCHLORIDE 10 MG: 20 INJECTION INTRAMUSCULAR; INTRAVENOUS at 13:25

## 2018-02-15 RX ADMIN — RDII 250 MG CAPSULE 250 MG: at 09:23

## 2018-02-15 NOTE — PROGRESS NOTES
Shift assessment complete. Patient alert to person and place with confusion on time and situation. Respirations even, regular, and non-labored. Lung sounds diminished. Bowel sounds active and abdomen soft and intact. Denies pain. Bed low, locked, and call light within reach.

## 2018-02-15 NOTE — PROGRESS NOTES
Progress Note    Patient: Chantell Villanueva MRN: [id number]  SSN: [id number]    YOB: 1927  Age: 80 y.o. Sex: female      Admit Date: 2/13/2018    LOS: 0 days     Subjective:   Patient examined at bedside. She had episode of sob and wheezing overnight. Lasix iv given. This morning I found her with confusion, she stated \"somebody is trying to kill me\". I reassured her she is in the hospital and I am next to her, she is safe. Haldol ordered. ROS: all pertinent findings described in my note. Objective:     Vitals:    02/14/18 2234 02/14/18 2308 02/15/18 0238 02/15/18 0742   BP:  (!) 196/96 171/85    Pulse:  (!) 126 76    Resp:   30    Temp:   98.8 °F (37.1 °C)    SpO2: 93%  93% 97%   Weight:       Height:            Intake and Output:  Current Shift:    Last three shifts:      Physical Exam:   GENERAL: alert, cooperative, no distress, appears stated age  EYE: negative  LYMPHATIC: Cervical, supraclavicular, and axillary nodes normal.   THROAT & NECK: normal and no erythema or exudates noted. LUNG: no rales, No wheezing, no rhonchi. HEART: regular rate and rhythm, S1, S2 normal, no murmur, click, rub or gallop  ABDOMEN: soft, non-tender. Bowel sounds normal. No masses,  no organomegaly  EXTREMITIES: bilateral mild pedal edema, atraumatic, no cyanosis   SKIN: Normal.  NEUROLOGIC: confused, no focal deficits     Lab/Data Review: All lab results for the last 24 hours reviewed. Assessment:     Active Problems:    Essential hypertension, benign (3/17/2015)      Mixed hyperlipidemia ()      Diastolic CHF, chronic (HCC) (5/17/2016)      Gout (10/6/2016)      CKD (chronic kidney disease) stage 3, GFR 30-59 ml/min (12/23/2017)      Pneumonia (2/13/2018)      Pleural effusion (2/13/2018)      Hypercapnia (2/13/2018)        Plan: 1. Acute hypoxic, hypercapnic respiratory failure likely due to Healthcare associated pneumonia:  continue vanc/zosyn- duonebs- monitor cbc daily -    2. acute diastolic HF with bilateral pleural effusions / moderate to severe Tric valve regurgitation: on lasix iv- continue home meds- strict I/O     3. History of home oxygen: monitor    4. Gout: continue allopurinol, colchicine     5. Episodes of confusion, AMS: possible delirium / HCAP: start haldol iv prn     6. Mild hypernatremia: encouraged to drink water - if sodium increasing will start D5 in am      -IP PT/OT    -Care manager, may need STR upon DC since this is her 3rd hospitalization in 2 months period    -DVT ppx: heparin, GCS     Full code     Signed By: Kenji Brewster MD     February 15, 2018

## 2018-02-15 NOTE — PROGRESS NOTES
MD. David Girard notified that the patient is anxious, and confused. [address] daughter at bedside and wants her to have something for anxiety. New order given.

## 2018-02-15 NOTE — PROGRESS NOTES
Attempted physical therapy twice this am.  Pt. Quite confused and not appropriate for therapy at this time.

## 2018-02-15 NOTE — PROGRESS NOTES
MD Mejia notified of patient pulse rate of 126, RR 30 and BP of 160/96. New order given for Lasix 40mg IV and call Resp for extra treatment.

## 2018-02-15 NOTE — PROGRESS NOTES
Am assessment completed. Pt is alert to self and most of time place. Verbalizes needs. Does have times of confusion where she can get combative. Takes medications whole with thin liquids. Incontinent of bowel and bladder. Lungs diminished. Continue to monitor.

## 2018-02-15 NOTE — ADT AUTH CERT NOTES
Utilization Review           letter of determination of status by Lizabeth Griggs RN        Review Status Review Entered       In Primary 2/15/2018       Details         Letter of Determination:  Outpatient states receiving Observation Services     This case was reviewed, and I concur with Outpatient status receiving Observation services.  This determination may change depending on further medical information, condition changes of the patient, or treatment requirements.       Ed Jonatan ADORNO MBA,   Physician Advisor  [address] [place] [address].                  LOC:Acute Adult-Infection: Pneumonia (2/15/2018) by Lizabeth Griggs RN        Review Status Review Entered       In Primary 2/15/2018       Details         REVIEW SUMMARY     Patient: Diamond Lorenz  Review Number: [id number]  Review Status:  In Primary     Condition Specific: Yes     Condition Level Of Care Code: OBS  Condition Level Of Care Description: Observation        OUTCOMES  Outcome Type: Primary           REVIEW DETAILS     Service Date: 02/15/2018  Admit Date: 02/13/2018  Product: [facility] Adult  Subset: Infection: Pneumonia      (Symptom or finding within 24h)         (Excludes PO medications unless noted)          [X] Select Day, One:              [X] Episode Day 3, One:                  [X] ACUTE, One:                      [X] Partial responder, not clinically stable for discharge and requires continued stay, >= One:                          [X] Pneumonia, Both:                              [X] Finding, >= One:                                  [X] WBC >= 13,000/cu.mm(13x10 exp 9/L)                                  ~--Admin, IQ Admin Admin on 02- 02:40 PM--~                                  Wbc 13.1 na 146 creat 1.22                                   Blood cx 2/13 ngtd                                               [X] Intervention, >= One:                                  [X] Anti-infective (includes PO) ~--Admin, IQ Admin Admin on 02- 02:39 PM--~                                  Iv zosyn 3.375 gm q8h                                   Iv vanco 1000 gm q24h                      Version: Tirso Simms 2017.2  Marga Valeraant  © 2017 [employer] and/or one of its subsidiaries. All Rights Reserved. CPT only © 2016 [employer]. All Rights Reserved.              Additional Notes       98.7 102 162/89 14 92%ra        Dounebs q6h        Po meds        Iv Lasix 40 mg daily 68.9 kg        Heparin 5000 units sq q12h        Iv apresoline 10 mg q6h prn sbp greater than 160 given x1        Daily labs        PT consult       Sds       Episode of sob and wheezing overnite pt confused this morning stated \"someone's trying to kill me. \" Pt reassured Haldol prn        Cont to monitor and follow

## 2018-02-15 NOTE — PHYSICIAN ADVISORY
Letter of Determination:  Outpatient states receiving Observation Services    This case was reviewed, and I concur with Outpatient status receiving Observation services. This determination may change depending on further medical information, condition changes of the patient, or treatment requirements.       Yazan Lancaster MD, GABRIELLA,   Physician [place].

## 2018-02-16 LAB
ANION GAP SERPL CALC-SCNC: 13 MMOL/L (ref 7–16)
ATRIAL RATE: 102 BPM
BASOPHILS # BLD: 0 K/UL (ref 0–0.2)
BASOPHILS NFR BLD: 0 % (ref 0–2)
BUN SERPL-MCNC: 29 MG/DL (ref 8–23)
CALCIUM SERPL-MCNC: 9 MG/DL (ref 8.3–10.4)
CALCULATED P AXIS, ECG09: 71 DEGREES
CALCULATED R AXIS, ECG10: -17 DEGREES
CALCULATED T AXIS, ECG11: 84 DEGREES
CHLORIDE SERPL-SCNC: 106 MMOL/L (ref 98–107)
CO2 SERPL-SCNC: 28 MMOL/L (ref 21–32)
CREAT SERPL-MCNC: 1.24 MG/DL (ref 0.6–1)
DIAGNOSIS, 93000: NORMAL
DIFFERENTIAL METHOD BLD: ABNORMAL
EOSINOPHIL # BLD: 0 K/UL (ref 0–0.8)
EOSINOPHIL NFR BLD: 0 % (ref 0.5–7.8)
ERYTHROCYTE [DISTWIDTH] IN BLOOD BY AUTOMATED COUNT: 15.7 % (ref 11.9–14.6)
GLUCOSE SERPL-MCNC: 108 MG/DL (ref 65–100)
HCT VFR BLD AUTO: 27.7 % (ref 35.8–46.3)
HGB BLD-MCNC: 9.1 G/DL (ref 11.7–15.4)
IMM GRANULOCYTES # BLD: 0 K/UL (ref 0–0.5)
IMM GRANULOCYTES NFR BLD AUTO: 0 % (ref 0–5)
LYMPHOCYTES # BLD: 0.9 K/UL (ref 0.5–4.6)
LYMPHOCYTES NFR BLD: 11 % (ref 13–44)
MAGNESIUM SERPL-MCNC: 1.7 MG/DL (ref 1.8–2.4)
MCH RBC QN AUTO: 33 PG (ref 26.1–32.9)
MCHC RBC AUTO-ENTMCNC: 32.9 G/DL (ref 31.4–35)
MCV RBC AUTO: 100.4 FL (ref 79.6–97.8)
MONOCYTES # BLD: 0.6 K/UL (ref 0.1–1.3)
MONOCYTES NFR BLD: 8 % (ref 4–12)
NEUTS SEG # BLD: 6.8 K/UL (ref 1.7–8.2)
NEUTS SEG NFR BLD: 81 % (ref 43–78)
P-R INTERVAL, ECG05: 134 MS
PLATELET # BLD AUTO: 213 K/UL (ref 150–450)
PMV BLD AUTO: 11.4 FL (ref 10.8–14.1)
POTASSIUM SERPL-SCNC: 2.6 MMOL/L (ref 3.5–5.1)
Q-T INTERVAL, ECG07: 368 MS
QRS DURATION, ECG06: 92 MS
QTC CALCULATION (BEZET), ECG08: 479 MS
RBC # BLD AUTO: 2.76 M/UL (ref 4.05–5.25)
SODIUM SERPL-SCNC: 147 MMOL/L (ref 136–145)
TROPONIN I SERPL-MCNC: 0.17 NG/ML (ref 0.02–0.05)
TROPONIN I SERPL-MCNC: 0.24 NG/ML (ref 0.02–0.05)
VANCOMYCIN TROUGH SERPL-MCNC: 21 UG/ML (ref 5–20)
VENTRICULAR RATE, ECG03: 102 BPM
WBC # BLD AUTO: 8.3 K/UL (ref 4.3–11.1)

## 2018-02-16 PROCEDURE — 74011250637 HC RX REV CODE- 250/637: Performed by: INTERNAL MEDICINE

## 2018-02-16 PROCEDURE — 85025 COMPLETE CBC W/AUTO DIFF WBC: CPT | Performed by: INTERNAL MEDICINE

## 2018-02-16 PROCEDURE — 74011250636 HC RX REV CODE- 250/636: Performed by: INTERNAL MEDICINE

## 2018-02-16 PROCEDURE — 74011000250 HC RX REV CODE- 250: Performed by: INTERNAL MEDICINE

## 2018-02-16 PROCEDURE — 80202 ASSAY OF VANCOMYCIN: CPT | Performed by: INTERNAL MEDICINE

## 2018-02-16 PROCEDURE — 94640 AIRWAY INHALATION TREATMENT: CPT

## 2018-02-16 PROCEDURE — 94760 N-INVAS EAR/PLS OXIMETRY 1: CPT

## 2018-02-16 PROCEDURE — 83735 ASSAY OF MAGNESIUM: CPT | Performed by: INTERNAL MEDICINE

## 2018-02-16 PROCEDURE — 80048 BASIC METABOLIC PNL TOTAL CA: CPT | Performed by: INTERNAL MEDICINE

## 2018-02-16 PROCEDURE — 65270000029 HC RM PRIVATE

## 2018-02-16 PROCEDURE — 93005 ELECTROCARDIOGRAM TRACING: CPT | Performed by: INTERNAL MEDICINE

## 2018-02-16 PROCEDURE — 74011000258 HC RX REV CODE- 258: Performed by: EMERGENCY MEDICINE

## 2018-02-16 PROCEDURE — 36415 COLL VENOUS BLD VENIPUNCTURE: CPT | Performed by: INTERNAL MEDICINE

## 2018-02-16 PROCEDURE — 99218 HC RM OBSERVATION: CPT

## 2018-02-16 PROCEDURE — 74011250636 HC RX REV CODE- 250/636: Performed by: EMERGENCY MEDICINE

## 2018-02-16 PROCEDURE — 84484 ASSAY OF TROPONIN QUANT: CPT | Performed by: INTERNAL MEDICINE

## 2018-02-16 RX ORDER — POTASSIUM CHLORIDE 14.9 MG/ML
20 INJECTION INTRAVENOUS
Status: COMPLETED | OUTPATIENT
Start: 2018-02-16 | End: 2018-02-16

## 2018-02-16 RX ORDER — MAGNESIUM SULFATE HEPTAHYDRATE 40 MG/ML
2 INJECTION, SOLUTION INTRAVENOUS ONCE
Status: COMPLETED | OUTPATIENT
Start: 2018-02-16 | End: 2018-02-16

## 2018-02-16 RX ORDER — IPRATROPIUM BROMIDE AND ALBUTEROL SULFATE 2.5; .5 MG/3ML; MG/3ML
3 SOLUTION RESPIRATORY (INHALATION)
Status: DISCONTINUED | OUTPATIENT
Start: 2018-02-16 | End: 2018-02-19 | Stop reason: HOSPADM

## 2018-02-16 RX ORDER — POTASSIUM CHLORIDE 20 MEQ/1
40 TABLET, EXTENDED RELEASE ORAL
Status: COMPLETED | OUTPATIENT
Start: 2018-02-16 | End: 2018-02-16

## 2018-02-16 RX ORDER — DEXTROSE MONOHYDRATE 50 MG/ML
50 INJECTION, SOLUTION INTRAVENOUS CONTINUOUS
Status: DISCONTINUED | OUTPATIENT
Start: 2018-02-16 | End: 2018-02-16

## 2018-02-16 RX ADMIN — FUROSEMIDE 40 MG: 10 INJECTION, SOLUTION INTRAMUSCULAR; INTRAVENOUS at 08:36

## 2018-02-16 RX ADMIN — POTASSIUM CHLORIDE 20 MEQ: 200 INJECTION, SOLUTION INTRAVENOUS at 11:05

## 2018-02-16 RX ADMIN — PIPERACILLIN SODIUM,TAZOBACTAM SODIUM 3.38 G: 3; .375 INJECTION, POWDER, FOR SOLUTION INTRAVENOUS at 02:24

## 2018-02-16 RX ADMIN — HEPARIN SODIUM 5000 UNITS: 5000 INJECTION, SOLUTION INTRAVENOUS; SUBCUTANEOUS at 17:38

## 2018-02-16 RX ADMIN — MAGNESIUM SULFATE HEPTAHYDRATE 2 G: 40 INJECTION, SOLUTION INTRAVENOUS at 11:13

## 2018-02-16 RX ADMIN — HEPARIN SODIUM 5000 UNITS: 5000 INJECTION, SOLUTION INTRAVENOUS; SUBCUTANEOUS at 06:04

## 2018-02-16 RX ADMIN — COLCHICINE 0.3 MG: 0.6 TABLET, FILM COATED ORAL at 08:36

## 2018-02-16 RX ADMIN — RDII 250 MG CAPSULE 250 MG: at 17:39

## 2018-02-16 RX ADMIN — AMLODIPINE BESYLATE 5 MG: 5 TABLET ORAL at 08:36

## 2018-02-16 RX ADMIN — ALLOPURINOL 100 MG: 100 TABLET ORAL at 17:39

## 2018-02-16 RX ADMIN — ALLOPURINOL 100 MG: 100 TABLET ORAL at 08:35

## 2018-02-16 RX ADMIN — ASPIRIN 81 MG: 81 TABLET, COATED ORAL at 08:36

## 2018-02-16 RX ADMIN — LORATADINE 10 MG: 10 TABLET ORAL at 08:42

## 2018-02-16 RX ADMIN — IPRATROPIUM BROMIDE AND ALBUTEROL SULFATE 3 ML: 2.5; .5 SOLUTION RESPIRATORY (INHALATION) at 07:47

## 2018-02-16 RX ADMIN — PRAVASTATIN SODIUM 20 MG: 20 TABLET ORAL at 22:56

## 2018-02-16 RX ADMIN — ACETAMINOPHEN 650 MG: 325 TABLET ORAL at 22:56

## 2018-02-16 RX ADMIN — VANCOMYCIN HYDROCHLORIDE 1000 MG: 1 INJECTION, POWDER, LYOPHILIZED, FOR SOLUTION INTRAVENOUS at 06:03

## 2018-02-16 RX ADMIN — CLOPIDOGREL BISULFATE 75 MG: 75 TABLET ORAL at 08:35

## 2018-02-16 RX ADMIN — RDII 250 MG CAPSULE 250 MG: at 08:42

## 2018-02-16 RX ADMIN — POTASSIUM CHLORIDE 40 MEQ: 20 TABLET, EXTENDED RELEASE ORAL at 06:51

## 2018-02-16 RX ADMIN — PIPERACILLIN SODIUM,TAZOBACTAM SODIUM 3.38 G: 3; .375 INJECTION, POWDER, FOR SOLUTION INTRAVENOUS at 17:39

## 2018-02-16 RX ADMIN — Medication 10 ML: at 22:58

## 2018-02-16 RX ADMIN — PIPERACILLIN SODIUM,TAZOBACTAM SODIUM 3.38 G: 3; .375 INJECTION, POWDER, FOR SOLUTION INTRAVENOUS at 12:44

## 2018-02-16 RX ADMIN — IPRATROPIUM BROMIDE AND ALBUTEROL SULFATE 3 ML: 2.5; .5 SOLUTION RESPIRATORY (INHALATION) at 19:54

## 2018-02-16 RX ADMIN — POTASSIUM CHLORIDE 20 MEQ: 200 INJECTION, SOLUTION INTRAVENOUS at 08:33

## 2018-02-16 NOTE — PROGRESS NOTES
Pharmacokinetic Consult to Pharmacist    August Ayala is a 80 y.o. female being treated for HAP with zosyn and vancomycin. Height: 4' 11\" (149.9 cm)  Weight: 68.4 kg (150 lb 12.7 oz)  Lab Results   Component Value Date/Time    BUN 29 (H) 02/16/2018 05:37 AM    Creatinine 1.24 (H) 02/16/2018 05:37 AM    WBC 8.3 02/16/2018 05:37 AM    Procalcitonin <0.1 02/13/2018 09:55 AM    Lactic acid 1.1 02/14/2018 06:09 AM    Lactic acid 2.0 02/08/2015 10:56 AM    Lactic Acid (POC) 3.7 (H) 02/13/2018 04:23 PM      Estimated Creatinine Clearance: 26.4 mL/min (based on Cr of 1.24). CULTURES:  2/9 BC x 2 : NG final  2/13 BC x 2 : NGTD    Lab Results   Component Value Date/Time    Vancomycin,trough 21.0 (HH) 02/16/2018 05:37 AM       Day 3 of vancomycin. Goal trough is 15-20. We will decrease the dose to vancomycin 750mg q24h. Will continue to follow patient.       Thank you,  Carmella Riley, PharmD

## 2018-02-16 NOTE — PROGRESS NOTES
Patient's heart rhythm sounding irregular. MD notified. Patient with history of pvc's. No new orders received.

## 2018-02-16 NOTE — PROGRESS NOTES
Progress Note    Patient: Leighton Cai MRN: [id number]  SSN: [id number]    YOB: 1927  Age: 80 y.o. Sex: female      Admit Date: 2/13/2018    LOS: 0 days     Subjective:   Patient examined at bedside. She was found alert, coherent today. Delirium has resolved. Daughter present in the room. Patient had a short-lived episode of left sided chest pain. ekg was unremarkable, troponin 0.24. She remained stable throughout the day. Electrolytes replaced. ROS: all pertinent findings described in my note. Objective:     Vitals:    02/15/18 2236 02/16/18 0341 02/16/18 0611 02/16/18 0703   BP: 129/67 139/79  148/90   Pulse: 100 (!) 103  (!) 102   Resp: 16 18 20   Temp: 97.7 °F (36.5 °C) 98 °F (36.7 °C)  97.9 °F (36.6 °C)   SpO2: 94% 94%  94%   Weight:   68.4 kg (150 lb 12.7 oz)    Height:            Intake and Output:  Current Shift:    Last three shifts: 02/14 1901 - 02/16 0700  In: 370 [P.O.:370]  Out: -     Physical Exam:   GENERAL: alert, cooperative, no distress, appears stated age  EYE: negative  LYMPHATIC: Cervical, supraclavicular, and axillary nodes normal.   THROAT & NECK: normal and no erythema or exudates noted. LUNG: no rales, No wheezing, no rhonchi. HEART: regular rate and rhythm, S1, S2 normal, no murmur, click, rub or gallop  ABDOMEN: soft, non-tender. Bowel sounds normal. No masses,  no organomegaly  EXTREMITIES: bilateral mild pedal edema, atraumatic, no cyanosis   SKIN: Normal.  NEUROLOGIC:  no focal deficits     Lab/Data Review: All lab results for the last 24 hours reviewed.      Assessment:     Active Problems:    Essential hypertension, benign (3/17/2015)      Mixed hyperlipidemia ()      Diastolic CHF, chronic (HCC) (5/17/2016)      Gout (10/6/2016)      CKD (chronic kidney disease) stage 3, GFR 30-59 ml/min (12/23/2017)      Pneumonia (2/13/2018)      Pleural effusion (2/13/2018)      Hypercapnia (2/13/2018)      Acute metabolic encephalopathy (2/15/2018)        Plan: 1. Acute hypoxic, hypercapnic respiratory failure likely due to Healthcare associated pneumonia:  continue vanc/zosyn- duonebs- monitor cbc daily -    2. acute diastolic HF with bilateral pleural effusions / moderate to severe Tric valve regurgitation: on lasix iv- continue home meds- strict I/O     3. History of home oxygen: monitor    4. Gout: continue allopurinol, colchicine     5. Episodes of confusion, AMS: possible delirium / HCAP: resolved- haldol iv prn     6. Mild hypernatremia: encouraged to drink water - if sodium increasing will start D5    7. Hypokalemia, hypomagnesemia: replaced    8. Episode of chest pain: unremarkable EKG, troponin 0.24- check troponin at night- possible non-thrombotic elevation secondary to HCAP and heart failure- monitor      -IP PT/OT    -Care manager involved.  May go home with home PT    -DVT ppx: heparin, GCS     Full code     Signed By: Emily Cook MD     February 16, 2018

## 2018-02-16 NOTE — PROGRESS NOTES
Problem: Interdisciplinary Rounds  Goal: Interdisciplinary Rounds  Interdisciplinary team rounds were held 2/16/2018 with the following team members:Care Management, Nursing, Nutrition, Pharmacy and Physician. Plan of care discussed. See clinical pathway and/or care plan for interventions and desired outcomes.

## 2018-02-16 NOTE — PROGRESS NOTES
Assessment completed and documented. Patient pleasant and family is at bedside. Currently oriented x 4. Lung sounds coarse on left side. Productive cough. Edema in RLE. Currently resting in bed. Will continue to monitor with hourly rounding.

## 2018-02-17 LAB
ANION GAP SERPL CALC-SCNC: 9 MMOL/L (ref 7–16)
BASOPHILS # BLD: 0 K/UL (ref 0–0.2)
BASOPHILS NFR BLD: 0 % (ref 0–2)
BUN SERPL-MCNC: 28 MG/DL (ref 8–23)
CALCIUM SERPL-MCNC: 8.8 MG/DL (ref 8.3–10.4)
CHLORIDE SERPL-SCNC: 107 MMOL/L (ref 98–107)
CO2 SERPL-SCNC: 31 MMOL/L (ref 21–32)
CREAT SERPL-MCNC: 1.21 MG/DL (ref 0.6–1)
DIFFERENTIAL METHOD BLD: ABNORMAL
EOSINOPHIL # BLD: 0 K/UL (ref 0–0.8)
EOSINOPHIL NFR BLD: 0 % (ref 0.5–7.8)
ERYTHROCYTE [DISTWIDTH] IN BLOOD BY AUTOMATED COUNT: 15.9 % (ref 11.9–14.6)
GLUCOSE SERPL-MCNC: 117 MG/DL (ref 65–100)
HCT VFR BLD AUTO: 28.5 % (ref 35.8–46.3)
HGB BLD-MCNC: 9 G/DL (ref 11.7–15.4)
IMM GRANULOCYTES # BLD: 0 K/UL (ref 0–0.5)
IMM GRANULOCYTES NFR BLD AUTO: 0 % (ref 0–5)
LYMPHOCYTES # BLD: 1 K/UL (ref 0.5–4.6)
LYMPHOCYTES NFR BLD: 10 % (ref 13–44)
MAGNESIUM SERPL-MCNC: 2 MG/DL (ref 1.8–2.4)
MCH RBC QN AUTO: 32 PG (ref 26.1–32.9)
MCHC RBC AUTO-ENTMCNC: 31.6 G/DL (ref 31.4–35)
MCV RBC AUTO: 101.4 FL (ref 79.6–97.8)
MONOCYTES # BLD: 0.8 K/UL (ref 0.1–1.3)
MONOCYTES NFR BLD: 8 % (ref 4–12)
NEUTS SEG # BLD: 7.5 K/UL (ref 1.7–8.2)
NEUTS SEG NFR BLD: 82 % (ref 43–78)
PLATELET # BLD AUTO: 217 K/UL (ref 150–450)
PMV BLD AUTO: 11.9 FL (ref 10.8–14.1)
POTASSIUM SERPL-SCNC: 2.8 MMOL/L (ref 3.5–5.1)
RBC # BLD AUTO: 2.81 M/UL (ref 4.05–5.25)
SODIUM SERPL-SCNC: 147 MMOL/L (ref 136–145)
TROPONIN I SERPL-MCNC: 0.14 NG/ML (ref 0.02–0.05)
WBC # BLD AUTO: 9.3 K/UL (ref 4.3–11.1)

## 2018-02-17 PROCEDURE — 84484 ASSAY OF TROPONIN QUANT: CPT | Performed by: INTERNAL MEDICINE

## 2018-02-17 PROCEDURE — 97110 THERAPEUTIC EXERCISES: CPT

## 2018-02-17 PROCEDURE — 74011000258 HC RX REV CODE- 258: Performed by: EMERGENCY MEDICINE

## 2018-02-17 PROCEDURE — 85025 COMPLETE CBC W/AUTO DIFF WBC: CPT | Performed by: INTERNAL MEDICINE

## 2018-02-17 PROCEDURE — 74011250636 HC RX REV CODE- 250/636: Performed by: EMERGENCY MEDICINE

## 2018-02-17 PROCEDURE — 97530 THERAPEUTIC ACTIVITIES: CPT

## 2018-02-17 PROCEDURE — 83735 ASSAY OF MAGNESIUM: CPT | Performed by: INTERNAL MEDICINE

## 2018-02-17 PROCEDURE — 94760 N-INVAS EAR/PLS OXIMETRY 1: CPT

## 2018-02-17 PROCEDURE — 74011250636 HC RX REV CODE- 250/636: Performed by: INTERNAL MEDICINE

## 2018-02-17 PROCEDURE — 74011000250 HC RX REV CODE- 250: Performed by: INTERNAL MEDICINE

## 2018-02-17 PROCEDURE — 94640 AIRWAY INHALATION TREATMENT: CPT

## 2018-02-17 PROCEDURE — 36415 COLL VENOUS BLD VENIPUNCTURE: CPT | Performed by: INTERNAL MEDICINE

## 2018-02-17 PROCEDURE — 74011250637 HC RX REV CODE- 250/637: Performed by: INTERNAL MEDICINE

## 2018-02-17 PROCEDURE — 99218 HC RM OBSERVATION: CPT

## 2018-02-17 PROCEDURE — 65270000029 HC RM PRIVATE

## 2018-02-17 PROCEDURE — 80048 BASIC METABOLIC PNL TOTAL CA: CPT | Performed by: INTERNAL MEDICINE

## 2018-02-17 RX ORDER — FUROSEMIDE 40 MG/1
40 TABLET ORAL DAILY
Status: DISCONTINUED | OUTPATIENT
Start: 2018-02-18 | End: 2018-02-18

## 2018-02-17 RX ORDER — POTASSIUM CHLORIDE 14.9 MG/ML
20 INJECTION INTRAVENOUS
Status: COMPLETED | OUTPATIENT
Start: 2018-02-17 | End: 2018-02-17

## 2018-02-17 RX ADMIN — COLCHICINE 0.3 MG: 0.6 TABLET, FILM COATED ORAL at 08:12

## 2018-02-17 RX ADMIN — IPRATROPIUM BROMIDE AND ALBUTEROL SULFATE 3 ML: 2.5; .5 SOLUTION RESPIRATORY (INHALATION) at 14:18

## 2018-02-17 RX ADMIN — LORATADINE 10 MG: 10 TABLET ORAL at 08:12

## 2018-02-17 RX ADMIN — IPRATROPIUM BROMIDE AND ALBUTEROL SULFATE 3 ML: 2.5; .5 SOLUTION RESPIRATORY (INHALATION) at 21:56

## 2018-02-17 RX ADMIN — AMLODIPINE BESYLATE 5 MG: 5 TABLET ORAL at 08:12

## 2018-02-17 RX ADMIN — IPRATROPIUM BROMIDE AND ALBUTEROL SULFATE 3 ML: 2.5; .5 SOLUTION RESPIRATORY (INHALATION) at 04:45

## 2018-02-17 RX ADMIN — POTASSIUM CHLORIDE 20 MEQ: 200 INJECTION, SOLUTION INTRAVENOUS at 08:19

## 2018-02-17 RX ADMIN — Medication 10 ML: at 21:38

## 2018-02-17 RX ADMIN — PIPERACILLIN SODIUM,TAZOBACTAM SODIUM 3.38 G: 3; .375 INJECTION, POWDER, FOR SOLUTION INTRAVENOUS at 21:38

## 2018-02-17 RX ADMIN — FUROSEMIDE 40 MG: 10 INJECTION, SOLUTION INTRAMUSCULAR; INTRAVENOUS at 08:12

## 2018-02-17 RX ADMIN — RDII 250 MG CAPSULE 250 MG: at 16:42

## 2018-02-17 RX ADMIN — HEPARIN SODIUM 5000 UNITS: 5000 INJECTION, SOLUTION INTRAVENOUS; SUBCUTANEOUS at 06:41

## 2018-02-17 RX ADMIN — RDII 250 MG CAPSULE 250 MG: at 08:12

## 2018-02-17 RX ADMIN — POTASSIUM CHLORIDE 20 MEQ: 200 INJECTION, SOLUTION INTRAVENOUS at 14:12

## 2018-02-17 RX ADMIN — VANCOMYCIN HYDROCHLORIDE 750 MG: 10 INJECTION, POWDER, LYOPHILIZED, FOR SOLUTION INTRAVENOUS at 06:40

## 2018-02-17 RX ADMIN — ASPIRIN 81 MG: 81 TABLET, COATED ORAL at 08:12

## 2018-02-17 RX ADMIN — PIPERACILLIN SODIUM,TAZOBACTAM SODIUM 3.38 G: 3; .375 INJECTION, POWDER, FOR SOLUTION INTRAVENOUS at 10:53

## 2018-02-17 RX ADMIN — PRAVASTATIN SODIUM 20 MG: 20 TABLET ORAL at 21:38

## 2018-02-17 RX ADMIN — CLOPIDOGREL BISULFATE 75 MG: 75 TABLET ORAL at 08:12

## 2018-02-17 RX ADMIN — ALLOPURINOL 100 MG: 100 TABLET ORAL at 16:42

## 2018-02-17 RX ADMIN — PIPERACILLIN SODIUM,TAZOBACTAM SODIUM 3.38 G: 3; .375 INJECTION, POWDER, FOR SOLUTION INTRAVENOUS at 02:44

## 2018-02-17 RX ADMIN — ALLOPURINOL 100 MG: 100 TABLET ORAL at 08:12

## 2018-02-17 RX ADMIN — HEPARIN SODIUM 5000 UNITS: 5000 INJECTION, SOLUTION INTRAVENOUS; SUBCUTANEOUS at 16:48

## 2018-02-17 NOTE — PROGRESS NOTES
Critical troponin of 0.17 called to MD. Troponin trending down. Patient says she was having chest pain earlier but that it is gone now-informed MD of this. He stated to check another troponin with AM labs and if she had any more chest pain to let him know. Will continue to monitor patient.

## 2018-02-17 NOTE — PROGRESS NOTES
Problem: Mobility Impaired (Adult and Pediatric)  Goal: *Acute Goals and Plan of Care (Insert Text)  STG:  (1.)Ms. Georges Hogan will move from supine to sit and sit to supine  with CONTACT GUARD ASSIST within 3 treatment day(s). (2.)Ms. Georges Hogan will transfer from bed to chair and chair to bed with CONTACT GUARD ASSIST using the least restrictive device within 3 treatment day(s). (3.)Ms. Georges Hogan will ambulate with CONTACT GUARD ASSIST for 30 feet with the least restrictive device within 3 treatment day(s). LTG:  (1.)Ms. Georges Hogan will move from supine to sit and sit to supine  in bed with STAND BY ASSIST within 7 treatment day(s). (2.)Ms. Georges Hogan will transfer from bed to chair and chair to bed with STAND BY ASSIST using the least restrictive device within 7 treatment day(s). (3.)Ms. Georges Hogan will ambulate with STAND BY ASSIST for 100 feet with the least restrictive device within 7 treatment day(s). (4)Ms. Georges Hogan will perform HEP with cues and assistance to increase safety on her feet in 7 days. ________________________________________________________________________________________________      PHYSICAL THERAPY: Daily Note, AM 2/17/2018  OBSERVATION: Hospital Day: 5  Payor: [employer]US / [place] [employer] [place] / Product Type: Managed Care Medicare /      NAME/AGE/GENDER: Daniel Walter is a 80 y.o. female   PRIMARY DIAGNOSIS: Pneumonia <principal problem not specified> <principal problem not specified>        ICD-10: Treatment Diagnosis:   · Generalized Muscle Weakness (M62.81)  · Other abnormalities of gait and mobility (R26.89)   Precaution/Allergies:  Bee pollen and Fire ant      ASSESSMENT:     Ms. Georges Hogan presents with general weakness and debility. She sat up, stood with walker, and satyed OOB set up for breakfast. There was a chair alarm in place. This section established at most recent assessment   PROBLEM LIST (Impairments causing functional limitations):  1.  Decreased Strength  2. Decreased ADL/Functional Activities  3. Decreased Transfer Abilities  4. Decreased Ambulation Ability/Technique  5. Decreased Balance  6. Decreased Activity Tolerance  7. Increased Fatigue  8. Increased Shortness of Breath  9. Decreased Flexibility/Joint Mobility  10. Decreased [place] with Home Exercise Program   INTERVENTIONS PLANNED: (Benefits and precautions of physical therapy have been discussed with the patient.)  1. Balance Exercise  2. Bed Mobility  3. Family Education  4. Gait Training  5. Home Exercise Program (HEP)  6. Range of Motion (ROM)  7. Therapeutic Activites  8. Therapeutic Exercise/Strengthening  9. Transfer Training     TREATMENT PLAN: Frequency/Duration: daily for duration of hospital stay  Rehabilitation Potential For Stated Goals: Good     RECOMMENDED REHABILITATION/EQUIPMENT: (at time of discharge pending progress): Due to the probability of continued deficits (see above) this patient will likely need continued skilled physical therapy after discharge. Equipment:    None at this time              HISTORY:   History of Present Injury/Illness (Reason for Referral):  Rosanne Nichole is a 80 y.o. female who has a PMH of diastolic HF with preserved EF, home oxygen, HTN, CKD stage III, Gout, dyslipidemia, CAD, NSTEMI who was brought in by ems after she was noted with sob and wheezing by her son at home. Of note, she has had 2 admissions this year ( the first on Dec/17 due to strep bacteremia sent to [facility] ), her last 2 days ago for hypotension and JOE. acei and torsemide where stopped back then. Patient stated her sob started this morning, despite using her daily pumps at home. She had a negative flu test on 2/7/18. Denies cough, fever, chills, diarrhea, chest pain, dizziness, syncope, or any other symptoms. Upon arrival to ER VS stable. While en-route she received albuterol nebulizers, with improvement.  Pertinent labs: lactic acid 3,  no leukocytosis, no electrolyte imbalances, normal kidney function. CXR showed RLL-R middle lobe infiltrate, bilateral pleural effusions-mild. She received levaquin x 1. Past Medical History/Comorbidities:   Ms. Rose Perez  has a past medical history of Acute kidney failure, unspecified; Arthritis; CAD (coronary artery disease); Cellulitis and abscess of other specified site; Coronary atherosclerosis of native coronary artery; Essential hypertension, benign (3/17/2015); Hypertension; Hypopotassemia; Mixed hyperlipidemia; Osteoarthritis (7/20/2017); Other and unspecified hyperlipidemia; Reflux esophagitis; Renal failure, unspecified; Shortness of breath; and Unspecified essential hypertension. Ms. Rose Perez  has a past surgical history that includes hx cholecystectomy; pr layr clos wnd face,facial <2.5cm; pr cardiac surg procedure unlist; hx cataract removal (Bilateral); and hx hysterectomy. Social History/Living Environment:   Home Environment: Private residence  Wheelchair Ramp: Yes  One/Two Story Residence: One story  Living Alone: No  Support Systems: Spouse/Significant Other/Partner  Patient Expects to be Discharged to[de-identified] Private residence  Current DME Used/Available at Home: Cane, straight  Prior Level of Function/Work/Activity:  Using rolling walker for gait short distances, some assist with adl's   Number of Personal Factors/Comorbidities that affect the Plan of Care: 3+: HIGH COMPLEXITY   EXAMINATION:   Most Recent Physical Functioning:   Gross Assessment:                  Posture:     Balance:  Sitting: Intact; High guard  Standing: Pull to stand; With support Bed Mobility:  Supine to Sit: Minimum assistance  Wheelchair Mobility:     Transfers:  Sit to Stand: Minimum assistance  Stand to Sit: Minimum assistance  Bed to Chair: Minimum assistance  Gait:     Speed/Mary: Shuffled; Slow  Gait Abnormalities: Decreased step clearance  Distance (ft): 6 Feet (ft)  Assistive Device: Walker, rolling  Ambulation - Level of Assistance: Minimal assistance  Interventions: Tactile cues; Verbal cues      Body Structures Involved:  1. Bones  2. Joints  3. Muscles  4. Ligaments Body Functions Affected:  1. Movement Related Activities and Participation Affected:  1. Mobility   Number of elements that affect the Plan of Care: 3: MODERATE COMPLEXITY   CLINICAL PRESENTATION:   Presentation: Stable and uncomplicated: LOW COMPLEXITY   CLINICAL DECISION MAKIN[address] [place] [address] AM-PAC 6 Clicks   Basic Mobility Inpatient Short Form  How much difficulty does the patient currently have. .. Unable A Lot A Little None   1. Turning over in bed (including adjusting bedclothes, sheets and blankets)? [] 1   [] 2   [x] 3   [] 4   2. Sitting down on and standing up from a chair with arms ( e.g., wheelchair, bedside commode, etc.)   [] 1   [] 2   [x] 3   [] 4   3. Moving from lying on back to sitting on the side of the bed? [] 1   [] 2   [x] 3   [] 4   How much help from another person does the patient currently need. .. Total A Lot A Little None   4. Moving to and from a bed to a chair (including a wheelchair)? [] 1   [] 2   [x] 3   [] 4   5. Need to walk in hospital room? [] 1   [] 2   [x] 3   [] 4   6. Climbing 3-5 steps with a railing? [] 1   [x] 2   [] 3   [] 4   © , Trustees of [address] [place] [address], under license to "[employer]. All rights reserved      Score:  Initial: 17 Most Recent: X (Date: -- )    Interpretation of Tool:  Represents activities that are increasingly more difficult (i.e. Bed mobility, Transfers, Gait). Score 24 23 22-20 19-15 14-10 9-7 6     Modifier CH CI CJ CK CL CM CN      ?  Mobility - Walking and Moving Around:     - CURRENT STATUS: CK - 40%-59% impaired, limited or restricted    - GOAL STATUS: CJ - 20%-39% impaired, limited or restricted    - D/C STATUS:  ---------------To be determined---------------  Payor: [employer] VIP C[employer] PLUS / [place] [employer] [place] / Product Type: Managed Care Medicare /      Medical Necessity:     · Patient is expected to demonstrate progress in strength, range of motion and balance to decrease assistance required with theraputic exercises and functional mobility. Reason for Services/Other Comments:  · Patient continues to require present interventions due to patient's inability to perform theraputic exercises and functional mobility independently. Use of outcome tool(s) and clinical judgement create a POC that gives a: Clear prediction of patient's progress: LOW COMPLEXITY            TREATMENT:   (In addition to Assessment/Re-Assessment sessions the following treatments were rendered)   Pre-treatment Symptoms/Complaints:  I just feel weak in my legs. No pain  Pain: Initial:      Post Session:  0     Therapeutic Activity: (    15): Therapeutic activities including Bed transfers, Chair transfers and Ambulation on level ground to improve mobility, strength and balance. Required minimal Tactile cues; Verbal cues to promote motor control of bilateral, upper extremity(s), lower extremity(s). OOB to chair and set up for breakfast.       Therapeutic Exercise: (  10):  Exercises per grid below to improve mobility and strength. Required minimal visual, verbal and manual cues to promote proper body alignment, promote proper body posture and promote proper body mechanics. Progressed resistance, range and repetitions as indicated.      Date:  2-17-18 Date:   Date:     Activity/Exercise Parameters Parameters Parameters   Ankle pumps x20     Long arc quads x20     Sit to stand x3     Hip ABD x20                            Braces/Orthotics/Lines/Etc:   · IV  · O2 Device: Nasal cannula  Treatment/Session Assessment:    · Response to Treatment:  Pt. Did ok, debility  · Interdisciplinary Collaboration:   o Physical Therapist  o Registered Nurse  o Rehabilitation Attendant  · After treatment position/precautions:   o Up in chair  o Bed alarm/tab alert on  o Bed/Chair-wheels locked  o Call light within reach   · Compliance with Program/Exercises: Will assess as treatment progresses. · Recommendations/Intent for next treatment session: \"Next visit will focus on reduction in assistance provided\".   Total Treatment Duration:  PT Patient Time In/Time Out  Time In: 0815  Time Out: 0840    Jina Monzon PT

## 2018-02-17 NOTE — PROGRESS NOTES
Am shift assessment . Pt is A/O x 4 . Lower lobes diminished and respirations even and unlabored. S1 & s2 auscultated , hear rate regular. Abdomen is soft . Pt is voiding without difficulty, sometimes continent and some incontinent episodes   . Iv patent and fusing . Call light in reach .  No c/o pain at this time

## 2018-02-17 NOTE — PROGRESS NOTES
Progress Note    Patient: Kendell Milner MRN: [id number]  SSN: [id number]    YOB: 1927  Age: 80 y.o. Sex: female      Admit Date: 2/13/2018    LOS: 0 days     Subjective:   Patient examined at bedside. She had no complains. No more chest pain episodes. Potassium replaced. Encouraged to drink water. ROS: all pertinent findings described in my note. Objective:     Vitals:    02/16/18 2150 02/17/18 0252 02/17/18 0445 02/17/18 0639   BP: 144/78 146/87     Pulse: 98 98     Resp: 16 24     Temp: 98.1 °F (36.7 °C) 98 °F (36.7 °C)     SpO2: 95% 96% 98%    Weight:    66.9 kg (147 lb 7.8 oz)   Height:            Intake and Output:  Current Shift:    Last three shifts: 02/15 1901 - 02/17 0700  In: 845 [P.O.:845]  Out: -     Physical Exam:   GENERAL: alert, cooperative, no distress, appears stated age  EYE: negative  LYMPHATIC: Cervical, supraclavicular, and axillary nodes normal.   THROAT & NECK: normal and no erythema or exudates noted. LUNG: no rales, No wheezing, no rhonchi. HEART: regular rate and rhythm, S1, S2 normal, no murmur, click, rub or gallop  ABDOMEN: soft, non-tender. Bowel sounds normal. No masses,  no organomegaly  EXTREMITIES: bilateral mild pedal edema, atraumatic, no cyanosis   SKIN: Normal.  NEUROLOGIC:  no focal deficits     Lab/Data Review: All lab results for the last 24 hours reviewed. Assessment:     Active Problems:    Essential hypertension, benign (3/17/2015)      Mixed hyperlipidemia ()      Diastolic CHF, chronic (HCC) (5/17/2016)      Gout (10/6/2016)      CKD (chronic kidney disease) stage 3, GFR 30-59 ml/min (12/23/2017)      Pneumonia (2/13/2018)      Pleural effusion (2/13/2018)      Hypercapnia (2/13/2018)      Acute metabolic encephalopathy (1/54/3371)        Plan: 1. Acute hypoxic, hypercapnic respiratory failure likely due to Healthcare associated pneumonia:  continue vanc/zosyn- duonebs- monitor cbc daily -    2. acute diastolic HF with bilateral pleural effusions / moderate to severe Tric valve regurgitation: change lasix to po- continue home meds- strict I/O     3. History of home oxygen: monitor    4. Gout: continue allopurinol, colchicine     5. Episodes of confusion, AMS: possible delirium / HCAP: resolved- haldol iv prn     6. Mild hypernatremia: encouraged to drink water -     7. Hypokalemia, replaced    8. Episode of chest pain: unremarkable EKG, troponin trended down to 0.14- check troponin at night- possible non-thrombotic elevation secondary to HCAP and heart failure- monitor: resolved      -IP PT/OT    -Care manager involved.  May go home with home PT    -DVT ppx: heparin, GCS     Full code     Signed By: Jacob Camacho MD     February 17, 2018

## 2018-02-17 NOTE — PROGRESS NOTES
Assessment completed and documented. Lung sounds coarse. Edema in RLE. Medicated with tylenol for a headache. Currently resting in bed. Will continue to monitor with hourly rounding.

## 2018-02-18 ENCOUNTER — HOME HEALTH ADMISSION (OUTPATIENT)
Dept: HOME HEALTH SERVICES | Facility: HOME HEALTH | Age: 83
End: 2018-02-18
Payer: COMMERCIAL

## 2018-02-18 PROBLEM — J18.9 HCAP (HEALTHCARE-ASSOCIATED PNEUMONIA): Status: ACTIVE | Noted: 2018-02-18

## 2018-02-18 LAB
ANION GAP SERPL CALC-SCNC: 9 MMOL/L (ref 7–16)
BACTERIA SPEC CULT: NORMAL
BACTERIA SPEC CULT: NORMAL
BASOPHILS # BLD: 0 K/UL (ref 0–0.2)
BASOPHILS NFR BLD: 0 % (ref 0–2)
BUN SERPL-MCNC: 24 MG/DL (ref 8–23)
CALCIUM SERPL-MCNC: 8.8 MG/DL (ref 8.3–10.4)
CHLORIDE SERPL-SCNC: 107 MMOL/L (ref 98–107)
CO2 SERPL-SCNC: 30 MMOL/L (ref 21–32)
CREAT SERPL-MCNC: 1.13 MG/DL (ref 0.6–1)
DIFFERENTIAL METHOD BLD: ABNORMAL
EOSINOPHIL # BLD: 0.1 K/UL (ref 0–0.8)
EOSINOPHIL NFR BLD: 1 % (ref 0.5–7.8)
ERYTHROCYTE [DISTWIDTH] IN BLOOD BY AUTOMATED COUNT: 16.1 % (ref 11.9–14.6)
GLUCOSE SERPL-MCNC: 104 MG/DL (ref 65–100)
HCT VFR BLD AUTO: 28 % (ref 35.8–46.3)
HGB BLD-MCNC: 8.7 G/DL (ref 11.7–15.4)
IMM GRANULOCYTES # BLD: 0 K/UL (ref 0–0.5)
IMM GRANULOCYTES NFR BLD AUTO: 0 % (ref 0–5)
LYMPHOCYTES # BLD: 1 K/UL (ref 0.5–4.6)
LYMPHOCYTES NFR BLD: 11 % (ref 13–44)
MAGNESIUM SERPL-MCNC: 1.9 MG/DL (ref 1.8–2.4)
MCH RBC QN AUTO: 31.5 PG (ref 26.1–32.9)
MCHC RBC AUTO-ENTMCNC: 31.1 G/DL (ref 31.4–35)
MCV RBC AUTO: 101.4 FL (ref 79.6–97.8)
MONOCYTES # BLD: 0.6 K/UL (ref 0.1–1.3)
MONOCYTES NFR BLD: 7 % (ref 4–12)
NEUTS SEG # BLD: 7.5 K/UL (ref 1.7–8.2)
NEUTS SEG NFR BLD: 81 % (ref 43–78)
PLATELET # BLD AUTO: 219 K/UL (ref 150–450)
PMV BLD AUTO: 11.8 FL (ref 10.8–14.1)
POTASSIUM SERPL-SCNC: 2.9 MMOL/L (ref 3.5–5.1)
RBC # BLD AUTO: 2.76 M/UL (ref 4.05–5.25)
SERVICE CMNT-IMP: NORMAL
SERVICE CMNT-IMP: NORMAL
SODIUM SERPL-SCNC: 146 MMOL/L (ref 136–145)
WBC # BLD AUTO: 9.2 K/UL (ref 4.3–11.1)

## 2018-02-18 PROCEDURE — 83735 ASSAY OF MAGNESIUM: CPT | Performed by: INTERNAL MEDICINE

## 2018-02-18 PROCEDURE — 74011250637 HC RX REV CODE- 250/637: Performed by: INTERNAL MEDICINE

## 2018-02-18 PROCEDURE — 74011250636 HC RX REV CODE- 250/636: Performed by: EMERGENCY MEDICINE

## 2018-02-18 PROCEDURE — 74011000250 HC RX REV CODE- 250: Performed by: INTERNAL MEDICINE

## 2018-02-18 PROCEDURE — 85025 COMPLETE CBC W/AUTO DIFF WBC: CPT | Performed by: INTERNAL MEDICINE

## 2018-02-18 PROCEDURE — 36415 COLL VENOUS BLD VENIPUNCTURE: CPT | Performed by: INTERNAL MEDICINE

## 2018-02-18 PROCEDURE — 97110 THERAPEUTIC EXERCISES: CPT

## 2018-02-18 PROCEDURE — 80048 BASIC METABOLIC PNL TOTAL CA: CPT | Performed by: INTERNAL MEDICINE

## 2018-02-18 PROCEDURE — 94640 AIRWAY INHALATION TREATMENT: CPT

## 2018-02-18 PROCEDURE — 74011250636 HC RX REV CODE- 250/636: Performed by: INTERNAL MEDICINE

## 2018-02-18 PROCEDURE — 93005 ELECTROCARDIOGRAM TRACING: CPT | Performed by: INTERNAL MEDICINE

## 2018-02-18 PROCEDURE — 94760 N-INVAS EAR/PLS OXIMETRY 1: CPT

## 2018-02-18 PROCEDURE — 74011000258 HC RX REV CODE- 258: Performed by: EMERGENCY MEDICINE

## 2018-02-18 PROCEDURE — 65270000029 HC RM PRIVATE

## 2018-02-18 RX ORDER — FUROSEMIDE 20 MG/1
20 TABLET ORAL DAILY
Status: DISCONTINUED | OUTPATIENT
Start: 2018-02-19 | End: 2018-02-19 | Stop reason: HOSPADM

## 2018-02-18 RX ORDER — LEVOFLOXACIN 5 MG/ML
750 INJECTION, SOLUTION INTRAVENOUS EVERY 24 HOURS
Status: DISCONTINUED | OUTPATIENT
Start: 2018-02-18 | End: 2018-02-18

## 2018-02-18 RX ORDER — POTASSIUM CHLORIDE 20 MEQ/1
40 TABLET, EXTENDED RELEASE ORAL
Status: COMPLETED | OUTPATIENT
Start: 2018-02-18 | End: 2018-02-18

## 2018-02-18 RX ORDER — POTASSIUM CHLORIDE 14.9 MG/ML
20 INJECTION INTRAVENOUS
Status: COMPLETED | OUTPATIENT
Start: 2018-02-18 | End: 2018-02-18

## 2018-02-18 RX ADMIN — IPRATROPIUM BROMIDE AND ALBUTEROL SULFATE 3 ML: 2.5; .5 SOLUTION RESPIRATORY (INHALATION) at 21:17

## 2018-02-18 RX ADMIN — AMLODIPINE BESYLATE 5 MG: 5 TABLET ORAL at 08:37

## 2018-02-18 RX ADMIN — POTASSIUM CHLORIDE 20 MEQ: 200 INJECTION, SOLUTION INTRAVENOUS at 08:39

## 2018-02-18 RX ADMIN — ASPIRIN 81 MG: 81 TABLET, COATED ORAL at 08:38

## 2018-02-18 RX ADMIN — POTASSIUM CHLORIDE 20 MEQ: 200 INJECTION, SOLUTION INTRAVENOUS at 11:46

## 2018-02-18 RX ADMIN — VANCOMYCIN HYDROCHLORIDE 750 MG: 10 INJECTION, POWDER, LYOPHILIZED, FOR SOLUTION INTRAVENOUS at 05:38

## 2018-02-18 RX ADMIN — PIPERACILLIN SODIUM,TAZOBACTAM SODIUM 3.38 G: 3; .375 INJECTION, POWDER, FOR SOLUTION INTRAVENOUS at 05:36

## 2018-02-18 RX ADMIN — PRAVASTATIN SODIUM 20 MG: 20 TABLET ORAL at 21:54

## 2018-02-18 RX ADMIN — POTASSIUM CHLORIDE 40 MEQ: 20 TABLET, EXTENDED RELEASE ORAL at 08:39

## 2018-02-18 RX ADMIN — CLOPIDOGREL BISULFATE 75 MG: 75 TABLET ORAL at 08:37

## 2018-02-18 RX ADMIN — HEPARIN SODIUM 5000 UNITS: 5000 INJECTION, SOLUTION INTRAVENOUS; SUBCUTANEOUS at 21:54

## 2018-02-18 RX ADMIN — ALLOPURINOL 100 MG: 100 TABLET ORAL at 17:53

## 2018-02-18 RX ADMIN — LEVOFLOXACIN 750 MG: 500 TABLET, FILM COATED ORAL at 16:01

## 2018-02-18 RX ADMIN — RDII 250 MG CAPSULE 250 MG: at 17:52

## 2018-02-18 RX ADMIN — ALLOPURINOL 100 MG: 100 TABLET ORAL at 08:37

## 2018-02-18 RX ADMIN — RDII 250 MG CAPSULE 250 MG: at 08:37

## 2018-02-18 RX ADMIN — COLCHICINE 0.3 MG: 0.6 TABLET, FILM COATED ORAL at 08:37

## 2018-02-18 RX ADMIN — LORATADINE 10 MG: 10 TABLET ORAL at 08:37

## 2018-02-18 RX ADMIN — HEPARIN SODIUM 5000 UNITS: 5000 INJECTION, SOLUTION INTRAVENOUS; SUBCUTANEOUS at 05:46

## 2018-02-18 RX ADMIN — Medication 10 ML: at 21:57

## 2018-02-18 RX ADMIN — IPRATROPIUM BROMIDE AND ALBUTEROL SULFATE 3 ML: 2.5; .5 SOLUTION RESPIRATORY (INHALATION) at 11:38

## 2018-02-18 NOTE — PROGRESS NOTES
[address]  Face to Face Encounter    Patients Name: Lukas Hayward    YOB: 1927    Ordering Physician: Dr. Natalie Ch      Primary Diagnosis: Pneumonia    Date of Face to Face:   2/18/2018                                  Face to Face Encounter findings are related to primary reason for home care:   yes. 1. I certify that the patient needs intermittent care as follows: skilled nursing care:  skilled observation/assessment, patient education  physical therapy: strengthening    2. I certify that this patient is homebound, that is: 1) patient requires the use of a walker device, special transportation, or assistance of another to leave the home; or 2) patient's condition makes leaving the home medically contraindicated; and 3) patient has a normal inability to leave the home and leaving the home requires considerable and taxing effort. Patient may leave the home for infrequent and short duration for medical reasons, and occasional absences for non-medical reasons. Homebound status is due to the following functional limitations: Patient with strength deficits limiting the performance of all ADL's without caregiver assistance or the use of an assistive device. 3. I certify that this patient is under my care and that I, or a nurse practitioner or  [id number], or clinical nurse specialist, or certified nurse midwife, working with me, had a Face-to-Face Encounter that meets the physician Face-to-Face Encounter requirements. The following are the clinical findings from the [address] [place] [address] encounter that support the need for skilled services and is a summary of the encounter: See hospital chart. See attached progess note      Charlene Perez, GISSELLESW  2/18/2018      THE FOLLOWING TO BE COMPLETED BY THE COMMUNITY PHYSICIAN:    I concur with the findings described above from the [facility] encounter that this patient is homebound and in need of a skilled service.     Certifying Physician: _____________________________________      Printed Certifying Physician Name: _____________________________________    Date: _________________        Patient is

## 2018-02-18 NOTE — PROGRESS NOTES
Patient's heart rhythm has increased in irregularity. Informed MD. Troponin is trending down and patient is not having any chest pain. EKG ordered. Respiratory notified. Will continue to monitor patient.

## 2018-02-18 NOTE — PROGRESS NOTES
Am shift assessment . Pt is A/O x 4 . Lower lobes diminished and respirations even and unlabored. S1 & s2 auscultated , heart rate occasionally irregular. Abdomen is soft . Pt is voiding without difficulty, sometimes continent and some incontinent episodes   . Iv patent and fusing . Call light in reach .  No c/o pain at this time

## 2018-02-18 NOTE — PROGRESS NOTES
Pt ambulated with the nurse x 1 and walker to the bathroom this am . Was slightly short of breath on exertion when returning to chair .  Oxygen sat on room air was 94 % and heart rate 105

## 2018-02-18 NOTE — PROGRESS NOTES
Assessment completed and documented. Lung sounds clear. Bowel sounds hypoactive. Redness noted to buttocks-allevyn applied and patient turned on her side. Currently resting in bed. Will continue to monitor with hourly rounding.

## 2018-02-18 NOTE — PROGRESS NOTES
Progress Note    Patient: Leighton Cai MRN: [id number]  SSN: [id number]    YOB: 1927  Age: 80 y.o. Sex: female      Admit Date: 2/13/2018    LOS: 0 days     Subjective:   Patient examined at bedside. She was found alert, in no distress. Hypokalemia replaced. I adjusted her dose of lasix today. ROS: all pertinent findings described in my note. Objective:     Vitals:    02/17/18 2158 02/17/18 2328 02/17/18 2336 02/18/18 0342   BP:  134/77  140/78   Pulse:  98 96 98   Resp:  23  28   Temp:  97.5 °F (36.4 °C)  98 °F (36.7 °C)   SpO2: 97% 96%  95%   Weight:       Height:            Intake and Output:  Current Shift:    Last three shifts: 02/16 1901 - 02/18 0700  In: 1095 [P.O.:795; I.V.:300]  Out: -     Physical Exam:   GENERAL: alert, cooperative, no distress, appears stated age  EYE: negative  LYMPHATIC: Cervical, supraclavicular, and axillary nodes normal.   THROAT & NECK: normal and no erythema or exudates noted. LUNG: no rales, No wheezing, no rhonchi. HEART: regular rate and rhythm, S1, S2 normal, no murmur, click, rub or gallop  ABDOMEN: soft, non-tender. Bowel sounds normal. No masses,  no organomegaly  EXTREMITIES: bilateral mild pedal edema, atraumatic, no cyanosis   SKIN: Normal.  NEUROLOGIC:  no focal deficits     Lab/Data Review: All lab results for the last 24 hours reviewed. Assessment:     Active Problems:    Essential hypertension, benign (3/17/2015)      Mixed hyperlipidemia ()      Diastolic CHF, chronic (HCC) (5/17/2016)      Gout (10/6/2016)      CKD (chronic kidney disease) stage 3, GFR 30-59 ml/min (12/23/2017)      Pneumonia (2/13/2018)      Pleural effusion (2/13/2018)      Hypercapnia (2/13/2018)      Acute metabolic encephalopathy (4/23/6147)        Plan: 1. Acute hypoxic, hypercapnic respiratory failure likely due to Healthcare associated pneumonia:  DC vanc and zosyn and start po levaquin- duonebs- monitor cbc daily -    2. acute diastolic HF with bilateral pleural effusions / moderate to severe Tric valve regurgitation: decrease lasix to 20 mg po day- continue home meds- strict I/O     3. History of home oxygen: monitor    4. Gout: continue allopurinol, colchicine     5. Episodes of confusion, AMS: possible delirium / HCAP: resolved- haldol iv prn     6. Mild hypernatremia: encouraged to drink water -     7. Hypokalemia, replaced by 40 meq iv and 40 po     8. Episode of chest pain: unremarkable EKG, troponin trended down to 0.14- check troponin at night- possible non-thrombotic elevation secondary to HCAP and heart failure- monitor: resolved      -IP PT/OT    -Care manager involved.  May benefit from home pt     -DVT ppx: heparin, GCS     Full code    *PATIENTS MEDICAL STATUS WAS CHANGED TO INPATIENT. ROS, PMH, SOCIAL HX AND FAMILY HX ARE UNCHANGED WHEN COMPARED TO HPI DONE ON 2/13/18.      Signed By: Genie Lambert MD     February 18, 2018

## 2018-02-19 VITALS
RESPIRATION RATE: 18 BRPM | TEMPERATURE: 97.6 F | OXYGEN SATURATION: 98 % | WEIGHT: 147.49 LBS | SYSTOLIC BLOOD PRESSURE: 157 MMHG | HEART RATE: 102 BPM | DIASTOLIC BLOOD PRESSURE: 80 MMHG | HEIGHT: 59 IN | BODY MASS INDEX: 29.73 KG/M2

## 2018-02-19 LAB
ANION GAP SERPL CALC-SCNC: 8 MMOL/L (ref 7–16)
ATRIAL RATE: 96 BPM
BUN SERPL-MCNC: 23 MG/DL (ref 8–23)
CALCIUM SERPL-MCNC: 8.9 MG/DL (ref 8.3–10.4)
CALCULATED P AXIS, ECG09: 73 DEGREES
CALCULATED R AXIS, ECG10: -12 DEGREES
CALCULATED T AXIS, ECG11: 88 DEGREES
CHLORIDE SERPL-SCNC: 109 MMOL/L (ref 98–107)
CO2 SERPL-SCNC: 28 MMOL/L (ref 21–32)
CREAT SERPL-MCNC: 1 MG/DL (ref 0.6–1)
DIAGNOSIS, 93000: NORMAL
GLUCOSE SERPL-MCNC: 105 MG/DL (ref 65–100)
MAGNESIUM SERPL-MCNC: 1.9 MG/DL (ref 1.8–2.4)
P-R INTERVAL, ECG05: 122 MS
POTASSIUM SERPL-SCNC: 4.1 MMOL/L (ref 3.5–5.1)
Q-T INTERVAL, ECG07: 382 MS
QRS DURATION, ECG06: 100 MS
QTC CALCULATION (BEZET), ECG08: 482 MS
SODIUM SERPL-SCNC: 145 MMOL/L (ref 136–145)
VENTRICULAR RATE, ECG03: 96 BPM

## 2018-02-19 PROCEDURE — 97110 THERAPEUTIC EXERCISES: CPT

## 2018-02-19 PROCEDURE — 83735 ASSAY OF MAGNESIUM: CPT | Performed by: INTERNAL MEDICINE

## 2018-02-19 PROCEDURE — 94760 N-INVAS EAR/PLS OXIMETRY 1: CPT

## 2018-02-19 PROCEDURE — 97530 THERAPEUTIC ACTIVITIES: CPT

## 2018-02-19 PROCEDURE — 74011250636 HC RX REV CODE- 250/636: Performed by: INTERNAL MEDICINE

## 2018-02-19 PROCEDURE — 77010033678 HC OXYGEN DAILY

## 2018-02-19 PROCEDURE — 77030034849

## 2018-02-19 PROCEDURE — 74011250637 HC RX REV CODE- 250/637: Performed by: INTERNAL MEDICINE

## 2018-02-19 PROCEDURE — 80048 BASIC METABOLIC PNL TOTAL CA: CPT | Performed by: INTERNAL MEDICINE

## 2018-02-19 PROCEDURE — 36415 COLL VENOUS BLD VENIPUNCTURE: CPT | Performed by: INTERNAL MEDICINE

## 2018-02-19 RX ORDER — LEVOFLOXACIN 750 MG/1
750 TABLET ORAL
Qty: 2 TAB | Refills: 0 | Status: SHIPPED | OUTPATIENT
Start: 2018-02-20 | End: 2018-02-22

## 2018-02-19 RX ORDER — ALBUTEROL SULFATE 90 UG/1
1 AEROSOL, METERED RESPIRATORY (INHALATION)
Qty: 1 INHALER | Refills: 1 | Status: SHIPPED | OUTPATIENT
Start: 2018-02-19 | End: 2018-04-09

## 2018-02-19 RX ORDER — ACETAMINOPHEN 325 MG/1
650 TABLET ORAL
Qty: 20 TAB | Refills: 0 | Status: SHIPPED | OUTPATIENT
Start: 2018-02-19 | End: 2018-04-09

## 2018-02-19 RX ORDER — AMLODIPINE BESYLATE 5 MG/1
5 TABLET ORAL DAILY
Qty: 30 TAB | Refills: 0 | Status: SHIPPED | OUTPATIENT
Start: 2018-02-19 | End: 2018-04-09

## 2018-02-19 RX ORDER — ALBUTEROL SULFATE 0.83 MG/ML
2.5 SOLUTION RESPIRATORY (INHALATION)
Qty: 24 EACH | Refills: 1 | Status: SHIPPED | OUTPATIENT
Start: 2018-02-19 | End: 2018-04-04

## 2018-02-19 RX ORDER — FUROSEMIDE 20 MG/1
20 TABLET ORAL DAILY
Qty: 30 TAB | Refills: 0 | Status: SHIPPED | OUTPATIENT
Start: 2018-02-19 | End: 2018-04-04

## 2018-02-19 RX ADMIN — ASPIRIN 81 MG: 81 TABLET, COATED ORAL at 09:13

## 2018-02-19 RX ADMIN — RDII 250 MG CAPSULE 250 MG: at 09:13

## 2018-02-19 RX ADMIN — FUROSEMIDE 20 MG: 20 TABLET ORAL at 09:13

## 2018-02-19 RX ADMIN — Medication 10 ML: at 05:14

## 2018-02-19 RX ADMIN — COLCHICINE 0.3 MG: 0.6 TABLET, FILM COATED ORAL at 09:13

## 2018-02-19 RX ADMIN — LORATADINE 10 MG: 10 TABLET ORAL at 09:14

## 2018-02-19 RX ADMIN — HEPARIN SODIUM 5000 UNITS: 5000 INJECTION, SOLUTION INTRAVENOUS; SUBCUTANEOUS at 09:13

## 2018-02-19 RX ADMIN — CLOPIDOGREL BISULFATE 75 MG: 75 TABLET ORAL at 09:14

## 2018-02-19 RX ADMIN — AMLODIPINE BESYLATE 5 MG: 5 TABLET ORAL at 09:14

## 2018-02-19 RX ADMIN — ALLOPURINOL 100 MG: 100 TABLET ORAL at 09:13

## 2018-02-19 NOTE — DISCHARGE INSTRUCTIONS
DISCHARGE SUMMARY from Nurse    The following personal items are in your possession at time of discharge:                   Clothing: With patient                PATIENT INSTRUCTIONS:    After general anesthesia or intravenous sedation, for 24 hours or while taking prescription Narcotics:  · Limit your activities  · Do not drive and operate hazardous machinery  · Do not make important personal or business decisions  · Do  not drink alcoholic beverages  · If you have not urinated within 8 hours after discharge, please contact your surgeon on call. Report the following to your surgeon:  · Excessive pain, swelling, redness or odor of or around the surgical area  · Temperature over 100.5  · Nausea and vomiting lasting longer than 4 hours or if unable to take medications  · Any signs of decreased circulation or nerve impairment to extremity: change in color, persistent  numbness, tingling, coldness or increase pain  · Any questions        What to do at Home:  Recommended activity: Activity as tolerated, per Md    If you experience any of the following symptoms fever>101, pain unrelieved with medication, nausea/vomiting, shortness of breath, dizziness/fainting, chest pain. , please follow up with your doctor. *  Please give a list of your current medications to your Primary Care Provider. *  Please update this list whenever your medications are discontinued, doses are      changed, or new medications (including over-the-counter products) are added. *  Please carry medication information at all times in case of emergency situations. These are general instructions for a healthy lifestyle:    No smoking/ No tobacco products/ Avoid exposure to second hand smoke    Surgeon General's Warning:  Quitting smoking now greatly reduces serious risk to your health.     Obesity, smoking, and sedentary lifestyle greatly increases your risk for illness    A healthy diet, regular physical exercise & weight monitoring are important for maintaining a healthy lifestyle    You may be retaining fluid if you have a history of heart failure or if you experience any of the following symptoms:  Weight gain of 3 pounds or more overnight or 5 pounds in a week, increased swelling in our hands or feet or shortness of breath while lying flat in bed. Please call your doctor as soon as you notice any of these symptoms; do not wait until your next office visit. Recognize signs and symptoms of STROKE:    F-face looks uneven    A-arms unable to move or move unevenly    S-speech slurred or non-existent    T-time-call 911 as soon as signs and symptoms begin-DO NOT go       Back to bed or wait to see if you get better-TIME IS BRAIN. Warning Signs of HEART ATTACK     Call 911 if you have these symptoms:   Chest discomfort. Most heart attacks involve discomfort in the center of the chest that lasts more than a few minutes, or that goes away and comes back. It can feel like uncomfortable pressure, squeezing, fullness, or pain.  Discomfort in other areas of the upper body. Symptoms can include pain or discomfort in one or both arms, the back, neck, jaw, or stomach.  Shortness of breath with or without chest discomfort.  Other signs may include breaking out in a cold sweat, nausea, or lightheadedness. Don't wait more than five minutes to call 911 - MINUTES MATTER! Fast action can save your life. Calling 911 is almost always the fastest way to get lifesaving treatment. Emergency Medical Services staff can begin treatment when they arrive -- up to an hour sooner than if someone gets to the hospital by car. The discharge information has been reviewed with the patient. The patient verbalized understanding. Discharge medications reviewed with the patient and appropriate educational materials and side effects teaching were provided.                Pneumonia: Care Instructions  Your Care Instructions    Pneumonia is an infection of the lungs. Most cases are caused by infections from bacteria or viruses. Pneumonia may be mild or very severe. If it is caused by bacteria, you will be treated with antibiotics. It may take a few weeks to a few months to recover fully from pneumonia, depending on how sick you were and whether your overall health is good. Follow-up care is a key part of your treatment and safety. Be sure to make and go to all appointments, and call your doctor if you are having problems. It's also a good idea to know your test results and keep a list of the medicines you take. How can you care for yourself at home? · Take your antibiotics exactly as directed. Do not stop taking the medicine just because you are feeling better. You need to take the full course of antibiotics. · Take your medicines exactly as prescribed. Call your doctor if you think you are having a problem with your medicine. · Get plenty of rest and sleep. You may feel weak and tired for a while, but your energy level will improve with time. · To prevent dehydration, drink plenty of fluids, enough so that your urine is light yellow or clear like water. Choose water and other caffeine-free clear liquids until you feel better. If you have kidney, heart, or liver disease and have to limit fluids, talk with your doctor before you increase the amount of fluids you drink. · Take care of your cough so you can rest. A cough that brings up mucus from your lungs is common with pneumonia. It is one way your body gets rid of the infection. But if coughing keeps you from resting or causes severe fatigue and chest-wall pain, talk to your doctor. He or she may suggest that you take a medicine to reduce the cough. · Use a vaporizer or humidifier to add moisture to your bedroom. Follow the directions for cleaning the machine. · Do not smoke or allow others to smoke around you. Smoke will make your cough last longer.  If you need help quitting, talk to your doctor about stop-smoking programs and medicines. These can increase your chances of quitting for good. · Take an over-the-counter pain medicine, such as acetaminophen (Tylenol), ibuprofen (Advil, Motrin), or naproxen (Aleve). Read and follow all instructions on the label. · Do not take two or more pain medicines at the same time unless the doctor told you to. Many pain medicines have acetaminophen, which is Tylenol. Too much acetaminophen (Tylenol) can be harmful. · If you were given a spirometer to measure how well your lungs are working, use it as instructed. This can help your doctor tell how your recovery is going. · To prevent pneumonia in the future, talk to your doctor about getting a flu vaccine (once a year) and a pneumococcal vaccine (one time only for most people). When should you call for help? Call 911 anytime you think you may need emergency care. For example, call if:  ? · You have severe trouble breathing. ?Call your doctor now or seek immediate medical care if:  ? · You cough up dark brown or bloody mucus (sputum). ? · You have new or worse trouble breathing. ? · You are dizzy or lightheaded, or you feel like you may faint. ? Watch closely for changes in your health, and be sure to contact your doctor if:  ? · You have a new or higher fever. ? · You are coughing more deeply or more often. ? · You are not getting better after 2 days (48 hours). ? · You do not get better as expected. Where can you learn more? Go to http://you-demarcus.info/. Enter [id number] in the search box to learn more about \"Pneumonia: Care Instructions. \"  Current as of: May 12, 2017  Content Version: 11.4  © 8877-2987 [employer]. Care instructions adapted under license by [employer] (which disclaims liability or warranty for this information).  If you have questions about a medical condition or this instruction, always ask your healthcare professional. [employer] disclaims any warranty or liability for your use of this information.

## 2018-02-19 NOTE — PROGRESS NOTES
Pt called stating she \"couldn't get her breath. \" O2 sat 87% on room air and . Pt placed on 2L nasal cannula, O2 sat immediately up to 98% and HR 98. Will continue to monitor.

## 2018-02-19 NOTE — PROGRESS NOTES
Shift assessment complete. Pt alert and oriented x4, respirations diminished, even and unlabored, HOB elevated, pt denies any SOB at this time, S1&S2 auscultated, HR irregular, abd soft, non- tender,hypoactive BS in all 4 quadrants, No pressure ulcers noted, trace of edema noted in BLE, pt is up with assistance to the bathroom, voiding, SCDs in place and functioning, pt denies any pain at this time, pt instructed to call for assistance, pt verbalizes understanding, bed low and locked, side rails x3, call light within reach.

## 2018-02-19 NOTE — PROGRESS NOTES
Pt has not voided on this shift. Bladder scan revealed >900 ml of urine. MD notified, order for straight cath received.

## 2018-02-19 NOTE — PROGRESS NOTES
Per MD pt stable for d/c.  RENETTA confirmed with Deanna Toro with [facility] that they received referral over the weekend.

## 2018-02-19 NOTE — PROGRESS NOTES
Discharge instructions and prescriptions provided and explained to the pt and daughter. Med side effect sheet reviewed. Opportunity for questions provided. Instructed to call once ready to leave.

## 2018-02-19 NOTE — DISCHARGE SUMMARY
Discharge Summary     Patient: Yarely Pace MRN: [id number]  SSN: [id number]    YOB: 1927  Age: 80 y.o.   Sex: female       Admit Date: 2/13/2018    Discharge Date: 2/19/2018      Admission Diagnoses: Pneumonia  HCAP (healthcare-associated pneumonia)  Pneumonia    Discharge Diagnoses:   Problem List as of 2/19/2018  Date Reviewed: 2/15/2018          Codes Class Noted - Resolved    HCAP (healthcare-associated pneumonia) ICD-10-CM: J18.9  ICD-9-CM: 486  2/18/2018 - Present        Acute metabolic encephalopathy [facility]: G93.41  ICD-9-CM: 348.31  2/15/2018 - Present        Pneumonia ICD-10-CM: J18.9  ICD-9-CM: 087  2/13/2018 - Present        Pleural effusion ICD-10-CM: J90  ICD-9-CM: 511.9  2/13/2018 - Present        Hypercapnia ICD-10-CM: R06.89  ICD-9-CM: 786.09  2/13/2018 - Present        Acute UTI ICD-10-CM: N39.0  ICD-9-CM: 599.0  2/7/2018 - Present        Hypotension ICD-10-CM: I95.9  ICD-9-CM: 458.9  2/6/2018 - Present        Chronic respiratory failure with hypoxia ([facility] ICD-10-CM: J96.11  ICD-9-CM: 518.83, 799.02  1/25/2018 - Present        Gram-positive bacteremia ICD-10-CM: R78.81  ICD-9-CM: 790.7  12/25/2017 - Present        Sepsis ([facility] ICD-10-CM: A41.9  ICD-9-CM: 038.9, 995.91  12/23/2017 - Present        Acute respiratory failure with hypoxia ([facility] ICD-10-CM: J96.01  ICD-9-CM: 518.81  12/23/2017 - Present        Fever ICD-10-CM: R50.9  ICD-9-CM: 780.60  12/23/2017 - Present        Elevated troponin ICD-10-CM: R74.8  ICD-9-CM: 790.6  12/23/2017 - Present        CKD (chronic kidney disease) stage 3, GFR 30-59 ml/min ICD-10-CM: N18.3  ICD-9-CM: 585.3  12/23/2017 - Present        Elevated lactic acid level ICD-10-CM: R79.89  ICD-9-CM: 276.2  12/23/2017 - Present        Hypernatremia ICD-10-CM: E87.0  ICD-9-CM: 276.0  12/23/2017 - Present        Macrocytic anemia ICD-10-CM: D53.9  ICD-9-CM: 281.9  12/23/2017 - Present        Pain of right lower extremity ICD-10-CM: M79.604  ICD-9-CM: 729.5 12/23/2017 - Present        Osteoarthritis (Chronic) ICD-10-CM: M19.90  ICD-9-CM: 715.90  7/20/2017 - Present        Pre-diabetes ICD-10-CM: R73.03  ICD-9-CM: 790.29  7/20/2017 - Present        Gout ICD-10-CM: M10.9  ICD-9-CM: 274.9  10/6/2016 - Present        Diastolic CHF, chronic (HCC) (Chronic) ICD-10-CM: I50.32  ICD-9-CM: 428.32, 428.0  5/17/2016 - Present        Pulmonary hypertension ICD-10-CM: I27.20  ICD-9-CM: 416.8  5/17/2016 - Present        Essential hypertension, benign (Chronic) ICD-10-CM: I10  ICD-9-CM: 401.1  3/17/2015 - Present        Acute kidney failure, unspecified ICD-10-CM: N17.9  ICD-9-CM: 584. 9  Unknown - Present        Reflux esophagitis ICD-10-CM: K21.0  ICD-9-CM: 530.11  Unknown - Present        Coronary atherosclerosis of native coronary artery ICD-10-CM: I25.10  ICD-9-CM: 414.01  Unknown - Present        Hypopotassemia ICD-10-CM: E87.6  ICD-9-CM: 276.8  Unknown - Present        Mixed hyperlipidemia ICD-10-CM: E78.2  ICD-9-CM: 272.2  Unknown - Present        Cellulitis and abscess of other specified site ICD-10-CM: L03.818, L02.818  ICD-9-CM: 682.8  Unknown - Present        Shortness of breath ICD-10-CM: R06.02  ICD-9-CM: 786.05  Unknown - Present        RESOLVED: NSTEMI (non-ST elevated myocardial infarction) ([facility] ICD-10-CM: I21.4  ICD-9-CM: 410.70  12/30/2017 - 1/25/2018        RESOLVED: Demand ischemia ([facility] ICD-10-CM: I24.8  ICD-9-CM: 411.89  12/23/2017 - 1/25/2018        RESOLVED: Systolic CHF, chronic ([facility] ICD-10-CM: I50.22  ICD-9-CM: 428.22, 428.0  4/21/2016 - 5/17/2016               Discharge Condition: [place] Course:   Norma Villegas is a 80 y.o. female who has a PMH of diastolic HF with preserved EF, moderate-severe T valve regurgitation, home oxygen, HTN, CKD stage III, Gout, dyslipidemia, CAD, NSTEMI who was brought in by ems after she was noted with sob and wheezing at home.  Of note, she has had 2 recent admissions  ( the first on Dec/17 due to strep bacteremia sent to [facility] and last 2 days prior her current hospitalization for hypotension and JOE. acei and torsemide where stopped ). Pertinent labs: lactic acid 3,  no leukocytosis, no electrolyte imbalances, normal kidney function. CXR showed RLL-R middle lobe infiltrate, bilateral pleural effusions-mild. Influenza was negative. She was admitted with a diagnosis of acute hypoxic and hypercapnic respiratory failure due to Health care associated pneumonia. She was treated on van/zosyn and eventually overlapped to po levaquin after e low procalcitonin level. Her diastolic heart failure was controlled on lasix. PT/OP was consulted and her clinical condition improved. Care manager involved for home PT and home services with visiting nurse. Patient is stable enough to be discharged and continue care at home. Daughter involved in patients goals of care. Physical Exam:   GENERAL: alert, cooperative, no distress, appears stated age  EYE: negative  LYMPHATIC: Cervical, supraclavicular, and axillary nodes normal.   THROAT & NECK: normal and no erythema or exudates noted. LUNG: no rales, No wheezing, no rhonchi. HEART: regular rate and rhythm, S1, S2 normal, no murmur, click, rub or gallop  ABDOMEN: soft, non-tender. Bowel sounds normal. No masses,  no organomegaly  EXTREMITIES: bilateral mild pedal edema, atraumatic, no cyanosis   SKIN: Normal.  NEUROLOGIC:  no focal deficits     Consults: Physical Medicine and Rehabilitation    Significant Diagnostic Studies: SEE DC SUMMARY NOTE    Disposition: home    Discharge Medications:   No current facility-administered medications for this encounter. Current Outpatient Prescriptions:     albuterol (PROVENTIL VENTOLIN) 2.5 mg /3 mL (0.083 %) nebulizer solution, 3 mL by Nebulization route every four (4) hours as needed for Wheezing., Disp: 24 Each, Rfl: 1    acetaminophen (TYLENOL) 325 mg tablet, Take 2 Tabs by mouth every six (6) hours as needed. , Disp: 20 Tab, Rfl: 0    amLODIPine (NORVASC) 5 mg tablet, Take 1 Tab by mouth daily. , Disp: 30 Tab, Rfl: 0    furosemide (LASIX) 20 mg tablet, Take 1 Tab by mouth daily. , Disp: 30 Tab, Rfl: 0    [START ON 2/20/2018] levoFLOXacin (LEVAQUIN) 750 mg tablet, Take 1 Tab by mouth every fourty-eight (48) hours for 2 days. , Disp: 2 Tab, Rfl: 0    albuterol (PROVENTIL HFA, VENTOLIN HFA, PROAIR HFA) 90 mcg/actuation inhaler, Take 1 Puff by inhalation every six (6) hours as needed for Wheezing., Disp: 1 Inhaler, Rfl: 1    sennosides (SENNA) 8.6 mg cap, Take  by mouth., Disp: , Rfl:     colchicine 0.6 mg tablet, Take 0.5 Tabs by mouth daily. , Disp: 30 Tab, Rfl: 0    cyanocobalamin 1,000 mcg tablet, Take 1 Tab by mouth daily. , Disp: 30 Tab, Rfl: 0    Saccharomyces boulardii (FLORASTOR) 250 mg capsule, Take 1 Cap by mouth two (2) times a day., Disp: 60 Cap, Rfl: 0    clotrimazole (LOTRIMIN AF, CLOTRIMAZOLE,) 1 % topical cream, Apply  to affected area two (2) times a day., Disp: 60 g, Rfl: 3    allopurinol (ZYLOPRIM) 100 mg tablet, Take 1 Tab by mouth two (2) times a day., Disp: 60 Tab, Rfl: 3    pravastatin (PRAVACHOL) 20 mg tablet, Take 1 Tab by mouth nightly., Disp: 30 Tab, Rfl: 4    raNITIdine (ZANTAC) 150 mg tablet, Take 1 Tab by mouth two (2) times a day., Disp: 60 Tab, Rfl: 4    clopidogrel (PLAVIX) 75 mg tab, Take 1 Tab by mouth daily. , Disp: 30 Tab, Rfl: 4    loratadine (CLARITIN) 10 mg tablet, Take 1 Tab by mouth daily. , Disp: 30 Tab, Rfl: 4    aspirin 81 mg CpDR, Take  by mouth daily. , Disp: , Rfl:     Activity: Activity as tolerated  Diet: Cardiac Diet  Wound Care: None needed    Follow-up Appointments   Procedures    FOLLOW UP VISIT Appointment in: One Week pcp     pcp     Standing Status:   Standing     Number of Occurrences:   1     Order Specific Question:   Appointment in     Answer:    One Week    FOLLOW UP VISIT Appointment in: Two Weeks Cardiology     Cardiology     Standing Status:   Standing     Number of Occurrences:   1 Standing Expiration Date:   2/20/2018     Order Specific Question:   Appointment in     Answer: Two Weeks     Order Specific Question:   Appt Date:      Answer:   2/27/2018     Order Specific Question:   Appt Time:     Answer:   2:30 PM     Order Specific Question:   Office Contact:     Answer:   N/A     Order Specific Question:   What provider will pt be seeing:     Answer:   MD       Signed By: Han Luz MD     February 19, 2018

## 2018-02-19 NOTE — PROGRESS NOTES
Received bedside shift report from off going nurse Barry Rivera RN which included SBAR, MAR, and Plan of Care. Patient is resting quietly with eyes closed and respirations present. Will continue to follow patient's progression through hourly rounding. Will meet all requests as needed and appropriate.

## 2018-02-20 ENCOUNTER — PATIENT OUTREACH (OUTPATIENT)
Dept: CASE MANAGEMENT | Age: 83
End: 2018-02-20

## 2018-02-20 NOTE — PROGRESS NOTES
CRISTIANO/CCM follow up call to Ms. Whaley, no answer, also tried daughter's number and left a message there. Will make another attempt tomorrow. This note will not be viewable in [address]

## 2018-02-21 ENCOUNTER — HOME CARE VISIT (OUTPATIENT)
Dept: SCHEDULING | Facility: HOME HEALTH | Age: 83
End: 2018-02-21
Payer: COMMERCIAL

## 2018-02-21 PROCEDURE — 3331090001 HH PPS REVENUE CREDIT

## 2018-02-21 PROCEDURE — 3331090002 HH PPS REVENUE DEBIT

## 2018-02-21 PROCEDURE — G0299 HHS/HOSPICE OF RN EA 15 MIN: HCPCS

## 2018-02-21 PROCEDURE — 400013 HH SOC

## 2018-02-22 ENCOUNTER — HOME CARE VISIT (OUTPATIENT)
Dept: SCHEDULING | Facility: HOME HEALTH | Age: 83
End: 2018-02-22
Payer: COMMERCIAL

## 2018-02-22 ENCOUNTER — PATIENT OUTREACH (OUTPATIENT)
Dept: CASE MANAGEMENT | Age: 83
End: 2018-02-22

## 2018-02-22 VITALS
SYSTOLIC BLOOD PRESSURE: 120 MMHG | DIASTOLIC BLOOD PRESSURE: 70 MMHG | OXYGEN SATURATION: 96 % | RESPIRATION RATE: 20 BRPM | TEMPERATURE: 98.2 F | HEART RATE: 102 BPM

## 2018-02-22 VITALS
TEMPERATURE: 98.2 F | HEART RATE: 98 BPM | RESPIRATION RATE: 20 BRPM | OXYGEN SATURATION: 97 % | DIASTOLIC BLOOD PRESSURE: 58 MMHG | SYSTOLIC BLOOD PRESSURE: 120 MMHG

## 2018-02-22 PROCEDURE — 3331090001 HH PPS REVENUE CREDIT

## 2018-02-22 PROCEDURE — G0299 HHS/HOSPICE OF RN EA 15 MIN: HCPCS

## 2018-02-22 PROCEDURE — 3331090002 HH PPS REVENUE DEBIT

## 2018-02-22 NOTE — PROGRESS NOTES
CCM/CRISTIANO outreach attempt x 2 already left messages, no response. Upon chart review, looks as if home health has already started going out and patient has a PCP scheduled appointment already. Will close for further services unless I hear back from the patient. This note will not be viewable in [address]

## 2018-02-23 PROCEDURE — 3331090001 HH PPS REVENUE CREDIT

## 2018-02-23 PROCEDURE — 3331090002 HH PPS REVENUE DEBIT

## 2018-02-24 ENCOUNTER — HOSPITAL ENCOUNTER (INPATIENT)
Age: 83
LOS: 2 days | Discharge: NURSING, REHAB OR LONG-TERM CARE | DRG: 291 | End: 2018-03-03
Attending: EMERGENCY MEDICINE | Admitting: HOSPITALIST
Payer: COMMERCIAL

## 2018-02-24 ENCOUNTER — HOME CARE VISIT (OUTPATIENT)
Dept: HOME HEALTH SERVICES | Facility: HOME HEALTH | Age: 83
End: 2018-02-24
Payer: COMMERCIAL

## 2018-02-24 ENCOUNTER — APPOINTMENT (OUTPATIENT)
Dept: GENERAL RADIOLOGY | Age: 83
DRG: 291 | End: 2018-02-24
Attending: EMERGENCY MEDICINE
Payer: COMMERCIAL

## 2018-02-24 DIAGNOSIS — I50.9 ACUTE ON CHRONIC CONGESTIVE HEART FAILURE, UNSPECIFIED CONGESTIVE HEART FAILURE TYPE: Primary | ICD-10-CM

## 2018-02-24 DIAGNOSIS — R60.0 BILATERAL LOWER EXTREMITY EDEMA: ICD-10-CM

## 2018-02-24 PROBLEM — R60.9 EDEMA, PERIPHERAL: Status: ACTIVE | Noted: 2018-02-24

## 2018-02-24 LAB
ALBUMIN SERPL-MCNC: 2.6 G/DL (ref 3.2–4.6)
ALBUMIN/GLOB SERPL: 0.7 {RATIO} (ref 1.2–3.5)
ALP SERPL-CCNC: 74 U/L (ref 50–136)
ALT SERPL-CCNC: 25 U/L (ref 12–65)
ANION GAP SERPL CALC-SCNC: 7 MMOL/L (ref 7–16)
APPEARANCE UR: ABNORMAL
AST SERPL-CCNC: 21 U/L (ref 15–37)
BACTERIA URNS QL MICRO: ABNORMAL /HPF
BASOPHILS # BLD: 0 K/UL (ref 0–0.2)
BASOPHILS NFR BLD: 0 % (ref 0–2)
BILIRUB SERPL-MCNC: 0.5 MG/DL (ref 0.2–1.1)
BILIRUB UR QL: NEGATIVE
BNP SERPL-MCNC: 217 PG/ML
BUN SERPL-MCNC: 22 MG/DL (ref 8–23)
CALCIUM SERPL-MCNC: 9.4 MG/DL (ref 8.3–10.4)
CHLORIDE SERPL-SCNC: 107 MMOL/L (ref 98–107)
CO2 SERPL-SCNC: 30 MMOL/L (ref 21–32)
COLOR UR: YELLOW
CREAT SERPL-MCNC: 1.2 MG/DL (ref 0.6–1)
DIFFERENTIAL METHOD BLD: ABNORMAL
EOSINOPHIL # BLD: 0.1 K/UL (ref 0–0.8)
EOSINOPHIL NFR BLD: 1 % (ref 0.5–7.8)
ERYTHROCYTE [DISTWIDTH] IN BLOOD BY AUTOMATED COUNT: 17.2 % (ref 11.9–14.6)
GLOBULIN SER CALC-MCNC: 3.5 G/DL (ref 2.3–3.5)
GLUCOSE SERPL-MCNC: 102 MG/DL (ref 65–100)
GLUCOSE UR STRIP.AUTO-MCNC: NEGATIVE MG/DL
HCT VFR BLD AUTO: 27.8 % (ref 35.8–46.3)
HGB BLD-MCNC: 8.9 G/DL (ref 11.7–15.4)
HGB UR QL STRIP: ABNORMAL
IMM GRANULOCYTES # BLD: 0 K/UL (ref 0–0.5)
IMM GRANULOCYTES NFR BLD AUTO: 0 % (ref 0–5)
KETONES UR QL STRIP.AUTO: NEGATIVE MG/DL
LEUKOCYTE ESTERASE UR QL STRIP.AUTO: ABNORMAL
LYMPHOCYTES # BLD: 0.5 K/UL (ref 0.5–4.6)
LYMPHOCYTES NFR BLD: 9 % (ref 13–44)
MCH RBC QN AUTO: 33.2 PG (ref 26.1–32.9)
MCHC RBC AUTO-ENTMCNC: 32 G/DL (ref 31.4–35)
MCV RBC AUTO: 103.7 FL (ref 79.6–97.8)
MONOCYTES # BLD: 0.4 K/UL (ref 0.1–1.3)
MONOCYTES NFR BLD: 7 % (ref 4–12)
NEUTS SEG # BLD: 5 K/UL (ref 1.7–8.2)
NEUTS SEG NFR BLD: 83 % (ref 43–78)
NITRITE UR QL STRIP.AUTO: NEGATIVE
OTHER OBSERVATIONS,UCOM: ABNORMAL
PH UR STRIP: 6 [PH] (ref 5–9)
PLATELET # BLD AUTO: 228 K/UL (ref 150–450)
PMV BLD AUTO: 11.4 FL (ref 10.8–14.1)
POTASSIUM SERPL-SCNC: 4 MMOL/L (ref 3.5–5.1)
PROT SERPL-MCNC: 6.1 G/DL (ref 6.3–8.2)
PROT UR STRIP-MCNC: 100 MG/DL
RBC # BLD AUTO: 2.68 M/UL (ref 4.05–5.25)
RBC #/AREA URNS HPF: ABNORMAL /HPF
SODIUM SERPL-SCNC: 144 MMOL/L (ref 136–145)
SP GR UR REFRACTOMETRY: 1.01 (ref 1–1.02)
TROPONIN I SERPL-MCNC: <0.04 NG/ML (ref 0.02–0.05)
UROBILINOGEN UR QL STRIP.AUTO: 0.2 EU/DL (ref 0.2–1)
WBC # BLD AUTO: 6 K/UL (ref 4.3–11.1)
WBC URNS QL MICRO: >100 /HPF
YEAST URNS QL MICRO: ABNORMAL

## 2018-02-24 PROCEDURE — 85025 COMPLETE CBC W/AUTO DIFF WBC: CPT | Performed by: EMERGENCY MEDICINE

## 2018-02-24 PROCEDURE — 87106 FUNGI IDENTIFICATION YEAST: CPT | Performed by: EMERGENCY MEDICINE

## 2018-02-24 PROCEDURE — 74011250636 HC RX REV CODE- 250/636: Performed by: FAMILY MEDICINE

## 2018-02-24 PROCEDURE — 80053 COMPREHEN METABOLIC PANEL: CPT | Performed by: EMERGENCY MEDICINE

## 2018-02-24 PROCEDURE — 71045 X-RAY EXAM CHEST 1 VIEW: CPT

## 2018-02-24 PROCEDURE — 93005 ELECTROCARDIOGRAM TRACING: CPT | Performed by: EMERGENCY MEDICINE

## 2018-02-24 PROCEDURE — 84484 ASSAY OF TROPONIN QUANT: CPT | Performed by: EMERGENCY MEDICINE

## 2018-02-24 PROCEDURE — 77030013140 HC MSK NEB VYRM -A

## 2018-02-24 PROCEDURE — 81003 URINALYSIS AUTO W/O SCOPE: CPT | Performed by: EMERGENCY MEDICINE

## 2018-02-24 PROCEDURE — 94640 AIRWAY INHALATION TREATMENT: CPT

## 2018-02-24 PROCEDURE — 74011000258 HC RX REV CODE- 258: Performed by: FAMILY MEDICINE

## 2018-02-24 PROCEDURE — 77030005518 HC CATH URETH FOL 2W BARD -B

## 2018-02-24 PROCEDURE — 83880 ASSAY OF NATRIURETIC PEPTIDE: CPT | Performed by: EMERGENCY MEDICINE

## 2018-02-24 PROCEDURE — 87086 URINE CULTURE/COLONY COUNT: CPT | Performed by: EMERGENCY MEDICINE

## 2018-02-24 PROCEDURE — 3331090001 HH PPS REVENUE CREDIT

## 2018-02-24 PROCEDURE — 99218 HC RM OBSERVATION: CPT

## 2018-02-24 PROCEDURE — 74011250636 HC RX REV CODE- 250/636: Performed by: EMERGENCY MEDICINE

## 2018-02-24 PROCEDURE — 3331090002 HH PPS REVENUE DEBIT

## 2018-02-24 PROCEDURE — 74011250637 HC RX REV CODE- 250/637: Performed by: FAMILY MEDICINE

## 2018-02-24 PROCEDURE — 51702 INSERT TEMP BLADDER CATH: CPT | Performed by: EMERGENCY MEDICINE

## 2018-02-24 PROCEDURE — 96374 THER/PROPH/DIAG INJ IV PUSH: CPT | Performed by: EMERGENCY MEDICINE

## 2018-02-24 PROCEDURE — 99285 EMERGENCY DEPT VISIT HI MDM: CPT | Performed by: EMERGENCY MEDICINE

## 2018-02-24 PROCEDURE — 74011000250 HC RX REV CODE- 250: Performed by: FAMILY MEDICINE

## 2018-02-24 RX ORDER — AMOXICILLIN 250 MG
1 CAPSULE ORAL DAILY
Status: DISCONTINUED | OUTPATIENT
Start: 2018-02-25 | End: 2018-03-03 | Stop reason: HOSPADM

## 2018-02-24 RX ORDER — FAMOTIDINE 20 MG/1
20 TABLET, FILM COATED ORAL DAILY
Status: DISCONTINUED | OUTPATIENT
Start: 2018-02-25 | End: 2018-03-03 | Stop reason: HOSPADM

## 2018-02-24 RX ORDER — AMLODIPINE BESYLATE 5 MG/1
5 TABLET ORAL DAILY
Status: DISCONTINUED | OUTPATIENT
Start: 2018-02-25 | End: 2018-03-03 | Stop reason: HOSPADM

## 2018-02-24 RX ORDER — CLOPIDOGREL BISULFATE 75 MG/1
75 TABLET ORAL DAILY
Status: DISCONTINUED | OUTPATIENT
Start: 2018-02-25 | End: 2018-02-27

## 2018-02-24 RX ORDER — ACETAMINOPHEN 325 MG/1
650 TABLET ORAL
Status: DISCONTINUED | OUTPATIENT
Start: 2018-02-24 | End: 2018-03-03 | Stop reason: HOSPADM

## 2018-02-24 RX ORDER — NALOXONE HYDROCHLORIDE 0.4 MG/ML
0.4 INJECTION, SOLUTION INTRAMUSCULAR; INTRAVENOUS; SUBCUTANEOUS AS NEEDED
Status: DISCONTINUED | OUTPATIENT
Start: 2018-02-24 | End: 2018-03-03 | Stop reason: HOSPADM

## 2018-02-24 RX ORDER — LANOLIN ALCOHOL/MO/W.PET/CERES
1000 CREAM (GRAM) TOPICAL DAILY
Status: DISCONTINUED | OUTPATIENT
Start: 2018-02-25 | End: 2018-03-03 | Stop reason: HOSPADM

## 2018-02-24 RX ORDER — SAME BUTANEDISULFONATE/BETAINE 400-600 MG
250 POWDER IN PACKET (EA) ORAL 2 TIMES DAILY
Status: DISCONTINUED | OUTPATIENT
Start: 2018-02-24 | End: 2018-03-03 | Stop reason: HOSPADM

## 2018-02-24 RX ORDER — ALLOPURINOL 100 MG/1
100 TABLET ORAL 2 TIMES DAILY
Status: DISCONTINUED | OUTPATIENT
Start: 2018-02-24 | End: 2018-03-03 | Stop reason: HOSPADM

## 2018-02-24 RX ORDER — COLCHICINE 0.6 MG/1
0.3 TABLET ORAL DAILY
Status: DISCONTINUED | OUTPATIENT
Start: 2018-02-25 | End: 2018-03-03 | Stop reason: HOSPADM

## 2018-02-24 RX ORDER — SODIUM CHLORIDE 0.9 % (FLUSH) 0.9 %
5-10 SYRINGE (ML) INJECTION AS NEEDED
Status: DISCONTINUED | OUTPATIENT
Start: 2018-02-24 | End: 2018-03-03 | Stop reason: HOSPADM

## 2018-02-24 RX ORDER — ALBUTEROL SULFATE 90 UG/1
1 AEROSOL, METERED RESPIRATORY (INHALATION)
Status: DISCONTINUED | OUTPATIENT
Start: 2018-02-24 | End: 2018-03-03 | Stop reason: HOSPADM

## 2018-02-24 RX ORDER — ONDANSETRON 2 MG/ML
4 INJECTION INTRAMUSCULAR; INTRAVENOUS
Status: DISCONTINUED | OUTPATIENT
Start: 2018-02-24 | End: 2018-03-03 | Stop reason: HOSPADM

## 2018-02-24 RX ORDER — ASPIRIN 81 MG/1
81 TABLET ORAL DAILY
Status: DISCONTINUED | OUTPATIENT
Start: 2018-02-25 | End: 2018-02-27

## 2018-02-24 RX ORDER — ALBUTEROL SULFATE 0.83 MG/ML
2.5 SOLUTION RESPIRATORY (INHALATION)
Status: DISCONTINUED | OUTPATIENT
Start: 2018-02-24 | End: 2018-03-03 | Stop reason: HOSPADM

## 2018-02-24 RX ORDER — POLYETHYLENE GLYCOL 3350 17 G/17G
17 POWDER, FOR SOLUTION ORAL DAILY
Status: DISCONTINUED | OUTPATIENT
Start: 2018-02-25 | End: 2018-03-03 | Stop reason: HOSPADM

## 2018-02-24 RX ORDER — LORATADINE 10 MG/1
10 TABLET ORAL DAILY
Status: DISCONTINUED | OUTPATIENT
Start: 2018-02-25 | End: 2018-03-03 | Stop reason: HOSPADM

## 2018-02-24 RX ORDER — HYDROCODONE BITARTRATE AND ACETAMINOPHEN 5; 325 MG/1; MG/1
1 TABLET ORAL
Status: DISCONTINUED | OUTPATIENT
Start: 2018-02-24 | End: 2018-03-03 | Stop reason: HOSPADM

## 2018-02-24 RX ORDER — SODIUM CHLORIDE 0.9 % (FLUSH) 0.9 %
5-10 SYRINGE (ML) INJECTION EVERY 8 HOURS
Status: DISCONTINUED | OUTPATIENT
Start: 2018-02-24 | End: 2018-03-03 | Stop reason: HOSPADM

## 2018-02-24 RX ORDER — FUROSEMIDE 10 MG/ML
60 INJECTION INTRAMUSCULAR; INTRAVENOUS
Status: COMPLETED | OUTPATIENT
Start: 2018-02-24 | End: 2018-02-24

## 2018-02-24 RX ORDER — HEPARIN SODIUM 5000 [USP'U]/ML
5000 INJECTION, SOLUTION INTRAVENOUS; SUBCUTANEOUS EVERY 12 HOURS
Status: DISCONTINUED | OUTPATIENT
Start: 2018-02-24 | End: 2018-02-26

## 2018-02-24 RX ORDER — PRAVASTATIN SODIUM 20 MG/1
20 TABLET ORAL
Status: DISCONTINUED | OUTPATIENT
Start: 2018-02-24 | End: 2018-03-03 | Stop reason: HOSPADM

## 2018-02-24 RX ORDER — FUROSEMIDE 10 MG/ML
20 INJECTION INTRAMUSCULAR; INTRAVENOUS 2 TIMES DAILY
Status: DISCONTINUED | OUTPATIENT
Start: 2018-02-24 | End: 2018-02-24

## 2018-02-24 RX ORDER — FUROSEMIDE 20 MG/1
20 TABLET ORAL DAILY
Status: DISCONTINUED | OUTPATIENT
Start: 2018-02-25 | End: 2018-03-03 | Stop reason: HOSPADM

## 2018-02-24 RX ADMIN — ALBUTEROL SULFATE 2.5 MG: 2.5 SOLUTION RESPIRATORY (INHALATION) at 19:36

## 2018-02-24 RX ADMIN — PRAVASTATIN SODIUM 20 MG: 20 TABLET ORAL at 21:52

## 2018-02-24 RX ADMIN — ALLOPURINOL 100 MG: 100 TABLET ORAL at 19:45

## 2018-02-24 RX ADMIN — PIPERACILLIN SODIUM,TAZOBACTAM SODIUM 3.38 G: 3; .375 INJECTION, POWDER, FOR SOLUTION INTRAVENOUS at 21:51

## 2018-02-24 RX ADMIN — FUROSEMIDE 20 MG: 10 INJECTION, SOLUTION INTRAMUSCULAR; INTRAVENOUS at 19:46

## 2018-02-24 RX ADMIN — RDII 250 MG CAPSULE 250 MG: at 19:45

## 2018-02-24 RX ADMIN — HYDROCODONE BITARTRATE AND ACETAMINOPHEN 1 TABLET: 5; 325 TABLET ORAL at 23:10

## 2018-02-24 RX ADMIN — Medication 10 ML: at 21:53

## 2018-02-24 RX ADMIN — FUROSEMIDE 60 MG: 10 INJECTION, SOLUTION INTRAMUSCULAR; INTRAVENOUS at 14:00

## 2018-02-24 NOTE — ED PROVIDER NOTES
HPI Comments: 75-year-old female presents via EMS with reports of worsening bilateral lower extremity edema and worsening shortness of breath over the past 3-4 days. Patient with history of CHF. Patient states she's been compliant with her Lasix 20 mg twice a day. Patient reports that she is on 2 L O2 nasal cannula at home. Patient denies any chest pain, nausea, vomiting, numbness, tingling, weakness, abdominal pain, cough, hemoptysis. Patient is a 80 y.o. female presenting with ankle swelling. The history is provided by the patient. No  was used. Ankle swelling    This is a new problem. The current episode started more than 2 days ago. The problem occurs constantly. The problem has been gradually worsening. The pain is present in the left lower leg and right lower leg. The quality of the pain is described as dull. The pain is at a severity of 2/10. The pain is mild. Pertinent negatives include no numbness, full range of motion, no stiffness, no tingling, no itching, no back pain and no neck pain. Treatments tried: Lasix 20mg BID  The treatment provided no relief. There has been no history of extremity trauma.         Past Medical History:   Diagnosis Date    Acute kidney failure, unspecified     Arthritis     CAD (coronary artery disease)     Cellulitis and abscess of other specified site     Coronary atherosclerosis of native coronary artery     Essential hypertension, benign 3/17/2015    Hypertension     Hypopotassemia     Mixed hyperlipidemia     Osteoarthritis 7/20/2017    Other and unspecified hyperlipidemia     Reflux esophagitis     Renal failure, unspecified     Shortness of breath     Unspecified essential hypertension        Past Surgical History:   Procedure Laterality Date    CARDIAC SURG PROCEDURE UNLIST      stent    HX CATARACT REMOVAL Bilateral     HX CHOLECYSTECTOMY      HX HYSTERECTOMY      complete    IN LAYR CLOS WND FACE,FACIAL <2.5CM Family History:   Problem Relation Age of Onset    Heart Attack Mother     Heart Attack Father     Heart Disease Brother     Heart Disease Brother        Social History     Social History    Marital status:      Spouse name: N/A    Number of children: N/A    Years of education: N/A     Occupational History    Not on file. Social History Main Topics    Smoking status: Never Smoker    Smokeless tobacco: Never Used    Alcohol use No    Drug use: No    Sexual activity: Not Currently     Other Topics Concern    Not on file     Social History Narrative         ALLERGIES: Bee pollen and Fire ant    Review of Systems   Constitutional: Negative for chills, fatigue and fever. HENT: Negative for congestion and rhinorrhea. Respiratory: Positive for shortness of breath. Negative for cough. Cardiovascular: Positive for leg swelling. Negative for chest pain and palpitations. Gastrointestinal: Negative for abdominal pain, diarrhea, nausea and vomiting. Genitourinary: Negative for dysuria and flank pain. Musculoskeletal: Negative for back pain, gait problem, joint swelling, neck pain, neck stiffness and stiffness. Skin: Negative for itching, pallor and rash. Neurological: Negative for dizziness, tingling, seizures, syncope, weakness, numbness and headaches. Psychiatric/Behavioral: Negative for agitation and confusion. Vitals:    02/24/18 1112   BP: 119/65   Pulse: 86   Resp: 18   Temp: 97.7 °F (36.5 °C)   SpO2: 100%   Weight: 66.7 kg (147 lb)   Height: 5' 2\" (1.575 m)            Physical Exam   Constitutional: She is oriented to person, place, and time. She appears well-developed and well-nourished. No distress. HENT:   Head: Normocephalic and atraumatic. Mouth/Throat: Oropharynx is clear and moist. No oropharyngeal exudate. Eyes: Conjunctivae and EOM are normal. Pupils are equal, round, and reactive to light. Neck: Normal range of motion. No JVD present.  No tracheal deviation present. Cardiovascular: Normal rate, regular rhythm, normal heart sounds and intact distal pulses. No murmur heard. Radial pulses 2+ bilaterally. Pulmonary/Chest: Effort normal and breath sounds normal. No respiratory distress. She has no rales. She exhibits no tenderness. Coarse breath sounds present bilaterally. Abdominal: Soft. There is no tenderness. There is no rebound and no guarding. Musculoskeletal: Normal range of motion. She exhibits edema. She exhibits no tenderness or deformity. 3+ pitting edema to bilateral lower extremities. Neurological: She is alert and oriented to person, place, and time. No cranial nerve deficit. Coordination normal.   Skin: Skin is warm and dry. No rash noted. No erythema. No pallor. Psychiatric: She has a normal mood and affect. Her behavior is normal.   Nursing note and vitals reviewed. MDM  Number of Diagnoses or Management Options  Acute on chronic congestive heart failure, unspecified congestive heart failure type [facility]): new and requires workup  Bilateral lower extremity edema: new and requires workup  Diagnosis management comments: Patient given Lasix 60 mg IV. Hospitalist consulted for observation admission. Amount and/or Complexity of Data Reviewed  Clinical lab tests: ordered and reviewed  Tests in the radiology section of CPT®: ordered and reviewed  Tests in the medicine section of CPT®: ordered and reviewed  Review and summarize past medical records: yes  Independent visualization of images, tracings, or specimens: yes    Risk of Complications, Morbidity, and/or Mortality  Presenting problems: moderate  Diagnostic procedures: moderate  Management options: moderate    Patient Progress  Patient progress: stable        ED Course   Comment By Time   CXR IMPRESSION: Cardiomegaly, clear lung rubalcava.  Obinna Browne MD 02/24 2387       Procedures

## 2018-02-24 NOTE — ROUTINE PROCESS
TRANSFER - OUT REPORT:    Verbal report given to America Bright on Tamera Sparks  being transferred to [id number] for routine progression of care       Report consisted of patients Situation, Background, Assessment and   Recommendations(SBAR). Information from the following report(s) ED Summary was reviewed with the receiving nurse. Lines:   Peripheral IV 02/24/18 Left Antecubital (Active)   Site Assessment Clean, dry, & intact 2/24/2018  1:01 PM   Phlebitis Assessment 0 2/24/2018  1:01 PM   Infiltration Assessment 0 2/24/2018  1:01 PM   Dressing Status Clean, dry, & intact 2/24/2018  1:01 PM   Action Taken Blood drawn 2/24/2018  1:01 PM        Opportunity for questions and clarification was provided.       Patient transported with:   O2 @ 2 liters

## 2018-02-24 NOTE — IP AVS SNAPSHOT
[address] 
[phone] Patient: Rodo Brown MRN: [id number] :2/10/1927 About your hospitalization You were admitted on:  2018 You last received care in the:  [address] [place] [address] You were discharged on:  March 3, 2018 Why you were hospitalized Your primary diagnosis was:  Acute Urinary Retention Your diagnoses also included:  Edema, Peripheral, Acute On Chronic Diastolic Chf (Congestive Heart Failure), Nyha Class 3 (Hcc), Uti (Urinary Tract Infection), Essential Hypertension, Benign, Chronic Anemia, Shortness Of Breath, Congestive Heart Failure (Chf) ([facility]) Follow-up Information Follow up With Details Comments Contact [place] [id number] [address] [place] [address] 
[phone] Kamran Porras MD In 2 weeks APPT. MAR. 15th @ 10:40 [id number] [address] [place] [address] 
[phone] [facility] In 4 weeks OFFICE WILL CALL PATIENT WITH APPT. 333 [facility] Marisa Bacon [phone] [id number] [phone] Your Scheduled Appointments Thursday March 15, 2018 10:40 AM EDT Office Visit with Kamran Porras MD  
[address] ([address] [place] [address]) [address] [address]  
[phone] Discharge Orders None A check michel indicates which time of day the medication should be taken. My Medications START taking these medications Instructions Each Dose to Equal  
 Morning Noon Evening Bedtime  
 tamsulosin 0.4 mg capsule Commonly known as:  FLOMAX Start taking on:  3/4/2018 Your last dose was: Your next dose is: Take 1 Cap by mouth daily for 30 days. 0.4 mg  
    
   
   
   
  
  
CHANGE how you take these medications  Instructions Each Dose to Equal  
 Morning Noon Evening Bedtime  
 acetaminophen 325 mg tablet Commonly known as:  TYLENOL What changed:  Another medication with the same name was removed. Continue taking this medication, and follow the directions you see here. Your last dose was: Your next dose is: Take 2 Tabs by mouth every six (6) hours as needed. 650 mg CONTINUE taking these medications Instructions Each Dose to Equal  
 Morning Noon Evening Bedtime * albuterol 2.5 mg /3 mL (0.083 %) nebulizer solution Commonly known as:  PROVENTIL VENTOLIN Your last dose was: Your next dose is:    
   
   
 3 mL by Nebulization route every four (4) hours as needed for Wheezing. 2.5 mg  
    
   
   
   
  
 * albuterol 90 mcg/actuation inhaler Commonly known as:  PROVENTIL HFA, VENTOLIN HFA, PROAIR HFA Your last dose was: Your next dose is: Take 1 Puff by inhalation every six (6) hours as needed for Wheezing. 1 Puff  
    
   
   
   
  
 allopurinol 100 mg tablet Commonly known as:  Sunil Taylor Your last dose was: Your next dose is: Take 1 Tab by mouth two (2) times a day. 100 mg  
    
   
   
   
  
 amLODIPine 5 mg tablet Commonly known as:  Job Dub Your last dose was: Your next dose is: Take 1 Tab by mouth daily. 5 mg  
    
   
   
   
  
 aspirin 81 mg Cpdr  
   
Your last dose was: Your next dose is: Take  by mouth daily. clopidogrel 75 mg Tab Commonly known as:  PLAVIX Your last dose was: Your next dose is: Take 1 Tab by mouth daily. 75 mg  
    
   
   
   
  
 clotrimazole 1 % topical cream  
Commonly known as:  LOTRIMIN AF (CLOTRIMAZOLE) Your last dose was: Your next dose is:    
   
   
 Apply  to affected area two (2) times a day. colchicine 0.6 mg tablet Your last dose was: Your next dose is: Take 0.5 Tabs by mouth daily. 0.3 mg  
    
   
   
   
  
 cyanocobalamin 1,000 mcg tablet Your last dose was: Your next dose is: Take 1 Tab by mouth daily. 1000 mcg  
    
   
   
   
  
 furosemide 20 mg tablet Commonly known as:  LASIX Your last dose was: Your next dose is: Take 1 Tab by mouth daily. 20 mg  
    
   
   
   
  
 loratadine 10 mg tablet Commonly known as:  Pineda Nicolasa Your last dose was: Your next dose is: Take 1 Tab by mouth daily. 10 mg OXYGEN-AIR DELIVERY SYSTEMS Your last dose was: Your next dose is: Take 2 L by inhalation continuous. via NC  
 2 L  
    
   
   
   
  
 pravastatin 20 mg tablet Commonly known as:  PRAVACHOL Your last dose was: Your next dose is: Take 1 Tab by mouth nightly. 20 mg  
    
   
   
   
  
 raNITIdine 150 mg tablet Commonly known as:  ZANTAC Your last dose was: Your next dose is: Take 1 Tab by mouth two (2) times a day. 150 mg Saccharomyces boulardii 250 mg capsule Commonly known as:  Keron Controls Your last dose was: Your next dose is: Take 1 Cap by mouth two (2) times a day. 250 mg Senna 8.6 mg Cap Generic drug:  sennosides Your last dose was: Your next dose is: Take 8.6 Tabs by mouth as needed (constipation). daily as needed. 8.6 Tab * Notice: This list has 2 medication(s) that are the same as other medications prescribed for you. Read the directions carefully, and ask your doctor or other care provider to review them with you. Where to Get Your Medications Information on where to get these meds will be given to you by the nurse or doctor. ! Ask your nurse or doctor about these medications  
  tamsulosin 0.4 mg capsule Discharge Instructions None [facility] Announcement We are excited to announce that we are making your provider's discharge notes available to you [facility] You will see these notes when they are completed and signed by the physician that discharged you from your recent hospital stay. If you have any questions or concerns about any information you see in [facility], please call the Health Information Department where you were seen or reach out to your Primary Care Provider for more information about your plan of care. Unresulted Labs-Please follow up with your PCP about these lab tests Order Current Status CULTURE, URINE Preliminary result Providers Seen During Your Hospitalization Provider Specialty Primary office phone 3[address] [facility] MD [facility] [phone] Serafin Stelee MD Family Practice [phone] Immunizations Administered for This Admission Name Date  
 TB Skin Test (PPD) Intradermal  Deferred (), 2/27/2018 Your Primary Care Physician (PCP) Primary Care Physician Office Phone Office Fax 7088 [facility], [address] [phone] You are allergic to the following Allergen Reactions Bee Pollen Swelling Fire Metricly Itching Swelling Recent Documentation Height Weight BMI OB Status Smoking Status 1.575 m 67.6 kg 27.25 kg/m2 Hysterectomy Never Smoker Emergency Contacts Name Discharge Info Relation Home Work Mobile Tresa Whaley  Daughter [21] [phone] Elise Conklin  Daughter [21] [phone] Patient Belongings The following personal items are in your possession at time of discharge: 
                   Clothing: With patient Please provide this summary of care documentation to your next provider. Signatures-by signing, you are acknowledging that this After Visit Summary has been reviewed with you and you have received a copy. Patient Signature:  ____________________________________________________________ Date:  ____________________________________________________________  
  
Claudene Born Provider Signature:  ____________________________________________________________ Date:  ____________________________________________________________

## 2018-02-24 NOTE — IP AVS SNAPSHOT
[address] [place]  
[phone] Patient: Josafat Castanon MRN: [id number] :2/10/1927 A check michel indicates which time of day the medication should be taken. My Medications START taking these medications Instructions Each Dose to Equal  
 Morning Noon Evening Bedtime  
 tamsulosin 0.4 mg capsule Commonly known as:  FLOMAX Start taking on:  3/4/2018 Your last dose was: Your next dose is: Take 1 Cap by mouth daily for 30 days. 0.4 mg  
    
   
   
   
  
  
CHANGE how you take these medications Instructions Each Dose to Equal  
 Morning Noon Evening Bedtime  
 acetaminophen 325 mg tablet Commonly known as:  TYLENOL What changed:  Another medication with the same name was removed. Continue taking this medication, and follow the directions you see here. Your last dose was: Your next dose is: Take 2 Tabs by mouth every six (6) hours as needed. 650 mg CONTINUE taking these medications Instructions Each Dose to Equal  
 Morning Noon Evening Bedtime * albuterol 2.5 mg /3 mL (0.083 %) nebulizer solution Commonly known as:  PROVENTIL VENTOLIN Your last dose was: Your next dose is:    
   
   
 3 mL by Nebulization route every four (4) hours as needed for Wheezing. 2.5 mg  
    
   
   
   
  
 * albuterol 90 mcg/actuation inhaler Commonly known as:  PROVENTIL HFA, VENTOLIN HFA, PROAIR HFA Your last dose was: Your next dose is: Take 1 Puff by inhalation every six (6) hours as needed for Wheezing. 1 Puff  
    
   
   
   
  
 allopurinol 100 mg tablet Commonly known as:  Martin Faith Your last dose was: Your next dose is: Take 1 Tab by mouth two (2) times a day. 100 mg  
    
   
   
   
  
 amLODIPine 5 mg tablet Commonly known as:  Garret Beth Your last dose was: Your next dose is: Take 1 Tab by mouth daily. 5 mg  
    
   
   
   
  
 aspirin 81 mg Cpdr  
   
Your last dose was: Your next dose is: Take  by mouth daily. clopidogrel 75 mg Tab Commonly known as:  PLAVIX Your last dose was: Your next dose is: Take 1 Tab by mouth daily. 75 mg  
    
   
   
   
  
 clotrimazole 1 % topical cream  
Commonly known as:  LOTRIMIN AF (CLOTRIMAZOLE) Your last dose was: Your next dose is:    
   
   
 Apply  to affected area two (2) times a day. colchicine 0.6 mg tablet Your last dose was: Your next dose is: Take 0.5 Tabs by mouth daily. 0.3 mg  
    
   
   
   
  
 cyanocobalamin 1,000 mcg tablet Your last dose was: Your next dose is: Take 1 Tab by mouth daily. 1000 mcg  
    
   
   
   
  
 furosemide 20 mg tablet Commonly known as:  LASIX Your last dose was: Your next dose is: Take 1 Tab by mouth daily. 20 mg  
    
   
   
   
  
 loratadine 10 mg tablet Commonly known as:  Lattie  Your last dose was: Your next dose is: Take 1 Tab by mouth daily. 10 mg OXYGEN-AIR DELIVERY SYSTEMS Your last dose was: Your next dose is: Take 2 L by inhalation continuous. via NC  
 2 L  
    
   
   
   
  
 pravastatin 20 mg tablet Commonly known as:  PRAVACHOL Your last dose was: Your next dose is: Take 1 Tab by mouth nightly. 20 mg  
    
   
   
   
  
 raNITIdine 150 mg tablet Commonly known as:  ZANTAC Your last dose was: Your next dose is: Take 1 Tab by mouth two (2) times a day. 150 mg Saccharomyces boulardii 250 mg capsule Commonly known as:  Keron Controls Your last dose was: Your next dose is: Take 1 Cap by mouth two (2) times a day. 250 mg Senna 8.6 mg Cap Generic drug:  sennosides Your last dose was: Your next dose is: Take 8.6 Tabs by mouth as needed (constipation). daily as needed. 8.6 Tab * Notice: This list has 2 medication(s) that are the same as other medications prescribed for you. Read the directions carefully, and ask your doctor or other care provider to review them with you. Where to Get Your Medications Information on where to get these meds will be given to you by the nurse or doctor. ! Ask your nurse or doctor about these medications  
  tamsulosin 0.4 mg capsule

## 2018-02-24 NOTE — ED TRIAGE NOTES
Pt arrived EMS for home with reports of CHF exacerbation; swelling to BLE, increased SOB. Pt wears O2 at 2l/nc at home.

## 2018-02-24 NOTE — PROGRESS NOTES
Received report from transferring nurse, Kylie Jo RN. Full shift assessment performed and dual skin assessment performed with Clayton Richards RN. Patient's lung sounds diminished at the bases and there are audible crackles. She has bilateral 3+ pitting edema with the left being greater than the right leg. Heart sounds revealed S1, S2, and murmur heard at PMI. Patient somewhat confused to situation and place but she is oriented to person.

## 2018-02-25 ENCOUNTER — HOME CARE VISIT (OUTPATIENT)
Dept: HOME HEALTH SERVICES | Facility: HOME HEALTH | Age: 83
End: 2018-02-25
Payer: COMMERCIAL

## 2018-02-25 PROBLEM — D64.9 CHRONIC ANEMIA: Status: ACTIVE | Noted: 2018-02-25

## 2018-02-25 LAB
ANION GAP SERPL CALC-SCNC: 6 MMOL/L (ref 7–16)
ATRIAL RATE: 89 BPM
BUN SERPL-MCNC: 21 MG/DL (ref 8–23)
CALCIUM SERPL-MCNC: 8.8 MG/DL (ref 8.3–10.4)
CALCULATED P AXIS, ECG09: 71 DEGREES
CALCULATED R AXIS, ECG10: 0 DEGREES
CALCULATED T AXIS, ECG11: 77 DEGREES
CHLORIDE SERPL-SCNC: 106 MMOL/L (ref 98–107)
CO2 SERPL-SCNC: 34 MMOL/L (ref 21–32)
CREAT SERPL-MCNC: 0.92 MG/DL (ref 0.6–1)
DIAGNOSIS, 93000: NORMAL
ERYTHROCYTE [DISTWIDTH] IN BLOOD BY AUTOMATED COUNT: 17.4 % (ref 11.9–14.6)
GLUCOSE SERPL-MCNC: 86 MG/DL (ref 65–100)
HCT VFR BLD AUTO: 25 % (ref 35.8–46.3)
HGB BLD-MCNC: 8 G/DL (ref 11.7–15.4)
MCH RBC QN AUTO: 32.9 PG (ref 26.1–32.9)
MCHC RBC AUTO-ENTMCNC: 32 G/DL (ref 31.4–35)
MCV RBC AUTO: 102.9 FL (ref 79.6–97.8)
P-R INTERVAL, ECG05: 130 MS
PLATELET # BLD AUTO: 210 K/UL (ref 150–450)
PMV BLD AUTO: 11.3 FL (ref 10.8–14.1)
POTASSIUM SERPL-SCNC: 3.2 MMOL/L (ref 3.5–5.1)
Q-T INTERVAL, ECG07: 360 MS
QRS DURATION, ECG06: 88 MS
QTC CALCULATION (BEZET), ECG08: 438 MS
RBC # BLD AUTO: 2.43 M/UL (ref 4.05–5.25)
SODIUM SERPL-SCNC: 146 MMOL/L (ref 136–145)
VENTRICULAR RATE, ECG03: 89 BPM
WBC # BLD AUTO: 5.2 K/UL (ref 4.3–11.1)

## 2018-02-25 PROCEDURE — 99218 HC RM OBSERVATION: CPT

## 2018-02-25 PROCEDURE — 3331090002 HH PPS REVENUE DEBIT

## 2018-02-25 PROCEDURE — 97161 PT EVAL LOW COMPLEX 20 MIN: CPT

## 2018-02-25 PROCEDURE — 97165 OT EVAL LOW COMPLEX 30 MIN: CPT

## 2018-02-25 PROCEDURE — 36415 COLL VENOUS BLD VENIPUNCTURE: CPT | Performed by: FAMILY MEDICINE

## 2018-02-25 PROCEDURE — 85027 COMPLETE CBC AUTOMATED: CPT | Performed by: FAMILY MEDICINE

## 2018-02-25 PROCEDURE — 74011250637 HC RX REV CODE- 250/637: Performed by: FAMILY MEDICINE

## 2018-02-25 PROCEDURE — G8979 MOBILITY GOAL STATUS: HCPCS

## 2018-02-25 PROCEDURE — 80048 BASIC METABOLIC PNL TOTAL CA: CPT | Performed by: FAMILY MEDICINE

## 2018-02-25 PROCEDURE — 74011000250 HC RX REV CODE- 250: Performed by: FAMILY MEDICINE

## 2018-02-25 PROCEDURE — G8988 SELF CARE GOAL STATUS: HCPCS

## 2018-02-25 PROCEDURE — 74011000258 HC RX REV CODE- 258: Performed by: FAMILY MEDICINE

## 2018-02-25 PROCEDURE — 94640 AIRWAY INHALATION TREATMENT: CPT

## 2018-02-25 PROCEDURE — 94760 N-INVAS EAR/PLS OXIMETRY 1: CPT

## 2018-02-25 PROCEDURE — 3331090001 HH PPS REVENUE CREDIT

## 2018-02-25 PROCEDURE — 74011250636 HC RX REV CODE- 250/636: Performed by: FAMILY MEDICINE

## 2018-02-25 PROCEDURE — G8987 SELF CARE CURRENT STATUS: HCPCS

## 2018-02-25 PROCEDURE — G8978 MOBILITY CURRENT STATUS: HCPCS

## 2018-02-25 RX ORDER — POTASSIUM CHLORIDE 14.9 MG/ML
20 INJECTION INTRAVENOUS
Status: COMPLETED | OUTPATIENT
Start: 2018-02-25 | End: 2018-02-25

## 2018-02-25 RX ADMIN — ALBUTEROL SULFATE 2.5 MG: 2.5 SOLUTION RESPIRATORY (INHALATION) at 14:54

## 2018-02-25 RX ADMIN — HYDROCODONE BITARTRATE AND ACETAMINOPHEN 1 TABLET: 5; 325 TABLET ORAL at 17:07

## 2018-02-25 RX ADMIN — CLOPIDOGREL BISULFATE 75 MG: 75 TABLET ORAL at 10:04

## 2018-02-25 RX ADMIN — PIPERACILLIN SODIUM,TAZOBACTAM SODIUM 3.38 G: 3; .375 INJECTION, POWDER, FOR SOLUTION INTRAVENOUS at 17:00

## 2018-02-25 RX ADMIN — POLYETHYLENE GLYCOL (3350) 17 G: 17 POWDER, FOR SOLUTION ORAL at 10:04

## 2018-02-25 RX ADMIN — AMLODIPINE BESYLATE 5 MG: 5 TABLET ORAL at 10:04

## 2018-02-25 RX ADMIN — CYANOCOBALAMIN TAB 1000 MCG 1000 MCG: 1000 TAB at 10:04

## 2018-02-25 RX ADMIN — FAMOTIDINE 20 MG: 20 TABLET, FILM COATED ORAL at 10:04

## 2018-02-25 RX ADMIN — Medication 10 ML: at 05:57

## 2018-02-25 RX ADMIN — Medication 10 ML: at 22:38

## 2018-02-25 RX ADMIN — Medication 10 ML: at 17:01

## 2018-02-25 RX ADMIN — RDII 250 MG CAPSULE 250 MG: at 10:04

## 2018-02-25 RX ADMIN — POTASSIUM CHLORIDE 20 MEQ: 200 INJECTION, SOLUTION INTRAVENOUS at 14:20

## 2018-02-25 RX ADMIN — ALLOPURINOL 100 MG: 100 TABLET ORAL at 17:07

## 2018-02-25 RX ADMIN — PRAVASTATIN SODIUM 20 MG: 20 TABLET ORAL at 21:15

## 2018-02-25 RX ADMIN — RDII 250 MG CAPSULE 250 MG: at 17:07

## 2018-02-25 RX ADMIN — LORATADINE 10 MG: 10 TABLET ORAL at 10:04

## 2018-02-25 RX ADMIN — ASPIRIN 81 MG: 81 TABLET, COATED ORAL at 10:04

## 2018-02-25 RX ADMIN — HEPARIN SODIUM 5000 UNITS: 5000 INJECTION, SOLUTION INTRAVENOUS; SUBCUTANEOUS at 10:03

## 2018-02-25 RX ADMIN — HEPARIN SODIUM 5000 UNITS: 5000 INJECTION, SOLUTION INTRAVENOUS; SUBCUTANEOUS at 21:15

## 2018-02-25 RX ADMIN — ALLOPURINOL 100 MG: 100 TABLET ORAL at 10:04

## 2018-02-25 RX ADMIN — SENNOSIDES AND DOCUSATE SODIUM 1 TABLET: 8.6; 5 TABLET ORAL at 10:04

## 2018-02-25 RX ADMIN — FUROSEMIDE 20 MG: 20 TABLET ORAL at 10:04

## 2018-02-25 RX ADMIN — ALBUTEROL SULFATE 2.5 MG: 2.5 SOLUTION RESPIRATORY (INHALATION) at 19:59

## 2018-02-25 RX ADMIN — PIPERACILLIN SODIUM,TAZOBACTAM SODIUM 3.38 G: 3; .375 INJECTION, POWDER, FOR SOLUTION INTRAVENOUS at 05:55

## 2018-02-25 RX ADMIN — POTASSIUM CHLORIDE 20 MEQ: 200 INJECTION, SOLUTION INTRAVENOUS at 10:08

## 2018-02-25 RX ADMIN — COLCHICINE 0.3 MG: 0.6 TABLET, FILM COATED ORAL at 10:04

## 2018-02-25 NOTE — PROGRESS NOTES
Received report from off going nurse Jenn Suarez RN which included SBAR, MAR, and Plan of Care. Patient is resting quietly with eyes closed and respirations present. Will continue to follow patient's progression through hourly rounding. Will meet all requests as needed and appropriate.

## 2018-02-25 NOTE — PROGRESS NOTES
Problem: Mobility Impaired (Adult and Pediatric)  Goal: *Acute Goals and Plan of Care (Insert Text)  DISCHARGE GOALS:  (1.)Ms. Lynda Bowens will move from supine to sit and sit to supine  in bed with SUPERVISION within 5 treatment day(s). (2.)Ms. Lynda Bowens will transfer from bed to chair and chair to bed with SUPERVISION using the least restrictive device within 5 treatment day(s). (3.)Ms. Lynda Bowens will ambulate with SUPERVISION for 75 feet with the least restrictive device within 5 treatment day(s). ________________________________________________________________________________________________    PHYSICAL THERAPY: Initial Assessment 2/25/2018  OBSERVATION: Hospital Day: 2  Payor: [employer] VIP CARE PLUS / [place] [employer] [place] / Product Type: [employer] /      NAME/AGE/GENDER: Mehreen Chinchilla is a 80 y.o. female   PRIMARY DIAGNOSIS: Heart failure ([facility] [address]  Edema, peripheral <principal problem not specified> <principal problem not specified>        ICD-10: Treatment Diagnosis:   · Generalized Muscle Weakness (M62.81)  · Difficulty in walking, Not elsewhere classified (R26.2)   Precaution/Allergies:  Bee pollen and Fire ant      ASSESSMENT:     Ms. Lynda Bowens presents with above diagnosis (and UTI per RN). This is patient's 3rd hospitalization this month (2/6-2/11 with hypoxia and hypotension and 2/13-2/19 with pneumonia). Patient seen by [facility] following last d/c but had not started services with PT, OT, or aide. Patient uses a wheelchair as primary means of mobility but uses a walker for transfers and short distances. She lives with her son and has some assist with ADLs. Patient would benefit from therapy services during admission to address strength, balance, and mobility. She would benefit from therapy services at d/c as well. This section established at most recent assessment   PROBLEM LIST (Impairments causing functional limitations):  1. Decreased Strength  2.  Decreased ADL/Functional Activities  3. Decreased Transfer Abilities  4. Decreased Ambulation Ability/Technique  5. Decreased Balance   INTERVENTIONS PLANNED: (Benefits and precautions of physical therapy have been discussed with the patient.)  1. Bed Mobility  2. Gait Training  3. Home Exercise Program (HEP)  4. Therapeutic Activites  5. Therapeutic Exercise/Strengthening     TREATMENT PLAN: Frequency/Duration: daily for duration of hospital stay  Rehabilitation Potential For Stated Goals: Good     RECOMMENDED REHABILITATION/EQUIPMENT: (at time of discharge pending progress): Due to the probability of continued deficits (see above) this patient will likely need continued skilled physical therapy after discharge. Equipment:    None at this time              HISTORY:   History of Present Injury/Illness (Reason for Referral):  80 y. o. female who has a PMH of diastolic HF with preserved EF, moderate-severe T valve regurgitation, home oxygen, HTN, CKD stage III, Gout, dyslipidemia, CAD, NSTEMI presents to ER with complaints of SOB and progressive LE edema. She was recently hospitalized(2/13-2/19) for PNA and s/p Levaquin. She was discharged home with 2L oxygen and she reports compliance. She also reports compliance with Lasix 20mg BID. In ER, CXR is clear without congestion or infiltrates. Her lab studies are at baseline and BNP is only 217. After travis was placed; UA collected is significant for >100WBC and many Bacteria. Past Medical History/Comorbidities:   Ms. Lisbeth Fontaine  has a past medical history of Acute kidney failure, unspecified; Arthritis; CAD (coronary artery disease); Cellulitis and abscess of other specified site; Coronary atherosclerosis of native coronary artery; Essential hypertension, benign (3/17/2015); Hypertension; Hypopotassemia; Mixed hyperlipidemia; Osteoarthritis (7/20/2017); Other and unspecified hyperlipidemia; Reflux esophagitis; Renal failure, unspecified;  Shortness of breath; and Unspecified essential hypertension. Ms. Eliza Almeida  has a past surgical history that includes hx cholecystectomy; pr layr clos wnd face,facial <2.5cm; pr cardiac surg procedure unlist; hx cataract removal (Bilateral); and hx hysterectomy. Social History/Living Environment:   Home Environment: Private residence  Wheelchair Ramp: Yes  One/Two Story Residence: One story  Living Alone: No  Support Systems: Child(jayleen)  Patient Expects to be Discharged to[de-identified] Private residence  Current DME Used/Available at Home: Shower chair, Walker, rolling, Wheelchair  Prior Level of Function/Work/Activity:  Wheelchair as primary means of mobility but rolling walker for transfers and short distances. Assist with ADLs. Personal Factors:          Sex:  female        Age:  80 y.o. Multiple hospitalizations   Number of Personal Factors/Comorbidities that affect the Plan of Care: 3+: HIGH COMPLEXITY   EXAMINATION:   Most Recent Physical Functioning:   Gross Assessment:  AROM: Generally decreased, functional  Strength: Generally decreased, functional  Coordination: Generally decreased, functional  Tone: Normal               Posture:     Balance:  Sitting: Intact  Standing: Pull to stand; With support Bed Mobility:  Supine to Sit: Contact guard assistance;Minimum assistance  Sit to Supine:  (in chair)  Scooting: Contact guard assistance  Wheelchair Mobility:     Transfers:  Sit to Stand: Contact guard assistance;Minimum assistance  Stand to Sit: Contact guard assistance;Minimum assistance  Bed to Chair: Contact guard assistance;Minimum assistance  Gait:     Speed/Mary: Slow  Step Length: Left shortened;Right shortened  Gait Abnormalities: Decreased step clearance  Distance (ft): 5 Feet (ft)  Assistive Device: Walker, rolling  Ambulation - Level of Assistance: Contact guard assistance;Minimal assistance  Interventions: Safety awareness training;Verbal cues      Body Structures Involved:  1. Muscles Body Functions Affected:  1.  Movement Related Activities and Participation Affected:  1. Mobility  2. Self Care  3. Domestic Life   Number of elements that affect the Plan of Care: 4+: HIGH COMPLEXITY   CLINICAL PRESENTATION:   Presentation: Stable and uncomplicated: LOW COMPLEXITY   CLINICAL DECISION MAKIN [facility] [address] [id number] AM-PAC 6 Clicks   Basic Mobility Inpatient Short Form  How much difficulty does the patient currently have. .. Unable A Lot A Little None   1. Turning over in bed (including adjusting bedclothes, sheets and blankets)? [] 1   [] 2   [x] 3   [] 4   2. Sitting down on and standing up from a chair with arms ( e.g., wheelchair, bedside commode, etc.)   [] 1   [] 2   [x] 3   [] 4   3. Moving from lying on back to sitting on the side of the bed? [] 1   [] 2   [x] 3   [] 4   How much help from another person does the patient currently need. .. Total A Lot A Little None   4. Moving to and from a bed to a chair (including a wheelchair)? [] 1   [] 2   [x] 3   [] 4   5. Need to walk in hospital room? [] 1   [] 2   [x] 3   [] 4   6. Climbing 3-5 steps with a railing? [] 1   [] 2   [x] 3   [] 4   © , Trustees of [address] [place] [address] [address] [address], under license to [employer]. All rights reserved      Score:  Initial: 18 Most Recent: X (Date: -- )    Interpretation of Tool:  Represents activities that are increasingly more difficult (i.e. Bed mobility, Transfers, Gait). Score 24 23 22-20 19-15 14-10 9-7 6     Modifier CH CI CJ CK CL CM CN      ?  Mobility - Walking and Moving Around:     - CURRENT STATUS: CK - 40%-59% impaired, limited or restricted    - GOAL STATUS: CJ - 20%-39% impaired, limited or restricted    - D/C STATUS:  ---------------To be determined---------------  Payor: [employer] VIP CARE PLUS / [place] [employer] VIP [employer] PLUS / Product Type: Managed Care Medicare /      Medical Necessity:     · Patient is expected to demonstrate progress in strength, balance and coordination to increase independence with bed mobility, transfers, and gait. Reason for Services/Other Comments:  · Patient continues to require skilled intervention due to generalized weakness and impaired mobility secondary to UTI. Use of outcome tool(s) and clinical judgement create a POC that gives a: Clear prediction of patient's progress: LOW COMPLEXITY            TREATMENT:   (In addition to Assessment/Re-Assessment sessions the following treatments were rendered)   Pre-treatment Symptoms/Complaints:  Patient reports she would like to sit in the chair. Reports she hasn't eaten in a few days. Pain: Initial:   Pain Intensity 1: 0  Post Session:  0     Assessment/Reassessment only, no treatment provided today    Braces/Orthotics/Lines/Etc:   · IV  · travis catheter  · O2 Device: Nasal cannula  Treatment/Session Assessment:    · Response to Treatment:  Patient tolerated well. Moved well and positioned nicely in the chair. · Interdisciplinary Collaboration:   o Physical Therapist  o Occupational Therapist  o Registered Nurse  · After treatment position/precautions:   o Up in chair  o Bed alarm/tab alert on  o Bed/Chair-wheels locked  o Call light within reach  o RN notified  o Family at bedside   · Compliance with Program/Exercises: compliant all of the time. · Recommendations/Intent for next treatment session: \"Next visit will focus on advancements to more challenging activities and reduction in assistance provided\".   Total Treatment Duration:  PT Patient Time In/Time Out  Time In: 1140  Time Out: [address] PT

## 2018-02-25 NOTE — H&P
HOSPITALIST H&P/CONSULT  NAME:  Desiree Butler   Age:  80 y.o.  :   2/10/1927   MRN:   [id number]  PCP: Mykel Guillermo MD  Consulting MD:  Treatment Team: Attending Provider: Triston Owens MD  HPI:   80 y. o. female who has a PMH of diastolic HF with preserved EF, moderate-severe T valve regurgitation, home oxygen, HTN, CKD stage III, Gout, dyslipidemia, CAD, NSTEMI presents to ER with complaints of SOB and progressive LE edema. She was recently hospitalized(-) for PNA and s/p Levaquin. She was discharged home with 2L oxygen and she reports compliance. She also reports compliance with Lasix 20mg BID. In ER, CXR is clear without congestion or infiltrates. Her lab studies are at baseline and BNP is only 217. After travis was placed; UA collected is significant for >100WBC and many Bacteria. Complete ROS done and is as stated in HPI or otherwise negative  Past Medical History:   Diagnosis Date    Acute kidney failure, unspecified     Arthritis     CAD (coronary artery disease)     Cellulitis and abscess of other specified site     Coronary atherosclerosis of native coronary artery     Essential hypertension, benign 3/17/2015    Hypertension     Hypopotassemia     Mixed hyperlipidemia     Osteoarthritis 2017    Other and unspecified hyperlipidemia     Reflux esophagitis     Renal failure, unspecified     Shortness of breath     Unspecified essential hypertension       Past Surgical History:   Procedure Laterality Date    CARDIAC SURG PROCEDURE UNLIST      stent    HX CATARACT REMOVAL Bilateral     HX CHOLECYSTECTOMY      HX HYSTERECTOMY      complete    MO LAYR CLOS WND FACE,FACIAL <2.5CM        Prior to Admission Medications   Prescriptions Last Dose Informant Patient Reported? Taking? OXYGEN-AIR DELIVERY SYSTEMS   Yes No   Sig: Take 2 L by inhalation continuous.  via NC   Saccharomyces boulardii (FLORASTOR) 250 mg capsule   No No   Sig: Take 1 Cap by mouth two (2) times a day.   acetaminophen (PAIN RELIEVER) 500 mg tablet   Yes No   Sig: Take 500 mg by mouth every six (6) hours as needed for Pain. 2 tablets as needed   acetaminophen (TYLENOL) 325 mg tablet   No No   Sig: Take 2 Tabs by mouth every six (6) hours as needed. albuterol (PROVENTIL HFA, VENTOLIN HFA, PROAIR HFA) 90 mcg/actuation inhaler   No No   Sig: Take 1 Puff by inhalation every six (6) hours as needed for Wheezing. albuterol (PROVENTIL VENTOLIN) 2.5 mg /3 mL (0.083 %) nebulizer solution   No No   Sig: 3 mL by Nebulization route every four (4) hours as needed for Wheezing. allopurinol (ZYLOPRIM) 100 mg tablet   No No   Sig: Take 1 Tab by mouth two (2) times a day. amLODIPine (NORVASC) 5 mg tablet   No No   Sig: Take 1 Tab by mouth daily. aspirin 81 mg CpDR   Yes No   Sig: Take  by mouth daily. clopidogrel (PLAVIX) 75 mg tab   No No   Sig: Take 1 Tab by mouth daily. clotrimazole (LOTRIMIN AF, CLOTRIMAZOLE,) 1 % topical cream   No No   Sig: Apply  to affected area two (2) times a day. colchicine 0.6 mg tablet   No No   Sig: Take 0.5 Tabs by mouth daily. cyanocobalamin 1,000 mcg tablet   No No   Sig: Take 1 Tab by mouth daily. furosemide (LASIX) 20 mg tablet   No No   Sig: Take 1 Tab by mouth daily. loratadine (CLARITIN) 10 mg tablet   No No   Sig: Take 1 Tab by mouth daily. pravastatin (PRAVACHOL) 20 mg tablet   No No   Sig: Take 1 Tab by mouth nightly. raNITIdine (ZANTAC) 150 mg tablet   No No   Sig: Take 1 Tab by mouth two (2) times a day. sennosides (SENNA) 8.6 mg cap   Yes No   Sig: Take 8.6 Tabs by mouth as needed (constipation). daily as needed.       Facility-Administered Medications: None     Allergies   Allergen Reactions    Bee Pollen Swelling    Fire Ant Itching and Swelling      Social History   Substance Use Topics    Smoking status: Never Smoker    Smokeless tobacco: Never Used    Alcohol use No      Family History   Problem Relation Age of Onset    Heart Attack Mother    Olivier Boothe Heart Attack Father     Heart Disease Brother     Heart Disease Brother       Objective:     Visit Vitals    /66    Pulse 85    Temp 97.9 °F (36.6 °C)    Resp 16    Ht 5' 2\" (1.575 m)    Wt 66.7 kg (147 lb)    SpO2 94%    BMI 26.89 kg/m2      Temp (24hrs), Av.9 °F (36.6 °C), Min:97.7 °F (36.5 °C), Max:98.2 °F (36.8 °C)    Oxygen Therapy  O2 Sat (%): 94 % (18)  Pulse via Oximetry: 88 beats per minute (18)  O2 Device: Nasal cannula (18)  O2 Flow Rate (L/min): 2 l/min (18)  Physical Exam:  General:    Alert, cooperative, frail    Head:   Normocephalic, without obvious abnormality, atraumatic. Nose:  Nares normal. No drainage or sinus tenderness. Lungs:   Clear to auscultation bilaterally. No Wheezing or Rhonchi. No rales. Heart:   Regular rate and rhythm. 2+ pitting edema  Abdomen:   Soft, non-tender. Not distended. Bowel sounds normal.   Extremities: No cyanosis. No edema. No clubbing  Neurologic: Alert and oriented x 2, no focal deficits     Data Review:   Recent Results (from the past 24 hour(s))   EKG, 12 LEAD, INITIAL    Collection Time: 18 12:57 PM   Result Value Ref Range    Ventricular Rate 89 BPM    Atrial Rate 89 BPM    P-R Interval 130 ms    QRS Duration 88 ms    Q-T Interval 360 ms    QTC Calculation (Bezet) 438 ms    Calculated P Axis 71 degrees    Calculated R Axis 0 degrees    Calculated T Axis 77 degrees    Diagnosis       !! AGE AND GENDER SPECIFIC ECG ANALYSIS !!   Normal sinus rhythm  Normal ECG  When compared with ECG of 2018 00:13,  No significant change was found     CBC WITH AUTOMATED DIFF    Collection Time: 18  1:01 PM   Result Value Ref Range    WBC 6.0 4.3 - 11.1 K/uL    RBC 2.68 (L) 4.05 - 5.25 M/uL    HGB 8.9 (L) 11.7 - 15.4 g/dL    HCT 27.8 (L) 35.8 - 46.3 %    .7 (H) 79.6 - 97.8 FL    MCH 33.2 (H) 26.1 - 32.9 PG    MCHC 32.0 31.4 - 35.0 g/dL    RDW 17.2 (H) 11.9 - 14.6 %    PLATELET 875 262 - 450 K/uL    MPV 11.4 10.8 - 14.1 FL    DF AUTOMATED      NEUTROPHILS 83 (H) 43 - 78 %    LYMPHOCYTES 9 (L) 13 - 44 %    MONOCYTES 7 4.0 - 12.0 %    EOSINOPHILS 1 0.5 - 7.8 %    BASOPHILS 0 0.0 - 2.0 %    IMMATURE GRANULOCYTES 0 0.0 - 5.0 %    ABS. NEUTROPHILS 5.0 1.7 - 8.2 K/UL    ABS. LYMPHOCYTES 0.5 0.5 - 4.6 K/UL    ABS. MONOCYTES 0.4 0.1 - 1.3 K/UL    ABS. EOSINOPHILS 0.1 0.0 - 0.8 K/UL    ABS. BASOPHILS 0.0 0.0 - 0.2 K/UL    ABS. IMM. GRANS. 0.0 0.0 - 0.5 K/UL   METABOLIC PANEL, COMPREHENSIVE    Collection Time: 02/24/18  1:01 PM   Result Value Ref Range    Sodium 144 136 - 145 mmol/L    Potassium 4.0 3.5 - 5.1 mmol/L    Chloride 107 98 - 107 mmol/L    CO2 30 21 - 32 mmol/L    Anion gap 7 7 - 16 mmol/L    Glucose 102 (H) 65 - 100 mg/dL    BUN 22 8 - 23 MG/DL    Creatinine 1.20 (H) 0.6 - 1.0 MG/DL    GFR est AA 54 (L) >60 ml/min/1.73m2    GFR est non-AA 45 (L) >60 ml/min/1.73m2    Calcium 9.4 8.3 - 10.4 MG/DL    Bilirubin, total 0.5 0.2 - 1.1 MG/DL    ALT (SGPT) 25 12 - 65 U/L    AST (SGOT) 21 15 - 37 U/L    Alk.  phosphatase 74 50 - 136 U/L    Protein, total 6.1 (L) 6.3 - 8.2 g/dL    Albumin 2.6 (L) 3.2 - 4.6 g/dL    Globulin 3.5 2.3 - 3.5 g/dL    A-G Ratio 0.7 (L) 1.2 - 3.5     TROPONIN I    Collection Time: 02/24/18  1:01 PM   Result Value Ref Range    Troponin-I, Qt. <0.04 0.02 - 0.05 NG/ML   BNP    Collection Time: 02/24/18  1:01 PM   Result Value Ref Range     pg/mL   URINALYSIS W/ RFLX MICROSCOPIC    Collection Time: 02/24/18  3:54 PM   Result Value Ref Range    Color YELLOW      Appearance TURBID      Specific gravity 1.008 1.001 - 1.023      pH (UA) 6.0 5.0 - 9.0      Protein 100 (A) NEG mg/dL    Glucose NEGATIVE  NEG mg/dL    Ketone NEGATIVE  NEG mg/dL    Bilirubin NEGATIVE  NEG      Blood LARGE (A) NEG      Urobilinogen 0.2 0.2 - 1.0 EU/dL    Nitrites NEGATIVE  NEG      Leukocyte Esterase LARGE (A) NEG      WBC >100 0 /hpf    RBC 20-50 0 /hpf    Bacteria TRACE 0 /hpf    Yeast MODERATE      Other observations       MICROSCOPIC PERFORMED ON UNSPUN URINE DUE TO TOO MANY BLOOD CELLS     Imaging [facility] /Studies     Assessment and Plan: Active Hospital Problems    Diagnosis Date Noted    Edema, peripheral 02/24/2018    Heart failure ([address] 02/24/2018   UTI    PLAN  ·  Admit to medical unit  · Urine culture pending  · Start Zosyn and follow culture  · I am not convinced this is CHF exacerbation. BNP and CXR unremarkable.  Will continue home Lasix PO as I do not want to dehydrate her  · Continue home meds  · PT/OT      Anticipated discharge: 2 midnights    Signed By: Corey Wiggins MD     February 24, 2018

## 2018-02-25 NOTE — PROGRESS NOTES
Care Management Interventions  Transition of Care Consult (CM Consult): Discharge Planning  80 F, OBS, Hx diastolic HF.  3rd adm  this month (2/6-2/11 hypoxia and hypotension, 2/13-2/19 pneumonia). Patient seen by [facility] after last d/c but had not started services with PT, OT, or aide. Uses WC & walker. Lives with her son and has some assist with ADLs. Ppd placed 2/7, 48 hr read on 2-9.

## 2018-02-25 NOTE — PROGRESS NOTES
PM assessment complete. Alert, oriented x 4. Respirations even and unlabored, no distress noted. Abdomen soft, bowel sounds active in all 4 quadrants. Weir patent and draining pink colored urine. 3+ pitting edema to BLE. Bed alarm on. Denies needs.

## 2018-02-25 NOTE — PROGRESS NOTES
Problem: Self Care Deficits Care Plan (Adult)  Goal: *Acute Goals and Plan of Care (Insert Text)  1. Patient will perform grooming with supervision. 2. Patient will perform upper body dressing with supervision. 3. Patient will perform lower body dressing with supervision. 4. Patient will perform bathing with supervision. 5. Patient will perform toileting and toilet transfer with supervision. 6. Patient will perform ADL functional mobility and tranfers in room with supervision. 7. Patient/family to demonstrate knowledge of home safety and DME recommendations. Goals to be achieved in 7 days. OCCUPATIONAL THERAPY: Initial Assessment and AM 2/25/2018  OBSERVATION: Hospital Day: 2  Payor: [employer] C[employer] PLUS / [place] [employer] [place]US / Product Type: Managed Care Medicare /      NAME/AGE/GENDER: Josafat Castanon is a 80 y.o. female   PRIMARY DIAGNOSIS:  Heart failure ([facility] [address]  Edema, peripheral <principal problem not specified> <principal problem not specified>        ICD-10: Treatment Diagnosis:    · Generalized Muscle Weakness (M62.81)  · Other lack of cordination (R27.8)   Precautions/Allergies:     Bee pollen and Fire ant      ASSESSMENT:     Ms. Eliza Almeida presents with above diagnosis. Pt lives with her son and has just started home health services at home. Pt will benefit from skilled OT services to maximize indep with self care and functional mobility. This section established at most recent assessment   PROBLEM LIST (Impairments causing functional limitations):  1. Decreased Strength  2. Decreased ADL/Functional Activities  3. Decreased Transfer Abilities  4. Decreased Ambulation Ability/Technique  5. Decreased Balance   INTERVENTIONS PLANNED: (Benefits and precautions of occupational therapy have been discussed with the patient.)  1. Activities of daily living training  2. Adaptive equipment training  3. Balance training  4. Clothing management  5.  Donning&doffing training  6. Therapeutic activity  7. Therapeutic exercise     TREATMENT PLAN: Frequency/Duration: Follow patient 4x/wk to address above goals. Rehabilitation Potential For Stated Goals: Good     RECOMMENDED REHABILITATION/EQUIPMENT: (at time of discharge pending progress): Due to the probability of continued deficits (see above) this patient will likely need continued skilled occupational therapy after discharge. Equipment:    TBD              OCCUPATIONAL PROFILE AND HISTORY:   History of Present Injury/Illness (Reason for Referral):  Pt admitted with LE edema  Past Medical History/Comorbidities:   Ms. Yobany Newman  has a past medical history of Acute kidney failure, unspecified; Arthritis; CAD (coronary artery disease); Cellulitis and abscess of other specified site; Coronary atherosclerosis of native coronary artery; Essential hypertension, benign (3/17/2015); Hypertension; Hypopotassemia; Mixed hyperlipidemia; Osteoarthritis (7/20/2017); Other and unspecified hyperlipidemia; Reflux esophagitis; Renal failure, unspecified; Shortness of breath; and Unspecified essential hypertension. Ms. Yobany Newman  has a past surgical history that includes hx cholecystectomy; pr layr clos wnd face,facial <2.5cm; pr cardiac surg procedure unlist; hx cataract removal (Bilateral); and hx hysterectomy. Social History/Living Environment:   Home Environment: Private residence  Wheelchair Ramp: Yes  One/Two Story Residence: One story  Living Alone: No  Support Systems: Child(jayleen)  Patient Expects to be Discharged to[de-identified] Private residence  Current DME Used/Available at Home: Shower chair, Walker, rolling, Wheelchair  Prior Level of Function/Work/Activity:  Supervision for self care. Functional mobility primarily in WC.      Number of Personal Factors/Comorbidities that affect the Plan of Care: Brief history (0):  LOW COMPLEXITY   ASSESSMENT OF OCCUPATIONAL PERFORMANCE[de-identified]   Activities of Daily Living:           Basic ADLs (From Assessment) Complex ADLs (From Assessment)   Basic ADL  Feeding: Supervision  Oral Facial Hygiene/Grooming: Supervision  Bathing: Moderate assistance  Upper Body Dressing: Minimum assistance  Lower Body Dressing: Moderate assistance  Toileting: Total assistance     Grooming/Bathing/Dressing Activities of Daily Living     Cognitive Retraining  Safety/Judgement: Awareness of environment; Fall prevention                 Functional Transfers  Toilet Transfer : Contact guard assistance;Minimum assistance  Shower Transfer: Contact guard assistance;Minimum assistance     Bed/Mat Mobility  Supine to Sit: Contact guard assistance;Minimum assistance  Sit to Supine:  (in chair)  Sit to Stand: Contact guard assistance;Minimum assistance  Bed to Chair: Contact guard assistance;Minimum assistance  Scooting: Contact guard assistance       Most Recent Physical Functioning:   Gross Assessment:                  Posture:     Balance:  Sitting: Intact  Standing: Pull to stand; With support Bed Mobility:  Supine to Sit: Contact guard assistance;Minimum assistance  Sit to Supine:  (in chair)  Scooting: Contact guard assistance  Wheelchair Mobility:     Transfers:  Sit to Stand: Contact guard assistance;Minimum assistance  Stand to Sit: Contact guard assistance;Minimum assistance  Bed to Chair: Contact guard assistance;Minimum assistance     ROM:          RUE WFL         LUE shoulder limited to 90 degress flexion           Patient Vitals for the past 6 hrs:   BP BP Patient Position SpO2 Pulse   02/25/18 0725 115/67 At rest;Supine 99 % 79   02/25/18 1137 115/55 At rest;Supine 98 % 73       Mental Status  Neurologic State: Alert  Orientation Level: Oriented to person, Oriented to place  Cognition: Follows commands  Perception: Appears intact  Perseveration: No perseveration noted  Safety/Judgement: Awareness of environment, Fall prevention                          Physical Skills Involved:  1. Range of Motion  2. Balance  3. Strength  4.  Activity Tolerance Cognitive Skills Affected (resulting in the inability to perform in a timely and safe manner):  1. None Psychosocial Skills Affected:  1. None   Number of elements that affect the Plan of Care: 1-3:  LOW COMPLEXITY   CLINICAL DECISION MAKIN[address] [place] [address] AM-PAC 6 Clicks   Daily Activity Inpatient Short Form  How much help from another person does the patient currently need. .. Total A Lot A Little None   1. Putting on and taking off regular lower body clothing? [] 1   [x] 2   [] 3   [] 4   2. Bathing (including washing, rinsing, drying)? [] 1   [x] 2   [] 3   [] 4   3. Toileting, which includes using toilet, bedpan or urinal?   [] 1   [x] 2   [] 3   [] 4   4. Putting on and taking off regular upper body clothing? [] 1   [] 2   [x] 3   [] 4   5. Taking care of personal grooming such as brushing teeth? [] 1   [] 2   [x] 3   [] 4   6. Eating meals? [] 1   [] 2   [x] 3   [] 4   © 2007, Trustees of [address] [place] [address], under license to [employer]. All rights reserved      Score:  Initial: 15 Most Recent: X (Date: -- )    Interpretation of Tool:  Represents activities that are increasingly more difficult (i.e. Bed mobility, Transfers, Gait). Score 24 23 22-20 19-15 14-10 9-7 6     Modifier CH CI CJ CK CL CM CN      ? Self Care:     - CURRENT STATUS: CK - 40%-59% impaired, limited or restricted    - GOAL STATUS: CJ - 20%-39% impaired, limited or restricted    - D/C STATUS:  ---------------To be determined---------------  Payor: [employer] VIP C[employer] PLUS / [place] [employer] VIP CARE PLUS / Product Type: Managed Care Medicare /      Medical Necessity:     · Patient is expected to demonstrate progress in balance, coordination and functional technique to decrease assistance required with self care and funtional mobility. Reason for Services/Other Comments:  · Patient continues to require skilled intervention due to decreased self care and funtional mobility.    Use of outcome tool(s) and clinical judgement create a POC that gives a: LOW COMPLEXITY         TREATMENT:   (In addition to Assessment/Re-Assessment sessions the following treatments were rendered)     Pre-treatment Symptoms/Complaints:  none  Pain: Initial:   Pain Intensity 1: 0  Post Session:  0     Assessment/Reassessment only, no treatment provided today    Braces/Orthotics/Lines/Etc:   · IV  · travis catheter  · O2 Device: Nasal cannula  Treatment/Session Assessment:    · Response to Treatment:  Tolerated well  · Interdisciplinary Collaboration:   o Physical Therapist  o Occupational Therapist  o Registered Nurse  · After treatment position/precautions:   o Up in chair  o Bed alarm/tab alert on  o Bed/Chair-wheels locked  o Caregiver at bedside  o Call light within reach  o RN notified  o Family at bedside   · Compliance with Program/Exercises: compliant most of the time. · Recommendations/Intent for next treatment session: \"Next visit will focus on advancements to more challenging activities\".   Total Treatment Duration:  OT Patient Time In/Time Out  Time In: 1150  Time Out: [address], OT

## 2018-02-25 NOTE — PROGRESS NOTES
Patient with complaints of pain in her ankles bilaterally. Her edema has improved as the day has progressed. She has 3 small blisters on her upper foot. Medicated with Norco 5/325 mg for pain. She remains on 1500 ml fluid restriction.

## 2018-02-25 NOTE — PROGRESS NOTES
Patient with pink tinged urine. Spoke with Dr. Yoandy Baez regarding Heparin administration. Order received to hold Heparin dose at 2100.

## 2018-02-26 LAB
ANION GAP SERPL CALC-SCNC: 4 MMOL/L (ref 7–16)
BASOPHILS # BLD: 0 K/UL (ref 0–0.2)
BASOPHILS NFR BLD: 0 % (ref 0–2)
BUN SERPL-MCNC: 24 MG/DL (ref 8–23)
CALCIUM SERPL-MCNC: 8.2 MG/DL (ref 8.3–10.4)
CHLORIDE SERPL-SCNC: 104 MMOL/L (ref 98–107)
CO2 SERPL-SCNC: 35 MMOL/L (ref 21–32)
CREAT SERPL-MCNC: 0.96 MG/DL (ref 0.6–1)
DIFFERENTIAL METHOD BLD: ABNORMAL
EOSINOPHIL # BLD: 0.2 K/UL (ref 0–0.8)
EOSINOPHIL NFR BLD: 4 % (ref 0.5–7.8)
ERYTHROCYTE [DISTWIDTH] IN BLOOD BY AUTOMATED COUNT: 17.5 % (ref 11.9–14.6)
FERRITIN SERPL-MCNC: 315 NG/ML (ref 8–388)
GLUCOSE SERPL-MCNC: 87 MG/DL (ref 65–100)
HCT VFR BLD AUTO: 24.4 % (ref 35.8–46.3)
HCT VFR BLD AUTO: 24.8 % (ref 35.8–46.3)
HGB BLD-MCNC: 7.5 G/DL (ref 11.7–15.4)
HGB BLD-MCNC: 7.9 G/DL (ref 11.7–15.4)
IMM GRANULOCYTES # BLD: 0 K/UL (ref 0–0.5)
IMM GRANULOCYTES NFR BLD AUTO: 0 % (ref 0–5)
IRON SATN MFR SERPL: 67 %
IRON SERPL-MCNC: 98 UG/DL (ref 35–150)
LYMPHOCYTES # BLD: 0.8 K/UL (ref 0.5–4.6)
LYMPHOCYTES NFR BLD: 15 % (ref 13–44)
MCH RBC QN AUTO: 32.3 PG (ref 26.1–32.9)
MCHC RBC AUTO-ENTMCNC: 30.7 G/DL (ref 31.4–35)
MCV RBC AUTO: 105.2 FL (ref 79.6–97.8)
MONOCYTES # BLD: 0.3 K/UL (ref 0.1–1.3)
MONOCYTES NFR BLD: 6 % (ref 4–12)
NEUTS SEG # BLD: 3.9 K/UL (ref 1.7–8.2)
NEUTS SEG NFR BLD: 75 % (ref 43–78)
PLATELET # BLD AUTO: 201 K/UL (ref 150–450)
PMV BLD AUTO: 11.1 FL (ref 10.8–14.1)
POTASSIUM SERPL-SCNC: 3.7 MMOL/L (ref 3.5–5.1)
RBC # BLD AUTO: 2.32 M/UL (ref 4.05–5.25)
SODIUM SERPL-SCNC: 143 MMOL/L (ref 136–145)
TIBC SERPL-MCNC: 146 UG/DL (ref 250–450)
WBC # BLD AUTO: 5.2 K/UL (ref 4.3–11.1)

## 2018-02-26 PROCEDURE — 3331090002 HH PPS REVENUE DEBIT

## 2018-02-26 PROCEDURE — 3331090001 HH PPS REVENUE CREDIT

## 2018-02-26 PROCEDURE — 97110 THERAPEUTIC EXERCISES: CPT

## 2018-02-26 PROCEDURE — 97161 PT EVAL LOW COMPLEX 20 MIN: CPT

## 2018-02-26 PROCEDURE — 94760 N-INVAS EAR/PLS OXIMETRY 1: CPT

## 2018-02-26 PROCEDURE — 36430 TRANSFUSION BLD/BLD COMPNT: CPT

## 2018-02-26 PROCEDURE — 80048 BASIC METABOLIC PNL TOTAL CA: CPT | Performed by: FAMILY MEDICINE

## 2018-02-26 PROCEDURE — 86900 BLOOD TYPING SEROLOGIC ABO: CPT | Performed by: FAMILY MEDICINE

## 2018-02-26 PROCEDURE — 74011250637 HC RX REV CODE- 250/637: Performed by: FAMILY MEDICINE

## 2018-02-26 PROCEDURE — 85018 HEMOGLOBIN: CPT | Performed by: FAMILY MEDICINE

## 2018-02-26 PROCEDURE — 86923 COMPATIBILITY TEST ELECTRIC: CPT | Performed by: FAMILY MEDICINE

## 2018-02-26 PROCEDURE — 82728 ASSAY OF FERRITIN: CPT | Performed by: FAMILY MEDICINE

## 2018-02-26 PROCEDURE — 85025 COMPLETE CBC W/AUTO DIFF WBC: CPT | Performed by: FAMILY MEDICINE

## 2018-02-26 PROCEDURE — 74011000250 HC RX REV CODE- 250: Performed by: FAMILY MEDICINE

## 2018-02-26 PROCEDURE — 77030013131 HC IV BLD ST ICUM -A

## 2018-02-26 PROCEDURE — 83540 ASSAY OF IRON: CPT | Performed by: FAMILY MEDICINE

## 2018-02-26 PROCEDURE — 99218 HC RM OBSERVATION: CPT

## 2018-02-26 PROCEDURE — 36415 COLL VENOUS BLD VENIPUNCTURE: CPT | Performed by: FAMILY MEDICINE

## 2018-02-26 PROCEDURE — 77010033678 HC OXYGEN DAILY

## 2018-02-26 PROCEDURE — P9016 RBC LEUKOCYTES REDUCED: HCPCS | Performed by: FAMILY MEDICINE

## 2018-02-26 PROCEDURE — 74011000258 HC RX REV CODE- 258: Performed by: FAMILY MEDICINE

## 2018-02-26 PROCEDURE — 74011250636 HC RX REV CODE- 250/636: Performed by: FAMILY MEDICINE

## 2018-02-26 PROCEDURE — 94640 AIRWAY INHALATION TREATMENT: CPT

## 2018-02-26 RX ORDER — SODIUM CHLORIDE 9 MG/ML
250 INJECTION, SOLUTION INTRAVENOUS AS NEEDED
Status: DISCONTINUED | OUTPATIENT
Start: 2018-02-26 | End: 2018-03-03 | Stop reason: HOSPADM

## 2018-02-26 RX ADMIN — ALBUTEROL SULFATE 2.5 MG: 2.5 SOLUTION RESPIRATORY (INHALATION) at 07:27

## 2018-02-26 RX ADMIN — HYDROCODONE BITARTRATE AND ACETAMINOPHEN 1 TABLET: 5; 325 TABLET ORAL at 17:32

## 2018-02-26 RX ADMIN — RDII 250 MG CAPSULE 250 MG: at 17:32

## 2018-02-26 RX ADMIN — SENNOSIDES AND DOCUSATE SODIUM 1 TABLET: 8.6; 5 TABLET ORAL at 09:47

## 2018-02-26 RX ADMIN — Medication 5 ML: at 14:00

## 2018-02-26 RX ADMIN — PIPERACILLIN SODIUM,TAZOBACTAM SODIUM 3.38 G: 3; .375 INJECTION, POWDER, FOR SOLUTION INTRAVENOUS at 21:00

## 2018-02-26 RX ADMIN — CLOPIDOGREL BISULFATE 75 MG: 75 TABLET ORAL at 09:48

## 2018-02-26 RX ADMIN — ASPIRIN 81 MG: 81 TABLET, COATED ORAL at 09:47

## 2018-02-26 RX ADMIN — ALBUTEROL SULFATE 2.5 MG: 2.5 SOLUTION RESPIRATORY (INHALATION) at 20:14

## 2018-02-26 RX ADMIN — CYANOCOBALAMIN TAB 1000 MCG 1000 MCG: 1000 TAB at 09:47

## 2018-02-26 RX ADMIN — Medication 10 ML: at 01:39

## 2018-02-26 RX ADMIN — ALLOPURINOL 100 MG: 100 TABLET ORAL at 09:47

## 2018-02-26 RX ADMIN — Medication 10 ML: at 11:23

## 2018-02-26 RX ADMIN — ALLOPURINOL 100 MG: 100 TABLET ORAL at 17:32

## 2018-02-26 RX ADMIN — POLYETHYLENE GLYCOL (3350) 17 G: 17 POWDER, FOR SOLUTION ORAL at 09:48

## 2018-02-26 RX ADMIN — LORATADINE 10 MG: 10 TABLET ORAL at 09:47

## 2018-02-26 RX ADMIN — COLCHICINE 0.3 MG: 0.6 TABLET, FILM COATED ORAL at 09:47

## 2018-02-26 RX ADMIN — FAMOTIDINE 20 MG: 20 TABLET, FILM COATED ORAL at 09:47

## 2018-02-26 RX ADMIN — PIPERACILLIN SODIUM,TAZOBACTAM SODIUM 3.38 G: 3; .375 INJECTION, POWDER, FOR SOLUTION INTRAVENOUS at 10:02

## 2018-02-26 RX ADMIN — PIPERACILLIN SODIUM,TAZOBACTAM SODIUM 3.38 G: 3; .375 INJECTION, POWDER, FOR SOLUTION INTRAVENOUS at 01:38

## 2018-02-26 RX ADMIN — ALBUTEROL SULFATE 2.5 MG: 2.5 SOLUTION RESPIRATORY (INHALATION) at 13:27

## 2018-02-26 RX ADMIN — RDII 250 MG CAPSULE 250 MG: at 09:47

## 2018-02-26 RX ADMIN — ONDANSETRON 4 MG: 2 INJECTION INTRAMUSCULAR; INTRAVENOUS at 11:23

## 2018-02-26 RX ADMIN — HYDROCODONE BITARTRATE AND ACETAMINOPHEN 1 TABLET: 5; 325 TABLET ORAL at 02:54

## 2018-02-26 NOTE — PROGRESS NOTES
Spiritual Care Visit, initial visit. Visited with patient at bedside. Prayed for patient's healing and health. Visit by Johanna Carey Back, Staff .  Leila., Nacho.B., B.A.

## 2018-02-26 NOTE — PROGRESS NOTES
Fani HERNANDEZ RN called back and states will have Dr Smitha Webb review. Message sent to Malina Castillo RN CM [facility] that patient is in room 359.

## 2018-02-26 NOTE — PROGRESS NOTES
Hospitalist Progress Note    2018  Admit Date: 2018 12:59 PM   NAME: Josafat Castanon   :  2/10/1927   MRN:  [id number]   Attending: Miguelina Hardwick MD  PCP:  Nisha Gandhi MD    SUBJECTIVE:   80 y. o. female who has a PMH of diastolic HF with preserved EF, moderate-severe T valve regurgitation, home oxygen, HTN, CKD stage III, Gout, dyslipidemia, CAD, NSTEMI presents to ER with complaints of SOB and progressive LE edema. She was recently hospitalized(-) for PNA and s/p Levaquin. She was discharged home with 2L oxygen and she reports compliance. She also reports compliance with Lasix 20mg BID. In ER, CXR is clear without congestion or infiltrates. Her lab studies are at baseline and BNP is only 217. After travis was placed; UA collected is significant for >100WBC and many Bacteria. Admitted and started on Zosyn. Today: patient is feeling better. She denies fever chills or SOB. Review of Systems negative with exception of pertinent positives noted above  PHYSICAL EXAM     Visit Vitals    /64 (BP 1 Location: Right arm, BP Patient Position: At rest;Supine)    Pulse 83    Temp 97.7 °F (36.5 °C)    Resp 18    Ht 5' 2\" (1.575 m)    Wt 66.7 kg (147 lb)    SpO2 98%    BMI 26.89 kg/m2      Temp (24hrs), Av.1 °F (36.2 °C), Min:96 °F (35.6 °C), Max:98.5 °F (36.9 °C)    Oxygen Therapy  O2 Sat (%): 98 % (18)  Pulse via Oximetry: 81 beats per minute (18)  O2 Device: Nasal cannula (18)  O2 Flow Rate (L/min): 2 l/min (18)    Intake/Output Summary (Last 24 hours) at 18  Last data filed at 18 1700   Gross per 24 hour   Intake              580 ml   Output             3075 ml   Net            -2495 ml      General: No acute distress    Lungs:  CTA Bilaterally. Heart:  Regular rate and rhythm. 1+ edema(improved)  Abdomen: Soft, Non distended, Non tender, Positive bowel sounds  Extremities: No cyanosis, clubbing. Normal ROM. Neurologic:  No focal deficits  Psych:  Alert and oriented, cooperative    [address] Problems    Diagnosis Date Noted    Chronic anemia 02/25/2018    Edema, peripheral 02/24/2018    Heart failure ([address] 02/24/2018    UTI (urinary tract infection) 02/07/2018    Essential hypertension, benign 03/17/2015     Plan:  · Continue Zosyn and follow up urine culture  ·  LE edema improved, will continue home Lasix PO.  Does not appear to be CHF exacerbation  · Continue home meds  · PT/OT    Signed By: Cira Nation MD     February 25, 2018

## 2018-02-26 NOTE — PROGRESS NOTES
PM assessment complete. Alert, oriented x 4. Respirations even and unlabored, no distress noted. Abdomen soft, bowel sounds active in all 4 quadrants. Weir patent and draining. BLE with 2+ pitting edema. Bed alarm on. Denies needs or pain.

## 2018-02-26 NOTE — PROGRESS NOTES
Problem: Interdisciplinary Rounds  Goal: Interdisciplinary Rounds  Interdisciplinary team rounds were held 2/26/2018 with the following team members:Care Management, Nursing, Pharmacy and Physician and the patient. Plan of care discussed. See clinical pathway and/or care plan for interventions and desired outcomes.

## 2018-02-26 NOTE — PROGRESS NOTES
Call to [facility] DAVID RN who states that Darene Dandy RN is assigned to patient today. Call to [facility] to request reevaluation for inpatient status - message left.

## 2018-02-26 NOTE — PROGRESS NOTES
Hospitalist Progress Note    2018  Admit Date: 2018 12:59 PM   NAME: Eldred Bloch   :  2/10/1927   MRN:  [id number]   Attending: Claudell Polo, MD  PCP:  Bahman Dorsey MD    SUBJECTIVE:   80 y. o. female who has a PMH of diastolic HF with preserved EF, moderate-severe T valve regurgitation, home oxygen, HTN, CKD stage III, Gout, dyslipidemia, CAD, NSTEMI presents to ER with complaints of SOB and progressive LE edema. She was recently hospitalized(-) for PNA and s/p Levaquin. She was discharged home with 2L oxygen and she reports compliance. She also reports compliance with Lasix 20mg BID. In ER, CXR is clear without congestion or infiltrates. Her lab studies are at baseline and BNP is only 217. After travis was placed; UA collected is significant for >100WBC and many Bacteria. Admitted and started on Zosyn. Urine culture pending. Today: patient is feeling well. She did report nausea to nursing staff that was treated with Zofran. She denies abdominal pain.  Anemia worsening , patient denies melena of bloody stools or having colonoscopy in past.        Review of Systems negative with exception of pertinent positives noted above  PHYSICAL EXAM     Visit Vitals    /67 (BP 1 Location: Left arm, BP Patient Position: At rest)    Pulse 88    Temp 98.4 °F (36.9 °C)    Resp 20    Ht 5' 2\" (1.575 m)    Wt 66.3 kg (146 lb 3.2 oz)    SpO2 100%    BMI 26.74 kg/m2      Temp (24hrs), Av.9 °F (36.6 °C), Min:97.3 °F (36.3 °C), Max:98.4 °F (36.9 °C)    Oxygen Therapy  O2 Sat (%): 100 % (18 1437)  Pulse via Oximetry: 89 beats per minute (18 132)  O2 Device: Nasal cannula (18 132)  O2 Flow Rate (L/min): 2 l/min (18 1327)  FIO2 (%): 28 % (18 1327)    Intake/Output Summary (Last 24 hours) at 18 1548  Last data filed at 18 1437   Gross per 24 hour   Intake              340 ml   Output             1200 ml   Net             -860 ml      General: No acute distress    Lungs:  CTA Bilaterally. Heart:  Regular rate and rhythm. 1+ edema(improved)  Abdomen: Soft, Non distended, Non tender, Positive bowel sounds  Extremities: No cyanosis, clubbing. Normal ROM. Neurologic:  No focal deficits  Psych:  Alert and oriented, cooperative    [address] Problems    Diagnosis Date Noted    Chronic anemia 02/25/2018    Edema, peripheral 02/24/2018    Heart failure ([address] 02/24/2018    UTI (urinary tract infection) 02/07/2018    Essential hypertension, benign 03/17/2015     Plan:   -Hgb decline to 7.9(8.9 on admission but no anemia labs seen in recent records). Will transfuse 1 units PRBC. Fecal occult and iron studies are pending. Continue Zosyn and follow up urine culture   LE edema improved, will continue home Lasix PO.  Does not appear to be CHF exacerbation  Continue home meds  PT/OT    Signed By: Wagner Kim MD     February 26, 2018

## 2018-02-26 NOTE — PROGRESS NOTES
SW spoke with pt during care rounds. SW familiar with pt from recent admission. Pt discharged home with [facility] and is still current. At this time unsure what pt's d/c needs will be.

## 2018-02-26 NOTE — PROGRESS NOTES
Problem: Mobility Impaired (Adult and Pediatric)  Goal: *Acute Goals and Plan of Care (Insert Text)  DISCHARGE GOALS:  (1.)Ms. Rae Geronimo will move from supine to sit and sit to supine  in bed with SUPERVISION within 5 treatment day(s). (2.)Ms. Rae Geronimo will transfer from bed to chair and chair to bed with SUPERVISION using the least restrictive device within 5 treatment day(s). (3.)Ms. Rae Geronimo will ambulate with SUPERVISION for 75 feet with the least restrictive device within 5 treatment day(s). ________________________________________________________________________________________________    PHYSICAL THERAPY: Daily Note, Treatment Day: 1st, AM 2/26/2018  OBSERVATION: Hospital Day: 3  Payor: FIRST [employer] VIP CARE PLUS / [place] FIRST CHO[employer] VIP CARE PLUS / Product Type: Managed Care Medicare /      NAME/AGE/GENDER: Esperanza Pritchett is a 80 y.o. female   PRIMARY DIAGNOSIS: Heart failure ([address]  Edema, peripheral UTI (urinary tract infection) UTI (urinary tract infection)        ICD-10: Treatment Diagnosis:   · Generalized Muscle Weakness (M62.81)  · Difficulty in walking, Not elsewhere classified (R26.2)   Precaution/Allergies:  Bee pollen and Fire ant      ASSESSMENT:     Ms. Rae Geronimo presents with above diagnosis (and UTI per RN). This is patient's 3rd hospitalization this month (2/6-2/11 with hypoxia and hypotension and 2/13-2/19 with pneumonia). Patient seen by [facility] following last d/c but had not started services with PT, OT, or aide. Patient uses a wheelchair as primary means of mobility but uses a walker for transfers and short distances. She lives with her son and has some assist with ADLs. Patient would benefit from therapy services during admission to address strength, balance, and mobility. She would benefit from therapy services at d/c as well. Pt states she is not feeling well today. Pt agreeable to exercises in supine.     This section established at most recent assessment   PROBLEM LIST (Impairments causing functional limitations):  1. Decreased Strength  2. Decreased ADL/Functional Activities  3. Decreased Transfer Abilities  4. Decreased Ambulation Ability/Technique  5. Decreased Balance   INTERVENTIONS PLANNED: (Benefits and precautions of physical therapy have been discussed with the patient.)  1. Bed Mobility  2. Gait Training  3. Home Exercise Program (HEP)  4. Therapeutic Activites  5. Therapeutic Exercise/Strengthening     TREATMENT PLAN: Frequency/Duration: daily for duration of hospital stay  Rehabilitation Potential For Stated Goals: Good     RECOMMENDED REHABILITATION/EQUIPMENT: (at time of discharge pending progress): Due to the probability of continued deficits (see above) this patient will likely need continued skilled physical therapy after discharge. Equipment:    None at this time              HISTORY:   History of Present Injury/Illness (Reason for Referral):  80 y. o. female who has a PMH of diastolic HF with preserved EF, moderate-severe T valve regurgitation, home oxygen, HTN, CKD stage III, Gout, dyslipidemia, CAD, NSTEMI presents to ER with complaints of SOB and progressive LE edema. She was recently hospitalized(2/13-2/19) for PNA and s/p Levaquin. She was discharged home with 2L oxygen and she reports compliance. She also reports compliance with Lasix 20mg BID. In ER, CXR is clear without congestion or infiltrates. Her lab studies are at baseline and BNP is only 217. After travis was placed; UA collected is significant for >100WBC and many Bacteria. Past Medical History/Comorbidities:   Ms. Yuliya Miller  has a past medical history of Acute kidney failure, unspecified; Arthritis; CAD (coronary artery disease); Cellulitis and abscess of other specified site; Coronary atherosclerosis of native coronary artery; Essential hypertension, benign (3/17/2015); Hypertension; Hypopotassemia; Mixed hyperlipidemia; Osteoarthritis (7/20/2017); Other and unspecified hyperlipidemia;  Reflux esophagitis; Renal failure, unspecified; Shortness of breath; and Unspecified essential hypertension. Ms. Driss Peters  has a past surgical history that includes hx cholecystectomy; pr layr clos wnd face,facial <2.5cm; pr cardiac surg procedure unlist; hx cataract removal (Bilateral); and hx hysterectomy. Social History/Living Environment:   Home Environment: Private residence  Wheelchair Ramp: Yes  One/Two Story Residence: One story  Living Alone: No  Support Systems: Child(jayleen)  Patient Expects to be Discharged to[de-identified] Private residence  Current DME Used/Available at Home: Wheelchair, Eliza Moellers, [place], [address] [place] chair  Prior Level of Function/Work/Activity:  Wheelchair as primary means of mobility but rolling walker for transfers and short distances. Assist with ADLs. Personal Factors:          Sex:  female        Age:  80 y.o. Multiple hospitalizations   Number of Personal Factors/Comorbidities that affect the Plan of Care: 3+: HIGH COMPLEXITY   EXAMINATION:   Most Recent Physical Functioning:   Gross Assessment:                  Posture:     Balance:    Bed Mobility:     Wheelchair Mobility:     Transfers:     Gait:            Body Structures Involved:  1. Muscles Body Functions Affected:  1. Movement Related Activities and Participation Affected:  1. Mobility  2. Self Care  3. Domestic Life   Number of elements that affect the Plan of Care: 4+: HIGH COMPLEXITY   CLINICAL PRESENTATION:   Presentation: Stable and uncomplicated: LOW COMPLEXITY   CLINICAL DECISION MAKIN [facility] [address] [id number] AM-PAC 6 Clicks   Basic Mobility Inpatient Short Form  How much difficulty does the patient currently have. .. Unable A Lot A Little None   1. Turning over in bed (including adjusting bedclothes, sheets and blankets)? [] 1   [] 2   [x] 3   [] 4   2. Sitting down on and standing up from a chair with arms ( e.g., wheelchair, bedside commode, etc.)   [] 1   [] 2   [x] 3   [] 4   3.   Moving from lying on back to sitting on the side of the bed? [] 1   [] 2   [x] 3   [] 4   How much help from another person does the patient currently need. .. Total A Lot A Little None   4. Moving to and from a bed to a chair (including a wheelchair)? [] 1   [] 2   [x] 3   [] 4   5. Need to walk in hospital room? [] 1   [] 2   [x] 3   [] 4   6. Climbing 3-5 steps with a railing? [] 1   [] 2   [x] 3   [] 4   © 2007, Trustees of [address] [place] [address] [address] [address], under license to [employer]. All rights reserved      Score:  Initial: 18 Most Recent: X (Date: -- )    Interpretation of Tool:  Represents activities that are increasingly more difficult (i.e. Bed mobility, Transfers, Gait). Score 24 23 22-20 19-15 14-10 9-7 6     Modifier CH CI CJ CK CL CM CN      ? Mobility - Walking and Moving Around:     - CURRENT STATUS: CK - 40%-59% impaired, limited or restricted    - GOAL STATUS: CJ - 20%-39% impaired, limited or restricted    - D/C STATUS:  ---------------To be determined---------------  Payor: FIRST [employer] VIP CARE PLUS [place] [employer] VIP CARE PLUS / Product Type: Managed Care Medicare /      Medical Necessity:     · Patient is expected to demonstrate progress in strength, balance and coordination to increase independence with bed mobility, transfers, and gait. Reason for Services/Other Comments:  · Patient continues to require skilled intervention due to generalized weakness and impaired mobility secondary to UTI. Use of outcome tool(s) and clinical judgement create a POC that gives a: Clear prediction of patient's progress: LOW COMPLEXITY            TREATMENT:   (In addition to Assessment/Re-Assessment sessions the following treatments were rendered)   Pre-treatment Symptoms/Complaints:  Patient reports she would like to sit in the chair. Reports she hasn't eaten in a few days. Pain: Initial:   Pain Intensity 1: 5  Post Session:  0     Therapeutic Exercise: (  15 minutes):  Exercises per grid below to improve mobility. Braces/Orthotics/Lines/Etc:   · IV  · travis catheter  · O2 Device: Nasal cannula  Treatment/Session Assessment:    · Response to Treatment:  Patient agreeable to exercises, stating she could not move today. · Interdisciplinary Collaboration:   o Physical Therapist  o Occupational Therapist  o Registered Nurse  · After treatment position/precautions:   o Supine in bed  o Bed alarm/tab alert on  o Bed/Chair-wheels locked  o Bed in low position  o Call light within reach  o RN notified  o Family at bedside   · Compliance with Program/Exercises: compliant all of the time. · Recommendations/Intent for next treatment session: \"Next visit will focus on advancements to more challenging activities and reduction in assistance provided\".   Total Treatment Duration:  PT Patient Time In/Time Out  Time In: 1245  Time Out: VINNY Hammond

## 2018-02-26 NOTE — PHYSICIAN ADVISORY
Letter of Determination:  Outpatient states receiving Observation Services    This case was reviewed, and I concur with Outpatient status receiving Observation services. This determination may change depending on further medical information, condition changes of the patient, or treatment requirements.       Jeny Mcknight MD, GABRIELLA,   Physician [place].

## 2018-02-27 LAB
ANION GAP SERPL CALC-SCNC: 3 MMOL/L (ref 7–16)
BASOPHILS # BLD: 0 K/UL (ref 0–0.2)
BASOPHILS NFR BLD: 0 % (ref 0–2)
BUN SERPL-MCNC: 56 MG/DL (ref 8–23)
CALCIUM SERPL-MCNC: 8.2 MG/DL (ref 8.3–10.4)
CHLORIDE SERPL-SCNC: 108 MMOL/L (ref 98–107)
CO2 SERPL-SCNC: 34 MMOL/L (ref 21–32)
CREAT SERPL-MCNC: 0.91 MG/DL (ref 0.6–1)
DIFFERENTIAL METHOD BLD: ABNORMAL
EOSINOPHIL # BLD: 0.2 K/UL (ref 0–0.8)
EOSINOPHIL NFR BLD: 4 % (ref 0.5–7.8)
ERYTHROCYTE [DISTWIDTH] IN BLOOD BY AUTOMATED COUNT: 19.6 % (ref 11.9–14.6)
GLUCOSE SERPL-MCNC: 84 MG/DL (ref 65–100)
HCT VFR BLD AUTO: 23.3 % (ref 35.8–46.3)
HGB BLD-MCNC: 7.4 G/DL (ref 11.7–15.4)
IMM GRANULOCYTES # BLD: 0 K/UL (ref 0–0.5)
IMM GRANULOCYTES NFR BLD AUTO: 0 % (ref 0–5)
LYMPHOCYTES # BLD: 0.8 K/UL (ref 0.5–4.6)
LYMPHOCYTES NFR BLD: 14 % (ref 13–44)
MCH RBC QN AUTO: 32.2 PG (ref 26.1–32.9)
MCHC RBC AUTO-ENTMCNC: 31.8 G/DL (ref 31.4–35)
MCV RBC AUTO: 101.3 FL (ref 79.6–97.8)
MONOCYTES # BLD: 0.4 K/UL (ref 0.1–1.3)
MONOCYTES NFR BLD: 7 % (ref 4–12)
NEUTS SEG # BLD: 4.1 K/UL (ref 1.7–8.2)
NEUTS SEG NFR BLD: 75 % (ref 43–78)
PLATELET # BLD AUTO: 185 K/UL (ref 150–450)
PMV BLD AUTO: 11.2 FL (ref 10.8–14.1)
POTASSIUM SERPL-SCNC: 4.4 MMOL/L (ref 3.5–5.1)
RBC # BLD AUTO: 2.3 M/UL (ref 4.05–5.25)
SODIUM SERPL-SCNC: 145 MMOL/L (ref 136–145)
URATE SERPL-MCNC: 2.8 MG/DL (ref 2.6–6)
WBC # BLD AUTO: 5.5 K/UL (ref 4.3–11.1)

## 2018-02-27 PROCEDURE — 77030013131 HC IV BLD ST ICUM -A

## 2018-02-27 PROCEDURE — 94640 AIRWAY INHALATION TREATMENT: CPT

## 2018-02-27 PROCEDURE — 3331090002 HH PPS REVENUE DEBIT

## 2018-02-27 PROCEDURE — 3331090001 HH PPS REVENUE CREDIT

## 2018-02-27 PROCEDURE — 74011250637 HC RX REV CODE- 250/637: Performed by: FAMILY MEDICINE

## 2018-02-27 PROCEDURE — 99218 HC RM OBSERVATION: CPT

## 2018-02-27 PROCEDURE — 74011000302 HC RX REV CODE- 302: Performed by: FAMILY MEDICINE

## 2018-02-27 PROCEDURE — 74011250636 HC RX REV CODE- 250/636: Performed by: FAMILY MEDICINE

## 2018-02-27 PROCEDURE — 77010033678 HC OXYGEN DAILY

## 2018-02-27 PROCEDURE — 74011000250 HC RX REV CODE- 250: Performed by: FAMILY MEDICINE

## 2018-02-27 PROCEDURE — 94760 N-INVAS EAR/PLS OXIMETRY 1: CPT

## 2018-02-27 PROCEDURE — P9016 RBC LEUKOCYTES REDUCED: HCPCS | Performed by: FAMILY MEDICINE

## 2018-02-27 PROCEDURE — 74011000258 HC RX REV CODE- 258: Performed by: FAMILY MEDICINE

## 2018-02-27 PROCEDURE — 80048 BASIC METABOLIC PNL TOTAL CA: CPT | Performed by: FAMILY MEDICINE

## 2018-02-27 PROCEDURE — 85025 COMPLETE CBC W/AUTO DIFF WBC: CPT | Performed by: FAMILY MEDICINE

## 2018-02-27 PROCEDURE — 84550 ASSAY OF BLOOD/URIC ACID: CPT | Performed by: HOSPITALIST

## 2018-02-27 PROCEDURE — 36415 COLL VENOUS BLD VENIPUNCTURE: CPT | Performed by: FAMILY MEDICINE

## 2018-02-27 PROCEDURE — 36430 TRANSFUSION BLD/BLD COMPNT: CPT

## 2018-02-27 PROCEDURE — 86580 TB INTRADERMAL TEST: CPT | Performed by: FAMILY MEDICINE

## 2018-02-27 RX ORDER — SODIUM CHLORIDE 9 MG/ML
250 INJECTION, SOLUTION INTRAVENOUS AS NEEDED
Status: DISCONTINUED | OUTPATIENT
Start: 2018-02-27 | End: 2018-03-03 | Stop reason: HOSPADM

## 2018-02-27 RX ORDER — COLCHICINE 0.6 MG/1
0.6 CAPSULE ORAL
Status: DISCONTINUED | OUTPATIENT
Start: 2018-02-27 | End: 2018-02-27 | Stop reason: SDUPTHER

## 2018-02-27 RX ADMIN — ALLOPURINOL 100 MG: 100 TABLET ORAL at 17:05

## 2018-02-27 RX ADMIN — PRAVASTATIN SODIUM 20 MG: 20 TABLET ORAL at 22:19

## 2018-02-27 RX ADMIN — ALLOPURINOL 100 MG: 100 TABLET ORAL at 08:39

## 2018-02-27 RX ADMIN — ACETAMINOPHEN 650 MG: 325 TABLET ORAL at 12:13

## 2018-02-27 RX ADMIN — PIPERACILLIN SODIUM,TAZOBACTAM SODIUM 3.38 G: 3; .375 INJECTION, POWDER, FOR SOLUTION INTRAVENOUS at 20:58

## 2018-02-27 RX ADMIN — ALBUTEROL SULFATE 2.5 MG: 2.5 SOLUTION RESPIRATORY (INHALATION) at 08:04

## 2018-02-27 RX ADMIN — SENNOSIDES AND DOCUSATE SODIUM 1 TABLET: 8.6; 5 TABLET ORAL at 08:39

## 2018-02-27 RX ADMIN — HYDROCODONE BITARTRATE AND ACETAMINOPHEN 1 TABLET: 5; 325 TABLET ORAL at 00:08

## 2018-02-27 RX ADMIN — PRAVASTATIN SODIUM 20 MG: 20 TABLET ORAL at 00:08

## 2018-02-27 RX ADMIN — LORATADINE 10 MG: 10 TABLET ORAL at 08:38

## 2018-02-27 RX ADMIN — PIPERACILLIN SODIUM,TAZOBACTAM SODIUM 3.38 G: 3; .375 INJECTION, POWDER, FOR SOLUTION INTRAVENOUS at 15:28

## 2018-02-27 RX ADMIN — CYANOCOBALAMIN TAB 1000 MCG 1000 MCG: 1000 TAB at 08:39

## 2018-02-27 RX ADMIN — FAMOTIDINE 20 MG: 20 TABLET, FILM COATED ORAL at 08:39

## 2018-02-27 RX ADMIN — PIPERACILLIN SODIUM,TAZOBACTAM SODIUM 3.38 G: 3; .375 INJECTION, POWDER, FOR SOLUTION INTRAVENOUS at 05:22

## 2018-02-27 RX ADMIN — ONDANSETRON 4 MG: 2 INJECTION INTRAMUSCULAR; INTRAVENOUS at 14:11

## 2018-02-27 RX ADMIN — ALBUTEROL SULFATE 2.5 MG: 2.5 SOLUTION RESPIRATORY (INHALATION) at 20:55

## 2018-02-27 RX ADMIN — COLCHICINE 0.3 MG: 0.6 TABLET, FILM COATED ORAL at 08:39

## 2018-02-27 RX ADMIN — FUROSEMIDE 20 MG: 20 TABLET ORAL at 08:40

## 2018-02-27 RX ADMIN — HYDROCODONE BITARTRATE AND ACETAMINOPHEN 1 TABLET: 5; 325 TABLET ORAL at 20:52

## 2018-02-27 RX ADMIN — RDII 250 MG CAPSULE 250 MG: at 08:39

## 2018-02-27 RX ADMIN — ALBUTEROL SULFATE 2.5 MG: 2.5 SOLUTION RESPIRATORY (INHALATION) at 13:24

## 2018-02-27 RX ADMIN — TUBERCULIN PURIFIED PROTEIN DERIVATIVE 5 UNITS: 5 INJECTION, SOLUTION INTRADERMAL at 17:00

## 2018-02-27 RX ADMIN — RDII 250 MG CAPSULE 250 MG: at 17:05

## 2018-02-27 RX ADMIN — HYDROCODONE BITARTRATE AND ACETAMINOPHEN 1 TABLET: 5; 325 TABLET ORAL at 14:11

## 2018-02-27 NOTE — PROGRESS NOTES
Problem: Interdisciplinary Rounds  Goal: Interdisciplinary Rounds  Interdisciplinary team rounds were held 2/27/2018 with the following team members:Care Management, Nursing, Pharmacy and Physician and the patient and Daughter. Plan of care discussed. See clinical pathway and/or care plan for interventions and desired outcomes.

## 2018-02-27 NOTE — CONSULTS
[facility] Consult Note       Primary GI Physician: Maria Alejandra Gibson MD (new)  Referring Physician:  Daryn Wang MD    Consult Date:  2/27/2018  Admit Date:  2/24/2018    Chief Complaint:  Anemia    Subjective:     History of Present Illness:  Patient is a 80 y.o. female  who is seen in consultation at the request of Dr. Lenz Friday for further evaluation of anemia. Patient has a PMHx to include, but not limited to diastolic HF with preserved EF of 55%, moderate-severe T valve regurgitation, home oxygen 2L after recent discharge for PNA, HTN, CKD stage III, Gout, dyslipidemia, CAD, and NSTEMI. She presented to the ED on 2/24 with SOB and progressive LE edema. CXR neg for congestion or infiltrates. . Positive for UTI and started on Zoysn. WBC normal. Presenting Hgb was 8.9 on 2/24 and has slowly drifted down to 7.5 yesterday. She got 1u PRBC. Repeat Hgb was 7.9 and Hgb this morning was 7.4. She is getting a second unit of PRBC today. Per nursing, no overt or active bleeding while inpatient. Patient denies a hx of GI bleed or any evidence of bleeding PTA. No N/V or abdominal pain. Last BM was 2 days ago. . Iron studies 2/26 was 98, TIBC 146, transferrin sat 67 and ferritin 315. She denies any NSAIDs use other than baby ASA qd. She is on Plavix which was discontinued today. Colonoscopy by Dr. SAINT-DIÉ in 5/2009 for changes in bowel habits revealed severe pan-diverticulosis. EGD 4/2009 with Dr. Hal Tijerina for intractable nausea revealed bile gastritis and benign gastric polyp. She reports a hx of GERD which is controlled with Zantac every day.        PMH:  Past Medical History:   Diagnosis Date    Acute kidney failure, unspecified     Arthritis     CAD (coronary artery disease)     Cellulitis and abscess of other specified site     Coronary atherosclerosis of native coronary artery     Essential hypertension, benign 3/17/2015    Hypertension     Hypopotassemia     Mixed hyperlipidemia  Osteoarthritis 7/20/2017    Other and unspecified hyperlipidemia     Reflux esophagitis     Renal failure, unspecified     Shortness of breath     Unspecified essential hypertension        PSH:  Past Surgical History:   Procedure Laterality Date    CARDIAC SURG PROCEDURE UNLIST      stent    HX CATARACT REMOVAL Bilateral     HX CHOLECYSTECTOMY      HX HYSTERECTOMY      complete    [place] LAYR CLOS WND FACE,FACIAL <2.5CM         Allergies: Allergies   Allergen Reactions    Bee Pollen Swelling    Fire Ant Itching and Swelling       Home Medications:  Prior to Admission medications    Medication Sig Start Date End Date Taking? Authorizing Provider   acetaminophen (PAIN RELIEVER) 500 mg tablet Take 500 mg by mouth every six (6) hours as needed for Pain. 2 tablets as needed    Historical Provider   OXYGEN-AIR DELIVERY SYSTEMS Take 2 L by inhalation continuous. via NC    Historical Provider   albuterol (PROVENTIL VENTOLIN) 2.5 mg /3 mL (0.083 %) nebulizer solution 3 mL by Nebulization route every four (4) hours as needed for Wheezing. 2/19/18   Osmany Matthew MD   acetaminophen (TYLENOL) 325 mg tablet Take 2 Tabs by mouth every six (6) hours as needed. 2/19/18   Osmany Matthew MD   amLODIPine (NORVASC) 5 mg tablet Take 1 Tab by mouth daily. 2/19/18   Osmany Matthew MD   furosemide (LASIX) 20 mg tablet Take 1 Tab by mouth daily. 2/19/18   Osmany Matthew MD   albuterol (PROVENTIL HFA, VENTOLIN HFA, PROAIR HFA) 90 mcg/actuation inhaler Take 1 Puff by inhalation every six (6) hours as needed for Wheezing. 2/19/18   Osmany Matthew MD   sennosides (SENNA) 8.6 mg cap Take 8.6 Tabs by mouth as needed (constipation). daily as needed. Historical Provider   colchicine 0.6 mg tablet Take 0.5 Tabs by mouth daily. 1/6/18   Tomás Abebe,    cyanocobalamin 1,000 mcg tablet Take 1 Tab by mouth daily.  1/6/18   Mark Rogers,    Saccharomyces boulardii (FLORASTOR) 250 mg capsule Take 1 Cap by mouth two (2) times a day. 1/5/18   Tomás Abebe DO   clotrimazole (LOTRIMIN AF, CLOTRIMAZOLE,) 1 % topical cream Apply  to affected area two (2) times a day. 11/27/17   Anya Gomez MD   allopurinol (ZYLOPRIM) 100 mg tablet Take 1 Tab by mouth two (2) times a day. 10/24/17   Anya Gomez MD   pravastatin (PRAVACHOL) 20 mg tablet Take 1 Tab by mouth nightly. 10/24/17   Anya Gomez MD   raNITIdine (ZANTAC) 150 mg tablet Take 1 Tab by mouth two (2) times a day. 10/24/17   Anya Gomez MD   clopidogrel (PLAVIX) 75 mg tab Take 1 Tab by mouth daily. 10/24/17   Anya Gomez MD   loratadine (CLARITIN) 10 mg tablet Take 1 Tab by mouth daily. 7/24/17   Anya Gomez MD   aspirin 81 mg CpDR Take  by mouth daily.     Adelaida Tiwari MD       Hospital Medications:  Current Facility-Administered Medications   Medication Dose Route Frequency    piperacillin-tazobactam (ZOSYN) 3.375 g in 0.9% sodium chloride (MBP/ADV) 100 mL  3.375 g IntraVENous Q8H    0.9% sodium chloride infusion 250 mL  250 mL IntraVENous PRN    0.9% sodium chloride infusion 250 mL  250 mL IntraVENous PRN    acetaminophen (TYLENOL) tablet 650 mg  650 mg Oral Q6H PRN    albuterol (PROVENTIL HFA, VENTOLIN HFA, PROAIR HFA) inhaler 1 Puff  1 Puff Inhalation Q6H PRN    albuterol (PROVENTIL VENTOLIN) nebulizer solution 2.5 mg  2.5 mg Nebulization Q6HWA RT    allopurinol (ZYLOPRIM) tablet 100 mg  100 mg Oral BID    amLODIPine (NORVASC) tablet 5 mg  5 mg Oral DAILY    colchicine tablet 0.3 mg  0.3 mg Oral DAILY    cyanocobalamin tablet 1,000 mcg  1,000 mcg Oral DAILY    loratadine (CLARITIN) tablet 10 mg  10 mg Oral DAILY    pravastatin (PRAVACHOL) tablet 20 mg  20 mg Oral QHS    famotidine (PEPCID) tablet 20 mg  20 mg Oral DAILY    Saccharomyces boulardii (FLORASTOR) capsule 250 mg  250 mg Oral BID    senna-docusate (PERICOLACE) 8.6-50 mg per tablet 1 Tab  1 Tab Oral DAILY    polyethylene glycol (MIRALAX) packet 17 g  17 g Oral DAILY    sodium chloride (NS) flush 5-10 mL  5-10 mL IntraVENous Q8H    sodium chloride (NS) flush 5-10 mL  5-10 mL IntraVENous PRN    HYDROcodone-acetaminophen (NORCO) 5-325 mg per tablet 1 Tab  1 Tab Oral Q4H PRN    ondansetron (ZOFRAN) injection 4 mg  4 mg IntraVENous Q4H PRN    naloxone (NARCAN) injection 0.4 mg  0.4 mg IntraVENous PRN    furosemide (LASIX) tablet 20 mg  20 mg Oral DAILY       Social History:  Social History   Substance Use Topics    Smoking status: Never Smoker    Smokeless tobacco: Never Used    Alcohol use No       Family History: Negative for colon CA or polyps. Family History   Problem Relation Age of Onset    Heart Attack Mother     Heart Attack Father     Heart Disease Brother     Heart Disease Brother        Review of Systems:  A detailed 10 system ROS is obtained, with pertinent positives as listed above. All others are negative. Diet:  Regular cardiac    Objective:     Physical Exam:  Vitals:  Visit Vitals    /61 (BP 1 Location: Left arm, BP Patient Position: At rest)    Pulse 87    Temp 96.4 °F (35.8 °C)    Resp 19    Ht 5' 2\" (1.575 m)    Wt 67.6 kg (149 lb)    SpO2 98%    BMI 27.25 kg/m2     Gen:  Pt is alert, cooperative, ELDERLY FEMALE in NAD  Skin:  Extremities and face reveal no rashes. HEENT: Sclerae anicteric. Extra-occular muscles are intact. No oral ulcers. No abnormal pigmentation of the lips. The neck is supple. Cardiovascular: Regular rate and rhythm. GRADE III/VI systolic murmur noted. Respiratory:  CTA ant.  GI:  Abdomen nondistended, soft, and nontender. Normal active bowel sounds. No enlargement of the liver or spleen. No masses palpable. Rectal:  TAN STOOLS, NO BLOOD, NO INIRINSIC MASSES NOTED. Musculoskeletal:  1+ BLE edema noted  Neurological:  Gross memory appears intact. Patient is alert and oriented. Psychiatric:  Mood appears appropriate with judgement intact.   Lymphatic:  No cervical or supraclavicular adenopathy. Laboratory:    Recent Labs      02/27/18   0648  02/26/18   1329  02/26/18   0650  02/25/18   0648   WBC  5.5   --   5.2  5.2   HGB  7.4*  7.9*  7.5*  8.0*   HCT  23.3*  24.8*  24.4*  25.0*   PLT  185   --   201  210   MCV  101.3*   --   105.2*  102.9*   NA  145   --   143  146*   K  4.4   --   3.7  3.2*   CL  108*   --   104  106   CO2  34*   --   35*  34*   BUN  56*   --   24*  21   CREA  0.91   --   0.96  0.92   CA  8.2*   --   8.2*  8.8   GLU  84   --   87  86          Assessment:     Principal Problem:  UTI (urinary tract infection) (2/7/2018)    Active Problems:  Essential hypertension, benign (3/17/2015)  Edema, peripheral (2/24/2018)  Heart failure (HCC) (2/24/2018)  Chronic anemia (2/25/2018)    81 y/o F with MMP outlined above who presents with SOB and progressive LE edema. Work-up neg for CHF exacerbation. LE edema improved with diuretics. Diagnosed with UTI on admission, started on abx, afebrile, and WBC normal. GI was asked to see patient in regards to anemia. Hgb marginally improved after blood transfusion. No overt or active bleeding. Rectal exam with tan stools, no blood or melena. She is NOT iron deficient. Consider other etiologies for patient's anemia. EGD and colonoscopy findings in 2009 as above. Plan:     No plans for procedures given that patient is not actively or overtly bleeding and she is at an increase risk for procedures due to advance age. Monitor HH and transfuse prn. Will sign-off. Pls call if patient's clinical status changes that would warrant GI evaluation or if patient demonstrates overt/active bleeding. Patient is seen and examined in collaboration with Dr. Charito Mckenna. Assessment and plan as per Dr. Jolie Cheadle. Sharon Matos PA-C    I have seen and examined the patient, and I have directed and agreed to the plan as described. She has no GI symptoms and is not iron deficient. Therefore, this is unlikely to be due to GI blood loss.   Especially at her age, endoscopy cannot be recommended. Notably, she has been significantly macrocytic for some time. I suggest a medical evaluation of anemia, such as checking B12, folate, and thyroid studies considering her macrocytic anemia. I am not sure when these will be accurate, since her blood supply is currently mixed with donor cells.     Kevin Medina MD

## 2018-02-27 NOTE — PROGRESS NOTES
AM assessment completed. Pt is A/O x 4. Upper lobes coarse and respirations even and unlabored  . Continues on oxygen 2l nc . S1&s2 auscultated , heart rate regular. Abdomen is soft and bowel sounds active . Weir patent.  Call light in reach.

## 2018-02-27 NOTE — PROGRESS NOTES
Shift Assessment - Patient is alert and oriented. Respirations are even and unlabored. Abdomen soft and non-tender Bowel sounds active x4. Weir is patent and draining.  +2 edema noted to BLE. Currently resting in a low, locked bed with call light within reach. Makes needs known.

## 2018-02-27 NOTE — PROGRESS NOTES
Checked with RN re: PT with patient. Patient receiving blood this am and agreed to check back this afternoon. Checked back on patient this afternoon. RN reports patient still receiving blood and ok to check back on patient tomorrow.     Page Flores, PT

## 2018-02-27 NOTE — PROGRESS NOTES
Celena talked with one of pt's daughters this pm after thoroughly updating the son who was present in the room during care rounds. Pt has [address] already and has a child in the home with her at all times. Daughter asked how she was doing with PT. Informed daughter Md plan was to d/c pt in the am. Discussed transportation home via car and the need for family to bring pt's portable home 02 tank. Will cont to follow and assist. Christine Sav          Daughter Evan Cowden [phone] ) present during care rounds. Pt lives with her son who is primary caregiver. Pt has been to rehab in the past & may be needed again. PPD placed. Await PT evaluation.

## 2018-02-27 NOTE — PROGRESS NOTES
Hospitalist Progress Note    2018  Admit Date: 2018 12:59 PM   NAME: Josafat Castanon   :  2/10/1927   MRN:  [id number]   Attending: Miguelina Hardwick MD  PCP:  Nisha Gandhi MD    SUBJECTIVE:   80 y. o. female who has a PMH of diastolic HF with preserved EF, moderate-severe T valve regurgitation, home oxygen, HTN, CKD stage III, Gout, dyslipidemia, CAD, NSTEMI presents to ER with complaints of SOB and progressive LE edema. She was recently hospitalized(-) for PNA and s/p Levaquin. She was discharged home with 2L oxygen and she reports compliance. She also reports compliance with Lasix 20mg BID. In ER, CXR is clear without congestion or infiltrates. Her lab studies are at baseline and BNP is only 217. After travis was placed; UA collected is significant for >100WBC and many Bacteria. Admitted and started on Zosyn. Urine culture pending. Today:   Pt reports bilateral ankle pain, and thinks that it's the gout. No melena or hematochezia noted  No chest pain, fever, chills          Review of Systems negative with exception of pertinent positives noted above  PHYSICAL EXAM     Visit Vitals    /62 (BP 1 Location: Left arm, BP Patient Position: At rest)    Pulse 86    Temp 98.2 °F (36.8 °C)    Resp 18    Ht 5' 2\" (1.575 m)    Wt 66.3 kg (146 lb 3.2 oz)    SpO2 97%    BMI 26.74 kg/m2      Temp (24hrs), Av.5 °F (36.4 °C), Min:96.2 °F (35.7 °C), Max:98.4 °F (36.9 °C)    Oxygen Therapy  O2 Sat (%): 97 % (18)  Pulse via Oximetry: 82 beats per minute (18)  O2 Device: Nasal cannula (18)  O2 Flow Rate (L/min): 2 l/min (18)  FIO2 (%): 28 % (18)    Intake/Output Summary (Last 24 hours) at 18  Last data filed at 18 [id number]   Gross per 24 hour   Intake                0 ml   Output             1350 ml   Net            -1350 ml      General: No acute distress    Lungs:  CTA Bilaterally. Heart:  Regular rate and rhythm.  1+ edema(improved)  Abdomen: Soft, Non distended, Non tender, Positive bowel sounds  Extremities: No cyanosis, clubbing. Normal ROM. Neurologic:  No focal deficits  Psych:  Alert and oriented, cooperative    [address] Problems    Diagnosis Date Noted    Chronic anemia 02/25/2018    Edema, peripheral 02/24/2018    Heart failure ([address] 02/24/2018    UTI (urinary tract infection) 02/07/2018    Essential hypertension, benign 03/17/2015     Plan:   -Hgb declined to 7.4 from 7.9 (8.9 on admission but no anemia labs seen in recent records). Will transfuse 1 units PRBC. Fecal occult is pending, iron studies shows anemia of chronic disease. Will consult GI for eval.  Will discontinue zosyn, as urine culture shows candida. LE edema improved, will continue home Lasix PO. Does not appear to be CHF exacerbation  Continue home meds  PT/OT  Disposition: will likely be discharged home with PT/OT when stable.     Signed By: Brian Prieto MD     February 27, 2018

## 2018-02-28 LAB
ANION GAP SERPL CALC-SCNC: 5 MMOL/L (ref 7–16)
BACTERIA SPEC CULT: ABNORMAL
BASOPHILS # BLD: 0 K/UL (ref 0–0.2)
BASOPHILS NFR BLD: 0 % (ref 0–2)
BUN SERPL-MCNC: 49 MG/DL (ref 8–23)
CALCIUM SERPL-MCNC: 8.4 MG/DL (ref 8.3–10.4)
CHLORIDE SERPL-SCNC: 108 MMOL/L (ref 98–107)
CO2 SERPL-SCNC: 32 MMOL/L (ref 21–32)
CREAT SERPL-MCNC: 0.99 MG/DL (ref 0.6–1)
DIFFERENTIAL METHOD BLD: ABNORMAL
EOSINOPHIL # BLD: 0.2 K/UL (ref 0–0.8)
EOSINOPHIL NFR BLD: 4 % (ref 0.5–7.8)
ERYTHROCYTE [DISTWIDTH] IN BLOOD BY AUTOMATED COUNT: 20.9 % (ref 11.9–14.6)
FOLATE SERPL-MCNC: 5.3 NG/ML (ref 3.1–17.5)
GLUCOSE SERPL-MCNC: 91 MG/DL (ref 65–100)
HCT VFR BLD AUTO: 23.2 % (ref 35.8–46.3)
HGB BLD-MCNC: 7.4 G/DL (ref 11.7–15.4)
IMM GRANULOCYTES # BLD: 0 K/UL (ref 0–0.5)
IMM GRANULOCYTES NFR BLD AUTO: 0 % (ref 0–5)
LYMPHOCYTES # BLD: 0.7 K/UL (ref 0.5–4.6)
LYMPHOCYTES NFR BLD: 14 % (ref 13–44)
MCH RBC QN AUTO: 31.1 PG (ref 26.1–32.9)
MCHC RBC AUTO-ENTMCNC: 31.9 G/DL (ref 31.4–35)
MCV RBC AUTO: 97.5 FL (ref 79.6–97.8)
MM INDURATION POC: NORMAL MM (ref 0–5)
MONOCYTES # BLD: 0.3 K/UL (ref 0.1–1.3)
MONOCYTES NFR BLD: 6 % (ref 4–12)
NEUTS SEG # BLD: 3.8 K/UL (ref 1.7–8.2)
NEUTS SEG NFR BLD: 76 % (ref 43–78)
PLATELET # BLD AUTO: 151 K/UL (ref 150–450)
PMV BLD AUTO: 11.1 FL (ref 10.8–14.1)
POTASSIUM SERPL-SCNC: 3.6 MMOL/L (ref 3.5–5.1)
PPD POC: NEGATIVE NEGATIVE
RBC # BLD AUTO: 2.38 M/UL (ref 4.05–5.25)
SERVICE CMNT-IMP: ABNORMAL
SODIUM SERPL-SCNC: 145 MMOL/L (ref 136–145)
VIT B12 SERPL-MCNC: 688 PG/ML (ref 193–986)
WBC # BLD AUTO: 5.1 K/UL (ref 4.3–11.1)

## 2018-02-28 PROCEDURE — 97535 SELF CARE MNGMENT TRAINING: CPT

## 2018-02-28 PROCEDURE — 77030021668 HC NEB PREFIL KT VYRM -A

## 2018-02-28 PROCEDURE — 74011000258 HC RX REV CODE- 258: Performed by: FAMILY MEDICINE

## 2018-02-28 PROCEDURE — 51798 US URINE CAPACITY MEASURE: CPT

## 2018-02-28 PROCEDURE — 94760 N-INVAS EAR/PLS OXIMETRY 1: CPT

## 2018-02-28 PROCEDURE — 82746 ASSAY OF FOLIC ACID SERUM: CPT | Performed by: HOSPITALIST

## 2018-02-28 PROCEDURE — 99218 HC RM OBSERVATION: CPT

## 2018-02-28 PROCEDURE — 74011000250 HC RX REV CODE- 250: Performed by: FAMILY MEDICINE

## 2018-02-28 PROCEDURE — 36415 COLL VENOUS BLD VENIPUNCTURE: CPT | Performed by: FAMILY MEDICINE

## 2018-02-28 PROCEDURE — 80048 BASIC METABOLIC PNL TOTAL CA: CPT | Performed by: FAMILY MEDICINE

## 2018-02-28 PROCEDURE — 3331090002 HH PPS REVENUE DEBIT

## 2018-02-28 PROCEDURE — 3331090001 HH PPS REVENUE CREDIT

## 2018-02-28 PROCEDURE — 97530 THERAPEUTIC ACTIVITIES: CPT

## 2018-02-28 PROCEDURE — 85025 COMPLETE CBC W/AUTO DIFF WBC: CPT | Performed by: FAMILY MEDICINE

## 2018-02-28 PROCEDURE — 77010033678 HC OXYGEN DAILY

## 2018-02-28 PROCEDURE — 74011250637 HC RX REV CODE- 250/637: Performed by: FAMILY MEDICINE

## 2018-02-28 PROCEDURE — 74011250636 HC RX REV CODE- 250/636: Performed by: FAMILY MEDICINE

## 2018-02-28 PROCEDURE — 94640 AIRWAY INHALATION TREATMENT: CPT

## 2018-02-28 PROCEDURE — 82607 VITAMIN B-12: CPT | Performed by: HOSPITALIST

## 2018-02-28 RX ADMIN — SENNOSIDES AND DOCUSATE SODIUM 1 TABLET: 8.6; 5 TABLET ORAL at 08:27

## 2018-02-28 RX ADMIN — Medication 5 ML: at 21:07

## 2018-02-28 RX ADMIN — RDII 250 MG CAPSULE 250 MG: at 08:27

## 2018-02-28 RX ADMIN — RDII 250 MG CAPSULE 250 MG: at 17:34

## 2018-02-28 RX ADMIN — HYDROCODONE BITARTRATE AND ACETAMINOPHEN 1 TABLET: 5; 325 TABLET ORAL at 22:37

## 2018-02-28 RX ADMIN — LORATADINE 10 MG: 10 TABLET ORAL at 08:27

## 2018-02-28 RX ADMIN — COLCHICINE 0.3 MG: 0.6 TABLET, FILM COATED ORAL at 08:27

## 2018-02-28 RX ADMIN — HYDROCODONE BITARTRATE AND ACETAMINOPHEN 1 TABLET: 5; 325 TABLET ORAL at 00:20

## 2018-02-28 RX ADMIN — ALBUTEROL SULFATE 2.5 MG: 2.5 SOLUTION RESPIRATORY (INHALATION) at 13:18

## 2018-02-28 RX ADMIN — CYANOCOBALAMIN TAB 1000 MCG 1000 MCG: 1000 TAB at 08:27

## 2018-02-28 RX ADMIN — ALBUTEROL SULFATE 2.5 MG: 2.5 SOLUTION RESPIRATORY (INHALATION) at 20:03

## 2018-02-28 RX ADMIN — ALLOPURINOL 100 MG: 100 TABLET ORAL at 17:34

## 2018-02-28 RX ADMIN — POLYETHYLENE GLYCOL (3350) 17 G: 17 POWDER, FOR SOLUTION ORAL at 09:00

## 2018-02-28 RX ADMIN — ACETAMINOPHEN 650 MG: 325 TABLET ORAL at 08:27

## 2018-02-28 RX ADMIN — HYDROCODONE BITARTRATE AND ACETAMINOPHEN 1 TABLET: 5; 325 TABLET ORAL at 11:37

## 2018-02-28 RX ADMIN — ALLOPURINOL 100 MG: 100 TABLET ORAL at 08:27

## 2018-02-28 RX ADMIN — FAMOTIDINE 20 MG: 20 TABLET, FILM COATED ORAL at 08:27

## 2018-02-28 RX ADMIN — ONDANSETRON 4 MG: 2 INJECTION INTRAMUSCULAR; INTRAVENOUS at 22:01

## 2018-02-28 RX ADMIN — PIPERACILLIN SODIUM,TAZOBACTAM SODIUM 3.38 G: 3; .375 INJECTION, POWDER, FOR SOLUTION INTRAVENOUS at 05:56

## 2018-02-28 RX ADMIN — HYDROCODONE BITARTRATE AND ACETAMINOPHEN 1 TABLET: 5; 325 TABLET ORAL at 15:51

## 2018-02-28 RX ADMIN — FUROSEMIDE 20 MG: 20 TABLET ORAL at 08:27

## 2018-02-28 RX ADMIN — PRAVASTATIN SODIUM 20 MG: 20 TABLET ORAL at 21:06

## 2018-02-28 RX ADMIN — ALBUTEROL SULFATE 2.5 MG: 2.5 SOLUTION RESPIRATORY (INHALATION) at 07:38

## 2018-02-28 NOTE — PROGRESS NOTES
Problem: Mobility Impaired (Adult and Pediatric)  Goal: *Acute Goals and Plan of Care (Insert Text)  DISCHARGE GOALS:  (1.)Ms. Eliza Almeida will move from supine to sit and sit to supine  in bed with SUPERVISION within 5 treatment day(s). (2.)Ms. Eliza Almeida will transfer from bed to chair and chair to bed with SUPERVISION using the least restrictive device within 5 treatment day(s). (3.)Ms. Eliza Almeida will ambulate with SUPERVISION for 75 feet with the least restrictive device within 5 treatment day(s). ________________________________________________________________________________________________    PHYSICAL THERAPY: Daily Note, Treatment Day: 2nd, AM 2/28/2018  OBSERVATION: Hospital Day: 5  Payor: FIRST CHOICE VIP CARE PLUS / [place] FIRST CHOICE VIP CARE PLUS / Product Type: Managed Care Medicare /      NAME/AGE/GENDER: Josafat Castanon is a 80 y.o. female   PRIMARY DIAGNOSIS: Heart failure ([facility] [address]  Edema, peripheral UTI (urinary tract infection) UTI (urinary tract infection)        ICD-10: Treatment Diagnosis:   · Generalized Muscle Weakness (M62.81)  · Difficulty in walking, Not elsewhere classified (R26.2)   Precaution/Allergies:  Bee pollen and Fire ant      ASSESSMENT:     Ms. Eliza Almeida showed increased gait distance but slightly more assist for functional mobility. This pt will need to be attended at home when discharged with follow up HHPT. If no assist available at home, this pt will need SNF for additional rehab due to not being safe to functional alone. This section established at most recent assessment   PROBLEM LIST (Impairments causing functional limitations):  1. Decreased Strength  2. Decreased ADL/Functional Activities  3. Decreased Transfer Abilities  4. Decreased Ambulation Ability/Technique  5. Decreased Balance   INTERVENTIONS PLANNED: (Benefits and precautions of physical therapy have been discussed with the patient.)  1. Bed Mobility  2. Gait Training  3. Home Exercise Program (HEP)  4.  Therapeutic Activites  5. Therapeutic Exercise/Strengthening     TREATMENT PLAN: Frequency/Duration: daily for duration of hospital stay  Rehabilitation Potential For Stated Goals: Good     RECOMMENDED REHABILITATION/EQUIPMENT: (at time of discharge pending progress): Due to the probability of continued deficits (see above) this patient will likely need continued skilled physical therapy after discharge. Equipment:    None at this time              HISTORY:   History of Present Injury/Illness (Reason for Referral):  80 y. o. female who has a PMH of diastolic HF with preserved EF, moderate-severe T valve regurgitation, home oxygen, HTN, CKD stage III, Gout, dyslipidemia, CAD, NSTEMI presents to ER with complaints of SOB and progressive LE edema. She was recently hospitalized(2/13-2/19) for PNA and s/p Levaquin. She was discharged home with 2L oxygen and she reports compliance. She also reports compliance with Lasix 20mg BID. In ER, CXR is clear without congestion or infiltrates. Her lab studies are at baseline and BNP is only 217. After travis was placed; UA collected is significant for >100WBC and many Bacteria. Past Medical History/Comorbidities:   Ms. Joseph Coreas  has a past medical history of Acute kidney failure, unspecified; Arthritis; CAD (coronary artery disease); Cellulitis and abscess of other specified site; Coronary atherosclerosis of native coronary artery; Essential hypertension, benign (3/17/2015); Hypertension; Hypopotassemia; Mixed hyperlipidemia; Osteoarthritis (7/20/2017); Other and unspecified hyperlipidemia; Reflux esophagitis; Renal failure, unspecified; Shortness of breath; and Unspecified essential hypertension. Ms. Joseph Coreas  has a past surgical history that includes hx cholecystectomy; pr layr clos wnd face,facial <2.5cm; pr cardiac surg procedure unlist; hx cataract removal (Bilateral); and hx hysterectomy.   Social History/Living Environment:   Home Environment: Private residence  Wheelchair Ramp: Yes  One/Two Story Residence: One story  Living Alone: No  Support Systems: Child(jayleen)  Patient Expects to be Discharged to[de-identified] Private residence  Current DME Used/Available at Home: Wheelchair, Walker, rolling, Shower chair  Prior Level of Function/Work/Activity:  Wheelchair as primary means of mobility but rolling walker for transfers and short distances. Assist with ADLs. Personal Factors:          Sex:  female        Age:  80 y.o. Multiple hospitalizations   Number of Personal Factors/Comorbidities that affect the Plan of Care: 3+: HIGH COMPLEXITY   EXAMINATION:   Most Recent Physical Functioning:   Gross Assessment: fair- strength through out                  Posture:     Balance:  Sitting: Intact; Without support  Standing: Impaired; With support (walker)  Standing - Static:  (fair- with walker)  Standing - Dynamic :  (fair- with walker) Bed Mobility:  Supine to Sit: Moderate assistance  Sit to Supine:  (NT)  Scooting: Minimum assistance  Wheelchair Mobility:     Transfers:  Sit to Stand: Minimum assistance  Stand to Sit: Minimum assistance  Bed to Chair: Minimum assistance (with walker)  Duration: 15 Minutes (extra time to work through activity noted)  Gait:     Speed/Mary: Shuffled; Slow  Step Length: Left shortened;Right shortened  Gait Abnormalities: Decreased step clearance  Distance (ft): 10 Feet (ft)  Assistive Device: Walker, rolling  Ambulation - Level of Assistance: Minimal assistance  Interventions: Safety awareness training;Verbal cues      Body Structures Involved:  1. Muscles Body Functions Affected:  1. Movement Related Activities and Participation Affected:  1. Mobility  2. Self Care  3.  Domestic Life   Number of elements that affect the Plan of Care: 4+: HIGH COMPLEXITY   CLINICAL PRESENTATION:   Presentation: Stable and uncomplicated: LOW COMPLEXITY   CLINICAL DECISION MAKIN [facility] Inpatient Short Form  How much difficulty does the patient currently have. .. Unable A Lot A Little None   1. Turning over in bed (including adjusting bedclothes, sheets and blankets)? [] 1   [] 2   [x] 3   [] 4   2. Sitting down on and standing up from a chair with arms ( e.g., wheelchair, bedside commode, etc.)   [] 1   [] 2   [x] 3   [] 4   3. Moving from lying on back to sitting on the side of the bed? [] 1   [] 2   [x] 3   [] 4   How much help from another person does the patient currently need. .. Total A Lot A Little None   4. Moving to and from a bed to a chair (including a wheelchair)? [] 1   [] 2   [x] 3   [] 4   5. Need to walk in hospital room? [] 1   [] 2   [x] 3   [] 4   6. Climbing 3-5 steps with a railing? [] 1   [] 2   [x] 3   [] 4   © 2007, Trustees of [address] [place], [place] [address], under license to [employer]. All rights reserved      Score:  Initial: 18 Most Recent: X (Date: -- )    Interpretation of Tool:  Represents activities that are increasingly more difficult (i.e. Bed mobility, Transfers, Gait). Score 24 23 22-20 19-15 14-10 9-7 6     Modifier CH CI CJ CK CL CM CN      ? Mobility - Walking and Moving Around:     - CURRENT STATUS: CK - 40%-59% impaired, limited or restricted    - GOAL STATUS: CJ - 20%-39% impaired, limited or restricted    - D/C STATUS:  ---------------To be determined---------------  Payor: FIRST CHOICE VIP CARE PLUS / [place] FIRST CHOICE VIP CARE PLUS / Product Type: Managed Care Medicare /      Medical Necessity:     · Patient is expected to demonstrate progress in strength, balance and coordination to increase independence with bed mobility, transfers, and gait. Reason for Services/Other Comments:  · Patient continues to require skilled intervention due to generalized weakness and impaired mobility secondary to UTI.    Use of outcome tool(s) and clinical judgement create a POC that gives a: Clear prediction of patient's progress: LOW COMPLEXITY            TREATMENT:   (In addition to Assessment/Re-Assessment sessions the following treatments were rendered)   Pre-treatment Symptoms/Complaints:  Pt was agreeable to therapy  Pain: Initial: visual scale  Pain Intensity 1: 0  Post Session:  0/10     Therapeutic Activity: (  15 Minutes (extra time to work through activity noted) ):  Therapeutic activities including bed mobility, sitting balance, transfers & short distance ambulation with rolling walker to improve mobility, strength, balance, coordination and dynamic movement of arm - bilateral, leg - bilateral and core to improve functional WB potential.    Braces/Orthotics/Lines/Etc:   · IV  · travis catheter  Treatment/Session Assessment:    · Response to Treatment:  Patient easily fatigued with minimal exertion  · Interdisciplinary Collaboration:   o Registered Nurse  · After treatment position/precautions:   o Up in chair  o Bed alarm/tab alert on  o Bed/Chair-wheels locked  o Call light within reach  o RN notified   · Compliance with Program/Exercises: compliant all of the time. · Recommendations/Intent for next treatment session: \"Next visit will focus on advancements to more challenging activities and reduction in assistance provided\".   Total Treatment Duration:  PT Patient Time In/Time Out  Time In: 0845  Time Out: 0900    Gabbie Suarez, PT

## 2018-02-28 NOTE — PROGRESS NOTES
I am accessing Ms. Whaley's chart as a part of our department's internal chart auditing process. I certify that Ms. Freddie Crow is, or was, a patient in our department.   Thank you,  FLORENCE Fields  2/28/2018

## 2018-02-28 NOTE — PROGRESS NOTES
AM assessment completed. Pt is A/O x 4. Lungs clear and respirations even and unlabored  . Continues on oxygen 2l nc . S1&s2 auscultated , heart rate regular. Abdomen is soft and bowel sounds active . Weir patent.  Call light in reach.

## 2018-02-28 NOTE — PROGRESS NOTES
Pt has not voided since travis removed, bladder scanned greater than 650 , paged Dr. Aleida Acevedo .

## 2018-02-28 NOTE — PROGRESS NOTES
Hospitalist Progress Note    2018  Admit Date: 2018 12:59 PM   NAME: Roxie Dewey   :  2/10/1927   MRN:  [id number]   Attending: Chuck Nettles MD  PCP:  Ravin Daly MD    SUBJECTIVE:   80 y. o. female who has a PMH of diastolic HF with preserved EF, moderate-severe T valve regurgitation, home oxygen, HTN, CKD stage III, Gout, dyslipidemia, CAD, NSTEMI presents to ER with complaints of SOB and progressive LE edema. She was recently hospitalized(-) for PNA and s/p Levaquin. She was discharged home with 2L oxygen and she reports compliance. She also reports compliance with Lasix 20mg BID. In ER, CXR is clear without congestion or infiltrates. Her lab studies are at baseline and BNP is only 217. After travis was placed; UA collected is significant for >100WBC and many Bacteria. Admitted and started on Zosyn. Urine culture pending. Today:   Pt reports that she is feeling better  Pain in the ankles is improving too as compared to yesterday. No nausea/vomiting, chest pain, abdominal pain, fever. Review of Systems negative with exception of pertinent positives noted above  PHYSICAL EXAM     Visit Vitals    /63 (BP 1 Location: Left arm, BP Patient Position: At rest)    Pulse 83    Temp 97.6 °F (36.4 °C)    Resp 20    Ht 5' 2\" (1.575 m)    Wt 67.6 kg (149 lb)    SpO2 99%    BMI 27.25 kg/m2      Temp (24hrs), Av.7 °F (36.5 °C), Min:96.4 °F (35.8 °C), Max:98.4 °F (36.9 °C)    Oxygen Therapy  O2 Sat (%): 99 % (18)  Pulse via Oximetry: 81 beats per minute (18)  O2 Device: Nasal cannula (18)  O2 Flow Rate (L/min): 1 l/min (18)  FIO2 (%): 24 % (18)    Intake/Output Summary (Last 24 hours) at 18 0849  Last data filed at 18 0600   Gross per 24 hour   Intake           1114.7 ml   Output             2000 ml   Net           -885.3 ml      General: No acute distress    Lungs:  CTA Bilaterally.    Heart:  Regular rate and rhythm. 1+ edema(improved)  Abdomen: Soft, Non distended, Non tender, Positive bowel sounds  Extremities: No cyanosis, clubbing. Normal ROM. Neurologic:  No focal deficits  Psych:  Alert and oriented, cooperative    [address] Problems    Diagnosis Date Noted    Chronic anemia 02/25/2018    Edema, peripheral 02/24/2018    Heart failure ([address] 02/24/2018    UTI (urinary tract infection) 02/07/2018    Essential hypertension, benign 03/17/2015     Plan:   -Hgb stable at 7.4 (8.9 on admission but no anemia labs seen in recent records). Will transfuse 1 units PRBC. Will follow up with vitamin B12 and folate levels. GI does not recommend any intervention, as anemia is unlikely from GI blood loss. LE edema improved, will continue home Lasix PO. Does not appear to be CHF exacerbation  Continue home meds  PT/OT  Disposition: will likely be discharged home with PT/OT when stable.     Signed By: Isamar Maldonado MD     February 28, 2018

## 2018-02-28 NOTE — PROGRESS NOTES
Problem: Interdisciplinary Rounds  Goal: Interdisciplinary Rounds  Interdisciplinary team rounds were held 2/28/2018 with the following team members:Care Management, Nursing, Patient Relations, Pharmacy, Physical Therapy and Physician and the patient and child(jayleen). Plan of care discussed. See clinical pathway and/or care plan for interventions and desired outcomes.

## 2018-02-28 NOTE — PROGRESS NOTES
Problem: Self Care Deficits Care Plan (Adult)  Goal: *Acute Goals and Plan of Care (Insert Text)  1. Patient will perform grooming with supervision. Met 2/28/2018  2. Patient will perform upper body dressing with supervision. 3. Patient will perform lower body dressing with supervision. 4. Patient will perform bathing with supervision. 5. Patient will perform toileting and toilet transfer with supervision. 6. Patient will perform ADL functional mobility and tranfers in room with supervision. 7. Patient/family to demonstrate knowledge of home safety and DME recommendations. Goals to be achieved in 7 days. OCCUPATIONAL THERAPY: Daily Note, Treatment Day: 1st and AM 2/28/2018  OBSERVATION: Hospital Day: 5  Payor: FIRST CHOICE VIP CARE PLUS / [place] FIRST CHOICE VIP CARE PLUS / Product Type: Managed Care Medicare /      NAME/AGE/GENDER: Delberta Gottron is a 80 y.o. female   PRIMARY DIAGNOSIS:  Heart failure ([facility] [address]  Edema, peripheral UTI (urinary tract infection) UTI (urinary tract infection)        ICD-10: Treatment Diagnosis:    · Generalized Muscle Weakness (M62.81)  · Other lack of cordination (R27.8)   Precautions/Allergies:     Bee pollen and Fire ant      ASSESSMENT:     Ms. Yobany Newman presents with above diagnosis. Pt completed grooming and upper body dressing. Continue with OT. This section established at most recent assessment   PROBLEM LIST (Impairments causing functional limitations):  1. Decreased Strength  2. Decreased ADL/Functional Activities  3. Decreased Transfer Abilities  4. Decreased Ambulation Ability/Technique  5. Decreased Balance   INTERVENTIONS PLANNED: (Benefits and precautions of occupational therapy have been discussed with the patient.)  1. Activities of daily living training  2. Adaptive equipment training  3. Balance training  4. Clothing management  5. Donning&doffing training  6. Therapeutic activity  7.  Therapeutic exercise     TREATMENT PLAN: Frequency/Duration: Follow patient 4x/wk to address above goals. Rehabilitation Potential For Stated Goals: Good     RECOMMENDED REHABILITATION/EQUIPMENT: (at time of discharge pending progress): Due to the probability of continued deficits (see above) this patient will likely need continued skilled occupational therapy after discharge. Equipment:    TBD              OCCUPATIONAL PROFILE AND HISTORY:   History of Present Injury/Illness (Reason for Referral):  Pt admitted with LE edema  Past Medical History/Comorbidities:   Ms. Kerrie Melendez  has a past medical history of Acute kidney failure, unspecified; Arthritis; CAD (coronary artery disease); Cellulitis and abscess of other specified site; Coronary atherosclerosis of native coronary artery; Essential hypertension, benign (3/17/2015); Hypertension; Hypopotassemia; Mixed hyperlipidemia; Osteoarthritis (7/20/2017); Other and unspecified hyperlipidemia; Reflux esophagitis; Renal failure, unspecified; Shortness of breath; and Unspecified essential hypertension. Ms. Kerrie Melendez  has a past surgical history that includes hx cholecystectomy; pr layr clos wnd face,facial <2.5cm; pr cardiac surg procedure unlist; hx cataract removal (Bilateral); and hx hysterectomy. Social History/Living Environment:   Home Environment: Private residence  Wheelchair Ramp: Yes  One/Two Story Residence: One story  Living Alone: No  Support Systems: Child(jayleen)  Patient Expects to be Discharged to[de-identified] Private residence  Current DME Used/Available at Home: Wheelchair, [address], [place], [address] [place] chair  Prior Level of Function/Work/Activity:  Supervision for self care. Functional mobility primarily in WC.      Number of Personal Factors/Comorbidities that affect the Plan of Care: Brief history (0):  LOW COMPLEXITY   ASSESSMENT OF OCCUPATIONAL PERFORMANCE[de-identified]   Activities of Daily Living:           Basic ADLs (From Assessment) Complex ADLs (From Assessment)   Basic ADL  Feeding: Supervision  Oral Facial Hygiene/Grooming: Supervision  Bathing: Moderate assistance  Upper Body Dressing: Minimum assistance  Lower Body Dressing: Moderate assistance  Toileting: Total assistance     Grooming/Bathing/Dressing Activities of Daily Living   Grooming  Grooming Assistance: Supervision/set up  Washing Face: Supervision/set-up  Brushing/Combing Hair: Supervision/set-up Cognitive Retraining  Safety/Judgement: Awareness of environment               Upper Body Dressing Assistance  Dressing Assistance: Moderate assistance  Hospital Gown: Moderate assistance       Bed/Mat Mobility  Supine to Sit: Moderate assistance  Sit to Supine:  (NT)  Sit to Stand: Minimum assistance  Bed to Chair: Minimum assistance (with walker)  Scooting: Minimum assistance       Most Recent Physical Functioning:   Gross Assessment:                  Posture:     Balance:  Sitting: Intact; Without support  Standing: Impaired; With support (walker)  Standing - Static:  (fair- with walker)  Standing - Dynamic :  (fair- with walker) Bed Mobility:  Supine to Sit: Moderate assistance  Sit to Supine:  (NT)  Scooting: Minimum assistance  Wheelchair Mobility:     Transfers:  Sit to Stand: Minimum assistance  Stand to Sit: Minimum assistance  Bed to Chair: Minimum assistance (with walker)  Duration: 15 Minutes (extra time to work through activity noted)     ROM:          RUE WFL         LUE shoulder limited to 90 degress flexion           Patient Vitals for the past 6 hrs:   BP BP Patient Position SpO2 O2 Flow Rate (L/min) Pulse   02/28/18 0737 109/63 At rest 99 % - 83   02/28/18 0738 - - 99 % 1 l/min -       Mental Status  Neurologic State: Alert  Orientation Level: Oriented to person  Cognition: Follows commands  Perception: Appears intact  Perseveration: No perseveration noted  Safety/Judgement: Awareness of environment            LLE Assessment  LLE Assessment (WDL): Exception to WDL RLE Assessment  RLE Assessment (WDL): Exceptions to [facility]           Physical Skills Involved:  1.  Range of Motion  2. Balance  3. Strength  4. Activity Tolerance Cognitive Skills Affected (resulting in the inability to perform in a timely and safe manner):  1. None Psychosocial Skills Affected:  1. None   Number of elements that affect the Plan of Care: 1-3:  LOW COMPLEXITY   CLINICAL DECISION MAKIN[address] [place] [address] AM-PAC 6 Clicks   Daily Activity Inpatient Short Form  How much help from another person does the patient currently need. .. Total A Lot A Little None   1. Putting on and taking off regular lower body clothing? [] 1   [x] 2   [] 3   [] 4   2. Bathing (including washing, rinsing, drying)? [] 1   [x] 2   [] 3   [] 4   3. Toileting, which includes using toilet, bedpan or urinal?   [] 1   [x] 2   [] 3   [] 4   4. Putting on and taking off regular upper body clothing? [] 1   [] 2   [x] 3   [] 4   5. Taking care of personal grooming such as brushing teeth? [] 1   [] 2   [x] 3   [] 4   6. Eating meals? [] 1   [] 2   [x] 3   [] 4   © , Trustees of [address] [place] [address], under license to [employer]. All rights reserved      Score:  Initial: 15 Most Recent: X (Date: -- )    Interpretation of Tool:  Represents activities that are increasingly more difficult (i.e. Bed mobility, Transfers, Gait). Score 24 23 22-20 19-15 14-10 9-7 6     Modifier CH CI CJ CK CL CM CN      ? Self Care:     - CURRENT STATUS: CK - 40%-59% impaired, limited or restricted    - GOAL STATUS: CJ - 20%-39% impaired, limited or restricted    - D/C STATUS:  ---------------To be determined---------------  Payor: FIRST CHOICE VIP CARE PLUS [place] FIRST CHOICE VIP CARE PLUS / Product Type: Managed Care Medicare /      Medical Necessity:     · Patient is expected to demonstrate progress in balance, coordination and functional technique to decrease assistance required with self care and funtional mobility.   Reason for Services/Other Comments:  · Patient continues to require skilled intervention due to decreased self care and funtional mobility. Use of outcome tool(s) and clinical judgement create a POC that gives a: LOW COMPLEXITY         TREATMENT:   (In addition to Assessment/Re-Assessment sessions the following treatments were rendered)     Pre-treatment Symptoms/Complaints:  none  Pain: Initial:   Pain Intensity 1: 0  Post Session:  0     Self Care: (15): Procedure(s) (per grid) utilized to improve and/or restore self-care/home management as related to dressing and grooming. Required minimal verbal and   cueing to facilitate activities of daily living skills and compensatory activities. Braces/Orthotics/Lines/Etc:   · IV  · travis catheter  · O2 Device: Nasal cannula  Treatment/Session Assessment:    · Response to Treatment:  Tolerated well  · Interdisciplinary Collaboration:   o Occupational Therapist  o Registered Nurse  · After treatment position/precautions:   o Supine in bed  o Bed/Chair-wheels locked  o Bed in low position  o Call light within reach  o RN notified  o Side rails x 3   · Compliance with Program/Exercises: compliant most of the time. · Recommendations/Intent for next treatment session: \"Next visit will focus on advancements to more challenging activities\".   Total Treatment Duration:  OT Patient Time In/Time Out  Time In: 1100  Time Out: 3000 [facility]

## 2018-03-01 PROBLEM — I50.9 CONGESTIVE HEART FAILURE (CHF) (HCC): Status: ACTIVE | Noted: 2018-03-01

## 2018-03-01 PROBLEM — I50.33 ACUTE ON CHRONIC DIASTOLIC CHF (CONGESTIVE HEART FAILURE), NYHA CLASS 3 (HCC): Status: ACTIVE | Noted: 2018-02-24

## 2018-03-01 PROBLEM — R33.8 ACUTE URINARY RETENTION: Status: ACTIVE | Noted: 2018-03-01

## 2018-03-01 LAB
ABO + RH BLD: NORMAL
ANION GAP SERPL CALC-SCNC: 5 MMOL/L (ref 7–16)
APPEARANCE UR: ABNORMAL
BACTERIA URNS QL MICRO: ABNORMAL /HPF
BILIRUB UR QL: NEGATIVE
BLD PROD TYP BPU: NORMAL
BLD PROD TYP BPU: NORMAL
BLOOD GROUP ANTIBODIES SERPL: NORMAL
BPU ID: NORMAL
BPU ID: NORMAL
BUN SERPL-MCNC: 41 MG/DL (ref 8–23)
CALCIUM SERPL-MCNC: 9.2 MG/DL (ref 8.3–10.4)
CHLORIDE SERPL-SCNC: 106 MMOL/L (ref 98–107)
CO2 SERPL-SCNC: 33 MMOL/L (ref 21–32)
COLOR UR: YELLOW
CREAT SERPL-MCNC: 0.97 MG/DL (ref 0.6–1)
CROSSMATCH RESULT,%XM: NORMAL
CROSSMATCH RESULT,%XM: NORMAL
EPI CELLS #/AREA URNS HPF: ABNORMAL /HPF
GLUCOSE SERPL-MCNC: 90 MG/DL (ref 65–100)
GLUCOSE UR STRIP.AUTO-MCNC: NEGATIVE MG/DL
HGB UR QL STRIP: ABNORMAL
KETONES UR QL STRIP.AUTO: NEGATIVE MG/DL
LEUKOCYTE ESTERASE UR QL STRIP.AUTO: ABNORMAL
MM INDURATION POC: NORMAL MM (ref 0–5)
NITRITE UR QL STRIP.AUTO: NEGATIVE
PH UR STRIP: 5.5 [PH] (ref 5–9)
POTASSIUM SERPL-SCNC: 3.6 MMOL/L (ref 3.5–5.1)
PPD POC: NORMAL NEGATIVE
PROT UR STRIP-MCNC: 30 MG/DL
SODIUM SERPL-SCNC: 144 MMOL/L (ref 136–145)
SP GR UR REFRACTOMETRY: 1.01 (ref 1–1.02)
SPECIMEN EXP DATE BLD: NORMAL
STATUS OF UNIT,%ST: NORMAL
STATUS OF UNIT,%ST: NORMAL
UNIT DIVISION, %UDIV: 0
UNIT DIVISION, %UDIV: 0
UROBILINOGEN UR QL STRIP.AUTO: 0.2 EU/DL (ref 0.2–1)
WBC URNS QL MICRO: >100 /HPF

## 2018-03-01 PROCEDURE — 74011250637 HC RX REV CODE- 250/637: Performed by: FAMILY MEDICINE

## 2018-03-01 PROCEDURE — 97116 GAIT TRAINING THERAPY: CPT

## 2018-03-01 PROCEDURE — 87106 FUNGI IDENTIFICATION YEAST: CPT | Performed by: FAMILY MEDICINE

## 2018-03-01 PROCEDURE — 65270000029 HC RM PRIVATE

## 2018-03-01 PROCEDURE — 36415 COLL VENOUS BLD VENIPUNCTURE: CPT | Performed by: FAMILY MEDICINE

## 2018-03-01 PROCEDURE — 3331090001 HH PPS REVENUE CREDIT

## 2018-03-01 PROCEDURE — 94640 AIRWAY INHALATION TREATMENT: CPT

## 2018-03-01 PROCEDURE — 77030034849

## 2018-03-01 PROCEDURE — 97110 THERAPEUTIC EXERCISES: CPT

## 2018-03-01 PROCEDURE — 80048 BASIC METABOLIC PNL TOTAL CA: CPT | Performed by: FAMILY MEDICINE

## 2018-03-01 PROCEDURE — 3331090002 HH PPS REVENUE DEBIT

## 2018-03-01 PROCEDURE — 87086 URINE CULTURE/COLONY COUNT: CPT | Performed by: FAMILY MEDICINE

## 2018-03-01 PROCEDURE — 51798 US URINE CAPACITY MEASURE: CPT

## 2018-03-01 PROCEDURE — 81001 URINALYSIS AUTO W/SCOPE: CPT | Performed by: FAMILY MEDICINE

## 2018-03-01 PROCEDURE — 99218 HC RM OBSERVATION: CPT

## 2018-03-01 PROCEDURE — 74011250637 HC RX REV CODE- 250/637: Performed by: HOSPITALIST

## 2018-03-01 PROCEDURE — 94760 N-INVAS EAR/PLS OXIMETRY 1: CPT

## 2018-03-01 PROCEDURE — 74011000250 HC RX REV CODE- 250: Performed by: FAMILY MEDICINE

## 2018-03-01 PROCEDURE — 77010033678 HC OXYGEN DAILY

## 2018-03-01 RX ORDER — TAMSULOSIN HYDROCHLORIDE 0.4 MG/1
0.4 CAPSULE ORAL DAILY
Status: DISCONTINUED | OUTPATIENT
Start: 2018-03-02 | End: 2018-03-03 | Stop reason: HOSPADM

## 2018-03-01 RX ORDER — TAMSULOSIN HYDROCHLORIDE 0.4 MG/1
0.4 CAPSULE ORAL ONCE
Status: COMPLETED | OUTPATIENT
Start: 2018-03-01 | End: 2018-03-01

## 2018-03-01 RX ADMIN — RDII 250 MG CAPSULE 250 MG: at 17:49

## 2018-03-01 RX ADMIN — Medication 10 ML: at 03:26

## 2018-03-01 RX ADMIN — ALBUTEROL SULFATE 2.5 MG: 2.5 SOLUTION RESPIRATORY (INHALATION) at 13:37

## 2018-03-01 RX ADMIN — TAMSULOSIN HYDROCHLORIDE 0.4 MG: 0.4 CAPSULE ORAL at 17:49

## 2018-03-01 RX ADMIN — COLCHICINE 0.3 MG: 0.6 TABLET, FILM COATED ORAL at 09:12

## 2018-03-01 RX ADMIN — PRAVASTATIN SODIUM 20 MG: 20 TABLET ORAL at 20:23

## 2018-03-01 RX ADMIN — SENNOSIDES AND DOCUSATE SODIUM 1 TABLET: 8.6; 5 TABLET ORAL at 09:14

## 2018-03-01 RX ADMIN — FAMOTIDINE 20 MG: 20 TABLET, FILM COATED ORAL at 09:14

## 2018-03-01 RX ADMIN — ACETAMINOPHEN 650 MG: 325 TABLET ORAL at 20:23

## 2018-03-01 RX ADMIN — POLYETHYLENE GLYCOL (3350) 17 G: 17 POWDER, FOR SOLUTION ORAL at 09:13

## 2018-03-01 RX ADMIN — HYDROCODONE BITARTRATE AND ACETAMINOPHEN 1 TABLET: 5; 325 TABLET ORAL at 03:25

## 2018-03-01 RX ADMIN — HYDROCODONE BITARTRATE AND ACETAMINOPHEN 1 TABLET: 5; 325 TABLET ORAL at 23:12

## 2018-03-01 RX ADMIN — RDII 250 MG CAPSULE 250 MG: at 09:12

## 2018-03-01 RX ADMIN — HYDROCODONE BITARTRATE AND ACETAMINOPHEN 1 TABLET: 5; 325 TABLET ORAL at 09:24

## 2018-03-01 RX ADMIN — CYANOCOBALAMIN TAB 1000 MCG 1000 MCG: 1000 TAB at 09:14

## 2018-03-01 RX ADMIN — ALLOPURINOL 100 MG: 100 TABLET ORAL at 17:48

## 2018-03-01 RX ADMIN — ALLOPURINOL 100 MG: 100 TABLET ORAL at 09:13

## 2018-03-01 RX ADMIN — FUROSEMIDE 20 MG: 20 TABLET ORAL at 09:13

## 2018-03-01 RX ADMIN — LORATADINE 10 MG: 10 TABLET ORAL at 09:13

## 2018-03-01 RX ADMIN — ALBUTEROL SULFATE 2.5 MG: 2.5 SOLUTION RESPIRATORY (INHALATION) at 07:34

## 2018-03-01 RX ADMIN — Medication 5 ML: at 17:49

## 2018-03-01 RX ADMIN — ALBUTEROL SULFATE 2.5 MG: 2.5 SOLUTION RESPIRATORY (INHALATION) at 19:48

## 2018-03-01 RX ADMIN — Medication 10 ML: at 20:26

## 2018-03-01 RX ADMIN — TAMSULOSIN HYDROCHLORIDE 0.4 MG: 0.4 CAPSULE ORAL at 16:00

## 2018-03-01 NOTE — PROGRESS NOTES
Patient has not voided since straight cath at [address]. Bladder scan revealed an average of 250 ml urine. Patient assisted to bedside commode. Patient voided 140 ml. Will monitor.

## 2018-03-01 NOTE — PROGRESS NOTES
Problem: Mobility Impaired (Adult and Pediatric)  Goal: *Acute Goals and Plan of Care (Insert Text)  DISCHARGE GOALS:  (1.)Ms. Ebony Grimes will move from supine to sit and sit to supine  in bed with SUPERVISION within 5 treatment day(s). (2.)Ms. Ebony Grimes will transfer from bed to chair and chair to bed with SUPERVISION using the least restrictive device within 5 treatment day(s). (3.)Ms. Ebony Grimes will ambulate with SUPERVISION for 75 feet with the least restrictive device within 5 treatment day(s). ________________________________________________________________________________________________    PHYSICAL THERAPY: Daily Note, Treatment Day: 3rd, AM 3/1/2018  INPATIENT: Hospital Day: 6  Payor: FIRST [employer] VIP C[employer] PLUS / [place] FIRST [employer] VIP CARE PLUS / Product Type: Managed Care Medicare /      NAME/AGE/GENDER: Chloe Hayden is a 80 y.o. female   PRIMARY DIAGNOSIS: Heart failure ([facility] [address]  Edema, peripheral  Congestive heart failure (CHF) (HCC)  Shortness of breath Acute urinary retention Acute urinary retention        ICD-10: Treatment Diagnosis:   · Generalized Muscle Weakness (M62.81)  · Difficulty in walking, Not elsewhere classified (R26.2)   Precaution/Allergies:  Bee pollen and Fire ant      ASSESSMENT:     Ms. Ebony Grimes is continuing to slowly improve with her bed mobility, transfers and gait. This pt will need to be attended at home when discharged with follow up HHPT. If no assist available at home, this pt will need SNF for additional rehab due to not being safe to functional alone. Good participation with therapy this morning. Continue to increase her gait distance as tolerated. This section established at most recent assessment   PROBLEM LIST (Impairments causing functional limitations):  1. Decreased Strength  2. Decreased ADL/Functional Activities  3. Decreased Transfer Abilities  4. Decreased Ambulation Ability/Technique  5.  Decreased Balance   INTERVENTIONS PLANNED: (Benefits and precautions of physical therapy have been discussed with the patient.)  1. Bed Mobility  2. Gait Training  3. Home Exercise Program (HEP)  4. Therapeutic Activites  5. Therapeutic Exercise/Strengthening     TREATMENT PLAN: Frequency/Duration: daily for duration of hospital stay  Rehabilitation Potential For Stated Goals: Good     RECOMMENDED REHABILITATION/EQUIPMENT: (at time of discharge pending progress): Due to the probability of continued deficits (see above) this patient will likely need continued skilled physical therapy after discharge. Equipment:    None at this time              HISTORY:   History of Present Injury/Illness (Reason for Referral):  80 y. o. female who has a PMH of diastolic HF with preserved EF, moderate-severe T valve regurgitation, home oxygen, HTN, CKD stage III, Gout, dyslipidemia, CAD, NSTEMI presents to ER with complaints of SOB and progressive LE edema. She was recently hospitalized(2/13-2/19) for PNA and s/p Levaquin. She was discharged home with 2L oxygen and she reports compliance. She also reports compliance with Lasix 20mg BID. In ER, CXR is clear without congestion or infiltrates. Her lab studies are at baseline and BNP is only 217. After travis was placed; UA collected is significant for >100WBC and many Bacteria. Past Medical History/Comorbidities:   Ms. Yobany Newman  has a past medical history of Acute kidney failure, unspecified; Arthritis; CAD (coronary artery disease); Cellulitis and abscess of other specified site; Coronary atherosclerosis of native coronary artery; Essential hypertension, benign (3/17/2015); Hypertension; Hypopotassemia; Mixed hyperlipidemia; Osteoarthritis (7/20/2017); Other and unspecified hyperlipidemia; Reflux esophagitis; Renal failure, unspecified; Shortness of breath; and Unspecified essential hypertension.   Ms. Yobany Newman  has a past surgical history that includes hx cholecystectomy; pr layr clos wnd face,facial <2.5cm; pr cardiac surg procedure unlist; hx cataract removal (Bilateral); and hx hysterectomy. Social History/Living Environment:   Home Environment: Private residence  Wheelchair Ramp: Yes  One/Two Story Residence: One story  Living Alone: No  Support Systems: Child(jayleen)  Patient Expects to be Discharged to[de-identified] Private residence  Current DME Used/Available at Home: Wheelchair, [address], [place], [address] [place] chair  Prior Level of Function/Work/Activity:  Wheelchair as primary means of mobility but rolling walker for transfers and short distances. Assist with ADLs. Personal Factors:          Sex:  female        Age:  80 y.o. Multiple hospitalizations   Number of Personal Factors/Comorbidities that affect the Plan of Care: 3+: HIGH COMPLEXITY   EXAMINATION:   Most Recent Physical Functioning:   Gross Assessment: fair- strength through out                  Posture:     Balance:  Sitting: Intact  Standing - Static: Fair  Standing - Dynamic : Fair Bed Mobility:  Supine to Sit: Minimum assistance;Assist x1  Sit to Supine:  (left up in chair)  Scooting: Stand-by assistance  Wheelchair Mobility:     Transfers:  Sit to Stand: Minimum assistance  Stand to Sit: Minimum assistance  Gait:     Speed/Mary: Shuffled; Slow  Step Length: Left shortened;Right shortened  Gait Abnormalities: Decreased step clearance;Shuffling gait  Distance (ft): 12 Feet (ft) (walked around the bed and to a chair)  Assistive Device: Walker, rolling  Ambulation - Level of Assistance: Contact guard assistance;Minimal assistance;Assist x1  Interventions: Safety awareness training  Duration: 10 Minutes      Body Structures Involved:  1. Muscles Body Functions Affected:  1. Movement Related Activities and Participation Affected:  1. Mobility  2. Self Care  3.  Domestic Life   Number of elements that affect the Plan of Care: 4+: HIGH COMPLEXITY   CLINICAL PRESENTATION:   Presentation: Stable and uncomplicated: LOW COMPLEXITY   CLINICAL DECISION MAKING:   Mariusz Sutherland Inpatient Short Form  How much difficulty does the patient currently have. .. Unable A Lot A Little None   1. Turning over in bed (including adjusting bedclothes, sheets and blankets)? [] 1   [] 2   [x] 3   [] 4   2. Sitting down on and standing up from a chair with arms ( e.g., wheelchair, bedside commode, etc.)   [] 1   [] 2   [x] 3   [] 4   3. Moving from lying on back to sitting on the side of the bed? [] 1   [] 2   [x] 3   [] 4   How much help from another person does the patient currently need. .. Total A Lot A Little None   4. Moving to and from a bed to a chair (including a wheelchair)? [] 1   [] 2   [x] 3   [] 4   5. Need to walk in hospital room? [] 1   [] 2   [x] 3   [] 4   6. Climbing 3-5 steps with a railing? [] 1   [] 2   [x] 3   [] 4   © 2007, Trustees of [address] [place] [address], under license to [employer]. All rights reserved      Score:  Initial: 18 Most Recent: X (Date: -- )    Interpretation of Tool:  Represents activities that are increasingly more difficult (i.e. Bed mobility, Transfers, Gait). Score 24 23 22-20 19-15 14-10 9-7 6     Modifier CH CI CJ CK CL CM CN      ? Mobility - Walking and Moving Around:     - CURRENT STATUS: CK - 40%-59% impaired, limited or restricted    - GOAL STATUS: CJ - 20%-39% impaired, limited or restricted    - D/C STATUS:  ---------------To be determined---------------  Payor: FIRST [employer] VIP CARE PLUS / [place] [employer] VIP CARE PLUS / Product Type: Managed Care Medicare /      Medical Necessity:     · Patient is expected to demonstrate progress in strength, balance and coordination to increase independence with bed mobility, transfers, and gait. Reason for Services/Other Comments:  · Patient continues to require skilled intervention due to generalized weakness and impaired mobility secondary to UTI.    Use of outcome tool(s) and clinical judgement create a POC that gives a: Clear prediction of patient's progress: LOW COMPLEXITY            TREATMENT:   (In addition to Assessment/Re-Assessment sessions the following treatments were rendered)   Pre-treatment Symptoms/Complaints:  Pt was agreeable to therapy today and without complaints  Pain: Initial: visual scale  Pain Intensity 1: 0  Post Session:  0/10     Gait Training (10 Minutes):  Gait training to improve and/or restore physical functioning as related to mobility and strength. Ambulated 12 Feet (ft) (walked around the bed and to a chair) with Contact guard assistance;Minimal assistance;Assist x1 using a Walker, rolling and minimal Safety awareness training related to their stride length to promote proper body posture. Therapeutic Exercise: (15 Minutes):  Exercises per grid below to improve mobility and strength. Required minimal verbal cues to promote proper body alignment. Date:  3/1 Date:   Date:     Activity/Exercise Parameters Parameters Parameters   Ankle pumps 10     Quad sets 10     Heel slides 10     SLR 10                             Braces/Orthotics/Lines/Etc:   ·   Treatment/Session Assessment:    · Response to Treatment:  She was able to progress well with therapy. Not fatiguing as much today  · Interdisciplinary Collaboration:   o Registered Nurse  · After treatment position/precautions:   o Up in chair  o Bed alarm/tab alert on  o Bed/Chair-wheels locked  o Call light within reach  o RN notified   · Compliance with Program/Exercises: compliant all of the time. · Recommendations/Intent for next treatment session: \"Next visit will focus on advancements to more challenging activities and reduction in assistance provided\".   Total Treatment Duration:  PT Patient Time In/Time Out  Time In: 0925  Time Out: [address] [facility], PT

## 2018-03-01 NOTE — ADT AUTH CERT NOTES
letter of determination upgrade from obs to ip status by Jace Scott RN        Review Status Review Entered       In Primary 3/1/2018       Details           Letter of Determination: Upgrade from Observation to Inpatient Status     This patient was originally hospitalized as Outpatient Status with Observation Services on 2/26/2018 for peripheral edema.  This patient now meets for Inpatient Admission in accordance with CMS regulation Section 43 . 3.  Specifically, patient's stay is now over Two Midnights and was medically necessary.  The patient's stay was medically necessary based on hemoglobin of 7.4 mg/dl, troponin-I of 0.14 ng/ml.  Consistent with CMS guidelines, patient meets for inpatient status.     It is our recommendation that this patient's hospitalization status should be upgraded from OBSERVATION to INPATIENT status.      The final decision regarding the patient's hospitalization status depends on the attending physician's judgement.     Lamar Holman MD, GABRIELLA,   Physician Sky Esposito.

## 2018-03-01 NOTE — PROGRESS NOTES
PM assessment complete. Alert, oriented x 4. Respirations even and unlabored, no distress noted. Abdomen soft, bowel sounds active in all 4 quadrants. Family at bedside. Denies needs or pain.

## 2018-03-01 NOTE — PROGRESS NOTES
Hospitalist Progress Note    3/1/2018  Admit Date: 2018 12:59 PM   NAME: Jannet Metz   :  2/10/1927   MRN:  [id number]   Attending: Timoteo Prieto MD  PCP:  Tahmina Joseph MD    SUBJECTIVE:   80 y. o. female who has a PMH of diastolic HF with preserved EF, moderate-severe T valve regurgitation, home oxygen, HTN, CKD stage III, Gout, dyslipidemia, CAD, NSTEMI presents to ER with complaints of SOB and progressive LE edema. She was recently hospitalized(-) for PNA and s/p Levaquin. She was discharged home with 2L oxygen and she reports compliance. She also reports compliance with Lasix 20mg BID. In ER, CXR is clear without congestion or infiltrates. Her lab studies are at baseline and BNP is only 217. After travis was placed; UA collected is significant for >100WBC and many Bacteria. Admitted and started on Zosyn. Urine culture pending. Today:   Pt developed urinary retention after travis was removed, pt needed  2 intermittent catheterization. Clinically pt is feeling better but is very frail and lethargic which is chronic. Pt's daughter present at bedside, would like to know if it is possible if pt can go to facility for supervised care for a short while. No chest pain, nausea, vomiting, diarrhea, headache.           Review of Systems negative with exception of pertinent positives noted above  PHYSICAL EXAM     Visit Vitals    BP 97/52 (BP 1 Location: Right arm, BP Patient Position: At rest;Head of bed elevated (Comment degrees))    Pulse 72    Temp 96 °F (35.6 °C)    Resp 22    Ht 5' 2\" (1.575 m)    Wt 67.3 kg (148 lb 4.8 oz)    SpO2 99%    BMI 27.12 kg/m2      Temp (24hrs), Av.2 °F (36.2 °C), Min:96 °F (35.6 °C), Max:98.3 °F (36.8 °C)    Oxygen Therapy  O2 Sat (%): 99 % (18)  Pulse via Oximetry: (!) 38 beats per minute (18)  O2 Device: Nasal cannula (18)  O2 Flow Rate (L/min): 2 l/min (18 4078)  FIO2 (%): 24 % (18)    Intake/Output Summary (Last 24 hours) at 03/01/18 0943  Last data filed at 03/01/18 9369   Gross per 24 hour   Intake              240 ml   Output             2165 ml   Net            -1925 ml      General: No acute distress    Lungs:  CTA Bilaterally. Heart:  Regular rate and rhythm. 1+ edema(improved)  Abdomen: Soft, Non distended, Non tender, Positive bowel sounds  Extremities: No cyanosis, clubbing. Normal ROM. Neurologic:  No focal deficits  Psych:  Alert and oriented, cooperative    [address] Problems    Diagnosis Date Noted    Acute urinary retention 03/01/2018     Priority: 1 - One    Acute on chronic diastolic CHF (congestive heart failure), NYHA class 3 (HCC) 02/24/2018     Priority: 2 - Two    UTI (urinary tract infection) 02/07/2018     Priority: 3 - Three    Essential hypertension, benign 03/17/2015     Priority: 4 - Four    Chronic anemia 02/25/2018     Priority: 5 - Five    Edema, peripheral 02/24/2018     Priority: 6 - Six     Plan:    Urinary retention: continue intermittent cath q6h with flomax 0.4 mg po every day. Hgb stable at 7.4, vitamin b12 and folate levels are normal. GI does not recommend any intervention, as anemia is unlikely from GI blood loss. LE edema improved, will continue home Lasix PO. Does not appear to be CHF exacerbation  Continue home meds  PT/OT  Disposition:pt's daughter requesting for SNF/STR, CM notified.      Signed By: Lawyer Chadwick MD     March 1, 2018

## 2018-03-01 NOTE — PHYSICIAN ADVISORY
Letter of Determination: Upgrade from Observation to Inpatient Status    This patient was originally hospitalized as Outpatient Status with Observation Services on 2/26/2018 for peripheral edema. This patient now meets for Inpatient Admission in accordance with CMS regulation Section 43 .3. Specifically, patient's stay is now over Two Midnights and was medically necessary. The patient's stay was medically necessary based on hemoglobin of 7.4 mg/dl, troponin-I of 0.14 ng/ml. Consistent with CMS guidelines, patient meets for inpatient status. It is our recommendation that this patient's hospitalization status should be upgraded from OBSERVATION to INPATIENT status.      The final decision regarding the patient's hospitalization status depends on the attending physician's judgement.     Rodney Draper MD, GABRIELLA,   Physician Sky Esposito.

## 2018-03-01 NOTE — PROGRESS NOTES
3/2/18-Auth received from insurance and pt could transfer however, her hbg is low and she is receiving blood. Sidney Vaz states maybe pt can dc the weekend. Pt aware and message left with pt's dtr. Via voicemail. Louis Challenger with [facility] admissions aware. Pt can transfer to [facility] the weekend if medically stable. Amarilys Fulton MD states pt's dtr asked for STR. SW met with pt and her dtr, Paz Scheuermann. Pt has been to [facility] in the past, SW asked if pt was agreeable to go again and both dtr and pt agree. SW called to Louis Challenger with [facility]. Louis Challenger states [facility] has beds and she will send referral to [facility]. Referral sent through [address] [place] [address]. Louis Challenger aware to go ahead and start precert if pt accepted.   Louis Challenger also aware to notify SW if pt not accepted in order for to make an additional referral.  Amarilys Fulton

## 2018-03-01 NOTE — PROGRESS NOTES
Shift assessment complete. Respirations present. Even and unlabored. No s/s of distress. Zero c/o pain at this time. Call light within reach. Encouraged patient to call for assistance. Patient verbalizes understanding. See Doc Flowsheet for assessment details. Patient resting in bed.  02/2l via nasal cannula. Abdomen soft. Alert and oriented times 4. Family at bedside.

## 2018-03-01 NOTE — PROGRESS NOTES
Bladder scan revealed an average of 700 ml urine. Patient assisted to [facility], voided 175 ml cloudy urine. Patient straight cathed for 350 ml urine.

## 2018-03-01 NOTE — ADT AUTH CERT NOTES
letter of determination upgrade from obs to ip status by Ginger Medina RN        Review Status Review Entered       In Primary 3/1/2018       Details           Letter of Determination: Upgrade from Observation to Inpatient Status     This patient was originally hospitalized as Outpatient Status with Observation Services on 2/26/2018 for peripheral edema.  This patient now meets for Inpatient Admission in accordance with CMS regulation Section 43 . 3.  Specifically, patient's stay is now over Two Midnights and was medically necessary.  The patient's stay was medically necessary based on hemoglobin of 7.4 mg/dl, troponin-I of 0.14 ng/ml.  Consistent with CMS guidelines, patient meets for inpatient status.     It is our recommendation that this patient's hospitalization status should be upgraded from OBSERVATION to INPATIENT status.      The final decision regarding the patient's hospitalization status depends on the attending physician's judgement.     Jeny Mcknight MD, GABRIELLA,   Physician [address] [place] [address] Adult-Extended Stay (2/28/2018) by Ginger Medina RN        Review Status Review Entered       In Primary 2/28/2018       Details         REVIEW SUMMARY     Patient: Grant Agrawal  Review Number: [id number]  Review Status:  In Primary     Condition Specific: Yes     Condition Level Of Care Code: OBS  Condition Level Of Care Description: Observation        OUTCOMES  Outcome Type: Primary           REVIEW DETAILS     Service Date: 02/28/2018  Admit Date: 02/24/2018  Product: Climmie Sonora Adult  Subset: Extended Stay      (Symptom or finding within 24h)         (Excludes PO medications unless noted)          Select Level of Care, One:              [ ] ACUTE, >= One:                  [ ] General, One:                      [ ] Partial responder, not clinically stable for discharge and requires continued stay, >= One:                          [ ] Functional impairment (new) and rehabilitation initiation <= 24h, >= One:                          ~--Admin, IQ Admin Admin on 02- 02:48 PM--~                          PT showed incrased gait distance but slightly more assist for functional mobility pt will need to be attended to at home dc f/u with [facility] if no assist at home pt will need SNF for additional rehab d/t not being safe to function alone                           OT pt completed grooming and upper body dressing                          PT OT Consulting                      Version: Ana Laura Daley 2017.2  Alex Mcgrath  © 2017 [employer] and/or one of its subsidiaries. All Rights Reserved. CPT only © 2016 American [employer] Association. All Rights Reserved.              Additional Notes       97.9 84 119/68 20 98% 1l nc        67.6kg         Cardiac diet        Dc travis        Daily bmp        c/o leg pain norco 5 mg given and legs elevated on pollows        scds       Amb with assist        Cm assisting with dcp        Home with [facility] family to transport and bring in portable o2        Anticipate dc  tomorrow        Po meds including norco 5 mg q4h prn mod pain given x2        Iv zosyn dcd           LOC:Acute Adult-Extended Stay (2/27/2018) by Sylvia Arroyo RN        Review Status Review Entered       In Primary 2/27/2018       Details         REVIEW SUMMARY     Patient: Ayla Talley  Review Number: [id number]  Review Status:  In Primary     Condition Specific: Yes     Condition Level Of Care Code: OBS  Condition Level Of Care Description: Observation        OUTCOMES  Outcome Type: Primary           REVIEW DETAILS     Service Date: 02/27/2018  Admit Date: 02/24/2018  Product: Erle Patches Adult  Subset: Extended Stay      (Symptom or finding within 24h)         (Excludes PO medications unless noted)          [X] Select Level of Care, One:              [X] ACUTE, >= One:                  [X] General, One:                      [X] Partial responder, not clinically stable for discharge and requires continued stay, >= One:                          [X] Functional impairment (new) and rehabilitation initiation <= 24h, >= One:                              [X] PT evaluation and training                              ~--Admin, IQ Admin Admin on 02- 04:02 PM--~                              Obs medical                              pt reporting bilateral ankle pain and thinks it the gout                               108/62 86 98.2 18 97% 2l nc                               Hgb declined to 7.4 transfuse 1 units prbcs                               Gi consult                               Dc zosyn urine shows candida                               Le edema improved cont home po Lasix                               Pt ot consults                               Ambulate with assist                               scds                              Likely yamilka discharged to home with pt /ot when stable                               Cm following                               Pending PT recommendations                              Albuterol nebs q6h                               Cardiac reg diet fluid restriction 1500 ml                               Occult blood                               Daily labs                      Version: [employer]® 2017.2  Cooper Cutler  © 2017 [employer] and/or one of its subsidiaries. All Rights Reserved. CPT only © 2016 American [employer] Association. All Rights Reserved.

## 2018-03-02 LAB
ANION GAP SERPL CALC-SCNC: 4 MMOL/L (ref 7–16)
BUN SERPL-MCNC: 33 MG/DL (ref 8–23)
CALCIUM SERPL-MCNC: 8.8 MG/DL (ref 8.3–10.4)
CHLORIDE SERPL-SCNC: 104 MMOL/L (ref 98–107)
CO2 SERPL-SCNC: 36 MMOL/L (ref 21–32)
CREAT SERPL-MCNC: 0.94 MG/DL (ref 0.6–1)
ERYTHROCYTE [DISTWIDTH] IN BLOOD BY AUTOMATED COUNT: 19.5 % (ref 11.9–14.6)
GLUCOSE SERPL-MCNC: 100 MG/DL (ref 65–100)
HCT VFR BLD AUTO: 22.8 % (ref 35.8–46.3)
HGB BLD-MCNC: 7.1 G/DL (ref 11.7–15.4)
MCH RBC QN AUTO: 31.7 PG (ref 26.1–32.9)
MCHC RBC AUTO-ENTMCNC: 31.1 G/DL (ref 31.4–35)
MCV RBC AUTO: 101.8 FL (ref 79.6–97.8)
PLATELET # BLD AUTO: 151 K/UL (ref 150–450)
PMV BLD AUTO: 11 FL (ref 10.8–14.1)
POTASSIUM SERPL-SCNC: 3.4 MMOL/L (ref 3.5–5.1)
RBC # BLD AUTO: 2.24 M/UL (ref 4.05–5.25)
SODIUM SERPL-SCNC: 144 MMOL/L (ref 136–145)
WBC # BLD AUTO: 3.8 K/UL (ref 4.3–11.1)

## 2018-03-02 PROCEDURE — 94640 AIRWAY INHALATION TREATMENT: CPT

## 2018-03-02 PROCEDURE — 85027 COMPLETE CBC AUTOMATED: CPT | Performed by: HOSPITALIST

## 2018-03-02 PROCEDURE — 86900 BLOOD TYPING SEROLOGIC ABO: CPT | Performed by: HOSPITALIST

## 2018-03-02 PROCEDURE — 74011000250 HC RX REV CODE- 250: Performed by: FAMILY MEDICINE

## 2018-03-02 PROCEDURE — 74011250637 HC RX REV CODE- 250/637: Performed by: FAMILY MEDICINE

## 2018-03-02 PROCEDURE — 36430 TRANSFUSION BLD/BLD COMPNT: CPT

## 2018-03-02 PROCEDURE — 77030034849

## 2018-03-02 PROCEDURE — 3331090001 HH PPS REVENUE CREDIT

## 2018-03-02 PROCEDURE — 3331090002 HH PPS REVENUE DEBIT

## 2018-03-02 PROCEDURE — 36415 COLL VENOUS BLD VENIPUNCTURE: CPT | Performed by: FAMILY MEDICINE

## 2018-03-02 PROCEDURE — 94760 N-INVAS EAR/PLS OXIMETRY 1: CPT

## 2018-03-02 PROCEDURE — 65270000029 HC RM PRIVATE

## 2018-03-02 PROCEDURE — 0T9B70Z DRAINAGE OF BLADDER WITH DRAINAGE DEVICE, VIA NATURAL OR ARTIFICIAL OPENING: ICD-10-PCS | Performed by: HOSPITALIST

## 2018-03-02 PROCEDURE — 77030005537 HC CATH URETH BARD -A

## 2018-03-02 PROCEDURE — 80048 BASIC METABOLIC PNL TOTAL CA: CPT | Performed by: FAMILY MEDICINE

## 2018-03-02 PROCEDURE — 86923 COMPATIBILITY TEST ELECTRIC: CPT | Performed by: HOSPITALIST

## 2018-03-02 PROCEDURE — 74011250637 HC RX REV CODE- 250/637: Performed by: HOSPITALIST

## 2018-03-02 PROCEDURE — 51798 US URINE CAPACITY MEASURE: CPT

## 2018-03-02 PROCEDURE — 77010033678 HC OXYGEN DAILY

## 2018-03-02 PROCEDURE — P9016 RBC LEUKOCYTES REDUCED: HCPCS | Performed by: HOSPITALIST

## 2018-03-02 PROCEDURE — 97535 SELF CARE MNGMENT TRAINING: CPT

## 2018-03-02 RX ORDER — POTASSIUM CHLORIDE 20MEQ/15ML
40 LIQUID (ML) ORAL DAILY
Status: DISCONTINUED | OUTPATIENT
Start: 2018-03-02 | End: 2018-03-03 | Stop reason: HOSPADM

## 2018-03-02 RX ORDER — POTASSIUM CHLORIDE 20MEQ/15ML
20 LIQUID (ML) ORAL DAILY
Status: DISCONTINUED | OUTPATIENT
Start: 2018-03-02 | End: 2018-03-02

## 2018-03-02 RX ORDER — SODIUM CHLORIDE 9 MG/ML
250 INJECTION, SOLUTION INTRAVENOUS AS NEEDED
Status: DISCONTINUED | OUTPATIENT
Start: 2018-03-02 | End: 2018-03-03 | Stop reason: HOSPADM

## 2018-03-02 RX ADMIN — SENNOSIDES AND DOCUSATE SODIUM 1 TABLET: 8.6; 5 TABLET ORAL at 09:26

## 2018-03-02 RX ADMIN — ACETAMINOPHEN 650 MG: 325 TABLET ORAL at 04:20

## 2018-03-02 RX ADMIN — TAMSULOSIN HYDROCHLORIDE 0.4 MG: 0.4 CAPSULE ORAL at 09:26

## 2018-03-02 RX ADMIN — Medication 10 ML: at 22:00

## 2018-03-02 RX ADMIN — HYDROCODONE BITARTRATE AND ACETAMINOPHEN 1 TABLET: 5; 325 TABLET ORAL at 13:32

## 2018-03-02 RX ADMIN — Medication 5 ML: at 14:00

## 2018-03-02 RX ADMIN — Medication 10 ML: at 05:49

## 2018-03-02 RX ADMIN — PRAVASTATIN SODIUM 20 MG: 20 TABLET ORAL at 22:05

## 2018-03-02 RX ADMIN — LORATADINE 10 MG: 10 TABLET ORAL at 09:26

## 2018-03-02 RX ADMIN — HYDROCODONE BITARTRATE AND ACETAMINOPHEN 1 TABLET: 5; 325 TABLET ORAL at 05:46

## 2018-03-02 RX ADMIN — POLYETHYLENE GLYCOL (3350) 17 G: 17 POWDER, FOR SOLUTION ORAL at 09:25

## 2018-03-02 RX ADMIN — POTASSIUM CHLORIDE 40 MEQ: 20 SOLUTION ORAL at 10:51

## 2018-03-02 RX ADMIN — ALLOPURINOL 100 MG: 100 TABLET ORAL at 17:16

## 2018-03-02 RX ADMIN — ALLOPURINOL 100 MG: 100 TABLET ORAL at 09:26

## 2018-03-02 RX ADMIN — ALBUTEROL SULFATE 2.5 MG: 2.5 SOLUTION RESPIRATORY (INHALATION) at 13:17

## 2018-03-02 RX ADMIN — HYDROCODONE BITARTRATE AND ACETAMINOPHEN 1 TABLET: 5; 325 TABLET ORAL at 23:42

## 2018-03-02 RX ADMIN — FAMOTIDINE 20 MG: 20 TABLET, FILM COATED ORAL at 09:26

## 2018-03-02 RX ADMIN — ALBUTEROL SULFATE 2.5 MG: 2.5 SOLUTION RESPIRATORY (INHALATION) at 20:16

## 2018-03-02 RX ADMIN — RDII 250 MG CAPSULE 250 MG: at 17:16

## 2018-03-02 RX ADMIN — CYANOCOBALAMIN TAB 1000 MCG 1000 MCG: 1000 TAB at 09:26

## 2018-03-02 RX ADMIN — COLCHICINE 0.3 MG: 0.6 TABLET, FILM COATED ORAL at 09:26

## 2018-03-02 RX ADMIN — ALBUTEROL SULFATE 2.5 MG: 2.5 SOLUTION RESPIRATORY (INHALATION) at 07:56

## 2018-03-02 RX ADMIN — RDII 250 MG CAPSULE 250 MG: at 09:26

## 2018-03-02 NOTE — PROGRESS NOTES
Spiritual Care visit. Follow up visit. Patient's code was changed to DNR.     Visit by Aleksandr Martínez M.Ed., Th.B. ,Staff

## 2018-03-02 NOTE — PROGRESS NOTES
Hospitalist Progress Note    3/2/2018  Admit Date: 2018 12:59 PM   NAME: Erum Saldivar   :  2/10/1927   MRN:  [id number]   Attending: Corey Wiggins MD  PCP:  Cora Muhammad MD    SUBJECTIVE:   80 y. o. female who has a PMH of diastolic HF with preserved EF, moderate-severe T valve regurgitation, home oxygen, HTN, CKD stage III, Gout, dyslipidemia, CAD, NSTEMI presents to ER with complaints of SOB and progressive LE edema. She was recently hospitalized(-) for PNA and s/p Levaquin. She was discharged home with 2L oxygen and she reports compliance. She also reports compliance with Lasix 20mg BID. In ER, CXR is clear without congestion or infiltrates. Her lab studies are at baseline and BNP is only 217. After travis was placed; UA collected is significant for >100WBC and many Bacteria. Admitted and started on Zosyn. Urine culture pending. Today:   Pt still requiring intermittent cath for urinary retention  Complains of ankle joint pain. No chest pain, nausea, vomiting, diarrhea, headache. Review of Systems negative with exception of pertinent positives noted above  PHYSICAL EXAM     Visit Vitals    /67    Pulse 79    Temp 97.9 °F (36.6 °C)    Resp 12    Ht 5' 2\" (1.575 m)    Wt 68.5 kg (151 lb)    SpO2 100%    BMI 27.62 kg/m2      Temp (24hrs), Av.4 °F (36.3 °C), Min:96.1 °F (35.6 °C), Max:97.9 °F (36.6 °C)    Oxygen Therapy  O2 Sat (%): 100 % (18)  Pulse via Oximetry: 74 beats per minute (18)  O2 Device: Nasal cannula (18)  O2 Flow Rate (L/min): 2 l/min (18)  FIO2 (%): 28 % (18)    Intake/Output Summary (Last 24 hours) at 18 0800  Last data filed at 18 0331   Gross per 24 hour   Intake                0 ml   Output              950 ml   Net             -950 ml      General: No acute distress    Lungs:  CTA Bilaterally. Heart:  Regular rate and rhythm.  1+ edema(improved)  Abdomen: Soft, Non distended, Non tender, Positive bowel sounds  Extremities: No cyanosis, clubbing. Normal ROM. Neurologic:  No focal deficits  Psych:  Alert and oriented, cooperative    [address] Problems    Diagnosis Date Noted    Acute urinary retention 03/01/2018     Priority: 1 - One    Acute on chronic diastolic CHF (congestive heart failure), NYHA class 3 (HCC) 02/24/2018     Priority: 2 - Two    UTI (urinary tract infection) 02/07/2018     Priority: 3 - Three    Essential hypertension, benign 03/17/2015     Priority: 4 - Four    Chronic anemia 02/25/2018     Priority: 5 - Five    Edema, peripheral 02/24/2018     Priority: 6 - Six    Congestive heart failure (CHF) ([facility] [place] [address] 03/01/2018    Shortness of breath      Plan:    Urinary retention: continue intermittent cath q6h with flomax 0.4 mg po every day. Hgb stable at 7.1 today, will transfuse one unit of PRBC. GI does not recommend any intervention, as anemia is unlikely from GI blood loss. LE edema improved, will continue home Lasix PO. Does not appear to be CHF exacerbation  Continue home meds  PT/OT  Disposition:pt's daughter requesting for SNF/STR, CM notified.      Signed By: Blanca Malhotra MD     March 2, 2018

## 2018-03-02 NOTE — PROGRESS NOTES
Pt resting in bed and is alert and oriented x 4. She denies pain and is on 2 L NC. RR even and unlabored. Call light in reach and pt instructed to call for assistance if needed. Will monitor.

## 2018-03-02 NOTE — PROGRESS NOTES
PT attempted to see pt but pt refused to participate due to being too fatigued/sleepy. PT informed RN that pt's bed was converted to chair mode with bed alarm activated. PT will follow up as allowed.  Renan Coleman, PT, SKINNY

## 2018-03-02 NOTE — PROGRESS NOTES
Problem: Self Care Deficits Care Plan (Adult)  Goal: *Acute Goals and Plan of Care (Insert Text)  1. Patient will perform grooming with supervision. Met 3/2/2018  2. Patient will perform upper body dressing with supervision. progressing  3. Patient will perform lower body dressing with supervision. 4. Patient will perform bathing with supervision. 5. Patient will perform toileting and toilet transfer with supervision. 6. Patient will perform ADL functional mobility and tranfers in room with supervision. 7. Patient/family to demonstrate knowledge of home safety and DME recommendations. New goal:  8. patient will participate in 30 minutes of ADL, therapeutic activity, therapeutic exercies with mod rest breaks to increase  Activity tolerance for self care and functional mobiltiy. Goals to be achieved in 7 days. OCCUPATIONAL THERAPY: Daily Note, Treatment Day: 2nd and AM 3/2/2018  INPATIENT: Hospital Day: 7  Payor: FIRST CHO[employer] VIP CARE PLUS / [place] FIRST CHO[employer] VIP CARE PLUS / Product Type: Managed Care Medicare /      NAME/AGE/GENDER: Eldred Bloch is a 80 y.o. female   PRIMARY DIAGNOSIS:  Heart failure ([facility] [address]  Edema, peripheral  Congestive heart failure (CHF) ([facility])  Shortness of breath Acute urinary retention Acute urinary retention        ICD-10: Treatment Diagnosis:    · Generalized Muscle Weakness (M62.81)  · Other lack of cordination (R27.8)   Precautions/Allergies:     Bee pollen and Fire ant      ASSESSMENT:     Ms. Jaswinder Blanca presents with above diagnosis. Patient refused physical therapy prior to OT session. OT received patient sitting upright in bed with lunch in front of her. She was unable to cut her grilled chicken and was eating it with her fingers. OT offered to cut it up and patient agreed. SHe used fork effectively with RUE and drank from cup with lid and straw using R UE. She was able to take the wrapping of her plate with additional time to obtain her cookie.  She agreed to wash her face and change her gown. She reported she is going to rehab after discharge. She would continue to benefit from skilled OT . SHe reported she was up all last night due to her legs hurting. Will Continue with OT with one new goal added. This section established at most recent assessment   PROBLEM LIST (Impairments causing functional limitations):  1. Decreased Strength  2. Decreased ADL/Functional Activities  3. Decreased Transfer Abilities  4. Decreased Ambulation Ability/Technique  5. Decreased Balance   INTERVENTIONS PLANNED: (Benefits and precautions of occupational therapy have been discussed with the patient.)  1. Activities of daily living training  2. Adaptive equipment training  3. Balance training  4. Clothing management  5. Donning&doffing training  6. Therapeutic activity  7. Therapeutic exercise     TREATMENT PLAN: Frequency/Duration: Follow patient 4x/wk to address above goals. Rehabilitation Potential For Stated Goals: Good     RECOMMENDED REHABILITATION/EQUIPMENT: (at time of discharge pending progress): Due to the probability of continued deficits (see above) this patient will likely need continued skilled occupational therapy after discharge. Equipment:    TBD              OCCUPATIONAL PROFILE AND HISTORY:   History of Present Injury/Illness (Reason for Referral):  Pt admitted with LE edema  Past Medical History/Comorbidities:   Ms. Brenda Nobles  has a past medical history of Acute kidney failure, unspecified; Arthritis; CAD (coronary artery disease); Cellulitis and abscess of other specified site; Coronary atherosclerosis of native coronary artery; Essential hypertension, benign (3/17/2015); Hypertension; Hypopotassemia; Mixed hyperlipidemia; Osteoarthritis (7/20/2017); Other and unspecified hyperlipidemia; Reflux esophagitis; Renal failure, unspecified; Shortness of breath; and Unspecified essential hypertension.   Ms. Brenda Nobles  has a past surgical history that includes hx cholecystectomy; pr Patti Valenzuela wnd face,facial <2.5cm; pr cardiac surg procedure unlist; hx cataract removal (Bilateral); and hx hysterectomy. Social History/Living Environment:   Home Environment: Private residence  Wheelchair Ramp: Yes  One/Two Story Residence: One story  Living Alone: No  Support Systems: Child(jayleen)  Patient Expects to be Discharged to[de-identified] Private residence  Current DME Used/Available at Home: Wheelchair, [place], [place], [address] [place] chair  Prior Level of Function/Work/Activity:  Supervision for self care. Functional mobility primarily in WC. Number of Personal Factors/Comorbidities that affect the Plan of Care: Brief history (0):  LOW COMPLEXITY   ASSESSMENT OF OCCUPATIONAL PERFORMANCE[de-identified]   Activities of Daily Living:           Basic ADLs (From Assessment) Complex ADLs (From Assessment)   Basic ADL  Feeding: Supervision  Oral Facial Hygiene/Grooming: Supervision  Bathing: Moderate assistance  Upper Body Dressing: Minimum assistance  Lower Body Dressing: Moderate assistance  Toileting: Total assistance     Grooming/Bathing/Dressing Activities of Daily Living   Grooming  Grooming Assistance: Supervision/set up  Washing Face: Supervision/set-up  Washing Hands: Supervision/set-up  Brushing/Combing Hair: Supervision/set-up Cognitive Retraining  Safety/Judgement: Awareness of environment; Fall prevention     Feeding  Feeding Assistance: Supervision/set-up  Container Management: Supervision/set-up  Cutting Food: Maximum assistance (therapist cut up chicken for patient to use fork only)  Utensil Management: Supervision/set-up (set up with fork using R hand)  Food to Mouth: Supervision/set-up  Drink to Mouth: Supervision/set-up (cup with lid and straw in it)         Upper Body [address]: Minimum assistance; Moderate assistance  Hospital Gown: Minimum  assistance; Moderate assistance (changed gown sitting up in bed)               Most Recent Physical Functioning:   Gross Assessment:                  Posture: Balance:    Bed Mobility:     Wheelchair Mobility:     Transfers:        ROM:          RUE WFL         LUE shoulder limited to 90 degress flexion           Patient Vitals for the past 6 hrs:   BP SpO2 O2 Flow Rate (L/min) Pulse   18 0759 99/51 92 % 2 l/min 75       Mental Status  Neurologic State: Alert, Appropriate for age  Orientation Level: Oriented to person, Oriented to place (not year , month January)  Cognition: Appropriate decision making, Appropriate for age attention/concentration, Follows commands  Perception: Appears intact  Perseveration: No perseveration noted  Safety/Judgement: Awareness of environment, Fall prevention                          Physical Skills Involved:  1. Range of Motion  2. Balance  3. Strength  4. Activity Tolerance Cognitive Skills Affected (resulting in the inability to perform in a timely and safe manner):  1. None Psychosocial Skills Affected:  1. None   Number of elements that affect the Plan of Care: 1-3:  LOW COMPLEXITY   CLINICAL DECISION MAKIN[address] [place] [id number] AM-PAC 6 Clicks   Daily Activity Inpatient Short Form  How much help from another person does the patient currently need. .. Total A Lot A Little None   1. Putting on and taking off regular lower body clothing? [] 1   [x] 2   [] 3   [] 4   2. Bathing (including washing, rinsing, drying)? [] 1   [x] 2   [] 3   [] 4   3. Toileting, which includes using toilet, bedpan or urinal?   [] 1   [x] 2   [] 3   [] 4   4. Putting on and taking off regular upper body clothing? [] 1   [] 2   [x] 3   [] 4   5. Taking care of personal grooming such as brushing teeth? [] 1   [] 2   [x] 3   [] 4   6. Eating meals? [] 1   [] 2   [x] 3   [] 4   © , Trustees of [address] [place] [address] [address] [address], under license to [employer].  All rights reserved      Score:  Initial: 15 Most Recent: X (Date: -- )    Interpretation of Tool:  Represents activities that are increasingly more difficult (i.e. Bed mobility, Transfers, Gait).   Score 24 23 22-20 19-15 14-10 9-7 6     Modifier CH CI CJ CK CL CM CN      ? Self Care:     - CURRENT STATUS: CK - 40%-59% impaired, limited or restricted    - GOAL STATUS: CJ - 20%-39% impaired, limited or restricted    - D/C STATUS:  ---------------To be determined---------------  Payor: FIRST CHOICE VIP CARE PLUS / [place] FIRST CHO[employer] VIP CARE PLUS / Product Type: Managed Care Medicare /      Medical Necessity:     · Patient is expected to demonstrate progress in balance, coordination and functional technique to decrease assistance required with self care and funtional mobility. Reason for Services/Other Comments:  · Patient continues to require skilled intervention due to decreased self care and funtional mobility. Use of outcome tool(s) and clinical judgement create a POC that gives a: LOW COMPLEXITY         TREATMENT:   (In addition to Assessment/Re-Assessment sessions the following treatments were rendered)     Pre-treatment Symptoms/Complaints:  none  Pain: Initial:   Pain Intensity 1: 2  Pain Location 1: Leg  Pain Orientation 1: Left, Right  Pain Intervention(s) 1: Rest  Post Session:  0   Self Care: (10): Procedure(s) (per grid) utilized to improve and/or restore self-care/home management as related to dressing and grooming. Required minimal verbal and   cueing to facilitate activities of daily living skills and compensatory activities. Braces/Orthotics/Lines/Etc:   · rtavis catheter  · O2 Device: Nasal cannula  Treatment/Session Assessment:    · Response to Treatment:  Tolerated well  · Interdisciplinary Collaboration:   o Occupational Therapist  o Registered Nurse  · After treatment position/precautions:   o Supine in bed  o Bed/Chair-wheels locked  o Bed in low position  o Call light within reach  o RN notified  o Side rails x 3   · Compliance with Program/Exercises: compliant most of the time. · Recommendations/Intent for next treatment session:   \"Next visit will focus on advancements to more challenging activities\".   Total Treatment Duration:10 minutes  OT Patient Time In/Time Out  Time In: 7438  Time Out: 1300 [facility], OT

## 2018-03-02 NOTE — PROGRESS NOTES
I am accessing Ms. Whaley's chart as a part of our department's internal chart auditing process. I certify that Ms. Nikhil Norman is, or was, a patient in our department.   Thank you,  Gertrudis Espana, OT  3/2/2018

## 2018-03-03 VITALS
BODY MASS INDEX: 27.42 KG/M2 | WEIGHT: 149 LBS | HEIGHT: 62 IN | DIASTOLIC BLOOD PRESSURE: 60 MMHG | HEART RATE: 82 BPM | TEMPERATURE: 98 F | SYSTOLIC BLOOD PRESSURE: 106 MMHG | OXYGEN SATURATION: 100 % | RESPIRATION RATE: 18 BRPM

## 2018-03-03 LAB
ABO + RH BLD: NORMAL
ANION GAP SERPL CALC-SCNC: 2 MMOL/L (ref 7–16)
BLD PROD TYP BPU: NORMAL
BLOOD GROUP ANTIBODIES SERPL: NORMAL
BPU ID: NORMAL
BUN SERPL-MCNC: 25 MG/DL (ref 8–23)
CALCIUM SERPL-MCNC: 9.1 MG/DL (ref 8.3–10.4)
CHLORIDE SERPL-SCNC: 105 MMOL/L (ref 98–107)
CO2 SERPL-SCNC: 37 MMOL/L (ref 21–32)
CREAT SERPL-MCNC: 0.98 MG/DL (ref 0.6–1)
CROSSMATCH RESULT,%XM: NORMAL
ERYTHROCYTE [DISTWIDTH] IN BLOOD BY AUTOMATED COUNT: 17.9 % (ref 11.9–14.6)
GLUCOSE SERPL-MCNC: 86 MG/DL (ref 65–100)
HCT VFR BLD AUTO: 24.9 % (ref 35.8–46.3)
HGB BLD-MCNC: 7.9 G/DL (ref 11.7–15.4)
MCH RBC QN AUTO: 31.6 PG (ref 26.1–32.9)
MCHC RBC AUTO-ENTMCNC: 31.7 G/DL (ref 31.4–35)
MCV RBC AUTO: 99.6 FL (ref 79.6–97.8)
PLATELET # BLD AUTO: 139 K/UL (ref 150–450)
PMV BLD AUTO: 10.9 FL (ref 10.8–14.1)
POTASSIUM SERPL-SCNC: 4 MMOL/L (ref 3.5–5.1)
RBC # BLD AUTO: 2.5 M/UL (ref 4.05–5.25)
SODIUM SERPL-SCNC: 144 MMOL/L (ref 136–145)
SPECIMEN EXP DATE BLD: NORMAL
STATUS OF UNIT,%ST: NORMAL
UNIT DIVISION, %UDIV: 0
WBC # BLD AUTO: 3.5 K/UL (ref 4.3–11.1)

## 2018-03-03 PROCEDURE — 74011250637 HC RX REV CODE- 250/637: Performed by: FAMILY MEDICINE

## 2018-03-03 PROCEDURE — 3331090001 HH PPS REVENUE CREDIT

## 2018-03-03 PROCEDURE — 36415 COLL VENOUS BLD VENIPUNCTURE: CPT | Performed by: FAMILY MEDICINE

## 2018-03-03 PROCEDURE — 3331090002 HH PPS REVENUE DEBIT

## 2018-03-03 PROCEDURE — 94760 N-INVAS EAR/PLS OXIMETRY 1: CPT

## 2018-03-03 PROCEDURE — 94640 AIRWAY INHALATION TREATMENT: CPT

## 2018-03-03 PROCEDURE — 74011000250 HC RX REV CODE- 250: Performed by: FAMILY MEDICINE

## 2018-03-03 PROCEDURE — 77010033678 HC OXYGEN DAILY

## 2018-03-03 PROCEDURE — 80048 BASIC METABOLIC PNL TOTAL CA: CPT | Performed by: FAMILY MEDICINE

## 2018-03-03 PROCEDURE — 74011250637 HC RX REV CODE- 250/637: Performed by: HOSPITALIST

## 2018-03-03 PROCEDURE — 85027 COMPLETE CBC AUTOMATED: CPT | Performed by: FAMILY MEDICINE

## 2018-03-03 RX ORDER — TAMSULOSIN HYDROCHLORIDE 0.4 MG/1
0.4 CAPSULE ORAL DAILY
Qty: 30 CAP | Refills: 2 | Status: SHIPPED | OUTPATIENT
Start: 2018-03-04 | End: 2018-04-04

## 2018-03-03 RX ADMIN — TAMSULOSIN HYDROCHLORIDE 0.4 MG: 0.4 CAPSULE ORAL at 08:03

## 2018-03-03 RX ADMIN — ALLOPURINOL 100 MG: 100 TABLET ORAL at 08:03

## 2018-03-03 RX ADMIN — FAMOTIDINE 20 MG: 20 TABLET, FILM COATED ORAL at 08:03

## 2018-03-03 RX ADMIN — SENNOSIDES AND DOCUSATE SODIUM 1 TABLET: 8.6; 5 TABLET ORAL at 08:02

## 2018-03-03 RX ADMIN — POLYETHYLENE GLYCOL (3350) 17 G: 17 POWDER, FOR SOLUTION ORAL at 08:03

## 2018-03-03 RX ADMIN — COLCHICINE 0.3 MG: 0.6 TABLET, FILM COATED ORAL at 08:03

## 2018-03-03 RX ADMIN — ALBUTEROL SULFATE 2.5 MG: 2.5 SOLUTION RESPIRATORY (INHALATION) at 08:30

## 2018-03-03 RX ADMIN — HYDROCODONE BITARTRATE AND ACETAMINOPHEN 1 TABLET: 5; 325 TABLET ORAL at 10:02

## 2018-03-03 RX ADMIN — RDII 250 MG CAPSULE 250 MG: at 08:03

## 2018-03-03 RX ADMIN — Medication 10 ML: at 05:51

## 2018-03-03 RX ADMIN — ACETAMINOPHEN 650 MG: 325 TABLET ORAL at 06:09

## 2018-03-03 RX ADMIN — CYANOCOBALAMIN TAB 1000 MCG 1000 MCG: 1000 TAB at 08:03

## 2018-03-03 RX ADMIN — POTASSIUM CHLORIDE 40 MEQ: 20 SOLUTION ORAL at 08:03

## 2018-03-03 RX ADMIN — LORATADINE 10 MG: 10 TABLET ORAL at 08:03

## 2018-03-03 NOTE — DISCHARGE SUMMARY
Hospitalist Discharge Summary     Patient ID:  Jannet Metz  [id number]  81 y.o.  2/10/1927  Admit date: 2/24/2018 12:59 PM  Discharge date and time: 3/3/2018  Attending: Timoteo Prieto MD  PCP:  Tahmina Joseph MD  Treatment Team: Attending Provider: Timoteo Prieto MD; Utilization Review: Jazmín UMANZOR Brittany    Principal Diagnosis     Acute on chronic hypoxic respiratory failure due to chronic diastolic CHF   Acute urinary retention; s/p travis catheter placement  Acute on chronic anemia; no GI bleed  Physical deconditioning  HTN  Peripheral edema      Principal Problem:    Acute urinary retention (3/1/2018)    Active Problems:    Acute on chronic diastolic CHF (congestive heart failure), NYHA class 3 ([address] (2/24/2018)      UTI (urinary tract infection) (2/7/2018)      Essential hypertension, benign (3/17/2015)      Chronic anemia (2/25/2018)      Edema, peripheral (2/24/2018)      Shortness of breath ()      Congestive heart failure (CHF) ([facility] [address] (3/1/2018)             Hospital Course:  Please refer to the admission H&P for details of presentation. In summary, the patient is 80 y. o. female who has a PMH of diastolic HF with preserved EF, moderate-severe T valve regurgitation, home oxygen, HTN, CKD stage III, Gout, dyslipidemia, CAD, NSTEMI admitted on 2/24 with acute on chronic hypoxic respiratory failure secondary to acute exacerbation of chronic diastolic CHF. Pt was treated with IV lasix, had a good response with adequate diuresis with resolution of peripheral edema. Pt was weaned down to baseline supplemental oxygen requirement. She was empirically started on zosyn in view of dirty UA, she completed a course of 5 days of antibiotics. Urine culture only showed 40,000 colonies of candida, which was likely a contaminant.  Hospital course was complicated by acute on chronic anemia, iron studies and vitamin levels were normal, GI didn't recommend any invasive endoscopic procedure given pt's age and that anemia didn't appear to be coming from a GI source. Anemia was treated with as needed blood transfusion. Pt developed acute urinary retention after travis was removed, intermittent catheterization was done and pt was started on flomax. Travis was placed again for comfort and hygiene purposes. On speaking with pt's daughter, given frailty and advanced age, pt would not like to have any resuscitative measures such as chest compression or intubation in an event of cardiac arrest.  Pt is going to need more supervised care that can't be provided at home along with some physical therapy. Pt will be transferred to [facility] today. Pt is being discharged with travis, and it should be changed within a week. Voiding trial can be done after 1 week. Significant Diagnostic Studies:   Portable chest x-ray 2/24/1813 18     COMPARISON: February 14, 2018     INDICATION: Hypoxia.     FINDINGS:     There is little change. Heart is enlarged and the lung fields are clear. Calcification of the mitral annulus is again noted.     IMPRESSION  IMPRESSION: Cardiomegaly, clear lung fields    Labs: Results:       Chemistry Recent Labs      03/03/18   0636  03/02/18   0623  03/01/18   0616   GLU  86  100  90   NA  144  144  144   K  4.0  3.4*  3.6   CL  105  104  106   CO2  37*  36*  33*   BUN  25*  33*  41*   CREA  0.98  0.94  0.97   CA  9.1  8.8  9.2   AGAP  2*  4*  5*      CBC w/Diff Recent Labs      03/03/18   0636  03/02/18   0623   WBC  3.5*  3.8*   RBC  2.50*  2.24*   HGB  7.9*  7.1*   HCT  24.9*  22.8*   PLT  139*  151      Cardiac Enzymes No results for input(s): CPK, CKND1, BRIELLE in the last 72 hours. No lab exists for component: CKRMB, TROIP   Coagulation No results for input(s): PTP, INR, APTT in the last 72 hours.     No lab exists for component: INREXT    Lipid Panel Lab Results   Component Value Date/Time    Cholesterol, total 118 10/24/2017 02:08 PM    HDL Cholesterol 53 10/24/2017 02:08 PM    LDL, calculated 52 10/24/2017 02:08 PM    VLDL, calculated 13 10/24/2017 02:08 PM    Triglyceride 63 10/24/2017 02:08 PM      BNP No results for input(s): BNPP in the last 72 hours. Liver Enzymes No results for input(s): TP, ALB, TBIL, AP, SGOT, GPT in the last 72 hours. No lab exists for component: DBIL   Thyroid Studies No results found for: T4, T3U, TSH, TSHEXT         Discharge Exam:  Visit Vitals    /56 (BP 1 Location: Right arm)    Pulse 77    Temp 97.6 °F (36.4 °C)    Resp 16    Ht 5' 2\" (1.575 m)    Wt 67.6 kg (149 lb)    SpO2 100%    BMI 27.25 kg/m2     General:                    No acute distress    Lungs:                                 CTA Bilaterally. Heart:                                  Regular rate and rhythm. 1+ edema(improved)  Abdomen:                  Soft, Non distended, Non tender, Positive bowel sounds  Extremities:               No cyanosis, clubbing. Normal ROM. Neurologic:                No focal deficits  Psych:                       Alert and oriented, cooperative    Disposition: STR  Discharge Condition: stable  Patient Instructions:   Current Discharge Medication List      START taking these medications    Details   tamsulosin (FLOMAX) 0.4 mg capsule Take 1 Cap by mouth daily for 30 days. Qty: 30 Cap, Refills: 2         CONTINUE these medications which have NOT CHANGED    Details   OXYGEN-AIR DELIVERY SYSTEMS Take 2 L by inhalation continuous. via NC      albuterol (PROVENTIL VENTOLIN) 2.5 mg /3 mL (0.083 %) nebulizer solution 3 mL by Nebulization route every four (4) hours as needed for Wheezing. Qty: 24 Each, Refills: 1      acetaminophen (TYLENOL) 325 mg tablet Take 2 Tabs by mouth every six (6) hours as needed. Qty: 20 Tab, Refills: 0      amLODIPine (NORVASC) 5 mg tablet Take 1 Tab by mouth daily. Qty: 30 Tab, Refills: 0      furosemide (LASIX) 20 mg tablet Take 1 Tab by mouth daily.   Qty: 30 Tab, Refills: 0      albuterol (PROVENTIL HFA, VENTOLIN HFA, PROAIR HFA) 90 mcg/actuation inhaler Take 1 Puff by inhalation every six (6) hours as needed for Wheezing. Qty: 1 Inhaler, Refills: 1      sennosides (SENNA) 8.6 mg cap Take 8.6 Tabs by mouth as needed (constipation). daily as needed. colchicine 0.6 mg tablet Take 0.5 Tabs by mouth daily. Qty: 30 Tab, Refills: 0      cyanocobalamin 1,000 mcg tablet Take 1 Tab by mouth daily. Qty: 30 Tab, Refills: 0      Saccharomyces boulardii (FLORASTOR) 250 mg capsule Take 1 Cap by mouth two (2) times a day. Qty: 60 Cap, Refills: 0      clotrimazole (LOTRIMIN AF, CLOTRIMAZOLE,) 1 % topical cream Apply  to affected area two (2) times a day. Qty: 60 g, Refills: 3    Associated Diagnoses: Rash      allopurinol (ZYLOPRIM) 100 mg tablet Take 1 Tab by mouth two (2) times a day. Qty: 60 Tab, Refills: 3    Associated Diagnoses: Hyperuricemia; Idiopathic chronic gout of multiple sites without tophus      pravastatin (PRAVACHOL) 20 mg tablet Take 1 Tab by mouth nightly. Qty: 30 Tab, Refills: 4    Associated Diagnoses: Mixed hyperlipidemia      raNITIdine (ZANTAC) 150 mg tablet Take 1 Tab by mouth two (2) times a day. Qty: 60 Tab, Refills: 4    Associated Diagnoses: Gastroesophageal reflux disease without esophagitis      clopidogrel (PLAVIX) 75 mg tab Take 1 Tab by mouth daily. Qty: 30 Tab, Refills: 4    Associated Diagnoses: Coronary artery disease due to lipid rich plaque      loratadine (CLARITIN) 10 mg tablet Take 1 Tab by mouth daily. Qty: 30 Tab, Refills: 4    Associated Diagnoses: Non-seasonal allergic rhinitis due to other allergic trigger      aspirin 81 mg CpDR Take  by mouth daily. Activity: PT/OT Eval and Treat  Diet: Cardiac Diet  Wound Care: None needed    Follow-up  ·  follow up with physician at [facility].    Time spent to discharge patient 35 minutes  Signed:  Jennifer Miller MD  3/3/2018  8:07 AM

## 2018-03-03 NOTE — PROGRESS NOTES
Pt resting in bed and is alert and oriented x 3. She denies pain and is on 2 L NC. RR even and unlabored. Weir patent and draining. Call light in reach and pt instructed to call for assistance if needed. Will monitor. Bed alarm on.

## 2018-03-04 PROCEDURE — 3331090001 HH PPS REVENUE CREDIT

## 2018-03-04 PROCEDURE — 3331090002 HH PPS REVENUE DEBIT

## 2018-03-05 ENCOUNTER — PATIENT OUTREACH (OUTPATIENT)
Dept: CASE MANAGEMENT | Age: 83
End: 2018-03-05

## 2018-03-05 LAB
BACTERIA SPEC CULT: ABNORMAL
SERVICE CMNT-IMP: ABNORMAL

## 2018-03-05 PROCEDURE — 3331090002 HH PPS REVENUE DEBIT

## 2018-03-05 PROCEDURE — 3331090001 HH PPS REVENUE CREDIT

## 2018-03-05 NOTE — PROGRESS NOTES
This note will not be viewable in [address] Patient discharged to [facility] ([address] [place] [address]). Patient will be included in weekly care coordination calls until discharge. Will forward chart to Susy Estrada RN  And Alysa Trevino RN  and close my case.

## 2018-03-06 PROCEDURE — 3331090001 HH PPS REVENUE CREDIT

## 2018-03-06 PROCEDURE — 3331090002 HH PPS REVENUE DEBIT

## 2018-03-07 PROCEDURE — 3331090001 HH PPS REVENUE CREDIT

## 2018-03-07 PROCEDURE — 3331090002 HH PPS REVENUE DEBIT

## 2018-03-08 ENCOUNTER — HOME CARE VISIT (OUTPATIENT)
Dept: HOME HEALTH SERVICES | Facility: HOME HEALTH | Age: 83
End: 2018-03-08
Payer: COMMERCIAL

## 2018-03-08 PROCEDURE — 3331090001 HH PPS REVENUE CREDIT

## 2018-03-08 PROCEDURE — 3331090002 HH PPS REVENUE DEBIT

## 2018-03-09 PROCEDURE — 3331090002 HH PPS REVENUE DEBIT

## 2018-03-09 PROCEDURE — 3331090001 HH PPS REVENUE CREDIT

## 2018-03-10 PROCEDURE — 3331090001 HH PPS REVENUE CREDIT

## 2018-03-10 PROCEDURE — 3331090002 HH PPS REVENUE DEBIT

## 2018-03-11 ENCOUNTER — HOSPITAL ENCOUNTER (OUTPATIENT)
Dept: LAB | Age: 83
Discharge: HOME | End: 2018-03-11

## 2018-03-11 LAB
ANION GAP SERPL CALC-SCNC: 4 MMOL/L (ref 7–16)
BNP SERPL-MCNC: 214 PG/ML
BUN SERPL-MCNC: 25 MG/DL (ref 8–23)
CALCIUM SERPL-MCNC: 8.1 MG/DL (ref 8.3–10.4)
CHLORIDE SERPL-SCNC: 104 MMOL/L (ref 98–107)
CO2 SERPL-SCNC: 33 MMOL/L (ref 21–32)
CREAT SERPL-MCNC: 0.97 MG/DL (ref 0.6–1)
ERYTHROCYTE [DISTWIDTH] IN BLOOD BY AUTOMATED COUNT: 18.2 % (ref 11.9–14.6)
GLUCOSE SERPL-MCNC: 110 MG/DL (ref 65–100)
HCT VFR BLD AUTO: 28.7 % (ref 35.8–46.3)
HGB BLD-MCNC: 8.8 G/DL (ref 11.7–15.4)
MCH RBC QN AUTO: 32 PG (ref 26.1–32.9)
MCHC RBC AUTO-ENTMCNC: 30.7 G/DL (ref 31.4–35)
MCV RBC AUTO: 104.4 FL (ref 79.6–97.8)
PLATELET # BLD AUTO: 233 K/UL (ref 150–450)
PMV BLD AUTO: 11 FL (ref 10.8–14.1)
POTASSIUM SERPL-SCNC: 4.1 MMOL/L (ref 3.5–5.1)
RBC # BLD AUTO: 2.75 M/UL (ref 4.05–5.25)
SODIUM SERPL-SCNC: 141 MMOL/L (ref 136–145)
WBC # BLD AUTO: 5.5 K/UL (ref 4.3–11.1)

## 2018-03-11 PROCEDURE — 85027 COMPLETE CBC AUTOMATED: CPT | Performed by: GENERAL PRACTICE

## 2018-03-11 PROCEDURE — 80048 BASIC METABOLIC PNL TOTAL CA: CPT | Performed by: GENERAL PRACTICE

## 2018-03-11 PROCEDURE — 3331090002 HH PPS REVENUE DEBIT

## 2018-03-11 PROCEDURE — 83880 ASSAY OF NATRIURETIC PEPTIDE: CPT | Performed by: GENERAL PRACTICE

## 2018-03-11 PROCEDURE — 3331090001 HH PPS REVENUE CREDIT

## 2018-03-12 PROCEDURE — 3331090002 HH PPS REVENUE DEBIT

## 2018-03-12 PROCEDURE — 3331090001 HH PPS REVENUE CREDIT

## 2018-03-13 PROCEDURE — 3331090001 HH PPS REVENUE CREDIT

## 2018-03-13 PROCEDURE — 3331090002 HH PPS REVENUE DEBIT

## 2018-03-14 PROCEDURE — 3331090001 HH PPS REVENUE CREDIT

## 2018-03-14 PROCEDURE — 3331090002 HH PPS REVENUE DEBIT

## 2018-03-15 ENCOUNTER — HOME CARE VISIT (OUTPATIENT)
Dept: HOME HEALTH SERVICES | Facility: HOME HEALTH | Age: 83
End: 2018-03-15
Payer: COMMERCIAL

## 2018-03-15 ENCOUNTER — PATIENT OUTREACH (OUTPATIENT)
Dept: CASE MANAGEMENT | Age: 83
End: 2018-03-15

## 2018-03-15 PROCEDURE — 3331090002 HH PPS REVENUE DEBIT

## 2018-03-15 PROCEDURE — 3331090001 HH PPS REVENUE CREDIT

## 2018-03-15 PROCEDURE — 3331090003 HH PPS REVENUE ADJ

## 2018-03-15 NOTE — PROGRESS NOTES
Patient discharged and admitted to [facility], I will try again to engage the patient and family prior to discharge to see how I can assist with transition to home. Chart review completed    This note will not be viewable in [employer].

## 2018-03-16 PROCEDURE — 3331090001 HH PPS REVENUE CREDIT

## 2018-03-16 PROCEDURE — 3331090002 HH PPS REVENUE DEBIT

## 2018-03-17 PROCEDURE — 3331090002 HH PPS REVENUE DEBIT

## 2018-03-17 PROCEDURE — 3331090001 HH PPS REVENUE CREDIT

## 2018-03-18 PROCEDURE — 3331090001 HH PPS REVENUE CREDIT

## 2018-03-18 PROCEDURE — 3331090002 HH PPS REVENUE DEBIT

## 2018-03-19 PROCEDURE — 3331090002 HH PPS REVENUE DEBIT

## 2018-03-19 PROCEDURE — 3331090001 HH PPS REVENUE CREDIT

## 2018-03-20 ENCOUNTER — PATIENT OUTREACH (OUTPATIENT)
Dept: CASE MANAGEMENT | Age: 83
End: 2018-03-20

## 2018-03-20 PROCEDURE — 3331090002 HH PPS REVENUE DEBIT

## 2018-03-20 PROCEDURE — 3331090001 HH PPS REVENUE CREDIT

## 2018-03-20 NOTE — PROGRESS NOTES
[facility] outreach to patient's daughter Tung Monzon) and she indicated Ms. Charlee Haines was still over at [facility] and doing better it seems. She says Malvina Angelucci is the  out there that she has spoken with and the plan is for her to go home on 3/22/18 with home health/OT/PT possibly. She also mentioned there were some issues with a few of the nursing staff last time her mother stayed there and she was not at all pleased with them and the care provided. She did say this time she has had better nursing staff than before. I provided my contact information to her for once Ms. Charlee Haines does go home how to contact me for assistance. She was appreciative and asked me to call over to speak with the  at [facility] to touch base about discharge plans. I did call [facility], was transferred to [place], I left a detailed message with my phone contact and asked to be called back to assist with discharge plan. Also mentioned daughter's complaints about prior experience there with certain staff. This note will not be viewable in [address]

## 2018-03-21 PROCEDURE — 3331090001 HH PPS REVENUE CREDIT

## 2018-03-21 PROCEDURE — 3331090002 HH PPS REVENUE DEBIT

## 2018-03-22 PROCEDURE — 3331090002 HH PPS REVENUE DEBIT

## 2018-03-22 PROCEDURE — 3331090001 HH PPS REVENUE CREDIT

## 2018-03-29 ENCOUNTER — PATIENT OUTREACH (OUTPATIENT)
Dept: CASE MANAGEMENT | Age: 83
End: 2018-03-29

## 2018-03-29 NOTE — PROGRESS NOTES
[facility] follow up call to Mrs. Shrestha Enid, no answer at either number so I left a voice message at the first one. Will follow up again tomorrow since patient's discharge from [facility] today. This note will not be viewable in [address]

## 2018-03-30 ENCOUNTER — PATIENT OUTREACH (OUTPATIENT)
Dept: CASE MANAGEMENT | Age: 83
End: 2018-03-30

## 2018-03-30 ENCOUNTER — HOSPITAL ENCOUNTER (INPATIENT)
Age: 83
LOS: 3 days | Discharge: HOME HEALTH | DRG: 291 | End: 2018-04-04
Attending: EMERGENCY MEDICINE | Admitting: INTERNAL MEDICINE
Payer: COMMERCIAL

## 2018-03-30 ENCOUNTER — APPOINTMENT (OUTPATIENT)
Dept: GENERAL RADIOLOGY | Age: 83
DRG: 291 | End: 2018-03-30
Attending: EMERGENCY MEDICINE
Payer: COMMERCIAL

## 2018-03-30 ENCOUNTER — APPOINTMENT (OUTPATIENT)
Dept: ULTRASOUND IMAGING | Age: 83
DRG: 291 | End: 2018-03-30
Attending: INTERNAL MEDICINE
Payer: COMMERCIAL

## 2018-03-30 DIAGNOSIS — I50.33 ACUTE ON CHRONIC DIASTOLIC CHF (CONGESTIVE HEART FAILURE) (HCC): Primary | ICD-10-CM

## 2018-03-30 PROBLEM — I50.9 CHF EXACERBATION (HCC): Status: ACTIVE | Noted: 2018-03-30

## 2018-03-30 LAB
ALBUMIN SERPL-MCNC: 2.5 G/DL (ref 3.2–4.6)
ALBUMIN/GLOB SERPL: 0.6 {RATIO} (ref 1.2–3.5)
ALP SERPL-CCNC: 108 U/L (ref 50–136)
ALT SERPL-CCNC: 15 U/L (ref 12–65)
ANION GAP SERPL CALC-SCNC: 3 MMOL/L (ref 7–16)
ARTERIAL PATENCY WRIST A: POSITIVE
AST SERPL-CCNC: 41 U/L (ref 15–37)
ATRIAL RATE: 96 BPM
BASE EXCESS BLDA CALC-SCNC: 5.9 MMOL/L (ref 0–3)
BASOPHILS # BLD: 0 K/UL (ref 0–0.2)
BASOPHILS NFR BLD: 0 % (ref 0–2)
BDY SITE: ABNORMAL
BILIRUB SERPL-MCNC: 0.5 MG/DL (ref 0.2–1.1)
BNP SERPL-MCNC: 126 PG/ML
BUN SERPL-MCNC: 20 MG/DL (ref 8–23)
CALCIUM SERPL-MCNC: 9 MG/DL (ref 8.3–10.4)
CALCULATED P AXIS, ECG09: 55 DEGREES
CALCULATED R AXIS, ECG10: -10 DEGREES
CALCULATED T AXIS, ECG11: 90 DEGREES
CHLORIDE SERPL-SCNC: 106 MMOL/L (ref 98–107)
CO2 SERPL-SCNC: 35 MMOL/L (ref 21–32)
COHGB MFR BLD: 0.3 % (ref 0.5–1.5)
CREAT SERPL-MCNC: 0.93 MG/DL (ref 0.6–1)
DIAGNOSIS, 93000: NORMAL
DIFFERENTIAL METHOD BLD: ABNORMAL
DO-HGB BLD-MCNC: 7 % (ref 0–5)
EOSINOPHIL # BLD: 0.1 K/UL (ref 0–0.8)
EOSINOPHIL NFR BLD: 2 % (ref 0.5–7.8)
ERYTHROCYTE [DISTWIDTH] IN BLOOD BY AUTOMATED COUNT: 17.9 % (ref 11.9–14.6)
EST. AVERAGE GLUCOSE BLD GHB EST-MCNC: ABNORMAL MG/DL
GAS FLOW.O2 O2 DELIVERY SYS: 2 L/MIN
GLOBULIN SER CALC-MCNC: 4 G/DL (ref 2.3–3.5)
GLUCOSE SERPL-MCNC: 107 MG/DL (ref 65–100)
HBA1C MFR BLD: 4.4 % (ref 4.8–6)
HCO3 BLDA-SCNC: 31 MMOL/L (ref 22–26)
HCT VFR BLD AUTO: 28.6 % (ref 35.8–46.3)
HGB BLD-MCNC: 8.8 G/DL (ref 11.7–15.4)
HGB BLDMV-MCNC: 9.8 GM/DL (ref 11.7–15)
IMM GRANULOCYTES # BLD: 0 K/UL (ref 0–0.5)
IMM GRANULOCYTES NFR BLD AUTO: 0 % (ref 0–5)
LYMPHOCYTES # BLD: 0.9 K/UL (ref 0.5–4.6)
LYMPHOCYTES NFR BLD: 13 % (ref 13–44)
MCH RBC QN AUTO: 32.1 PG (ref 26.1–32.9)
MCHC RBC AUTO-ENTMCNC: 30.8 G/DL (ref 31.4–35)
MCV RBC AUTO: 104.4 FL (ref 79.6–97.8)
METHGB MFR BLD: 0.3 % (ref 0–1.5)
MONOCYTES # BLD: 0.5 K/UL (ref 0.1–1.3)
MONOCYTES NFR BLD: 7 % (ref 4–12)
NEUTS SEG # BLD: 5.6 K/UL (ref 1.7–8.2)
NEUTS SEG NFR BLD: 78 % (ref 43–78)
OXYHGB MFR BLDA: 92.6 % (ref 94–97)
P-R INTERVAL, ECG05: 130 MS
PCO2 BLDA: 50 MMHG (ref 35–45)
PH BLDA: 7.41 [PH] (ref 7.35–7.45)
PLATELET # BLD AUTO: 289 K/UL (ref 150–450)
PMV BLD AUTO: 11.4 FL (ref 10.8–14.1)
PO2 BLDA: 69 MMHG (ref 80–105)
POTASSIUM SERPL-SCNC: 4.6 MMOL/L (ref 3.5–5.1)
PROT SERPL-MCNC: 6.5 G/DL (ref 6.3–8.2)
Q-T INTERVAL, ECG07: 352 MS
QRS DURATION, ECG06: 92 MS
QTC CALCULATION (BEZET), ECG08: 444 MS
RBC # BLD AUTO: 2.74 M/UL (ref 4.05–5.25)
SAO2 % BLD: 93 % (ref 92–98.5)
SERVICE CMNT-IMP: ABNORMAL
SODIUM SERPL-SCNC: 144 MMOL/L (ref 136–145)
TROPONIN I SERPL-MCNC: <0.04 NG/ML (ref 0.02–0.05)
VENTILATION MODE VENT: ABNORMAL
VENTRICULAR RATE, ECG03: 96 BPM
WBC # BLD AUTO: 7.2 K/UL (ref 4.3–11.1)

## 2018-03-30 PROCEDURE — 74011250637 HC RX REV CODE- 250/637: Performed by: INTERNAL MEDICINE

## 2018-03-30 PROCEDURE — 83880 ASSAY OF NATRIURETIC PEPTIDE: CPT | Performed by: EMERGENCY MEDICINE

## 2018-03-30 PROCEDURE — 86580 TB INTRADERMAL TEST: CPT | Performed by: INTERNAL MEDICINE

## 2018-03-30 PROCEDURE — 83036 HEMOGLOBIN GLYCOSYLATED A1C: CPT | Performed by: INTERNAL MEDICINE

## 2018-03-30 PROCEDURE — 80053 COMPREHEN METABOLIC PANEL: CPT | Performed by: EMERGENCY MEDICINE

## 2018-03-30 PROCEDURE — 74011000302 HC RX REV CODE- 302: Performed by: INTERNAL MEDICINE

## 2018-03-30 PROCEDURE — 36600 WITHDRAWAL OF ARTERIAL BLOOD: CPT

## 2018-03-30 PROCEDURE — 82803 BLOOD GASES ANY COMBINATION: CPT

## 2018-03-30 PROCEDURE — 93971 EXTREMITY STUDY: CPT

## 2018-03-30 PROCEDURE — 77030019605

## 2018-03-30 PROCEDURE — 99285 EMERGENCY DEPT VISIT HI MDM: CPT | Performed by: EMERGENCY MEDICINE

## 2018-03-30 PROCEDURE — 85025 COMPLETE CBC W/AUTO DIFF WBC: CPT | Performed by: EMERGENCY MEDICINE

## 2018-03-30 PROCEDURE — 84484 ASSAY OF TROPONIN QUANT: CPT | Performed by: EMERGENCY MEDICINE

## 2018-03-30 PROCEDURE — 93005 ELECTROCARDIOGRAM TRACING: CPT | Performed by: EMERGENCY MEDICINE

## 2018-03-30 PROCEDURE — 74011250636 HC RX REV CODE- 250/636: Performed by: INTERNAL MEDICINE

## 2018-03-30 PROCEDURE — 99218 HC RM OBSERVATION: CPT

## 2018-03-30 PROCEDURE — 71045 X-RAY EXAM CHEST 1 VIEW: CPT

## 2018-03-30 RX ORDER — PRAVASTATIN SODIUM 20 MG/1
20 TABLET ORAL
Status: DISCONTINUED | OUTPATIENT
Start: 2018-03-30 | End: 2018-04-04 | Stop reason: HOSPADM

## 2018-03-30 RX ORDER — ACETAMINOPHEN 500 MG
500 TABLET ORAL
Status: DISCONTINUED | OUTPATIENT
Start: 2018-03-30 | End: 2018-04-04 | Stop reason: HOSPADM

## 2018-03-30 RX ORDER — HEPARIN SODIUM 5000 [USP'U]/ML
5000 INJECTION, SOLUTION INTRAVENOUS; SUBCUTANEOUS EVERY 12 HOURS
Status: DISCONTINUED | OUTPATIENT
Start: 2018-03-30 | End: 2018-04-04 | Stop reason: HOSPADM

## 2018-03-30 RX ORDER — FUROSEMIDE 10 MG/ML
40 INJECTION INTRAMUSCULAR; INTRAVENOUS EVERY 12 HOURS
Status: DISCONTINUED | OUTPATIENT
Start: 2018-03-30 | End: 2018-04-04 | Stop reason: HOSPADM

## 2018-03-30 RX ORDER — GABAPENTIN 300 MG/1
300 CAPSULE ORAL 3 TIMES DAILY
COMMUNITY
Start: 2018-06-06 | End: 2018-07-07

## 2018-03-30 RX ORDER — COLCHICINE 0.6 MG/1
0.3 TABLET ORAL DAILY
Status: DISCONTINUED | OUTPATIENT
Start: 2018-03-31 | End: 2018-04-04 | Stop reason: HOSPADM

## 2018-03-30 RX ORDER — TAMSULOSIN HYDROCHLORIDE 0.4 MG/1
0.4 CAPSULE ORAL DAILY
Status: DISCONTINUED | OUTPATIENT
Start: 2018-03-31 | End: 2018-04-04 | Stop reason: HOSPADM

## 2018-03-30 RX ORDER — FUROSEMIDE 10 MG/ML
60 INJECTION INTRAMUSCULAR; INTRAVENOUS
Status: DISCONTINUED | OUTPATIENT
Start: 2018-03-30 | End: 2018-03-30 | Stop reason: SDUPTHER

## 2018-03-30 RX ORDER — SODIUM CHLORIDE 0.9 % (FLUSH) 0.9 %
5-10 SYRINGE (ML) INJECTION EVERY 8 HOURS
Status: DISCONTINUED | OUTPATIENT
Start: 2018-03-30 | End: 2018-04-04 | Stop reason: HOSPADM

## 2018-03-30 RX ORDER — CLOPIDOGREL BISULFATE 75 MG/1
75 TABLET ORAL DAILY
Status: DISCONTINUED | OUTPATIENT
Start: 2018-03-31 | End: 2018-04-04 | Stop reason: HOSPADM

## 2018-03-30 RX ORDER — AMLODIPINE BESYLATE 5 MG/1
5 TABLET ORAL DAILY
Status: DISCONTINUED | OUTPATIENT
Start: 2018-03-31 | End: 2018-04-04 | Stop reason: HOSPADM

## 2018-03-30 RX ORDER — SODIUM CHLORIDE 0.9 % (FLUSH) 0.9 %
5-10 SYRINGE (ML) INJECTION AS NEEDED
Status: DISCONTINUED | OUTPATIENT
Start: 2018-03-30 | End: 2018-04-04 | Stop reason: HOSPADM

## 2018-03-30 RX ORDER — ALLOPURINOL 100 MG/1
100 TABLET ORAL 2 TIMES DAILY
Status: DISCONTINUED | OUTPATIENT
Start: 2018-03-30 | End: 2018-04-04 | Stop reason: HOSPADM

## 2018-03-30 RX ORDER — GABAPENTIN 300 MG/1
300 CAPSULE ORAL 2 TIMES DAILY
Status: DISCONTINUED | OUTPATIENT
Start: 2018-03-30 | End: 2018-04-04 | Stop reason: HOSPADM

## 2018-03-30 RX ORDER — ASPIRIN 81 MG/1
81 TABLET ORAL DAILY
Status: DISCONTINUED | OUTPATIENT
Start: 2018-03-31 | End: 2018-04-04 | Stop reason: HOSPADM

## 2018-03-30 RX ADMIN — GABAPENTIN 300 MG: 300 CAPSULE ORAL at 19:28

## 2018-03-30 RX ADMIN — Medication 10 ML: at 19:30

## 2018-03-30 RX ADMIN — HEPARIN SODIUM 5000 UNITS: 5000 INJECTION, SOLUTION INTRAVENOUS; SUBCUTANEOUS at 13:59

## 2018-03-30 RX ADMIN — ACETAMINOPHEN 500 MG: 500 TABLET, FILM COATED ORAL at 09:36

## 2018-03-30 RX ADMIN — FUROSEMIDE 40 MG: 10 INJECTION, SOLUTION INTRAMUSCULAR; INTRAVENOUS at 14:00

## 2018-03-30 RX ADMIN — ALLOPURINOL 100 MG: 100 TABLET ORAL at 17:07

## 2018-03-30 RX ADMIN — PRAVASTATIN SODIUM 20 MG: 20 TABLET ORAL at 19:30

## 2018-03-30 RX ADMIN — ACETAMINOPHEN 500 MG: 500 TABLET, FILM COATED ORAL at 19:28

## 2018-03-30 RX ADMIN — TUBERCULIN PURIFIED PROTEIN DERIVATIVE 5 UNITS: 5 INJECTION, SOLUTION INTRADERMAL at 17:06

## 2018-03-30 RX ADMIN — Medication 10 ML: at 14:00

## 2018-03-30 RX ADMIN — FUROSEMIDE 40 MG: 10 INJECTION, SOLUTION INTRAMUSCULAR; INTRAVENOUS at 19:29

## 2018-03-30 NOTE — IP AVS SNAPSHOT
[address] 
[phone] Patient: Eldred Bloch MRN: [id number] :2/10/1927 About your hospitalization You were admitted on:  2018 You last received care in the:  [address] [place] [address] You were discharged on:  2018 Why you were hospitalized Your primary diagnosis was:  Pulmonary Hypertension (Hcc) Your diagnoses also included:  Chf Exacerbation (Hcc), Osteoarthritis, Reflux Esophagitis, Mixed Hyperlipidemia, Chf (Congestive Heart Failure) (Hcc), Sob (Shortness Of Breath), Acute Respiratory Failure With Hypoxia (Hcc), Chronic Respiratory Failure With Hypoxia (Hcc), Congestive Heart Failure (Chf) (Hcc), Macrocytic Anemia Follow-up Information Follow up With Details Comments Contact Info Bahman Dorsey MD  PLEASE CALL THE OFFICE AND MAKE AN APPT FOR ONE WEEK [id number] [facility] [address] [place] [address],[address] [id number] [facility] 160 
[phone] Emery Thornton MD  PLEASE CALL AND [address] AN APPT FOR ONE MONTH 2 [address] 400 [facility] [address] 
[phone] Your Scheduled Appointments 2018 10:30 AM EDT Extended Office Visit with Bahman Dorsey MD  
[address] (32 [facility]) [address] [place] [address]  
[phone] Discharge Orders None A check michel indicates which time of day the medication should be taken. My Medications CHANGE how you take these medications Instructions Each Dose to Equal  
 Morning Noon Evening Bedtime  
 albuterol 90 mcg/actuation inhaler Commonly known as:  PROVENTIL HFA, VENTOLIN HFA, PROAIR HFA What changed:  Another medication with the same name was removed. Continue taking this medication, and follow the directions you see here. Take 1 Puff by inhalation every six (6) hours as needed for Wheezing. 1 Puff furosemide 40 mg tablet Commonly known as:  LASIX What changed:   
- medication strength 
- how much to take Your next dose is:  TOMORROW Take 1 Tab by mouth daily for 30 days. 40 mg CONTINUE taking these medications Instructions Each Dose to Equal  
 Morning Noon Evening Bedtime  
 acetaminophen 325 mg tablet Commonly known as:  TYLENOL Take 2 Tabs by mouth every six (6) hours as needed. 650 mg  
    
   
   
   
  
 allopurinol 100 mg tablet Commonly known as:  Geradine Colder Your next dose is:  TODAY Take 1 Tab by mouth two (2) times a day. 100 mg  
    
   
   
   
  
  
 amLODIPine 5 mg tablet Commonly known as:  Enma Luis Your next dose is:  TOMORROW Take 1 Tab by mouth daily. 5 mg  
    
   
   
   
  
 aspirin 81 mg Cpdr  
Your next dose is:  Nusrat Take  by mouth daily. clopidogrel 75 mg Tab Commonly known as:  PLAVIX Notes to Patient:  TOMORROW Take 1 Tab by mouth daily. 75 mg  
    
   
   
   
  
 colchicine 0.6 mg tablet Your next dose is:  TOMORROW Take 0.5 Tabs by mouth daily. 0.3 mg  
    
   
   
   
  
 cyanocobalamin 1,000 mcg tablet Your next dose is:  TOMORROW Take 1 Tab by mouth daily. 1000 mcg  
    
   
   
   
  
 gabapentin 300 mg capsule Commonly known as:  NEURONTIN Your next dose is:  TODAY Take 300 mg by mouth two (2) times a day. 300 mg  
    
   
   
   
  
  
 loratadine 10 mg tablet Commonly known as:  Yuly Muss Take 1 Tab by mouth daily. 10 mg OXYGEN-AIR DELIVERY SYSTEMS Take 2 L by inhalation continuous. via NC  
 2 L  
    
   
   
   
  
 pravastatin 20 mg tablet Commonly known as:  PRAVACHOL Your next dose is:  TODAY Take 1 Tab by mouth nightly. 20 mg  
    
   
   
   
  
  
 raNITIdine 150 mg tablet Commonly known as:  ZANTAC Your next dose is:  TODAY Take 1 Tab by mouth two (2) times a day. 150 mg Senna 8.6 mg Cap Generic drug:  sennosides Take 8.6 Tabs by mouth as needed (constipation). daily as needed. 8.6 Tab STOP taking these medications   
 tamsulosin 0.4 mg capsule Commonly known as:  FLOMAX Where to Get Your Medications These medications were sent to [address] [place] [address] Phone:  [phone]  
  furosemide 40 mg tablet Discharge Instructions DISCHARGE SUMMARY from Nurse The following personal items are in your possession at time of discharge: 
 
Dental Appliances: At home Visual Aid: None Home Medications: None Jewelry: None Clothing: At bedside Other Valuables: None PATIENT INSTRUCTIONS: 
 
After general anesthesia or intravenous sedation, for 24 hours or while taking prescription Narcotics: · Limit your activities · Do not drive and operate hazardous machinery · Do not make important personal or business decisions · Do  not drink alcoholic beverages · If you have not urinated within 8 hours after discharge, please contact your surgeon on call. Report the following to your surgeon: 
· Excessive pain, swelling, redness or odor of or around the surgical area · Temperature over 100.5 · Nausea and vomiting lasting longer than 4 hours or if unable to take medications · Any signs of decreased circulation or nerve impairment to extremity: change in color, persistent  numbness, tingling, coldness or increase pain · Any questions What to do at Home: 
Recommended activity: Activity as tolerated, per MD 
 
If you experience any of the following symptoms fever>101, pain unrelieved with medication, nausea/vomiting, shortness of breath, dizziness/fainting, chest pain. , please follow up with your doctor. *  Please give a list of your current medications to your Primary Care Provider. *  Please update this list whenever your medications are discontinued, doses are 
    changed, or new medications (including over-the-counter products) are added. *  Please carry medication information at all times in case of emergency situations. These are general instructions for a healthy lifestyle: No smoking/ No tobacco products/ Avoid exposure to second hand smoke Surgeon General's Warning:  Quitting smoking now greatly reduces serious risk to your health. Obesity, smoking, and sedentary lifestyle greatly increases your risk for illness A healthy diet, regular physical exercise & weight monitoring are important for maintaining a healthy lifestyle You may be retaining fluid if you have a history of heart failure or if you experience any of the following symptoms:  Weight gain of 3 pounds or more overnight or 5 pounds in a week, increased swelling in our hands or feet or shortness of breath while lying flat in bed. Please call your doctor as soon as you notice any of these symptoms; do not wait until your next office visit. Recognize signs and symptoms of STROKE: 
 
F-face looks uneven A-arms unable to move or move unevenly S-speech slurred or non-existent T-time-call 911 as soon as signs and symptoms begin-DO NOT go Back to bed or wait to see if you get better-TIME IS BRAIN. Warning Signs of HEART ATTACK Call 911 if you have these symptoms: 
? Chest discomfort. Most heart attacks involve discomfort in the center of the chest that lasts more than a few minutes, or that goes away and comes back. It can feel like uncomfortable pressure, squeezing, fullness, or pain. ? Discomfort in other areas of the upper body. Symptoms can include pain or discomfort in one or both arms, the back, neck, jaw, or stomach. ? Shortness of breath with or without chest discomfort. ? Other signs may include breaking out in a cold sweat, nausea, or lightheadedness. Don't wait more than five minutes to call [address]! Fast action can save your life. Calling 911 is almost always the fastest way to get lifesaving treatment. Emergency Medical Services staff can begin treatment when they arrive  up to an hour sooner than if someone gets to the hospital by car. The discharge information has been reviewed with the patient. The patient verbalized understanding. Discharge medications reviewed with the patient and appropriate educational materials and side effects teaching were provided. Heart Failure: Care Instructions Your Care Instructions Heart failure occurs when your heart does not pump as much blood as the body needs. Failure does not mean that the heart has stopped pumping but rather that it is not pumping as well as it should. Over time, this causes fluid buildup in your lungs and other parts of your body. Fluid buildup can cause shortness of breath, fatigue, swollen ankles, and other problems. By taking medicines regularly, reducing sodium (salt) in your diet, checking your weight every day, and making lifestyle changes, you can feel better and live longer. Follow-up care is a key part of your treatment and safety. Be sure to make and go to all appointments, and call your doctor if you are having problems. It's also a good idea to know your test results and keep a list of the medicines you take. How can you care for yourself at home? Medicines ? · Be safe with medicines. Take your medicines exactly as prescribed. Call your doctor if you think you are having a problem with your medicine.   
? · Do not take any vitamins, over-the-counter medicine, or herbal products without talking to your doctor first. Gaylan Dura not take ibuprofen (Advil or Motrin) and naproxen (Aleve) without talking to your doctor first. They could make your heart failure worse. ? · You may be taking some of the following medicine. ¨ Beta-blockers can slow heart rate, decrease blood pressure, and improve your condition. Taking a beta-blocker may lower your chance of needing to be hospitalized. ¨ Angiotensin-converting enzyme inhibitors (ACEIs) reduce the heart's workload, lower blood pressure, and reduce swelling. Taking an ACEI may lower your chance of needing to be hospitalized again. ¨ Angiotensin II receptor blockers (ARBs) work like ACEIs. Your doctor may prescribe them instead of ACEIs. ¨ Diuretics, also called water pills, reduce swelling. ¨ Potassium supplements replace this important mineral, which is sometimes lost with diuretics. ¨ Aspirin and other blood thinners prevent blood clots, which can cause a stroke or heart attack. ? You will get more details on the specific medicines your doctor prescribes. Diet ? · Your doctor may suggest that you limit sodium to 2,000 milligrams (mg) a day or less. That is less than 1 teaspoon of salt a day, including all the salt you eat in cooking or in packaged foods. People get most of their sodium from processed foods. Fast food and restaurant meals also tend to be very high in sodium. ? · Ask your doctor how much liquid you can drink each day. You may have to limit liquids. ?Weight ? · Weigh yourself without clothing at the same time each day. Record your weight. Call your doctor if you have a sudden weight gain, such as more than 2 to 3 pounds in a day or 5 pounds in a week. (Your doctor may suggest a different range of weight gain.) A sudden weight gain may mean that your heart failure is getting worse. ? Activity level ? · Start light exercise (if your doctor says it is okay). Even if you can only do a small amount, exercise will help you get stronger, have more energy, and manage your weight and your stress.  Walking is an easy way to get exercise. Start out by walking a little more than you did before. Bit by bit, increase the amount you walk. ? · When you exercise, watch for signs that your heart is working too hard. You are pushing yourself too hard if you cannot talk while you are exercising. If you become short of breath or dizzy or have chest pain, stop, sit down, and rest.  
? · If you feel \"wiped out\" the day after you exercise, walk slower or for a shorter distance until you can work up to a better pace. ? · Get enough rest at night. Sleeping with 1 or 2 pillows under your upper body and head may help you breathe easier. ? Lifestyle changes ? · Do not smoke. Smoking can make a heart condition worse. If you need help quitting, talk to your doctor about stop-smoking programs and medicines. These can increase your chances of quitting for good. Quitting smoking may be the most important step you can take to protect your heart. ? · Limit alcohol to 2 drinks a day for men and 1 drink a day for women. Too much alcohol can cause health problems. ? · Avoid getting sick from colds and the flu. Get a pneumococcal vaccine shot. If you have had one before, ask your doctor whether you need another dose. Get a flu shot each year. If you must be around people with colds or the flu, wash your hands often. When should you call for help? Call 911 if you have symptoms of sudden heart failure such as: 
? · You have severe trouble breathing. ? · You cough up pink, foamy mucus. ? · You have a new irregular or rapid heartbeat. ?Call your doctor now or seek immediate medical care if: 
? · You have new or increased shortness of breath. ? · You are dizzy or lightheaded, or you feel like you may faint. ? · You have sudden weight gain, such as more than 2 to 3 pounds in a day or 5 pounds in a week. (Your doctor may suggest a different range of weight gain.) ? · You have increased swelling in your legs, ankles, or feet. ? · You are suddenly so tired or weak that you cannot do your usual activities. ? Watch closely for changes in your health, and be sure to contact your doctor if you develop new symptoms. Where can you learn more? Go to http://you-demarcus.info/. Enter Z055 in the search box to learn more about \"Heart Failure: Care Instructions. \" Current as of: September 21, 2016 Content Version: 11.4 © 8836-5353 [employer]. Care instructions adapted under license by [employer] (which disclaims liability or warranty for this information). If you have questions about a medical condition or this instruction, always ask your healthcare professional. [address] any warranty or liability for your use of this information. Evoleen Announcement We are excited to announce that we are making your provider's discharge notes available to you in Evoleen. You will see these notes when they are completed and signed by the physician that discharged you from your recent hospital stay. If you have any questions or concerns about any information you see in Evoleen, please call the Health Information Department where you were seen or reach out to your Primary Care Provider for more information about your plan of care. Introducing Costa Hermosillo As a [facility] patient, I wanted to make you aware of our electronic visit tool called Costa Hermosillo. [facility] 24/7 allows you to connect within minutes with a medical provider 24 hours a day, seven days a week via a mobile device or tablet or logging into a secure website from your computer. You can access Costa Hermosillo from anywhere in the [place].  
 
A virtual visit might be right for you when you have a simple condition and feel like you just dont want to get out of bed, or cant get away from work for an appointment, when your regular [facility] provider is not available (evenings, weekends or holidays), or when youre out of town and need minor care. Electronic visits cost only $49 and if the [facility] provider determines a prescription is needed to treat your condition, one can be electronically transmitted to a nearby pharmacy*. Please take a moment to enroll today if you have not already done so. The enrollment process is free and takes just a few minutes. To enroll, please download the [employer] david to your tablet or phone, or visit [email or web address] to enroll on your computer. And, as an [address] [place] [address] patient with a [employer] account, the results of your visits will be scanned into your electronic medical record and your primary care provider will be able to view the scanned results. We urge you to continue to see your regular [facility] provider for your ongoing medical care. And while your primary care provider may not be the one available when you seek a [employer] virtual visit, the peace of mind you get from getting a real diagnosis real time can be priceless. For more information on [employer], view our Frequently Asked Questions (FAQs) at [email or web address]. Sincerely, 
 
Zayda Murguia MD 
Chief Medical Officer [address] *:  certain medications cannot be prescribed via [employer] Providers Seen During Your Hospitalization Provider Specialty Primary office phone [address] [place] Emergency Medicine [phone] Mildred Haskins MD Internal Medicine [phone] Immunizations Administered for This Admission Name Date  
 TB Skin Test (PPD) Intradermal  Deferred (),  Deferred (), 3/30/2018 Your Primary Care Physician (PCP) Primary Care Physician Office Phone Office Fax [address], [address] [phone] You are allergic to the following Allergen Reactions Bee Pollen Swelling Fire Cubbying Itching Swelling Recent Documentation Height Weight BMI OB Status Smoking Status 1.6 m 61.1 kg 23.88 kg/m2 Hysterectomy Never Smoker Emergency Contacts Name Discharge Info Relation Home Work Mobile Tresa Whaley  Daughter [21] [phone] Sherrie Ward  Daughter [21] [phone] Patient Belongings The following personal items are in your possession at time of discharge: 
  Dental Appliances: At home  Visual Aid: None      Home Medications: None   Jewelry: None  Clothing: At bedside    Other Valuables: None Please provide this summary of care documentation to your next provider. Signatures-by signing, you are acknowledging that this After Visit Summary has been reviewed with you and you have received a copy. Patient Signature:  ____________________________________________________________ Date:  ____________________________________________________________  
  
[facility] Provider Signature:  ____________________________________________________________ Date:  ____________________________________________________________

## 2018-03-30 NOTE — H&P
Hospitalist H&P Note     Admit Date:  3/30/2018  7:04 AM   Name:  Lyubov Treadwell   Age:  80 y.o.  :  2/10/1927   MRN:  [id number]   PCP:  Deuce Pop MD  Treatment Team: Attending Provider: Michele Rae MD    HPI:    is a 81 yo female who presented with dyspnea on a background of CAD, OA, HLD, and HTN. She denies the following: cough, chest pain or PND symptoms. She reports that the symptoms were present for 1 day and progressively worsened prompting the ER visit. CXR showed no acute process. She also had right >left leg swelling and dopplers showed no DVT. She denies missing any of her BP medications at home. Of note, 2017 ECHO showed an EF of 55-60% with severe/moderate tricuspid regurgitation. 10 systems reviewed and negative except as noted in HPI.   Past Medical History:   Diagnosis Date    Acute kidney failure, unspecified ([address]     Arthritis     CAD (coronary artery disease)     Cellulitis and abscess of other specified site     Coronary atherosclerosis of native coronary artery     Essential hypertension, benign 3/17/2015    Hypertension     Hypopotassemia     Mixed hyperlipidemia     Osteoarthritis 2017    Other and unspecified hyperlipidemia     Reflux esophagitis     Renal failure, unspecified     Shortness of breath     Unspecified essential hypertension       Past Surgical History:   Procedure Laterality Date    CARDIAC SURG PROCEDURE UNLIST      stent    HX CATARACT REMOVAL Bilateral     HX CHOLECYSTECTOMY      HX HYSTERECTOMY      complete    AK LAYR CLOS WND FACE,FACIAL <2.5CM        Allergies   Allergen Reactions    Bee Pollen Swelling    Fire Ant Itching and Swelling      Social History   Substance Use Topics    Smoking status: Never Smoker    Smokeless tobacco: Never Used    Alcohol use No      Family History   Problem Relation Age of Onset    Heart Attack Mother     Heart Attack Father     Heart Disease Brother     Heart Disease Brother       Immunization History   Administered Date(s) Administered    Pneumococcal Conjugate (PCV-13) 03/17/2016    TB Skin Test (PPD) Intradermal 12/23/2017, 02/07/2018, 02/27/2018    Tetanus Toxoid, Adsorbed 03/17/2016     PTA Medications:  Prior to Admission Medications   Prescriptions Last Dose Informant Patient Reported? Taking? OXYGEN-AIR DELIVERY SYSTEMS   Yes Yes   Sig: Take 2 L by inhalation continuous. via NC   acetaminophen (TYLENOL) 325 mg tablet Unknown at Unknown time  No No   Sig: Take 2 Tabs by mouth every six (6) hours as needed. albuterol (PROVENTIL HFA, VENTOLIN HFA, PROAIR HFA) 90 mcg/actuation inhaler 3/30/2018 at Unknown time  No Yes   Sig: Take 1 Puff by inhalation every six (6) hours as needed for Wheezing. albuterol (PROVENTIL VENTOLIN) 2.5 mg /3 mL (0.083 %) nebulizer solution 3/30/2018 at Unknown time  No Yes   Sig: 3 mL by Nebulization route every four (4) hours as needed for Wheezing. allopurinol (ZYLOPRIM) 100 mg tablet   No Yes   Sig: Take 1 Tab by mouth two (2) times a day. amLODIPine (NORVASC) 5 mg tablet   No Yes   Sig: Take 1 Tab by mouth daily. aspirin 81 mg CpDR   Yes Yes   Sig: Take  by mouth daily. clopidogrel (PLAVIX) 75 mg tab   No Yes   Sig: Take 1 Tab by mouth daily. colchicine 0.6 mg tablet   No Yes   Sig: Take 0.5 Tabs by mouth daily. cyanocobalamin 1,000 mcg tablet   No Yes   Sig: Take 1 Tab by mouth daily. furosemide (LASIX) 20 mg tablet   No Yes   Sig: Take 1 Tab by mouth daily. gabapentin (NEURONTIN) 300 mg capsule   Yes Yes   Sig: Take 300 mg by mouth two (2) times a day. loratadine (CLARITIN) 10 mg tablet   No Yes   Sig: Take 1 Tab by mouth daily. pravastatin (PRAVACHOL) 20 mg tablet   No Yes   Sig: Take 1 Tab by mouth nightly. raNITIdine (ZANTAC) 150 mg tablet   No Yes   Sig: Take 1 Tab by mouth two (2) times a day. sennosides (SENNA) 8.6 mg cap   Yes Yes   Sig: Take 8.6 Tabs by mouth as needed (constipation). daily as needed. tamsulosin (FLOMAX) 0.4 mg capsule   No Yes   Sig: Take 1 Cap by mouth daily for 30 days. Facility-Administered Medications: None       Review of Systems:  Gen: No weight loss  Eyes: no vision changes  ENT: no sore throat  Resp: +dyspnea  CV: No cp  Abd:  no abd pain, no vomiting or diarrhea  : No incontinence, no hematuria or dysuria, no flank pain  MSK: no leg swelling  Neuro: No HA or dizziness, no weakness, numbness/tingling    Objective:   Patient Vitals for the past 24 hrs:   Temp Pulse Resp BP SpO2   03/30/18 1322 95.7 °F (35.4 °C) 94 19 135/65 -   03/30/18 1220 98.4 °F (36.9 °C) 86 21 121/61 100 %   03/30/18 1040 - 92 20 137/63 91 %   03/30/18 0936 - 94 16 136/60 95 %   03/30/18 0815 - - - - 99 %   03/30/18 0705 98.2 °F (36.8 °C) 96 24 122/60 100 %     Oxygen Therapy  O2 Sat (%): 100 % (03/30/18 1220)  Pulse via Oximetry: 87 beats per minute (03/30/18 1220)  O2 Device: Nasal cannula (03/30/18 1220)  O2 Flow Rate (L/min): 2 l/min (03/30/18 1220)  No intake or output data in the 24 hours ending 03/30/18 1537    Physical Exam:  General:    Well nourished. Alert. Eyes:   Normal sclera. Extraocular movements intact. ENT:  Normocephalic, atraumatic. Moist mucous membranes  CV:   RRR. No murmur, rub, or gallop. Lungs:  CTAB. No wheezing, rhonchi, or rales. Abdomen: Soft, nontender, nondistended. Bowel sounds normal.   Extremities: +1 pitting edema  Right >left  Neurologic: CN II-XII grossly intact. Sensation intact. Skin:     No rashes or jaundice. Psych:  Normal mood and affect. I reviewed the labs, imaging, EKGs, telemetry, and other studies done this admission.   Data Review:   Recent Results (from the past 24 hour(s))   BLOOD GAS, ARTERIAL    Collection Time: 03/30/18  7:30 AM   Result Value Ref Range    pH 7.41 7.35 - 7.45      PCO2 50 (H) 35 - 45 mmHg    PO2 69 (L) 80 - 105 mmHg    BICARBONATE 31 (H) 22 - 26 mmol/L    BASE EXCESS 5.9 (H) 0 - 3 mmol/L    TOTAL HEMOGLOBIN 9.8 (L) 11.7 - 15.0 GM/DL    O2 SAT 93 92 - 98.5 %    Arterial O2 Hgb 92.6 (L) 94 - 97 %    CARBOXYHEMOGLOBIN 0.3 (L) 0.5 - 1.5 %    METHEMOGLOBIN 0.3 0.0 - 1.5 %    DEOXYHEMOGLOBIN 7 (H) 0.0 - 5.0 %    SITE RB     ALLENS TEST POSITIVE      MODE NC     O2 FLOW 2.00 L/min    Respiratory comment: dr meyer at 3 30 2018 7 35 47 AM. Read back. CBC WITH AUTOMATED DIFF    Collection Time: 03/30/18  7:39 AM   Result Value Ref Range    WBC 7.2 4.3 - 11.1 K/uL    RBC 2.74 (L) 4.05 - 5.25 M/uL    HGB 8.8 (L) 11.7 - 15.4 g/dL    HCT 28.6 (L) 35.8 - 46.3 %    .4 (H) 79.6 - 97.8 FL    MCH 32.1 26.1 - 32.9 PG    MCHC 30.8 (L) 31.4 - 35.0 g/dL    RDW 17.9 (H) 11.9 - 14.6 %    PLATELET 404 876 - 688 K/uL    MPV 11.4 10.8 - 14.1 FL    DF AUTOMATED      NEUTROPHILS 78 43 - 78 %    LYMPHOCYTES 13 13 - 44 %    MONOCYTES 7 4.0 - 12.0 %    EOSINOPHILS 2 0.5 - 7.8 %    BASOPHILS 0 0.0 - 2.0 %    IMMATURE GRANULOCYTES 0 0.0 - 5.0 %    ABS. NEUTROPHILS 5.6 1.7 - 8.2 K/UL    ABS. LYMPHOCYTES 0.9 0.5 - 4.6 K/UL    ABS. MONOCYTES 0.5 0.1 - 1.3 K/UL    ABS. EOSINOPHILS 0.1 0.0 - 0.8 K/UL    ABS. BASOPHILS 0.0 0.0 - 0.2 K/UL    ABS. IMM. GRANS. 0.0 0.0 - 0.5 K/UL   METABOLIC PANEL, COMPREHENSIVE    Collection Time: 03/30/18  7:39 AM   Result Value Ref Range    Sodium 144 136 - 145 mmol/L    Potassium 4.6 3.5 - 5.1 mmol/L    Chloride 106 98 - 107 mmol/L    CO2 35 (H) 21 - 32 mmol/L    Anion gap 3 (L) 7 - 16 mmol/L    Glucose 107 (H) 65 - 100 mg/dL    BUN 20 8 - 23 MG/DL    Creatinine 0.93 0.6 - 1.0 MG/DL    GFR est AA >60 >60 ml/min/1.73m2    GFR est non-AA >60 >60 ml/min/1.73m2    Calcium 9.0 8.3 - 10.4 MG/DL    Bilirubin, total 0.5 0.2 - 1.1 MG/DL    ALT (SGPT) 15 12 - 65 U/L    AST (SGOT) 41 (H) 15 - 37 U/L    Alk.  phosphatase 108 50 - 136 U/L    Protein, total 6.5 6.3 - 8.2 g/dL    Albumin 2.5 (L) 3.2 - 4.6 g/dL    Globulin 4.0 (H) 2.3 - 3.5 g/dL    A-G Ratio 0.6 (L) 1.2 - 3.5     BNP    Collection Time: 03/30/18  7:39 AM   Result Value Ref Range     pg/mL   TROPONIN I    Collection Time: 03/30/18  7:39 AM   Result Value Ref Range    Troponin-I, Qt. <0.04 0.02 - 0.05 NG/ML   EKG, 12 LEAD, INITIAL    Collection Time: 03/30/18  7:49 AM   Result Value Ref Range    Ventricular Rate 96 BPM    Atrial Rate 96 BPM    P-R Interval 130 ms    QRS Duration 92 ms    Q-T Interval 352 ms    QTC Calculation (Bezet) 444 ms    Calculated P Axis 55 degrees    Calculated R Axis -10 degrees    Calculated T Axis 90 degrees    Diagnosis       Normal sinus rhythm  Septal infarct , age undetermined  Abnormal ECG  When compared with ECG of 24-FEB-2018 12:57,  Septal infarct is now Present  Confirmed by MOHSEN ADORNO (), Ritu Romero ([id number]) on 3/30/2018 12:27:03 PM     HEMOGLOBIN A1C WITH EAG    Collection Time: 03/30/18  9:44 AM   Result Value Ref Range    Hemoglobin A1c 4.4 (L) 4.8 - 6.0 %    Est. average glucose Cannot be calculated mg/dL       All Micro Results     None          Other Studies:  Xr Chest Sngl V    Result Date: 3/30/2018  PORTABLE CHEST, March 30, 2018 at 0723 hours CLINICAL HISTORY:  Shortness of breath. COMPARISON:  February 24, 2018. FINDINGS:  AP erect image demonstrates mild cardiomegaly with pulmonary venous congestion, edema, and small pleural effusions in a pattern most consistent with congestive heart failure. No dong pneumonic consolidation. No definite pneumothorax. The bony thorax appears intact on this view. There are overlying radiopaque support devices. IMPRESSION:  CONGESTIVE HEART FAILURE. Duplex Lower Ext Venous Right    Result Date: 3/30/2018   Right lower extremity Doppler venous ultrasound: 3/30/2018 Indication: Pain and swelling Findings: The common femoral vein, profunda femoral vein, greater saphenous vein, femoral vein, popliteal vein, posterior tibial veins and peroneal veins all display normal Doppler signal, compressibility, and augmentation.      Impression: No ultrasound evidence of right lower extremity deep venous thrombosis      Assessment and Plan:     Hospital Problems as of 3/30/2018  Date Reviewed: 2/15/2018          Codes Class Noted - Resolved POA    CHF exacerbation ([address] ICD-10-CM: I50.9  ICD-9-CM: 428.0  3/30/2018 - Present Unknown        CKD (chronic kidney disease) stage 3, GFR 30-59 ml/min ICD-10-CM: N18.3  ICD-9-CM: 585.3  12/23/2017 - Present         Osteoarthritis (Chronic) ICD-10-CM: M19.90  ICD-9-CM: 715.90  7/20/2017 - Present Yes        Reflux esophagitis ICD-10-CM: K21.0  ICD-9-CM: 530.11  Unknown - Present Yes        Mixed hyperlipidemia ICD-10-CM: E78.2  ICD-9-CM: 272.2  Unknown - Present Yes              PLAN:  Acute diastolic CHF exacerbation  CXR: CHF   12/2017 ECHO showed an EF of 55-60% with severe/moderate tricuspid regurgitation.   BNP mildly elevated 126  Plan  Lasix 40 IV BID   Goal: Net negative 1-5 L over 24 hours  Daily weights   Strict Is and Os  DASH Diet  HLD: Continue statin  Gout: Continue allopurinol  CAD: Continue aspirin, plavix  HTN: Continue home regimen    DVT ppx: heparin   Anticipated DC needs:  <48 hours, PT/OT eval, PPD  Code status:  Full  Risk:  high    Signed:  Minerva Ramachandran MD

## 2018-03-30 NOTE — ED TRIAGE NOTES
Pt discharged from [facility] following lengthy Hospital stay. Patient called 911 c/o shortness of breath. Patient had O2 sat in upper 80s, placed on oxygen 12LPM via NRM and transported.

## 2018-03-30 NOTE — PROGRESS NOTES
Spoke with Sanjay Shaw RN about pt admission to the ED, she will follow up when pt is d/c. Gave pt's Jemima's phone number at [facility] suggested taht pt have [facility] set up upon d/c, spoke with pt and she does not have a preference on [facility] agency.

## 2018-03-30 NOTE — ED NOTES
Pt reported her \"pull-up\" was wet. Pt was cleaned and a new brief was placed. Pt tolerated this intervention well. Pt remains in room laying down. Pt states she will rest or even take a nap.

## 2018-03-30 NOTE — PROGRESS NOTES
Visited with pt in the ED, states she was feeling SOB at home even with her 3L of NC.pt was just d/c from [facility] yesterday after being admitted on 3/5. Pt lives at home with her son, uses a walker for ambulation, is independent with all ADL's, does not drive. states that she wears 3L O2 at all times since approx 6 months ago. PT does not have any therapy ordered currently. Pt is currently being followed by Kimberly Flores RN with the [facility], intensive case management.

## 2018-03-30 NOTE — ED PROVIDER NOTES
HPI Comments: 80 y.o. female w/ PMHx of diastolic HF with preserved EF, chronic hypoxic respiratory failure on 2L O2, HTN, CKD, Gout, HLD, CAD w/ hx of NSTEMI who was recently admitted on 2/24 with acute on chronic hypoxic respiratory failure secondary to acute exacerbation of chronic diastolic CHF presents via EMS with complaint of worsening shortness of breath since yesterday. Patient was recently released from [facility] on yesterday. States she has had progressively worsening shortness of breath since she was discharged back home. Denies chest pain, fever, chills, productive cough, dizziness, weakness, nausea, vomiting. Patient states she has had worsening lower external swelling. Patient reports compliance with all her medications. Patient is a 80 y.o. female presenting with shortness of breath. The history is provided by the patient. No  was used. Shortness of Breath   This is a new problem. The problem occurs continuously. The current episode started yesterday. The problem has not changed since onset. Associated symptoms include cough. Pertinent negatives include no fever, no headaches, no rhinorrhea, no neck pain, no chest pain, no vomiting, no abdominal pain, no rash and no leg swelling. She has tried nothing for the symptoms. The treatment provided no relief. She has had prior hospitalizations. Associated medical issues include heart failure.         Past Medical History:   Diagnosis Date    Acute kidney failure, unspecified     Arthritis     CAD (coronary artery disease)     Cellulitis and abscess of other specified site     Coronary atherosclerosis of native coronary artery     Essential hypertension, benign 3/17/2015    Hypertension     Hypopotassemia     Mixed hyperlipidemia     Osteoarthritis 7/20/2017    Other and unspecified hyperlipidemia     Reflux esophagitis     Renal failure, unspecified     Shortness of breath     Unspecified essential hypertension        Past Surgical History:   Procedure Laterality Date    CARDIAC SURG PROCEDURE UNLIST      stent    HX CATARACT REMOVAL Bilateral     HX CHOLECYSTECTOMY      HX HYSTERECTOMY      complete    SC LAYR CLOS WND FACE,FACIAL <2.5CM           Family History:   Problem Relation Age of Onset    Heart Attack Mother     Heart Attack Father     Heart Disease Brother     Heart Disease Brother        Social History     Social History    Marital status:      Spouse name: N/A    Number of children: N/A    Years of education: N/A     Occupational History    Not on file. Social History Main Topics    Smoking status: Never Smoker    Smokeless tobacco: Never Used    Alcohol use No    Drug use: No    Sexual activity: Not Currently     Other Topics Concern    Not on file     Social History Narrative         ALLERGIES: Bee pollen and Fire ant    Review of Systems   Constitutional: Negative for chills, fatigue and fever. HENT: Negative for congestion and rhinorrhea. Respiratory: Positive for cough and shortness of breath. Cardiovascular: Negative for chest pain, palpitations and leg swelling. Gastrointestinal: Negative for abdominal pain, diarrhea, nausea and vomiting. Genitourinary: Negative for dysuria, flank pain and pelvic pain. Musculoskeletal: Negative for back pain, gait problem, joint swelling, neck pain and neck stiffness. Skin: Negative for pallor and rash. Neurological: Negative for dizziness, syncope, weakness and headaches. Vitals:    03/30/18 0705   BP: 122/60   Pulse: 96   Resp: 24   Temp: 98.2 °F (36.8 °C)   SpO2: 100%   Weight: 68 kg (150 lb)   Height: 5' 3\" (1.6 m)            Physical Exam   Constitutional: She is oriented to person, place, and time. She appears well-developed and well-nourished. HENT:   Head: Normocephalic and atraumatic.    Mouth/Throat: Oropharynx is clear and moist.   Eyes: Conjunctivae and EOM are normal. Pupils are equal, round, and reactive to light. Neck: Normal range of motion. No JVD present. No tracheal deviation present. Cardiovascular: Normal rate, regular rhythm, normal heart sounds and intact distal pulses. No murmur heard. Radial pulses 2+ and equal bilaterally. Pulmonary/Chest: Effort normal and breath sounds normal. No respiratory distress. She has no wheezes. She has no rales. Abdominal: Soft. There is no tenderness. There is no rebound and no guarding. Musculoskeletal: Normal range of motion. She exhibits edema. 2+ pitting edema to bilateral lower extremities. No calf TTP. Neurological: She is alert and oriented to person, place, and time. No cranial nerve deficit. Coordination normal.   Skin: Skin is warm and dry. No erythema. Nursing note and vitals reviewed. MDM  Number of Diagnoses or Management Options  Acute on chronic diastolic CHF (congestive heart failure) [facility]): new and requires workup  Diagnosis management comments: Chest x-ray with evidence of CHF. BNP stable. Order for Lasix 60mg IV placed. ABG with evidence of chronic hypoxia. Patient on 2 L O2 via nasal cannula. Hospitalist consulted for admission. Amount and/or Complexity of Data Reviewed  Clinical lab tests: reviewed and ordered  Tests in the radiology section of CPT®: ordered and reviewed  Tests in the medicine section of CPT®: ordered and reviewed  Review and summarize past medical records: yes  Discuss the patient with other providers: yes  Independent visualization of images, tracings, or specimens: yes    Risk of Complications, Morbidity, and/or Mortality  Presenting problems: moderate  Diagnostic procedures: moderate  Management options: moderate    Patient Progress  Patient progress: stable        ED Course   Comment By Time   CXR IMPRESSION:  CONGESTIVE HEART FAILURE.  Elio Cohen MD 03/30 0804       EKG  Date/Time: 3/30/2018 7:54 AM  Performed by: Shakira Bishop, [address] [place] [address]  Authorized by: LEIGHTON Aguiar     ECG reviewed by ED Physician in the absence of a cardiologist: yes    Rate:     ECG rate:  96    ECG rate assessment: normal    Rhythm:     Rhythm: sinus rhythm    Ectopy:     Ectopy: none    QRS:     QRS axis:  Normal    QRS intervals:  Normal  Conduction:     Conduction: normal    ST segments:     ST segments:  Normal  T waves:     T waves: normal

## 2018-03-30 NOTE — PROGRESS NOTES
Patient received from ER to room 360  via stretcher. Patient alert & oriented x3,short of breath on exertion, O2 @ 2L nasal cannula. Admission and dual skin assessment completed with Deisy Murphy RN. Skin intact, scattered ecchymosis  Noted. Bed low & locked, side rails x3 up. Call light within reach, instructed to call for assistance as needed.

## 2018-03-30 NOTE — PROGRESS NOTES
Spoke with Olya Reina, she states taht she spoke with [facility] and they did order [facility] through [facility] for pt.

## 2018-03-30 NOTE — ED NOTES
TRANSFER - OUT REPORT:    Verbal report given to [facility] RN on [employer]  being transferred to [facility] for routine progression of care       Report consisted of patients Situation, Background, Assessment and   Recommendations(SBAR). Information from the following report(s) ED Summary, Intake/Output and Recent Results was reviewed with the receiving nurse. Lines:   Peripheral IV 03/30/18 Left Forearm (Active)   Site Assessment Clean, dry, & intact 3/30/2018  7:44 AM   Phlebitis Assessment 0 3/30/2018  7:44 AM   Infiltration Assessment 0 3/30/2018  7:44 AM   Dressing Status Clean, dry, & intact 3/30/2018  7:44 AM   Dressing Type Tape;Transparent 3/30/2018  7:44 AM   Hub Color/Line Status Blue 3/30/2018  7:44 AM        Opportunity for questions and clarification was provided.       Patient transported with:   O2 @ 2 liters

## 2018-03-30 NOTE — PROGRESS NOTES
TRANSFER - IN REPORT:    Verbal report received from [facility] RN(name) on Eldred Bloch  being received from ER(unit) for routine progression of care      Report consisted of patients Situation, Background, Assessment and   Recommendations(SBAR). Information from the following report(s) SBAR and Kardex was reviewed with the receiving nurse. Opportunity for questions and clarification was provided. Assessment completed upon patients arrival to unit and care assumed.

## 2018-03-30 NOTE — PROGRESS NOTES
Notified by ED that patient was being admitted. I called [facility], spoke with Gissel Choi at nurses desk and she informed me that [facility] was ordered for the patient to go home with. Will monitor patient's progress in hospital and see that she has home health or maybe even tele monitoring at home. This note will not be viewable in [address]

## 2018-03-31 LAB
ALBUMIN SERPL-MCNC: 2.4 G/DL (ref 3.2–4.6)
ALBUMIN/GLOB SERPL: 0.6 {RATIO} (ref 1.2–3.5)
ALP SERPL-CCNC: 100 U/L (ref 50–136)
ALT SERPL-CCNC: 11 U/L (ref 12–65)
ANION GAP SERPL CALC-SCNC: 8 MMOL/L (ref 7–16)
AST SERPL-CCNC: 18 U/L (ref 15–37)
BASOPHILS # BLD: 0 K/UL (ref 0–0.2)
BASOPHILS NFR BLD: 0 % (ref 0–2)
BILIRUB SERPL-MCNC: 0.7 MG/DL (ref 0.2–1.1)
BUN SERPL-MCNC: 20 MG/DL (ref 8–23)
CALCIUM SERPL-MCNC: 9.3 MG/DL (ref 8.3–10.4)
CHLORIDE SERPL-SCNC: 103 MMOL/L (ref 98–107)
CO2 SERPL-SCNC: 35 MMOL/L (ref 21–32)
CREAT SERPL-MCNC: 0.84 MG/DL (ref 0.6–1)
DIFFERENTIAL METHOD BLD: ABNORMAL
EOSINOPHIL # BLD: 0.1 K/UL (ref 0–0.8)
EOSINOPHIL NFR BLD: 2 % (ref 0.5–7.8)
ERYTHROCYTE [DISTWIDTH] IN BLOOD BY AUTOMATED COUNT: 17.4 % (ref 11.9–14.6)
GLOBULIN SER CALC-MCNC: 3.9 G/DL (ref 2.3–3.5)
GLUCOSE SERPL-MCNC: 85 MG/DL (ref 65–100)
HCT VFR BLD AUTO: 29.9 % (ref 35.8–46.3)
HGB BLD-MCNC: 9.2 G/DL (ref 11.7–15.4)
IMM GRANULOCYTES # BLD: 0 K/UL (ref 0–0.5)
IMM GRANULOCYTES NFR BLD AUTO: 0 % (ref 0–5)
LYMPHOCYTES # BLD: 1 K/UL (ref 0.5–4.6)
LYMPHOCYTES NFR BLD: 17 % (ref 13–44)
MCH RBC QN AUTO: 32.3 PG (ref 26.1–32.9)
MCHC RBC AUTO-ENTMCNC: 30.8 G/DL (ref 31.4–35)
MCV RBC AUTO: 104.9 FL (ref 79.6–97.8)
MM INDURATION POC: 0 MM (ref 0–5)
MONOCYTES # BLD: 0.5 K/UL (ref 0.1–1.3)
MONOCYTES NFR BLD: 7 % (ref 4–12)
NEUTS SEG # BLD: 4.6 K/UL (ref 1.7–8.2)
NEUTS SEG NFR BLD: 74 % (ref 43–78)
PLATELET # BLD AUTO: 242 K/UL (ref 150–450)
PMV BLD AUTO: 10.4 FL (ref 10.8–14.1)
POTASSIUM SERPL-SCNC: 3.3 MMOL/L (ref 3.5–5.1)
PPD POC: NORMAL NEGATIVE
PROT SERPL-MCNC: 6.3 G/DL (ref 6.3–8.2)
RBC # BLD AUTO: 2.85 M/UL (ref 4.05–5.25)
SODIUM SERPL-SCNC: 146 MMOL/L (ref 136–145)
WBC # BLD AUTO: 6.2 K/UL (ref 4.3–11.1)

## 2018-03-31 PROCEDURE — 94664 DEMO&/EVAL PT USE INHALER: CPT

## 2018-03-31 PROCEDURE — 97165 OT EVAL LOW COMPLEX 30 MIN: CPT

## 2018-03-31 PROCEDURE — 80053 COMPREHEN METABOLIC PANEL: CPT | Performed by: INTERNAL MEDICINE

## 2018-03-31 PROCEDURE — 94640 AIRWAY INHALATION TREATMENT: CPT

## 2018-03-31 PROCEDURE — 74011250636 HC RX REV CODE- 250/636: Performed by: INTERNAL MEDICINE

## 2018-03-31 PROCEDURE — 36415 COLL VENOUS BLD VENIPUNCTURE: CPT | Performed by: INTERNAL MEDICINE

## 2018-03-31 PROCEDURE — 99218 HC RM OBSERVATION: CPT

## 2018-03-31 PROCEDURE — G8979 MOBILITY GOAL STATUS: HCPCS

## 2018-03-31 PROCEDURE — G8987 SELF CARE CURRENT STATUS: HCPCS

## 2018-03-31 PROCEDURE — G8978 MOBILITY CURRENT STATUS: HCPCS

## 2018-03-31 PROCEDURE — 94760 N-INVAS EAR/PLS OXIMETRY 1: CPT

## 2018-03-31 PROCEDURE — 85025 COMPLETE CBC W/AUTO DIFF WBC: CPT | Performed by: INTERNAL MEDICINE

## 2018-03-31 PROCEDURE — 97161 PT EVAL LOW COMPLEX 20 MIN: CPT

## 2018-03-31 PROCEDURE — 77030021668 HC NEB PREFIL KT VYRM -A

## 2018-03-31 PROCEDURE — 74011250637 HC RX REV CODE- 250/637: Performed by: INTERNAL MEDICINE

## 2018-03-31 PROCEDURE — 74011000250 HC RX REV CODE- 250: Performed by: INTERNAL MEDICINE

## 2018-03-31 PROCEDURE — 77010033678 HC OXYGEN DAILY

## 2018-03-31 PROCEDURE — G8988 SELF CARE GOAL STATUS: HCPCS

## 2018-03-31 RX ORDER — IPRATROPIUM BROMIDE AND ALBUTEROL SULFATE 2.5; .5 MG/3ML; MG/3ML
SOLUTION RESPIRATORY (INHALATION)
Status: ACTIVE
Start: 2018-03-31 | End: 2018-03-31

## 2018-03-31 RX ORDER — POTASSIUM CHLORIDE 20 MEQ/1
40 TABLET, EXTENDED RELEASE ORAL DAILY
Status: DISCONTINUED | OUTPATIENT
Start: 2018-03-31 | End: 2018-04-04 | Stop reason: HOSPADM

## 2018-03-31 RX ORDER — IPRATROPIUM BROMIDE AND ALBUTEROL SULFATE 2.5; .5 MG/3ML; MG/3ML
3 SOLUTION RESPIRATORY (INHALATION) 3 TIMES DAILY
Status: DISCONTINUED | OUTPATIENT
Start: 2018-03-31 | End: 2018-03-31

## 2018-03-31 RX ORDER — IPRATROPIUM BROMIDE AND ALBUTEROL SULFATE 2.5; .5 MG/3ML; MG/3ML
3 SOLUTION RESPIRATORY (INHALATION)
Status: DISCONTINUED | OUTPATIENT
Start: 2018-03-31 | End: 2018-04-04 | Stop reason: HOSPADM

## 2018-03-31 RX ADMIN — Medication 10 ML: at 21:43

## 2018-03-31 RX ADMIN — FUROSEMIDE 40 MG: 10 INJECTION, SOLUTION INTRAMUSCULAR; INTRAVENOUS at 07:51

## 2018-03-31 RX ADMIN — POTASSIUM CHLORIDE 40 MEQ: 20 TABLET, EXTENDED RELEASE ORAL at 16:22

## 2018-03-31 RX ADMIN — CLOPIDOGREL BISULFATE 75 MG: 75 TABLET ORAL at 07:49

## 2018-03-31 RX ADMIN — PRAVASTATIN SODIUM 20 MG: 20 TABLET ORAL at 21:43

## 2018-03-31 RX ADMIN — GABAPENTIN 300 MG: 300 CAPSULE ORAL at 07:49

## 2018-03-31 RX ADMIN — HEPARIN SODIUM 5000 UNITS: 5000 INJECTION, SOLUTION INTRAVENOUS; SUBCUTANEOUS at 13:16

## 2018-03-31 RX ADMIN — ALLOPURINOL 100 MG: 100 TABLET ORAL at 16:22

## 2018-03-31 RX ADMIN — IPRATROPIUM BROMIDE AND ALBUTEROL SULFATE 3 ML: 2.5; .5 SOLUTION RESPIRATORY (INHALATION) at 21:27

## 2018-03-31 RX ADMIN — AMLODIPINE BESYLATE 5 MG: 5 TABLET ORAL at 07:50

## 2018-03-31 RX ADMIN — TAMSULOSIN HYDROCHLORIDE 0.4 MG: 0.4 CAPSULE ORAL at 07:49

## 2018-03-31 RX ADMIN — IPRATROPIUM BROMIDE AND ALBUTEROL SULFATE 3 ML: 2.5; .5 SOLUTION RESPIRATORY (INHALATION) at 09:03

## 2018-03-31 RX ADMIN — Medication 10 ML: at 06:00

## 2018-03-31 RX ADMIN — Medication 10 ML: at 13:17

## 2018-03-31 RX ADMIN — ASPIRIN 81 MG: 81 TABLET, COATED ORAL at 07:49

## 2018-03-31 RX ADMIN — IPRATROPIUM BROMIDE AND ALBUTEROL SULFATE 3 ML: 2.5; .5 SOLUTION RESPIRATORY (INHALATION) at 13:37

## 2018-03-31 RX ADMIN — COLCHICINE 0.3 MG: 0.6 TABLET, FILM COATED ORAL at 07:48

## 2018-03-31 RX ADMIN — ALLOPURINOL 100 MG: 100 TABLET ORAL at 07:49

## 2018-03-31 RX ADMIN — HEPARIN SODIUM 5000 UNITS: 5000 INJECTION, SOLUTION INTRAVENOUS; SUBCUTANEOUS at 02:53

## 2018-03-31 RX ADMIN — GABAPENTIN 300 MG: 300 CAPSULE ORAL at 20:30

## 2018-03-31 RX ADMIN — FUROSEMIDE 40 MG: 10 INJECTION, SOLUTION INTRAMUSCULAR; INTRAVENOUS at 20:31

## 2018-03-31 NOTE — PROGRESS NOTES
Pt is alert and oriented x 4. C/o HA. PRN tylenol given. Physical assessment done. Trace edema noted to BLE with 1+ pitting edema to RLE. Feet elevated on pillows. Bed is in low, and locked position. Call light within reach.

## 2018-03-31 NOTE — PROGRESS NOTES
Problem: Falls - Risk of  Goal: *Absence of Falls  Document Vanda Fall Risk and appropriate interventions in the flowsheet.    Outcome: Progressing Towards Goal  Fall Risk Interventions:  Mobility Interventions: Bed/chair exit alarm, Patient to call before getting OOB, PT Consult for mobility concerns         Medication Interventions: Patient to call before getting OOB    Elimination Interventions: Bed/chair exit alarm, Call light in reach, Patient to call for help with toileting needs

## 2018-03-31 NOTE — PROGRESS NOTES
Pt resting in bed with eyes closed. Assisted with incontinence care. Pt SOB on 02 @ 2L/min when turning. HOB is elevated 45 degrees. Heels floated. Offered fluids. Took sips of water. Bed low and locked. Bed alarm on. Call light within reach.

## 2018-03-31 NOTE — PROGRESS NOTES
Shift assessment complete. A+OX4. RR even and unlabored. HR regular. LS diminished. An soft active bowel sounds. Bed LL. All needs met at this time. Will monitor with hourly rounds.

## 2018-03-31 NOTE — PROGRESS NOTES
Pt ate breakfast a little bit and stopped, complained of shortness of breath, states she's \"smothering. \"  Sat checked 100% on 3L per nasal cannula. RT notified to check on patient and to administer treatment. RTs To [facility] [address] at bedside.

## 2018-03-31 NOTE — PROGRESS NOTES
Problem: Mobility Impaired (Adult and Pediatric)  Goal: *Acute Goals and Plan of Care (Insert Text)  STG:  (1.)Ms. Jose Antonio Duval will roll side to side with MINIMAL ASSIST within 3 treatment day(s). (2.)Ms. Jose Antonio Duval will transfer from bed to chair and chair to bed with MODERATE ASSIST using the least restrictive device within 3 treatment day(s). (3.)Ms. Jose Antonio Duval will ambulate with MODERATE ASSIST for 5 feet with the least restrictive device within 3 treatment day(s). LTG:  (1.)Ms. Jose Antonio Duval will move from supine to sit and sit to supine  in bed with MINIMAL ASSIST within 7 treatment day(s). (2.)Ms. Jose Antonio Duval will transfer from bed to chair and chair to bed with MINIMAL ASSIST using the least restrictive device within 7 treatment day(s). (3.)Ms. Jose Antonio Duval will ambulate with MINIMAL ASSIST for 20 feet with the least restrictive device within 7 treatment day(s). ________________________________________________________________________________________________    PHYSICAL THERAPY: Initial Assessment, Treatment Day: Day of Assessment, AM 3/31/2018  OBSERVATION: Hospital Day: 2  Payor: FIRST CHOICE VIP CARE PLUS / [place] FIRST CHO[employer] VIP CARE PLUS / Product Type: Managed Care Medicare /      NAME/AGE/GENDER: Magdiel Little is a 80 y.o. female   PRIMARY DIAGNOSIS: CHF exacerbation ([facility] [address] <principal problem not specified> <principal problem not specified>        ICD-10: Treatment Diagnosis:    · Generalized Muscle Weakness (M62.81)  · Difficulty in walking, Not elsewhere classified (R26.2)   Precaution/Allergies:  Bee pollen and Fire ant      ASSESSMENT:     Ms. Jose Antonio Duval presents with decreased strength and functional mobility with persistent shortness of breath exacerbating symptoms. She will benefit from physical therapy while inpatient to work on above goals. Will need rehab at discharge. This section established at most recent assessment   PROBLEM LIST (Impairments causing functional limitations):  1.  Decreased Strength  2. Decreased ADL/Functional Activities  3. Decreased Transfer Abilities  4. Decreased Ambulation Ability/Technique  5. Decreased Balance  6. Decreased Activity Tolerance  7. Increased Fatigue  8. Increased Shortness of Breath  9. Decreased Flexibility/Joint Mobility  10. Decreased [place] with Home Exercise Program  11. Decreased Cognition   INTERVENTIONS PLANNED: (Benefits and precautions of physical therapy have been discussed with the patient.)  1. Balance Exercise  2. Bed Mobility  3. Gait Training  4. Home Exercise Program (HEP)  5. Neuromuscular Re-education/Strengthening  6. Range of Motion (ROM)  7. Therapeutic Activites  8. Therapeutic Exercise/Strengthening     TREATMENT PLAN: Frequency/Duration: daily for duration of hospital stay  Rehabilitation Potential For Stated Goals: Good     RECOMMENDED REHABILITATION/EQUIPMENT: (at time of discharge pending progress): Due to the probability of continued deficits (see above) this patient will likely need continued skilled physical therapy after discharge. Equipment:    None at this time. Pt states she has cane, walker, and w/c              HISTORY:   History of Present Injury/Illness (Reason for Referral):  Pt arrived through ER with dyspnea and hx of CHF. States she came from home where she lives in a home where her son lives next door. She has a ramp to enter home. States she ambulated with cane but has walker and w/c. Poor historian who fluctuated between thinking it was guillermina time and easter. Past Medical History/Comorbidities:   Ms. Garret Felipe  has a past medical history of Acute kidney failure, unspecified ([facility] [address] Arthritis; CAD (coronary artery disease); Cellulitis and abscess of other specified site; Coronary atherosclerosis of native coronary artery; Essential hypertension, benign (3/17/2015); Hypertension; Hypopotassemia; Mixed hyperlipidemia; Osteoarthritis (7/20/2017); Other and unspecified hyperlipidemia;  Reflux esophagitis; Renal failure, unspecified; Shortness of breath; and Unspecified essential hypertension. Ms. Daniela Dowell  has a past surgical history that includes hx cholecystectomy; pr layr clos wnd face,facial <2.5cm; pr cardiac surg procedure unlist; hx cataract removal (Bilateral); and hx hysterectomy. Social History/Living Environment:   Home Environment: Private residence  One/Two Story Residence: One story  Living Alone: No  Support Systems: Child(jayleen)  Patient Expects to be Discharged to[de-identified] Private residence  Current DME Used/Available at Home: Walker, Wheelchair  Prior Level of Function/Work/Activity:  States she ambulated with cane and was independent with household mobility  Previous Treatment Approaches:          Pt was recently in this hospital for similar problems (per pt report)  Personal Factors:          Sex:  female        Age:  80 y.o. Pt seemed anxious with her SOB   Number of Personal Factors/Comorbidities that affect the Plan of Care: 1-2: MODERATE COMPLEXITY   EXAMINATION:   Most Recent Physical Functioning:   Gross Assessment:                  Posture:     Balance:  Sitting: Intact; With support  Standing: Impaired; With support  Standing - Static: Constant support  Standing - Dynamic : Poor Bed Mobility:  Rolling: Moderate assistance  Supine to Sit: Moderate assistance  Sit to Supine: Moderate assistance  Scooting: Minimum assistance  Wheelchair Mobility:     Transfers:  Sit to Stand: Moderate assistance  Stand to Sit: Moderate assistance  Gait:         Observation/Orthostatic Postural Assessment:          Rounded shoulders and forward head. ROM:          50% ltd RUE - able to raise shoulder to 90 °, 80% ltd right shoulder with approx 30 ° flexion left shoulder  Strength:         Global deconditioning noted grossly 3/5  Functional Mobility:         Gait/Ambulation:  3 steps to HOB min A        Transfers:   Mod A sit to stand and supine to sit        Bed Mobility:  Mod A rolling  Balance:          Poor with retro lean  Skin Integrity:          Sacral wound with bandage   Body Structures Involved:  1. Nerves  2. Muscles  3. Ligaments Body Functions Affected:  1. Mental  2. Sensory/Pain  3. Neuromusculoskeletal  4. Movement Related  5. Skin Related Activities and Participation Affected:  1. Learning and Applying Knowledge  2. General Tasks and Demands  3. Mobility  4. Self Care  5. Domestic Life  6. Interpersonal Interactions and Relationships  7. Community, Social and [place]   Number of elements that affect the Plan of Care: 3: MODERATE COMPLEXITY   CLINICAL PRESENTATION:   Presentation: Stable and uncomplicated: LOW COMPLEXITY   CLINICAL DECISION MAKIN[address] [place] [address] AM-PAC 6 Clicks   Basic Mobility Inpatient Short Form  How much difficulty does the patient currently have. .. Unable A Lot A Little None   1. Turning over in bed (including adjusting bedclothes, sheets and blankets)? [] 1   [x] 2   [] 3   [] 4   2. Sitting down on and standing up from a chair with arms ( e.g., wheelchair, bedside commode, etc.)   [] 1   [x] 2   [] 3   [] 4   3. Moving from lying on back to sitting on the side of the bed? [] 1   [x] 2   [] 3   [] 4   How much help from another person does the patient currently need. .. Total A Lot A Little None   4. Moving to and from a bed to a chair (including a wheelchair)? [x] 1   [] 2   [] 3   [] 4   5. Need to walk in hospital room? [x] 1   [] 2   [] 3   [] 4   6. Climbing 3-5 steps with a railing? [x] 1   [] 2   [] 3   [] 4   © , Trustees of [address] [place] [address], under license to [employer]. All rights reserved      Score:  Initial: 9 Most Recent: X (Date: -- )    Interpretation of Tool:  Represents activities that are increasingly more difficult (i.e. Bed mobility, Transfers, Gait). Score 24 23 22-20 19-15 14-10 9-7 6     Modifier CH CI CJ CK CL CM CN      ?  Mobility - Walking and Moving Around:     - CURRENT STATUS: CM - 80%-99% impaired, limited or restricted    - GOAL STATUS: CL - 60%-79% impaired, limited or restricted    - D/C STATUS:  ---------------To be determined---------------  Payor: FIRST CHOICE VIP CARE PLUS [place] [employer] CHO[employer] VIP CARE PLUS / Product Type: Managed Care Medicare /      Medical Necessity:     · Patient demonstrates fair rehab potential due to higher previous functional level. Reason for Services/Other Comments:  · Patient continues to require skilled intervention due to ongoing goals noted above. Use of outcome tool(s) and clinical judgement create a POC that gives a: Questionable prediction of patient's progress: MODERATE COMPLEXITY            TREATMENT:   (In addition to Assessment/Re-Assessment sessions the following treatments were rendered)   Pre-treatment Symptoms/Complaints:  See above  Pain: Initial:   Pain Intensity 1: 0  Post Session:  0     Assessment/Reassessment only, no treatment provided today    Braces/Orthotics/Lines/Etc:   · O2 Device: Nasal cannula  Treatment/Session Assessment:    · Response to Treatment:  Fair. SOB and anxious  · Interdisciplinary Collaboration:   o Occupational Therapist  o Registered Nurse  · After treatment position/precautions:   o Supine in bed  o Bed/Chair-wheels locked  o Call light within reach  o RN notified  o Side rails x 2   · Compliance with Program/Exercises: Will assess as treatment progresses. · Recommendations/Intent for next treatment session: \"Next visit will focus on advancements to more challenging activities\".   Total Treatment Duration:  PT Patient Time In/Time Out  Time In: 0915  Time Out: 110 Jamari Maki PT

## 2018-03-31 NOTE — PROGRESS NOTES
Hospitalist Progress Note     Admit Date:  3/30/2018  7:04 AM   Name:  Esperanza Pritchett   Age:  80 y.o.  :  2/10/1927   MRN:  [id number]   PCP:  Rita Tejeda MD  Treatment Team: Attending Provider: Lexa Kamara MD; Utilization Review: Jazmín Soto    Subjective:    is a 79 yo female who presented with dyspnea on a background of CAD, OA, HLD, NSTEMI, and HTN. CXR showed pulmonary venous congestion and small pleural effusions. She was diagnosed with CHF exacerbation. She continues to report dyspnea and cough but denies any chest pain. She is oriented to name and place but not year.     Objective:   Patient Vitals for the past 24 hrs:   Temp Pulse Resp BP SpO2   18 1453 97.1 °F (36.2 °C) 90 16 107/54 97 %   18 1337 - - - - 96 %   18 1100 97.4 °F (36.3 °C) 89 16 118/70 98 %   18 0905 - - - - 96 %   18 0715 98.1 °F (36.7 °C) 85 16 122/67 96 %   18 0357 98.1 °F (36.7 °C) 92 14 134/74 94 %   18 2301 97.1 °F (36.2 °C) 91 18 124/66 95 %   18 1841 97.7 °F (36.5 °C) 92 14 150/82 97 %     Oxygen Therapy  O2 Sat (%): 97 % (18 1453)  Pulse via Oximetry: 90 beats per minute (18 1337)  O2 Device: Nasal cannula (18 1337)  O2 Flow Rate (L/min): 3 l/min (18 1337)  No intake or output data in the 24 hours ending 18 1556        General appearance: NAD, conversant  Eyes: anicteric sclerae, moist conjunctivae; no lid-lag  ENT: Atraumatic; oropharynx clear with moist mucous membranes and no mucosal ulcerations; normal hard and soft palate  Neck: Trachea midline   FROM, supple, no thyromegaly or lymphadenopathy  Lungs: +bilateral crackles  CV: S1,S2 present, no added murmurs  Abdomen: Soft, non-tender; no masses   Extremities: No peripheral edema or extremity lymphadenopathy  Skin: Normal temperature, turgor and texture; no subcutaneous nodules  Psych: Appropriate affect, alert and oriented to person, place     Data Review:  I have reviewed all labs, meds, telemetry events, and studies from the last 24 hours. Recent Results (from the past 24 hour(s))   METABOLIC PANEL, COMPREHENSIVE    Collection Time: 03/31/18  6:04 AM   Result Value Ref Range    Sodium 146 (H) 136 - 145 mmol/L    Potassium 3.3 (L) 3.5 - 5.1 mmol/L    Chloride 103 98 - 107 mmol/L    CO2 35 (H) 21 - 32 mmol/L    Anion gap 8 7 - 16 mmol/L    Glucose 85 65 - 100 mg/dL    BUN 20 8 - 23 MG/DL    Creatinine 0.84 0.6 - 1.0 MG/DL    GFR est AA >60 >60 ml/min/1.73m2    GFR est non-AA >60 >60 ml/min/1.73m2    Calcium 9.3 8.3 - 10.4 MG/DL    Bilirubin, total 0.7 0.2 - 1.1 MG/DL    ALT (SGPT) 11 (L) 12 - 65 U/L    AST (SGOT) 18 15 - 37 U/L    Alk. phosphatase 100 50 - 136 U/L    Protein, total 6.3 6.3 - 8.2 g/dL    Albumin 2.4 (L) 3.2 - 4.6 g/dL    Globulin 3.9 (H) 2.3 - 3.5 g/dL    A-G Ratio 0.6 (L) 1.2 - 3.5     CBC WITH AUTOMATED DIFF    Collection Time: 03/31/18  6:04 AM   Result Value Ref Range    WBC 6.2 4.3 - 11.1 K/uL    RBC 2.85 (L) 4.05 - 5.25 M/uL    HGB 9.2 (L) 11.7 - 15.4 g/dL    HCT 29.9 (L) 35.8 - 46.3 %    .9 (H) 79.6 - 97.8 FL    MCH 32.3 26.1 - 32.9 PG    MCHC 30.8 (L) 31.4 - 35.0 g/dL    RDW 17.4 (H) 11.9 - 14.6 %    PLATELET 809 272 - 827 K/uL    MPV 10.4 (L) 10.8 - 14.1 FL    DF AUTOMATED      NEUTROPHILS 74 43 - 78 %    LYMPHOCYTES 17 13 - 44 %    MONOCYTES 7 4.0 - 12.0 %    EOSINOPHILS 2 0.5 - 7.8 %    BASOPHILS 0 0.0 - 2.0 %    IMMATURE GRANULOCYTES 0 0.0 - 5.0 %    ABS. NEUTROPHILS 4.6 1.7 - 8.2 K/UL    ABS. LYMPHOCYTES 1.0 0.5 - 4.6 K/UL    ABS. MONOCYTES 0.5 0.1 - 1.3 K/UL    ABS. EOSINOPHILS 0.1 0.0 - 0.8 K/UL    ABS. BASOPHILS 0.0 0.0 - 0.2 K/UL    ABS. IMM.  GRANS. 0.0 0.0 - 0.5 K/UL        All Micro Results     None          Current Meds:  Current Facility-Administered Medications   Medication Dose Route Frequency    albuterol-ipratropium (DUO-NEB) 2.5 MG-0.5 MG/3 ML  3 mL Nebulization TID RT    potassium chloride (K-DUR, KLOR-CON) SR tablet 40 mEq  40 mEq Oral DAILY    furosemide (LASIX) injection 40 mg  40 mg IntraVENous Q12H    acetaminophen (TYLENOL) tablet 500 mg  500 mg Oral Q6H PRN    allopurinol (ZYLOPRIM) tablet 100 mg  100 mg Oral BID    amLODIPine (NORVASC) tablet 5 mg  5 mg Oral DAILY    aspirin delayed-release tablet 81 mg  81 mg Oral DAILY    clopidogrel (PLAVIX) tablet 75 mg  75 mg Oral DAILY    colchicine tablet 0.3 mg  0.3 mg Oral DAILY    gabapentin (NEURONTIN) capsule 300 mg  300 mg Oral BID    pravastatin (PRAVACHOL) tablet 20 mg  20 mg Oral QHS    tamsulosin (FLOMAX) capsule 0.4 mg  0.4 mg Oral DAILY    sodium chloride (NS) flush 5-10 mL  5-10 mL IntraVENous Q8H    sodium chloride (NS) flush 5-10 mL  5-10 mL IntraVENous PRN    heparin (porcine) injection 5,000 Units  5,000 Units SubCUTAneous Q12H    tuberculin injection 5 Units  5 Units IntraDERMal ONCE       Other Studies (last 24 hours):  No results found.       Review of Systems:  Gen: No weight loss  Eyes: no vision changes  ENT: no sore throat  Resp: +dyspnea, cough  CV: No chest pain  Abd:  no abd pain, no vomiting or diarrhea  : No incontinence, no hematuria or dysuria, no flank pain  MSK: no leg swelling  Neuro: No HA or dizziness, no weakness, numbness/tingling    Assessment and Plan:     Hospital Problems as of 3/31/2018  Date Reviewed: 2/15/2018          Codes Class Noted - Resolved POA    CHF exacerbation ([facility] ICD-10-CM: I50.9  ICD-9-CM: 428.0  3/30/2018 - Present Unknown        CKD (chronic kidney disease) stage 3, GFR 30-59 ml/min ICD-10-CM: N18.3  ICD-9-CM: 585.3  12/23/2017 - Present         Osteoarthritis (Chronic) ICD-10-CM: M19.90  ICD-9-CM: 715.90  7/20/2017 - Present Yes        Reflux esophagitis ICD-10-CM: K21.0  ICD-9-CM: 530.11  Unknown - Present Yes        Mixed hyperlipidemia ICD-10-CM: E78.2  ICD-9-CM: 272.2  Unknown - Present Yes              PLAN:    Acute diastolic CHF exacerbation  CXR: CHF   12/2017 ECHO showed an EF of 55-60% with severe/moderate tricuspid regurgitation.   BNP mildly elevated 126  Plan  Lasix 40 IV BID   Goal: Net negative 1-5 L over 24 hours  Daily weights (weights now downtrending)  Strict Is and Os  DASH Diet  HLD: Continue statin  Gout: Continue allopurinol  CAD: Continue aspirin, plavix  Hypokalemia: KCL replacement  HTN: Continue home regimen    DC planning/Dispo:  Home in 48 hours  DVT ppx:  heparin    Signed:  Marlene Vergara MD

## 2018-03-31 NOTE — PROGRESS NOTES
Assessment done via doc flow sheet. Pt alert & oriented x3, resp unlabored, lung sounds diminished, O2 @ 3L per nasal cannula, sat 96%. Denies pain at this time.   Bed low & locked, side rails x3 up with call light within reach, pt instructed to call for assistance as needed,

## 2018-03-31 NOTE — PROGRESS NOTES
Problem: Self Care Deficits Care Plan (Adult)  Goal: *Acute Goals and Plan of Care (Insert Text)  1. Patient will perform grooming with supervision. 2. Patient will perform upper body dressing with supervision. 3. Patient will perform lower body dressing with minimal assist  4. Patient will perform sponge bathing with supervision. 5. Patient will perform toileting and toilet transfer with minimal assist.  6. Patient will perform ADL functional mobility and tranfers in room with minimal assist.  7. Patient/family to demonstrate knowledge of home safety and DME recommendations. Goals to be achieved in 7 days. OCCUPATIONAL THERAPY: Initial Assessment and AM 3/31/2018  OBSERVATION: Hospital Day: 2  Payor: [employer] VIP CARE PLUS / [place] [employer] VIP [employer] [place]US / Product Type: Managed Care Medicare /      NAME/AGE/GENDER: Ritu Myers is a 80 y.o. female   PRIMARY DIAGNOSIS:  CHF exacerbation ([facility] [address] <principal problem not specified> <principal problem not specified>        ICD-10: Treatment Diagnosis:    · Generalized Muscle Weakness (M62.81)  · Other lack of cordination (R27.8)   Precautions/Allergies:     Bee pollen and Fire ant      ASSESSMENT:     Ms. Alyce Bridges presents with above diagnosis. Pt found in supine and agreeable to therapy session. Pt reports only being home from rehab for one day prior to returning to the hospital for SOB. Pt will benefit from skilled OT services to address deficits in self care and functional mobility. This section established at most recent assessment   PROBLEM LIST (Impairments causing functional limitations):  1. Decreased Strength  2. Decreased ADL/Functional Activities  3. Decreased Transfer Abilities  4. Decreased Ambulation Ability/Technique  5. Decreased Balance   INTERVENTIONS PLANNED: (Benefits and precautions of occupational therapy have been discussed with the patient.)  1. Activities of daily living training  2. Adaptive equipment training  3.  Balance training  4. Clothing management  5. Donning&doffing training  6. Therapeutic activity  7. Therapeutic exercise     TREATMENT PLAN: Frequency/Duration: Follow patient 4x/week to address above goals. Rehabilitation Potential For Stated Goals: Good     RECOMMENDED REHABILITATION/EQUIPMENT: (at time of discharge pending progress): Due to the probability of continued deficits (see above) this patient will likely need continued skilled occupational therapy after discharge. Equipment:    TBD              OCCUPATIONAL PROFILE AND HISTORY:   History of Present Injury/Illness (Reason for Referral):  See above  Past Medical History/Comorbidities:   Ms. Jhonny Juárez  has a past medical history of Acute kidney failure, unspecified ([facility] [address] Arthritis; CAD (coronary artery disease); Cellulitis and abscess of other specified site; Coronary atherosclerosis of native coronary artery; Essential hypertension, benign (3/17/2015); Hypertension; Hypopotassemia; Mixed hyperlipidemia; Osteoarthritis (7/20/2017); Other and unspecified hyperlipidemia; Reflux esophagitis; Renal failure, unspecified; Shortness of breath; and Unspecified essential hypertension. Ms. Jhonny Juárez  has a past surgical history that includes hx cholecystectomy; pr layr clos wnd face,facial <2.5cm; pr cardiac surg procedure unlist; hx cataract removal (Bilateral); and hx hysterectomy.   Social History/Living Environment:   Home Environment: Private residence  # Steps to Enter: 0  Wheelchair Ramp: Yes  One/Two Story Residence: One story  Living Alone: No  Support Systems: Child(jayleen)  Patient Expects to be Discharged to[de-identified] Private residence  Current DME Used/Available at Home: Walker, rolling, Wheelchair, [place], straight  Tub or Shower Type: Tub/Shower combination  Prior Level of Function/Work/Activity:  Minimal assist for self care and functional mobility     Number of Personal Factors/Comorbidities that affect the Plan of Care: Brief history (0):  LOW COMPLEXITY   ASSESSMENT OF OCCUPATIONAL PERFORMANCE[de-identified]   Activities of Daily Living:           Basic ADLs (From Assessment) Complex ADLs (From Assessment)   Basic ADL  Feeding: Supervision  Oral Facial Hygiene/Grooming: Supervision  Bathing: Moderate assistance  Type of Bath: Chlorhexidine (CHG), Bath Pack, Full  Upper Body Dressing: Minimum assistance  Lower Body Dressing: Moderate assistance  Toileting: Total assistance     Grooming/Bathing/Dressing Activities of Daily Living     Cognitive Retraining  Safety/Judgement: Awareness of environment; Fall prevention                 Functional Transfers  Toilet Transfer : Moderate assistance  Shower Transfer: Moderate assistance     Bed/Mat Mobility  Rolling: Moderate assistance  Supine to Sit: Moderate assistance  Sit to Supine: Moderate assistance  Sit to Stand: Moderate assistance  Bed to Chair: Moderate assistance  Scooting: Minimum assistance       Most Recent Physical Functioning:   Gross Assessment:                  Posture:     Balance:  Sitting: Intact; With support  Standing: Impaired; With support  Standing - Static: Constant support  Standing - Dynamic : Poor Bed Mobility:  Rolling: Moderate assistance  Supine to Sit: Moderate assistance  Sit to Supine: Moderate assistance  Scooting: Minimum assistance  Wheelchair Mobility:     Transfers:  Sit to Stand:  Moderate assistance  Stand to Sit: Moderate assistance  Bed to Chair: Moderate assistance     JACKLYNE WFLLORENAE shoulder flexion limited to 30 degrees from prior injury           Patient Vitals for the past 6 hrs:   BP BP Patient Position SpO2 O2 Flow Rate (L/min) Pulse   03/31/18 0715 122/67 At rest 96 % - 85   03/31/18 0905 - - 96 % 3 l/min -   03/31/18 1100 118/70 At rest 98 % - 89       Mental Status  Neurologic State: Alert  Orientation Level: Oriented to person, Oriented to situation  Cognition: Decreased attention/concentration, Decreased command following  Perception: Appears intact  Perseveration: No perseveration noted  Safety/Judgement: Awareness of environment, Fall prevention                          Physical Skills Involved:  1. Range of Motion  2. Balance  3. Strength  4. Activity Tolerance Cognitive Skills Affected (resulting in the inability to perform in a timely and safe manner):  1. NOne Psychosocial Skills Affected:  1. None   Number of elements that affect the Plan of Care: 1-3:  LOW COMPLEXITY   CLINICAL DECISION MAKIN[address] [place] [address] AM-PAC 6 Clicks   Daily Activity Inpatient Short Form  How much help from another person does the patient currently need. .. Total A Lot A Little None   1. Putting on and taking off regular lower body clothing? [] 1   [x] 2   [] 3   [] 4   2. Bathing (including washing, rinsing, drying)? [] 1   [x] 2   [] 3   [] 4   3. Toileting, which includes using toilet, bedpan or urinal?   [] 1   [x] 2   [] 3   [] 4   4. Putting on and taking off regular upper body clothing? [] 1   [x] 2   [] 3   [] 4   5. Taking care of personal grooming such as brushing teeth? [] 1   [] 2   [x] 3   [] 4   6. Eating meals? [] 1   [] 2   [x] 3   [] 4   © , Trustees of [address] [place] [address], under license to [employer]. All rights reserved      Score:  Initial: 14 Most Recent: X (Date: -- )    Interpretation of Tool:  Represents activities that are increasingly more difficult (i.e. Bed mobility, Transfers, Gait). Score 24 23 22-20 19-15 14-10 9-7 6     Modifier CH CI CJ CK CL CM CN      ?  Self Care:     - CURRENT STATUS: CL - 60%-79% impaired, limited or restricted    - GOAL STATUS: CK - 40%-59% impaired, limited or restricted    - D/C STATUS:  ---------------To be determined---------------  Payor: [employer] VIP CARE PLUS / [place] [employer] VIP CARE PLUS / Product Type: Managed Care Medicare /      Medical Necessity:     · Patient is expected to demonstrate progress in balance, coordination and functional technique to decrease assistance required with self care and functional mobility. Reason for Services/Other Comments:  · Patient continues to require skilled intervention due to decreased self care and functional mobility. Use of outcome tool(s) and clinical judgement create a POC that gives a: LOW COMPLEXITY         TREATMENT:   (In addition to Assessment/Re-Assessment sessions the following treatments were rendered)     Pre-treatment Symptoms/Complaints:  none  Pain: Initial:   Pain Intensity 1: 0  Post Session:  0     Assessment/Reassessment only, no treatment provided today    Braces/Orthotics/Lines/Etc:   · O2 Device: Nasal cannula  Treatment/Session Assessment:    · Response to Treatment:  Tolerated well  · Interdisciplinary Collaboration:   o Physical Therapist  o Occupational Therapist  o Registered Nurse  · After treatment position/precautions:   o Supine in bed  o Bed/Chair-wheels locked  o Bed in low position  o Call light within reach  o RN notified  o Side rails x 3   · Compliance with Program/Exercises: compliant all of the time. · Recommendations/Intent for next treatment session: \"Next visit will focus on advancements to more challenging activities\".   Total Treatment Duration:  OT Patient Time In/Time Out  Time In: 0930  Time Out: [address] Escobedo OT

## 2018-04-01 PROBLEM — I50.9 CHF (CONGESTIVE HEART FAILURE) (HCC): Status: ACTIVE | Noted: 2018-04-01

## 2018-04-01 LAB
ANION GAP SERPL CALC-SCNC: 4 MMOL/L (ref 7–16)
BASOPHILS # BLD: 0 K/UL (ref 0–0.2)
BASOPHILS NFR BLD: 0 % (ref 0–2)
BUN SERPL-MCNC: 24 MG/DL (ref 8–23)
CALCIUM SERPL-MCNC: 8.9 MG/DL (ref 8.3–10.4)
CHLORIDE SERPL-SCNC: 103 MMOL/L (ref 98–107)
CO2 SERPL-SCNC: 37 MMOL/L (ref 21–32)
CREAT SERPL-MCNC: 0.92 MG/DL (ref 0.6–1)
DIFFERENTIAL METHOD BLD: ABNORMAL
EOSINOPHIL # BLD: 0.1 K/UL (ref 0–0.8)
EOSINOPHIL NFR BLD: 3 % (ref 0.5–7.8)
ERYTHROCYTE [DISTWIDTH] IN BLOOD BY AUTOMATED COUNT: 17.4 % (ref 11.9–14.6)
GLUCOSE SERPL-MCNC: 109 MG/DL (ref 65–100)
HCT VFR BLD AUTO: 27.2 % (ref 35.8–46.3)
HGB BLD-MCNC: 8.5 G/DL (ref 11.7–15.4)
IMM GRANULOCYTES # BLD: 0 K/UL (ref 0–0.5)
IMM GRANULOCYTES NFR BLD AUTO: 0 % (ref 0–5)
LYMPHOCYTES # BLD: 1.4 K/UL (ref 0.5–4.6)
LYMPHOCYTES NFR BLD: 26 % (ref 13–44)
MCH RBC QN AUTO: 32.8 PG (ref 26.1–32.9)
MCHC RBC AUTO-ENTMCNC: 31.3 G/DL (ref 31.4–35)
MCV RBC AUTO: 105 FL (ref 79.6–97.8)
MM INDURATION POC: 0 MM (ref 0–5)
MONOCYTES # BLD: 0.5 K/UL (ref 0.1–1.3)
MONOCYTES NFR BLD: 10 % (ref 4–12)
NEUTS SEG # BLD: 3.2 K/UL (ref 1.7–8.2)
NEUTS SEG NFR BLD: 61 % (ref 43–78)
PLATELET # BLD AUTO: 224 K/UL (ref 150–450)
PMV BLD AUTO: 11.1 FL (ref 10.8–14.1)
POTASSIUM SERPL-SCNC: 3.5 MMOL/L (ref 3.5–5.1)
PPD POC: NORMAL NEGATIVE
RBC # BLD AUTO: 2.59 M/UL (ref 4.05–5.25)
SODIUM SERPL-SCNC: 144 MMOL/L (ref 136–145)
WBC # BLD AUTO: 5.2 K/UL (ref 4.3–11.1)

## 2018-04-01 PROCEDURE — 99218 HC RM OBSERVATION: CPT

## 2018-04-01 PROCEDURE — 94640 AIRWAY INHALATION TREATMENT: CPT

## 2018-04-01 PROCEDURE — 80048 BASIC METABOLIC PNL TOTAL CA: CPT | Performed by: INTERNAL MEDICINE

## 2018-04-01 PROCEDURE — 97530 THERAPEUTIC ACTIVITIES: CPT

## 2018-04-01 PROCEDURE — 74011250636 HC RX REV CODE- 250/636: Performed by: INTERNAL MEDICINE

## 2018-04-01 PROCEDURE — 74011000250 HC RX REV CODE- 250: Performed by: INTERNAL MEDICINE

## 2018-04-01 PROCEDURE — 85025 COMPLETE CBC W/AUTO DIFF WBC: CPT | Performed by: INTERNAL MEDICINE

## 2018-04-01 PROCEDURE — 74011250637 HC RX REV CODE- 250/637: Performed by: INTERNAL MEDICINE

## 2018-04-01 PROCEDURE — 65270000029 HC RM PRIVATE

## 2018-04-01 PROCEDURE — 36415 COLL VENOUS BLD VENIPUNCTURE: CPT | Performed by: INTERNAL MEDICINE

## 2018-04-01 PROCEDURE — 77030019605

## 2018-04-01 PROCEDURE — 94760 N-INVAS EAR/PLS OXIMETRY 1: CPT

## 2018-04-01 RX ADMIN — Medication 10 ML: at 21:55

## 2018-04-01 RX ADMIN — PRAVASTATIN SODIUM 20 MG: 20 TABLET ORAL at 21:54

## 2018-04-01 RX ADMIN — FUROSEMIDE 40 MG: 10 INJECTION, SOLUTION INTRAMUSCULAR; INTRAVENOUS at 21:54

## 2018-04-01 RX ADMIN — GABAPENTIN 300 MG: 300 CAPSULE ORAL at 21:54

## 2018-04-01 RX ADMIN — GABAPENTIN 300 MG: 300 CAPSULE ORAL at 09:35

## 2018-04-01 RX ADMIN — HEPARIN SODIUM 5000 UNITS: 5000 INJECTION, SOLUTION INTRAVENOUS; SUBCUTANEOUS at 14:18

## 2018-04-01 RX ADMIN — ASPIRIN 81 MG: 81 TABLET, COATED ORAL at 09:35

## 2018-04-01 RX ADMIN — ALLOPURINOL 100 MG: 100 TABLET ORAL at 17:18

## 2018-04-01 RX ADMIN — HEPARIN SODIUM 5000 UNITS: 5000 INJECTION, SOLUTION INTRAVENOUS; SUBCUTANEOUS at 01:47

## 2018-04-01 RX ADMIN — CLOPIDOGREL BISULFATE 75 MG: 75 TABLET ORAL at 09:35

## 2018-04-01 RX ADMIN — TAMSULOSIN HYDROCHLORIDE 0.4 MG: 0.4 CAPSULE ORAL at 09:35

## 2018-04-01 RX ADMIN — IPRATROPIUM BROMIDE AND ALBUTEROL SULFATE 3 ML: 2.5; .5 SOLUTION RESPIRATORY (INHALATION) at 08:07

## 2018-04-01 RX ADMIN — AMLODIPINE BESYLATE 5 MG: 5 TABLET ORAL at 09:35

## 2018-04-01 RX ADMIN — IPRATROPIUM BROMIDE AND ALBUTEROL SULFATE 3 ML: 2.5; .5 SOLUTION RESPIRATORY (INHALATION) at 13:53

## 2018-04-01 RX ADMIN — FUROSEMIDE 40 MG: 10 INJECTION, SOLUTION INTRAMUSCULAR; INTRAVENOUS at 09:50

## 2018-04-01 RX ADMIN — IPRATROPIUM BROMIDE AND ALBUTEROL SULFATE 3 ML: 2.5; .5 SOLUTION RESPIRATORY (INHALATION) at 19:50

## 2018-04-01 RX ADMIN — ALLOPURINOL 100 MG: 100 TABLET ORAL at 09:35

## 2018-04-01 RX ADMIN — COLCHICINE 0.3 MG: 0.6 TABLET, FILM COATED ORAL at 09:35

## 2018-04-01 RX ADMIN — POTASSIUM CHLORIDE 40 MEQ: 20 TABLET, EXTENDED RELEASE ORAL at 09:34

## 2018-04-01 RX ADMIN — Medication 10 ML: at 05:26

## 2018-04-01 NOTE — PROGRESS NOTES
Problem: Falls - Risk of  Goal: *Absence of Falls  Document Vanda Fall Risk and appropriate interventions in the flowsheet.    Outcome: Progressing Towards Goal  Fall Risk Interventions:  Mobility Interventions: Bed/chair exit alarm         Medication Interventions: Bed/chair exit alarm    Elimination Interventions: Call light in reach

## 2018-04-01 NOTE — PROGRESS NOTES
AM Assessment. Pt. A/O X4. Respirations even and unlabored. Lungs clear, diminished. Abd. Soft and non tender. Generalized weakness noted. Scattered excoriation noted. IV patent and flushed. On oxygen at 3L via NC. Pt. Has no complaints at this time. Call light within reach. Will monitor hourly.

## 2018-04-01 NOTE — PROGRESS NOTES
Shift assessment complete. A+OX4. HR regular. LS dimished. RR even and unlabored. Abd soft active bowel sounds. Bed LL. All needs met at this time. Staff will monitor with hourly rounds.

## 2018-04-01 NOTE — PROGRESS NOTES
Hospitalist Progress Note     Admit Date:  3/30/2018  7:04 AM   Name:  Dennise Mcclendon   Age:  80 y.o.  :  2/10/1927   MRN:  [id number]   PCP:  Lary Clifton MD  Treatment Team: Attending Provider: Missy Santos MD; Utilization Review: Jazmín Soto    Subjective:    is a 81 yo female who presented with dyspnea on a background of CAD, OA, HLD, NSTEMI, and HTN. CXR showed pulmonary venous congestion and small pleural effusions. She was diagnosed with CHF exacerbation. She reports some dyspnea and cough.     Objective:     Patient Vitals for the past 24 hrs:   Temp Pulse Resp BP SpO2   18 1500 97.8 °F (36.6 °C) 86 18 116/71 93 %   18 1353 - - - - 94 %   18 1240 97.6 °F (36.4 °C) 78 18 110/67 94 %   18 0808 - - - - 93 %   18 0756 98.6 °F (37 °C) 84 16 102/62 100 %   18 0416 97.9 °F (36.6 °C) 92 16 106/68 92 %   18 0002 97.9 °F (36.6 °C) 86 18 117/70 93 %   18 2128 - - - - 96 %   18 1900 97.3 °F (36.3 °C) 87 16 126/72 98 %     Oxygen Therapy  O2 Sat (%): 93 % (18 1500)  Pulse via Oximetry: 78 beats per minute (18 1353)  O2 Device: Nasal cannula (18 1353)  O2 Flow Rate (L/min): 3 l/min (18 1353)  No intake or output data in the 24 hours ending 18 1606        General appearance: NAD, conversant  Eyes: anicteric sclerae, moist conjunctivae; no lid-lag  ENT: Atraumatic; oropharynx clear with moist mucous membranes and no mucosal ulcerations; normal hard and soft palate  Neck: Trachea midline   FROM, supple, no thyromegaly or lymphadenopathy  Lungs: +bilateral crackles  CV: S1,S2 present, no added murmurs  Abdomen: Soft, non-tender; no masses   Extremities: No peripheral edema or extremity lymphadenopathy  Skin: Normal temperature, turgor and texture; no subcutaneous nodules  Psych: Appropriate affect, alert and oriented to person, place     Data Review:  I have reviewed all labs, meds, telemetry events, and studies from the last 24 hours. Recent Results (from the past 24 hour(s))   PLEASE READ & DOCUMENT PPD TEST IN 24 HRS    Collection Time: 03/31/18  5:00 PM   Result Value Ref Range    PPD neg Negative    mm Induration 0.0 mm   CBC WITH AUTOMATED DIFF    Collection Time: 04/01/18  4:59 AM   Result Value Ref Range    WBC 5.2 4.3 - 11.1 K/uL    RBC 2.59 (L) 4.05 - 5.25 M/uL    HGB 8.5 (L) 11.7 - 15.4 g/dL    HCT 27.2 (L) 35.8 - 46.3 %    .0 (H) 79.6 - 97.8 FL    MCH 32.8 26.1 - 32.9 PG    MCHC 31.3 (L) 31.4 - 35.0 g/dL    RDW 17.4 (H) 11.9 - 14.6 %    PLATELET 647 598 - 820 K/uL    MPV 11.1 10.8 - 14.1 FL    DF AUTOMATED      NEUTROPHILS 61 43 - 78 %    LYMPHOCYTES 26 13 - 44 %    MONOCYTES 10 4.0 - 12.0 %    EOSINOPHILS 3 0.5 - 7.8 %    BASOPHILS 0 0.0 - 2.0 %    IMMATURE GRANULOCYTES 0 0.0 - 5.0 %    ABS. NEUTROPHILS 3.2 1.7 - 8.2 K/UL    ABS. LYMPHOCYTES 1.4 0.5 - 4.6 K/UL    ABS. MONOCYTES 0.5 0.1 - 1.3 K/UL    ABS. EOSINOPHILS 0.1 0.0 - 0.8 K/UL    ABS. BASOPHILS 0.0 0.0 - 0.2 K/UL    ABS. IMM.  GRANS. 0.0 0.0 - 0.5 K/UL   METABOLIC PANEL, BASIC    Collection Time: 04/01/18  4:59 AM   Result Value Ref Range    Sodium 144 136 - 145 mmol/L    Potassium 3.5 3.5 - 5.1 mmol/L    Chloride 103 98 - 107 mmol/L    CO2 37 (H) 21 - 32 mmol/L    Anion gap 4 (L) 7 - 16 mmol/L    Glucose 109 (H) 65 - 100 mg/dL    BUN 24 (H) 8 - 23 MG/DL    Creatinine 0.92 0.6 - 1.0 MG/DL    GFR est AA >60 >60 ml/min/1.73m2    GFR est non-AA >60 >60 ml/min/1.73m2    Calcium 8.9 8.3 - 10.4 MG/DL        All Micro Results     None          Current Meds:  Current Facility-Administered Medications   Medication Dose Route Frequency    albuterol-ipratropium (DUO-NEB) 2.5 MG-0.5 MG/3 ML  3 mL Nebulization TID RT    potassium chloride (K-DUR, KLOR-CON) SR tablet 40 mEq  40 mEq Oral DAILY    furosemide (LASIX) injection 40 mg  40 mg IntraVENous Q12H    acetaminophen (TYLENOL) tablet 500 mg  500 mg Oral Q6H PRN    allopurinol (ZYLOPRIM) tablet 100 mg  100 mg Oral BID    amLODIPine (NORVASC) tablet 5 mg  5 mg Oral DAILY    aspirin delayed-release tablet 81 mg  81 mg Oral DAILY    clopidogrel (PLAVIX) tablet 75 mg  75 mg Oral DAILY    colchicine tablet 0.3 mg  0.3 mg Oral DAILY    gabapentin (NEURONTIN) capsule 300 mg  300 mg Oral BID    pravastatin (PRAVACHOL) tablet 20 mg  20 mg Oral QHS    tamsulosin (FLOMAX) capsule 0.4 mg  0.4 mg Oral DAILY    sodium chloride (NS) flush 5-10 mL  5-10 mL IntraVENous Q8H    sodium chloride (NS) flush 5-10 mL  5-10 mL IntraVENous PRN    heparin (porcine) injection 5,000 Units  5,000 Units SubCUTAneous Q12H       Other Studies (last 24 hours):  No results found. Review of Systems:  Gen: No weight loss  Eyes: no vision changes  ENT: no sore throat  Resp: +dyspnea, cough  CV: No chest pain  Abd:  no abd pain, no vomiting or diarrhea  : No incontinence, no hematuria or dysuria, no flank pain  MSK: no leg swelling  Neuro: No HA or dizziness, no weakness, numbness/tingling    Assessment and Plan:     Hospital Problems as of 4/1/2018  Date Reviewed: 2/15/2018          Codes Class Noted - Resolved POA    CHF (congestive heart failure) ([facility] ICD-10-CM: I50.9  ICD-9-CM: 428.0  4/1/2018 - Present Unknown        CHF exacerbation ([facility] ICD-10-CM: I50.9  ICD-9-CM: 428.0  3/30/2018 - Present Unknown        CKD (chronic kidney disease) stage 3, GFR 30-59 ml/min ICD-10-CM: N18.3  ICD-9-CM: 585.3  12/23/2017 - Present         Osteoarthritis (Chronic) ICD-10-CM: M19.90  ICD-9-CM: 715.90  7/20/2017 - Present Yes        Reflux esophagitis ICD-10-CM: K21.0  ICD-9-CM: 530.11  Unknown - Present Yes        Mixed hyperlipidemia ICD-10-CM: E78.2  ICD-9-CM: 272.2  Unknown - Present Yes              PLAN:    Acute diastolic CHF exacerbation  CXR: CHF   12/2017 ECHO showed an EF of 55-60% with severe/moderate tricuspid regurgitation.   BNP mildly elevated 126  Plan  Lasix 40 IV BID , will likely transition to oral lasix in the AM  Goal: Net negative 1-5 L over 24 hours  Daily weights (weights now downtrending)  Strict Is and Os  DASH Diet  HLD: Continue statin  Gout: Continue allopurinol  CAD: Continue aspirin, plavix  Hypokalemia: KCL replacement  HTN: Continue home regimen    DC planning/Dispo:  Home in 48 hours  DVT ppx:  heparin    Signed:  Erin St MD

## 2018-04-01 NOTE — PROGRESS NOTES
Problem: Mobility Impaired (Adult and Pediatric)  Goal: *Acute Goals and Plan of Care (Insert Text)  STG:  (1.)Ms. Chaitanya Menjivar will transfer from bed to chair and chair to bed with MODERATE ASSIST using the least restrictive device within 3 treatment day(s). Met 4/1  (2.)Ms. Chaitanya Menjivar will ambulate with MODERATE ASSIST for 5 feet with the least restrictive device within 3 treatment day(s). Met 4/1    LTG:  (1.)Ms. Chaitanya Menjivar will transfer from bed to chair and chair to bed with MINIMAL ASSIST using the least restrictive device within 7 treatment day(s). Met 4/1  (2.)Ms. Chaitanya Menjivar will ambulate with MINIMAL ASSIST for 20 feet with the least restrictive device within 7 treatment day(s). Met 4/1    ADDENDUM GOALS 4/1/18 : (1.)Ms. Chaitanya Menjivar will roll & move from supine to sit and sit to supine  in bed with MINIMAL ASSIST within 7 treatment day(s). 2) transfers with walker & CGA. 3) pt ambulating with rolling walker 100 ft with CGA. ________________________________________________________________________________________________    PHYSICAL THERAPY: Daily Note, Treatment Day: 1st, PM 4/1/2018  INPATIENT: Hospital Day: 3  Payor: FIRST CHOICE VIP CARE PLUS / [place] FIRST CHO[employer] VIP CARE [place] / Product Type: Managed Care Medicare /      NAME/AGE/GENDER: Roxie Dewey is a 80 y.o. female   PRIMARY DIAGNOSIS: CHF exacerbation ([address]  CHF (congestive heart failure) ([address] <principal problem not specified> <principal problem not specified>        ICD-10: Treatment Diagnosis:    · Generalized Muscle Weakness (M62.81)  · Difficulty in walking, Not elsewhere classified (R26.2)   Precaution/Allergies:  Bee pollen and Fire ant      ASSESSMENT:     Ms. Chaitanya Menjivar showed improved ability to [facility] & gait with rolling walker. Pt will need psost acute rehab stay if no assist availalble at home on DC.      This section established at most recent assessment PROBLEM LIST (Impairments causing functional limitations):  1. Decreased Strength  2. Decreased ADL/Functional Activities  3. Decreased Transfer Abilities  4. Decreased Ambulation Ability/Technique  5. Decreased Balance  6. Decreased Activity Tolerance  7. Increased Fatigue  8. Increased Shortness of Breath  9. Decreased Flexibility/Joint Mobility  10. Decreased [place] with Home Exercise Program  11. Decreased Cognition   INTERVENTIONS PLANNED: (Benefits and precautions of physical therapy have been discussed with the patient.)  1. Balance Exercise  2. Bed Mobility  3. Gait Training  4. Home Exercise Program (HEP)  5. Neuromuscular Re-education/Strengthening  6. Range of Motion (ROM)  7. Therapeutic Activites  8. Therapeutic Exercise/Strengthening     TREATMENT PLAN: Frequency/Duration: daily for duration of hospital stay  Rehabilitation Potential For Stated Goals: Good     RECOMMENDED REHABILITATION/EQUIPMENT: (at time of discharge pending progress): Due to the probability of continued deficits (see above) this patient will likely need continued skilled physical therapy after discharge. Equipment:    None at this time. Pt states she has cane, walker, and w/c              HISTORY:   History of Present Injury/Illness (Reason for Referral):  Pt arrived through ER with dyspnea and hx of CHF. States she came from home where she lives in a home where her son lives next door. She has a ramp to enter home. States she ambulated with cane but has walker and w/c. Poor historian who fluctuated between thinking it was guillermina time and easter. Past Medical History/Comorbidities:   Ms. Dimple Ortega  has a past medical history of Acute kidney failure, unspecified ([facility] [address] Arthritis; CAD (coronary artery disease); Cellulitis and abscess of other specified site; Coronary atherosclerosis of native coronary artery; Essential hypertension, benign (3/17/2015); Hypertension; Hypopotassemia;  Mixed hyperlipidemia; Osteoarthritis (7/20/2017); Other and unspecified hyperlipidemia; Reflux esophagitis; Renal failure, unspecified; Shortness of breath; and Unspecified essential hypertension. Ms. Jose Antonio Duval  has a past surgical history that includes hx cholecystectomy; pr layr clos wnd face,facial <2.5cm; pr cardiac surg procedure unlist; hx cataract removal (Bilateral); and hx hysterectomy. Social History/Living Environment:   Home Environment: Private residence  # Steps to Enter: 0  Wheelchair Ramp: Yes  One/Two Story Residence: One story  Living Alone: No  Support Systems: Child(jayleen)  Patient Expects to be Discharged to[de-identified] Private residence  Current DME Used/Available at Home: Walker, rolling, Wheelchair, Cane, straight  Tub or Shower Type: Tub/Shower combination  Prior Level of Function/Work/Activity:  States she ambulated with cane and was independent with household mobility  Previous Treatment Approaches:          Pt was recently in this hospital for similar problems (per pt report)  Personal Factors:          Sex:  female        Age:  80 y.o. Pt seemed anxious with her SOB   Number of Personal Factors/Comorbidities that affect the Plan of Care: 1-2: MODERATE COMPLEXITY   EXAMINATION:   Most Recent Physical Functioning:   Gross Assessment: 3 to 3-/5 throughout                       Balance:  Sitting: Intact; Without support  Standing: Impaired; With support (walker)  Standing - Static:  (fair- with walker)  Standing - Dynamic :  (fair- with walker) Bed Mobility:  Supine to Sit: Moderate assistance  Sit to Supine:  Moderate assistance  Scooting: Minimum assistance       Transfers:  Sit to Stand: Minimum assistance  Stand to Sit: Minimum assistance  Bed to Chair: Minimum assistance  Duration: 15 Minutes (extra time to work through activity noted)  Gait:     Speed/Mary: Delayed  Gait Abnormalities: Decreased step clearance (mild sway)  Distance (ft): 60 Feet (ft)  Assistive Device: Walker, rolling  Ambulation - Level of Assistance: Minimal assistance         Sacral wound with bandage   Body Structures Involved:  1. Nerves  2. Muscles  3. Ligaments Body Functions Affected:  1. Mental  2. Sensory/Pain  3. Neuromusculoskeletal  4. Movement Related  5. Skin Related Activities and Participation Affected:  1. Learning and Applying Knowledge  2. General Tasks and Demands  3. Mobility  4. Self Care  5. Domestic Life  6. Interpersonal Interactions and Relationships  7. Community, Social and [place]   Number of elements that affect the Plan of Care: 3: MODERATE COMPLEXITY   CLINICAL PRESENTATION:   Presentation: Stable and uncomplicated: LOW COMPLEXITY   CLINICAL DECISION MAKIN[address] [place] [address] AM-PAC 6 Clicks   Basic Mobility Inpatient Short Form  How much difficulty does the patient currently have. .. Unable A Lot A Little None   1. Turning over in bed (including adjusting bedclothes, sheets and blankets)? [] 1   [x] 2   [] 3   [] 4   2. Sitting down on and standing up from a chair with arms ( e.g., wheelchair, bedside commode, etc.)   [] 1   [x] 2   [] 3   [] 4   3. Moving from lying on back to sitting on the side of the bed? [] 1   [x] 2   [] 3   [] 4   How much help from another person does the patient currently need. .. Total A Lot A Little None   4. Moving to and from a bed to a chair (including a wheelchair)? [x] 1   [] 2   [] 3   [] 4   5. Need to walk in hospital room? [x] 1   [] 2   [] 3   [] 4   6. Climbing 3-5 steps with a railing? [x] 1   [] 2   [] 3   [] 4   © , Trustees of [address] [place] [address], under license to [employer]. All rights reserved      Score:  Initial: 9 Most Recent: X (Date: -- )    Interpretation of Tool:  Represents activities that are increasingly more difficult (i.e. Bed mobility, Transfers, Gait). Score 24 23 22-20 19-15 14-10 9-7 6     Modifier CH CI CJ CK CL CM CN      ?  Mobility - Walking and Moving Around:     - CURRENT STATUS: CM - 80%-99% impaired, limited or restricted    - GOAL STATUS: CL - 60%-79% impaired, limited or restricted    - D/C STATUS:  ---------------To be determined---------------  Payor: FIRST CHOICE VIP CARE PLUS / [place] FIRST CHO[employer] VIP CARE [place]US / Product Type: Managed Care Medicare /      Medical Necessity:     · Patient demonstrates fair rehab potential due to higher previous functional level. Reason for Services/Other Comments:  · Patient continues to require skilled intervention due to ongoing goals noted above. Use of outcome tool(s) and clinical judgement create a POC that gives a: Questionable prediction of patient's progress: MODERATE COMPLEXITY            TREATMENT:   (In addition to Assessment/Re-Assessment sessions the following treatments were rendered)   Pre-treatment Symptoms/Complaints:  Pt agreeable to get out of bed  Pain: Initial: visual scale  Pain Intensity 1: 0  Post Session:  0/10   Therapeutic Activity: (  15 Minutes (extra time to work through activity noted) ):  Therapeutic activities including bed mobility, transfers & short distance ambulation to improve mobility, strength, balance, coordination and dynamic movement of arm - bilateral and leg - bilateral to improve functional WB potential.      Braces/Orthotics/Lines/Etc:   · O2 Device: Nasal cannula  Treatment/Session Assessment:    · Response to Treatment:  Tolerated better today  · Interdisciplinary Collaboration:   o Registered Nurse  · After treatment position/precautions:   o Up in chair  o Bed/Chair-wheels locked  o Call light within reach  o RN notified   · Compliance with Program/Exercises: Will assess as treatment progresses. · Recommendations/Intent for next treatment session: \"Next visit will focus on advancements to more challenging activities and reduction in assistance provided\".   Total Treatment Duration:  PT Patient Time In/Time Out  Time In: 1155  Time Out: 1700 Jabier Herrera, PT

## 2018-04-02 PROBLEM — I50.9 CHF EXACERBATION (HCC): Status: RESOLVED | Noted: 2018-03-30 | Resolved: 2018-04-02

## 2018-04-02 PROBLEM — I50.9 CHF (CONGESTIVE HEART FAILURE) (HCC): Status: RESOLVED | Noted: 2018-04-01 | Resolved: 2018-04-02

## 2018-04-02 LAB
ANION GAP SERPL CALC-SCNC: 2 MMOL/L (ref 7–16)
BASOPHILS # BLD: 0 K/UL (ref 0–0.2)
BASOPHILS NFR BLD: 0 % (ref 0–2)
BUN SERPL-MCNC: 25 MG/DL (ref 8–23)
CALCIUM SERPL-MCNC: 8.8 MG/DL (ref 8.3–10.4)
CHLORIDE SERPL-SCNC: 103 MMOL/L (ref 98–107)
CO2 SERPL-SCNC: 38 MMOL/L (ref 21–32)
CREAT SERPL-MCNC: 1.01 MG/DL (ref 0.6–1)
DIFFERENTIAL METHOD BLD: ABNORMAL
EOSINOPHIL # BLD: 0.2 K/UL (ref 0–0.8)
EOSINOPHIL NFR BLD: 4 % (ref 0.5–7.8)
ERYTHROCYTE [DISTWIDTH] IN BLOOD BY AUTOMATED COUNT: 16.9 % (ref 11.9–14.6)
GLUCOSE SERPL-MCNC: 93 MG/DL (ref 65–100)
HCT VFR BLD AUTO: 28.4 % (ref 35.8–46.3)
HGB BLD-MCNC: 8.7 G/DL (ref 11.7–15.4)
IMM GRANULOCYTES # BLD: 0 K/UL (ref 0–0.5)
IMM GRANULOCYTES NFR BLD AUTO: 0 % (ref 0–5)
LYMPHOCYTES # BLD: 1.3 K/UL (ref 0.5–4.6)
LYMPHOCYTES NFR BLD: 31 % (ref 13–44)
MCH RBC QN AUTO: 31.6 PG (ref 26.1–32.9)
MCHC RBC AUTO-ENTMCNC: 30.6 G/DL (ref 31.4–35)
MCV RBC AUTO: 103.3 FL (ref 79.6–97.8)
MM INDURATION POC: 0 MM (ref 0–5)
MONOCYTES # BLD: 0.4 K/UL (ref 0.1–1.3)
MONOCYTES NFR BLD: 10 % (ref 4–12)
NEUTS SEG # BLD: 2.4 K/UL (ref 1.7–8.2)
NEUTS SEG NFR BLD: 55 % (ref 43–78)
PLATELET # BLD AUTO: 238 K/UL (ref 150–450)
PMV BLD AUTO: 10.8 FL (ref 10.8–14.1)
POTASSIUM SERPL-SCNC: 4 MMOL/L (ref 3.5–5.1)
PPD POC: NEGATIVE NEGATIVE
RBC # BLD AUTO: 2.75 M/UL (ref 4.05–5.25)
SODIUM SERPL-SCNC: 143 MMOL/L (ref 136–145)
WBC # BLD AUTO: 4.3 K/UL (ref 4.3–11.1)

## 2018-04-02 PROCEDURE — 97530 THERAPEUTIC ACTIVITIES: CPT

## 2018-04-02 PROCEDURE — 85025 COMPLETE CBC W/AUTO DIFF WBC: CPT | Performed by: INTERNAL MEDICINE

## 2018-04-02 PROCEDURE — 74011250636 HC RX REV CODE- 250/636: Performed by: INTERNAL MEDICINE

## 2018-04-02 PROCEDURE — 76450000000

## 2018-04-02 PROCEDURE — 74011000250 HC RX REV CODE- 250: Performed by: INTERNAL MEDICINE

## 2018-04-02 PROCEDURE — 77010033678 HC OXYGEN DAILY

## 2018-04-02 PROCEDURE — 65270000029 HC RM PRIVATE

## 2018-04-02 PROCEDURE — 94760 N-INVAS EAR/PLS OXIMETRY 1: CPT

## 2018-04-02 PROCEDURE — 80048 BASIC METABOLIC PNL TOTAL CA: CPT | Performed by: INTERNAL MEDICINE

## 2018-04-02 PROCEDURE — 97110 THERAPEUTIC EXERCISES: CPT

## 2018-04-02 PROCEDURE — 36415 COLL VENOUS BLD VENIPUNCTURE: CPT | Performed by: INTERNAL MEDICINE

## 2018-04-02 PROCEDURE — 74011250637 HC RX REV CODE- 250/637: Performed by: INTERNAL MEDICINE

## 2018-04-02 PROCEDURE — 94640 AIRWAY INHALATION TREATMENT: CPT

## 2018-04-02 PROCEDURE — 97535 SELF CARE MNGMENT TRAINING: CPT

## 2018-04-02 RX ORDER — MORPHINE SULFATE 100 MG/5ML
10 SOLUTION ORAL
Status: DISCONTINUED | OUTPATIENT
Start: 2018-04-02 | End: 2018-04-04 | Stop reason: HOSPADM

## 2018-04-02 RX ORDER — MORPHINE SULFATE 100 MG/5ML
5 SOLUTION ORAL
Status: DISCONTINUED | OUTPATIENT
Start: 2018-04-02 | End: 2018-04-04 | Stop reason: HOSPADM

## 2018-04-02 RX ADMIN — IPRATROPIUM BROMIDE AND ALBUTEROL SULFATE 3 ML: 2.5; .5 SOLUTION RESPIRATORY (INHALATION) at 20:43

## 2018-04-02 RX ADMIN — TAMSULOSIN HYDROCHLORIDE 0.4 MG: 0.4 CAPSULE ORAL at 08:41

## 2018-04-02 RX ADMIN — PRAVASTATIN SODIUM 20 MG: 20 TABLET ORAL at 21:51

## 2018-04-02 RX ADMIN — GABAPENTIN 300 MG: 300 CAPSULE ORAL at 08:41

## 2018-04-02 RX ADMIN — ASPIRIN 81 MG: 81 TABLET, COATED ORAL at 08:41

## 2018-04-02 RX ADMIN — ALLOPURINOL 100 MG: 100 TABLET ORAL at 17:11

## 2018-04-02 RX ADMIN — COLCHICINE 0.3 MG: 0.6 TABLET, FILM COATED ORAL at 08:41

## 2018-04-02 RX ADMIN — FUROSEMIDE 40 MG: 10 INJECTION, SOLUTION INTRAMUSCULAR; INTRAVENOUS at 08:42

## 2018-04-02 RX ADMIN — FUROSEMIDE 40 MG: 10 INJECTION, SOLUTION INTRAMUSCULAR; INTRAVENOUS at 21:49

## 2018-04-02 RX ADMIN — IPRATROPIUM BROMIDE AND ALBUTEROL SULFATE 3 ML: 2.5; .5 SOLUTION RESPIRATORY (INHALATION) at 13:25

## 2018-04-02 RX ADMIN — HEPARIN SODIUM 5000 UNITS: 5000 INJECTION, SOLUTION INTRAVENOUS; SUBCUTANEOUS at 14:03

## 2018-04-02 RX ADMIN — HEPARIN SODIUM 5000 UNITS: 5000 INJECTION, SOLUTION INTRAVENOUS; SUBCUTANEOUS at 01:54

## 2018-04-02 RX ADMIN — IPRATROPIUM BROMIDE AND ALBUTEROL SULFATE 3 ML: 2.5; .5 SOLUTION RESPIRATORY (INHALATION) at 07:40

## 2018-04-02 RX ADMIN — POTASSIUM CHLORIDE 40 MEQ: 20 TABLET, EXTENDED RELEASE ORAL at 08:41

## 2018-04-02 RX ADMIN — Medication 10 ML: at 05:10

## 2018-04-02 RX ADMIN — CLOPIDOGREL BISULFATE 75 MG: 75 TABLET ORAL at 08:42

## 2018-04-02 RX ADMIN — GABAPENTIN 300 MG: 300 CAPSULE ORAL at 21:51

## 2018-04-02 RX ADMIN — ALLOPURINOL 100 MG: 100 TABLET ORAL at 08:42

## 2018-04-02 RX ADMIN — Medication 10 ML: at 21:58

## 2018-04-02 NOTE — PROGRESS NOTES
Am shift assessment. Pt is A/O x 4  . Lower lobes diminished  and respirations even and unlabored and continues on oxygen @ 3 l nc. S1 & S2 auscultated , heart rate regular. Abdomen is soft  and non tender to and bowel sounds active x 4 quads Pt is voiding without difficulty, and is incontinent at this time . BLE plus 2 edema noted.  No c/o pain and Call light is in reach

## 2018-04-02 NOTE — PROGRESS NOTES
Care Management Interventions  PCP Verified by CM: Yes  Mode of Transport at Discharge: Other (see comment)  Transition of Care Consult (CM Consult): Discharge Planning, [address]: No  Reason Outside [place]: Patient already serviced by other home care/hospice agency  Physical Therapy Consult: Yes  Current Support Network: Own Home, Lives with Caregiver  Confirm Follow Up Transport: Family  Plan discussed with Pt/Family/Caregiver: Yes  Discharge Location  Discharge Placement: Home with home health      SW familiar with pt from previous admissions. After last admission pt went to [facility] and then discharges back home ( with Family ) and [facility]. Pt is also followed by Bryon Potter with [facility] for complex mgmt. At d/c [facility] will need resume HH order and d/c summary.

## 2018-04-02 NOTE — PROGRESS NOTES
Problem: Mobility Impaired (Adult and Pediatric)  Goal: *Acute Goals and Plan of Care (Insert Text)  STG:  (1.)Ms. Jhonny Juárez will transfer from bed to chair and chair to bed with MODERATE ASSIST using the least restrictive device within 3 treatment day(s). Met 4/1  (2.)Ms. Jhonny Juárez will ambulate with MODERATE ASSIST for 5 feet with the least restrictive device within 3 treatment day(s). Met 4/1    LTG:  (1.)Ms. Jhonny Juárez will transfer from bed to chair and chair to bed with MINIMAL ASSIST using the least restrictive device within 7 treatment day(s). Met 4/1  (2.)Ms. Jhonny Juárez will ambulate with MINIMAL ASSIST for 20 feet with the least restrictive device within 7 treatment day(s). Met 4/1    ADDENDUM GOALS 4/1/18 : (1.)Ms. Jhonny Juárez will roll & move from supine to sit and sit to supine  in bed with MINIMAL ASSIST within 7 treatment day(s). 2) transfers with walker & CGA. 3) pt ambulating with rolling walker 100 ft with CGA. ________________________________________________________________________________________________    PHYSICAL THERAPY: Daily Note, Treatment Day: 2nd, AM 4/2/2018  INPATIENT: Hospital Day: 4  Payor: FIRST CHOICE VIP CARE PLUS [place] FIRST CHO[place] / Product Type: Managed Care Medicare /      NAME/AGE/GENDER: Lethia Harada is a 80 y.o. female   PRIMARY DIAGNOSIS: CHF exacerbation ([facility] [address]  CHF (congestive heart failure) ([facility] [address] <principal problem not specified> <principal problem not specified>        ICD-10: Treatment Diagnosis:    · Generalized Muscle Weakness (M62.81)  · Difficulty in walking, Not elsewhere classified (R26.2)   Precaution/Allergies:  Bee pollen and Fire ant      ASSESSMENT:     Ms. Lourena Keas showed improved ability to [facility] & gait with rolling walker. Pt will need psost acute rehab stay if no assist availalble at home on DC.   4/2 agreeable to therapy.   She increase her distance using RW with Min A and no LOB. She is on 2L of 02 all the time. She remain in the chair with call light near. This section established at most recent assessment   PROBLEM LIST (Impairments causing functional limitations):  1. Decreased Strength  2. Decreased ADL/Functional Activities  3. Decreased Transfer Abilities  4. Decreased Ambulation Ability/Technique  5. Decreased Balance  6. Decreased Activity Tolerance  7. Increased Fatigue  8. Increased Shortness of Breath  9. Decreased Flexibility/Joint Mobility  10. Decreased [place] with Home Exercise Program  11. Decreased Cognition   INTERVENTIONS PLANNED: (Benefits and precautions of physical therapy have been discussed with the patient.)  1. Balance Exercise  2. Bed Mobility  3. Gait Training  4. Home Exercise Program (HEP)  5. Neuromuscular Re-education/Strengthening  6. Range of Motion (ROM)  7. Therapeutic Activites  8. Therapeutic Exercise/Strengthening     TREATMENT PLAN: Frequency/Duration: daily for duration of hospital stay  Rehabilitation Potential For Stated Goals: Good     RECOMMENDED REHABILITATION/EQUIPMENT: (at time of discharge pending progress): Due to the probability of continued deficits (see above) this patient will likely need continued skilled physical therapy after discharge. Equipment:    None at this time. Pt states she has cane, walker, and w/c              HISTORY:   History of Present Injury/Illness (Reason for Referral):  Pt arrived through ER with dyspnea and hx of CHF. States she came from home where she lives in a home where her son lives next door. She has a ramp to enter home. States she ambulated with cane but has walker and w/c. Poor historian who fluctuated between thinking it was guillermina time and easter. Past Medical History/Comorbidities:   Ms. Garret Felipe  has a past medical history of Acute kidney failure, unspecified ([facility] [address] Arthritis; CAD (coronary artery disease);  Cellulitis and abscess of other specified site; Coronary atherosclerosis of native coronary artery; Essential hypertension, benign (3/17/2015); Hypertension; Hypopotassemia; Mixed hyperlipidemia; Osteoarthritis (7/20/2017); Other and unspecified hyperlipidemia; Reflux esophagitis; Renal failure, unspecified; Shortness of breath; and Unspecified essential hypertension. Ms. Charito French  has a past surgical history that includes hx cholecystectomy; pr layr clos wnd face,facial <2.5cm; pr cardiac surg procedure unlist; hx cataract removal (Bilateral); and hx hysterectomy. Social History/Living Environment:   Home Environment: Private residence  # Steps to Enter: 0  Wheelchair Ramp: Yes  One/Two Story Residence: One story  Living Alone: No  Support Systems: Child(jayleen)  Patient Expects to be Discharged to[de-identified] Private residence  Current DME Used/Available at Home: Walker, rolling, Wheelchair, Cane, straight  Tub or Shower Type: Tub/Shower combination  Prior Level of Function/Work/Activity:  States she ambulated with cane and was independent with household mobility  Previous Treatment Approaches:          Pt was recently in this hospital for similar problems (per pt report)  Personal Factors:          Sex:  female        Age:  80 y.o. Pt seemed anxious with her SOB   Number of Personal Factors/Comorbidities that affect the Plan of Care: 1-2: MODERATE COMPLEXITY   EXAMINATION:   Most Recent Physical Functioning:   Gross Assessment: 3 to 3-/5 throughout                       Balance:    Bed Mobility:          Transfers:  Sit to Stand: Minimum assistance  Bed to Chair: Minimum assistance  Duration: 15 Minutes  Gait:     Speed/Mary: Delayed  Gait Abnormalities: Decreased step clearance  Distance (ft): 100 Feet (ft)  Assistive Device: Walker, rolling  Ambulation - Level of Assistance: Stand-by assistance         Sacral wound with bandage   Body Structures Involved:  1. Nerves  2. Muscles  3.  Ligaments Body Functions Affected:  1. Mental  2. Sensory/Pain  3. Neuromusculoskeletal  4. Movement Related  5. Skin Related Activities and Participation Affected:  1. Learning and Applying Knowledge  2. General Tasks and Demands  3. Mobility  4. Self Care  5. Domestic Life  6. Interpersonal Interactions and Relationships  7. Community, Social and [place]   Number of elements that affect the Plan of Care: 3: MODERATE COMPLEXITY   CLINICAL PRESENTATION:   Presentation: Stable and uncomplicated: LOW COMPLEXITY   CLINICAL DECISION MAKIN[address] [place] [id number] AM-PAC 6 Clicks   Basic Mobility Inpatient Short Form  How much difficulty does the patient currently have. .. Unable A Lot A Little None   1. Turning over in bed (including adjusting bedclothes, sheets and blankets)? [] 1   [x] 2   [] 3   [] 4   2. Sitting down on and standing up from a chair with arms ( e.g., wheelchair, bedside commode, etc.)   [] 1   [x] 2   [] 3   [] 4   3. Moving from lying on back to sitting on the side of the bed? [] 1   [x] 2   [] 3   [] 4   How much help from another person does the patient currently need. .. Total A Lot A Little None   4. Moving to and from a bed to a chair (including a wheelchair)? [x] 1   [] 2   [] 3   [] 4   5. Need to walk in hospital room? [x] 1   [] 2   [] 3   [] 4   6. Climbing 3-5 steps with a railing? [x] 1   [] 2   [] 3   [] 4   © , Trustees of [address] [place] [address], under license to [employer]. All rights reserved      Score:  Initial: 9 Most Recent: X (Date: -- )    Interpretation of Tool:  Represents activities that are increasingly more difficult (i.e. Bed mobility, Transfers, Gait). Score 24 23 22-20 19-15 14-10 9-7 6     Modifier CH CI CJ CK CL CM CN      ?  Mobility - Walking and Moving Around:     - CURRENT STATUS: CM - 80%-99% impaired, limited or restricted    - GOAL STATUS: CL - 60%-79% impaired, limited or restricted    - D/C STATUS:  ---------------To be determined---------------  Payor: FIRST CHOICE VIP CARE PLUS [place] FIRST [place] C[place] / Product Type: Managed Care Medicare /      Medical Necessity:     · Patient demonstrates fair rehab potential due to higher previous functional level. Reason for Services/Other Comments:  · Patient continues to require skilled intervention due to ongoing goals noted above. Use of outcome tool(s) and clinical judgement create a POC that gives a: Questionable prediction of patient's progress: MODERATE COMPLEXITY            TREATMENT:   (In addition to Assessment/Re-Assessment sessions the following treatments were rendered)   Pre-treatment Symptoms/Complaints:  Pt agreeable to get out of bed  Pain: Initial: visual scale  Pain Intensity 1: 0 (0/10 after therapy)  Post Session:     Therapeutic Activity: (  15 Minutes ):  Therapeutic activities including bed mobility, transfers & short distance ambulation to improve mobility, strength, balance, coordination and dynamic movement of arm - bilateral and leg - bilateral to improve functional WB potential.  Therapeutic Exercise: (15 Minutes):  Exercises per grid below to improve strength. Required minimal verbal cues to promote proper body alignment. Progressed range as indicated. Date:  4/2 Date:   Date:     Activity/Exercise Parameters Parameters Parameters   Marching in place 12     LAQ 12     Ankle pumps 12     Hip abd/add 12                               Braces/Orthotics/Lines/Etc:   · O2 Device: Nasal cannula  Treatment/Session Assessment:    · Response to Treatment:  Tolerated session well  · Interdisciplinary Collaboration:   o Registered Nurse  · After treatment position/precautions:   o Up in chair  o Bed/Chair-wheels locked  o Call light within reach  o RN notified   · Compliance with Program/Exercises: Will assess as treatment progresses. · Recommendations/Intent for next treatment session:   \"Next visit will focus on advancements to more challenging activities and reduction in assistance provided\".   Total Treatment Duration:  PT Patient Time In/Time Out  Time In: 1045  Time Out: [address] Rivas PTA

## 2018-04-02 NOTE — CONSULTS
[facility] Cardiology Consult                Date of  Admission: 3/30/2018  7:04 AM     Primary Care Physician: Dr. Iris Tabor  Primary Cardiologist: Dr. Tejas Castro  Referring Physician: Hospitalist   Consulting Physician: Dr. Cruz Lewis    CC/Reason for consult: CHF      Tylor Castillo is a 80 y.o. female with prior h/o CAD s/p PCI may years ago to LAD , chronic dHF, mod to severe pHTN with last echo 12/17 showing preserved LV function EF 55-60%, no WMA, mild AS, mod MR and mod to severe TR, RVSP 53.9. Last LHC in 2014 by Dr. Jose L Acevedo showed patent stent to LAD with mild to mod CAD. Admission Jan 2018 with sepsis from bacteremia in  and was found to have NSTEMI (trop 6.4) and decision was made for conservative treatment. Patient presented to [facility] with c/o SOB. Labs showed hgb 8.7, , and CXR CHF. Hospitalist admitted and started on IV lasix. No real diuresis since admission. Cardiology was consulted for CHF.   Denies any chest pain but some mild SOB at rest.     Diagnosis    Essential hypertension, benign    Acute kidney failure, unspecified (HCC)    Reflux esophagitis    Coronary atherosclerosis of native coronary artery    Hypopotassemia    Mixed hyperlipidemia    Cellulitis and abscess of other specified site    Shortness of breath    Diastolic CHF, chronic (HCC)    Pulmonary hypertension (HCC)    Gout    Osteoarthritis    Pre-diabetes    Sepsis (HCC)    Acute respiratory failure with hypoxia (HCC)    Fever    Elevated troponin    CKD (chronic kidney disease) stage 3, GFR 30-59 ml/min    Elevated lactic acid level    Hypernatremia    Macrocytic anemia    Pain of right lower extremity    Gram-positive bacteremia    Chronic respiratory failure with hypoxia (HCC)    Hypotension    UTI (urinary tract infection)    Pneumonia    Pleural effusion    Hypercapnia    Acute metabolic encephalopathy    HCAP (healthcare-associated pneumonia)    Edema, peripheral    Acute on chronic diastolic CHF (congestive heart failure), NYHA class 3 (HCC)    Chronic anemia    Acute urinary retention    Congestive heart failure (CHF) (HCC)    CHF exacerbation (HCC)    CHF [facility]congestive heart failure) ([facility])       Past Medical History:   Diagnosis Date    Acute kidney failure, unspecified (HCC)     Arthritis     CAD (coronary artery disease)     Cellulitis and abscess of other specified site     Coronary atherosclerosis of native coronary artery     Essential hypertension, benign 3/17/2015    Hypertension     Hypopotassemia     Mixed hyperlipidemia     Osteoarthritis 7/20/2017    Other and unspecified hyperlipidemia     Reflux esophagitis     Renal failure, unspecified     Shortness of breath     Unspecified essential hypertension       Past Surgical History:   Procedure Laterality Date    CARDIAC SURG PROCEDURE UNLIST      stent    HX CATARACT REMOVAL Bilateral     HX CHOLECYSTECTOMY      HX HYSTERECTOMY      complete    SD LAYR CLOS WND FACE,FACIAL <2.5CM       Allergies   Allergen Reactions    Bee Pollen Swelling    Fire Ant Itching and Swelling      Family History   Problem Relation Age of Onset    Heart Attack Mother     Heart Attack Father     Heart Disease Brother     Heart Disease Brother         Current Facility-Administered Medications   Medication Dose Route Frequency    albuterol-ipratropium (DUO-NEB) 2.5 MG-0.5 MG/3 ML  3 mL Nebulization TID RT    potassium chloride (K-DUR, KLOR-CON) SR tablet 40 mEq  40 mEq Oral DAILY    furosemide (LASIX) injection 40 mg  40 mg IntraVENous Q12H    acetaminophen (TYLENOL) tablet 500 mg  500 mg Oral Q6H PRN    allopurinol (ZYLOPRIM) tablet 100 mg  100 mg Oral BID    amLODIPine (NORVASC) tablet 5 mg  5 mg Oral DAILY    aspirin delayed-release tablet 81 mg  81 mg Oral DAILY    clopidogrel (PLAVIX) tablet 75 mg  75 mg Oral DAILY    colchicine tablet 0.3 mg  0.3 mg Oral DAILY    gabapentin (NEURONTIN) capsule 300 mg  300 mg Oral BID    pravastatin (PRAVACHOL) tablet 20 mg  20 mg Oral QHS    tamsulosin (FLOMAX) capsule 0.4 mg  0.4 mg Oral DAILY    sodium chloride (NS) flush 5-10 mL  5-10 mL IntraVENous Q8H    sodium chloride (NS) flush 5-10 mL  5-10 mL IntraVENous PRN    heparin (porcine) injection 5,000 Units  5,000 Units SubCUTAneous Q12H       Review of Systems   Constitution: Positive for weakness. Negative for weight gain and weight loss. HENT: Negative for congestion. Cardiovascular: Positive for dyspnea on exertion. Negative for chest pain, leg swelling, orthopnea, palpitations, paroxysmal nocturnal dyspnea and syncope. Respiratory: Positive for shortness of breath. Negative for cough. Gastrointestinal: Negative for bloating. Genitourinary: Negative for dysuria. Psychiatric/Behavioral: Negative for altered mental status. Physical Exam  Vitals:    04/02/18 0153 04/02/18 0325 04/02/18 0731 04/02/18 0740   BP:  106/59 102/64    Pulse:  84 81    Resp:  18 18    Temp:  96.8 °F (36 °C) 97.4 °F (36.3 °C)    SpO2:  97% 99% 100%   Weight: 62.4 kg (137 lb 9.6 oz)      Height:           Physical Exam:  General: Well Developed, Well Nourished, No Acute Distress  HEENT: pupils equal and round, no abnormalities noted  Neck: supple, no JVD, no carotid bruits  Heart: S1S2 with RRR without murmurs or gallops  Lungs: diminished in bases  Abd: soft, nontender, nondistended, with good bowel sounds  Ext: warm, no edema, calves supple/nontender, pulses 2+ bilaterally  Skin: warm and dry  Psychiatric: Normal mood and affect  Neurologic: Alert and oriented X 3    Cardiographics    Telemetry: NA  ECG: Normal sinus rhythm Septal infarct , age undetermined Abnormal ECG   Echocardiogram: last echo 12/17 showing preserved LV function EF 55-60%, no WMA, mild AS, mod MR and mod to severe TR, RVSP 53.9.     Labs:   Recent Labs      04/02/18   0520  04/01/18   0459   NA  143  144   K  4.0  3.5   BUN  25*  24*   CREA  1.01*  0.92   GLU  93  109*   WBC 4.3  5.2   HGB  8.7*  8.5*   HCT  28.4*  27.2*   PLT  238  224        Assessment/Plan:     Assessment:      Active Problems:    Reflux esophagitis ()      Mixed hyperlipidemia ()      Osteoarthritis (7/20/2017)      CHF exacerbation (HCC) (3/30/2018)      CHF (congestive heart failure) ([facility] [address] (4/1/2018)- no real diuresis since admission. Her BNP was 126. Likely her SOB is multifactorial including pHTN, TR, and DD. Will continue IV lasix, may need to consider palliative care with recurrent dHF, age and decision for conservative treatment in Jan 2018 when she had a NSTEMI. This is her 5th admission in 5 months. Prior NSTEMI Dec 2017- decision made for no aggressive tx. Continue asa/plavix and statin. Thank you very much for this referral. We appreciate the opportunity to participate in this patient's care. We will follow along with above stated plan.     Danilo Dc NP  Consulting MD: Dr. Regine Monteiro

## 2018-04-02 NOTE — ACP (ADVANCE CARE PLANNING)
Discussion with pt regarding disease process. Pt appears to have little to no insight into her disease process. She does state that she is not at all tired of coming to the hospital when she feels bad and is hopeful that she can continue to do so moving forward unless we are \"tired of her\". I explained to her that we are not tired of her, but we are wanting to make sure that she is not \"tired of us\" and would like a less aggressive approach. Pt states, no, this approach is fine with her. She confirms that she wishes to remain a FULL CODE. Pt plans to continue to live with her son, with her 2 daughters and neighbor also participating in her care. Pt does not have HCPOA at this time but states she will think about doing this at [facility] point\". Expect pt to be discharged when appropriate with home health.     Rich Olivares, ANP-BC, [facility]  Palliative Care Team

## 2018-04-02 NOTE — CONSULTS
Palliative Care    Patient: Bryan Sol MRN: [id number]  SSN: [id number]    YOB: 1927  Age: 80 y.o. Sex: female       Date of Request: 4/2/2018  Date of Consult:  4/2/2018  Reason for Consult:  assist in chronic disease management  Requesting Physician: James Castro     Assessment/Plan:     Principal Diagnosis:    Dyspnea  R06.00-Pt would benefit from hospice involvement to assist with dyspnea management in the home, however pt does not feel as if she is ready for this service; consider Roxanol prn for dyspnea, even at home. Additional Diagnoses:   · Debility, Unspecified  R53.81-ongoing  · Counseling, Encounter for Medical Advice  Z71.9  · Encounter for Palliative Care  Z51.5    Palliative Performance Scale (PPS):  PPS: 50    Medical Decision Making:   Reviewed and summarized H&P and notes since admission. Discussed case with appropriate providers: discussed with XAVI Martinez RN. Reviewed laboratory and x-ray data. Discussion with pt regarding disease process. Pt appears to have little to no insight into her disease process. She does state that she is not at all tired of coming to the hospital when she feels bad and is hopeful that she can continue to do so moving forward unless we are \"tired of her\". I explained to her that we are not tired of her, but we are wanting to make sure that she is not \"tired of us\" and would like a less aggressive approach. Pt states, no, this approach is fine with her. She confirms that she wishes to remain a FULL CODE. Pt plans to continue to live with her son, with her 2 daughters and neighbor also participating in her care. Pt does not have HCPOA at this time but states she will think about doing this at [facility] point\".     Will discuss findings with members of the interdisciplinary team.      Thank you for this referral.   .    Subjective:     History obtained from:  Patient, Care Provider and Chart    Chief Complaint: dyspnea  History of Present Illness: This is a 80year old female admitted 4/1/2018 from home with dyspnea. Pt states she was home from [facility] less than a day, when she became dyspneic and went to Ed. Pt CXR in ED showed no acute process. 12/2017 ECHO showed EF 55-60%, severe/moderate TR. Pt has known CHF, CAD, OA, HLD, and HTN. This is pt 9th ED visit in past 6 months, with 5 of these visits resulting in hospitalization. She has had pneumonia, recurrent UTI, hypotension, and CHF exacerbation. Pt lives at home with her son. She has 2 adult daughters who participate in her care as well. She denies pain. States the only problem she has is \"smothering\" at home at times. Advance Directive: No       Code Status:  Full Code            Health Care Power of : No - Patient does not have a [address]     Past Medical History:   Diagnosis Date    Acute kidney failure, unspecified ([facility] [address]     Arthritis     CAD (coronary artery disease)     Cellulitis and abscess of other specified site     Coronary atherosclerosis of native coronary artery     Essential hypertension, benign 3/17/2015    Hypertension     Hypopotassemia     Mixed hyperlipidemia     Osteoarthritis 7/20/2017    Other and unspecified hyperlipidemia     Reflux esophagitis     Renal failure, unspecified     Shortness of breath     Unspecified essential hypertension       Past Surgical History:   Procedure Laterality Date    CARDIAC SURG PROCEDURE UNLIST      stent    HX CATARACT REMOVAL Bilateral     HX CHOLECYSTECTOMY      HX HYSTERECTOMY      complete    SD LAYR CLOS WND FACE,FACIAL <2.5CM       Family History   Problem Relation Age of Onset    Heart Attack Mother     Heart Attack Father     Heart Disease Brother     Heart Disease Brother       Social History   Substance Use Topics    Smoking status: Never Smoker    Smokeless tobacco: Never Used    Alcohol use No     Prior to Admission medications    Medication Sig Start Date End Date Taking? Authorizing Provider   gabapentin (NEURONTIN) 300 mg capsule Take 300 mg by mouth two (2) times a day. Yes Adelaida Tiwari MD   tamsulosin (FLOMAX) 0.4 mg capsule Take 1 Cap by mouth daily for 30 days. 3/4/18 4/3/18 Yes Jose Barrett MD   OXYGEN-AIR DELIVERY SYSTEMS Take 2 L by inhalation continuous. via NC   Yes Historical Provider   albuterol (PROVENTIL VENTOLIN) 2.5 mg /3 mL (0.083 %) nebulizer solution 3 mL by Nebulization route every four (4) hours as needed for Wheezing. 2/19/18  Yes Nathanael Gr MD   amLODIPine (NORVASC) 5 mg tablet Take 1 Tab by mouth daily. 2/19/18  Yes Nathanael Gr MD   furosemide (LASIX) 20 mg tablet Take 1 Tab by mouth daily. 2/19/18  Yes Nathanael Gr MD   albuterol (PROVENTIL HFA, VENTOLIN HFA, PROAIR HFA) 90 mcg/actuation inhaler Take 1 Puff by inhalation every six (6) hours as needed for Wheezing. 2/19/18  Yes Nathanael Gr MD   sennosides (SENNA) 8.6 mg cap Take 8.6 Tabs by mouth as needed (constipation). daily as needed. Yes Historical Provider   colchicine 0.6 mg tablet Take 0.5 Tabs by mouth daily. 1/6/18  Yes Tomás Abebe DO   cyanocobalamin 1,000 mcg tablet Take 1 Tab by mouth daily. 1/6/18  Yes Tomás Abebe DO   allopurinol (ZYLOPRIM) 100 mg tablet Take 1 Tab by mouth two (2) times a day. 10/24/17  Yes Erica Jha MD   pravastatin (PRAVACHOL) 20 mg tablet Take 1 Tab by mouth nightly. 10/24/17  Yes Erica Jha MD   raNITIdine (ZANTAC) 150 mg tablet Take 1 Tab by mouth two (2) times a day. 10/24/17  Yes Erica Jha MD   clopidogrel (PLAVIX) 75 mg tab Take 1 Tab by mouth daily. 10/24/17  Yes Erica Jha MD   loratadine (CLARITIN) 10 mg tablet Take 1 Tab by mouth daily. 7/24/17  Yes Erica Jha MD   aspirin 81 mg CpDR Take  by mouth daily. Yes Phys Other, MD   acetaminophen (TYLENOL) 325 mg tablet Take 2 Tabs by mouth every six (6) hours as needed.  2/19/18   Nathanael Gr MD       Allergies   Allergen Reactions    Bee Pollen Swelling    Fire Ant Itching and Swelling        Review of Systems:  A comprehensive review of systems was negative except for: Constitutional: Positive for fatigue. Objective:     Visit Vitals    /64 (BP 1 Location: Right arm, BP Patient Position: At rest;Head of bed elevated (Comment degrees))    Pulse 81    Temp 97.4 °F (36.3 °C)    Resp 18    Ht 5' 3\" (1.6 m)    Wt 62.4 kg (137 lb 9.6 oz)    SpO2 100%    BMI 24.37 kg/m2        Physical Exam:    General:  Cooperative. No acute distress. Up in chair. No family present. Eyes:  Conjunctivae/corneas clear    Nose: Nares normal. Septum midline. Neck: Supple, symmetrical, trachea midline, no JVD   Lungs:   Clear to auscultation bilaterally, unlabored   Heart:  Regular rate and rhythm   Abdomen:   Soft, non-tender, non-distended   Extremities: Normal, atraumatic, no cyanosis or edema   Skin: Skin color-scattered bruising, texture, turgor normal. No rash or lesions.    Neurologic: Nonfocal   Psych: Alert and oriented x3      Assessment:     Hospital Problems  Date Reviewed: 2/15/2018          Codes Class Noted POA    CHF (congestive heart failure) ([facility] ICD-10-CM: I50.9  ICD-9-CM: 428.0  4/1/2018 Unknown        CHF exacerbation ([facility] ICD-10-CM: I50.9  ICD-9-CM: 428.0  3/30/2018 Unknown        Osteoarthritis (Chronic) ICD-10-CM: M19.90  ICD-9-CM: 715.90  7/20/2017 Yes        Reflux esophagitis ICD-10-CM: K21.0  ICD-9-CM: 530.11  Unknown Yes        Mixed hyperlipidemia ICD-10-CM: E78.2  ICD-9-CM: 272.2  Unknown Yes              Signed By: Ian Miller NP     April 2, 2018

## 2018-04-02 NOTE — PROGRESS NOTES
Problem: Mobility Impaired (Adult and Pediatric)  Goal: *Acute Goals and Plan of Care (Insert Text)  STG:  (1.)Ms. Garret Felipe will transfer from bed to chair and chair to bed with MODERATE ASSIST using the least restrictive device within 3 treatment day(s). Met 4/1  (2.)Ms. Garret Felipe will ambulate with MODERATE ASSIST for 5 feet with the least restrictive device within 3 treatment day(s). Met 4/1    LTG:  (1.)Ms. Garret Felipe will transfer from bed to chair and chair to bed with MINIMAL ASSIST using the least restrictive device within 7 treatment day(s). Met 4/1  (2.)Ms. Garret Felipe will ambulate with MINIMAL ASSIST for 20 feet with the least restrictive device within 7 treatment day(s). Met 4/1    ADDENDUM GOALS 4/1/18 : (1.)Ms. Garret Felipe will roll & move from supine to sit and sit to supine  in bed with MINIMAL ASSIST within 7 treatment day(s). 2) transfers with walker & CGA. 3) pt ambulating with rolling walker 100 ft with CGA. ________________________________________________________________________________________________    PHYSICAL THERAPY: Daily Note, Treatment Day: 2nd, PM 4/2/2018  INPATIENT: Hospital Day: 4  Payor: [employer] C[employer] PLUS / [place] [employer] [place] / Product Type: Managed Care Medicare /      NAME/AGE/GENDER: Craig Sanches is a 80 y.o. female   PRIMARY DIAGNOSIS: CHF exacerbation ([address]  CHF (congestive heart failure) ([facility] [facility] <principal problem not specified> <principal problem not specified>        ICD-10: Treatment Diagnosis:    · Generalized Muscle Weakness (M62.81)  · Difficulty in walking, Not elsewhere classified (R26.2)   Precaution/Allergies:  Bee pollen and Fire ant      ASSESSMENT:     Ms. Garret Felipe showed improved ability to [facility] & gait with rolling walker. Pt will need psost acute rehab stay if no assist availalble at home on DC.   4/2 agreeable to therapy.   She increase her distance using RW with Min A and no LOB. She is on 2L of 02 all the time. She remain in the chair with call light near. 4/2 pm making good progress with gait and exercises. She remains in the bed with call light near. This section established at most recent assessment   PROBLEM LIST (Impairments causing functional limitations):  1. Decreased Strength  2. Decreased ADL/Functional Activities  3. Decreased Transfer Abilities  4. Decreased Ambulation Ability/Technique  5. Decreased Balance  6. Decreased Activity Tolerance  7. Increased Fatigue  8. Increased Shortness of Breath  9. Decreased Flexibility/Joint Mobility  10. Decreased New Site with Home Exercise Program  11. Decreased Cognition   INTERVENTIONS PLANNED: (Benefits and precautions of physical therapy have been discussed with the patient.)  1. Balance Exercise  2. Bed Mobility  3. Gait Training  4. Home Exercise Program (HEP)  5. Neuromuscular Re-education/Strengthening  6. Range of Motion (ROM)  7. Therapeutic Activites  8. Therapeutic Exercise/Strengthening     TREATMENT PLAN: Frequency/Duration: daily for duration of hospital stay  Rehabilitation Potential For Stated Goals: Good     RECOMMENDED REHABILITATION/EQUIPMENT: (at time of discharge pending progress): Due to the probability of continued deficits (see above) this patient will likely need continued skilled physical therapy after discharge. Equipment:    None at this time. Pt states she has cane, walker, and w/c              HISTORY:   History of Present Injury/Illness (Reason for Referral):  Pt arrived through ER with dyspnea and hx of CHF. States she came from home where she lives in a home where her son lives next door. She has a ramp to enter home. States she ambulated with cane but has walker and w/c. Poor historian who fluctuated between thinking it was guillermina time and east.     Past Medical History/Comorbidities:   Ms. Andreia Chowdhury  has a past medical history of Acute kidney failure, unspecified ([facility] [address] Arthritis; CAD (coronary artery disease); Cellulitis and abscess of other specified site; Coronary atherosclerosis of native coronary artery; Essential hypertension, benign (3/17/2015); Hypertension; Hypopotassemia; Mixed hyperlipidemia; Osteoarthritis (7/20/2017); Other and unspecified hyperlipidemia; Reflux esophagitis; Renal failure, unspecified; Shortness of breath; and Unspecified essential hypertension. Ms. Driss Peters  has a past surgical history that includes hx cholecystectomy; pr layr clos wnd face,facial <2.5cm; pr cardiac surg procedure unlist; hx cataract removal (Bilateral); and hx hysterectomy. Social History/Living Environment:   Home Environment: Private residence  # Steps to Enter: 0  Wheelchair Ramp: Yes  One/Two Story Residence: One story  Living Alone: No  Support Systems: Child(jayleen)  Patient Expects to be Discharged to[de-identified] Private residence  Current DME Used/Available at Home: Walker, rolling, Wheelchair, Cane, straight  Tub or Shower Type: Tub/Shower combination  Prior Level of Function/Work/Activity:  States she ambulated with cane and was independent with household mobility  Previous Treatment Approaches:          Pt was recently in this hospital for similar problems (per pt report)  Personal Factors:          Sex:  female        Age:  80 y.o.         Pt seemed anxious with her SOB   Number of Personal Factors/Comorbidities that affect the Plan of Care: 1-2: MODERATE COMPLEXITY   EXAMINATION:   Most Recent Physical Functioning:   Gross Assessment: 3 to 3-/5 throughout                       Balance:    Bed Mobility:  Sit to Supine: Minimum assistance       Transfers:  Sit to Stand: Minimum assistance  Bed to Chair: Minimum assistance  Duration: 8 Minutes  Gait:     Speed/Mary: Delayed  Gait Abnormalities: Decreased step clearance  Distance (ft): 10 Feet (ft) (back to bed, just a little tired)  Assistive Device: Walker, rolling  Ambulation - Level of Assistance: Contact guard assistance         Sacral wound with bandage   Body Structures Involved:  1. Nerves  2. Muscles  3. Ligaments Body Functions Affected:  1. Mental  2. Sensory/Pain  3. Neuromusculoskeletal  4. Movement Related  5. Skin Related Activities and Participation Affected:  1. Learning and Applying Knowledge  2. General Tasks and Demands  3. Mobility  4. Self Care  5. Domestic Life  6. Interpersonal Interactions and Relationships  7. Community, Social and [place]   Number of elements that affect the Plan of Care: 3: MODERATE COMPLEXITY   CLINICAL PRESENTATION:   Presentation: Stable and uncomplicated: LOW COMPLEXITY   CLINICAL DECISION MAKIN[address] [place] [address] AM-PAC 6 Clicks   Basic Mobility Inpatient Short Form  How much difficulty does the patient currently have. .. Unable A Lot A Little None   1. Turning over in bed (including adjusting bedclothes, sheets and blankets)? [] 1   [x] 2   [] 3   [] 4   2. Sitting down on and standing up from a chair with arms ( e.g., wheelchair, bedside commode, etc.)   [] 1   [x] 2   [] 3   [] 4   3. Moving from lying on back to sitting on the side of the bed? [] 1   [x] 2   [] 3   [] 4   How much help from another person does the patient currently need. .. Total A Lot A Little None   4. Moving to and from a bed to a chair (including a wheelchair)? [x] 1   [] 2   [] 3   [] 4   5. Need to walk in hospital room? [x] 1   [] 2   [] 3   [] 4   6. Climbing 3-5 steps with a railing? [x] 1   [] 2   [] 3   [] 4   © , Trustees of [address] [place] [address], under license to [employer]. All rights reserved      Score:  Initial: 9 Most Recent: X (Date: -- )    Interpretation of Tool:  Represents activities that are increasingly more difficult (i.e. Bed mobility, Transfers, Gait). Score 24 23 22-20 19-15 14-10 9-7 6     Modifier CH CI CJ CK CL CM CN      ?  Mobility - Walking and Moving Around:     - CURRENT STATUS: CM - 80%-99% impaired, limited or restricted    - GOAL STATUS: CL - 60%-79% impaired, limited or restricted    - D/C STATUS:  ---------------To be determined---------------  Payor: [employer] VIP CARE PLUS / [place] [employer] VIP [employer]ARE [place]US / Product Type: Managed Care Medicare /      Medical Necessity:     · Patient demonstrates fair rehab potential due to higher previous functional level. Reason for Services/Other Comments:  · Patient continues to require skilled intervention due to ongoing goals noted above. Use of outcome tool(s) and clinical judgement create a POC that gives a: Questionable prediction of patient's progress: MODERATE COMPLEXITY            TREATMENT:   (In addition to Assessment/Re-Assessment sessions the following treatments were rendered)   Pre-treatment Symptoms/Complaints:  Pt agreeable to get out of bed  Pain: Initial: visual scale  Pain Intensity 1: 0 (0/10 after therapy)  Post Session:     Therapeutic Activity: (  8 Minutes ):  Therapeutic activities including bed mobility, transfers & short distance ambulation to improve mobility, strength, balance, coordination and dynamic movement of arm - bilateral and leg - bilateral to improve functional WB potential.  Therapeutic Exercise: (15 Minutes):  Exercises per grid below to improve strength. Required minimal verbal cues to promote proper body alignment. Progressed range as indicated. Date:  4/2 Date:  4/2 pm Date:     Activity/Exercise Parameters Parameters Parameters   Marching in place 12 12    LAQ 12 12    Ankle pumps 12 12    Hip abd/add 12 12                              Braces/Orthotics/Lines/Etc:   · O2 Device: Nasal cannula  Treatment/Session Assessment:    · Response to Treatment:  Tolerated session well this pm  · Interdisciplinary Collaboration:   o Registered Nurse  · After treatment position/precautions:   o Supine in bed  o Bed/Chair-wheels locked  o Call light within reach  o RN notified   · Compliance with Program/Exercises:  Will assess as treatment progresses. · Recommendations/Intent for next treatment session: \"Next visit will focus on advancements to more challenging activities and reduction in assistance provided\".   Total Treatment Duration:  PT Patient Time In/Time Out  Time In: 1330  Time Out: One Devendra Torres, PTA

## 2018-04-02 NOTE — PROGRESS NOTES
Hospitalist Progress Note     Admit Date:  3/30/2018  7:04 AM   Name:  Uriah Arguello   Age:  80 y.o.  :  2/10/1927   MRN:  [id number]   PCP:  May Gordon MD  Treatment Team: Attending Provider: Yoav Ahumada MD; Utilization Review: Jazmín Soto    Subjective:    is a 81 yo female who presented with dyspnea on a background of CAD, OA, HLD, NSTEMI, and HTN. CXR showed pulmonary venous congestion and small pleural effusions. She was seen by Cardiology with low concern for CHF (dyspnea thought to be secondary to p HTN and TR). This morning, she reports some dyspnea.     Objective:     Patient Vitals for the past 24 hrs:   Temp Pulse Resp BP SpO2   18 0740 - - - - 100 %   18 0731 97.4 °F (36.3 °C) 81 18 102/64 99 %   18 0325 96.8 °F (36 °C) 84 18 106/59 97 %   18 0052 98.1 °F (36.7 °C) 94 18 114/68 90 %   18 1950 - - - - 91 %   18 1832 98 °F (36.7 °C) 78 20 124/67 95 %   18 1500 97.8 °F (36.6 °C) 86 18 116/71 93 %   18 1353 - - - - 94 %   18 1240 97.6 °F (36.4 °C) 78 18 110/67 94 %     Oxygen Therapy  O2 Sat (%): 100 % (1840)  Pulse via Oximetry: 74 beats per minute (1840)  O2 Device: Nasal cannula (18)  O2 Flow Rate (L/min): 3 l/min (weaned to 2 ) (18)  No intake or output data in the 24 hours ending 18 08        General appearance: NAD, conversant  Eyes: anicteric sclerae, moist conjunctivae; no lid-lag  ENT: Atraumatic; oropharynx clear with moist mucous membranes and no mucosal ulcerations; normal hard and soft palate  Neck: Trachea midline   FROM, supple, no thyromegaly or lymphadenopathy  Lungs: clear to auscultation  CV: S1,S2 present, no added murmurs  Abdomen: Soft, non-tender; no masses   Extremities: No peripheral edema or extremity lymphadenopathy  Skin: Normal temperature, turgor and texture; no subcutaneous nodules  Psych: Appropriate affect, alert and oriented to person, place     Data Review:  I have reviewed all labs, meds, telemetry events, and studies from the last 24 hours. Recent Results (from the past 24 hour(s))   PLEASE READ & DOCUMENT PPD TEST IN 48 HRS    Collection Time: 04/01/18  5:06 PM   Result Value Ref Range    PPD Nagative Negative    mm Induration 0 mm   METABOLIC PANEL, BASIC    Collection Time: 04/02/18  5:20 AM   Result Value Ref Range    Sodium 143 136 - 145 mmol/L    Potassium 4.0 3.5 - 5.1 mmol/L    Chloride 103 98 - 107 mmol/L    CO2 38 (H) 21 - 32 mmol/L    Anion gap 2 (L) 7 - 16 mmol/L    Glucose 93 65 - 100 mg/dL    BUN 25 (H) 8 - 23 MG/DL    Creatinine 1.01 (H) 0.6 - 1.0 MG/DL    GFR est AA >60 >60 ml/min/1.73m2    GFR est non-AA 55 (L) >60 ml/min/1.73m2    Calcium 8.8 8.3 - 10.4 MG/DL   CBC WITH AUTOMATED DIFF    Collection Time: 04/02/18  5:20 AM   Result Value Ref Range    WBC 4.3 4.3 - 11.1 K/uL    RBC 2.75 (L) 4.05 - 5.25 M/uL    HGB 8.7 (L) 11.7 - 15.4 g/dL    HCT 28.4 (L) 35.8 - 46.3 %    .3 (H) 79.6 - 97.8 FL    MCH 31.6 26.1 - 32.9 PG    MCHC 30.6 (L) 31.4 - 35.0 g/dL    RDW 16.9 (H) 11.9 - 14.6 %    PLATELET 798 321 - 006 K/uL    MPV 10.8 10.8 - 14.1 FL    DF AUTOMATED      NEUTROPHILS 55 43 - 78 %    LYMPHOCYTES 31 13 - 44 %    MONOCYTES 10 4.0 - 12.0 %    EOSINOPHILS 4 0.5 - 7.8 %    BASOPHILS 0 0.0 - 2.0 %    IMMATURE GRANULOCYTES 0 0.0 - 5.0 %    ABS. NEUTROPHILS 2.4 1.7 - 8.2 K/UL    ABS. LYMPHOCYTES 1.3 0.5 - 4.6 K/UL    ABS. MONOCYTES 0.4 0.1 - 1.3 K/UL    ABS. EOSINOPHILS 0.2 0.0 - 0.8 K/UL    ABS. BASOPHILS 0.0 0.0 - 0.2 K/UL    ABS. IMM.  GRANS. 0.0 0.0 - 0.5 K/UL        All Micro Results     None          Current Meds:  Current Facility-Administered Medications   Medication Dose Route Frequency    albuterol-ipratropium (DUO-NEB) 2.5 MG-0.5 MG/3 ML  3 mL Nebulization TID RT    potassium chloride (K-DUR, KLOR-CON) SR tablet 40 mEq  40 mEq Oral DAILY    furosemide (LASIX) injection 40 mg  40 mg IntraVENous Q12H    acetaminophen (TYLENOL) tablet 500 mg  500 mg Oral Q6H PRN    allopurinol (ZYLOPRIM) tablet 100 mg  100 mg Oral BID    amLODIPine (NORVASC) tablet 5 mg  5 mg Oral DAILY    aspirin delayed-release tablet 81 mg  81 mg Oral DAILY    clopidogrel (PLAVIX) tablet 75 mg  75 mg Oral DAILY    colchicine tablet 0.3 mg  0.3 mg Oral DAILY    gabapentin (NEURONTIN) capsule 300 mg  300 mg Oral BID    pravastatin (PRAVACHOL) tablet 20 mg  20 mg Oral QHS    tamsulosin (FLOMAX) capsule 0.4 mg  0.4 mg Oral DAILY    sodium chloride (NS) flush 5-10 mL  5-10 mL IntraVENous Q8H    sodium chloride (NS) flush 5-10 mL  5-10 mL IntraVENous PRN    heparin (porcine) injection 5,000 Units  5,000 Units SubCUTAneous Q12H       Other Studies (last 24 hours):  No results found.       Review of Systems:  Gen: No weight loss  Eyes: no vision changes  ENT: no sore throat  Resp: +dyspnea, cough  CV: No chest pain  Abd:  no abd pain, no vomiting or diarrhea  : No incontinence, no hematuria or dysuria, no flank pain  MSK: no leg swelling  Neuro: No HA or dizziness, no weakness, numbness/tingling    Assessment and Plan:     Hospital Problems as of 4/2/2018  Date Reviewed: 2/15/2018          Codes Class Noted - Resolved POA    CHF (congestive heart failure) ([facility] ICD-10-CM: I50.9  ICD-9-CM: 428.0  4/1/2018 - Present Unknown        CHF exacerbation ([facility] ICD-10-CM: I50.9  ICD-9-CM: 428.0  3/30/2018 - Present Unknown        CKD (chronic kidney disease) stage 3, GFR 30-59 ml/min ICD-10-CM: N18.3  ICD-9-CM: 585.3  12/23/2017 - Present         Osteoarthritis (Chronic) ICD-10-CM: M19.90  ICD-9-CM: 715.90  7/20/2017 - Present Yes        Reflux esophagitis ICD-10-CM: K21.0  ICD-9-CM: 530.11  Unknown - Present Yes        Mixed hyperlipidemia ICD-10-CM: E78.2  ICD-9-CM: 272.2  Unknown - Present Yes              PLAN:    Dyspnea  Cardiology following: low concern for acute CHF, symptoms most likely secondary to pulm HTN   12/2017 ECHO showed an EF of 55-60% with severe/moderate tricuspid regurgitation.   BNP mildly elevated 126  Plan  Lasix 40 IV BID continue at this time, Cardiology to manage  Cardiology to transition to palliative care  Palliative care consulted  Daily weights (weights now downtrending)  Strict Is and Os  DASH Diet  HLD: Continue statin  Gout: Continue allopurinol  CAD: Continue aspirin, plavix  Hypokalemia: Resolved  HTN: Continue home regimen    Chronic Respiratory Falure with Hypoxia in the setting of dyspnea, SOB with ABG-pO2-50 on 2LNC, home O2 @ 2LNC continuous being treated with continue home regime.      Treatment:   Cardiology following, will possibly transition to palliative care    DC planning/Dispo:  Home in 48 hours  DVT ppx:  heparin    Signed:  Niki Estrella MD

## 2018-04-02 NOTE — ROUTINE PROCESS
CHF teaching started to pt post introduction; aware of CHF dx and poor long term prognosis. Emphasis of CHF teaching reinforced to call Provider STAT with worsening Dyspnea, Daily weights.  Planner @ BS and reinforced via call to son @ [facility] ; [phone] ): 20mins      Nc @ home: 2l NC  Continue FR  Palliative care; seen    Needs scale

## 2018-04-02 NOTE — PROGRESS NOTES
Problem: Interdisciplinary Rounds  Goal: Interdisciplinary Rounds  Interdisciplinary team rounds were held 4/2/2018 with the following team members:Care Management, Nursing, Nutrition, Pharmacy, Physical Therapy and Physician and the patient. Plan of care discussed. See clinical pathway and/or care plan for interventions and desired outcomes.

## 2018-04-02 NOTE — PROGRESS NOTES
Call to [facility] to discuss today's IDRs. Left message as she is on vacation and also called Abdiaziz Valle RN who is covering. Notified Pati Mitchell that hospice referral discussed in rounds today and that Cardiology is talking with daughters about it.   Pati Mitchell states she will reach out to daughters and see how she can assist.

## 2018-04-02 NOTE — PROGRESS NOTES
Problem: Self Care Deficits Care Plan (Adult)  Goal: *Acute Goals and Plan of Care (Insert Text)  1. Patient will perform grooming with supervision. 2. Patient will perform upper body dressing with supervision. 3. Patient will perform lower body dressing with minimal assist  4. Patient will perform sponge bathing with supervision. 5. Patient will perform toileting and toilet transfer with minimal assist.  6. Patient will perform ADL functional mobility and tranfers in room with minimal assist.  7. Patient/family to demonstrate knowledge of home safety and DME recommendations. Goals to be achieved in 7 days. OCCUPATIONAL THERAPY: Daily Note, Treatment Day: 1st and AM 4/2/2018  INPATIENT: Hospital Day: 4  Payor: FIRST CHOICE VIP CARE PLUS / [place]: [place] FIRST CHO[employer] VIP CARE [place]US / Product Type: [employer] Care Medicare /      NAME/AGE/GENDER: Ashwin Osuna is a 80 y.o. female   PRIMARY DIAGNOSIS:  CHF exacerbation ([facility] [address]  CHF (congestive heart failure) ([facility] <principal problem not specified> <principal problem not specified>        ICD-10: Treatment Diagnosis:    · Generalized Muscle Weakness (M62.81)  · Other lack of cordination (R27.8)   Precautions/Allergies:     Bee pollen and Fire ant      ASSESSMENT:     Ms. Juan R Chiu presents with above diagnosis. Pt found in supine and agreeable to therapy session. Pt performed functional transfers, toileting, sponge bathing and dressing. See chart for details. Pt requiring minimal rest breaks during activity but no SOB noted. Pt left in recliner with needs in reach and RN notified. Pt making progress towards goals. Will cont POC. This section established at most recent assessment   PROBLEM LIST (Impairments causing functional limitations):  1. Decreased Strength  2. Decreased ADL/Functional Activities  3. Decreased Transfer Abilities  4. Decreased Ambulation Ability/Technique  5.  Decreased Balance   INTERVENTIONS PLANNED: (Benefits and precautions of occupational therapy have been discussed with the patient.)  1. Activities of daily living training  2. Adaptive equipment training  3. Balance training  4. Clothing management  5. Donning&doffing training  6. Therapeutic activity  7. Therapeutic exercise     TREATMENT PLAN: Frequency/Duration: Follow patient 4x/week to address above goals. Rehabilitation Potential For Stated Goals: Good     RECOMMENDED REHABILITATION/EQUIPMENT: (at time of discharge pending progress): Due to the probability of continued deficits (see above) this patient will likely need continued skilled occupational therapy after discharge. Equipment:    TBD              OCCUPATIONAL PROFILE AND HISTORY:   History of Present Injury/Illness (Reason for Referral):  See above  Past Medical History/Comorbidities:   Ms. Andreia Chowdhury  has a past medical history of Acute kidney failure, unspecified ([facility] [address] Arthritis; CAD (coronary artery disease); Cellulitis and abscess of other specified site; Coronary atherosclerosis of native coronary artery; Essential hypertension, benign (3/17/2015); Hypertension; Hypopotassemia; Mixed hyperlipidemia; Osteoarthritis (7/20/2017); Other and unspecified hyperlipidemia; Reflux esophagitis; Renal failure, unspecified; Shortness of breath; and Unspecified essential hypertension. Ms. Andreia Chowdhury  has a past surgical history that includes hx cholecystectomy; pr layr clos wnd face,facial <2.5cm; pr cardiac surg procedure unlist; hx cataract removal (Bilateral); and hx hysterectomy.   Social History/Living Environment:   Home Environment: Private residence  # Steps to Enter: 0  Wheelchair Ramp: Yes  One/Two Story Residence: One story  Living Alone: No  Support Systems: Child(jayleen)  Patient Expects to be Discharged to[de-identified] Private residence  Current DME Used/Available at Home: Walker, rolling, Wheelchair, Cane, straight  Tub or Shower Type: Tub/Shower combination  Prior Level of Function/Work/Activity:  Minimal assist for self care and functional mobility     Number of Personal Factors/Comorbidities that affect the Plan of Care: Brief history (0):  LOW COMPLEXITY   ASSESSMENT OF OCCUPATIONAL PERFORMANCE[de-identified]   Activities of Daily Living:           Basic ADLs (From Assessment) Complex ADLs (From Assessment)   Basic ADL  Feeding: Supervision  Oral Facial Hygiene/Grooming: Supervision  Bathing: Moderate assistance  Type of Bath: Chlorhexidine (CHG), Bath Pack, Full  Upper Body Dressing: Minimum assistance  Lower Body Dressing: Moderate assistance  Toileting: Total assistance     Grooming/Bathing/Dressing Activities of Daily Living   Grooming  Grooming Assistance: Supervision/set up  Washing Face: Supervision/set-up  Washing Hands: Supervision/set-up Cognitive Retraining  Safety/Judgement: Awareness of environment; Fall prevention   Upper Body Bathing  Bathing Assistance: Supervision/set-up  Position Performed: Seated in chair     Lower Body Bathing  Bathing Assistance: Moderate assistance  Perineal  : Minimum assistance  Position Performed: Seated on toilet  Lower Body : Moderate assistance  Position Performed: Seated in chair Toileting  Toileting Assistance: Maximum assistance  Bladder Hygiene: Minimum assistance  Bowel Hygiene: Maximum assistance  Clothing Management: Moderate assistance  Adaptive Equipment: Grab bars   Upper Body Dressing Assistance  Dressing Assistance: Minimum assistance  Hospital Gown: Minimum  assistance Functional Transfers  Bathroom Mobility: Minimum assistance  Toilet Transfer :  Moderate assistance  Adaptive Equipment: Walker (comment)   Lower Body Dressing Assistance  Dressing Assistance: Maximum assistance  Protective Undergarmet: Maximum assistance  Socks: Maximum assistance  Adaptive Equipment Used: Walker Bed/Mat Mobility  Supine to Sit: Minimum assistance  Sit to Supine: Minimum assistance  Sit to Stand: Minimum assistance  Bed to Chair: Minimum assistance  Adaptive Equipment: Bed rails       Most Recent Physical Functioning: Gross Assessment:                  Posture:     Balance:    Bed Mobility:  Supine to Sit: Minimum assistance  Sit to Supine: Minimum assistance  Wheelchair Mobility:     Transfers:  Sit to Stand: Minimum assistance  Bed to Chair: Minimum assistance     RUE WFL, LUE shoulder flexion limited to 30 degrees from prior injury           Patient Vitals for the past 6 hrs:   BP BP Patient Position SpO2 O2 Flow Rate (L/min) Pulse   18 0731 102/64 At rest;Head of bed elevated (Comment degrees) 99 % - 81   18 0740 - - 100 % 3 l/min -       Mental Status  Neurologic State: Alert  Orientation Level: Oriented X4  Cognition: Appropriate decision making, Appropriate for age attention/concentration, Follows commands  Perception: Appears intact  Perseveration: No perseveration noted  Safety/Judgement: Awareness of environment, Fall prevention                          Physical Skills Involved:  1. Range of Motion  2. Balance  3. Strength  4. Activity Tolerance Cognitive Skills Affected (resulting in the inability to perform in a timely and safe manner):  1. NOne Psychosocial Skills Affected:  1. None   Number of elements that affect the Plan of Care: 1-3:  LOW COMPLEXITY   CLINICAL DECISION MAKIN[address] [place] [address] AM-PAC 6 Clicks   Daily Activity Inpatient Short Form  How much help from another person does the patient currently need. .. Total A Lot A Little None   1. Putting on and taking off regular lower body clothing? [] 1   [x] 2   [] 3   [] 4   2. Bathing (including washing, rinsing, drying)? [] 1   [x] 2   [] 3   [] 4   3. Toileting, which includes using toilet, bedpan or urinal?   [] 1   [x] 2   [] 3   [] 4   4. Putting on and taking off regular upper body clothing? [] 1   [x] 2   [] 3   [] 4   5. Taking care of personal grooming such as brushing teeth? [] 1   [] 2   [x] 3   [] 4   6. Eating meals?    [] 1   [] 2   [x] 3   [] 4   © , Trustees of [address] [place], [place] [id number], under license to [employer] All rights reserved      Score:  Initial: 14 Most Recent: X (Date: -- )    Interpretation of Tool:  Represents activities that are increasingly more difficult (i.e. Bed mobility, Transfers, Gait). Score 24 23 22-20 19-15 14-10 9-7 6     Modifier CH CI CJ CK CL CM CN      ? Self Care:     - CURRENT STATUS: CL - 60%-79% impaired, limited or restricted    - GOAL STATUS: CK - 40%-59% impaired, limited or restricted    - D/C STATUS:  ---------------To be determined---------------  Payor: FIRST CHOICE VIP CARE PLUS / [place] FIRST CHO[employer] VIP CARE PLUS / Product Type: Managed Care Medicare /      Medical Necessity:     · Patient is expected to demonstrate progress in balance, coordination and functional technique to decrease assistance required with self care and functional mobility. Reason for Services/Other Comments:  · Patient continues to require skilled intervention due to decreased self care and functional mobility. Use of outcome tool(s) and clinical judgement create a POC that gives a: LOW COMPLEXITY         TREATMENT:   (In addition to Assessment/Re-Assessment sessions the following treatments were rendered)     Pre-treatment Symptoms/Complaints:  none  Pain: Initial:   Pain Intensity 1: 0  Post Session:  0     Self Care: (40): Procedure(s) (per grid) utilized to improve and/or restore self-care/home management as related to dressing, bathing, toileting and grooming. Required moderate verbal, tactile and   cueing to facilitate activities of daily living skills.     Braces/Orthotics/Lines/Etc:   · O2 Device: Nasal cannula  Treatment/Session Assessment:    · Response to Treatment:  Tolerated well  · Interdisciplinary Collaboration:   o Physical Therapist  o Occupational Therapist  o Registered Nurse  · After treatment position/precautions:   o Up in chair  o Bed/Chair-wheels locked  o Bed in low position  o Call light within reach  o RN notified   · Compliance with Program/Exercises: compliant all of the time. · Recommendations/Intent for next treatment session: \"Next visit will focus on advancements to more challenging activities\".   Total Treatment Duration:  OT Patient Time In/Time Out  Time In: 0940  Time Out: [id number] Tyler Moreno   40 mins     García Cloud, OT

## 2018-04-03 ENCOUNTER — PATIENT OUTREACH (OUTPATIENT)
Dept: CASE MANAGEMENT | Age: 83
End: 2018-04-03

## 2018-04-03 LAB
ANION GAP SERPL CALC-SCNC: 6 MMOL/L (ref 7–16)
BASOPHILS # BLD: 0 K/UL (ref 0–0.2)
BASOPHILS NFR BLD: 1 % (ref 0–2)
BUN SERPL-MCNC: 26 MG/DL (ref 8–23)
CALCIUM SERPL-MCNC: 9 MG/DL (ref 8.3–10.4)
CHLORIDE SERPL-SCNC: 101 MMOL/L (ref 98–107)
CO2 SERPL-SCNC: 37 MMOL/L (ref 21–32)
CREAT SERPL-MCNC: 1.1 MG/DL (ref 0.6–1)
DIFFERENTIAL METHOD BLD: ABNORMAL
EOSINOPHIL # BLD: 0.2 K/UL (ref 0–0.8)
EOSINOPHIL NFR BLD: 5 % (ref 0.5–7.8)
ERYTHROCYTE [DISTWIDTH] IN BLOOD BY AUTOMATED COUNT: 16.8 % (ref 11.9–14.6)
GLUCOSE SERPL-MCNC: 88 MG/DL (ref 65–100)
HCT VFR BLD AUTO: 26.8 % (ref 35.8–46.3)
HGB BLD-MCNC: 8.3 G/DL (ref 11.7–15.4)
IMM GRANULOCYTES # BLD: 0 K/UL (ref 0–0.5)
IMM GRANULOCYTES NFR BLD AUTO: 0 % (ref 0–5)
LYMPHOCYTES # BLD: 1.5 K/UL (ref 0.5–4.6)
LYMPHOCYTES NFR BLD: 33 % (ref 13–44)
MCH RBC QN AUTO: 31.9 PG (ref 26.1–32.9)
MCHC RBC AUTO-ENTMCNC: 31 G/DL (ref 31.4–35)
MCV RBC AUTO: 103.1 FL (ref 79.6–97.8)
MONOCYTES # BLD: 0.6 K/UL (ref 0.1–1.3)
MONOCYTES NFR BLD: 13 % (ref 4–12)
NEUTS SEG # BLD: 2.1 K/UL (ref 1.7–8.2)
NEUTS SEG NFR BLD: 48 % (ref 43–78)
PLATELET # BLD AUTO: 233 K/UL (ref 150–450)
PMV BLD AUTO: 11.2 FL (ref 10.8–14.1)
POTASSIUM SERPL-SCNC: 3.9 MMOL/L (ref 3.5–5.1)
RBC # BLD AUTO: 2.6 M/UL (ref 4.05–5.25)
SODIUM SERPL-SCNC: 144 MMOL/L (ref 136–145)
WBC # BLD AUTO: 4.4 K/UL (ref 4.3–11.1)

## 2018-04-03 PROCEDURE — 85025 COMPLETE CBC W/AUTO DIFF WBC: CPT | Performed by: INTERNAL MEDICINE

## 2018-04-03 PROCEDURE — 74011250637 HC RX REV CODE- 250/637: Performed by: INTERNAL MEDICINE

## 2018-04-03 PROCEDURE — 77010033678 HC OXYGEN DAILY

## 2018-04-03 PROCEDURE — 94640 AIRWAY INHALATION TREATMENT: CPT

## 2018-04-03 PROCEDURE — 80048 BASIC METABOLIC PNL TOTAL CA: CPT | Performed by: INTERNAL MEDICINE

## 2018-04-03 PROCEDURE — 97116 GAIT TRAINING THERAPY: CPT

## 2018-04-03 PROCEDURE — 74011250637 HC RX REV CODE- 250/637: Performed by: FAMILY MEDICINE

## 2018-04-03 PROCEDURE — 36415 COLL VENOUS BLD VENIPUNCTURE: CPT | Performed by: INTERNAL MEDICINE

## 2018-04-03 PROCEDURE — 74011000250 HC RX REV CODE- 250: Performed by: INTERNAL MEDICINE

## 2018-04-03 PROCEDURE — 74011250636 HC RX REV CODE- 250/636: Performed by: INTERNAL MEDICINE

## 2018-04-03 PROCEDURE — 94760 N-INVAS EAR/PLS OXIMETRY 1: CPT

## 2018-04-03 PROCEDURE — 65270000029 HC RM PRIVATE

## 2018-04-03 PROCEDURE — 97110 THERAPEUTIC EXERCISES: CPT

## 2018-04-03 RX ORDER — POLYETHYLENE GLYCOL 3350 17 G/17G
17 POWDER, FOR SOLUTION ORAL DAILY
Status: DISCONTINUED | OUTPATIENT
Start: 2018-04-03 | End: 2018-04-04 | Stop reason: HOSPADM

## 2018-04-03 RX ADMIN — IPRATROPIUM BROMIDE AND ALBUTEROL SULFATE 3 ML: 2.5; .5 SOLUTION RESPIRATORY (INHALATION) at 13:36

## 2018-04-03 RX ADMIN — CLOPIDOGREL BISULFATE 75 MG: 75 TABLET ORAL at 10:24

## 2018-04-03 RX ADMIN — GABAPENTIN 300 MG: 300 CAPSULE ORAL at 10:23

## 2018-04-03 RX ADMIN — ASPIRIN 81 MG: 81 TABLET, COATED ORAL at 10:24

## 2018-04-03 RX ADMIN — HEPARIN SODIUM 5000 UNITS: 5000 INJECTION, SOLUTION INTRAVENOUS; SUBCUTANEOUS at 03:44

## 2018-04-03 RX ADMIN — IPRATROPIUM BROMIDE AND ALBUTEROL SULFATE 3 ML: 2.5; .5 SOLUTION RESPIRATORY (INHALATION) at 19:31

## 2018-04-03 RX ADMIN — Medication 5 ML: at 15:29

## 2018-04-03 RX ADMIN — POLYETHYLENE GLYCOL (3350) 17 G: 17 POWDER, FOR SOLUTION ORAL at 15:28

## 2018-04-03 RX ADMIN — PRAVASTATIN SODIUM 20 MG: 20 TABLET ORAL at 21:52

## 2018-04-03 RX ADMIN — Medication 10 ML: at 06:12

## 2018-04-03 RX ADMIN — HEPARIN SODIUM 5000 UNITS: 5000 INJECTION, SOLUTION INTRAVENOUS; SUBCUTANEOUS at 15:29

## 2018-04-03 RX ADMIN — TAMSULOSIN HYDROCHLORIDE 0.4 MG: 0.4 CAPSULE ORAL at 10:24

## 2018-04-03 RX ADMIN — Medication 10 ML: at 21:52

## 2018-04-03 RX ADMIN — FUROSEMIDE 40 MG: 10 INJECTION, SOLUTION INTRAMUSCULAR; INTRAVENOUS at 10:24

## 2018-04-03 RX ADMIN — IPRATROPIUM BROMIDE AND ALBUTEROL SULFATE 3 ML: 2.5; .5 SOLUTION RESPIRATORY (INHALATION) at 08:00

## 2018-04-03 RX ADMIN — FUROSEMIDE 40 MG: 10 INJECTION, SOLUTION INTRAMUSCULAR; INTRAVENOUS at 21:52

## 2018-04-03 RX ADMIN — AMLODIPINE BESYLATE 5 MG: 5 TABLET ORAL at 10:23

## 2018-04-03 RX ADMIN — GABAPENTIN 300 MG: 300 CAPSULE ORAL at 21:52

## 2018-04-03 RX ADMIN — ALLOPURINOL 100 MG: 100 TABLET ORAL at 10:24

## 2018-04-03 RX ADMIN — COLCHICINE 0.3 MG: 0.6 TABLET, FILM COATED ORAL at 10:24

## 2018-04-03 RX ADMIN — ALLOPURINOL 100 MG: 100 TABLET ORAL at 18:58

## 2018-04-03 RX ADMIN — POTASSIUM CHLORIDE 40 MEQ: 20 TABLET, EXTENDED RELEASE ORAL at 10:24

## 2018-04-03 NOTE — CDMP QUERY
Please clarify if this patient is being treated/managed for:    **Chronic Respiratory Falure with Hypoxia in the setting of dyspnea, SOB with ABG-pO2-50 on 2LNC, home O2 @ 2LNC continuous being treated with continue home regime. =>Other Explanation of clinical findings  =>Unable to Determine (no explanation of clinical findings)    The medical record reflects the following:    Risk Factors: dyspnea, SOB    Clinical Indicators: ABG-pO2-50 on 2LNC, home O2 @ 2LNC continuous    Treatment: continue home regime     Please clarify and document your clinical opinion in the progress notes and discharge summary including the definitive and/or presumptive diagnosis, (suspected or probable), related to the above clinical findings. Please include clinical findings supporting your diagnosis.     Thanks,  Larry Ochoa RN, CDS  Compliant Documentation Management Program  [phone]

## 2018-04-03 NOTE — PROGRESS NOTES
Shift assessment complete. A+OX4. HR regular. LS dimished. RR even and unlabored. IVS without complications. Abd soft active bowel sounds. Bed LL. All needs met at this time. Staff will monitor with hourly rounds.

## 2018-04-03 NOTE — PROGRESS NOTES
Received bedside shift report from off going nurse Tobias Lopez RN which included SBAR, MAR, and Plan of Care. Patient is resting quietly with eyes closed and respirations present. Will continue to follow patient's progression through hourly rounding. Will meet all requests as needed and appropriate.

## 2018-04-03 NOTE — PROGRESS NOTES
Problem: Mobility Impaired (Adult and Pediatric)  Goal: *Acute Goals and Plan of Care (Insert Text)  STG:  (1.)Ms. Driss Peters will transfer from bed to chair and chair to bed with MODERATE ASSIST using the least restrictive device within 3 treatment day(s). Met 4/1  (2.)Ms. Driss Peters will ambulate with MODERATE ASSIST for 5 feet with the least restrictive device within 3 treatment day(s). Met 4/1    LTG:  (1.)Ms. Driss Peters will transfer from bed to chair and chair to bed with MINIMAL ASSIST using the least restrictive device within 7 treatment day(s). Met 4/1  (2.)Ms. Driss Peters will ambulate with MINIMAL ASSIST for 20 feet with the least restrictive device within 7 treatment day(s). Met 4/1    ADDENDUM GOALS 4/1/18 : (1.)Ms. Driss Peters will roll & move from supine to sit and sit to supine  in bed with MINIMAL ASSIST within 7 treatment day(s). 2) transfers with walker & CGA. 3) pt ambulating with rolling walker 100 ft with CGA. ________________________________________________________________________________________________    PHYSICAL THERAPY: Daily Note, Treatment Day: 3rd, AM 4/3/2018  INPATIENT: Hospital Day: 5  Payor: FIRST CHOICE VIP CARE PLUS [place] FIRST CHOICE VIP CARE PLUS / Product Type: Managed Care Medicare /      NAME/AGE/GENDER: Tylor Castillo is a 80 y.o. female   PRIMARY DIAGNOSIS: CHF exacerbation ([address]  CHF (congestive heart failure) ([address] <principal problem not specified> <principal problem not specified>        ICD-10: Treatment Diagnosis:    · Generalized Muscle Weakness (M62.81)  · Difficulty in walking, Not elsewhere classified (R26.2)   Precaution/Allergies:  Bee pollen and Fire ant      ASSESSMENT:     Ms. Driss Peters is sitting in chair upon contact. Upon standing pt urinated and taken to bathroom to be cleaned up. Pt required Min A to come to standing from toilet due to low surface.  Pt gait distance limited by shortness of breath. This section established at most recent assessment   PROBLEM LIST (Impairments causing functional limitations):  1. Decreased Strength  2. Decreased ADL/Functional Activities  3. Decreased Transfer Abilities  4. Decreased Ambulation Ability/Technique  5. Decreased Balance  6. Decreased Activity Tolerance  7. Increased Fatigue  8. Increased Shortness of Breath  9. Decreased Flexibility/Joint Mobility  10. Decreased [place] with Home Exercise Program  11. Decreased Cognition   INTERVENTIONS PLANNED: (Benefits and precautions of physical therapy have been discussed with the patient.)  1. Balance Exercise  2. Bed Mobility  3. Gait Training  4. Home Exercise Program (HEP)  5. Neuromuscular Re-education/Strengthening  6. Range of Motion (ROM)  7. Therapeutic Activites  8. Therapeutic Exercise/Strengthening     TREATMENT PLAN: Frequency/Duration: daily for duration of hospital stay  Rehabilitation Potential For Stated Goals: Good     RECOMMENDED REHABILITATION/EQUIPMENT: (at time of discharge pending progress): Due to the probability of continued deficits (see above) this patient will likely need continued skilled physical therapy after discharge. Equipment:    None at this time. Pt states she has cane, walker, and w/c              HISTORY:   History of Present Injury/Illness (Reason for Referral):  Pt arrived through ER with dyspnea and hx of CHF. States she came from home where she lives in a home where her son lives next door. She has a ramp to enter home. States she ambulated with cane but has walker and w/c. Poor historian who fluctuated between thinking it was guillermina time and easter. Past Medical History/Comorbidities:   Ms. Garret Felipe  has a past medical history of Acute kidney failure, unspecified ([facility] [address] Arthritis; CAD (coronary artery disease);  Cellulitis and abscess of other specified site; Coronary atherosclerosis of native coronary artery; Essential hypertension, benign (3/17/2015); Hypertension; Hypopotassemia; Mixed hyperlipidemia; Osteoarthritis (7/20/2017); Other and unspecified hyperlipidemia; Reflux esophagitis; Renal failure, unspecified; Shortness of breath; and Unspecified essential hypertension. Ms. Kolton Alvarado  has a past surgical history that includes hx cholecystectomy; pr layr clos wnd face,facial <2.5cm; pr cardiac surg procedure unlist; hx cataract removal (Bilateral); and hx hysterectomy. Social History/Living Environment:   Home Environment: Private residence  # Steps to Enter: 0  Wheelchair Ramp: Yes  One/Two Story Residence: One story  Living Alone: No  Support Systems: Child(jayleen)  Patient Expects to be Discharged to[de-identified] Private residence  Current DME Used/Available at Home: Walker, rolling, Wheelchair, Cane, straight  Tub or Shower Type: Tub/Shower combination  Prior Level of Function/Work/Activity:  States she ambulated with cane and was independent with household mobility  Previous Treatment Approaches:          Pt was recently in this hospital for similar problems (per pt report)  Personal Factors:          Sex:  female        Age:  80 y.o. Pt seemed anxious with her SOB   Number of Personal Factors/Comorbidities that affect the Plan of Care: 1-2: MODERATE COMPLEXITY   EXAMINATION:   Most Recent Physical Functioning:   Gross Assessment: 3 to 3-/5 throughout                       Balance:    Bed Mobility:          Transfers:  Sit to Stand: Minimum assistance  Gait:     Speed/Mary: Pace decreased (<100 feet/min)  Gait Abnormalities: Decreased step clearance (excessive trunk bend)  Distance (ft): 75 Feet (ft) (additional 10' to bathroom)  Assistive Device: Walker, rolling  Ambulation - Level of Assistance: Contact guard assistance  Duration: 10 Minutes         Sacral wound with bandage   Body Structures Involved:  1. Nerves  2. Muscles  3.  Ligaments Body Functions Affected:  1. Mental  2. Sensory/Pain  3. Neuromusculoskeletal  4. Movement Related  5. Skin Related Activities and Participation Affected:  1. Learning and Applying Knowledge  2. General Tasks and Demands  3. Mobility  4. Self Care  5. Domestic Life  6. Interpersonal Interactions and Relationships  7. Community, Social and [place]   Number of elements that affect the Plan of Care: 3: MODERATE COMPLEXITY   CLINICAL PRESENTATION:   Presentation: Stable and uncomplicated: LOW COMPLEXITY   CLINICAL DECISION MAKIN[address] [place] [id number] AM-PAC 6 Clicks   Basic Mobility Inpatient Short Form  How much difficulty does the patient currently have. .. Unable A Lot A Little None   1. Turning over in bed (including adjusting bedclothes, sheets and blankets)? [] 1   [x] 2   [] 3   [] 4   2. Sitting down on and standing up from a chair with arms ( e.g., wheelchair, bedside commode, etc.)   [] 1   [x] 2   [] 3   [] 4   3. Moving from lying on back to sitting on the side of the bed? [] 1   [x] 2   [] 3   [] 4   How much help from another person does the patient currently need. .. Total A Lot A Little None   4. Moving to and from a bed to a chair (including a wheelchair)? [x] 1   [] 2   [] 3   [] 4   5. Need to walk in hospital room? [x] 1   [] 2   [] 3   [] 4   6. Climbing 3-5 steps with a railing? [x] 1   [] 2   [] 3   [] 4   © , Trustees of [address] [place] [address], under license to [employer]. All rights reserved      Score:  Initial: 9 Most Recent: X (Date: -- )    Interpretation of Tool:  Represents activities that are increasingly more difficult (i.e. Bed mobility, Transfers, Gait). Score 24 23 22-20 19-15 14-10 9-7 6     Modifier CH CI CJ CK CL CM CN      ?  Mobility - Walking and Moving Around:     - CURRENT STATUS: CM - 80%-99% impaired, limited or restricted    - GOAL STATUS: CL - 60%-79% impaired, limited or restricted    - D/C STATUS:  ---------------To be determined---------------  Payor: FIRST CHOICE VIP CARE PLUS / [place] FIRST CHO[employer] VIP CARE PLUS / Product Type: Managed Care Medicare /      Medical Necessity:     · Patient demonstrates fair rehab potential due to higher previous functional level. Reason for Services/Other Comments:  · Patient continues to require skilled intervention due to ongoing goals noted above. Use of outcome tool(s) and clinical judgement create a POC that gives a: Questionable prediction of patient's progress: MODERATE COMPLEXITY            TREATMENT:   (In addition to Assessment/Re-Assessment sessions the following treatments were rendered)   Pre-treatment Symptoms/Complaints:  Pt states her breathing is good today  Pain: Initial: 0/10 visually     Post Session:  7/10 numeric scale (visually 2/10)   Gait Training (10 Minutes):  Gait training to improve and/or restore physical functioning as related to mobility, strength and balance. Ambulated 75 Feet (ft) (additional 10' to bathroom) with Contact guard assistance using a Walker, rolling and minimal   related to their stride length and posture to promote proper body alignment, promote proper body posture and promote proper body mechanics. Instruction in performance of gait posture to correct overall gait quality. Therapeutic Exercise: (13 Minutes):  Exercises per grid below to improve strength. Required minimal verbal cues to promote proper body alignment. Progressed range as indicated.      Date:  4/2 Date:  4/2 pm Date:     Activity/Exercise Parameters Parameters Parameters   Marching in place 12 12    LAQ 12 12 12a   Ankle pumps 12 12 12a   Hip abd/add 12 12 12a   Seated marching   12a                       Braces/Orthotics/Lines/Etc:   · O2 Device: Nasal cannula  Treatment/Session Assessment:    · Response to Treatment:  Tolerated session well this pm  · Interdisciplinary Collaboration:   o Registered Nurse  · After treatment position/precautions:   o Up in chair  o Bed alarm/tab alert on  o Bed/Chair-wheels locked  o Call light within reach  o RN notified   · Compliance with Program/Exercises: Will assess as treatment progresses. · Recommendations/Intent for next treatment session: \"Next visit will focus on advancements to more challenging activities and reduction in assistance provided\".   Total Treatment Duration:  PT Patient Time In/Time Out  Time In: 0855  Time Out: 101[facility], [facility]

## 2018-04-03 NOTE — PROGRESS NOTES
Shift assessment complete. Pt alert and oriented x4, respirations diminished, even and unlabored, HOB elevated, pt denies any SOB at this time, S1&S2 auscultated, HR regular, abd soft, non- tender, Active BS in all 4 quadrants, No pressure ulcers noted, +1 edema noted to BLE, brief clean and dry, voiding, pt denies any pain at this time, pt instructed to call for assistance, pt verbalizes understanding, bed low and locked, side rails x3, call light within reach.

## 2018-04-03 NOTE — PROGRESS NOTES
ED referral as  [facility]RP patient. CM contacted Community [facility] RN, Lyndsey Murguia regarding Hospice/Palliative Care. Patient currently being followed by Sincere Starr RN CM. Aung Barrie currently covering for [facility]. In hospital visit with patient. Patient in chair at bedside alert and oriented. Patient states she wants \"full care\". Reports she lives with her son and is independent with her ADL's, \"as long as I wear my oxygen. \"    RRAT: 30- 4 admissions and 2 ED visits since 1/14/18. PMH: CHF & CKD    Plan: Cardiologist to discuss Hospice/Palliative care with family. Inpatient CM will contact [facility] to resume services upon discharge. [facility] RN will complete CRISTIANO  and follow up upon discharge. This note will not be viewable in [address]

## 2018-04-03 NOTE — PROGRESS NOTES
Chart review following call from IP case management regarding hospice/palliative referral and numerous ED/IP visits for CHF. Palliative medicine has visited and note reviewed. Patient is not hospice appropriate due to desire to continue with aggressive care. Community case management will follow post discharge.

## 2018-04-03 NOTE — DISCHARGE INSTRUCTIONS
DISCHARGE SUMMARY from Nurse    The following personal items are in your possession at time of discharge:    Dental Appliances: At home  Visual Aid: None     Home Medications: None  Jewelry: None  Clothing: At bedside  Other Valuables: None             PATIENT INSTRUCTIONS:    After general anesthesia or intravenous sedation, for 24 hours or while taking prescription Narcotics:  · Limit your activities  · Do not drive and operate hazardous machinery  · Do not make important personal or business decisions  · Do  not drink alcoholic beverages  · If you have not urinated within 8 hours after discharge, please contact your surgeon on call. Report the following to your surgeon:  · Excessive pain, swelling, redness or odor of or around the surgical area  · Temperature over 100.5  · Nausea and vomiting lasting longer than 4 hours or if unable to take medications  · Any signs of decreased circulation or nerve impairment to extremity: change in color, persistent  numbness, tingling, coldness or increase pain  · Any questions        What to do at Home:  Recommended activity: Activity as tolerated, per MD    If you experience any of the following symptoms fever>101, pain unrelieved with medication, nausea/vomiting, shortness of breath, dizziness/fainting, chest pain. , please follow up with your doctor. *  Please give a list of your current medications to your Primary Care Provider. *  Please update this list whenever your medications are discontinued, doses are      changed, or new medications (including over-the-counter products) are added. *  Please carry medication information at all times in case of emergency situations. These are general instructions for a healthy lifestyle:    No smoking/ No tobacco products/ Avoid exposure to second hand smoke    Surgeon General's Warning:  Quitting smoking now greatly reduces serious risk to your health.     Obesity, smoking, and sedentary lifestyle greatly increases your risk for illness    A healthy diet, regular physical exercise & weight monitoring are important for maintaining a healthy lifestyle    You may be retaining fluid if you have a history of heart failure or if you experience any of the following symptoms:  Weight gain of 3 pounds or more overnight or 5 pounds in a week, increased swelling in our hands or feet or shortness of breath while lying flat in bed. Please call your doctor as soon as you notice any of these symptoms; do not wait until your next office visit. Recognize signs and symptoms of STROKE:    F-face looks uneven    A-arms unable to move or move unevenly    S-speech slurred or non-existent    T-time-call 911 as soon as signs and symptoms begin-DO NOT go       Back to bed or wait to see if you get better-TIME IS BRAIN. Warning Signs of HEART ATTACK     Call 911 if you have these symptoms:   Chest discomfort. Most heart attacks involve discomfort in the center of the chest that lasts more than a few minutes, or that goes away and comes back. It can feel like uncomfortable pressure, squeezing, fullness, or pain.  Discomfort in other areas of the upper body. Symptoms can include pain or discomfort in one or both arms, the back, neck, jaw, or stomach.  Shortness of breath with or without chest discomfort.  Other signs may include breaking out in a cold sweat, nausea, or lightheadedness. Don't wait more than five minutes to call 911 - MINUTES MATTER! Fast action can save your life. Calling 911 is almost always the fastest way to get lifesaving treatment. Emergency Medical Services staff can begin treatment when they arrive -- up to an hour sooner than if someone gets to the hospital by car. The discharge information has been reviewed with the patient. The patient verbalized understanding.     Discharge medications reviewed with the patient and appropriate educational materials and side effects teaching were provided. Heart Failure: Care Instructions  Your Care Instructions    Heart failure occurs when your heart does not pump as much blood as the body needs. Failure does not mean that the heart has stopped pumping but rather that it is not pumping as well as it should. Over time, this causes fluid buildup in your lungs and other parts of your body. Fluid buildup can cause shortness of breath, fatigue, swollen ankles, and other problems. By taking medicines regularly, reducing sodium (salt) in your diet, checking your weight every day, and making lifestyle changes, you can feel better and live longer. Follow-up care is a key part of your treatment and safety. Be sure to make and go to all appointments, and call your doctor if you are having problems. It's also a good idea to know your test results and keep a list of the medicines you take. How can you care for yourself at home? Medicines  ? · Be safe with medicines. Take your medicines exactly as prescribed. Call your doctor if you think you are having a problem with your medicine. ? · Do not take any vitamins, over-the-counter medicine, or herbal products without talking to your doctor first. Jean-Claude Teodora not take ibuprofen (Advil or Motrin) and naproxen (Aleve) without talking to your doctor first. They could make your heart failure worse. ? · You may be taking some of the following medicine. ¨ Beta-blockers can slow heart rate, decrease blood pressure, and improve your condition. Taking a beta-blocker may lower your chance of needing to be hospitalized. ¨ Angiotensin-converting enzyme inhibitors (ACEIs) reduce the heart's workload, lower blood pressure, and reduce swelling. Taking an ACEI may lower your chance of needing to be hospitalized again. ¨ Angiotensin II receptor blockers (ARBs) work like ACEIs. Your doctor may prescribe them instead of ACEIs. ¨ Diuretics, also called water pills, reduce swelling.   ¨ Potassium supplements replace this important mineral, which is sometimes lost with diuretics. ¨ Aspirin and other blood thinners prevent blood clots, which can cause a stroke or heart attack. ? You will get more details on the specific medicines your doctor prescribes. Diet  ? · Your doctor may suggest that you limit sodium to 2,000 milligrams (mg) a day or less. That is less than 1 teaspoon of salt a day, including all the salt you eat in cooking or in packaged foods. People get most of their sodium from processed foods. Fast food and restaurant meals also tend to be very high in sodium. ? · Ask your doctor how much liquid you can drink each day. You may have to limit liquids. ?Weight  ? · Weigh yourself without clothing at the same time each day. Record your weight. Call your doctor if you have a sudden weight gain, such as more than 2 to 3 pounds in a day or 5 pounds in a week. (Your doctor may suggest a different range of weight gain.) A sudden weight gain may mean that your heart failure is getting worse. ? Activity level  ? · Start light exercise (if your doctor says it is okay). Even if you can only do a small amount, exercise will help you get stronger, have more energy, and manage your weight and your stress. Walking is an easy way to get exercise. Start out by walking a little more than you did before. Bit by bit, increase the amount you walk. ? · When you exercise, watch for signs that your heart is working too hard. You are pushing yourself too hard if you cannot talk while you are exercising. If you become short of breath or dizzy or have chest pain, stop, sit down, and rest.   ? · If you feel \"wiped out\" the day after you exercise, walk slower or for a shorter distance until you can work up to a better pace. ? · Get enough rest at night. Sleeping with 1 or 2 pillows under your upper body and head may help you breathe easier. ? Lifestyle changes  ? · Do not smoke. Smoking can make a heart condition worse.  If you need help quitting, talk to your doctor about stop-smoking programs and medicines. These can increase your chances of quitting for good. Quitting smoking may be the most important step you can take to protect your heart. ? · Limit alcohol to 2 drinks a day for men and 1 drink a day for women. Too much alcohol can cause health problems. ? · Avoid getting sick from colds and the flu. Get a pneumococcal vaccine shot. If you have had one before, ask your doctor whether you need another dose. Get a flu shot each year. If you must be around people with colds or the flu, wash your hands often. When should you call for help? Call 911 if you have symptoms of sudden heart failure such as:  ? · You have severe trouble breathing. ? · You cough up pink, foamy mucus. ? · You have a new irregular or rapid heartbeat. ?Call your doctor now or seek immediate medical care if:  ? · You have new or increased shortness of breath. ? · You are dizzy or lightheaded, or you feel like you may faint. ? · You have sudden weight gain, such as more than 2 to 3 pounds in a day or 5 pounds in a week. (Your doctor may suggest a different range of weight gain.)   ? · You have increased swelling in your legs, ankles, or feet. ? · You are suddenly so tired or weak that you cannot do your usual activities. ? Watch closely for changes in your health, and be sure to contact your doctor if you develop new symptoms. Where can you learn more? Go to http://you-demarcus.info/. Enter V011 in the search box to learn more about \"Heart Failure: Care Instructions. \"  Current as of: September 21, 2016  Content Version: 11.4  © 6521-9581 [employer]. Care instructions adapted under license by [employer] (which disclaims liability or warranty for this information).  If you have questions about a medical condition or this instruction, always ask your healthcare professional. Obie Murry any warranty or liability for your use of this information.

## 2018-04-03 NOTE — PROGRESS NOTES
Problem: Interdisciplinary Rounds  Goal: Interdisciplinary Rounds  Interdisciplinary team rounds were held 4/3/2018 with the following team members:Care Management, Nursing, Nutrition, Pharmacy and Physician and the patient. Plan of care discussed. See clinical pathway and/or care plan for interventions and desired outcomes.

## 2018-04-04 ENCOUNTER — HOME HEALTH ADMISSION (OUTPATIENT)
Dept: HOME HEALTH SERVICES | Facility: HOME HEALTH | Age: 83
End: 2018-04-04

## 2018-04-04 VITALS
BODY MASS INDEX: 23.88 KG/M2 | HEIGHT: 63 IN | DIASTOLIC BLOOD PRESSURE: 67 MMHG | SYSTOLIC BLOOD PRESSURE: 113 MMHG | WEIGHT: 134.8 LBS | HEART RATE: 85 BPM | RESPIRATION RATE: 20 BRPM | TEMPERATURE: 96 F | OXYGEN SATURATION: 96 %

## 2018-04-04 LAB
ANION GAP SERPL CALC-SCNC: 1 MMOL/L (ref 7–16)
BUN SERPL-MCNC: 30 MG/DL (ref 8–23)
CALCIUM SERPL-MCNC: 9.1 MG/DL (ref 8.3–10.4)
CHLORIDE SERPL-SCNC: 100 MMOL/L (ref 98–107)
CO2 SERPL-SCNC: 38 MMOL/L (ref 21–32)
CREAT SERPL-MCNC: 1.02 MG/DL (ref 0.6–1)
GLUCOSE SERPL-MCNC: 93 MG/DL (ref 65–100)
POTASSIUM SERPL-SCNC: 4 MMOL/L (ref 3.5–5.1)
SODIUM SERPL-SCNC: 139 MMOL/L (ref 136–145)

## 2018-04-04 PROCEDURE — 97530 THERAPEUTIC ACTIVITIES: CPT

## 2018-04-04 PROCEDURE — 74011250636 HC RX REV CODE- 250/636: Performed by: INTERNAL MEDICINE

## 2018-04-04 PROCEDURE — 77010033678 HC OXYGEN DAILY

## 2018-04-04 PROCEDURE — 97110 THERAPEUTIC EXERCISES: CPT

## 2018-04-04 PROCEDURE — 74011250637 HC RX REV CODE- 250/637: Performed by: FAMILY MEDICINE

## 2018-04-04 PROCEDURE — 97535 SELF CARE MNGMENT TRAINING: CPT

## 2018-04-04 PROCEDURE — 94640 AIRWAY INHALATION TREATMENT: CPT

## 2018-04-04 PROCEDURE — 74011000250 HC RX REV CODE- 250: Performed by: INTERNAL MEDICINE

## 2018-04-04 PROCEDURE — 94760 N-INVAS EAR/PLS OXIMETRY 1: CPT

## 2018-04-04 PROCEDURE — 36415 COLL VENOUS BLD VENIPUNCTURE: CPT | Performed by: FAMILY MEDICINE

## 2018-04-04 PROCEDURE — 74011250637 HC RX REV CODE- 250/637: Performed by: INTERNAL MEDICINE

## 2018-04-04 PROCEDURE — 80048 BASIC METABOLIC PNL TOTAL CA: CPT | Performed by: FAMILY MEDICINE

## 2018-04-04 RX ORDER — FUROSEMIDE 40 MG/1
40 TABLET ORAL DAILY
Qty: 30 TAB | Refills: 0 | Status: SHIPPED | OUTPATIENT
Start: 2018-04-04 | End: 2018-04-09

## 2018-04-04 RX ADMIN — GABAPENTIN 300 MG: 300 CAPSULE ORAL at 08:34

## 2018-04-04 RX ADMIN — IPRATROPIUM BROMIDE AND ALBUTEROL SULFATE 3 ML: 2.5; .5 SOLUTION RESPIRATORY (INHALATION) at 07:23

## 2018-04-04 RX ADMIN — COLCHICINE 0.3 MG: 0.6 TABLET, FILM COATED ORAL at 08:33

## 2018-04-04 RX ADMIN — AMLODIPINE BESYLATE 5 MG: 5 TABLET ORAL at 08:34

## 2018-04-04 RX ADMIN — POLYETHYLENE GLYCOL (3350) 17 G: 17 POWDER, FOR SOLUTION ORAL at 09:00

## 2018-04-04 RX ADMIN — FUROSEMIDE 40 MG: 10 INJECTION, SOLUTION INTRAMUSCULAR; INTRAVENOUS at 08:33

## 2018-04-04 RX ADMIN — TAMSULOSIN HYDROCHLORIDE 0.4 MG: 0.4 CAPSULE ORAL at 08:34

## 2018-04-04 RX ADMIN — ASPIRIN 81 MG: 81 TABLET, COATED ORAL at 08:33

## 2018-04-04 RX ADMIN — POTASSIUM CHLORIDE 40 MEQ: 20 TABLET, EXTENDED RELEASE ORAL at 08:33

## 2018-04-04 RX ADMIN — HEPARIN SODIUM 5000 UNITS: 5000 INJECTION, SOLUTION INTRAVENOUS; SUBCUTANEOUS at 01:35

## 2018-04-04 RX ADMIN — ALLOPURINOL 100 MG: 100 TABLET ORAL at 08:33

## 2018-04-04 RX ADMIN — HEPARIN SODIUM 5000 UNITS: 5000 INJECTION, SOLUTION INTRAVENOUS; SUBCUTANEOUS at 15:01

## 2018-04-04 RX ADMIN — IPRATROPIUM BROMIDE AND ALBUTEROL SULFATE 3 ML: 2.5; .5 SOLUTION RESPIRATORY (INHALATION) at 13:41

## 2018-04-04 RX ADMIN — CLOPIDOGREL BISULFATE 75 MG: 75 TABLET ORAL at 08:34

## 2018-04-04 NOTE — PROGRESS NOTES
CM called [facility] and arranged for EMS transport. ETA is 5:20 pm. AVS put in an envelope marked \"Give to the family of Ms. Whaley\"

## 2018-04-04 NOTE — PROGRESS NOTES
Problem: Interdisciplinary Rounds  Goal: Interdisciplinary Rounds  Interdisciplinary team rounds were held 4/4/2018 with the following team members:Care Management, Nursing, Pharmacy, Physical Therapy and Physician and the patient. Plan of care discussed. See clinical pathway and/or care plan for interventions and desired outcomes.

## 2018-04-04 NOTE — PROGRESS NOTES
Problem: Self Care Deficits Care Plan (Adult)  Goal: *Acute Goals and Plan of Care (Insert Text)  1. Patient will perform grooming with supervision. 2. Patient will perform upper body dressing with supervision. 3. Patient will perform lower body dressing with minimal assist  4. Patient will perform sponge bathing with supervision. 5. Patient will perform toileting and toilet transfer with minimal assist.  6. Patient will perform ADL functional mobility and tranfers in room with minimal assist.  7. Patient/family to demonstrate knowledge of home safety and DME recommendations. Goals to be achieved in 7 days. OCCUPATIONAL THERAPY: Daily Note  4/4/2018  INPATIENT: Hospital Day: 6  Payor: [employer] VIP CARE PLUS / [place] [employer] [place] / Product Type: Managed Care Medicare /      NAME/AGE/GENDER: Mona Leonard is a 80 y.o. female   PRIMARY DIAGNOSIS:  CHF exacerbation ([facility] [address]  CHF (congestive heart failure) ([facility] [address] <principal problem not specified> <principal problem not specified>        ICD-10: Treatment Diagnosis:    · Generalized Muscle Weakness (M62.81)  · Other lack of cordination (R27.8)   Precautions/Allergies:     Bee pollen and Fire ant      ASSESSMENT:     Ms. Brenda Nobles presents with above diagnosis. Pt found in supine and agreeable to therapy session. Pt performed functional transfers, toileting, sponge bathing and dressing. See chart for details. Pt requiring minimal rest breaks during activity but no SOB noted. Pt left in recliner with needs in reach and RN notified. Pt making progress towards goals. Will cont POC.     4/4/18   Pt up in chair upon arrival.  Bathing already completed but pt requested to put on gown from home. Patient needed min assist to don over head due to left shoulder pain. No SOB noted while completing dressing. Ambulated 50 feet with rolling walker.   Self Care: (20): Procedure(s) (per grid) utilized to improve and/or restore self-care/home management as related to dressing. Required minimal visual, verbal, tactile and   cueing to facilitate activities of daily living skills. Therapeutic Activity: (    20): Therapeutic activities including Ambulation on level ground and  to improve mobility, strength, balance and coordination. Required minimal   to promote dynamic balance in standing. This section established at most recent assessment   PROBLEM LIST (Impairments causing functional limitations):  1. Decreased Strength  2. Decreased ADL/Functional Activities  3. Decreased Transfer Abilities  4. Decreased Ambulation Ability/Technique  5. Decreased Balance   INTERVENTIONS PLANNED: (Benefits and precautions of occupational therapy have been discussed with the patient.)  1. Activities of daily living training  2. Adaptive equipment training  3. Balance training  4. Clothing management  5. Donning&doffing training  6. Therapeutic activity  7. Therapeutic exercise     TREATMENT PLAN: Frequency/Duration: Follow patient 4x/week to address above goals. Rehabilitation Potential For Stated Goals: Good     RECOMMENDED REHABILITATION/EQUIPMENT: (at time of discharge pending progress): Due to the probability of continued deficits (see above) this patient will likely need continued skilled occupational therapy after discharge. Equipment:    TBD              OCCUPATIONAL PROFILE AND HISTORY:   History of Present Injury/Illness (Reason for Referral):  See above  Past Medical History/Comorbidities:   Ms. Castro White  has a past medical history of Acute kidney failure, unspecified ([facility] [address] Arthritis; CAD (coronary artery disease); Cellulitis and abscess of other specified site; Coronary atherosclerosis of native coronary artery; Essential hypertension, benign (3/17/2015); Hypertension; Hypopotassemia; Mixed hyperlipidemia; Osteoarthritis (7/20/2017); Other and unspecified hyperlipidemia; Reflux esophagitis; Renal failure, unspecified;  Shortness of breath; and Unspecified essential hypertension. Ms. Dimple Ortega  has a past surgical history that includes hx cholecystectomy; pr layr clos wnd face,facial <2.5cm; pr cardiac surg procedure unlist; hx cataract removal (Bilateral); and hx hysterectomy. Social History/Living Environment:   Home Environment: Private residence  # Steps to Enter: 0  Wheelchair Ramp: Yes  One/Two Story Residence: One story  Living Alone: No  Support Systems: Child(jayleen)  Patient Expects to be Discharged to[de-identified] Private residence  Current DME Used/Available at Home: Walker, rolling, Wheelchair, [address], [place], straight  Tub or Shower Type: Tub/Shower combination  Prior Level of Function/Work/Activity:  Minimal assist for self care and functional mobility     Number of Personal Factors/Comorbidities that affect the Plan of Care: Brief history (0):  LOW COMPLEXITY   ASSESSMENT OF OCCUPATIONAL PERFORMANCE[de-identified]   Activities of Daily Living:           Basic ADLs (From Assessment) Complex ADLs (From Assessment)   Basic ADL  Feeding: Supervision  Oral Facial Hygiene/Grooming: Supervision  Bathing: Moderate assistance  Type of Bath: Chlorhexidine (CHG), Full, Shower  Upper Body Dressing: Minimum assistance  Lower Body Dressing: Moderate assistance  Toileting:  Total assistance     Grooming/Bathing/Dressing Activities of Daily Living                     Upper Body Dressing Assistance  Dressing Assistance: Minimum assistance  Pullover Shirt: Minimum assistance       Bed/Mat Mobility  Sit to Stand: Minimum assistance       Most Recent Physical Functioning:   Gross Assessment:                  Posture:     Balance:    Bed Mobility:     Wheelchair Mobility:     Transfers:  Sit to Stand: Minimum assistance     RUE WFL, LUE shoulder flexion limited to 30 degrees from prior injury           Patient Vitals for the past 6 hrs:   BP BP Patient Position SpO2 O2 Flow Rate (L/min) Pulse   04/04/18 0640 112/57 At rest 94 % - 83   04/04/18 0724 - - 97 % 2 l/min -       Mental Status  Neurologic State: Alert  Orientation Level: Appropriate for age, Oriented X4  Cognition: Appropriate decision making, Appropriate for age attention/concentration, Follows commands  Perception: Appears intact  Perseveration: No perseveration noted  Safety/Judgement: Awareness of environment, Fall prevention                          Physical Skills Involved:  1. Range of Motion  2. Balance  3. Strength  4. Activity Tolerance Cognitive Skills Affected (resulting in the inability to perform in a timely and safe manner):  1. NOne Psychosocial Skills Affected:  1. None   Number of elements that affect the Plan of Care: 1-3:  LOW COMPLEXITY   CLINICAL DECISION MAKIN[address] [place] [address] AM-PAC 6 Clicks   Daily Activity Inpatient Short Form  How much help from another person does the patient currently need. .. Total A Lot A Little None   1. Putting on and taking off regular lower body clothing? [] 1   [x] 2   [] 3   [] 4   2. Bathing (including washing, rinsing, drying)? [] 1   [x] 2   [] 3   [] 4   3. Toileting, which includes using toilet, bedpan or urinal?   [] 1   [x] 2   [] 3   [] 4   4. Putting on and taking off regular upper body clothing? [] 1   [x] 2   [] 3   [] 4   5. Taking care of personal grooming such as brushing teeth? [] 1   [] 2   [x] 3   [] 4   6. Eating meals? [] 1   [] 2   [x] 3   [] 4   © , Trustees of [address] [place] [address], under license to [employer] All rights reserved      Score:  Initial: 14 Most Recent: X (Date: -- )    Interpretation of Tool:  Represents activities that are increasingly more difficult (i.e. Bed mobility, Transfers, Gait). Score 24 23 22-20 19-15 14-10 9-7 6     Modifier CH CI CJ CK CL CM CN      ?  Self Care:     - CURRENT STATUS: CL - 60%-79% impaired, limited or restricted    - GOAL STATUS: CK - 40%-59% impaired, limited or restricted    - D/C STATUS:  ---------------To be determined---------------  Payor: [employer] VIP CARE PLUS / [place] [employer] [place] / Product Type: Managed Care Medicare /      Medical Necessity:     · Patient is expected to demonstrate progress in balance, coordination and functional technique to decrease assistance required with self care and functional mobility. Reason for Services/Other Comments:  · Patient continues to require skilled intervention due to decreased self care and functional mobility. Use of outcome tool(s) and clinical judgement create a POC that gives a: LOW COMPLEXITY         TREATMENT:   (In addition to Assessment/Re-Assessment sessions the following treatments were rendered)     Pre-treatment Symptoms/Complaints:  none  Pain: Initial:   Pain Intensity 1: 2  Pain Location 1: Shoulder  Pain Intervention(s) 1: Exercise  Post Session:  0     Self Care: (40): Procedure(s) (per grid) utilized to improve and/or restore self-care/home management as related to dressing, bathing, toileting and grooming. Required moderate verbal, tactile and   cueing to facilitate activities of daily living skills. Braces/Orthotics/Lines/Etc:   · O2 Device: Nasal cannula  Treatment/Session Assessment:    · Response to Treatment:  Tolerated well  · Interdisciplinary Collaboration:   o Certified Occupational Therapy Assistant  o Registered Nurse  · After treatment position/precautions:   o Up in chair  o Bed alarm/tab alert on  o Bed/Chair-wheels locked  o Bed in low position  o Call light within reach  o RN notified  o Family at bedside   · Compliance with Program/Exercises: compliant all of the time. · Recommendations/Intent for next treatment session: \"Next visit will focus on advancements to more challenging activities\".   Total Treatment Duration:      40 mins     Gaviota Pereira

## 2018-04-04 NOTE — PROGRESS NOTES
Hospitalist Progress Note     Admit Date:  3/30/2018  7:04 AM   Name:  Lyubov Treadwell   Age:  80 y.o.  :  2/10/1927   MRN:  [id number]   PCP:  Deuce Pop MD  Treatment Team: Attending Provider: Michele Rae MD; Utilization Review: Jazmín Soto    Subjective:    is a 79 yo female who presented with dyspnea on a background of CAD, OA, HLD, NSTEMI, and HTN. CXR showed pulmonary venous congestion and small pleural effusions. She was seen by Cardiology with low concern for CHF (dyspnea thought to be secondary to p HTN and TR). Today: she reports dyspnea stable and improving slightly since admission.     Objective:     Patient Vitals for the past 24 hrs:   Temp Pulse Resp BP SpO2   18 - - - - 98 %   18 1828 96.8 °F (36 °C) 82 16 100/65 100 %   18 1535 96 °F (35.6 °C) 87 16 102/53 96 %   18 1140 96.6 °F (35.9 °C) 80 16 110/59 92 %   18 0801 - - - - 93 %   18 0728 97.9 °F (36.6 °C) 85 18 106/66 -   18 0641 98.3 °F (36.8 °C) 85 24 117/72 94 %   18 0320 97.6 °F (36.4 °C) 85 24 116/66 99 %   18 2351 98 °F (36.7 °C) 88 24 109/64 97 %   18 2149 - 91 - 108/68 -   18 - - - - 99 %     Oxygen Therapy  O2 Sat (%): 98 % (18)  Pulse via Oximetry: 85 beats per minute (18)  O2 Device: Nasal cannula (18)  O2 Flow Rate (L/min): 2 l/min (18)  No intake or output data in the 24 hours ending 18        General appearance: NAD, conversant  Neck: Trachea midline   FROM, supple, no thyromegaly or lymphadenopathy  Lungs: clear to auscultation  CV: S1,S2 present, no added murmurs  Abdomen: Soft, non-tender; no masses   Extremities: No peripheral edema or extremity lymphadenopathy  Skin: Normal temperature, turgor and texture; no subcutaneous nodules  Psych: Appropriate affect, alert and oriented to person, place     Data Review:  I have reviewed all labs, meds, telemetry events, and studies from the last 24 hours. Recent Results (from the past 24 hour(s))   METABOLIC PANEL, BASIC    Collection Time: 04/03/18  5:46 AM   Result Value Ref Range    Sodium 144 136 - 145 mmol/L    Potassium 3.9 3.5 - 5.1 mmol/L    Chloride 101 98 - 107 mmol/L    CO2 37 (H) 21 - 32 mmol/L    Anion gap 6 (L) 7 - 16 mmol/L    Glucose 88 65 - 100 mg/dL    BUN 26 (H) 8 - 23 MG/DL    Creatinine 1.10 (H) 0.6 - 1.0 MG/DL    GFR est AA 60 (L) >60 ml/min/1.73m2    GFR est non-AA 49 (L) >60 ml/min/1.73m2    Calcium 9.0 8.3 - 10.4 MG/DL   CBC WITH AUTOMATED DIFF    Collection Time: 04/03/18  5:46 AM   Result Value Ref Range    WBC 4.4 4.3 - 11.1 K/uL    RBC 2.60 (L) 4.05 - 5.25 M/uL    HGB 8.3 (L) 11.7 - 15.4 g/dL    HCT 26.8 (L) 35.8 - 46.3 %    .1 (H) 79.6 - 97.8 FL    MCH 31.9 26.1 - 32.9 PG    MCHC 31.0 (L) 31.4 - 35.0 g/dL    RDW 16.8 (H) 11.9 - 14.6 %    PLATELET 091 640 - 399 K/uL    MPV 11.2 10.8 - 14.1 FL    DF AUTOMATED      NEUTROPHILS 48 43 - 78 %    LYMPHOCYTES 33 13 - 44 %    MONOCYTES 13 (H) 4.0 - 12.0 %    EOSINOPHILS 5 0.5 - 7.8 %    BASOPHILS 1 0.0 - 2.0 %    IMMATURE GRANULOCYTES 0 0.0 - 5.0 %    ABS. NEUTROPHILS 2.1 1.7 - 8.2 K/UL    ABS. LYMPHOCYTES 1.5 0.5 - 4.6 K/UL    ABS. MONOCYTES 0.6 0.1 - 1.3 K/UL    ABS. EOSINOPHILS 0.2 0.0 - 0.8 K/UL    ABS. BASOPHILS 0.0 0.0 - 0.2 K/UL    ABS. IMM.  GRANS. 0.0 0.0 - 0.5 K/UL        All Micro Results     None          Current Meds:  Current Facility-Administered Medications   Medication Dose Route Frequency    polyethylene glycol (MIRALAX) packet 17 g  17 g Oral DAILY    morphine (ROXANOL) concentrated oral syringe 5 mg  5 mg Oral Q3H PRN    morphine (ROXANOL) concentrated oral syringe 10 mg  10 mg Oral Q3H PRN    albuterol-ipratropium (DUO-NEB) 2.5 MG-0.5 MG/3 ML  3 mL Nebulization TID RT    potassium chloride (K-DUR, KLOR-CON) SR tablet 40 mEq  40 mEq Oral DAILY    furosemide (LASIX) injection 40 mg  40 mg IntraVENous Q12H    acetaminophen (TYLENOL) tablet 500 mg  500 mg Oral Q6H PRN    allopurinol (ZYLOPRIM) tablet 100 mg  100 mg Oral BID    amLODIPine (NORVASC) tablet 5 mg  5 mg Oral DAILY    aspirin delayed-release tablet 81 mg  81 mg Oral DAILY    clopidogrel (PLAVIX) tablet 75 mg  75 mg Oral DAILY    colchicine tablet 0.3 mg  0.3 mg Oral DAILY    gabapentin (NEURONTIN) capsule 300 mg  300 mg Oral BID    pravastatin (PRAVACHOL) tablet 20 mg  20 mg Oral QHS    tamsulosin (FLOMAX) capsule 0.4 mg  0.4 mg Oral DAILY    sodium chloride (NS) flush 5-10 mL  5-10 mL IntraVENous Q8H    sodium chloride (NS) flush 5-10 mL  5-10 mL IntraVENous PRN    heparin (porcine) injection 5,000 Units  5,000 Units SubCUTAneous Q12H         Assessment and Plan:     Hospital Problems as of 4/3/2018  Date Reviewed: 2/15/2018          Codes Class Noted - Resolved POA    CKD (chronic kidney disease) stage 3, GFR 30-59 ml/min ICD-10-CM: N18.3  ICD-9-CM: 585.3  12/23/2017 - Present         Osteoarthritis (Chronic) ICD-10-CM: M19.90  ICD-9-CM: 715.90  7/20/2017 - Present Yes        Reflux esophagitis ICD-10-CM: K21.0  ICD-9-CM: 530.11  Unknown - Present Yes        Mixed hyperlipidemia ICD-10-CM: E78.2  ICD-9-CM: 272.2  Unknown - Present Yes        SOB (shortness of breath) ICD-10-CM: R06.02  ICD-9-CM: 786.05  Unknown - Present Yes        RESOLVED: CHF (congestive heart failure) (HCC) ICD-10-CM: I50.9  ICD-9-CM: 428.0  4/1/2018 - 4/2/2018 Unknown        RESOLVED: CHF exacerbation ([address] ICD-10-CM: I50.9  ICD-9-CM: 428.0  3/30/2018 - 4/2/2018 Unknown              PLAN:    Dyspnea  Cardiology following: low concern for acute CHF, symptoms most likely secondary to pulm HTN    Lasix 40 IV BID continue at this time, Cardiology to manage  Cardiology recs transition to palliative/hospice  Palliative care consulted and patient wanting full code.  Need to discuss with daughter as well  Daily weights (weights now downtrending)  Strict Is and Os  DASH Diet      DC planning/Dispo:  Home in am  DVT ppx:  heparin    Signed:  Cira Nation MD

## 2018-04-04 NOTE — PROGRESS NOTES
[address]  Face to Face Encounter    Patients Name: Jannet Metz    YOB: 1927    Ordering Physician: Kim Castelan    Primary Diagnosis: CHF exacerbation [facility])  CHF (congestive heart failure) [facility])    Date of Face to Face:   4/4/18                                  Face to Face Encounter findings are related to primary reason for home care:   yes. 1. I certify that the patient needs intermittent care as follows: skilled nursing care:  skilled observation/assessment, patient education and complex care plan management  physical therapy: strengthening, gait/stair training, balance training and pt/caregiver education  speech therapy:  pt/caregiver education    2. I certify that this patient is homebound, that is: 1) patient requires the use of a walker device, special transportation, or assistance of another to leave the home; or 2) patient's condition makes leaving the home medically contraindicated; and 3) patient has a normal inability to leave the home and leaving the home requires considerable and taxing effort. Patient may leave the home for infrequent and short duration for medical reasons, and occasional absences for non-medical reasons. Homebound status is due to the following functional limitations: Patient with strength deficits limiting the performance of all ADL's without caregiver assistance or the use of an assistive device. 3. I certify that this patient is under my care and that I, or a nurse practitioner or  [id number], or clinical nurse specialist, or certified nurse midwife, working with me, had a Face-to-Face Encounter that meets the physician Face-to-Face Encounter requirements.   The following are the clinical findings from the [address] [place] [address] encounter that support the need for skilled services and is a summary of the encounter:     See Marisa Mulligan  4/4/2018      THE FOLLOWING TO BE COMPLETED BY THE COMMUNITY PHYSICIAN:    I concur with the findings described above from the F2F encounter that this patient is homebound and in need of a skilled service.     Certifying Physician: _____________________________________      Printed Certifying Physician Name: _____________________________________    Date: _________________

## 2018-04-04 NOTE — PROGRESS NOTES
Received bedside shift report from off going nurse Jannette Hernandez RN which included SBAR, MAR, and Plan of Care. Patient is resting quietly with eyes closed and respirations present. Will continue to follow patient's progression through hourly rounding. Will meet all requests as needed and appropriate.

## 2018-04-04 NOTE — PROGRESS NOTES
RAFI called Sara with [facility] to confirm the referral, but stated that the patient is discharging in the next 30 minutes with no family at the bedside, and her children are the best source of information. Sara advised that she would call the patient's children to do an assessment. RAFI agreed. RAFI also called patient's daughter to let her know that her mother would be leaving the hospital at 5:20 (estimated by Loretta Herron).  Merissa Simmons

## 2018-04-04 NOTE — PROGRESS NOTES
Problem: Mobility Impaired (Adult and Pediatric)  Goal: *Acute Goals and Plan of Care (Insert Text)  STG:  (1.)Ms. Danish Eller will transfer from bed to chair and chair to bed with MODERATE ASSIST using the least restrictive device within 3 treatment day(s). Met 4/1  (2.)Ms. Danish Eller will ambulate with MODERATE ASSIST for 5 feet with the least restrictive device within 3 treatment day(s). Met 4/1    LTG:  (1.)Ms. Danish Eller will transfer from bed to chair and chair to bed with MINIMAL ASSIST using the least restrictive device within 7 treatment day(s). Met 4/1  (2.)Ms. Danish Eller will ambulate with MINIMAL ASSIST for 20 feet with the least restrictive device within 7 treatment day(s). Met 4/1    ADDENDUM GOALS 4/1/18 : (1.)Ms. Danish Eller will roll & move from supine to sit and sit to supine  in bed with MINIMAL ASSIST within 7 treatment day(s). 2) transfers with walker & CGA. MET 4/4/18                                                   3) pt ambulating with rolling walker 100 ft with CGA. MET 4/4/18    ________________________________________________________________________________________________    PHYSICAL THERAPY: Daily Note, Treatment Day: 4th, AM 4/4/2018  INPATIENT: Hospital Day: 6  Payor: [employer] VIP CARE PLUS / [place] [employer] VIP [employer] [place]US / Product Type: Managed Care Medicare /      NAME/AGE/GENDER: Erum Saldivar is a 80 y.o. female   PRIMARY DIAGNOSIS: CHF exacerbation ([address]  CHF (congestive heart failure) ([address] <principal problem not specified> <principal problem not specified>        ICD-10: Treatment Diagnosis:    · Generalized Muscle Weakness (M62.81)  · Difficulty in walking, Not elsewhere classified (R26.2)   Precaution/Allergies:  Bee pollen and Fire ant      ASSESSMENT:     Ms. Danish Eller is sitting up in chair on arrival and is agreeable to PT. Pt stands and walks down hallway with good balance and no LOB.   Pt returned to chair and performed BLE exercises. Pt left sitting up with needs in reach and on 2L O2     This section established at most recent assessment   PROBLEM LIST (Impairments causing functional limitations):  1. Decreased Strength  2. Decreased ADL/Functional Activities  3. Decreased Transfer Abilities  4. Decreased Ambulation Ability/Technique  5. Decreased Balance  6. Decreased Activity Tolerance  7. Increased Fatigue  8. Increased Shortness of Breath  9. Decreased Flexibility/Joint Mobility  10. Decreased [place] with Home Exercise Program  11. Decreased Cognition   INTERVENTIONS PLANNED: (Benefits and precautions of physical therapy have been discussed with the patient.)  1. Balance Exercise  2. Bed Mobility  3. Gait Training  4. Home Exercise Program (HEP)  5. Neuromuscular Re-education/Strengthening  6. Range of Motion (ROM)  7. Therapeutic Activites  8. Therapeutic Exercise/Strengthening     TREATMENT PLAN: Frequency/Duration: daily for duration of hospital stay  Rehabilitation Potential For Stated Goals: Good     RECOMMENDED REHABILITATION/EQUIPMENT: (at time of discharge pending progress): Due to the probability of continued deficits (see above) this patient will likely need continued skilled physical therapy after discharge. Equipment:    None at this time. Pt states she has cane, walker, and w/c              HISTORY:   History of Present Injury/Illness (Reason for Referral):  Pt arrived through ER with dyspnea and hx of CHF. States she came from home where she lives in a home where her son lives next door. She has a ramp to enter home. States she ambulated with cane but has walker and w/c. Poor historian who fluctuated between thinking it was guillermina time and easter. Past Medical History/Comorbidities:   Ms. Joseph Coreas  has a past medical history of Acute kidney failure, unspecified ([facility] Arthritis; CAD (coronary artery disease);  Cellulitis and abscess of other specified site; Coronary atherosclerosis of native coronary artery; Essential hypertension, benign (3/17/2015); Hypertension; Hypopotassemia; Mixed hyperlipidemia; Osteoarthritis (7/20/2017); Other and unspecified hyperlipidemia; Reflux esophagitis; Renal failure, unspecified; Shortness of breath; and Unspecified essential hypertension. Ms. Juanita Philippe  has a past surgical history that includes hx cholecystectomy; pr layr clos wnd face,facial <2.5cm; pr cardiac surg procedure unlist; hx cataract removal (Bilateral); and hx hysterectomy. Social History/Living Environment:   Home Environment: Private residence  # Steps to Enter: 0  Wheelchair Ramp: Yes  One/Two Story Residence: One story  Living Alone: No  Support Systems: Child(jayleen)  Patient Expects to be Discharged to[de-identified] Private residence  Current DME Used/Available at Home: Walker, rolling, Wheelchair, Cane, straight  Tub or Shower Type: Tub/Shower combination  Prior Level of Function/Work/Activity:  States she ambulated with cane and was independent with household mobility  Previous Treatment Approaches:          Pt was recently in this hospital for similar problems (per pt report)  Personal Factors:          Sex:  female        Age:  80 y.o. Pt seemed anxious with her SOB   Number of Personal Factors/Comorbidities that affect the Plan of Care: 1-2: MODERATE COMPLEXITY   EXAMINATION:   Most Recent Physical Functioning:   Gross Assessment: 3 to 3-/5 throughout                       Balance:    Bed Mobility:          Transfers:  Sit to Stand: Stand-by assistance  Stand to Sit: Stand-by assistance  Bed to Chair: Stand-by assistance;Contact guard assistance  Gait:     Speed/Mary: Delayed;Pace decreased (<100 feet/min)  Gait Abnormalities: Antalgic;Decreased step clearance  Distance (ft): 100 Feet (ft)  Assistive Device: Walker, rolling  Ambulation - Level of Assistance: Stand-by assistance;Contact guard assistance         Sacral wound with bandage   Body Structures Involved:  1. Nerves  2. Muscles  3.  Ligaments Body Functions Affected:  1. Mental  2. Sensory/Pain  3. Neuromusculoskeletal  4. Movement Related  5. Skin Related Activities and Participation Affected:  1. Learning and Applying Knowledge  2. General Tasks and Demands  3. Mobility  4. Self Care  5. Domestic Life  6. Interpersonal Interactions and Relationships  7. Community, Social and [place]   Number of elements that affect the Plan of Care: 3: MODERATE COMPLEXITY   CLINICAL PRESENTATION:   Presentation: Stable and uncomplicated: LOW COMPLEXITY   CLINICAL DECISION MAKIN[address] [place] [id number] AM-PAC 6 Clicks   Basic Mobility Inpatient Short Form  How much difficulty does the patient currently have. .. Unable A Lot A Little None   1. Turning over in bed (including adjusting bedclothes, sheets and blankets)? [] 1   [x] 2   [] 3   [] 4   2. Sitting down on and standing up from a chair with arms ( e.g., wheelchair, bedside commode, etc.)   [] 1   [x] 2   [] 3   [] 4   3. Moving from lying on back to sitting on the side of the bed? [] 1   [x] 2   [] 3   [] 4   How much help from another person does the patient currently need. .. Total A Lot A Little None   4. Moving to and from a bed to a chair (including a wheelchair)? [x] 1   [] 2   [] 3   [] 4   5. Need to walk in hospital room? [x] 1   [] 2   [] 3   [] 4   6. Climbing 3-5 steps with a railing? [x] 1   [] 2   [] 3   [] 4   © , Trustees of [address] [place] [address], under license to [employer]. All rights reserved      Score:  Initial: 9 Most Recent: X (Date: -- )    Interpretation of Tool:  Represents activities that are increasingly more difficult (i.e. Bed mobility, Transfers, Gait). Score 24 23 22-20 19-15 14-10 9-7 6     Modifier CH CI CJ CK CL CM CN      ?  Mobility - Walking and Moving Around:     - CURRENT STATUS: CM - 80%-99% impaired, limited or restricted    - GOAL STATUS: CL - 60%-79% impaired, limited or restricted    - D/C STATUS:  ---------------To be determined---------------  Payor: [employer] VIP CARE PLUS [place] [employer] VIP CARE [place]US / Product Type: Managed Care Medicare /      Medical Necessity:     · Patient demonstrates fair rehab potential due to higher previous functional level. Reason for Services/Other Comments:  · Patient continues to require skilled intervention due to ongoing goals noted above. Use of outcome tool(s) and clinical judgement create a POC that gives a: Questionable prediction of patient's progress: MODERATE COMPLEXITY            TREATMENT:   (In addition to Assessment/Re-Assessment sessions the following treatments were rendered)   Pre-treatment Symptoms/Complaints:  Pt states her legs feel a little weak today. Pain: Initial: 0/10 visually     Post Session:  3/10 knees   Therapeutic Activity: (    10 minutes): Therapeutic activities including Ambulation on level ground to improve mobility, strength and balance. Required minimal   to promote dynamic balance in standing. Therapeutic Exercise: (15 Minutes):  Exercises per grid below to improve strength. Required minimal verbal cues to promote proper body alignment. Progressed range as indicated. Date:  4/2 Date:  4/2 pm Date:   Date  4/4/18   Activity/Exercise Parameters Parameters Parameters    Marching in place 12 12  15   LAQ 12 12 12a 15   Ankle pumps 12 12 12a 15   Hip abd/add 12 12 12a 15   Seated marching   12a                    Braces/Orthotics/Lines/Etc:   · O2 Device: Nasal cannula  Treatment/Session Assessment:    · Response to Treatment:  Tolerated session well this pm  · Interdisciplinary Collaboration:   o Registered Nurse  · After treatment position/precautions:   o Up in chair  o Bed alarm/tab alert on  o Bed/Chair-wheels locked  o Call light within reach  o RN notified   · Compliance with Program/Exercises: Will assess as treatment progresses. · Recommendations/Intent for next treatment session:   \"Next visit will focus on advancements to more challenging activities and reduction in assistance provided\".   Total Treatment Duration:  PT Patient Time In/Time Out  Time In: 1130  Time Out: 2 [place] MIMI Wilkinson

## 2018-04-04 NOTE — PROGRESS NOTES
CM reviewed patient's chart and called [facility] to confirm that patient was current and what services were offered for the patient through their agency. CM spoke with Sandy Fernando at [facility] who stated that the patient was discharged on 3/31 due to family discussing rehab at a SNF for the patient. Patient had [facility] arranged through [facility] on 3/29. CM spoke with Agnes Bajwa at [facility] who stated that patient would need a new order since she is no longer a patient of theirs. CM called daughter Eileen Mcgowan to confirm that she want  to set up [facility] through [facility]. Patient was receiving PT, Skilled Nursing, and Speech Therapy. CM spoke with patient's daughter Eileen Mcgowan who stated that she would like for the  to arrange for [facility]. CM put in a referral. Patient's daughter also stated that she was trying to get in touch with her brother to ensure that he would be at the patient's home once she was discharged. CM advised Eileen Greener to call her back to confirm that he would be there. Eileen Mcgowan called back a few minutes later and confirmed that her brother would be at the home. CM let the patient's nurse know that the patient had someone at the home. CM confirmed that the daughter wanted the patient transported home by EMS. Patient's daughter Sofiya Hoskins) confirmed her desire for the patient to be transported home by EMS.

## 2018-04-04 NOTE — DISCHARGE SUMMARY
Hospitalist Discharge Summary     Patient ID:  Yahir Treadwell  [id number]  1 y.o.  2/10/1927  Admit date: 3/30/2018  7:04 AM  Discharge date and time: 4/4/2018  Attending: Jose L Borrego MD  PCP:  Anna Trujillo MD  Treatment Team: Attending Provider: Jose L Borrego MD; Utilization Review: Jazmín Soto    Principal Diagnosis Pulmonary hypertension [facility])   Principal Problem:    Pulmonary hypertension ([facility] [address] (5/17/2016)    Active Problems:    Reflux esophagitis ()      Mixed hyperlipidemia ()      SOB (shortness of breath) ()      Osteoarthritis (7/20/2017)      Acute respiratory failure with hypoxia ([facility] [address] (12/23/2017)      Macrocytic anemia (12/23/2017)      Chronic respiratory failure with hypoxia (HCC) (1/25/2018)      Congestive heart failure (CHF) ([facility] [address] (3/1/2018)         Hospital Course:  Please refer to the admission H&P for details of presentation. In summary, the patient is    a 79 yo female who presented with dyspnea on a background of CAD, OA, HLD, and HTN. She reports that the symptoms were present for 1 day and progressively worsened prompting the ER visit. CXR showed pulmonary venous congestion and.she was diagnosed with CHF exacerbation. CXR showed no acute process. She also had right >left leg swelling and dopplers showed no DVT. Of note, 12/2017 ECHO showed an EF of 55-60% with severe/moderate tricuspid regurgitation. She was started on IV Lasix but did not have significant diuresis so Cardiology was consulted. Cardiology determined her SOB was multifactorial due to: anemia, age, significant Pulm HTN. Lasix was discontinued and Palliative was consulted. However, patient wishes to remain a FULL CODE and wants to continue care at home with her son. She is no opposed to rehospitalization. She was discharge home into care of her son and daughters notified of plan. Significant Diagnostic Studies:   CXR IMPRESSION:  CONGESTIVE HEART FAILURE.     Labs: Results:       Chemistry Recent Labs 04/04/18   0718  04/03/18   0546  04/02/18   0520   GLU  93  88  93   NA  139  144  143   K  4.0  3.9  4.0   CL  100  101  103   CO2  38*  37*  38*   BUN  30*  26*  25*   CREA  1.02*  1.10*  1.01*   CA  9.1  9.0  8.8   AGAP  1*  6*  2*      CBC w/Diff Recent Labs      04/03/18   0546  04/02/18   0520   WBC  4.4  4.3   RBC  2.60*  2.75*   HGB  8.3*  8.7*   HCT  26.8*  28.4*   PLT  233  238   GRANS  48  55   LYMPH  33  31   EOS  5  4      Cardiac Enzymes No results for input(s): CPK, CKND1, BRIELLE in the last 72 hours. No lab exists for component: CKRMB, TROIP   Coagulation No results for input(s): PTP, INR, APTT in the last 72 hours. No lab exists for component: INREXT    Lipid Panel Lab Results   Component Value Date/Time    Cholesterol, total 118 10/24/2017 02:08 PM    HDL Cholesterol 53 10/24/2017 02:08 PM    LDL, calculated 52 10/24/2017 02:08 PM    VLDL, calculated 13 10/24/2017 02:08 PM    Triglyceride 63 10/24/2017 02:08 PM      BNP No results for input(s): BNPP in the last 72 hours. Liver Enzymes No results for input(s): TP, ALB, TBIL, AP, SGOT, GPT in the last 72 hours.     No lab exists for component: DBIL   Thyroid Studies No results found for: T4, T3U, TSH, TSHEXT         Discharge Exam:  Visit Vitals    /67 (BP 1 Location: Left arm, BP Patient Position: At rest;Pre-activity)    Pulse 85    Temp 96 °F (35.6 °C)    Resp 20    Ht 5' 3\" (1.6 m)    Wt 61.1 kg (134 lb 12.8 oz)    SpO2 96%    BMI 23.88 kg/m2     General appearance: NAD, conversant  Neck: Trachea midline   FROM, supple, no thyromegaly or lymphadenopathy  Lungs: clear to auscultation  CV: S1,S2 present, no added murmurs  Abdomen: Soft, non-tender; no masses   Extremities: No peripheral edema or extremity lymphadenopathy  Skin: Normal temperature, turgor and texture; no subcutaneous nodules  Psych: Appropriate affect, alert and oriented to person, place    Disposition: home  Discharge Condition: stable  Patient Instructions: Current Discharge Medication List      CONTINUE these medications which have CHANGED    Details   furosemide (LASIX) 40 mg tablet Take 1 Tab by mouth daily for 30 days. Qty: 30 Tab, Refills: 0         CONTINUE these medications which have NOT CHANGED    Details   gabapentin (NEURONTIN) 300 mg capsule Take 300 mg by mouth two (2) times a day. OXYGEN-AIR DELIVERY SYSTEMS Take 2 L by inhalation continuous. via NC      amLODIPine (NORVASC) 5 mg tablet Take 1 Tab by mouth daily. Qty: 30 Tab, Refills: 0      albuterol (PROVENTIL HFA, VENTOLIN HFA, PROAIR HFA) 90 mcg/actuation inhaler Take 1 Puff by inhalation every six (6) hours as needed for Wheezing. Qty: 1 Inhaler, Refills: 1      sennosides (SENNA) 8.6 mg cap Take 8.6 Tabs by mouth as needed (constipation). daily as needed. colchicine 0.6 mg tablet Take 0.5 Tabs by mouth daily. Qty: 30 Tab, Refills: 0      cyanocobalamin 1,000 mcg tablet Take 1 Tab by mouth daily. Qty: 30 Tab, Refills: 0      allopurinol (ZYLOPRIM) 100 mg tablet Take 1 Tab by mouth two (2) times a day. Qty: 60 Tab, Refills: 3    Associated Diagnoses: Hyperuricemia; Idiopathic chronic gout of multiple sites without tophus      pravastatin (PRAVACHOL) 20 mg tablet Take 1 Tab by mouth nightly. Qty: 30 Tab, Refills: 4    Associated Diagnoses: Mixed hyperlipidemia      raNITIdine (ZANTAC) 150 mg tablet Take 1 Tab by mouth two (2) times a day. Qty: 60 Tab, Refills: 4    Associated Diagnoses: Gastroesophageal reflux disease without esophagitis      clopidogrel (PLAVIX) 75 mg tab Take 1 Tab by mouth daily. Qty: 30 Tab, Refills: 4    Associated Diagnoses: Coronary artery disease due to lipid rich plaque      loratadine (CLARITIN) 10 mg tablet Take 1 Tab by mouth daily. Qty: 30 Tab, Refills: 4    Associated Diagnoses: Non-seasonal allergic rhinitis due to other allergic trigger      aspirin 81 mg CpDR Take  by mouth daily.       acetaminophen (TYLENOL) 325 mg tablet Take 2 Tabs by mouth every six (6) hours as needed.   Qty: 20 Tab, Refills: 0         STOP taking these medications       tamsulosin (FLOMAX) 0.4 mg capsule Comments:   Reason for Stopping:         albuterol (PROVENTIL VENTOLIN) 2.5 mg /3 mL (0.083 %) nebulizer solution Comments:   Reason for Stopping:               Activity: Activity as tolerated  Diet: Cardiac Diet      Follow-up  · PCP in 1 week    Time spent to discharge patient 35 minutes  Signed:  Fausto Barr MD  4/4/2018  4:47 PM

## 2018-04-05 ENCOUNTER — HOSPICE ADMISSION (OUTPATIENT)
Dept: HOSPICE | Facility: HOSPICE | Age: 83
End: 2018-04-05
Payer: MEDICARE

## 2018-04-05 ENCOUNTER — PATIENT OUTREACH (OUTPATIENT)
Dept: CASE MANAGEMENT | Age: 83
End: 2018-04-05

## 2018-04-05 ENCOUNTER — HOME CARE VISIT (OUTPATIENT)
Dept: SCHEDULING | Facility: HOME HEALTH | Age: 83
End: 2018-04-05

## 2018-04-05 NOTE — PROGRESS NOTES
Transition of Care Discharge Follow-up Questionnaire   Date/Time of Call:   4/5/18    What was the patient hospitalized for? CHF           Does the patient understand his/her diagnosis and/or treatment and what happened during the hospitalization? Per Karrie Haas, son, she understands. Did the patient receive discharge instructions? Yes   Review any discharge instructions (see notes in [facility]). Ask patient if they understand these. Do they have any questions? Weigh daily, report to PCP weight gain of 3lb overnight or 5lb in a week. Were home services ordered (nursing, PT, OT, ST, etc.)? [facility]       If so, has the first visit occurred? If not, why? (Assist with coordination of services if necessary.)          Was any DME ordered? None     If so, has it been received? If not, why?  (Assist with coordination of arranging DME orders if necessary.) N/A         Complete a review of all medications (new, continued and discontinued meds per the D/C instructions and [facility] Reviewed with son, Karrie Haas. He is unable to read the labels but had someone else in the home read to him. The Lasix at home is 20mg, so she will take two tablets daily until a new Rx is filled. Were all new prescriptions filled? If not, why?  (Assist with obtainment of medications if necessary.) Changes: Lasix 40mg daily, albuterol via nebulizer every 6 hours as needed  Stop Flomax         Does the patient understand the purpose and dosing instructions for all medications? (If patient has questions, provide explanation and education.) Yes per Karrie Haas   Does the patient have any problems in performing ADLs? (If patient is unable to perform ADLs  what is the limiting factor(s)? Do they have a support system that can assist? If no support system is present, discuss possible assistance that they may be able to obtain.) HH to send an aide. Needs assistance with bath, meals.               Does the patient have all follow-up appointments scheduled? Has transportation been arranged? [facility] Pulmonary follow-up should be within 7 days of discharge; all others should have PCP follow-up within 7 days of discharge; follow-ups with other specialists as appropriate or ordered.) Appointment made with PCP, Dr. Diogenes Trent for 4/9/17 at 3:10pm    Appointment made with cardiology, Dr. Haily Bergeron for 4/16/18 at 8:45am    Information given to Rui Boyd and he wrote down information. Any other questions or concerns expressed by the patient? None   Schedule next appointment with GRISELDA PERRY Coordinator or refer to RN Case Manager/  as appropriate. Followed by Hoang Delarosa RN CM. Note forwarded to her in-box.    CRISTIANO Call Completed By: Elliott Phillips RN

## 2018-04-05 NOTE — PROGRESS NOTES
This note will not be viewable in [address] Patient discharged 4/4/18 to home. Patient currently followed by [facility]. Will notify Jennifer Mccormack RN and Olivia Irving RN of discharge.

## 2018-04-07 ENCOUNTER — HOME CARE VISIT (OUTPATIENT)
Dept: SCHEDULING | Facility: HOME HEALTH | Age: 83
End: 2018-04-07
Payer: MEDICARE

## 2018-04-07 PROCEDURE — 0651 HSPC ROUTINE HOME CARE

## 2018-04-07 PROCEDURE — G0299 HHS/HOSPICE OF RN EA 15 MIN: HCPCS

## 2018-04-07 PROCEDURE — 3336500001 HSPC ELECTION

## 2018-04-08 ENCOUNTER — APPOINTMENT (OUTPATIENT)
Dept: GENERAL RADIOLOGY | Age: 83
End: 2018-04-08
Attending: EMERGENCY MEDICINE
Payer: COMMERCIAL

## 2018-04-08 ENCOUNTER — HOME CARE VISIT (OUTPATIENT)
Dept: HOSPICE | Facility: HOSPICE | Age: 83
End: 2018-04-08
Payer: MEDICARE

## 2018-04-08 ENCOUNTER — HOSPITAL ENCOUNTER (EMERGENCY)
Age: 83
Discharge: HOME | End: 2018-04-08
Attending: EMERGENCY MEDICINE
Payer: COMMERCIAL

## 2018-04-08 VITALS
HEART RATE: 76 BPM | DIASTOLIC BLOOD PRESSURE: 65 MMHG | TEMPERATURE: 97.6 F | SYSTOLIC BLOOD PRESSURE: 104 MMHG | RESPIRATION RATE: 18 BRPM

## 2018-04-08 VITALS
DIASTOLIC BLOOD PRESSURE: 55 MMHG | HEART RATE: 74 BPM | SYSTOLIC BLOOD PRESSURE: 111 MMHG | OXYGEN SATURATION: 100 % | HEIGHT: 63 IN | RESPIRATION RATE: 21 BRPM | WEIGHT: 127 LBS | BODY MASS INDEX: 22.5 KG/M2 | TEMPERATURE: 98.4 F

## 2018-04-08 DIAGNOSIS — R07.89 ATYPICAL CHEST PAIN: Primary | ICD-10-CM

## 2018-04-08 LAB
ALBUMIN SERPL-MCNC: 2.6 G/DL (ref 3.2–4.6)
ALBUMIN/GLOB SERPL: 0.8 {RATIO} (ref 1.2–3.5)
ALP SERPL-CCNC: 91 U/L (ref 50–136)
ALT SERPL-CCNC: 20 U/L (ref 12–65)
ANION GAP SERPL CALC-SCNC: 5 MMOL/L (ref 7–16)
AST SERPL-CCNC: 23 U/L (ref 15–37)
ATRIAL RATE: 85 BPM
BASOPHILS # BLD: 0 K/UL (ref 0–0.2)
BASOPHILS NFR BLD: 1 % (ref 0–2)
BILIRUB SERPL-MCNC: 0.3 MG/DL (ref 0.2–1.1)
BUN SERPL-MCNC: 35 MG/DL (ref 8–23)
CALCIUM SERPL-MCNC: 10.3 MG/DL (ref 8.3–10.4)
CALCULATED P AXIS, ECG09: 102 DEGREES
CALCULATED R AXIS, ECG10: 1 DEGREES
CALCULATED T AXIS, ECG11: 83 DEGREES
CHLORIDE SERPL-SCNC: 104 MMOL/L (ref 98–107)
CO2 SERPL-SCNC: 36 MMOL/L (ref 21–32)
CREAT SERPL-MCNC: 0.96 MG/DL (ref 0.6–1)
DIAGNOSIS, 93000: NORMAL
DIFFERENTIAL METHOD BLD: ABNORMAL
EOSINOPHIL # BLD: 0.4 K/UL (ref 0–0.8)
EOSINOPHIL NFR BLD: 6 % (ref 0.5–7.8)
ERYTHROCYTE [DISTWIDTH] IN BLOOD BY AUTOMATED COUNT: 17 % (ref 11.9–14.6)
GLOBULIN SER CALC-MCNC: 3.4 G/DL (ref 2.3–3.5)
GLUCOSE SERPL-MCNC: 98 MG/DL (ref 65–100)
HCT VFR BLD AUTO: 22.8 % (ref 35.8–46.3)
HGB BLD-MCNC: 7 G/DL (ref 11.7–15.4)
IMM GRANULOCYTES # BLD: 0 K/UL (ref 0–0.5)
IMM GRANULOCYTES NFR BLD AUTO: 0 % (ref 0–5)
LYMPHOCYTES # BLD: 2 K/UL (ref 0.5–4.6)
LYMPHOCYTES NFR BLD: 33 % (ref 13–44)
MCH RBC QN AUTO: 32.1 PG (ref 26.1–32.9)
MCHC RBC AUTO-ENTMCNC: 30.7 G/DL (ref 31.4–35)
MCV RBC AUTO: 104.6 FL (ref 79.6–97.8)
MONOCYTES # BLD: 0.6 K/UL (ref 0.1–1.3)
MONOCYTES NFR BLD: 9 % (ref 4–12)
NEUTS SEG # BLD: 3.1 K/UL (ref 1.7–8.2)
NEUTS SEG NFR BLD: 51 % (ref 43–78)
P-R INTERVAL, ECG05: 148 MS
PLATELET # BLD AUTO: 263 K/UL (ref 150–450)
PMV BLD AUTO: 10.9 FL (ref 10.8–14.1)
POTASSIUM SERPL-SCNC: 4.1 MMOL/L (ref 3.5–5.1)
PROT SERPL-MCNC: 6 G/DL (ref 6.3–8.2)
Q-T INTERVAL, ECG07: 380 MS
QRS DURATION, ECG06: 90 MS
QTC CALCULATION (BEZET), ECG08: 452 MS
RBC # BLD AUTO: 2.18 M/UL (ref 4.05–5.25)
SODIUM SERPL-SCNC: 145 MMOL/L (ref 136–145)
TROPONIN I BLD-MCNC: 0.02 NG/ML (ref 0.02–0.05)
VENTRICULAR RATE, ECG03: 85 BPM
WBC # BLD AUTO: 6.1 K/UL (ref 4.3–11.1)

## 2018-04-08 PROCEDURE — 99284 EMERGENCY DEPT VISIT MOD MDM: CPT | Performed by: EMERGENCY MEDICINE

## 2018-04-08 PROCEDURE — 71045 X-RAY EXAM CHEST 1 VIEW: CPT

## 2018-04-08 PROCEDURE — 85025 COMPLETE CBC W/AUTO DIFF WBC: CPT | Performed by: EMERGENCY MEDICINE

## 2018-04-08 PROCEDURE — 80053 COMPREHEN METABOLIC PANEL: CPT | Performed by: EMERGENCY MEDICINE

## 2018-04-08 PROCEDURE — 0651 HSPC ROUTINE HOME CARE

## 2018-04-08 PROCEDURE — 84484 ASSAY OF TROPONIN QUANT: CPT

## 2018-04-08 PROCEDURE — 93005 ELECTROCARDIOGRAM TRACING: CPT | Performed by: EMERGENCY MEDICINE

## 2018-04-08 NOTE — DISCHARGE INSTRUCTIONS
Return with any fevers, vomiting, difficulty breathing, worsening symptoms, or additional concerns. Follow-up with your primary care doctor for reevaluation as needed.

## 2018-04-08 NOTE — ED PROVIDER NOTES
HPI Comments: 19-year-old lady presents with concerns about a shooting pain that goes from the left side of her neck into her chest.  Patient was recently discharged from the [facility]  After having had some edema. He states he does not feel he can a squeezing or tightness and her pain is now resolved. She denies any fevers or vomiting as had no cough or difficulty breathing. Elements of this note were created using speech recognition software. As such, errors of speech recognition may be present. Patient is a 80 y.o. female presenting with chest pain. The history is provided by the patient. Chest Pain    Pertinent negatives include no fever and no palpitations. Past Medical History:   Diagnosis Date    Acute kidney failure, unspecified ([facility] [address]     Arthritis     CAD (coronary artery disease)     Cellulitis and abscess of other specified site     Coronary atherosclerosis of native coronary artery     Essential hypertension, benign 3/17/2015    Hypertension     Hypopotassemia     Mixed hyperlipidemia     Osteoarthritis 7/20/2017    Other and unspecified hyperlipidemia     Reflux esophagitis     Renal failure, unspecified     Shortness of breath     Unspecified essential hypertension        Past Surgical History:   Procedure Laterality Date    CARDIAC SURG PROCEDURE UNLIST      stent    HX CATARACT REMOVAL Bilateral     HX CHOLECYSTECTOMY      HX HYSTERECTOMY      complete    HI LAYR CLOS WND FACE,FACIAL <2.5CM           Family History:   Problem Relation Age of Onset    Heart Attack Mother     Heart Attack Father     Heart Disease Brother     Heart Disease Brother        Social History     Social History    Marital status:      Spouse name: N/A    Number of children: N/A    Years of education: N/A     Occupational History    Not on file.      Social History Main Topics    Smoking status: Never Smoker    Smokeless tobacco: Never Used    Alcohol use No    Drug use: No    Sexual activity: Not Currently     Other Topics Concern    Not on file     Social History Narrative         ALLERGIES: Bee pollen and Fire ant    Review of Systems   Constitutional: Negative for chills and fever. HENT: Negative. Respiratory: Negative. Cardiovascular: Positive for chest pain. Negative for palpitations. Gastrointestinal: Negative. Vitals:    04/08/18 0103 04/08/18 0109   BP: 114/57    Pulse: 89    Resp: 16    Temp: 98.4 °F (36.9 °C)    SpO2: 98% 100%   Weight: 57.6 kg (127 lb)    Height: 5' 3\" (1.6 m)             Physical Exam   Constitutional: She is oriented to person, place, and time. She appears well-developed and well-nourished. Cardiovascular: Normal rate. Murmur heard. 2/6 systolic ejection murmur   Pulmonary/Chest: Effort normal and breath sounds normal.   Neurological: She is alert and oriented to person, place, and time. Nursing note and vitals reviewed. MDM  Number of Diagnoses or Management Options  Diagnosis management comments: I will check a troponin and an EKG with a chest x-ray. Patient's labs and x-ray are unremarkable. I will plan to discharge her home.         ED Course       Procedures

## 2018-04-08 NOTE — ED NOTES
POC is pt's son, Shanae Glass phone [phone]. Cannot come to hospital, cannot pick pt up to return home.

## 2018-04-08 NOTE — ED NOTES
I have reviewed discharge instructions with the patient. The patient verbalized understanding. Patient left ED via Discharge Method: stretcher to Home via [facility]. Opportunity for questions and clarification provided. Patient given 0 scripts. To continue your aftercare when you leave the hospital, you may receive an automated call from our care team to check in on how you are doing. This is a free service and part of our promise to provide the best care and service to meet your aftercare needs.  If you have questions, or wish to unsubscribe from this service please call [phone]. Thank you for Choosing our [facility] Emergency Department.

## 2018-04-08 NOTE — ED TRIAGE NOTES
Pt complains of chest pain radiating to arm and neck. States pain began tonight.  EMS administered 324mg asa and 1 SL nitro

## 2018-04-09 ENCOUNTER — PATIENT OUTREACH (OUTPATIENT)
Dept: CASE MANAGEMENT | Age: 83
End: 2018-04-09

## 2018-04-09 ENCOUNTER — HOME CARE VISIT (OUTPATIENT)
Dept: SCHEDULING | Facility: HOME HEALTH | Age: 83
End: 2018-04-09
Payer: MEDICARE

## 2018-04-09 PROCEDURE — G0299 HHS/HOSPICE OF RN EA 15 MIN: HCPCS

## 2018-04-09 PROCEDURE — HOSPICE MEDICATION HC HH HOSPICE MEDICATION

## 2018-04-09 PROCEDURE — 0651 HSPC ROUTINE HOME CARE

## 2018-04-09 NOTE — PROGRESS NOTES
Follow up call to Mr. Dimple Ortega (son) , no answer, left a message with my direct contact number and will try again later to reach family and help with any coordination needed for home. Referral was sent to [address], so not sure if they have reached out or not. This note will not be viewable in [address]

## 2018-04-10 ENCOUNTER — HOME CARE VISIT (OUTPATIENT)
Dept: SCHEDULING | Facility: HOME HEALTH | Age: 83
End: 2018-04-10
Payer: MEDICARE

## 2018-04-10 ENCOUNTER — PATIENT OUTREACH (OUTPATIENT)
Dept: CASE MANAGEMENT | Age: 83
End: 2018-04-10

## 2018-04-10 VITALS
DIASTOLIC BLOOD PRESSURE: 60 MMHG | SYSTOLIC BLOOD PRESSURE: 112 MMHG | HEART RATE: 84 BPM | RESPIRATION RATE: 24 BRPM | WEIGHT: 135 LBS | BODY MASS INDEX: 23.91 KG/M2

## 2018-04-10 PROCEDURE — A4927 NON-STERILE GLOVES: HCPCS

## 2018-04-10 PROCEDURE — T4527 ADULT SIZE PULL-ON LG: HCPCS

## 2018-04-10 PROCEDURE — G0155 HHCP-SVS OF CSW,EA 15 MIN: HCPCS

## 2018-04-10 PROCEDURE — A9270 NON-COVERED ITEM OR SERVICE: HCPCS

## 2018-04-10 PROCEDURE — 0651 HSPC ROUTINE HOME CARE

## 2018-04-10 NOTE — PROGRESS NOTES
CCM follow up call to family for Ms. Whaley, no answer, unable to leave a message. I did call [facility] and was able to confirm patient was admitted to their service and also found a note entered in the chart from yesterday where the RN went out for a visit. Hospice nurse name is Camachoangelicmaria isabel Shamarsaul [phone]. I will touch base with hospice periodically to assist where needed. This note will not be viewable in [address]

## 2018-04-11 ENCOUNTER — HOME CARE VISIT (OUTPATIENT)
Dept: SCHEDULING | Facility: HOME HEALTH | Age: 83
End: 2018-04-11
Payer: MEDICARE

## 2018-04-11 PROCEDURE — 0651 HSPC ROUTINE HOME CARE

## 2018-04-11 PROCEDURE — G0156 HHCP-SVS OF AIDE,EA 15 MIN: HCPCS

## 2018-04-11 PROCEDURE — HOSPICE MEDICATION HC HH HOSPICE MEDICATION

## 2018-04-12 ENCOUNTER — HOME CARE VISIT (OUTPATIENT)
Dept: SCHEDULING | Facility: HOME HEALTH | Age: 83
End: 2018-04-12
Payer: MEDICARE

## 2018-04-12 PROCEDURE — G0299 HHS/HOSPICE OF RN EA 15 MIN: HCPCS

## 2018-04-12 PROCEDURE — 0651 HSPC ROUTINE HOME CARE

## 2018-04-13 ENCOUNTER — HOME CARE VISIT (OUTPATIENT)
Dept: SCHEDULING | Facility: HOME HEALTH | Age: 83
End: 2018-04-13
Payer: MEDICARE

## 2018-04-13 PROCEDURE — 0651 HSPC ROUTINE HOME CARE

## 2018-04-14 PROCEDURE — 0651 HSPC ROUTINE HOME CARE

## 2018-04-15 ENCOUNTER — HOME CARE VISIT (OUTPATIENT)
Dept: HOSPICE | Facility: HOSPICE | Age: 83
End: 2018-04-15
Payer: MEDICARE

## 2018-04-15 ENCOUNTER — HOME CARE VISIT (OUTPATIENT)
Dept: SCHEDULING | Facility: HOME HEALTH | Age: 83
End: 2018-04-15
Payer: MEDICARE

## 2018-04-15 PROCEDURE — G0299 HHS/HOSPICE OF RN EA 15 MIN: HCPCS

## 2018-04-15 PROCEDURE — 0651 HSPC ROUTINE HOME CARE

## 2018-04-16 ENCOUNTER — HOME CARE VISIT (OUTPATIENT)
Dept: SCHEDULING | Facility: HOME HEALTH | Age: 83
End: 2018-04-16
Payer: MEDICARE

## 2018-04-16 VITALS
SYSTOLIC BLOOD PRESSURE: 112 MMHG | TEMPERATURE: 97.5 F | HEART RATE: 84 BPM | RESPIRATION RATE: 20 BRPM | DIASTOLIC BLOOD PRESSURE: 84 MMHG

## 2018-04-16 VITALS — SYSTOLIC BLOOD PRESSURE: 122 MMHG | HEART RATE: 88 BPM | DIASTOLIC BLOOD PRESSURE: 64 MMHG | RESPIRATION RATE: 20 BRPM

## 2018-04-16 PROCEDURE — G0299 HHS/HOSPICE OF RN EA 15 MIN: HCPCS

## 2018-04-16 PROCEDURE — G0156 HHCP-SVS OF AIDE,EA 15 MIN: HCPCS

## 2018-04-16 PROCEDURE — 0651 HSPC ROUTINE HOME CARE

## 2018-04-17 ENCOUNTER — PATIENT OUTREACH (OUTPATIENT)
Dept: CASE MANAGEMENT | Age: 83
End: 2018-04-17

## 2018-04-17 PROCEDURE — 0651 HSPC ROUTINE HOME CARE

## 2018-04-17 NOTE — PROGRESS NOTES
CCM outreach attempted again to son and daughter, no answer, and was unable to leave a message on voice mail. Patient has Hospice currently so I will close out for case management needs unless I hear back from the patient or family. This note will not be viewable in [address]

## 2018-04-18 ENCOUNTER — HOME CARE VISIT (OUTPATIENT)
Dept: SCHEDULING | Facility: HOME HEALTH | Age: 83
End: 2018-04-18
Payer: MEDICARE

## 2018-04-18 PROCEDURE — 0651 HSPC ROUTINE HOME CARE

## 2018-04-18 PROCEDURE — HOSPICE MEDICATION HC HH HOSPICE MEDICATION

## 2018-04-18 PROCEDURE — G0155 HHCP-SVS OF CSW,EA 15 MIN: HCPCS

## 2018-04-19 ENCOUNTER — HOME CARE VISIT (OUTPATIENT)
Dept: SCHEDULING | Facility: HOME HEALTH | Age: 83
End: 2018-04-19
Payer: MEDICARE

## 2018-04-19 PROCEDURE — 0651 HSPC ROUTINE HOME CARE

## 2018-04-19 PROCEDURE — HOSPICE MEDICATION HC HH HOSPICE MEDICATION

## 2018-04-19 PROCEDURE — G0156 HHCP-SVS OF AIDE,EA 15 MIN: HCPCS

## 2018-04-20 PROCEDURE — 0651 HSPC ROUTINE HOME CARE

## 2018-04-21 ENCOUNTER — HOME CARE VISIT (OUTPATIENT)
Dept: HOSPICE | Facility: HOSPICE | Age: 83
End: 2018-04-21
Payer: MEDICARE

## 2018-04-21 ENCOUNTER — HOME CARE VISIT (OUTPATIENT)
Dept: SCHEDULING | Facility: HOME HEALTH | Age: 83
End: 2018-04-21
Payer: MEDICARE

## 2018-04-21 PROCEDURE — 0651 HSPC ROUTINE HOME CARE

## 2018-04-21 PROCEDURE — G0299 HHS/HOSPICE OF RN EA 15 MIN: HCPCS

## 2018-04-22 VITALS
DIASTOLIC BLOOD PRESSURE: 72 MMHG | HEART RATE: 88 BPM | RESPIRATION RATE: 18 BRPM | TEMPERATURE: 97.4 F | SYSTOLIC BLOOD PRESSURE: 128 MMHG

## 2018-04-22 PROCEDURE — 0651 HSPC ROUTINE HOME CARE

## 2018-04-23 ENCOUNTER — HOME CARE VISIT (OUTPATIENT)
Dept: SCHEDULING | Facility: HOME HEALTH | Age: 83
End: 2018-04-23
Payer: MEDICARE

## 2018-04-23 PROCEDURE — 0651 HSPC ROUTINE HOME CARE

## 2018-04-23 PROCEDURE — G0156 HHCP-SVS OF AIDE,EA 15 MIN: HCPCS

## 2018-04-23 PROCEDURE — HOSPICE MEDICATION HC HH HOSPICE MEDICATION

## 2018-04-24 ENCOUNTER — HOME CARE VISIT (OUTPATIENT)
Dept: SCHEDULING | Facility: HOME HEALTH | Age: 83
End: 2018-04-24
Payer: MEDICARE

## 2018-04-24 VITALS — HEART RATE: 84 BPM | SYSTOLIC BLOOD PRESSURE: 118 MMHG | DIASTOLIC BLOOD PRESSURE: 60 MMHG | RESPIRATION RATE: 20 BRPM

## 2018-04-24 PROCEDURE — 99343 PR HOME VST NEW PATIENT MOD-HI SEVERITY 45 MINUTES: CPT | Performed by: INTERNAL MEDICINE

## 2018-04-24 PROCEDURE — 0651 HSPC ROUTINE HOME CARE

## 2018-04-24 PROCEDURE — HOSPICE MEDICATION HC HH HOSPICE MEDICATION

## 2018-04-24 PROCEDURE — G0299 HHS/HOSPICE OF RN EA 15 MIN: HCPCS

## 2018-04-25 PROCEDURE — 0651 HSPC ROUTINE HOME CARE

## 2018-04-26 ENCOUNTER — HOME CARE VISIT (OUTPATIENT)
Dept: SCHEDULING | Facility: HOME HEALTH | Age: 83
End: 2018-04-26
Payer: MEDICARE

## 2018-04-26 PROCEDURE — 0651 HSPC ROUTINE HOME CARE

## 2018-04-26 PROCEDURE — G0156 HHCP-SVS OF AIDE,EA 15 MIN: HCPCS

## 2018-04-27 PROCEDURE — 0651 HSPC ROUTINE HOME CARE

## 2018-04-28 ENCOUNTER — HOME CARE VISIT (OUTPATIENT)
Dept: HOSPICE | Facility: HOSPICE | Age: 83
End: 2018-04-28
Payer: MEDICARE

## 2018-04-28 PROCEDURE — 0651 HSPC ROUTINE HOME CARE

## 2018-04-29 PROCEDURE — 0651 HSPC ROUTINE HOME CARE

## 2018-04-30 ENCOUNTER — HOME CARE VISIT (OUTPATIENT)
Dept: SCHEDULING | Facility: HOME HEALTH | Age: 83
End: 2018-04-30
Payer: MEDICARE

## 2018-04-30 PROCEDURE — G0299 HHS/HOSPICE OF RN EA 15 MIN: HCPCS

## 2018-04-30 PROCEDURE — HOSPICE MEDICATION HC HH HOSPICE MEDICATION

## 2018-04-30 PROCEDURE — G0156 HHCP-SVS OF AIDE,EA 15 MIN: HCPCS

## 2018-04-30 PROCEDURE — 0651 HSPC ROUTINE HOME CARE

## 2018-05-01 PROCEDURE — T4541 LARGE DISPOSABLE UNDERPAD: HCPCS

## 2018-05-01 PROCEDURE — A9270 NON-COVERED ITEM OR SERVICE: HCPCS

## 2018-05-01 PROCEDURE — T4527 ADULT SIZE PULL-ON LG: HCPCS

## 2018-05-01 PROCEDURE — 0651 HSPC ROUTINE HOME CARE

## 2018-05-02 VITALS — SYSTOLIC BLOOD PRESSURE: 122 MMHG | RESPIRATION RATE: 24 BRPM | HEART RATE: 84 BPM | DIASTOLIC BLOOD PRESSURE: 80 MMHG

## 2018-05-02 PROCEDURE — 0651 HSPC ROUTINE HOME CARE

## 2018-05-03 ENCOUNTER — HOME CARE VISIT (OUTPATIENT)
Dept: SCHEDULING | Facility: HOME HEALTH | Age: 83
End: 2018-05-03
Payer: MEDICARE

## 2018-05-03 PROCEDURE — 0651 HSPC ROUTINE HOME CARE

## 2018-05-04 PROCEDURE — 0651 HSPC ROUTINE HOME CARE

## 2018-05-05 ENCOUNTER — HOME CARE VISIT (OUTPATIENT)
Dept: SCHEDULING | Facility: HOME HEALTH | Age: 83
End: 2018-05-05
Payer: MEDICARE

## 2018-05-05 ENCOUNTER — HOME CARE VISIT (OUTPATIENT)
Dept: HOSPICE | Facility: HOSPICE | Age: 83
End: 2018-05-05
Payer: MEDICARE

## 2018-05-05 PROCEDURE — G0299 HHS/HOSPICE OF RN EA 15 MIN: HCPCS

## 2018-05-05 PROCEDURE — 0651 HSPC ROUTINE HOME CARE

## 2018-05-06 PROCEDURE — 0651 HSPC ROUTINE HOME CARE

## 2018-05-07 ENCOUNTER — HOME CARE VISIT (OUTPATIENT)
Dept: SCHEDULING | Facility: HOME HEALTH | Age: 83
End: 2018-05-07
Payer: MEDICARE

## 2018-05-07 PROCEDURE — 0651 HSPC ROUTINE HOME CARE

## 2018-05-07 PROCEDURE — G0156 HHCP-SVS OF AIDE,EA 15 MIN: HCPCS

## 2018-05-08 PROCEDURE — 0651 HSPC ROUTINE HOME CARE

## 2018-05-09 ENCOUNTER — HOME CARE VISIT (OUTPATIENT)
Dept: SCHEDULING | Facility: HOME HEALTH | Age: 83
End: 2018-05-09
Payer: MEDICARE

## 2018-05-09 PROCEDURE — G0299 HHS/HOSPICE OF RN EA 15 MIN: HCPCS

## 2018-05-09 PROCEDURE — 0651 HSPC ROUTINE HOME CARE

## 2018-05-10 ENCOUNTER — HOME CARE VISIT (OUTPATIENT)
Dept: SCHEDULING | Facility: HOME HEALTH | Age: 83
End: 2018-05-10
Payer: MEDICARE

## 2018-05-10 VITALS — DIASTOLIC BLOOD PRESSURE: 80 MMHG | HEART RATE: 96 BPM | RESPIRATION RATE: 24 BRPM | SYSTOLIC BLOOD PRESSURE: 122 MMHG

## 2018-05-10 PROCEDURE — G0156 HHCP-SVS OF AIDE,EA 15 MIN: HCPCS

## 2018-05-10 PROCEDURE — 0651 HSPC ROUTINE HOME CARE

## 2018-05-11 ENCOUNTER — HOME CARE VISIT (OUTPATIENT)
Dept: SCHEDULING | Facility: HOME HEALTH | Age: 83
End: 2018-05-11
Payer: MEDICARE

## 2018-05-11 PROCEDURE — G0155 HHCP-SVS OF CSW,EA 15 MIN: HCPCS

## 2018-05-11 PROCEDURE — A9270 NON-COVERED ITEM OR SERVICE: HCPCS

## 2018-05-11 PROCEDURE — 0651 HSPC ROUTINE HOME CARE

## 2018-05-11 PROCEDURE — HOSPICE MEDICATION HC HH HOSPICE MEDICATION

## 2018-05-12 PROCEDURE — 0651 HSPC ROUTINE HOME CARE

## 2018-05-13 PROCEDURE — 0651 HSPC ROUTINE HOME CARE

## 2018-05-14 ENCOUNTER — HOME CARE VISIT (OUTPATIENT)
Dept: SCHEDULING | Facility: HOME HEALTH | Age: 83
End: 2018-05-14
Payer: MEDICARE

## 2018-05-14 VITALS — DIASTOLIC BLOOD PRESSURE: 80 MMHG | RESPIRATION RATE: 24 BRPM | SYSTOLIC BLOOD PRESSURE: 134 MMHG | HEART RATE: 88 BPM

## 2018-05-14 PROCEDURE — 0651 HSPC ROUTINE HOME CARE

## 2018-05-14 PROCEDURE — G0156 HHCP-SVS OF AIDE,EA 15 MIN: HCPCS

## 2018-05-14 PROCEDURE — G0299 HHS/HOSPICE OF RN EA 15 MIN: HCPCS

## 2018-05-15 ENCOUNTER — HOME CARE VISIT (OUTPATIENT)
Dept: SCHEDULING | Facility: HOME HEALTH | Age: 83
End: 2018-05-15
Payer: MEDICARE

## 2018-05-15 PROCEDURE — 0651 HSPC ROUTINE HOME CARE

## 2018-05-16 ENCOUNTER — HOME CARE VISIT (OUTPATIENT)
Dept: SCHEDULING | Facility: HOME HEALTH | Age: 83
End: 2018-05-16
Payer: MEDICARE

## 2018-05-16 PROCEDURE — G0155 HHCP-SVS OF CSW,EA 15 MIN: HCPCS

## 2018-05-16 PROCEDURE — 0651 HSPC ROUTINE HOME CARE

## 2018-05-17 ENCOUNTER — HOME CARE VISIT (OUTPATIENT)
Dept: SCHEDULING | Facility: HOME HEALTH | Age: 83
End: 2018-05-17
Payer: MEDICARE

## 2018-05-17 PROCEDURE — G0156 HHCP-SVS OF AIDE,EA 15 MIN: HCPCS

## 2018-05-17 PROCEDURE — 0651 HSPC ROUTINE HOME CARE

## 2018-05-18 PROCEDURE — 0651 HSPC ROUTINE HOME CARE

## 2018-05-19 PROCEDURE — 0651 HSPC ROUTINE HOME CARE

## 2018-05-20 PROCEDURE — 0651 HSPC ROUTINE HOME CARE

## 2018-05-21 ENCOUNTER — HOME CARE VISIT (OUTPATIENT)
Dept: SCHEDULING | Facility: HOME HEALTH | Age: 83
End: 2018-05-21
Payer: MEDICARE

## 2018-05-21 VITALS — SYSTOLIC BLOOD PRESSURE: 122 MMHG | HEART RATE: 84 BPM | DIASTOLIC BLOOD PRESSURE: 60 MMHG | RESPIRATION RATE: 24 BRPM

## 2018-05-21 PROCEDURE — T4527 ADULT SIZE PULL-ON LG: HCPCS

## 2018-05-21 PROCEDURE — 0651 HSPC ROUTINE HOME CARE

## 2018-05-21 PROCEDURE — HOSPICE MEDICATION HC HH HOSPICE MEDICATION

## 2018-05-21 PROCEDURE — A9270 NON-COVERED ITEM OR SERVICE: HCPCS

## 2018-05-21 PROCEDURE — T4541 LARGE DISPOSABLE UNDERPAD: HCPCS

## 2018-05-21 PROCEDURE — G0299 HHS/HOSPICE OF RN EA 15 MIN: HCPCS

## 2018-05-21 PROCEDURE — G0156 HHCP-SVS OF AIDE,EA 15 MIN: HCPCS

## 2018-05-21 PROCEDURE — G0155 HHCP-SVS OF CSW,EA 15 MIN: HCPCS

## 2018-05-22 ENCOUNTER — DOCUMENTATION ONLY (OUTPATIENT)
Dept: OTHER | Age: 83
End: 2018-05-22

## 2018-05-22 PROCEDURE — 0651 HSPC ROUTINE HOME CARE

## 2018-05-23 PROBLEM — Z51.5 HOSPICE CARE PATIENT: Status: ACTIVE | Noted: 2018-05-23

## 2018-05-23 PROCEDURE — 0651 HSPC ROUTINE HOME CARE

## 2018-05-24 ENCOUNTER — HOME CARE VISIT (OUTPATIENT)
Dept: SCHEDULING | Facility: HOME HEALTH | Age: 83
End: 2018-05-24
Payer: MEDICARE

## 2018-05-24 PROCEDURE — 0651 HSPC ROUTINE HOME CARE

## 2018-05-24 PROCEDURE — G0156 HHCP-SVS OF AIDE,EA 15 MIN: HCPCS

## 2018-05-25 PROCEDURE — 0651 HSPC ROUTINE HOME CARE

## 2018-05-26 PROCEDURE — 0651 HSPC ROUTINE HOME CARE

## 2018-05-27 PROCEDURE — 0651 HSPC ROUTINE HOME CARE

## 2018-05-28 ENCOUNTER — HOME CARE VISIT (OUTPATIENT)
Dept: SCHEDULING | Facility: HOME HEALTH | Age: 83
End: 2018-05-28
Payer: MEDICARE

## 2018-05-28 PROCEDURE — 0651 HSPC ROUTINE HOME CARE

## 2018-05-29 PROCEDURE — 0651 HSPC ROUTINE HOME CARE

## 2018-05-30 ENCOUNTER — HOME CARE VISIT (OUTPATIENT)
Dept: SCHEDULING | Facility: HOME HEALTH | Age: 83
End: 2018-05-30
Payer: MEDICARE

## 2018-05-30 VITALS — DIASTOLIC BLOOD PRESSURE: 60 MMHG | RESPIRATION RATE: 20 BRPM | SYSTOLIC BLOOD PRESSURE: 118 MMHG | HEART RATE: 92 BPM

## 2018-05-30 PROCEDURE — G0299 HHS/HOSPICE OF RN EA 15 MIN: HCPCS

## 2018-05-30 PROCEDURE — 0651 HSPC ROUTINE HOME CARE

## 2018-05-30 PROCEDURE — HOSPICE MEDICATION HC HH HOSPICE MEDICATION

## 2018-05-31 ENCOUNTER — HOME CARE VISIT (OUTPATIENT)
Dept: SCHEDULING | Facility: HOME HEALTH | Age: 83
End: 2018-05-31
Payer: MEDICARE

## 2018-05-31 PROCEDURE — 0651 HSPC ROUTINE HOME CARE

## 2018-05-31 PROCEDURE — G0156 HHCP-SVS OF AIDE,EA 15 MIN: HCPCS

## 2018-06-01 PROCEDURE — 0651 HSPC ROUTINE HOME CARE

## 2018-06-02 PROCEDURE — 0651 HSPC ROUTINE HOME CARE

## 2018-06-03 ENCOUNTER — HOME CARE VISIT (OUTPATIENT)
Dept: SCHEDULING | Facility: HOME HEALTH | Age: 83
End: 2018-06-03
Payer: MEDICARE

## 2018-06-03 PROCEDURE — 0651 HSPC ROUTINE HOME CARE

## 2018-06-03 PROCEDURE — G0299 HHS/HOSPICE OF RN EA 15 MIN: HCPCS

## 2018-06-04 ENCOUNTER — HOME CARE VISIT (OUTPATIENT)
Dept: SCHEDULING | Facility: HOME HEALTH | Age: 83
End: 2018-06-04
Payer: MEDICARE

## 2018-06-04 VITALS — SYSTOLIC BLOOD PRESSURE: 120 MMHG | RESPIRATION RATE: 12 BRPM | HEART RATE: 88 BPM | DIASTOLIC BLOOD PRESSURE: 60 MMHG

## 2018-06-04 PROCEDURE — 0651 HSPC ROUTINE HOME CARE

## 2018-06-04 PROCEDURE — G0156 HHCP-SVS OF AIDE,EA 15 MIN: HCPCS

## 2018-06-05 ENCOUNTER — HOME CARE VISIT (OUTPATIENT)
Dept: HOSPICE | Facility: HOSPICE | Age: 83
End: 2018-06-05
Payer: MEDICARE

## 2018-06-05 ENCOUNTER — HOME CARE VISIT (OUTPATIENT)
Dept: SCHEDULING | Facility: HOME HEALTH | Age: 83
End: 2018-06-05
Payer: MEDICARE

## 2018-06-05 VITALS — RESPIRATION RATE: 20 BRPM | SYSTOLIC BLOOD PRESSURE: 128 MMHG | DIASTOLIC BLOOD PRESSURE: 70 MMHG | HEART RATE: 84 BPM

## 2018-06-05 PROCEDURE — G0299 HHS/HOSPICE OF RN EA 15 MIN: HCPCS

## 2018-06-05 PROCEDURE — 0651 HSPC ROUTINE HOME CARE

## 2018-06-05 PROCEDURE — HOSPICE MEDICATION HC HH HOSPICE MEDICATION

## 2018-06-06 ENCOUNTER — HOME CARE VISIT (OUTPATIENT)
Dept: SCHEDULING | Facility: HOME HEALTH | Age: 83
End: 2018-06-06
Payer: MEDICARE

## 2018-06-06 PROCEDURE — G0299 HHS/HOSPICE OF RN EA 15 MIN: HCPCS

## 2018-06-06 PROCEDURE — 0651 HSPC ROUTINE HOME CARE

## 2018-06-06 PROCEDURE — HOSPICE MEDICATION HC HH HOSPICE MEDICATION

## 2018-06-07 VITALS — RESPIRATION RATE: 20 BRPM | SYSTOLIC BLOOD PRESSURE: 118 MMHG | HEART RATE: 88 BPM | DIASTOLIC BLOOD PRESSURE: 64 MMHG

## 2018-06-07 PROCEDURE — 0651 HSPC ROUTINE HOME CARE

## 2018-06-08 ENCOUNTER — HOME CARE VISIT (OUTPATIENT)
Dept: SCHEDULING | Facility: HOME HEALTH | Age: 83
End: 2018-06-08
Payer: MEDICARE

## 2018-06-08 PROCEDURE — 0651 HSPC ROUTINE HOME CARE

## 2018-06-08 PROCEDURE — G0299 HHS/HOSPICE OF RN EA 15 MIN: HCPCS

## 2018-06-09 PROCEDURE — 0651 HSPC ROUTINE HOME CARE

## 2018-06-10 ENCOUNTER — HOME CARE VISIT (OUTPATIENT)
Dept: SCHEDULING | Facility: HOME HEALTH | Age: 83
End: 2018-06-10
Payer: MEDICARE

## 2018-06-10 PROCEDURE — 0651 HSPC ROUTINE HOME CARE

## 2018-06-11 PROCEDURE — 0651 HSPC ROUTINE HOME CARE

## 2018-06-12 ENCOUNTER — HOME CARE VISIT (OUTPATIENT)
Dept: SCHEDULING | Facility: HOME HEALTH | Age: 83
End: 2018-06-12
Payer: MEDICARE

## 2018-06-12 PROCEDURE — G0299 HHS/HOSPICE OF RN EA 15 MIN: HCPCS

## 2018-06-12 PROCEDURE — HOSPICE MEDICATION HC HH HOSPICE MEDICATION

## 2018-06-12 PROCEDURE — 0651 HSPC ROUTINE HOME CARE

## 2018-06-12 PROCEDURE — G0155 HHCP-SVS OF CSW,EA 15 MIN: HCPCS

## 2018-06-13 VITALS — RESPIRATION RATE: 20 BRPM | SYSTOLIC BLOOD PRESSURE: 118 MMHG | DIASTOLIC BLOOD PRESSURE: 72 MMHG | HEART RATE: 88 BPM

## 2018-06-13 PROCEDURE — T4541 LARGE DISPOSABLE UNDERPAD: HCPCS

## 2018-06-13 PROCEDURE — 0651 HSPC ROUTINE HOME CARE

## 2018-06-13 PROCEDURE — T4523 ADULT SIZE BRIEF/DIAPER LG: HCPCS

## 2018-06-13 PROCEDURE — A9270 NON-COVERED ITEM OR SERVICE: HCPCS

## 2018-06-14 ENCOUNTER — HOME CARE VISIT (OUTPATIENT)
Dept: SCHEDULING | Facility: HOME HEALTH | Age: 83
End: 2018-06-14
Payer: MEDICARE

## 2018-06-14 PROCEDURE — 0651 HSPC ROUTINE HOME CARE

## 2018-06-15 ENCOUNTER — HOME CARE VISIT (OUTPATIENT)
Dept: SCHEDULING | Facility: HOME HEALTH | Age: 83
End: 2018-06-15
Payer: MEDICARE

## 2018-06-15 VITALS — RESPIRATION RATE: 20 BRPM | SYSTOLIC BLOOD PRESSURE: 122 MMHG | DIASTOLIC BLOOD PRESSURE: 60 MMHG | HEART RATE: 84 BPM

## 2018-06-15 PROCEDURE — 0651 HSPC ROUTINE HOME CARE

## 2018-06-15 PROCEDURE — G0299 HHS/HOSPICE OF RN EA 15 MIN: HCPCS

## 2018-06-16 PROCEDURE — 0651 HSPC ROUTINE HOME CARE

## 2018-06-17 PROCEDURE — 0651 HSPC ROUTINE HOME CARE

## 2018-06-18 ENCOUNTER — HOME CARE VISIT (OUTPATIENT)
Dept: SCHEDULING | Facility: HOME HEALTH | Age: 83
End: 2018-06-18
Payer: MEDICARE

## 2018-06-18 VITALS — DIASTOLIC BLOOD PRESSURE: 60 MMHG | SYSTOLIC BLOOD PRESSURE: 124 MMHG | RESPIRATION RATE: 20 BRPM | HEART RATE: 80 BPM

## 2018-06-18 PROCEDURE — G0299 HHS/HOSPICE OF RN EA 15 MIN: HCPCS

## 2018-06-18 PROCEDURE — HOSPICE MEDICATION HC HH HOSPICE MEDICATION

## 2018-06-18 PROCEDURE — G0156 HHCP-SVS OF AIDE,EA 15 MIN: HCPCS

## 2018-06-18 PROCEDURE — 0651 HSPC ROUTINE HOME CARE

## 2018-06-19 PROCEDURE — 0651 HSPC ROUTINE HOME CARE

## 2018-06-20 PROCEDURE — 0651 HSPC ROUTINE HOME CARE

## 2018-06-21 ENCOUNTER — HOME CARE VISIT (OUTPATIENT)
Dept: SCHEDULING | Facility: HOME HEALTH | Age: 83
End: 2018-06-21
Payer: MEDICARE

## 2018-06-21 PROCEDURE — 0651 HSPC ROUTINE HOME CARE

## 2018-06-21 PROCEDURE — G0156 HHCP-SVS OF AIDE,EA 15 MIN: HCPCS

## 2018-06-22 PROCEDURE — 0651 HSPC ROUTINE HOME CARE

## 2018-06-23 PROCEDURE — 0651 HSPC ROUTINE HOME CARE

## 2018-06-24 PROCEDURE — 0651 HSPC ROUTINE HOME CARE

## 2018-06-25 ENCOUNTER — HOME CARE VISIT (OUTPATIENT)
Dept: SCHEDULING | Facility: HOME HEALTH | Age: 83
End: 2018-06-25
Payer: MEDICARE

## 2018-06-25 ENCOUNTER — HOME CARE VISIT (OUTPATIENT)
Dept: HOSPICE | Facility: HOSPICE | Age: 83
End: 2018-06-25
Payer: MEDICARE

## 2018-06-25 PROCEDURE — G0156 HHCP-SVS OF AIDE,EA 15 MIN: HCPCS

## 2018-06-25 PROCEDURE — 0651 HSPC ROUTINE HOME CARE

## 2018-06-26 ENCOUNTER — HOME CARE VISIT (OUTPATIENT)
Dept: SCHEDULING | Facility: HOME HEALTH | Age: 83
End: 2018-06-26
Payer: MEDICARE

## 2018-06-26 PROCEDURE — 0651 HSPC ROUTINE HOME CARE

## 2018-06-26 PROCEDURE — A4927 NON-STERILE GLOVES: HCPCS

## 2018-06-26 PROCEDURE — A9270 NON-COVERED ITEM OR SERVICE: HCPCS

## 2018-06-26 PROCEDURE — HOSPICE MEDICATION HC HH HOSPICE MEDICATION

## 2018-06-26 PROCEDURE — G0299 HHS/HOSPICE OF RN EA 15 MIN: HCPCS

## 2018-06-26 PROCEDURE — T4526 ADULT SIZE PULL-ON MED: HCPCS

## 2018-06-27 VITALS — SYSTOLIC BLOOD PRESSURE: 122 MMHG | RESPIRATION RATE: 20 BRPM | DIASTOLIC BLOOD PRESSURE: 60 MMHG | HEART RATE: 84 BPM

## 2018-06-27 PROCEDURE — 0651 HSPC ROUTINE HOME CARE

## 2018-06-28 ENCOUNTER — HOME CARE VISIT (OUTPATIENT)
Dept: SCHEDULING | Facility: HOME HEALTH | Age: 83
End: 2018-06-28
Payer: MEDICARE

## 2018-06-28 PROCEDURE — G0156 HHCP-SVS OF AIDE,EA 15 MIN: HCPCS

## 2018-06-28 PROCEDURE — HOSPICE MEDICATION HC HH HOSPICE MEDICATION

## 2018-06-28 PROCEDURE — 0651 HSPC ROUTINE HOME CARE

## 2018-06-29 PROCEDURE — 0651 HSPC ROUTINE HOME CARE

## 2018-06-30 ENCOUNTER — HOME CARE VISIT (OUTPATIENT)
Dept: HOSPICE | Facility: HOSPICE | Age: 83
End: 2018-06-30
Payer: MEDICARE

## 2018-06-30 PROCEDURE — G0299 HHS/HOSPICE OF RN EA 15 MIN: HCPCS

## 2018-06-30 PROCEDURE — 0651 HSPC ROUTINE HOME CARE

## 2018-07-01 ENCOUNTER — HOME CARE VISIT (OUTPATIENT)
Dept: HOSPICE | Facility: HOSPICE | Age: 83
End: 2018-07-01
Payer: MEDICARE

## 2018-07-01 ENCOUNTER — HOME CARE VISIT (OUTPATIENT)
Dept: SCHEDULING | Facility: HOME HEALTH | Age: 83
End: 2018-07-01
Payer: MEDICARE

## 2018-07-01 VITALS
SYSTOLIC BLOOD PRESSURE: 124 MMHG | HEART RATE: 60 BPM | RESPIRATION RATE: 18 BRPM | TEMPERATURE: 98 F | DIASTOLIC BLOOD PRESSURE: 70 MMHG

## 2018-07-01 PROCEDURE — G0299 HHS/HOSPICE OF RN EA 15 MIN: HCPCS

## 2018-07-01 PROCEDURE — 0651 HSPC ROUTINE HOME CARE

## 2018-07-02 ENCOUNTER — HOME CARE VISIT (OUTPATIENT)
Dept: SCHEDULING | Facility: HOME HEALTH | Age: 83
End: 2018-07-02
Payer: MEDICARE

## 2018-07-02 ENCOUNTER — HOSPITAL ENCOUNTER (OUTPATIENT)
Dept: LAB | Age: 83
Discharge: HOME | End: 2018-07-02
Payer: COMMERCIAL

## 2018-07-02 VITALS — DIASTOLIC BLOOD PRESSURE: 60 MMHG | RESPIRATION RATE: 20 BRPM | HEART RATE: 92 BPM | SYSTOLIC BLOOD PRESSURE: 112 MMHG

## 2018-07-02 VITALS — RESPIRATION RATE: 21 BRPM | DIASTOLIC BLOOD PRESSURE: 78 MMHG | SYSTOLIC BLOOD PRESSURE: 126 MMHG | HEART RATE: 66 BPM

## 2018-07-02 LAB
APPEARANCE UR: CLEAR
BACTERIA URNS QL MICRO: 0 /HPF
BILIRUB UR QL: NEGATIVE
CASTS URNS QL MICRO: ABNORMAL /LPF
COLOR UR: YELLOW
EPI CELLS #/AREA URNS HPF: ABNORMAL /HPF
GLUCOSE UR STRIP.AUTO-MCNC: NEGATIVE MG/DL
HGB UR QL STRIP: ABNORMAL
KETONES UR QL STRIP.AUTO: NEGATIVE MG/DL
LEUKOCYTE ESTERASE UR QL STRIP.AUTO: ABNORMAL
NITRITE UR QL STRIP.AUTO: NEGATIVE
PH UR STRIP: 6.5 [PH] (ref 5–9)
PROT UR STRIP-MCNC: NEGATIVE MG/DL
RBC #/AREA URNS HPF: ABNORMAL /HPF
SP GR UR REFRACTOMETRY: 1.01 (ref 1–1.02)
UROBILINOGEN UR QL STRIP.AUTO: 0.2 EU/DL (ref 0.2–1)
WBC URNS QL MICRO: ABNORMAL /HPF

## 2018-07-02 PROCEDURE — 81001 URINALYSIS AUTO W/SCOPE: CPT | Performed by: INTERNAL MEDICINE

## 2018-07-02 PROCEDURE — G0299 HHS/HOSPICE OF RN EA 15 MIN: HCPCS

## 2018-07-02 PROCEDURE — 87086 URINE CULTURE/COLONY COUNT: CPT | Performed by: INTERNAL MEDICINE

## 2018-07-02 PROCEDURE — 0651 HSPC ROUTINE HOME CARE

## 2018-07-02 PROCEDURE — HOSPICE MEDICATION HC HH HOSPICE MEDICATION

## 2018-07-02 RX ADMIN — HALOPERIDOL 1 MG: 1 TABLET ORAL at 13:30

## 2018-07-03 ENCOUNTER — HOSPITAL ENCOUNTER (INPATIENT)
Age: 83
LOS: 4 days | Discharge: HOME | End: 2018-07-07
Attending: INTERNAL MEDICINE | Admitting: INTERNAL MEDICINE

## 2018-07-03 ENCOUNTER — HOME CARE VISIT (OUTPATIENT)
Dept: SCHEDULING | Facility: HOME HEALTH | Age: 83
End: 2018-07-03
Payer: MEDICARE

## 2018-07-03 PROCEDURE — 74011250637 HC RX REV CODE- 250/637: Performed by: NURSE PRACTITIONER

## 2018-07-03 PROCEDURE — G0299 HHS/HOSPICE OF RN EA 15 MIN: HCPCS

## 2018-07-03 PROCEDURE — 74011250636 HC RX REV CODE- 250/636: Performed by: NURSE PRACTITIONER

## 2018-07-03 PROCEDURE — 0656 HSPC GENERAL INPATIENT

## 2018-07-03 PROCEDURE — 74011000250 HC RX REV CODE- 250: Performed by: NURSE PRACTITIONER

## 2018-07-03 RX ORDER — RANITIDINE 150 MG/1
150 TABLET, FILM COATED ORAL 2 TIMES DAILY
Status: DISCONTINUED | OUTPATIENT
Start: 2018-07-03 | End: 2018-07-07 | Stop reason: HOSPADM

## 2018-07-03 RX ORDER — SENNOSIDES 8.6 MG/1
2 TABLET ORAL 2 TIMES DAILY
Status: DISCONTINUED | OUTPATIENT
Start: 2018-07-03 | End: 2018-07-07 | Stop reason: HOSPADM

## 2018-07-03 RX ORDER — LEVOFLOXACIN 500 MG/1
500 TABLET, FILM COATED ORAL EVERY 24 HOURS
Status: DISCONTINUED | OUTPATIENT
Start: 2018-07-03 | End: 2018-07-06

## 2018-07-03 RX ORDER — HYDROCODONE BITARTRATE AND ACETAMINOPHEN 5; 325 MG/1; MG/1
1 TABLET ORAL
Status: DISCONTINUED | OUTPATIENT
Start: 2018-07-03 | End: 2018-07-07 | Stop reason: HOSPADM

## 2018-07-03 RX ORDER — IPRATROPIUM BROMIDE AND ALBUTEROL SULFATE 2.5; .5 MG/3ML; MG/3ML
3 SOLUTION RESPIRATORY (INHALATION)
Status: DISCONTINUED | OUTPATIENT
Start: 2018-07-03 | End: 2018-07-07 | Stop reason: HOSPADM

## 2018-07-03 RX ORDER — CLOPIDOGREL BISULFATE 75 MG/1
75 TABLET ORAL DAILY
Status: DISCONTINUED | OUTPATIENT
Start: 2018-07-04 | End: 2018-07-07 | Stop reason: HOSPADM

## 2018-07-03 RX ORDER — ACETAMINOPHEN 325 MG/1
650 TABLET ORAL
Status: DISCONTINUED | OUTPATIENT
Start: 2018-07-03 | End: 2018-07-07 | Stop reason: HOSPADM

## 2018-07-03 RX ORDER — POLYVINYL ALCOHOL 14 MG/ML
1 SOLUTION/ DROPS OPHTHALMIC AS NEEDED
Status: DISCONTINUED | OUTPATIENT
Start: 2018-07-03 | End: 2018-07-07 | Stop reason: HOSPADM

## 2018-07-03 RX ORDER — COLCHICINE 0.6 MG/1
0.3 TABLET ORAL
Status: DISCONTINUED | OUTPATIENT
Start: 2018-07-03 | End: 2018-07-07 | Stop reason: HOSPADM

## 2018-07-03 RX ORDER — GABAPENTIN 300 MG/1
300 CAPSULE ORAL 3 TIMES DAILY
Status: DISCONTINUED | OUTPATIENT
Start: 2018-07-03 | End: 2018-07-07 | Stop reason: HOSPADM

## 2018-07-03 RX ORDER — PREDNISONE 10 MG/1
5 TABLET ORAL DAILY
Status: DISCONTINUED | OUTPATIENT
Start: 2018-07-04 | End: 2018-07-07 | Stop reason: HOSPADM

## 2018-07-03 RX ORDER — ALLOPURINOL 100 MG/1
100 TABLET ORAL 2 TIMES DAILY
Status: DISCONTINUED | OUTPATIENT
Start: 2018-07-03 | End: 2018-07-07 | Stop reason: HOSPADM

## 2018-07-03 RX ORDER — LORATADINE 10 MG/1
10 TABLET ORAL DAILY
Status: DISCONTINUED | OUTPATIENT
Start: 2018-07-04 | End: 2018-07-07 | Stop reason: HOSPADM

## 2018-07-03 RX ORDER — ALBUTEROL SULFATE 90 UG/1
1 AEROSOL, METERED RESPIRATORY (INHALATION)
Status: DISCONTINUED | OUTPATIENT
Start: 2018-07-03 | End: 2018-07-07 | Stop reason: HOSPADM

## 2018-07-03 RX ORDER — LANOLIN ALCOHOL/MO/W.PET/CERES
1000 CREAM (GRAM) TOPICAL DAILY
Status: DISCONTINUED | OUTPATIENT
Start: 2018-07-04 | End: 2018-07-07 | Stop reason: HOSPADM

## 2018-07-03 RX ORDER — FUROSEMIDE 40 MG/1
40 TABLET ORAL DAILY
Status: DISCONTINUED | OUTPATIENT
Start: 2018-07-04 | End: 2018-07-07 | Stop reason: HOSPADM

## 2018-07-03 RX ORDER — AMLODIPINE BESYLATE 5 MG/1
5 TABLET ORAL DAILY
Status: DISCONTINUED | OUTPATIENT
Start: 2018-07-04 | End: 2018-07-07 | Stop reason: HOSPADM

## 2018-07-03 RX ORDER — HALOPERIDOL 5 MG/ML
2 INJECTION INTRAMUSCULAR
Status: DISCONTINUED | OUTPATIENT
Start: 2018-07-03 | End: 2018-07-07 | Stop reason: HOSPADM

## 2018-07-03 RX ORDER — POTASSIUM CHLORIDE 750 MG/1
10 TABLET, EXTENDED RELEASE ORAL DAILY
Status: DISCONTINUED | OUTPATIENT
Start: 2018-07-04 | End: 2018-07-07 | Stop reason: HOSPADM

## 2018-07-03 RX ADMIN — GABAPENTIN 300 MG: 300 CAPSULE ORAL at 16:34

## 2018-07-03 RX ADMIN — RANITIDINE 150 MG: 150 TABLET, FILM COATED ORAL at 18:59

## 2018-07-03 RX ADMIN — HYDROCODONE BITARTRATE AND ACETAMINOPHEN 1 TABLET: 5; 325 TABLET ORAL at 22:58

## 2018-07-03 RX ADMIN — HALOPERIDOL LACTATE 2 MG: 5 INJECTION INTRAMUSCULAR at 22:58

## 2018-07-03 RX ADMIN — LEVOFLOXACIN 500 MG: 500 TABLET, FILM COATED ORAL at 18:15

## 2018-07-03 RX ADMIN — IPRATROPIUM BROMIDE AND ALBUTEROL SULFATE 3 ML: .5; 3 SOLUTION RESPIRATORY (INHALATION) at 16:34

## 2018-07-03 RX ADMIN — HALOPERIDOL LACTATE 2 MG: 5 INJECTION INTRAMUSCULAR at 21:31

## 2018-07-03 RX ADMIN — SENNOSIDES 17.2 MG: 8.6 TABLET, FILM COATED ORAL at 18:09

## 2018-07-03 RX ADMIN — ALLOPURINOL 100 MG: 100 TABLET ORAL at 18:10

## 2018-07-03 RX ADMIN — IPRATROPIUM BROMIDE AND ALBUTEROL SULFATE 3 ML: .5; 3 SOLUTION RESPIRATORY (INHALATION) at 23:21

## 2018-07-03 RX ADMIN — GABAPENTIN 300 MG: 300 CAPSULE ORAL at 21:30

## 2018-07-03 NOTE — PROGRESS NOTES
Patient arrived at [phone] 90, alert and oriented, via stretcher. Pt using O2 3 LPM via nasal cannula. No sign or symptoms of pain, dyspnea, discomfort or agitation. Pt. Transferred to bed by EMT. Bed locked in lowest position and pt wearing tab alarm.  Bedside table and call bell within pt.s reach

## 2018-07-03 NOTE — PROGRESS NOTES
Situation: Antwon Lenz is a 80year old lady who comes to us from [facility].  She is GIP level of care for symptom management. shakira Teresa has been seeing patient in the home setting. Background:  Patient is supported by her three children, Mini Bowman and Tato. Javad Howard lives with mom. Patient is a [employer] and adheres to the [address] Since her illness the [employer] has not followed up. Assessment: 's visit began with daughter Tato and one of the son's girlfriend Krystal Ortega. Tato admits being exhausted. Krystal Ortega acknowledged she had seen Tato doing a great deal over the last few days and is very tired.  affirmed the difficulty of caring for a loved one and encouraged Tato to get some rest. Tato spoke briefly of her mom's jeffery. She could not remember the name of mom's [employer] and said mom had not been able to attend in quite some time.  assured  Her of continued support for mom.  then went to patient's room. Patient was awake and talking. She was a bit confused but pleasant.  offered words of encouragement and prayer. Plan:   to continue providing spiritual and emotional support throughout patient's time with [facility]

## 2018-07-03 NOTE — H&P
History and Physical    Patient: Kassi Alcantar MRN: [id number]  SSN: [id number]    YOB: 1927  Age: 80 y.o. Sex: female      Subjective:      Kassi Alcantar is a 80 y.o. female who is known to our in home program for a hospice diagnosis of pulmonary hypertension. Up until now she has been managed by [facility] at home with the support of her family including her son whom she lives with and her dtrs/dtr in law including Capri Mcgowan, Kitty Adames, and Akira. However, approximately a week ago she developed symptoms resembling a UTI and was subsequently started on Bactrim. It is unclear exactly what precipitated her acute onset of worsening delirium as there are a number of family members involved (some of whom are poor historians) but the patient developed hypermania with agitated outbursts and multiple nights of no sleep. As a result, Bactrim was stopped and a follow up UA/C&S was collected and sent to the lab on 7/2 at 1400. The UA showed small blood and moderate LE but no growth within this short incubation time. In the meantime, the patient had been started at home on PRN haldol orally at 1 mg with the patient's family providing around the clock with little to no relief of her agitated state. Today the primary RN called requesting a change in dose to provide comfort to the patient but this also proved ineffective. As a result, the patient has been transferred to [facility] for GIP level of care for dx of pulmonary hypertension for management of agitated delirium, dyspnea, and family/pt support. Today she is accompanied by her dtr Loren Barnes and dtr sid Gamez.        Past Medical History:   Diagnosis Date    Acute kidney failure, unspecified ([address]     Arthritis     CAD (coronary artery disease)     Cellulitis and abscess of other specified site     Coronary atherosclerosis of native coronary artery     Essential hypertension, benign 3/17/2015    Hypertension     Hypopotassemia     Mixed hyperlipidemia  Osteoarthritis 7/20/2017    Other and unspecified hyperlipidemia     Reflux esophagitis     Renal failure, unspecified     Shortness of breath     Unspecified essential hypertension      Past Surgical History:   Procedure Laterality Date    CARDIAC SURG PROCEDURE UNLIST      stent    HX CATARACT REMOVAL Bilateral     HX CHOLECYSTECTOMY      HX HYSTERECTOMY      complete    [place] LAYR CLOS WND FACE,FACIAL <2.5CM        Family History   Problem Relation Age of Onset    Heart Attack Mother     Heart Attack Father     Heart Disease Brother     Heart Disease Brother      Social History   Substance Use Topics    Smoking status: Never Smoker    Smokeless tobacco: Never Used    Alcohol use No      Prior to Admission medications    Medication Sig Start Date End Date Taking? Authorizing Provider   haloperidol (HALDOL) 1 mg tablet Take 2 mg by mouth every four (4) hours as needed (delirium). 2 tabs=2 mg   Indications: Delirium 7/3/18   Historical Provider   polyvinyl alcohol (LIQUIFILM TEARS) 1.4 % ophthalmic solution Administer 1 Drop to both eyes as needed (dry eyes). Indications: Dry Eye 4/25/18   Historical Provider   sennosides 8.6 mg cap Take 2 Tabs by mouth two (2) times a day. Indications: constipation 5/14/18   Historical Provider   predniSONE (DELTASONE) 10 mg tablet Take 5 mg by mouth daily. take 1/2 tab=5 mg   Indications: acute gouty arthritis, idiopathic pulmonary fibrosis 4/24/18   Historical Provider   potassium chloride (KLOR-CON) 10 mEq tablet Take 10 mEq by mouth daily. pt found to be taking    Indications: hypokalemia prevention 4/9/18   Historical Provider   acetaminophen (TYLENOL) 325 mg tablet Take 650 mg by mouth every six (6) hours as needed for Pain or Fever. 4/7/18   Historical Provider   albuterol (ACCUNEB) 1.25 mg/3 mL nebu Take 2.5 mg by inhalation every four (4) hours as needed (sob, wheezing).  4/7/18   Historical Provider   allopurinol (ZYLOPRIM) 100 mg tablet Take 100 mg by mouth two (2) times a day. 4/7/18   Historical Provider   amLODIPine (NORVASC) 5 mg tablet Take 5 mg by mouth daily. 4/7/18   Historical Provider   clopidogrel (PLAVIX) 75 mg tab Take 75 mg by mouth daily. 4/7/18   Historical Provider   colchicine 0.6 mg tablet Take 0.3 mg by mouth three (3) times daily as needed (gout flare up). Pt takes 1/2 tab         USE for gout pain not relieved by prednison   Indications: acute gouty arthritis 4/7/18   Historical Provider   CYANOCOBALAMIN, VITAMIN B-12, Take 1,000 mcg by mouth daily. 4/7/18   Historical Provider   furosemide (LASIX) 40 mg tablet Take 40 mg by mouth daily. 4/7/18   Historical Provider   loratadine (CLARITIN) 10 mg tablet Take 10 mg by mouth daily. 4/7/18   Historical Provider   raNITIdine (ZANTAC) 150 mg tablet Take 150 mg by mouth two (2) times a day. 4/7/18   Historical Provider   HYDROcodone-acetaminophen (NORCO) 5-325 mg per tablet Take 1 Tab by mouth every four (4) hours as needed for Pain (dyspnea). Indications: Pain, dyspnea 6/6/18   Historical Provider   albuterol (PROVENTIL HFA, VENTOLIN HFA, PROAIR HFA) 90 mcg/actuation inhaler Take 1 Puff by inhalation every four (4) hours as needed for Wheezing or Shortness of Breath. 4/7/18   Historical Provider   gabapentin (NEURONTIN) 300 mg capsule Take 300 mg by mouth three (3) times daily. Increase HS dose to 600 mg =2 tabs    continue 300 mg in AM and afternoon  Indications: NEUROPATHIC PAIN 6/6/18   Phys MD Te   OXYGEN-AIR DELIVERY SYSTEMS Take 3 L by inhalation continuous. via NC      increased to 3 L  Indications: DYSPNEA 6/15/18   Historical Provider        Allergies   Allergen Reactions    Bactrim [Sulfamethoxazole-Trimethoprim] Other (comments)     Caused delirum and hypermania.  Bee Pollen Swelling    Fire Ant Itching and Swelling       Review of Systems:  Pertinent items are noted in the History of Present Illness.     Objective:     Vitals:    07/03/18 1454   BP: 129/63   Pulse: 96   Resp: 16   Temp: 96.8 °F (36 °C)        Physical Exam:  GENERAL: alert, cooperative, mild distress related to mild myoclonic spasms and restless legs. LUNG: diminished breath sounds throughout  HEART: regular rate and rhythm, S1, S2 normal, no murmur, click, rub or gallop  ABDOMEN: soft, non-tender. Bowel sounds normal. No masses,  no organomegaly  EXTREMITIES:  extremities normal, atraumatic, no cyanosis or edema  SKIN: Normal. and no rash or abnormalities  NEUROLOGIC: AOx1. Bed bound. Motor strength normal and symmetric. No facial drooping. Speech seems appropriate for her situation with [facility] drawl. Cranial nerves 2-12 and sensation grossly intact. PSYCHIATRIC: agitated but redirectable. Fidgety but self startles when closes eyes.      Assessment:     Hospital Problems  Date Reviewed: 7/3/2018          Codes Class Noted POA    Chronic respiratory failure with hypoxia [facility]) ICD-10-CM: J96.11  ICD-9-CM: 518.83, 799.02  1/25/2018 Yes        Diastolic CHF, chronic (HCC) (Chronic) ICD-10-CM: I50.32  ICD-9-CM: 428.32, 428.0  5/17/2016 Yes        Pulmonary hypertension (HCC) ICD-10-CM: I27.20  ICD-9-CM: 416.8  5/17/2016 Yes        SOB (shortness of breath) ICD-10-CM: R06.02  ICD-9-CM: 786.05  Unknown Yes              Plan:     Current Facility-Administered Medications   Medication Dose Route Frequency    acetaminophen (TYLENOL) tablet 650 mg  650 mg Oral Q6H PRN    albuterol (PROVENTIL HFA, VENTOLIN HFA, PROAIR HFA) inhaler 1 Puff (Patient Supplied)  1 Puff Inhalation Q4H PRN    allopurinol (ZYLOPRIM) tablet 100 mg (Patient Supplied)  100 mg Oral BID    [START ON 7/4/2018] amLODIPine (NORVASC) tablet 5 mg (Patient Supplied)  5 mg Oral DAILY    [START ON 7/4/2018] clopidogrel (PLAVIX) tablet 75 mg (Patient Supplied)  75 mg Oral DAILY    colchicine tablet 0.3 mg (Patient Supplied)  0.3 mg Oral TID PRN    [START ON 7/4/2018] cyanocobalamin tablet 1,000 mcg (Patient Supplied)  1,000 mcg Oral DAILY    [START ON 7/4/2018] furosemide (LASIX) tablet 40 mg  40 mg Oral DAILY    gabapentin (NEURONTIN) capsule 300 mg  300 mg Oral TID    HYDROcodone-acetaminophen (NORCO) 5-325 mg per tablet 1 Tab  1 Tab Oral Q4H PRN    [START ON 7/4/2018] loratadine (CLARITIN) tablet 10 mg  10 mg Oral DAILY    polyvinyl alcohol (LIQUIFILM TEARS) 1.4 % ophthalmic solution 1 Drop  1 Drop Both Eyes PRN    [START ON 7/4/2018] potassium chloride (KLOR-CON) tablet 10 mEq (Patient Supplied)  10 mEq Oral DAILY    [START ON 7/4/2018] predniSONE (DELTASONE) tablet 5 mg  5 mg Oral DAILY    raNITIdine (ZANTAC) tablet 150 mg (Patient Supplied)  150 mg Oral BID    senna (SENOKOT) tablet 17.2 mg  2 Tab Oral BID    haloperidol lactate (HALDOL) injection 2 mg  2 mg SubCUTAneous Q1H PRN    Or    haloperidol lactate (HALDOL) injection 2 mg  2 mg IntraVENous Q1H PRN    levoFLOXacin (LEVAQUIN) tablet 500 mg  500 mg Oral Q24H    albuterol-ipratropium (DUO-NEB) 2.5 MG-0.5 MG/3 ML  3 mL Nebulization Q8H RT     1. Admit GIP for dx of pulmonary hypertension for management of agitated delirium, dyspnea, and family/pt support. 2. Agitated delirium: PRN haldol. Start levofloxacin for possible PNA vs UTI. 3. Dyspnea: 3 L oxygen continuous and scheduled prenisone and duonebs. 4. Family/Pt. Support: Spoke with patient's family away from bedside and we discuss possible reasons for this abrupt change in personality including reaction to Bactrim, recurrent UTI, other infections such as pneumonia, hypoxic event, other neuro event? I have outlined the plan of care including medications with the patient's family and we will continue to monitor and palliate symptoms as they arise. I have also discussed with the family that it is possible that we may be able to stabilize her and in that event we will be discharging her home. They understand this admission is for acute symptom management and that more information will likely be known within the next 24-48 hours.  PPS 30%.    Signed By: Virgen Garcia NP     July 3, 2018

## 2018-07-04 PROCEDURE — 74011250637 HC RX REV CODE- 250/637: Performed by: NURSE PRACTITIONER

## 2018-07-04 PROCEDURE — 0656 HSPC GENERAL INPATIENT

## 2018-07-04 PROCEDURE — 74011000250 HC RX REV CODE- 250: Performed by: NURSE PRACTITIONER

## 2018-07-04 PROCEDURE — 74011636637 HC RX REV CODE- 636/637: Performed by: NURSE PRACTITIONER

## 2018-07-04 PROCEDURE — 74011250636 HC RX REV CODE- 250/636: Performed by: NURSE PRACTITIONER

## 2018-07-04 RX ORDER — HALOPERIDOL 1 MG/1
2 TABLET ORAL 2 TIMES DAILY
Status: DISCONTINUED | OUTPATIENT
Start: 2018-07-04 | End: 2018-07-06

## 2018-07-04 RX ADMIN — FUROSEMIDE 40 MG: 40 TABLET ORAL at 17:03

## 2018-07-04 RX ADMIN — PREDNISONE 5 MG: 10 TABLET ORAL at 16:59

## 2018-07-04 RX ADMIN — LEVOFLOXACIN 500 MG: 500 TABLET, FILM COATED ORAL at 16:56

## 2018-07-04 RX ADMIN — RANITIDINE 150 MG: 150 TABLET, FILM COATED ORAL at 17:09

## 2018-07-04 RX ADMIN — HALOPERIDOL LACTATE 2 MG: 5 INJECTION INTRAMUSCULAR at 05:07

## 2018-07-04 RX ADMIN — AMLODIPINE BESYLATE 5 MG: 5 TABLET ORAL at 17:09

## 2018-07-04 RX ADMIN — LORATADINE 10 MG: 10 TABLET ORAL at 17:04

## 2018-07-04 RX ADMIN — CLOPIDOGREL BISULFATE 75 MG: 75 TABLET ORAL at 17:08

## 2018-07-04 RX ADMIN — ALLOPURINOL 100 MG: 100 TABLET ORAL at 18:07

## 2018-07-04 RX ADMIN — Medication 1000 MCG: at 17:08

## 2018-07-04 RX ADMIN — IPRATROPIUM BROMIDE AND ALBUTEROL SULFATE 3 ML: .5; 3 SOLUTION RESPIRATORY (INHALATION) at 23:51

## 2018-07-04 RX ADMIN — IPRATROPIUM BROMIDE AND ALBUTEROL SULFATE 3 ML: .5; 3 SOLUTION RESPIRATORY (INHALATION) at 17:33

## 2018-07-04 RX ADMIN — GABAPENTIN 300 MG: 300 CAPSULE ORAL at 21:13

## 2018-07-04 RX ADMIN — HALOPERIDOL 2 MG: 1 TABLET ORAL at 18:07

## 2018-07-04 RX ADMIN — GABAPENTIN 300 MG: 300 CAPSULE ORAL at 17:02

## 2018-07-04 RX ADMIN — SENNOSIDES 17.2 MG: 8.6 TABLET, FILM COATED ORAL at 17:00

## 2018-07-04 NOTE — PROGRESS NOTES
End of shift: Pt constantly talking throughout the night; at times very loud voice. Pt reports seeing several different people in the room with her that are trying to do things to her. Pt states \" I'm not going to sleep, don't you see him\". RN and CNA took turns staying at bedside to attempt to reassure patient, pt would be distracted for a short time before she went back to looking up at ceiling or in corner reporting she saw a man. Pt did attempt to get OOB twice throughout the shift. Pt observed jerking and having shakes. Pt has had three prn doses of haldol SQ and one dose of prn norco po.

## 2018-07-04 NOTE — PROGRESS NOTES
Problem: Pressure Injury - Risk of  Goal: *Prevention of pressure injury  Document Ceasar Scale and appropriate interventions in the flowsheet. Outcome: Progressing Towards Goal  Pressure Injury Interventions:  Sensory Interventions: Assess changes in LOC    Moisture Interventions: Absorbent underpads    Activity Interventions: Assess need for specialty bed    Mobility Interventions: Assess need for specialty bed, HOB 30 degrees or less    Nutrition Interventions: Document food/fluid/supplement intake    Friction and Shear Interventions: Apply protective barrier, creams and emollients, Lift sheet               Problem: Falls - Risk of  Goal: *Absence of Falls  Document Vanda Fall Risk and appropriate interventions in the flowsheet.    Outcome: Progressing Towards Goal  Fall Risk Interventions:       Mentation Interventions: Adequate sleep, hydration, pain control, Bed/chair exit alarm, Door open when patient unattended    Medication Interventions: Bed/chair exit alarm    Elimination Interventions: Bed/chair exit alarm, Call light in reach

## 2018-07-04 NOTE — ROUTINE PROCESS
Pt I'd by name and . Pt calm. No distress. No facial grimace. Denies pain at this time. Flacc =0-1. Resp non labored on 3L n/c. Lungs diminished. BS diminished. HR reg. No edema noted at this time. Brief clean/dry  SR up x2. Bed low/locked. Call light with in reach. Door opened. Tab alerts on.

## 2018-07-04 NOTE — PROGRESS NOTES
Pt yelling out \"help me\" reports to RN that a man is in her room messing with her feet and putting something on them. RN remains at bedside to calm and reassure the patient.

## 2018-07-04 NOTE — PROGRESS NOTES
Received report from off-going RN. Pt resting in bed, talking softly to self, tv on. Bed in low and locked position, safety measures in place, door open for monitoring.

## 2018-07-04 NOTE — PROGRESS NOTES
Progress Note    Patient: Tanya Boswell MRN: [id number]  SSN: [id number]    YOB: 1927  Age: 80 y.o. Sex: female      Admit Date: 7/3/2018    LOS: 1 day     Subjective:     Calm and restful. Rouses with stimulation. Too sleepy for food or meds at this time. Per nursing, several doses of PRN haldol via injection required throughout the night for agitated delirium. No family at bedside at this time. Review of Systems:  Review of systems not obtained due to patient factors. Objective:     Vitals:    07/03/18 1454 07/04/18 0509   BP: 129/63 119/67   Pulse: 96 95   Resp: 16 18   Temp: 96.8 °F (36 °C) 97.1 °F (36.2 °C)        Intake and Output:  Current Shift: 07/04 0701 - 07/04 1900  In: 100 [P.O.:100]  Out: -   Last three shifts: 07/02 1901 - 07/04 0700  In: 100 [P.O.:100]  Out: -       Physical Exam:  GENERAL: Drowsy but rouses with stimulation but appears sedated due to meds, cooperative  LUNG: diminished breath sounds throughout  HEART: regular rate and rhythm, S1, S2 normal, no murmur, click, rub or gallop  ABDOMEN: soft, non-tender. Bowel sounds normal. No masses,  no organomegaly  EXTREMITIES:  extremities normal, atraumatic, no cyanosis or edema  SKIN: Normal. and no rash or abnormalities  NEUROLOGIC: AOx1. Bed bound. Motor strength normal and symmetric. No facial drooping. Speech seems appropriate for her situation with [facility]. Cranial nerves 2-12 and sensation grossly intact. No spasms or jerking noted. PSYCHIATRIC: Calm at present.     Lab/Data Review:  No new labs resulted in the last 24 hours.     Assessment:     Principal Problem:    Pulmonary hypertension ([facility] (5/17/2016)    Active Problems:    SOB (shortness of breath) ()      Diastolic CHF, chronic ([facility] (5/17/2016)      Chronic respiratory failure with hypoxia (HCC) (1/25/2018)        Plan:     Current Facility-Administered Medications   Medication Dose Route Frequency    haloperidol (HALDOL) tablet 2 mg  2 mg Oral BID    acetaminophen (TYLENOL) tablet 650 mg  650 mg Oral Q6H PRN    albuterol (PROVENTIL HFA, VENTOLIN HFA, PROAIR HFA) inhaler 1 Puff (Patient Supplied)  1 Puff Inhalation Q4H PRN    allopurinol (ZYLOPRIM) tablet 100 mg (Patient Supplied)  100 mg Oral BID    amLODIPine (NORVASC) tablet 5 mg (Patient Supplied)  5 mg Oral DAILY    clopidogrel (PLAVIX) tablet 75 mg (Patient Supplied)  75 mg Oral DAILY    colchicine tablet 0.3 mg (Patient Supplied)  0.3 mg Oral TID PRN    cyanocobalamin tablet 1,000 mcg (Patient Supplied)  1,000 mcg Oral DAILY    furosemide (LASIX) tablet 40 mg  40 mg Oral DAILY    gabapentin (NEURONTIN) capsule 300 mg  300 mg Oral TID    HYDROcodone-acetaminophen (NORCO) 5-325 mg per tablet 1 Tab  1 Tab Oral Q4H PRN    loratadine (CLARITIN) tablet 10 mg  10 mg Oral DAILY    polyvinyl alcohol (LIQUIFILM TEARS) 1.4 % ophthalmic solution 1 Drop  1 Drop Both Eyes PRN    potassium chloride (KLOR-CON) tablet 10 mEq (Patient Supplied)  10 mEq Oral DAILY    predniSONE (DELTASONE) tablet 5 mg  5 mg Oral DAILY    raNITIdine (ZANTAC) tablet 150 mg (Patient Supplied)  150 mg Oral BID    senna (SENOKOT) tablet 17.2 mg  2 Tab Oral BID    haloperidol lactate (HALDOL) injection 2 mg  2 mg SubCUTAneous Q1H PRN    Or    haloperidol lactate (HALDOL) injection 2 mg  2 mg IntraVENous Q1H PRN    levoFLOXacin (LEVAQUIN) tablet 500 mg  500 mg Oral Q24H    albuterol-ipratropium (DUO-NEB) 2.5 MG-0.5 MG/3 ML  3 mL Nebulization Q8H RT     1. Admit GIP for dx of pulmonary hypertension for management of agitated delirium, dyspnea, and family/pt support.     2. Agitated delirium: PRN haldol and scheduled haldol BID. Continue levofloxacin for possible PNA vs UTI. Will attempt to reverse day/night sleep schedule.     3. Dyspnea: 3 L oxygen continuous and scheduled prenisone and duonebs.      4. Family/Pt. Support: No family at bedside. Discussed ongoing plan of care including meds with primary RN.  Have added scheduled haldol as an attempt to regulate patient's behaviors. Continue to monitor and palliate symptoms as they arise.  PPS 30%    Signed By: Gilmar Rankin NP     July 4, 2018

## 2018-07-04 NOTE — PROGRESS NOTES
Bedside Report taken from Noemi Alanis, [address] [facility]. Pt identified by name and . Pt in bed with eyes closed yet agitated. Arms and legs twitching. . Bed locked and low, side rails up, tabs/bed alarm in place, call light with in reach, and door opened for continued monitoring.

## 2018-07-05 ENCOUNTER — HOME CARE VISIT (OUTPATIENT)
Dept: SCHEDULING | Facility: HOME HEALTH | Age: 83
End: 2018-07-05
Payer: MEDICARE

## 2018-07-05 LAB
BACTERIA SPEC CULT: NORMAL
SERVICE CMNT-IMP: NORMAL

## 2018-07-05 PROCEDURE — 74011250637 HC RX REV CODE- 250/637: Performed by: NURSE PRACTITIONER

## 2018-07-05 PROCEDURE — 74011000250 HC RX REV CODE- 250: Performed by: NURSE PRACTITIONER

## 2018-07-05 PROCEDURE — 0656 HSPC GENERAL INPATIENT

## 2018-07-05 PROCEDURE — 74011636637 HC RX REV CODE- 636/637: Performed by: NURSE PRACTITIONER

## 2018-07-05 RX ORDER — FACIAL-BODY WIPES
10 EACH TOPICAL AS NEEDED
Status: DISCONTINUED | OUTPATIENT
Start: 2018-07-05 | End: 2018-07-07 | Stop reason: HOSPADM

## 2018-07-05 RX ADMIN — HALOPERIDOL 2 MG: 1 TABLET ORAL at 08:04

## 2018-07-05 RX ADMIN — POTASSIUM CHLORIDE 10 MEQ: 750 TABLET, EXTENDED RELEASE ORAL at 08:03

## 2018-07-05 RX ADMIN — IPRATROPIUM BROMIDE AND ALBUTEROL SULFATE 3 ML: .5; 3 SOLUTION RESPIRATORY (INHALATION) at 16:00

## 2018-07-05 RX ADMIN — GABAPENTIN 300 MG: 300 CAPSULE ORAL at 08:04

## 2018-07-05 RX ADMIN — HALOPERIDOL 2 MG: 1 TABLET ORAL at 17:32

## 2018-07-05 RX ADMIN — SENNOSIDES 17.2 MG: 8.6 TABLET, FILM COATED ORAL at 17:32

## 2018-07-05 RX ADMIN — Medication 1000 MCG: at 08:04

## 2018-07-05 RX ADMIN — RANITIDINE 150 MG: 150 TABLET, FILM COATED ORAL at 17:36

## 2018-07-05 RX ADMIN — RANITIDINE 150 MG: 150 TABLET, FILM COATED ORAL at 08:02

## 2018-07-05 RX ADMIN — SENNOSIDES 17.2 MG: 8.6 TABLET, FILM COATED ORAL at 08:03

## 2018-07-05 RX ADMIN — AMLODIPINE BESYLATE 5 MG: 5 TABLET ORAL at 08:07

## 2018-07-05 RX ADMIN — CLOPIDOGREL BISULFATE 75 MG: 75 TABLET ORAL at 08:07

## 2018-07-05 RX ADMIN — ALBUTEROL SULFATE 1 PUFF: 90 AEROSOL, METERED RESPIRATORY (INHALATION) at 17:35

## 2018-07-05 RX ADMIN — IPRATROPIUM BROMIDE AND ALBUTEROL SULFATE 3 ML: .5; 3 SOLUTION RESPIRATORY (INHALATION) at 07:51

## 2018-07-05 RX ADMIN — IPRATROPIUM BROMIDE AND ALBUTEROL SULFATE 3 ML: .5; 3 SOLUTION RESPIRATORY (INHALATION) at 23:12

## 2018-07-05 RX ADMIN — ALLOPURINOL 100 MG: 100 TABLET ORAL at 08:07

## 2018-07-05 RX ADMIN — FUROSEMIDE 40 MG: 40 TABLET ORAL at 08:04

## 2018-07-05 RX ADMIN — LEVOFLOXACIN 500 MG: 500 TABLET, FILM COATED ORAL at 17:09

## 2018-07-05 RX ADMIN — GABAPENTIN 300 MG: 300 CAPSULE ORAL at 17:10

## 2018-07-05 RX ADMIN — GABAPENTIN 300 MG: 300 CAPSULE ORAL at 21:02

## 2018-07-05 RX ADMIN — PREDNISONE 5 MG: 10 TABLET ORAL at 08:02

## 2018-07-05 RX ADMIN — LORATADINE 10 MG: 10 TABLET ORAL at 08:10

## 2018-07-05 RX ADMIN — ALLOPURINOL 100 MG: 100 TABLET ORAL at 17:36

## 2018-07-05 NOTE — PROGRESS NOTES
Progress Note    Patient: Christine Ruiz MRN: [id number]  SSN: [id number]    YOB: 1927  Age: 80 y.o. Sex: female      Admit Date: 7/3/2018    LOS: 2 days     Subjective:     Alert to self. Ate 75% of breakfast. No family at bedside. Denies pain or nausea. Dyspnea at rest.     Review of Systems:  Pertinent items are noted in the History of Present Illness. Objective:     Vitals:    07/04/18 0509 07/04/18 1704 07/05/18 0456 07/05/18 0745   BP: 119/67 124/60 114/62 124/84   Pulse: 95 92 93 80   Resp: 18 16 16 16   Temp: 97.1 °F (36.2 °C) 98.3 °F (36.8 °C) 96.8 °F (36 °C)         Intake and Output:  Current Shift:    Last three shifts: 07/03 1901 - 07/05 0700  In: 218 [P.O.:218]  Out: -     Physical Exam:   GENERAL: alert, cooperative, mild distress, appears stated age, pale  LUNG: clear to auscultation bilaterally, diminished breath sounds with labored respirations. HEART: regular rate and rhythm  ABDOMEN: soft, non-tender. Bowel sounds normal. No masses,  no organomegaly  : Incontinent. EXTREMITIES:  extremities normal, atraumatic, no cyanosis or edema  SKIN: Normal. and no rash or abnormalities  NEUROLOGIC: Alert to self. Able to answer some questions. Generalized weakness. Bedbound. PSYCHIATRIC: anxious    Lab/Data Review:  No new labs resulted in the last 24 hours.     Assessment:     Principal Problem:    Pulmonary hypertension ([address] (5/17/2016)    Active Problems:    SOB (shortness of breath) ()      Diastolic CHF, chronic (HCC) (5/17/2016)      Chronic respiratory failure with hypoxia (HCC) (1/25/2018)        Plan:     Current Facility-Administered Medications   Medication Dose Route Frequency    bisacodyl (DULCOLAX) suppository 10 mg  10 mg Rectal PRN    haloperidol (HALDOL) tablet 2 mg  2 mg Oral BID    acetaminophen (TYLENOL) tablet 650 mg  650 mg Oral Q6H PRN    albuterol (PROVENTIL HFA, VENTOLIN HFA, PROAIR HFA) inhaler 1 Puff (Patient Supplied)  1 Puff Inhalation Q4H PRN  allopurinol (ZYLOPRIM) tablet 100 mg (Patient Supplied)  100 mg Oral BID    amLODIPine (NORVASC) tablet 5 mg (Patient Supplied)  5 mg Oral DAILY    clopidogrel (PLAVIX) tablet 75 mg (Patient Supplied)  75 mg Oral DAILY    colchicine tablet 0.3 mg (Patient Supplied)  0.3 mg Oral TID PRN    cyanocobalamin tablet 1,000 mcg (Patient Supplied)  1,000 mcg Oral DAILY    furosemide (LASIX) tablet 40 mg  40 mg Oral DAILY    gabapentin (NEURONTIN) capsule 300 mg  300 mg Oral TID    HYDROcodone-acetaminophen (NORCO) 5-325 mg per tablet 1 Tab  1 Tab Oral Q4H PRN    loratadine (CLARITIN) tablet 10 mg  10 mg Oral DAILY    polyvinyl alcohol (LIQUIFILM TEARS) 1.4 % ophthalmic solution 1 Drop  1 Drop Both Eyes PRN    potassium chloride (KLOR-CON) tablet 10 mEq (Patient Supplied)  10 mEq Oral DAILY    predniSONE (DELTASONE) tablet 5 mg  5 mg Oral DAILY    raNITIdine (ZANTAC) tablet 150 mg (Patient Supplied)  150 mg Oral BID    senna (SENOKOT) tablet 17.2 mg  2 Tab Oral BID    haloperidol lactate (HALDOL) injection 2 mg  2 mg SubCUTAneous Q1H PRN    Or    haloperidol lactate (HALDOL) injection 2 mg  2 mg IntraVENous Q1H PRN    levoFLOXacin (LEVAQUIN) tablet 500 mg  500 mg Oral Q24H    albuterol-ipratropium (DUO-NEB) 2.5 MG-0.5 MG/3 ML  3 mL Nebulization Q8H RT       Admitted GIP with pulmonary hypertension for management of delirium and dyspnea. 1. Delirium: Haldol as ordered. 2. Dyspnea: Steroids as ordered. Nebulizers scheduled and prn. Oxygen as ordered. 3. Family/Pt Support: No family at bedside during exam. Medications and plan of care discussed with nursing staff. Will continue to monitor for symptoms and adjust medications as needed to maintain patient comfort. PPS 30%. Case discussed with Dr. Loree De Los Santos.         Signed By: Kenzie Weeks NP     July 5, 2018

## 2018-07-05 NOTE — PROGRESS NOTES
Problem: Pressure Injury - Risk of  Goal: *Prevention of pressure injury  Document Ceasar Scale and appropriate interventions in the flowsheet.    Outcome: Progressing Towards Goal  Pressure Injury Interventions:  Sensory Interventions: Assess changes in LOC, Check visual cues for pain    Moisture Interventions: Absorbent underpads, Moisture barrier, Check for incontinence Q2 hours and as needed    Activity Interventions: Pressure redistribution bed/mattress(bed type)    Mobility Interventions: Pressure redistribution bed/mattress (bed type)    Nutrition Interventions: Document food/fluid/supplement intake    Friction and Shear Interventions: Lift sheet

## 2018-07-05 NOTE — PROGRESS NOTES
3998- The patient is resting quietly at the change of sift rounds. She was identified by name and . She shows no signs of distress. Pain =0. No signs of anxiety, SOB, nausea or vomit. The bed is low and locked and the side rails are up times two. The call light is within the reach of the patient. The room is near the nurses station and the door to the room is left open. Tab alert is in place and on the on position. 0510- Patient is awake and alert to person only. She is feeding herself breakfast. She demonstrates how to call for assistance. 4094- All AM scheduled medications taken whole with water. No problems with swallowing them. 1200- The patient is feeding herself and is talking to herself as she thinks she is addressing someone else. The patient report was given to the on coming RN and walking rounds completed.

## 2018-07-05 NOTE — PROGRESS NOTES
LMSW went to complete the SW assessment. Pt is unaccompanied at this time and unable to participate in assessment.  LMSW will try again later and/or call family

## 2018-07-05 NOTE — PROGRESS NOTES
Problem: Falls - Risk of  Goal: *Absence of Falls  Document Vanda Fall Risk and appropriate interventions in the flowsheet.    Outcome: Progressing Towards Goal  Fall Risk Interventions:       Mentation Interventions: Bed/chair exit alarm, Door open when patient unattended, Evaluate medications/consider consulting pharmacy    Medication Interventions: Bed/chair exit alarm, Evaluate medications/consider consulting pharmacy    Elimination Interventions: Bed/chair exit alarm, Call light in reach

## 2018-07-05 NOTE — ROUTINE PROCESS
Pt I'd by name and . Pt calm. No distress. Denies pain at this time. No facial grimace. Flacc =0-1. Resp non labored on 3L n/c. Lungs diminished. BS diminished. HR reg. No edema noted at this time. Brief clean/dry. SR up x2. Bed low/locked. Call light with in reach. Door opened. Tab alerts on.

## 2018-07-06 PROCEDURE — 74011000250 HC RX REV CODE- 250: Performed by: NURSE PRACTITIONER

## 2018-07-06 PROCEDURE — 74011636637 HC RX REV CODE- 636/637: Performed by: NURSE PRACTITIONER

## 2018-07-06 PROCEDURE — 74011250637 HC RX REV CODE- 250/637: Performed by: NURSE PRACTITIONER

## 2018-07-06 PROCEDURE — 0656 HSPC GENERAL INPATIENT

## 2018-07-06 RX ORDER — GABAPENTIN 300 MG/1
300 CAPSULE ORAL 3 TIMES DAILY
Qty: 1 CAP | Refills: 0 | Status: SHIPPED
Start: 2018-07-06 | End: 2018-08-01

## 2018-07-06 RX ORDER — HALOPERIDOL 1 MG/1
1 TABLET ORAL 2 TIMES DAILY
Qty: 1 TAB | Refills: 0 | Status: SHIPPED
Start: 2018-07-07 | End: 2018-08-01

## 2018-07-06 RX ORDER — IPRATROPIUM BROMIDE AND ALBUTEROL SULFATE 2.5; .5 MG/3ML; MG/3ML
3 SOLUTION RESPIRATORY (INHALATION) EVERY 8 HOURS
Qty: 30 NEBULE | Refills: 0 | Status: SHIPPED | OUTPATIENT
Start: 2018-07-06 | End: 2018-08-01

## 2018-07-06 RX ORDER — HALOPERIDOL 1 MG/1
1 TABLET ORAL 2 TIMES DAILY
Status: DISCONTINUED | OUTPATIENT
Start: 2018-07-06 | End: 2018-07-07 | Stop reason: HOSPADM

## 2018-07-06 RX ORDER — LEVOFLOXACIN 500 MG/1
500 TABLET, FILM COATED ORAL EVERY 24 HOURS
Status: COMPLETED | OUTPATIENT
Start: 2018-07-07 | End: 2018-07-07

## 2018-07-06 RX ORDER — LEVOFLOXACIN 500 MG/1
500 TABLET, FILM COATED ORAL EVERY 24 HOURS
Status: DISCONTINUED | OUTPATIENT
Start: 2018-07-06 | End: 2018-07-06

## 2018-07-06 RX ADMIN — RANITIDINE 150 MG: 150 TABLET, FILM COATED ORAL at 08:10

## 2018-07-06 RX ADMIN — POTASSIUM CHLORIDE 10 MEQ: 750 TABLET, EXTENDED RELEASE ORAL at 08:10

## 2018-07-06 RX ADMIN — FUROSEMIDE 40 MG: 40 TABLET ORAL at 08:11

## 2018-07-06 RX ADMIN — IPRATROPIUM BROMIDE AND ALBUTEROL SULFATE 3 ML: .5; 3 SOLUTION RESPIRATORY (INHALATION) at 23:31

## 2018-07-06 RX ADMIN — Medication 1000 MCG: at 08:10

## 2018-07-06 RX ADMIN — GABAPENTIN 300 MG: 300 CAPSULE ORAL at 17:11

## 2018-07-06 RX ADMIN — BISACODYL 10 MG: 10 SUPPOSITORY RECTAL at 17:16

## 2018-07-06 RX ADMIN — SENNOSIDES 17.2 MG: 8.6 TABLET, FILM COATED ORAL at 08:11

## 2018-07-06 RX ADMIN — HALOPERIDOL 1 MG: 1 TABLET ORAL at 17:11

## 2018-07-06 RX ADMIN — GABAPENTIN 300 MG: 300 CAPSULE ORAL at 08:11

## 2018-07-06 RX ADMIN — PREDNISONE 5 MG: 10 TABLET ORAL at 08:11

## 2018-07-06 RX ADMIN — CLOPIDOGREL BISULFATE 75 MG: 75 TABLET ORAL at 08:10

## 2018-07-06 RX ADMIN — LEVOFLOXACIN 500 MG: 500 TABLET, FILM COATED ORAL at 17:11

## 2018-07-06 RX ADMIN — GABAPENTIN 300 MG: 300 CAPSULE ORAL at 21:26

## 2018-07-06 RX ADMIN — ALLOPURINOL 100 MG: 100 TABLET ORAL at 08:10

## 2018-07-06 RX ADMIN — LORATADINE 10 MG: 10 TABLET ORAL at 08:11

## 2018-07-06 RX ADMIN — IPRATROPIUM BROMIDE AND ALBUTEROL SULFATE 3 ML: .5; 3 SOLUTION RESPIRATORY (INHALATION) at 08:10

## 2018-07-06 RX ADMIN — HALOPERIDOL 2 MG: 1 TABLET ORAL at 08:11

## 2018-07-06 RX ADMIN — ALLOPURINOL 100 MG: 100 TABLET ORAL at 17:15

## 2018-07-06 RX ADMIN — IPRATROPIUM BROMIDE AND ALBUTEROL SULFATE 3 ML: .5; 3 SOLUTION RESPIRATORY (INHALATION) at 16:30

## 2018-07-06 RX ADMIN — RANITIDINE 150 MG: 150 TABLET, FILM COATED ORAL at 17:15

## 2018-07-06 RX ADMIN — SENNOSIDES 17.2 MG: 8.6 TABLET, FILM COATED ORAL at 17:11

## 2018-07-06 RX ADMIN — AMLODIPINE BESYLATE 5 MG: 5 TABLET ORAL at 08:10

## 2018-07-06 NOTE — PROGRESS NOTES
LMSW called the son to see when the family would like the pt to come home. Family said anytime tomorrow.         LMSW set up transportation for 10am to leave the [place] [id number] and go back home

## 2018-07-06 NOTE — PROGRESS NOTES
Progress Note    Patient: Pantera Mireles MRN: [id number]  SSN: [id number]    YOB: 1927  Age: 80 y.o. Sex: female      Admit Date: 7/3/2018    LOS: 3 days     Subjective:     Alert to self. Ate 100% of breakfast. No family at bedside. Denies pain or nausea. Dyspnea at rest.     Review of Systems:  Pertinent items are noted in the History of Present Illness. Objective:     Vitals:    07/04/18 1704 07/05/18 0456 07/05/18 0745 07/06/18 0502   BP: 124/60 114/62 124/84 136/61   Pulse: 92 93 80 88   Resp: 16 16 16 20   Temp: 98.3 °F (36.8 °C) 96.8 °F (36 °C)  97.5 °F (36.4 °C)        Intake and Output:  Current Shift: 07/06 0701 - 07/06 1900  In: 360 [P.O.:360]  Out: -   Last three shifts:      Physical Exam:   GENERAL: alert, cooperative, mild distress, appears stated age, pale  LUNG: clear to auscultation bilaterally, diminished breath sounds with labored respirations. HEART: regular rate and rhythm  ABDOMEN: soft, non-tender. Bowel sounds normal. No masses,  no organomegaly  : Incontinent. EXTREMITIES:  extremities normal, atraumatic, no cyanosis or edema  SKIN: Normal. and no rash or abnormalities  NEUROLOGIC: Alert to self. Able to answer some questions. Generalized weakness. Bedbound. PSYCHIATRIC: anxious    Lab/Data Review:  No new labs resulted in the last 24 hours.     Assessment:     Principal Problem:    Pulmonary hypertension ([address] (5/17/2016)    Active Problems:    SOB (shortness of breath) ()      Diastolic CHF, chronic (HCC) (5/17/2016)      Chronic respiratory failure with hypoxia (HCC) (1/25/2018)        Plan:     Current Facility-Administered Medications   Medication Dose Route Frequency    bisacodyl (DULCOLAX) suppository 10 mg  10 mg Rectal PRN    haloperidol (HALDOL) tablet 2 mg  2 mg Oral BID    acetaminophen (TYLENOL) tablet 650 mg  650 mg Oral Q6H PRN    albuterol (PROVENTIL HFA, VENTOLIN HFA, PROAIR HFA) inhaler 1 Puff (Patient Supplied)  1 Puff Inhalation Q4H PRN  allopurinol (ZYLOPRIM) tablet 100 mg (Patient Supplied)  100 mg Oral BID    amLODIPine (NORVASC) tablet 5 mg (Patient Supplied)  5 mg Oral DAILY    clopidogrel (PLAVIX) tablet 75 mg (Patient Supplied)  75 mg Oral DAILY    colchicine tablet 0.3 mg (Patient Supplied)  0.3 mg Oral TID PRN    cyanocobalamin tablet 1,000 mcg (Patient Supplied)  1,000 mcg Oral DAILY    furosemide (LASIX) tablet 40 mg  40 mg Oral DAILY    gabapentin (NEURONTIN) capsule 300 mg  300 mg Oral TID    HYDROcodone-acetaminophen (NORCO) 5-325 mg per tablet 1 Tab  1 Tab Oral Q4H PRN    loratadine (CLARITIN) tablet 10 mg  10 mg Oral DAILY    polyvinyl alcohol (LIQUIFILM TEARS) 1.4 % ophthalmic solution 1 Drop  1 Drop Both Eyes PRN    potassium chloride (KLOR-CON) tablet 10 mEq (Patient Supplied)  10 mEq Oral DAILY    predniSONE (DELTASONE) tablet 5 mg  5 mg Oral DAILY    raNITIdine (ZANTAC) tablet 150 mg (Patient Supplied)  150 mg Oral BID    senna (SENOKOT) tablet 17.2 mg  2 Tab Oral BID    haloperidol lactate (HALDOL) injection 2 mg  2 mg SubCUTAneous Q1H PRN    Or    haloperidol lactate (HALDOL) injection 2 mg  2 mg IntraVENous Q1H PRN    levoFLOXacin (LEVAQUIN) tablet 500 mg  500 mg Oral Q24H    albuterol-ipratropium (DUO-NEB) 2.5 MG-0.5 MG/3 ML  3 mL Nebulization Q8H RT       Admitted GIP with pulmonary hypertension for management of delirium and dyspnea. 1. Delirium: Haldol as ordered. 2. Dyspnea: Steroids as ordered. Nebulizers scheduled and prn. Oxygen as ordered. 3. Family/Pt Support: No family at bedside during exam. Medications and plan of care discussed with nursing staff. Will continue to monitor for symptoms and adjust medications as needed to maintain patient comfort. PPS 30%. Case [facility] Dr. Meagan Spear [facility]. Delirium controlled with scheduled haldol. Will decrease to 1mg bid for home use and continue prn dosing. Will continue levaquin for a total of  5 doses.  Plans for discharge in the morning back home with family.        Signed By: Lupillo Herrera NP     July 6, 2018

## 2018-07-06 NOTE — PROGRESS NOTES
07/06/18 0839   Pyschosocial Assessment 1   Concerns from previous vist? No  (Initial GIP assesment)   Significant changes in relationships or household members? No   Signs of abuse or neglect? No   Financial Need (none)   Pain signs needing to be reported to  No   Psychosocial Components/Teaching/Interventions Instructed on how to contact the agency with concerns; Emotional support/supportive listening   The patient has designated their health care surrogate Patient has not made wishes known   Visit Environment: LMSW visited with the pt in her room this am after her bath. Pt is awake and alert at this time      Circumstances for Admission: Pt was admitted to the [place] [address] as GIP for symptom management from the in home program.  Her primary Rn is Tori. Pt was admitted to [facility] for symptom management of delerium and agitation and dyspnea. Family/Social History: Pt is a 80year old  female. She states she has 6 children but can only name 5 names. She worked in [employer] her whole working life. She retired from [employer]. Spritiual Assessment: She attends [employer]: She lives in a duplex with her son Alicja Wheat. They have lived there for 35 years. Alicja Wheat was hit by a car when he was 28 and he is disabled himself. He is her primary caregiver. The other children do assist.      Patient's Emotional and Cognitive Status: Pt is awake alert and pleasant. No agitation or improper behaviors. Primary Caregiver / People Involved: Pop Manzanares dtrKeenan Banker daughter, Ramon Walker son, Alicja Wheat son,  Francesca Damon is DIL. Advance Directive/HCPOA / DNR Status:  Pt has no HCPOA. She is a DNR       Final Arrangements: LMSW did not discuss with pt. Will ask family when they visit      Harm to Self or Others: Pt is not a harm to herself or others       Bereavement Risk: Low to moderate.   Concern for son Cali Talley who has lived with his mother for many years and his disabiility      Plan for the Patient: Pt to discharge home when her symptoms are managed.         Referrals Made: none yet

## 2018-07-06 NOTE — [facility] IDG NURSE NOTES
Patient: Peace Gonzalez    Date: 07/06/18  Time: 12:15 PM    [facility] Nurse Notes    UPDATE: Patient is a 80year old Female patient with diagnosis of Pulmonary Fibrosis, on GIP level of care for Delirium,Dyspnea and Agitation Management. Confused per report. Scheduled Haldol for Delirium Management. GOALS OF CARE:   Continue to monitor for optimal patient comfort and symptom management. Ongoing family support with patients children.              Signed by: Rober Angulo RN MSN

## 2018-07-06 NOTE — [facility] IDG MASTER NOTE
Hospice Interdisciplinary Group Collaborative  Date: 07/06/18  Time: 12:00 pm    ___________________    Patient: Pantera Mireles  Insurance ID: [unfilled]  MRN: [id number]      ___________________    Diagnoses: There were no encounter diagnoses.     Current Medications:    Current Facility-Administered Medications:     haloperidol (HALDOL) tablet 1 mg, 1 mg, Oral, BID, George Erwin NP    levoFLOXacin [facility]) tablet 500 mg, 500 mg, Oral, Q24H, George Erwin NP    bisacodyl (DULCOLAX) suppository 10 mg, 10 mg, Rectal, PRN, George Erwin NP    acetaminophen (TYLENOL) tablet 650 mg, 650 mg, Oral, Q6H PRN, Elsa Esteves NP    albuterol (PROVENTIL HFA, VENTOLIN HFA, PROAIR HFA) inhaler 1 Puff (Patient Supplied), 1 Puff, Inhalation, Q4H PRN, Elsa Esteves NP, 1 Puff at 07/05/18 1735    allopurinol (ZYLOPRIM) tablet 100 mg (Patient Supplied), 100 mg, Oral, BID, Elsa Esteves NP, 100 mg at 07/06/18 0810    amLODIPine (NORVASC) tablet 5 mg (Patient Supplied), 5 mg, Oral, DAILY, Elsa Esteves NP, 5 mg at 07/06/18 0810    clopidogrel (PLAVIX) tablet 75 mg (Patient Supplied), 75 mg, Oral, DAILY, Elsa Esteves NP, 75 mg at 07/06/18 0810    colchicine tablet 0.3 mg (Patient Supplied), 0.3 mg, Oral, TID PRN, Elsa Esteves NP    cyanocobalamin tablet 1,000 mcg (Patient Supplied), 1,000 mcg, Oral, DAILY, Elsa Esteves NP, 1,000 mcg at 07/06/18 0810    furosemide (LASIX) tablet 40 mg, 40 mg, Oral, DAILY, Elsa Esteves NP, 40 mg at 07/06/18 6214    gabapentin (NEURONTIN) capsule 300 mg, 300 mg, Oral, TID, Elsa Esteves NP, 300 mg at 07/06/18 0811    HYDROcodone-acetaminophen (NORCO) 5-325 mg per tablet 1 Tab, 1 Tab, Oral, Q4H PRN, Elsa Esteves NP, 1 Tab at 07/03/18 2258    loratadine (CLARITIN) tablet 10 mg, 10 mg, Oral, DAILY, Elsa Esteves NP, 10 mg at 07/06/18 0811    polyvinyl alcohol (LIQUIFILM TEARS) 1.4 % ophthalmic solution 1 Drop, 1 Drop, Both Eyes, PRN, Golden Dowling NP    potassium chloride (KLOR-CON) tablet 10 mEq (Patient Supplied), 10 mEq, Oral, DAILY, Golden Dowling NP, 10 mEq at 07/06/18 0810    predniSONE (DELTASONE) tablet 5 mg, 5 mg, Oral, DAILY, Golden Dowling NP, 5 mg at 07/06/18 9096    raNITIdine (ZANTAC) tablet 150 mg (Patient Supplied), 150 mg, Oral, BID, Golden Dowling NP, 150 mg at 07/06/18 0810    senna (SENOKOT) tablet 17.2 mg, 2 Tab, Oral, BID, Golden Dowling NP, 17.2 mg at 07/06/18 0238    haloperidol lactate (HALDOL) injection 2 mg, 2 mg, SubCUTAneous, Q1H PRN, 2 mg at 07/04/18 0507 **OR** haloperidol lactate (HALDOL) injection 2 mg, 2 mg, IntraVENous, Q1H PRN, Golden Dowling NP    albuterol-ipratropium (DUO-NEB) 2.5 MG-0.5 MG/3 ML, 3 mL, Nebulization, Q8H RT, Golden Dowling NP, 3 mL at 07/06/18 0810    Orders:  Orders Placed This Encounter    IP CONSULT TO SPIRITUAL CARE     Standing Status:   Standing     Number of Occurrences:   1     Order Specific Question:   Reason for Consult: Answer: Once on week one, then PRN. For [facility] Patients Only. For contracted patients, their primary hospice will continue to manage spiritual care needs.  DIET REGULAR     Standing Status:   Standing     Number of Occurrences:   1    VITAL SIGNS     Standing Status:   Standing     Number of Occurrences:   1    VITAL SIGNS     Standing Status:   Standing     Number of Occurrences:   1    NURSING-MISCELLANEOUS: Comfort Care Measures CONTINUOUS     Standing Status:   Standing     Number of Occurrences:   1     Order Specific Question:   Description of Order:     Answer:   Comfort Care Measures    GARCIA CATHETER, CARE     1. Garcia Catheter care every shift and PRN  2. Notify Physician of Garcia Catheter leakage, occlusion, gross adherent sediment or accidental removal  3. Change Garcia 30 days after insertion.      Standing Status:   Standing     Number of Occurrences:   1    BLADDER CHECKS     May scan bladder PRN for urinary retention and or patient discomfort     Standing Status:   Standing     Number of Occurrences:   1    NURSING ASSESSMENT:  SPECIFY Assess for GIP, routine, or respite level of care. Q SHIFT Routine     Standing Status:   Standing     Number of Occurrences:   1     Order Specific Question:   Please describe the test or procedure you would like to order. Answer:   Assess for GIP, routine, or respite level of care.  PAIN ASSESSMENT Pain and Symptoms: Assess ever 4 hours and PRN, for GIP level of care. PRN Routine     Standing Status:   Standing     Number of Occurrences:   1     Order Specific Question:   Please describe the test or procedure you would like to order. Answer:   Pain and Symptoms: Assess ever 4 hours and PRN, for GIP level of care.  ACTIVITY AS TOLERATED W/ASSIST     Standing Status:   Standing     Number of Occurrences:   1    NURSING-MISCELLANEOUS: ADMISSION/DISCHARGE INFORMATION Admit GIP for dx of pulmonary hypertension for management of agitated delirium, dyspnea, and family/pt support. Admitted from [address] [place] [address]. Primary RN Benny Rogers. CONTINUOUS     Admit GIP for dx of pulmonary hypertension for management of agitated delirium, dyspnea, and family/pt support. Admitted from [address] [place] [address]. Primary RN Benny Rogers. Standing Status:   Standing     Number of Occurrences:   1     Order Specific Question:   Description of Order:     Answer:   ADMISSION/DISCHARGE INFORMATION    DO NOT RESUSCITATE     Standing Status:   Standing     Number of Occurrences:   1     Order Specific Question:   Comfort Measures Only? Answer: Yes    OXYGEN CANNULA Liters per minute: 3; Indications for O2 therapy: HYPOXIA, RESPIRATORY DISTRESS CONTINUOUS Routine     Standing Status:   Standing     Number of Occurrences:   1     Order Specific Question:   Liters per minute:      Answer:   3     Order Specific Question:   Indications for O2 therapy     Answer:   HYPOXIA     Order Specific Question:   Indications for O2 therapy     Answer:   RESPIRATORY DISTRESS    acetaminophen (TYLENOL) tablet 650 mg     OP SIG:Take 650 mg by mouth every six (6) hours as needed for Pain or Fever.  albuterol (PROVENTIL HFA, VENTOLIN HFA, PROAIR HFA) inhaler 1 Puff (Patient Supplied)     OP SIG:Take 1 Puff by inhalation every four (4) hours as needed for Wheezing or Shortness of Breath. Order Specific Question:   MODE OF DELIVERY     Answer:   Spacer    allopurinol (ZYLOPRIM) tablet 100 mg (Patient Supplied)     OP SIG:Take 100 mg by mouth two (2) times a day.  amLODIPine (NORVASC) tablet 5 mg (Patient Supplied)     OP SIG:Take 5 mg by mouth daily.  clopidogrel (PLAVIX) tablet 75 mg (Patient Supplied)     OP SIG:Take 75 mg by mouth daily.  colchicine tablet 0.3 mg (Patient Supplied)     OP SIG:Take 0.3 mg by mouth three (3) times daily as needed (gout flare up). Pt takes 1/2 tab         USE for gout pain not relieved by prednison   Indications: acute gouty arthritis      cyanocobalamin tablet 1,000 mcg (Patient Supplied)    furosemide (LASIX) tablet 40 mg     OP SIG:Take 40 mg by mouth daily.  gabapentin (NEURONTIN) capsule 300 mg     OP SIG:Take 300 mg by mouth three (3) times daily. Increase HS dose to 600 mg =2 tabs    continue 300 mg in AM and afternoon  Indications: NEUROPATHIC PAIN      HYDROcodone-acetaminophen (NORCO) 5-325 mg per tablet 1 Tab     OP SIG:Take 1 Tab by mouth every four (4) hours as needed for Pain (dyspnea). Indications: Pain, dyspnea      loratadine (CLARITIN) tablet 10 mg     OP SIG:Take 10 mg by mouth daily.  polyvinyl alcohol (LIQUIFILM TEARS) 1.4 % ophthalmic solution 1 Drop     OP SIG:Administer 1 Drop to both eyes as needed (dry eyes). Indications: Dry Eye      potassium chloride (KLOR-CON) tablet 10 mEq (Patient Supplied)     OP SIG:Take 10 mEq by mouth daily.  pt found to be taking    Indications: hypokalemia prevention      predniSONE (DELTASONE) tablet 5 mg     OP SIG:Take 5 mg by mouth daily. take 1/2 tab=5 mg   Indications: acute gouty arthritis, idiopathic pulmonary fibrosis      raNITIdine (ZANTAC) tablet 150 mg (Patient Supplied)     OP SIG:Take 150 mg by mouth two (2) times a day.  senna (SENOKOT) tablet 17.2 mg    OR Linked Order Group     haloperidol lactate (HALDOL) injection 2 mg     haloperidol lactate (HALDOL) injection 2 mg    DISCONTD: levoFLOXacin (LEVAQUIN) tablet 500 mg     Order Specific Question:   Antibiotic Indications     Answer:   Pneumonia (HAP)     Order Specific Question:   HAP duration of therapy     Answer:   7 days    albuterol-ipratropium (DUO-NEB) 2.5 MG-0.5 MG/3 ML     Order Specific Question:   MODE OF DELIVERY     Answer:   Nebulizer    DISCONTD: haloperidol (HALDOL) tablet 2 mg    bisacodyl (DULCOLAX) suppository 10 mg    haloperidol (HALDOL) tablet 1 mg    levoFLOXacin (LEVAQUIN) tablet 500 mg     Order Specific Question:   Antibiotic Indications     Answer:   Pneumonia (HAP)     Order Specific Question:   HAP duration of therapy     Answer:   7 days    INITIAL PHYSICIAN ORDER: HOSPICE Level Of Care: General; Reason for Admission: Admit GIP for dx of pulmonary hypertension for management of agitated delirium, dyspnea, and family/pt support. Standing Status:   Standing     Number of Occurrences:   1     Order Specific Question:   Status     Answer:   Hospice     Order Specific Question:   Level Of Care     Answer:   General     Order Specific Question:   Reason for Admission     Answer:   Admit GIP for dx of pulmonary hypertension for management of agitated delirium, dyspnea, and family/pt support. Order Specific Question:   Inpatient Hospitalization Certified Necessary for the Following Reasons     Answer:   3.  Patient receiving treatment that can only be provided in an inpatient setting (further clarification in H&P documentation)     Order Specific Question:   Admitting Diagnosis Answer:   Pulmonary hypertension [facility]) [[id number]]     Order Specific Question:   Terminal Prognosis Diagnosis(es)     Answer:   CKD (chronic kidney disease), stage III [4320643]     Order Specific Question:   Terminal Prognosis Diagnosis(es)     Answer:   CHF (congestive heart failure) ([facility] [address] [[id number]]     Order Specific Question:   Terminal Prognosis Diagnosis(es)     Answer:   Respiratory failure [facility]) [[id number]]     Order Specific Question:   Admitting Physician     Answer:   Guero Hondah     Order Specific Question:   Attending Physician     Answer:   Guero Hondah     Order Specific Question:   Discharge Plan:     Answer: Other (Specify)     Comments:   Unclear as to disposition at this time. [facility] at [facility] or [address] [facility] and return home with [facility].  IP CONSULT TO SOCIAL WORK     Standing Status:   Standing     Number of Occurrences:   1     Order Specific Question:   Reason for Consult: Answer: For [facility] Patients Only. For contracted patients, their primary hospice will continue to manage social work needs. Allergies: Allergies   Allergen Reactions    Bactrim [Sulfamethoxazole-Trimethoprim] Other (comments)     Caused delirum and hypermania.  Bee Pollen Swelling    Fire Ant Itching and Swelling       Care Plan:       Multidisciplinary Problems (Active)           Problem: Anticipatory Grief     Dates: Start: 07/05/18       Disciplines: Interdisciplinary    Goal: Grief heard and acknowledged, anxiety reduced, patient coping identified, patient/family expressed gratitude     Dates: Start: 07/05/18      Disciplines: Interdisciplinary   Intervention: Assess grief responses    Dates: Start: 07/05/18       Description: Assess for feelings of grief    Intervention: Support grieving process    Dates: Start: 07/05/18       Description: Address feelings of loss, anticipatory grief, expression of concern.           Problem: Anxiety/Agitation     Dates: Start: 07/03/18 Disciplines: Interdisciplinary    Goal: Verbalize and demonstrate ability to manage anxiety     Dates: Start: 07/03/18      Description: The patient/family/caregiver will verbalize and demonstrate ability to manage the patient's anxiety throughout hospice care. Disciplines: Interdisciplinary   Intervention: Assess for anxiety/agitation    Dates: Start: 07/03/18       Description: Assess for signs and symptoms of anxiety and agitation. Intervention: Instruction strategies to reduce anxiety/agitation    Dates: Start: 07/03/18       Description: Instruct patient/caregiver on strategies to reduce anxiety/agitation. Problem: Dying Process     Dates: Start: 07/05/18       Disciplines: Interdisciplinary    Goal: Increased peace with dying process, patient coping identified, patient/family expressed gratitude, spiritual distress identified and decreased with visit     Dates: Start: 07/05/18      Disciplines: Interdisciplinary   Intervention: Assess coping status    Dates: Start: 07/05/18       Description: Assess for ability to cope with death    Intervention: Assist with coping skills    Dates: Start: 07/05/18      Intervention: Assist with remembrance    Dates: Start: 07/05/18      Intervention: Assist with spiritual questions    Dates: Start: 07/05/18            Problem: Falls - Risk of     Dates: Start: 07/03/18       Disciplines: Interdisciplinary    Goal: *Absence of Falls     Dates: Start: 07/03/18      Description: Document Chino Ramirez Fall Risk and appropriate interventions in the flowsheet.     Disciplines: Interdisciplinary         Problem: Pain     Dates: Start: 07/03/18       Disciplines: Interdisciplinary    Goal: Verbalize satisfaction of level of comfort and symptom control     Dates: Start: 07/03/18      Disciplines: Interdisciplinary   Intervention: Assess effectiveness of pain management    Dates: Start: 07/03/18      Intervention: Assess for signs/symptoms of acute pain    Dates: Start: 07/03/18 Intervention: Assess for signs/symptoms of chronic pain    Dates: Start: 07/03/18      Intervention: Instruct on pain scales    Dates: Start: 07/03/18            Problem: Patient Education: Go to Patient Education Activity     Dates: Start: 07/03/18       Disciplines: Interdisciplinary    Goal: Patient/Family Education     Dates: Start: 07/03/18      Disciplines: Interdisciplinary         Problem: Patient Education: Go to Patient Education Activity     Dates: Start: 07/03/18       Disciplines: Interdisciplinary    Goal: Patient/Family Education     Dates: Start: 07/03/18      Disciplines: Interdisciplinary         Problem: Pressure Injury - Risk of     Dates: Start: 07/03/18       Disciplines: Interdisciplinary    Goal: *Prevention of pressure injury     Dates: Start: 07/03/18      Description: Document Ceasar Scale and appropriate interventions in the flowsheet. Disciplines: Interdisciplinary         Problem: Spiritual Evaluation     Dates: Start: 07/05/18       Disciplines: Interdisciplinary    Goal: Identify beliefs/practices that support hospice experience     Dates: Start: 07/05/18      Description: Patient/family identify their beliefs/practices that impair Hospice experience. Patient/family identify their beliefs/practices that support Hospice experience. Patient coping identified. Spiritual distress identified and decreased with visit. Disciplines: Interdisciplinary   Intervention: Assess spiritual needs    Dates: Start: 07/05/18       Description: Assist with spiritual questions    Intervention: Assist with clergy contact    Dates: Start: 07/05/18      Intervention: Assist with spiritual resources    Dates: Start: 07/05/18      Intervention: Offer ongoing support/referrals to community resources    Dates: Start: 07/05/18      Intervention: Provide spiritual support    Dates: Start: 07/05/18       Description: Assist with coordination of spiritual needs.   Needs may include communion, anointing, / visits, and fear.             ___________________    Care Team Notes          POC/IDG Notes      [facility] IDG Volunteer Notes by Guido Null at 07/06/18 1332  Version 1 of 1    Author:  Guido Null Service:  Ousmane Sheikh Author Type:  Hospice Volunteer/    Filed:  07/06/18 1333 Date of Service:  07/06/18 1332 Status:  Signed    :  Guido Null (Hospice Volunteer/)               [facility] Interdisciplinary Plan of Care Review     Status Codes I = Initiated C=Continued R=Revised RS = Resolved     I Volunteer     Goal: Hospice house volunteer (s) enhances the quality of remaining life while patient is at the hospice house. Interventions: Brian Spivey Volunteer (s) will provide companionship to the patient and/or family by visiting at the hospice house       . Brian Spivey Volunteer (s) will provide respite as needed when requested by patient and/or family. Brian Lee  Volunteer will provide activities such as music, reading, pet therapy, etc. as requested. Brian Lee  Comfort bag delivered. Any other special requests or information regarding volunteer services:     No further needs identified at this time. These notes have been discussed in [facility] meeting. Signed by: Mitzie Dance IDG  Notes by Yaron Novak at 07/06/18 1216  Version 1 of 1    Author:  Yaron Novak Service:  Licensed Clinical  Author Type:      Filed:  07/06/18 1222 Date of Service:  07/06/18 1216 Status:  Signed    :  Yaron Novak ()           Patient: Ni Grimes    Date: 07/06/18  Time: 12:16 PM    [facility]  Notes  Pt is here GIP for symptom management. She has rebounded and can be discharged back home. LMSW will call family and see when they can take her back home.   Appropriate for DC on Sat on Sunday         Signed by: Yaron Novak [address] [place] [address] Nurse Notes by Sharon Bush at 07/06/18 1215  Version 1 of 1    Author:  Sharon Bush Service:  Markie Lofton Author Type:  Registered Nurse    Filed:  07/06/18 1218 Date of Service:  07/06/18 1215 Status:  Signed    :  Sharon Bush (Registered Nurse)           Patient: Romayne Leep    Date: 07/06/18  Time: 12:15 PM    [facility] Nurse Notes    UPDATE: Patient is a 80year old Female patient with diagnosis of Pulmonary Fibrosis, on [facility] level of care for Delirium,Dyspnea and Agitation Management. Confused per report. Scheduled Haldol for Delirium Management. GOALS OF CARE:   Continue to monitor for optimal patient comfort and symptom management. Ongoing family support with patients children. Signed by: Sharon Bush RN MSN       [address] [place] [address]  Notes by Brian Jacobson at 07/06/18 1216  Version 1 of 1    Author:  Brian Jacobson Service:  Spiritual Care Author Type:  Pastoral Care    Filed:  07/06/18 1218 Date of Service:  07/06/18 1216 Status:  Signed    :  Brian Jacobson (Pastoral Care)           Patient: Romayne Leep    Date: 07/06/18  Time: 12:16 PM    [facility]  Notes  Intervention: Initial assessment completed. Visit with patient offering emotional support and prayer. Family support with active listening, compassion and prayer. Outcome: Family feelings validated. Patient comforted by prayer. Plan: Continued support  With compassion and spiritual rituals.     Signed by: Brian Jacobson

## 2018-07-06 NOTE — [facility] IDG VOLUNTEER NOTES
[address] Review     Status Codes I = Initiated C=Continued R=Revised RS = Resolved     I Volunteer     Goal: Hospice house volunteer (s) enhances the quality of remaining life while patient is at the hospice house. Interventions: Jenny Adan Volunteer (s) will provide companionship to the patient and/or family by visiting at the hospice house       . Jenny Adan Volunteer (s) will provide respite as needed when requested by patient and/or family. Jenny Pham  Volunteer will provide activities such as music, reading, pet therapy, etc. as requested. Jenny Pham  Comfort bag delivered. Any other special requests or information regarding volunteer services:     No further needs identified at this time. These notes have been discussed in [facility] meeting.       Signed by: Sylvie Ann

## 2018-07-06 NOTE — [facility] IDG SOCIAL WORKER NOTES
Patient: Arjun Garay    Date: 07/06/18  Time: 12:16 PM    [facility]  Notes  Pt is here GIP for symptom management. She has rebounded and can be discharged back home. LMSW will call family and see when they can take her back home.   Appropriate for DC on Sat on Sunday         Signed by: Chelsea Villatoro

## 2018-07-06 NOTE — PROGRESS NOTES
Report received from [facility]. Pt alert and oriented. Ate breakfast after tray set up. Pt swallowed pills whole and several at a time with water and tolerated well.

## 2018-07-06 NOTE — DISCHARGE SUMMARY
Discharge Summary     Patient: Leslye Genao MRN: [id number]  SSN: [id number]    YOB: 1927  Age: 80 y.o.   Sex: female       Admit Date: 7/3/2018    Discharge Date: 7/7/2018  At 1000 via ambulance    Admission Diagnoses: gip  Pulmonary hypertension ([facility]  CKD (chronic kidney disease), stage III  CHF (congestive heart failure) ([facility]  Respiratory failure ([facility]    Discharge Diagnoses:   Problem List as of 7/6/2018  Date Reviewed: 7/3/2018          Codes Class Noted - Resolved    Hospice care patient ICD-10-CM: Z51.5  ICD-9-CM: V66.7  5/23/2018 - Present        Acute urinary retention ICD-10-CM: R33.8  ICD-9-CM: 788.29  3/1/2018 - Present        Congestive heart failure (CHF) ([facility] ICD-10-CM: I50.9  ICD-9-CM: 428.0  3/1/2018 - Present        Chronic anemia ICD-10-CM: D64.9  ICD-9-CM: 285.9  2/25/2018 - Present        Edema, peripheral ICD-10-CM: R60.9  ICD-9-CM: 782.3  2/24/2018 - Present        Acute on chronic diastolic CHF (congestive heart failure), NYHA class 3 (HCC) ICD-10-CM: I50.33  ICD-9-CM: 428.33, 428.0  2/24/2018 - Present        HCAP (healthcare-associated pneumonia) ICD-10-CM: J18.9  ICD-9-CM: 486  2/18/2018 - Present        Acute metabolic encephalopathy EHK-00-QC: G93.41  ICD-9-CM: 348.31  2/15/2018 - Present        Pneumonia ICD-10-CM: J18.9  ICD-9-CM: 122  2/13/2018 - Present        Pleural effusion ICD-10-CM: J90  ICD-9-CM: 511.9  2/13/2018 - Present        Hypercapnia ICD-10-CM: R06.89  ICD-9-CM: 786.09  2/13/2018 - Present        UTI (urinary tract infection) ICD-10-CM: N39.0  ICD-9-CM: 599.0  2/7/2018 - Present        Hypotension ICD-10-CM: I95.9  ICD-9-CM: 458.9  2/6/2018 - Present        Chronic respiratory failure with hypoxia ([facility] ICD-10-CM: J96.11  ICD-9-CM: 518.83, 799.02  1/25/2018 - Present        Gram-positive bacteremia ICD-10-CM: R78.81  ICD-9-CM: 790.7  12/25/2017 - Present        Sepsis ([facility] ICD-10-CM: A41.9  ICD-9-CM: 038.9, 995.91  12/23/2017 - Present Acute respiratory failure with hypoxia [facility]) ICD-10-CM: J96.01  ICD-9-CM: 518.81  12/23/2017 - Present        Fever ICD-10-CM: R50.9  ICD-9-CM: 780.60  12/23/2017 - Present        Elevated troponin ICD-10-CM: R74.8  ICD-9-CM: 790.6  12/23/2017 - Present        CKD (chronic kidney disease) stage 3, GFR 30-59 ml/min ICD-10-CM: N18.3  ICD-9-CM: 585.3  12/23/2017 - Present        Elevated lactic acid level ICD-10-CM: R79.89  ICD-9-CM: 276.2  12/23/2017 - Present        Hypernatremia ICD-10-CM: E87.0  ICD-9-CM: 276.0  12/23/2017 - Present        Macrocytic anemia ICD-10-CM: D53.9  ICD-9-CM: 281.9  12/23/2017 - Present        Pain of right lower extremity ICD-10-CM: M79.604  ICD-9-CM: 729.5  12/23/2017 - Present        Osteoarthritis (Chronic) ICD-10-CM: M19.90  ICD-9-CM: 715.90  7/20/2017 - Present        Pre-diabetes ICD-10-CM: R73.03  ICD-9-CM: 790.29  7/20/2017 - Present        Gout ICD-10-CM: M10.9  ICD-9-CM: 274.9  10/6/2016 - Present        Diastolic CHF, chronic (HCC) (Chronic) ICD-10-CM: I50.32  ICD-9-CM: 428.32, 428.0  5/17/2016 - Present        * (Principal)Pulmonary hypertension ([facility] ICD-10-CM: I27.20  ICD-9-CM: 416.8  5/17/2016 - Present        Essential hypertension, benign (Chronic) ICD-10-CM: I10  ICD-9-CM: 401.1  3/17/2015 - Present        Acute kidney failure, unspecified ([facility] ICD-10-CM: N17.9  ICD-9-CM: 584. 9  Unknown - Present        Reflux esophagitis ICD-10-CM: K21.0  ICD-9-CM: 530.11  Unknown - Present        Coronary atherosclerosis of native coronary artery ICD-10-CM: I25.10  ICD-9-CM: 414.01  Unknown - Present        Hypopotassemia ICD-10-CM: E87.6  ICD-9-CM: 276.8  Unknown - Present        Mixed hyperlipidemia ICD-10-CM: E78.2  ICD-9-CM: 272.2  Unknown - Present        Cellulitis and abscess of other specified site ICD-10-CM: L03.818, L02.818  ICD-9-CM: 682.8  Unknown - Present        SOB (shortness of breath) ICD-10-CM: R06.02  ICD-9-CM: 786.05  Unknown - Present        RESOLVED: CHF (congestive heart failure) ([facility] ICD-10-CM: I50.9  ICD-9-CM: 428.0  4/1/2018 - 4/2/2018        RESOLVED: CHF exacerbation ([facility] ICD-10-CM: I50.9  ICD-9-CM: 428.0  3/30/2018 - 4/2/2018        RESOLVED: NSTEMI (non-ST elevated myocardial infarction) ([facility] ICD-10-CM: I21.4  ICD-9-CM: 410.70  12/30/2017 - 1/25/2018        RESOLVED: Demand ischemia ([facility] ICD-10-CM: I24.8  ICD-9-CM: 411.89  12/23/2017 - 1/25/2018        RESOLVED: Systolic CHF, chronic ([facility] ICD-10-CM: I50.22  ICD-9-CM: 428.22, 428.0  4/21/2016 - 5/17/2016               Discharge Condition: KATERIN [address] Course: Discharge from [address] [place] [address] stay at [facility] to home with [facility]. DC 7/7/18 at 1000 via ambulance. Status at time of discharge is routine. Consults: None    Significant Diagnostic Studies: None    Disposition: home    Discharge Medications:   Current Discharge Medication List      START taking these medications    Details   !! haloperidol (HALDOL) 1 mg tablet Take 1 Tab by mouth two (2) times a day. albuterol-ipratropium (DUO-NEB) 2.5 mg-0.5 mg/3 ml nebu 3 mL by Nebulization route every eight (8) hours. Qty: 30 Nebule, Refills: 0       !! - Potential duplicate medications found. Please discuss with provider. CONTINUE these medications which have CHANGED    Details   gabapentin (NEURONTIN) 300 mg capsule Take 1 Cap by mouth three (3) times daily. Indications: NEUROPATHIC PAIN  Qty: 1 Cap, Refills: 0         CONTINUE these medications which have NOT CHANGED    Details   !! haloperidol (HALDOL) 1 mg tablet Take 2 mg by mouth every four (4) hours as needed (delirium). 2 tabs=2 mg   Indications: Delirium      polyvinyl alcohol (LIQUIFILM TEARS) 1.4 % ophthalmic solution Administer 1 Drop to both eyes as needed (dry eyes). Indications: Dry Eye      sennosides 8.6 mg cap Take 2 Tabs by mouth two (2) times a day. Indications: constipation      predniSONE (DELTASONE) 10 mg tablet Take 5 mg by mouth daily.  take 1/2 tab=5 mg   Indications: acute gouty arthritis, idiopathic pulmonary fibrosis      potassium chloride (KLOR-CON) 10 mEq tablet Take 10 mEq by mouth daily. acetaminophen (TYLENOL) 325 mg tablet Take 650 mg by mouth every six (6) hours as needed for Pain or Fever. albuterol (ACCUNEB) 1.25 mg/3 mL nebu Take 2.5 mg by inhalation every four (4) hours as needed (sob, wheezing). allopurinol (ZYLOPRIM) 100 mg tablet Take 100 mg by mouth two (2) times a day. amLODIPine (NORVASC) 5 mg tablet Take 5 mg by mouth daily. clopidogrel (PLAVIX) 75 mg tab Take 75 mg by mouth daily. colchicine 0.6 mg tablet Take 0.3 mg by mouth three (3) times daily as needed (gout flare up). Pt takes 1/2 tab         USE for gout pain not relieved by prednisone   Indications: acute gouty arthritis      CYANOCOBALAMIN, VITAMIN B-12, Take 1,000 mcg by mouth daily. furosemide (LASIX) 40 mg tablet Take 40 mg by mouth daily. loratadine (CLARITIN) 10 mg tablet Take 10 mg by mouth daily. raNITIdine (ZANTAC) 150 mg tablet Take 150 mg by mouth two (2) times a day. HYDROcodone-acetaminophen (NORCO) 5-325 mg per tablet Take 1 Tab by mouth every four (4) hours as needed for Pain (dyspnea). Indications: Pain, dyspnea      albuterol (PROVENTIL HFA, VENTOLIN HFA, PROAIR HFA) 90 mcg/actuation inhaler Take 1 Puff by inhalation every four (4) hours as needed for Wheezing or Shortness of Breath. OXYGEN-AIR DELIVERY SYSTEMS Take 3 L by inhalation continuous. via NC      increased to 3 L  Indications: DYSPNEA       ! ! - Potential duplicate medications found. Please discuss with provider. Activity: Activity as tolerated with assist.  Diet: Regular Diet  Wound Care: None needed  Oxygen: Oxygen via nasal cannula at 3L/min  Equipment: Equipment as previously ordered in the home. Follow-up Appointments   Procedures    FOLLOW UP VISIT Appointment in: Other (Specify) Follow-up with home care RN on day of discharge. Follow-up with home care RN on day of discharge. Standing Status:   Standing     Number of Occurrences:   1     Order Specific Question:   Appointment in     Answer:    Other (Specify)       Signed By: Rochelle Arredondo NP     July 6, 2018

## 2018-07-06 NOTE — [facility] IDG CHAPLAIN NOTES
Patient: Pantera Pelican    Date: 07/06/18  Time: 12:16 PM    \[facility]\""  Notes  Intervention: Initial assessment completed. Visit with patient offering emotional support and prayer. Family support with active listening, compassion and prayer. Outcome: Family feelings validated. Patient comforted by prayer. Plan: Continued support  With compassion and spiritual rituals.     Signed by: Yomi Louie

## 2018-07-06 NOTE — HOSPICE
Per [facility], NP Pt to be discharged tomorrow 7/7 at 1000 via EMS. Family aware. Suppository given for no BM since 7/1. Pt tolerated insertion well. Alejandra Church CNA present at bedside. Pt eats well with tray set up. Report to be given to SAM Oconnor.

## 2018-07-07 ENCOUNTER — HOME CARE VISIT (OUTPATIENT)
Dept: SCHEDULING | Facility: HOME HEALTH | Age: 83
End: 2018-07-07
Payer: MEDICARE

## 2018-07-07 VITALS
HEART RATE: 82 BPM | RESPIRATION RATE: 17 BRPM | TEMPERATURE: 97.4 F | DIASTOLIC BLOOD PRESSURE: 59 MMHG | SYSTOLIC BLOOD PRESSURE: 131 MMHG

## 2018-07-07 VITALS — DIASTOLIC BLOOD PRESSURE: 70 MMHG | HEART RATE: 84 BPM | RESPIRATION RATE: 20 BRPM | SYSTOLIC BLOOD PRESSURE: 110 MMHG

## 2018-07-07 PROCEDURE — 74011000250 HC RX REV CODE- 250: Performed by: NURSE PRACTITIONER

## 2018-07-07 PROCEDURE — 74011250637 HC RX REV CODE- 250/637: Performed by: NURSE PRACTITIONER

## 2018-07-07 PROCEDURE — 0656 HSPC GENERAL INPATIENT

## 2018-07-07 PROCEDURE — 0651 HSPC ROUTINE HOME CARE

## 2018-07-07 PROCEDURE — G0299 HHS/HOSPICE OF RN EA 15 MIN: HCPCS

## 2018-07-07 PROCEDURE — 74011636637 HC RX REV CODE- 636/637: Performed by: NURSE PRACTITIONER

## 2018-07-07 RX ADMIN — CLOPIDOGREL BISULFATE 75 MG: 75 TABLET ORAL at 08:39

## 2018-07-07 RX ADMIN — FUROSEMIDE 40 MG: 40 TABLET ORAL at 08:26

## 2018-07-07 RX ADMIN — LORATADINE 10 MG: 10 TABLET ORAL at 08:28

## 2018-07-07 RX ADMIN — IPRATROPIUM BROMIDE AND ALBUTEROL SULFATE 3 ML: .5; 3 SOLUTION RESPIRATORY (INHALATION) at 07:35

## 2018-07-07 RX ADMIN — LEVOFLOXACIN 500 MG: 500 TABLET, FILM COATED ORAL at 08:27

## 2018-07-07 RX ADMIN — Medication 1000 MCG: at 08:23

## 2018-07-07 RX ADMIN — GABAPENTIN 300 MG: 300 CAPSULE ORAL at 08:26

## 2018-07-07 RX ADMIN — RANITIDINE 150 MG: 150 TABLET, FILM COATED ORAL at 08:41

## 2018-07-07 RX ADMIN — HALOPERIDOL 1 MG: 1 TABLET ORAL at 08:27

## 2018-07-07 RX ADMIN — AMLODIPINE BESYLATE 5 MG: 5 TABLET ORAL at 08:23

## 2018-07-07 RX ADMIN — SENNOSIDES 17.2 MG: 8.6 TABLET, FILM COATED ORAL at 08:27

## 2018-07-07 RX ADMIN — PREDNISONE 5 MG: 10 TABLET ORAL at 08:33

## 2018-07-07 RX ADMIN — ALLOPURINOL 100 MG: 100 TABLET ORAL at 08:22

## 2018-07-07 RX ADMIN — POTASSIUM CHLORIDE 10 MEQ: 750 TABLET, EXTENDED RELEASE ORAL at 08:24

## 2018-07-07 NOTE — PROGRESS NOTES
[address] [place] [address] took the patient home. Belongings and medications went with the patient. A bag of dirty gown and blanket were found left here. Karine Lutz will  on Monday and take to the patient.

## 2018-07-07 NOTE — PROGRESS NOTES
Pt identified by name and . Pt in bed with eyes closed. Pt displaying no signs or symptoms of pain, dyspnea, agitation,nausea, or vomiting. Flacc 0/10. Bed locked and low, side rails up, tabs/bed alarm in place, call light with in reach, and door opened for continued monitoring.

## 2018-07-08 PROCEDURE — 0656 HSPC GENERAL INPATIENT

## 2018-07-08 PROCEDURE — 0651 HSPC ROUTINE HOME CARE

## 2018-07-09 PROCEDURE — 0651 HSPC ROUTINE HOME CARE

## 2018-07-09 PROCEDURE — 0656 HSPC GENERAL INPATIENT

## 2018-07-10 ENCOUNTER — HOME CARE VISIT (OUTPATIENT)
Dept: SCHEDULING | Facility: HOME HEALTH | Age: 83
End: 2018-07-10
Payer: MEDICARE

## 2018-07-10 PROCEDURE — 0656 HSPC GENERAL INPATIENT

## 2018-07-10 PROCEDURE — 0651 HSPC ROUTINE HOME CARE

## 2018-07-10 PROCEDURE — G0156 HHCP-SVS OF AIDE,EA 15 MIN: HCPCS

## 2018-07-11 ENCOUNTER — HOME CARE VISIT (OUTPATIENT)
Dept: SCHEDULING | Facility: HOME HEALTH | Age: 83
End: 2018-07-11
Payer: MEDICARE

## 2018-07-11 VITALS — SYSTOLIC BLOOD PRESSURE: 118 MMHG | DIASTOLIC BLOOD PRESSURE: 64 MMHG | HEART RATE: 84 BPM | RESPIRATION RATE: 20 BRPM

## 2018-07-11 PROCEDURE — 0651 HSPC ROUTINE HOME CARE

## 2018-07-11 PROCEDURE — 0656 HSPC GENERAL INPATIENT

## 2018-07-11 PROCEDURE — HOSPICE MEDICATION HC HH HOSPICE MEDICATION

## 2018-07-11 PROCEDURE — G0299 HHS/HOSPICE OF RN EA 15 MIN: HCPCS

## 2018-07-12 ENCOUNTER — HOME CARE VISIT (OUTPATIENT)
Dept: SCHEDULING | Facility: HOME HEALTH | Age: 83
End: 2018-07-12
Payer: MEDICARE

## 2018-07-12 PROCEDURE — G0156 HHCP-SVS OF AIDE,EA 15 MIN: HCPCS

## 2018-07-12 PROCEDURE — 0656 HSPC GENERAL INPATIENT

## 2018-07-12 PROCEDURE — 0651 HSPC ROUTINE HOME CARE

## 2018-07-13 PROCEDURE — 0656 HSPC GENERAL INPATIENT

## 2018-07-13 PROCEDURE — 0651 HSPC ROUTINE HOME CARE

## 2018-07-14 PROCEDURE — 0656 HSPC GENERAL INPATIENT

## 2018-07-14 PROCEDURE — 0651 HSPC ROUTINE HOME CARE

## 2018-07-15 PROCEDURE — 0656 HSPC GENERAL INPATIENT

## 2018-07-15 PROCEDURE — 0651 HSPC ROUTINE HOME CARE

## 2018-07-16 ENCOUNTER — HOME CARE VISIT (OUTPATIENT)
Dept: SCHEDULING | Facility: HOME HEALTH | Age: 83
End: 2018-07-16
Payer: MEDICARE

## 2018-07-16 PROCEDURE — 0651 HSPC ROUTINE HOME CARE

## 2018-07-16 PROCEDURE — G0155 HHCP-SVS OF CSW,EA 15 MIN: HCPCS

## 2018-07-16 PROCEDURE — 0656 HSPC GENERAL INPATIENT

## 2018-07-17 ENCOUNTER — HOME CARE VISIT (OUTPATIENT)
Dept: SCHEDULING | Facility: HOME HEALTH | Age: 83
End: 2018-07-17
Payer: MEDICARE

## 2018-07-17 VITALS — RESPIRATION RATE: 20 BRPM | HEART RATE: 88 BPM | DIASTOLIC BLOOD PRESSURE: 70 MMHG | SYSTOLIC BLOOD PRESSURE: 142 MMHG

## 2018-07-17 PROCEDURE — HOSPICE MEDICATION HC HH HOSPICE MEDICATION

## 2018-07-17 PROCEDURE — G0299 HHS/HOSPICE OF RN EA 15 MIN: HCPCS

## 2018-07-17 PROCEDURE — G0156 HHCP-SVS OF AIDE,EA 15 MIN: HCPCS

## 2018-07-17 PROCEDURE — 0656 HSPC GENERAL INPATIENT

## 2018-07-17 PROCEDURE — 0651 HSPC ROUTINE HOME CARE

## 2018-07-18 PROCEDURE — 0651 HSPC ROUTINE HOME CARE

## 2018-07-18 PROCEDURE — 0656 HSPC GENERAL INPATIENT

## 2018-07-19 ENCOUNTER — HOME CARE VISIT (OUTPATIENT)
Dept: SCHEDULING | Facility: HOME HEALTH | Age: 83
End: 2018-07-19
Payer: MEDICARE

## 2018-07-19 PROCEDURE — 0656 HSPC GENERAL INPATIENT

## 2018-07-19 PROCEDURE — G0156 HHCP-SVS OF AIDE,EA 15 MIN: HCPCS

## 2018-07-19 PROCEDURE — 0651 HSPC ROUTINE HOME CARE

## 2018-07-20 PROCEDURE — 0656 HSPC GENERAL INPATIENT

## 2018-07-20 PROCEDURE — 0651 HSPC ROUTINE HOME CARE

## 2018-07-21 PROCEDURE — 0656 HSPC GENERAL INPATIENT

## 2018-07-21 PROCEDURE — 0651 HSPC ROUTINE HOME CARE

## 2018-07-22 PROCEDURE — 0651 HSPC ROUTINE HOME CARE

## 2018-07-22 PROCEDURE — 0656 HSPC GENERAL INPATIENT

## 2018-07-23 ENCOUNTER — HOME CARE VISIT (OUTPATIENT)
Dept: SCHEDULING | Facility: HOME HEALTH | Age: 83
End: 2018-07-23
Payer: MEDICARE

## 2018-07-23 PROCEDURE — 0656 HSPC GENERAL INPATIENT

## 2018-07-23 PROCEDURE — G0299 HHS/HOSPICE OF RN EA 15 MIN: HCPCS

## 2018-07-23 PROCEDURE — 0651 HSPC ROUTINE HOME CARE

## 2018-07-23 PROCEDURE — HOSPICE MEDICATION HC HH HOSPICE MEDICATION

## 2018-07-24 ENCOUNTER — HOME CARE VISIT (OUTPATIENT)
Dept: SCHEDULING | Facility: HOME HEALTH | Age: 83
End: 2018-07-24
Payer: MEDICARE

## 2018-07-24 VITALS — SYSTOLIC BLOOD PRESSURE: 142 MMHG | DIASTOLIC BLOOD PRESSURE: 70 MMHG | HEART RATE: 88 BPM | RESPIRATION RATE: 20 BRPM

## 2018-07-24 PROCEDURE — 0656 HSPC GENERAL INPATIENT

## 2018-07-24 PROCEDURE — 0651 HSPC ROUTINE HOME CARE

## 2018-07-25 PROCEDURE — 0656 HSPC GENERAL INPATIENT

## 2018-07-25 PROCEDURE — 0651 HSPC ROUTINE HOME CARE

## 2018-07-26 ENCOUNTER — HOME CARE VISIT (OUTPATIENT)
Dept: SCHEDULING | Facility: HOME HEALTH | Age: 83
End: 2018-07-26
Payer: MEDICARE

## 2018-07-26 PROCEDURE — G0299 HHS/HOSPICE OF RN EA 15 MIN: HCPCS

## 2018-07-26 PROCEDURE — 0656 HSPC GENERAL INPATIENT

## 2018-07-26 PROCEDURE — G0156 HHCP-SVS OF AIDE,EA 15 MIN: HCPCS

## 2018-07-26 PROCEDURE — 0651 HSPC ROUTINE HOME CARE

## 2018-07-27 PROCEDURE — A9270 NON-COVERED ITEM OR SERVICE: HCPCS

## 2018-07-27 PROCEDURE — T4528 ADULT SIZE PULL-ON XL: HCPCS

## 2018-07-27 PROCEDURE — 0656 HSPC GENERAL INPATIENT

## 2018-07-27 PROCEDURE — 0651 HSPC ROUTINE HOME CARE

## 2018-07-28 PROCEDURE — 0656 HSPC GENERAL INPATIENT

## 2018-07-28 PROCEDURE — 0651 HSPC ROUTINE HOME CARE

## 2018-07-29 PROCEDURE — 0656 HSPC GENERAL INPATIENT

## 2018-07-29 PROCEDURE — 0651 HSPC ROUTINE HOME CARE

## 2018-07-30 ENCOUNTER — HOME CARE VISIT (OUTPATIENT)
Dept: SCHEDULING | Facility: HOME HEALTH | Age: 83
End: 2018-07-30
Payer: MEDICARE

## 2018-07-30 PROCEDURE — 0656 HSPC GENERAL INPATIENT

## 2018-07-30 PROCEDURE — HOSPICE MEDICATION HC HH HOSPICE MEDICATION

## 2018-07-30 PROCEDURE — G0299 HHS/HOSPICE OF RN EA 15 MIN: HCPCS

## 2018-07-30 PROCEDURE — 0651 HSPC ROUTINE HOME CARE

## 2018-07-31 VITALS — SYSTOLIC BLOOD PRESSURE: 122 MMHG | DIASTOLIC BLOOD PRESSURE: 66 MMHG | HEART RATE: 90 BPM | RESPIRATION RATE: 20 BRPM

## 2018-07-31 PROCEDURE — 0651 HSPC ROUTINE HOME CARE

## 2018-08-01 ENCOUNTER — HOME CARE VISIT (OUTPATIENT)
Dept: SCHEDULING | Facility: HOME HEALTH | Age: 83
End: 2018-08-01
Payer: MEDICARE

## 2018-08-01 PROCEDURE — G0156 HHCP-SVS OF AIDE,EA 15 MIN: HCPCS

## 2018-08-01 PROCEDURE — 0651 HSPC ROUTINE HOME CARE

## 2018-08-02 PROCEDURE — 0651 HSPC ROUTINE HOME CARE

## 2018-08-03 ENCOUNTER — HOME CARE VISIT (OUTPATIENT)
Dept: SCHEDULING | Facility: HOME HEALTH | Age: 83
End: 2018-08-03
Payer: MEDICARE

## 2018-08-03 PROCEDURE — G0156 HHCP-SVS OF AIDE,EA 15 MIN: HCPCS

## 2018-08-03 PROCEDURE — A9270 NON-COVERED ITEM OR SERVICE: HCPCS

## 2018-08-03 PROCEDURE — 0651 HSPC ROUTINE HOME CARE

## 2018-08-03 PROCEDURE — T4528 ADULT SIZE PULL-ON XL: HCPCS

## 2018-08-04 PROCEDURE — 0651 HSPC ROUTINE HOME CARE

## 2018-08-05 PROCEDURE — 0651 HSPC ROUTINE HOME CARE

## 2018-08-06 PROCEDURE — 0651 HSPC ROUTINE HOME CARE

## 2018-08-07 ENCOUNTER — HOME CARE VISIT (OUTPATIENT)
Dept: SCHEDULING | Facility: HOME HEALTH | Age: 83
End: 2018-08-07
Payer: MEDICARE

## 2018-08-07 PROCEDURE — 0651 HSPC ROUTINE HOME CARE

## 2018-08-07 PROCEDURE — G0156 HHCP-SVS OF AIDE,EA 15 MIN: HCPCS

## 2018-08-08 ENCOUNTER — HOME CARE VISIT (OUTPATIENT)
Dept: SCHEDULING | Facility: HOME HEALTH | Age: 83
End: 2018-08-08
Payer: MEDICARE

## 2018-08-08 VITALS — HEART RATE: 84 BPM | SYSTOLIC BLOOD PRESSURE: 122 MMHG | DIASTOLIC BLOOD PRESSURE: 60 MMHG | RESPIRATION RATE: 20 BRPM

## 2018-08-08 PROCEDURE — 0651 HSPC ROUTINE HOME CARE

## 2018-08-08 PROCEDURE — G0299 HHS/HOSPICE OF RN EA 15 MIN: HCPCS

## 2018-08-08 PROCEDURE — HOSPICE MEDICATION HC HH HOSPICE MEDICATION

## 2018-08-09 ENCOUNTER — HOME CARE VISIT (OUTPATIENT)
Dept: SCHEDULING | Facility: HOME HEALTH | Age: 83
End: 2018-08-09
Payer: MEDICARE

## 2018-08-09 PROCEDURE — G0156 HHCP-SVS OF AIDE,EA 15 MIN: HCPCS

## 2018-08-09 PROCEDURE — 0651 HSPC ROUTINE HOME CARE

## 2018-08-09 PROCEDURE — E0191 PROTECTOR HEEL OR ELBOW: HCPCS

## 2018-08-10 PROCEDURE — 0651 HSPC ROUTINE HOME CARE

## 2018-08-11 PROCEDURE — 0651 HSPC ROUTINE HOME CARE

## 2018-08-12 PROCEDURE — 0651 HSPC ROUTINE HOME CARE

## 2018-08-13 PROCEDURE — 0651 HSPC ROUTINE HOME CARE

## 2018-08-14 ENCOUNTER — HOME CARE VISIT (OUTPATIENT)
Dept: SCHEDULING | Facility: HOME HEALTH | Age: 83
End: 2018-08-14
Payer: MEDICARE

## 2018-08-14 VITALS — SYSTOLIC BLOOD PRESSURE: 132 MMHG | DIASTOLIC BLOOD PRESSURE: 60 MMHG | HEART RATE: 88 BPM | RESPIRATION RATE: 20 BRPM

## 2018-08-14 PROCEDURE — G0155 HHCP-SVS OF CSW,EA 15 MIN: HCPCS

## 2018-08-14 PROCEDURE — 0651 HSPC ROUTINE HOME CARE

## 2018-08-14 PROCEDURE — HOSPICE MEDICATION HC HH HOSPICE MEDICATION

## 2018-08-14 PROCEDURE — G0299 HHS/HOSPICE OF RN EA 15 MIN: HCPCS

## 2018-08-14 PROCEDURE — G0156 HHCP-SVS OF AIDE,EA 15 MIN: HCPCS

## 2018-08-15 PROCEDURE — 0651 HSPC ROUTINE HOME CARE

## 2018-08-16 PROCEDURE — 0651 HSPC ROUTINE HOME CARE

## 2018-08-16 NOTE — DISCHARGE SUMMARY
Hospitalist Discharge Summary     Patient ID:  [employer]  [id number]  41 y.o.  2/10/1927  Admit date: 12/22/2017 11:21 PM  Discharge date and time: 1/5/2018  Attending: Miranda Powell MD  PCP:  Star Babcock MD  Treatment Team: Attending Provider: Miranda Powell MD; Utilization Review: Amee Blue; Care Manager: Tim Aleman, SAM; Utilization Review: Paulina Stafford    Principal Diagnosis Sepsis [facility])   Principal Problem:    Sepsis ([facility] (12/23/2017)    Active Problems:    Essential hypertension, benign (5/52/2178)      Diastolic CHF, chronic ([facility] (5/17/2016)      Fever (12/23/2017)      Elevated troponin (12/23/2017)      CKD (chronic kidney disease) stage 3, GFR 30-59 ml/min (12/23/2017)      Elevated lactic acid level (12/23/2017)      Hypernatremia (12/23/2017)      Macrocytic anemia (12/23/2017)      Pain of right lower extremity (12/23/2017)      Gram-positive bacteremia (12/25/2017)      NSTEMI (non-ST elevated myocardial infarction) ([facility] (12/30/2017)             Hospital Course:  80year old CF with a PMH of CAD, HTN, gout, and peripheral neuropathy that presented to the ER with sepsis from Strep bacteremia and was also found to have an NSTEMI. Cardiology evaluated her and treated conservatively with IV Lasix that was transitioned to oral Demadex. ID evaluated the patient and will treat with IV Rocephin until 1/22/18. The patient remained stable and was eventually discharged to rehab. ID Plan of Care: Please note that ID cannot follow the patient at SNF.    · Routine PICC care  · Continue ceftriaxone 2g IV Q24 hr for four weeks, through 1/22/18  · Recommend weekly labs:   · CBC with diff, serum creatinine, LFTs  · Will follow up with ID at [facility] 1/22/18 at 0920    Significant Diagnostic Studies:       Labs: Results:       Chemistry Recent Labs      01/03/18   0611   GLU  79   NA  143   K  3.8   CL  104   CO2  31   BUN  31*   CREA  1.33*   CA  9.0   AGAP  8      CBC w/Diff No I haven't seen pt since May and did not prescribe this. We should schedule him a visit to discuss   results for input(s): WBC, RBC, HGB, HCT, PLT, GRANS, LYMPH, EOS, HGBEXT, HCTEXT, PLTEXT, HGBEXT, HCTEXT, PLTEXT in the last 72 hours. Cardiac Enzymes No results for input(s): CPK, CKND1, BRIELLE in the last 72 hours. No lab exists for component: CKRMB, TROIP   Coagulation No results for input(s): PTP, INR, APTT in the last 72 hours. No lab exists for component: INREXT, INREXT    Lipid Panel Lab Results   Component Value Date/Time    Cholesterol, total 118 10/24/2017 02:08 PM    HDL Cholesterol 53 10/24/2017 02:08 PM    LDL, calculated 52 10/24/2017 02:08 PM    VLDL, calculated 13 10/24/2017 02:08 PM    Triglyceride 63 10/24/2017 02:08 PM      BNP No results for input(s): BNPP in the last 72 hours. Liver Enzymes No results for input(s): TP, ALB, TBIL, AP, SGOT, GPT in the last 72 hours. No lab exists for component: DBIL   Thyroid Studies No results found for: T4, T3U, TSH, TSHEXT, TSHEXT         Discharge Exam:  Visit Vitals    /61 (BP 1 Location: Left arm, BP Patient Position: Sitting)  Comment (BP Patient Position): Sitting up in the recliner    Pulse (!) 106    Temp 98.1 °F (36.7 °C)    Resp 18    Ht 5' (1.524 m)    Wt 68.8 kg (151 lb 9.6 oz)    LMP Comment: hysterectomy    SpO2 95%    Breastfeeding No    BMI 29.61 kg/m2     General appearance: alert, cooperative, no distress, appears stated age  Lungs: clear to auscultation bilaterally  Heart: regular rate and rhythm, S1, S2 normal, no murmur, click, rub or gallop  Abdomen: soft, non-tender. Bowel sounds normal. No masses,  no organomegaly  Extremities: no cyanosis or edema  Neurologic: Grossly normal    Disposition: Acute Rehab  Discharge Condition: stable  Patient Instructions:   Current Discharge Medication List      START taking these medications    Details   carvedilol (COREG) 3.125 mg tablet Take 1 Tab by mouth two (2) times daily (with meals).  Indications: hypertension  Qty: 60 Tab, Refills: 1      colchicine 0.6 mg tablet Take 0.5 Tabs by mouth daily. Qty: 30 Tab, Refills: 0      cyanocobalamin 1,000 mcg tablet Take 1 Tab by mouth daily. Qty: 30 Tab, Refills: 0      gabapentin (NEURONTIN) 100 mg capsule Take 2 Caps by mouth nightly. Indications: NEUROPATHIC PAIN  Qty: 90 Cap, Refills: 0      Saccharomyces boulardii (FLORASTOR) 250 mg capsule Take 1 Cap by mouth two (2) times a day. Qty: 60 Cap, Refills: 0      cefTRIAXone 2 gram 2 g IV syringe 2 g by IntraVENous route every twenty-four (24) hours for 16 days. Qty: 16 Dose, Refills: 0         CONTINUE these medications which have CHANGED    Details   HYDROcodone-acetaminophen (NORCO) 7.5-325 mg per tablet Take 1 Tab by mouth every eight (8) hours as needed for Pain. Max Daily Amount: 3 Tabs. Qty: 24 Tab, Refills: 0    Associated Diagnoses: Primary osteoarthritis of both knees         CONTINUE these medications which have NOT CHANGED    Details   clotrimazole (LOTRIMIN AF, CLOTRIMAZOLE,) 1 % topical cream Apply  to affected area two (2) times a day. Qty: 60 g, Refills: 3    Associated Diagnoses: Rash      lisinopril (PRINIVIL, ZESTRIL) 30 mg tablet Take 1 Tab by mouth daily. Qty: 30 Tab, Refills: 4    Associated Diagnoses: Essential hypertension with goal blood pressure less than 130/80      torsemide (DEMADEX) 20 mg tablet Take 1 Tab by mouth daily. Qty: 30 Tab, Refills: 2    Associated Diagnoses: Combined systolic and diastolic congestive heart failure, unspecified congestive heart failure chronicity ([facility] Acute pulmonary edema (HCC)      allopurinol (ZYLOPRIM) 100 mg tablet Take 1 Tab by mouth two (2) times a day. Qty: 60 Tab, Refills: 3    Associated Diagnoses: Hyperuricemia; Idiopathic chronic gout of multiple sites without tophus      pravastatin (PRAVACHOL) 20 mg tablet Take 1 Tab by mouth nightly. Qty: 30 Tab, Refills: 4    Associated Diagnoses: Mixed hyperlipidemia      raNITIdine (ZANTAC) 150 mg tablet Take 1 Tab by mouth two (2) times a day.   Qty: 60 Tab, Refills: 4 Associated Diagnoses: Gastroesophageal reflux disease without esophagitis      clopidogrel (PLAVIX) 75 mg tab Take 1 Tab by mouth daily. Qty: 30 Tab, Refills: 4    Associated Diagnoses: Coronary artery disease due to lipid rich plaque      potassium chloride (KLOR-CON M10) 10 mEq tablet Take 1 Tab by mouth daily. Qty: 30 Tab, Refills: 4    Associated Diagnoses: Hypokalemia      conjugated estrogens (PREMARIN) 0.625 mg/gram vaginal cream Insert 0.5 g into vagina daily. Qty: 45 g, Refills: 2    Associated Diagnoses: Atrophic vaginitis      loratadine (CLARITIN) 10 mg tablet Take 1 Tab by mouth daily. Qty: 30 Tab, Refills: 4    Associated Diagnoses: Non-seasonal allergic rhinitis due to other allergic trigger      aspirin 81 mg CpDR Take  by mouth daily.          STOP taking these medications       colchicine (MITIGARE) 0.6 mg capsule Comments:   Reason for Stopping:               Activity: Activity as tolerated  Diet: Cardiac Diet, 2gm sodium  Wound Care: As directed    Follow-up  ·   Follow up with Dr. Armin Alcala once discharged from rehab  · Dr. Janett [facility]) in 2 weeks  Time spent to discharge patient 35 minutes  Signed:  Tommie Ghotra DO  1/5/2018  3:04 PM

## 2018-08-17 ENCOUNTER — HOME CARE VISIT (OUTPATIENT)
Dept: SCHEDULING | Facility: HOME HEALTH | Age: 83
End: 2018-08-17
Payer: MEDICARE

## 2018-08-17 PROCEDURE — 0651 HSPC ROUTINE HOME CARE

## 2018-08-17 PROCEDURE — G0156 HHCP-SVS OF AIDE,EA 15 MIN: HCPCS

## 2018-08-18 PROCEDURE — 0651 HSPC ROUTINE HOME CARE

## 2018-08-19 PROCEDURE — 0651 HSPC ROUTINE HOME CARE

## 2018-08-20 ENCOUNTER — HOME CARE VISIT (OUTPATIENT)
Dept: SCHEDULING | Facility: HOME HEALTH | Age: 83
End: 2018-08-20
Payer: MEDICARE

## 2018-08-20 VITALS — SYSTOLIC BLOOD PRESSURE: 112 MMHG | RESPIRATION RATE: 20 BRPM | HEART RATE: 88 BPM | DIASTOLIC BLOOD PRESSURE: 60 MMHG

## 2018-08-20 PROCEDURE — HOSPICE MEDICATION HC HH HOSPICE MEDICATION

## 2018-08-20 PROCEDURE — 0651 HSPC ROUTINE HOME CARE

## 2018-08-20 PROCEDURE — A9270 NON-COVERED ITEM OR SERVICE: HCPCS

## 2018-08-20 PROCEDURE — T4528 ADULT SIZE PULL-ON XL: HCPCS

## 2018-08-20 PROCEDURE — HHS10554 SHAMPOO/BODY WASH 8 OZ ALOE VESTA

## 2018-08-20 PROCEDURE — G0299 HHS/HOSPICE OF RN EA 15 MIN: HCPCS

## 2018-08-21 ENCOUNTER — HOME CARE VISIT (OUTPATIENT)
Dept: SCHEDULING | Facility: HOME HEALTH | Age: 83
End: 2018-08-21
Payer: MEDICARE

## 2018-08-21 PROCEDURE — G0156 HHCP-SVS OF AIDE,EA 15 MIN: HCPCS

## 2018-08-21 PROCEDURE — 0651 HSPC ROUTINE HOME CARE

## 2018-08-22 PROCEDURE — 0651 HSPC ROUTINE HOME CARE

## 2018-08-23 PROCEDURE — 0651 HSPC ROUTINE HOME CARE

## 2018-08-24 ENCOUNTER — HOME CARE VISIT (OUTPATIENT)
Dept: SCHEDULING | Facility: HOME HEALTH | Age: 83
End: 2018-08-24
Payer: MEDICARE

## 2018-08-24 PROCEDURE — 0651 HSPC ROUTINE HOME CARE

## 2018-08-24 PROCEDURE — G0156 HHCP-SVS OF AIDE,EA 15 MIN: HCPCS

## 2018-08-25 PROCEDURE — 0651 HSPC ROUTINE HOME CARE

## 2018-08-26 PROCEDURE — 0651 HSPC ROUTINE HOME CARE

## 2018-08-27 PROCEDURE — 0651 HSPC ROUTINE HOME CARE

## 2018-08-28 ENCOUNTER — HOME CARE VISIT (OUTPATIENT)
Dept: SCHEDULING | Facility: HOME HEALTH | Age: 83
End: 2018-08-28
Payer: MEDICARE

## 2018-08-28 VITALS — HEART RATE: 80 BPM | SYSTOLIC BLOOD PRESSURE: 118 MMHG | RESPIRATION RATE: 20 BRPM | DIASTOLIC BLOOD PRESSURE: 60 MMHG

## 2018-08-28 PROCEDURE — G0155 HHCP-SVS OF CSW,EA 15 MIN: HCPCS

## 2018-08-28 PROCEDURE — HOSPICE MEDICATION HC HH HOSPICE MEDICATION

## 2018-08-28 PROCEDURE — G0156 HHCP-SVS OF AIDE,EA 15 MIN: HCPCS

## 2018-08-28 PROCEDURE — 0651 HSPC ROUTINE HOME CARE

## 2018-08-28 PROCEDURE — G0299 HHS/HOSPICE OF RN EA 15 MIN: HCPCS

## 2018-08-29 PROCEDURE — 0651 HSPC ROUTINE HOME CARE

## 2018-08-30 ENCOUNTER — HOME CARE VISIT (OUTPATIENT)
Dept: SCHEDULING | Facility: HOME HEALTH | Age: 83
End: 2018-08-30
Payer: MEDICARE

## 2018-08-30 PROCEDURE — 0651 HSPC ROUTINE HOME CARE

## 2018-08-30 PROCEDURE — G0156 HHCP-SVS OF AIDE,EA 15 MIN: HCPCS

## 2018-08-31 ENCOUNTER — HOME CARE VISIT (OUTPATIENT)
Dept: SCHEDULING | Facility: HOME HEALTH | Age: 83
End: 2018-08-31
Payer: MEDICARE

## 2018-08-31 PROCEDURE — 0651 HSPC ROUTINE HOME CARE

## 2018-08-31 PROCEDURE — G0299 HHS/HOSPICE OF RN EA 15 MIN: HCPCS

## 2018-09-01 PROCEDURE — 0651 HSPC ROUTINE HOME CARE

## 2018-09-02 PROCEDURE — 0651 HSPC ROUTINE HOME CARE

## 2018-09-03 ENCOUNTER — HOME CARE VISIT (OUTPATIENT)
Dept: SCHEDULING | Facility: HOME HEALTH | Age: 83
End: 2018-09-03
Payer: MEDICARE

## 2018-09-03 PROCEDURE — 0651 HSPC ROUTINE HOME CARE

## 2018-09-03 PROCEDURE — G0299 HHS/HOSPICE OF RN EA 15 MIN: HCPCS

## 2018-09-04 VITALS — DIASTOLIC BLOOD PRESSURE: 60 MMHG | SYSTOLIC BLOOD PRESSURE: 132 MMHG | HEART RATE: 92 BPM | RESPIRATION RATE: 20 BRPM

## 2018-09-04 PROCEDURE — 0651 HSPC ROUTINE HOME CARE

## 2018-09-04 PROCEDURE — HOSPICE MEDICATION HC HH HOSPICE MEDICATION

## 2018-09-05 ENCOUNTER — HOME CARE VISIT (OUTPATIENT)
Dept: SCHEDULING | Facility: HOME HEALTH | Age: 83
End: 2018-09-05
Payer: MEDICARE

## 2018-09-05 PROCEDURE — G0156 HHCP-SVS OF AIDE,EA 15 MIN: HCPCS

## 2018-09-05 PROCEDURE — 0651 HSPC ROUTINE HOME CARE

## 2018-09-06 ENCOUNTER — HOME CARE VISIT (OUTPATIENT)
Dept: SCHEDULING | Facility: HOME HEALTH | Age: 83
End: 2018-09-06
Payer: MEDICARE

## 2018-09-06 PROCEDURE — 0651 HSPC ROUTINE HOME CARE

## 2018-09-07 ENCOUNTER — HOME CARE VISIT (OUTPATIENT)
Dept: SCHEDULING | Facility: HOME HEALTH | Age: 83
End: 2018-09-07
Payer: MEDICARE

## 2018-09-07 PROCEDURE — G0156 HHCP-SVS OF AIDE,EA 15 MIN: HCPCS

## 2018-09-07 PROCEDURE — 0651 HSPC ROUTINE HOME CARE

## 2018-09-08 PROCEDURE — 0651 HSPC ROUTINE HOME CARE

## 2018-09-09 PROCEDURE — 0651 HSPC ROUTINE HOME CARE

## 2018-09-10 ENCOUNTER — HOME CARE VISIT (OUTPATIENT)
Dept: SCHEDULING | Facility: HOME HEALTH | Age: 83
End: 2018-09-10
Payer: MEDICARE

## 2018-09-10 PROCEDURE — G0299 HHS/HOSPICE OF RN EA 15 MIN: HCPCS

## 2018-09-10 PROCEDURE — HOSPICE MEDICATION HC HH HOSPICE MEDICATION

## 2018-09-10 PROCEDURE — 0651 HSPC ROUTINE HOME CARE

## 2018-09-11 ENCOUNTER — HOME CARE VISIT (OUTPATIENT)
Dept: SCHEDULING | Facility: HOME HEALTH | Age: 83
End: 2018-09-11
Payer: MEDICARE

## 2018-09-11 VITALS — DIASTOLIC BLOOD PRESSURE: 70 MMHG | HEART RATE: 88 BPM | RESPIRATION RATE: 20 BRPM | SYSTOLIC BLOOD PRESSURE: 122 MMHG

## 2018-09-11 PROCEDURE — G0156 HHCP-SVS OF AIDE,EA 15 MIN: HCPCS

## 2018-09-11 PROCEDURE — G0155 HHCP-SVS OF CSW,EA 15 MIN: HCPCS

## 2018-09-11 PROCEDURE — 0651 HSPC ROUTINE HOME CARE

## 2018-09-12 PROCEDURE — HOSPICE MEDICATION HC HH HOSPICE MEDICATION

## 2018-09-12 PROCEDURE — 0651 HSPC ROUTINE HOME CARE

## 2018-09-13 ENCOUNTER — HOME CARE VISIT (OUTPATIENT)
Dept: SCHEDULING | Facility: HOME HEALTH | Age: 83
End: 2018-09-13
Payer: MEDICARE

## 2018-09-13 PROCEDURE — 0651 HSPC ROUTINE HOME CARE

## 2018-09-13 PROCEDURE — G0156 HHCP-SVS OF AIDE,EA 15 MIN: HCPCS

## 2018-09-13 PROCEDURE — G0299 HHS/HOSPICE OF RN EA 15 MIN: HCPCS

## 2018-09-14 PROCEDURE — 0651 HSPC ROUTINE HOME CARE

## 2018-09-15 ENCOUNTER — HOME CARE VISIT (OUTPATIENT)
Dept: HOSPICE | Facility: HOSPICE | Age: 83
End: 2018-09-15
Payer: MEDICARE

## 2018-09-15 PROCEDURE — 0651 HSPC ROUTINE HOME CARE
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
## 2018-12-13 NOTE — PROGRESS NOTES
Problem: Coping and Emotional Distress  Goal: Demonstrate Acceptance of Terminal Illness and Disease Progression  Patient/family/caregiver will demonstrate acceptance of terminal disease and understanding of disease progression while employing appropriate mechanisms. Outcome: Progressing Towards Goal  Pt verbalizes understanding of disease progression; teaching and support will be continuing and ongoing.
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
## 2018-12-14 NOTE — PROGRESS NOTES
visited in home patient in [facility] for respite stay. Patient appeared to be sleeping soundly, so  chose not to disturb her and left business card on bedside table.

## 2018-12-14 NOTE — PROGRESS NOTES
The shift change rounds and report taken. The patient identified by name and . No signs of anxiety, SOB, nausea / vomit or pain. The bed is low and locked with two rails up and call light within her reach. The door to the room is open as the patient is alone. 5809- Administered scheduled medications and prn medications for pain and anxiety. 1000- The patient now states no pain and no anxiety. 1242- Scheduled medications administered. Educated the patient on the medication change and on the transport time home tomorrow. She voiced understanding on both. Medicated one more time for pain in feet and for anxiety. The medications resolved the symptoms. She has called for all needs. She has remained alert and oriented times three this shift. Reported off to the on coming RN and completed walking rounds.

## 2018-12-14 NOTE — DISCHARGE SUMMARY
Discharge Summary     Patient: Gabbi Clay MRN: [id number]  SSN: [id number]    YOB: 1927  Age: 80 y.o.   Sex: female       Admit Date: 12/13/2018    Discharge Date: 12/15/2018 at 1115     Admission Diagnoses: Respite  Pulmonary hypertension ([facility]  Pulmonary hypertension ([facility]    Discharge Diagnoses:   Problem List as of 12/14/2018 Date Reviewed: 12/13/2018          Codes Class Noted - Resolved    Hospice care patient ICD-10-CM: Z51.5  ICD-9-CM: V66.7  5/23/2018 - Present        Acute urinary retention ICD-10-CM: R33.8  ICD-9-CM: 788.29  3/1/2018 - Present        Congestive heart failure (CHF) ([facility] ICD-10-CM: I50.9  ICD-9-CM: 428.0  3/1/2018 - Present        Chronic anemia ICD-10-CM: D64.9  ICD-9-CM: 285.9  2/25/2018 - Present        Edema, peripheral ICD-10-CM: R60.9  ICD-9-CM: 782.3  2/24/2018 - Present        Acute on chronic diastolic CHF (congestive heart failure), NYHA class 3 (HCC) ICD-10-CM: I50.33  ICD-9-CM: 428.33, 428.0  2/24/2018 - Present        HCAP (healthcare-associated pneumonia) ICD-10-CM: J18.9  ICD-9-CM: 486  2/18/2018 - Present        Acute metabolic encephalopathy JKM-20-CZ: G93.41  ICD-9-CM: 348.31  2/15/2018 - Present        Pneumonia ICD-10-CM: J18.9  ICD-9-CM: 027  2/13/2018 - Present        Pleural effusion ICD-10-CM: J90  ICD-9-CM: 511.9  2/13/2018 - Present        Hypercapnia ICD-10-CM: R06.89  ICD-9-CM: 786.09  2/13/2018 - Present        UTI (urinary tract infection) ICD-10-CM: N39.0  ICD-9-CM: 599.0  2/7/2018 - Present        Hypotension ICD-10-CM: I95.9  ICD-9-CM: 458.9  2/6/2018 - Present        Chronic respiratory failure with hypoxia ([facility] ICD-10-CM: J96.11  ICD-9-CM: 518.83, 799.02  1/25/2018 - Present        Gram-positive bacteremia ICD-10-CM: R78.81  ICD-9-CM: 790.7  12/25/2017 - Present        Sepsis ([facility] ICD-10-CM: A41.9  ICD-9-CM: 038.9, 995.91  12/23/2017 - Present        Acute respiratory failure with hypoxia (HCC) ICD-10-CM: J96.01  ICD-9-CM: 518.81 12/23/2017 - Present        Fever ICD-10-CM: R50.9  ICD-9-CM: 780.60  12/23/2017 - Present        Elevated troponin ICD-10-CM: R74.8  ICD-9-CM: 790.6  12/23/2017 - Present        CKD (chronic kidney disease) stage 3, GFR 30-59 ml/min (HCC) ICD-10-CM: N18.3  ICD-9-CM: 585.3  12/23/2017 - Present        Elevated lactic acid level ICD-10-CM: R79.89  ICD-9-CM: 276.2  12/23/2017 - Present        Hypernatremia ICD-10-CM: E87.0  ICD-9-CM: 276.0  12/23/2017 - Present        Macrocytic anemia ICD-10-CM: D53.9  ICD-9-CM: 281.9  12/23/2017 - Present        Pain of right lower extremity ICD-10-CM: M79.604  ICD-9-CM: 729.5  12/23/2017 - Present        Osteoarthritis (Chronic) ICD-10-CM: M19.90  ICD-9-CM: 715.90  7/20/2017 - Present        Pre-diabetes ICD-10-CM: R73.03  ICD-9-CM: 790.29  7/20/2017 - Present        Gout ICD-10-CM: M10.9  ICD-9-CM: 274.9  10/6/2016 - Present        Diastolic CHF, chronic (HCC) (Chronic) ICD-10-CM: I50.32  ICD-9-CM: 428.32, 428.0  5/17/2016 - Present        * (Principal) Pulmonary hypertension (HCC) ICD-10-CM: I27.20  ICD-9-CM: 416.8  5/17/2016 - Present        Essential hypertension, benign (Chronic) ICD-10-CM: I10  ICD-9-CM: 401.1  3/17/2015 - Present        Acute kidney failure, unspecified ([facility] ICD-10-CM: N17.9  ICD-9-CM: 584. 9  Unknown - Present        Reflux esophagitis ICD-10-CM: K21.0  ICD-9-CM: 530.11  Unknown - Present        Coronary atherosclerosis of native coronary artery ICD-10-CM: I25.10  ICD-9-CM: 414.01  Unknown - Present        Hypopotassemia ICD-10-CM: E87.6  ICD-9-CM: 276.8  Unknown - Present        Mixed hyperlipidemia ICD-10-CM: E78.2  ICD-9-CM: 272.2  Unknown - Present        Cellulitis and abscess of other specified site ICD-10-CM: L03.818, L02.818  ICD-9-CM: 682.8  Unknown - Present        SOB (shortness of breath) ICD-10-CM: R06.02  ICD-9-CM: 786.05  Unknown - Present        RESOLVED: CHF (congestive heart failure) ([facility] ICD-10-CM: I50.9  ICD-9-CM: 428.0  4/1/2018 - 4/2/2018        RESOLVED: CHF exacerbation ([facility] ICD-10-CM: I50.9  ICD-9-CM: 428.0  3/30/2018 - 4/2/2018        RESOLVED: NSTEMI (non-ST elevated myocardial infarction) ([facility] ICD-10-CM: I21.4  ICD-9-CM: 410.70  12/30/2017 - 1/25/2018        RESOLVED: Demand ischemia ([facility] ICD-10-CM: I24.8  ICD-9-CM: 411.89  12/23/2017 - 1/25/2018        RESOLVED: Systolic CHF, chronic ([facility] ICD-10-CM: I50.22  ICD-9-CM: 428.22, 428.0  4/21/2016 - 5/17/2016               Discharge Condition: [address] Course: Discharge from respite stay at [facility] to home with [facility]. DC 12/15/18 at 1115  via ambulance. Status at time of discharge is routine. Consults: None    Significant Diagnostic Studies: None    Disposition: home    Discharge Medications:   Current Discharge Medication List      CONTINUE these medications which have CHANGED    Details   ! ! predniSONE (DELTASONE) 10 mg tablet Take 5 mg=0.5 tablet by mouth daily with breakfast starting 12/22/18      !! predniSONE (DELTASONE) 20 mg tablet Take 20mg=2 tablets by mouth daily with breakfast On 12/16/18 and 12/17/18. !! predniSONE (DELTASONE) 10 mg tablet Take 10mg=1 tablet daily on 12/18/18, 12/19/18,12/20/18 and 12/21/18.         !! - Potential duplicate medications found. Please discuss with provider. CONTINUE these medications which have NOT CHANGED    Details   HYDROcodone-acetaminophen (NORCO) 5-325 mg per tablet Take 1 Tab by mouth every four (4) hours as needed for Pain (or SOB). !! gabapentin (NEURONTIN) 300 mg capsule Take 300 mg by mouth two (2) times a day. Indications: NEUROPATHIC PAIN      !! gabapentin (NEURONTIN) 300 mg capsule Take 600 mg by mouth nightly. Indications: NEUROPATHIC PAIN      haloperidol (HALDOL) 1 mg tablet Take 1 mg by mouth every four (4) hours as needed (delirium). polyvinyl alcohol (LIQUIFILM TEARS) 1.4 % ophthalmic solution Administer 1 Drop to both eyes as needed (dry eyes).  Indications: Dry Eye      sennosides 8.6 mg cap Take 2 Tabs by mouth two (2) times a day. Indications: constipation      potassium chloride (KLOR-CON) 10 mEq tablet Take 10 mEq by mouth daily. pt found to be taking    Indications: hypokalemia prevention      acetaminophen (TYLENOL) 325 mg tablet Take 650 mg by mouth every six (6) hours as needed for Pain or Fever. albuterol (ACCUNEB) 1.25 mg/3 mL nebu Take 2.5 mg by inhalation every four (4) hours as needed (sob, wheezing). allopurinol (ZYLOPRIM) 100 mg tablet Take 100 mg by mouth two (2) times a day. amLODIPine (NORVASC) 5 mg tablet Take 5 mg by mouth daily. clopidogrel (PLAVIX) 75 mg tab Take 75 mg by mouth daily. CYANOCOBALAMIN, VITAMIN B-12, Take 1,000 mcg by mouth daily. furosemide (LASIX) 40 mg tablet Take 40 mg by mouth daily. loratadine (CLARITIN) 10 mg tablet Take 10 mg by mouth daily. raNITIdine (ZANTAC) 150 mg tablet Take 150 mg by mouth two (2) times a day. albuterol (PROVENTIL HFA, VENTOLIN HFA, PROAIR HFA) 90 mcg/actuation inhaler Take 1 Puff by inhalation every four (4) hours as needed for Wheezing or Shortness of Breath. OXYGEN-AIR DELIVERY SYSTEMS Take 3 L by inhalation continuous. via NC    Indications: DYSPNEA       ! ! - Potential duplicate medications found. Please discuss with provider. Activity: Activity as tolerated with assistance  Diet: Resume previous diet  Wound Care:   Location: bilateral heels  Stage 1: Float heels on pillow. Turn and reposition as needed. Oxygen: O2 at 3L/min via nasal cannula  Equipment: Equipment as previously ordered in the home. Follow-up Appointments   Procedures    FOLLOW UP VISIT Appointment in: Other (Specify) Follow-up with home care RN on day of discharge. Follow-up with home care RN on day of discharge. Standing Status:   Standing     Number of Occurrences:   1     Order Specific Question:   Appointment in     Answer:    Other (Specify)       Signed By: Mary Anne Sharp NP     December 14, 2018

## 2018-12-14 NOTE — [facility] IDG MASTER NOTE
Hospice Interdisciplinary Group Collaborative  Date: 12/14/18  Time: 4:18 PM    ___________________    Patient: Matty Carroll  ___________________    Diagnoses: There were no encounter diagnoses.     Current Medications:    Current Facility-Administered Medications:     predniSONE (DELTASONE) tablet 20 mg, 20 mg, Oral, DAILY, Ana Imam, NP, 20 mg at 12/14/18 1241    [START ON 12/18/2018] predniSONE (DELTASONE) tablet 10 mg, 10 mg, Oral, DAILY WITH BREAKFAST, Ana Imam, NP    Amy December ON 12/22/2018] predniSONE (DELTASONE) tablet 5 mg, 5 mg, Oral, DAILY WITH BREAKFAST, Ana Imam, NP    acetaminophen (TYLENOL) tablet 650 mg, 650 mg, Oral, Q6H PRN, Ana Imam, NP, 650 mg at 12/13/18 1926    albuterol (PROVENTIL VENTOLIN) nebulizer solution 2.5 mg, 2.5 mg, Inhalation, Q4H PRN, Ana Imam, NP    gabapentin (NEURONTIN) capsule 600 mg, 600 mg, Oral, QHS, Ana Imam, NP, 600 mg at 12/13/18 2132    HYDROcodone-acetaminophen (NORCO) 5-325 mg per tablet 1 Tab, 1 Tab, Oral, Q4H PRN, Ana Imam, NP, 1 Tab at 12/14/18 1454    polyvinyl alcohol (LIQUIFILM TEARS) 1.4 % ophthalmic solution 1 Drop, 1 Drop, Both Eyes, PRN, Ana Imam, NP    famotidine (PEPCID) tablet 20 mg, 20 mg, Oral, BID, Ana Imam, NP, 20 mg at 12/14/18 8571    furosemide (LASIX) tablet 40 mg, 40 mg, Oral, DAILY, Ana Imam, NP, 40 mg at 12/14/18 0113    gabapentin (NEURONTIN) capsule 300 mg, 300 mg, Oral, BID, Ana Imam, NP, 300 mg at 12/14/18 3816    loratadine (CLARITIN) tablet 10 mg, 10 mg, Oral, DAILY, Ana Imam, NP, 10 mg at 12/14/18 2539    potassium chloride SA (MICRO-K) capsule 10 mEq, 10 mEq, Oral, DAILY, Ana Imam, NP, 10 mEq at 12/14/18 1195    haloperidol (HALDOL) tablet 2.5 mg, 2.5 mg, Oral, Q4H PRN, Ana Imam, NP, 2.5 mg at 12/14/18 1454    senna (SENOKOT) tablet 17.2 mg, 2 Tab, Oral, BID, Ana Imam, NP, 17.2 mg at 12/14/18 9807    Orders:  Orders Placed This Encounter    IP CONSULT TO SPIRITUAL CARE     Standing Status:   Standing     Number of Occurrences:   1     Order Specific Question:   Reason for Consult: Answer: Once on week one, then PRN. For [facility] Patients Only. For contracted patients, their primary hospice will continue to manage spiritual care needs.  DIET MECHANICAL SOFT     Standing Status:   Standing     Number of Occurrences:   1    VITAL SIGNS     Standing Status:   Standing     Number of Occurrences:   1    VITAL SIGNS     Standing Status:   Standing     Number of Occurrences:   1    NURSING-MISCELLANEOUS: Comfort Care Measures CONTINUOUS     Standing Status:   Standing     Number of Occurrences:   1     Order Specific Question:   Description of Order:     Answer:   Comfort Care Measures    GARCIA CATHETER, CARE     1. Garcia Catheter care every shift and PRN  2. Notify Physician of Garcia Catheter leakage, occlusion, gross adherent sediment or accidental removal  3. Change Garcia 30 days after insertion. 4. May flush catheter bid and prn leakage or gross adherent sediment or mucus. Standing Status:   Standing     Number of Occurrences:   1    BLADDER CHECKS     May scan bladder PRN for urinary retention and or patient discomfort     Standing Status:   Standing     Number of Occurrences:   1    NURSING ASSESSMENT:  SPECIFY Assess for GIP, routine, or respite level of care. Q SHIFT Routine     Standing Status:   Standing     Number of Occurrences:   1     Order Specific Question:   Please describe the test or procedure you would like to order. Answer:   Assess for GIP, routine, or respite level of care.  PAIN ASSESSMENT Pain and Symptoms: Assess ever 4 hours and PRN, for GIP level of care. PRN Routine     Standing Status:   Standing     Number of Occurrences:   1     Order Specific Question:   Please describe the test or procedure you would like to order.      Answer:   Pain and Symptoms: Assess ever 4 hours and PRN, for GIP level of care.  WOUND CARE, DRESSING CHANGE     Wound Care:  Location: bilateral heels  Stage I (Cavalon)- Cleanse wound location with wound cleanser, pat dry and apply Cavilon Durable Barrier Cream every 12 hours and PRN if soiled. Turn every 2 hours. Assess with each nursing assessment visit. Standing Status:   Standing     Number of Occurrences:   1    NURSING-MISCELLANEOUS: admit 12/13: Admit for respite due to power outage. Home care RN Nikita Serrato. Hospice Diagnosis: Hospice terminal diagnosis: Pulmonary hypertension ([address] (I27.20) Other Hospice diagnoses: Hypoxia (R09.02) Dyspnea, unspecified type (...     12/13: Admit for respite due to power outage. Home care RN Nikita Serrato. 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     As evidenced by a 80year old woman with precipitous functional decline over the past 6 months secondary to multiple problems amongst which pulmonary HTN has been the most prominently adverse. Patient had been living independently in December 2017; she is now dependent on others for the majority of her activities of daily living.  Beginning in December 2017 in series she experienced urosepsis with hypotension, nursing home acquired PNA, nursing home acquired influenza PNA, metabolic encephalopathy, pleural effusions, pulmonary edema, and persistent hypoxia. After cycling from hospital to SNF rehab at least 3 times she has returned to her home environment in a compromised state. She has profound fatigue and some confusion both of which are new in 2018. Reflecting on her multiple hospital stays in the last short period of time and her relentlessly downward functional trajectory, the patient has elected to try to avoid repeated hospitalizations and to limit further care to comfort measures only. Her last  echocardiogram demonstrated LV diastolic dysfunction, tricuspid regurgitation, and pulmonary HTN estimated at 60 mmHg. During this recertification period she has been well cared for by her family who are alternating their presence in her home. She  is in bed most of the day. She feels her pain primarily neuopathic in nature is under good control on her current therapeutic regimen. She utilizes her nebulizers and oxygen now prn.  Macrocytic anemia, severe symptom of osteoarthritis, and HTN are other chronic diagnoses unrelated to pulmonary HTN and functional decline which none the less adversely affect her prognosis.  This narrative is completed with the most recent face to face evaluation by HERIBERTO Kumar on 11/26/2018. Her family remains on board with hospice philosophy. .     Standing Status:   Standing     Number of Occurrences:   1     Order Specific Question:   Description of Order:     Answer:   admit    NURSING-MISCELLANEOUS: DME Please supply patient with walker for ambulation. CONTINUOUS     Please supply patient with walker for ambulation.      Standing Status:   Standing     Number of Occurrences:   1     Order Specific Question:   Description of Order:     Answer:   DME    ACTIVITY AS TOLERATED W/ASSIST     May get oob with assist with walker     Standing Status:   Standing     Number of Occurrences:   1    DO NOT RESUSCITATE     Standing Status:   Standing     Number of Occurrences:   1    OXYGEN CANNULA Liters per minute: 3; Indications for O2 therapy: RESPIRATORY DISTRESS PRN Routine     Standing Status:   Standing     Number of Occurrences:   1     Order Specific Question:   Liters per minute: Answer:   3     Order Specific Question:   Indications for O2 therapy     Answer:   RESPIRATORY DISTRESS    acetaminophen (TYLENOL) tablet 650 mg     OP SIG:Take 650 mg by mouth every six (6) hours as needed for Pain or Fever.  albuterol (PROVENTIL VENTOLIN) nebulizer solution 2.5 mg     Order Specific Question:   MODE OF DELIVERY     Answer:   Nebulizer    DISCONTD: allopurinol (ZYLOPRIM) tablet 100 mg (Patient Supplied)     OP SIG:Take 100 mg by mouth two (2) times a day.  DISCONTD: amLODIPine (NORVASC) tablet 5 mg (Patient Supplied)     OP SIG:Take 5 mg by mouth daily.  DISCONTD: clopidogrel (PLAVIX) tablet 75 mg (Patient Supplied)     OP SIG:Take 75 mg by mouth daily.  DISCONTD: furosemide (LASIX) tablet 40 mg     OP SIG:Take 40 mg by mouth daily.  DISCONTD: gabapentin (NEURONTIN) capsule 300 mg     OP SIG:Take 300 mg by mouth two (2) times a day. Indications: NEUROPATHIC PAIN      gabapentin (NEURONTIN) capsule 600 mg     OP SIG:Take 600 mg by mouth nightly. Indications: NEUROPATHIC PAIN      DISCONTD: haloperidol (HALDOL) tablet 1 mg (Patient Supplied)     OP SIG:Take 1 mg by mouth every four (4) hours as needed (delirium).  HYDROcodone-acetaminophen (NORCO) 5-325 mg per tablet 1 Tab     OP SIG:Take 1 Tab by mouth every four (4) hours as needed for Pain (or SOB).  DISCONTD: loratadine (CLARITIN) tablet 10 mg     OP SIG:Take 10 mg by mouth daily.  polyvinyl alcohol (LIQUIFILM TEARS) 1.4 % ophthalmic solution 1 Drop     OP SIG:Administer 1 Drop to both eyes as needed (dry eyes).  Indications: Dry Eye      DISCONTD: potassium chloride SA (MICRO-K) capsule 10 mEq    DISCONTD: predniSONE (DELTASONE) tablet 5 mg     OP SIG:Take 5 mg by mouth daily. take 1/2 tab=5 mg   Indications: acute gouty arthritis, idiopathic pulmonary fibrosis      DISCONTD: famotidine (PEPCID) tablet 20 mg     Order Specific Question:   H2RA INDICATION     Answer:   SUP Prophylaxis    DISCONTD: sennosides cap 2 Tab (Patient Supplied)     OP SIG:Take 2 Tabs by mouth two (2) times a day. Indications: constipation      DISCONTD: allopurinol (ZYLOPRIM) tablet 100 mg (Patient Supplied)     OP SIG:Take 100 mg by mouth two (2) times a day.  DISCONTD: amLODIPine (NORVASC) tablet 5 mg (Patient Supplied)     OP SIG:Take 5 mg by mouth daily.  DISCONTD: clopidogrel (PLAVIX) tablet 75 mg (Patient Supplied)     OP SIG:Take 75 mg by mouth daily.  famotidine (PEPCID) tablet 20 mg     Order Specific Question:   H2RA INDICATION     Answer:   SUP Prophylaxis    furosemide (LASIX) tablet 40 mg     OP SIG:Take 40 mg by mouth daily.  gabapentin (NEURONTIN) capsule 300 mg     OP SIG:Take 300 mg by mouth two (2) times a day. Indications: NEUROPATHIC PAIN      loratadine (CLARITIN) tablet 10 mg     OP SIG:Take 10 mg by mouth daily.  potassium chloride SA (MICRO-K) capsule 10 mEq    DISCONTD: predniSONE (DELTASONE) tablet 5 mg     OP SIG:Take 5 mg by mouth daily. take 1/2 tab=5 mg   Indications: acute gouty arthritis, idiopathic pulmonary fibrosis      DISCONTD: sennosides cap 2 Tab (Patient Supplied)     OP SIG:Take 2 Tabs by mouth two (2) times a day. Indications: constipation      haloperidol (HALDOL) tablet 2.5 mg    senna (SENOKOT) tablet 17.2 mg    predniSONE (DELTASONE) tablet 20 mg     OP SIG:Take 5 mg by mouth daily.  take 1/2 tab=5 mg   Indications: acute gouty arthritis, idiopathic pulmonary fibrosis      predniSONE (DELTASONE) tablet 10 mg    predniSONE (DELTASONE) tablet 5 mg    INITIAL PHYSICIAN ORDER: HOSPICE Level Of Care: Respite; Reason for Admission: [facility] respite patient. Admit for respite care due to caregiver fatigue. Hospice diagnosis pulmonary hypertension     Standing Status:   Standing     Number of Occurrences:   1     Order Specific Question:   Status     Answer:   Hospice     Order Specific Question:   Level Of Care     Answer:   Respite     Order Specific Question:   Reason for Admission     Answer:   [facility] respite patient. Admit for respite care due to caregiver fatigue. Hospice diagnosis pulmonary hypertension     Order Specific Question:   Inpatient Hospitalization Certified Necessary for the Following Reasons     Answer:   8. Other (further clarification in H&P documentation)     Comments:   respite care     Order Specific Question:   Admitting Diagnosis     Answer:   Pulmonary hypertension [facility]) [[id number]]     Order Specific Question:   Terminal Prognosis Diagnosis(es)     Answer:   Pulmonary hypertension ([facility] [[id number]]     Order Specific Question:   Admitting Physician     Answer:   Yobany White     Order Specific Question:   Attending Physician     Answer:   Yobany White     Order Specific Question:   Discharge Plan:     Answer:   Home with Home Hospice    IP CONSULT TO SOCIAL WORK     Standing Status:   Standing     Number of Occurrences:   1     Order Specific Question:   Reason for Consult: Answer: For [facility] Patients Only. For contracted patients, their primary hospice will continue to manage social work needs. Allergies: Allergies   Allergen Reactions    Bactrim [Sulfamethoxazole-Trimethoprim] Other (comments)     Caused delirum and hypermania.     Bee Pollen Swelling    Fire Ant Itching and Swelling       Care Plan:  Multidisciplinary Problems (Active)     Problem: Coping and Emotional Distress     Dates: Start: 12/13/18       Description:     Disciplines: Interdisciplinary    Goal: Demonstrate Acceptance of Terminal Illness and Disease Progression     Dates: Start: 12/13/18   Expected End: 12/20/18       Description: Patient/family/caregiver will demonstrate acceptance of terminal disease and understanding of disease progression while employing appropriate mechanisms.     Disciplines: Interdisciplinary    Intervention: Assess emotional status and coping mechanisms     Dates: Start: 12/13/18       Description: Assess patient/caregiver emotional status and coping mechanisms            Intervention: Assess family's level of coping     Dates: Start: 12/13/18       Description:           Intervention: Instruct on effective coping strategies     Dates: Start: 12/13/18       Description: Patient/family/caregiver will demonstrate acceptance of terminal disease and understanding of disease progression while employing coping mechanisms                      Problem: Discharge Planning     Dates: Start: 12/13/18       Description:     Disciplines: Interdisciplinary    Goal: *Discharge to safe environment     Dates: Start: 12/13/18   Expected End: 12/20/18       Description:     Disciplines: Interdisciplinary    Intervention: ADL ability assessment     Dates: Start: 12/13/18       Description:           Intervention: Living conditions assessment     Dates: Start: 12/13/18       Description:           Intervention: Mobility assessment     Dates: Start: 12/13/18       Description:           Intervention: Medical equipment assessment     Dates: Start: 12/13/18       Description:           Intervention: Community resources assessment     Dates: Start: 12/13/18       Description:           Intervention: Support system identifications     Dates: Start: 12/13/18       Description:           Intervention: Caregiver availability assessment     Dates: Start: 12/13/18       Description:           Intervention: Post discharge service coordination     Dates: Start: 12/13/18       Description:           Intervention: Community service coordination     Dates: Start: 12/13/18       Description: Intervention: Medical equipment coordination     Dates: Start: 12/13/18       Description:           Intervention: Caregiver identification     Dates: Start: 12/13/18       Description:           Intervention: Support system identification     Dates: Start: 12/13/18       Description:                       Problem: Falls - Risk of     Dates: Start: 12/13/18       Description:     Disciplines: Interdisciplinary    Goal: *Absence of Falls     Dates: Start: 12/13/18       Description: Document Jinny Guerra Fall Risk and appropriate interventions in the flowsheet. Disciplines: Interdisciplinary                Problem: Patient Education: Go to Patient Education Activity     Dates: Start: 12/13/18       Description:     Disciplines: Interdisciplinary    Goal: Patient/Family Education     Dates: Start: 12/13/18   Expected End: 12/20/18       Description:     Disciplines: Interdisciplinary                Problem: Patient Education: Go to Patient Education Activity     Dates: Start: 12/13/18       Description:     Disciplines: Interdisciplinary    Goal: Patient/Family Education     Dates: Start: 12/13/18       Description:     Disciplines: Interdisciplinary                Problem: Patient Education: Go to Patient Education Activity     Dates: Start: 12/13/18       Description:     Disciplines: Interdisciplinary    Goal: Patient/Family Education     Dates: Start: 12/13/18       Description:     Disciplines: Interdisciplinary                Problem: Pressure Injury - Risk of     Dates: Start: 12/13/18       Description:     Disciplines: Interdisciplinary    Goal: *Prevention of pressure injury     Dates: Start: 12/13/18       Description: Document Ceasar Scale and appropriate interventions in the flowsheet.     Disciplines: Interdisciplinary                Problem: Psychosocial General     Dates: Start: 12/13/18       Description:     Disciplines: Interdisciplinary    Goal: Verbalize factors contributing to need for treatment and voice changes that will be made to prevent hospitalization     Dates: Start: 12/13/18   Expected End: 12/20/18       Description:     Disciplines: Interdisciplinary    Intervention: Identify psychosocial factors     Dates: Start: 12/13/18       Description: On SOC and PRN encourage patient/caregiver to identify factors in the patient's that have contributed to the need for treatment (e.g. Living situation, relationships, drug or alcohol use, inadequate coping mechanisms). Discuss each contributing factor, how the patient sees these now, and what can be changed, what the patient is motivated to change, and how the patient will deal differently with these things to prevent rehospitalization. ___________________    Care Team Notes          POC/IDG Notes      [facility] IDG Volunteer Notes by Naz Ryan at 12/14/18 1207  Version 1 of 1    Author:  Naz Ryan Service:  Buster Shell Author Type:  Hospice Volunteer/    Filed:  12/14/18 1208 Date of Service:  12/14/18 1207 Status:  Signed    :  Naz Ryan (Hospice Volunteer/)           [address] Review     Status Codes I = Initiated C=Continued R=Revised RS = Resolved     I.  Volunteer     Goal: Hospice house volunteer (s) enhances the quality of remaining life while patient is at the hospice house. Interventions: Rocky Pacheco Serarnoy Pacheco Gene Presume Volunteer (s) will provide companionship to the patient and/or family by visiting at the hospice house       . Rocky Pacheco Gene Presume Volunteer (s) will provide respite as needed when requested by patient and/or family. Rocky Pacheco Adalberto Maguire  Volunteer will provide activities such as music, reading, pet therapy, etc. as requested. Rocky Sigalaas  Comfort bag delivered.         Any other special requests or information regarding volunteer services:    Staff have requested extra visits to the pt for companionship and volunteers have been notified. No further needs identified at this time. These notes have been discussed in [facility] meeting. Signed by: Herbert Cornelius IDG Nurse Notes by Cynthia Cm RN at 12/14/18 1028  Version 1 of 1    Author:  Cynthia Cm RN Service:  [facility] Author Type:  Registered Nurse    Filed:  12/14/18 1101 Date of Service:  12/14/18 1028 Status:  Signed    :  Cynthia Cm RN (Registered Nurse)       Patient: Cherylene Nest    Date: 12/14/18  Time: 10:28 AM    [facility] Nurse Notes:  Pt is in hospice for Pulmonary HTN. She was brought to [facility] for emergency respite secondary to power outage. Scheduled discharge at this time is for 12/19. No family was present when patient arrived for respite stay and not home medications were sent with patient. Pt has experienced any episodes of uncontrolled pain or discomfort. Appetite is adequate. Pt does have stage 1 pressure areas bilateral heels and are being floated. Pt does have history of gout and is receiving pain management for that. Prednisone 20 mg q day added. No other changes Comprehensive plan of care reviewed. POC from home has been reviewed and modified as necessary to meet patients needs. IDG and pt./family in agreement with plan of care. The IDG identifies through on-going assessment when a change is needed to the POC; the pt/family will receive care and services necessitated by changes in POC. Medications reviewed by the pharmacist and Medical Director. Signed by: Daxa Branch RN       [address] ID  Notes by Galen rOtiz at 12/14/18 1000  Version 1 of 1    Author:  Galen Ortiz Service:  Licensed Clinical  Author Type:      Filed:  12/14/18 1058 Date of Service:  12/14/18 1000 Status:  Signed    :  Galen Ortiz ()       Patient: Cherylene Nest    Date: 12/14/18  Time: 10:00 AM    [facility]  Notes  Pt is here respite due to power outage. Pt is due to go home on 12-18-18. Will coordinate with the home care team her discharge plan. Signed by: Swetha Lofton       [facility] IDG  Notes by Jake Marley at 12/14/18 0919  Version 1 of 1    Author:  Jake Marley Service:  HOSPICE Author Type:  Pastoral Care    Filed:  12/14/18 7998 Date of Service:  12/14/18 0919 Status:  Signed    :  Jake Marley (Pastoral Care)       Patient: Gabbi Clay    Date: 12/14/18  Time: 9:19 AM    [facility]  Notes:    Patient is at [facility] due to power outage at home. Discharge scheduled December 18.         Signed by: Cece Cox

## 2018-12-14 NOTE — PROGRESS NOTES
Problem: Falls - Risk of  Goal: *Absence of Falls  Document Vanda Fall Risk and appropriate interventions in the flowsheet.   Outcome: Progressing Towards Goal  Fall Risk Interventions:  Mobility Interventions: Communicate number of staff needed for ambulation/transfer, Bed/chair exit alarm, Patient to call before getting OOB, Utilize walker, cane, or other assistive device    Mentation Interventions: Adequate sleep, hydration, pain control, Evaluate medications/consider consulting pharmacy, Bed/chair exit alarm, More frequent rounding, Door open when patient unattended, Reorient patient, Toileting rounds    Medication Interventions: Bed/chair exit alarm, Evaluate medications/consider consulting pharmacy, Patient to call before getting OOB    Elimination Interventions: Bed/chair exit alarm, Patient to call for help with toileting needs, Call light in reach, Toileting schedule/hourly rounds

## 2018-12-14 NOTE — [facility] IDG SOCIAL WORKER NOTES
Patient: Dario Michel    Date: 12/14/18  Time: 10:00 AM    [facility]  Notes  Pt is here respite due to power outage. Pt is due to go home on 12-18-18. Will coordinate with the home care team her discharge plan.          Signed by: Sera Lehman

## 2018-12-14 NOTE — [facility] IDG VOLUNTEER NOTES
[address] Review     Status Codes I = Initiated C=Continued R=Revised RS = Resolved     I.  Volunteer     Goal: Hospice house volunteer (s) enhances the quality of remaining life while patient is at the hospice house. Interventions: Carroll Escalera Volunteer (s) will provide companionship to the patient and/or family by visiting at the hospice house       . Carroll Escalera Volunteer (s) will provide respite as needed when requested by patient and/or family. Carroll Evans  Volunteer will provide activities such as music, reading, pet therapy, etc. as requested. Carroll Evans  Comfort bag delivered. Any other special requests or information regarding volunteer services:    Staff have requested extra visits to the pt for companionship and volunteers have been notified. No further needs identified at this time. These notes have been discussed in [facility] meeting.         Signed by: Olegario Vizcaino
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
## 2018-12-14 NOTE — PROGRESS NOTES
Report received from off going RN. Patient round. ID by name and . Patient stating her left toe is hurting; agitated. Will medicate per MAR. Bed low and locked. Call bell in reach. Door open for continuous monitoring.

## 2018-12-14 NOTE — PROGRESS NOTES
LMSW Arranged transport for the pt to go back home tomorrow at 11:15 am.  Home SW and Rn are aware of the time
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
## 2018-12-15 NOTE — PROGRESS NOTES
Pt slept in 2-3 hour intervals. She was medicated for pain in her feet and a headache thus far. Medication intervention effective, pt reports headache is better. Safety measures are in place, call light in reach.

## 2018-12-15 NOTE — PROGRESS NOTES
Problem: Falls - Risk of  Goal: *Absence of Falls  Document Vanda Fall Risk and appropriate interventions in the flowsheet. Outcome: Progressing Towards Goal  Fall Risk Interventions:  Mobility Interventions: Bed/chair exit alarm, Patient to call before getting OOB    Mentation Interventions: Bed/chair exit alarm, Reorient patient, Adequate sleep, hydration, pain control, Door open when patient unattended    Medication Interventions: Bed/chair exit alarm, Patient to call before getting OOB    Elimination Interventions: Bed/chair exit alarm, Call light in reach             Problem: Pressure Injury - Risk of  Goal: *Prevention of pressure injury  Document Ceasar Scale and appropriate interventions in the flowsheet.   Outcome: Progressing Towards Goal  Pressure Injury Interventions:  Sensory Interventions: Assess changes in LOC, Assess need for specialty bed, Avoid rigorous massage over bony prominences, Float heels, Keep linens dry and wrinkle-free    Moisture Interventions: Absorbent underpads, Apply protective barrier, creams and emollients, Maintain skin hydration (lotion/cream), Assess need for specialty bed    Activity Interventions: Pressure redistribution bed/mattress(bed type)    Mobility Interventions: Assess need for specialty bed, Float heels, HOB 30 degrees or less, Pressure redistribution bed/mattress (bed type)    Nutrition Interventions: Document food/fluid/supplement intake    Friction and Shear Interventions: HOB 30 degrees or less

## 2018-12-15 NOTE — PROGRESS NOTES
Pt alert, able to make needs known, swallows am meds without difficulty whole in yogurt, and feeds self breakfast independently with setup. Spirits good, excited to be returning home today. Visits pleasantly with this nurse. Order found from [facility] for new Prednisone order of 10 mg that does not begin today, and shredded per Dr. Jamison Gallegos instructions, as to not cause confusion being sent home with discharge paperwork. Dr. Shawn Fortune to address later. Contacted son Annelise Villa with report, verified address pt is being discharged to, reviewed discharge instructions, and understanding verbalized. No med came or are leaving with pt. Boo Ask stays as well, as pt did not come with walker from home, and walker is still in residence. Report called to Mike Martinez RN, and Dr. Shawn Fortune in to see pt prior to discharge. [facility] arrives for pickup, and pt is received. No further needs noted at present. Pt denies need for any prns this shift.

## 2018-12-15 NOTE — PROGRESS NOTES
Received report on patient. She is alert and oriented to person, place and time. Pt is able to verbalize needs. Pt looking forward to being discharged tomorrow. Pt c/o pain 7/10 and was medicated per orders. Medication effective patient resting with eyes closed, no s/sx of distress or pain.

## 2018-12-15 NOTE — PROGRESS NOTES
Staff in to do incontinence care and pt reported headache. Stated, \"It's bad enough to keep me awake. Pt grimacing and nonverbal pain 5/10. Pt administered medication per order.

## 2019-01-01 ENCOUNTER — HOME CARE VISIT (OUTPATIENT)
Dept: SCHEDULING | Facility: HOME HEALTH | Age: 84
End: 2019-01-01
Payer: MEDICARE

## 2019-01-01 ENCOUNTER — HOME CARE VISIT (OUTPATIENT)
Dept: HOSPICE | Facility: HOSPICE | Age: 84
End: 2019-01-01
Payer: MEDICARE

## 2019-01-01 ENCOUNTER — HOSPITAL ENCOUNTER (INPATIENT)
Age: 84
LOS: 5 days | Discharge: HOME | End: 2019-04-08
Attending: INTERNAL MEDICINE | Admitting: INTERNAL MEDICINE

## 2019-01-01 ENCOUNTER — HOSPITAL ENCOUNTER (INPATIENT)
Age: 84
LOS: 5 days | Discharge: HOME | End: 2019-01-21
Attending: INTERNAL MEDICINE | Admitting: INTERNAL MEDICINE

## 2019-01-01 ENCOUNTER — TELEPHONE (OUTPATIENT)
Dept: HOSPICE | Age: 84
End: 2019-01-01

## 2019-01-01 VITALS — DIASTOLIC BLOOD PRESSURE: 76 MMHG | SYSTOLIC BLOOD PRESSURE: 124 MMHG | HEART RATE: 88 BPM | RESPIRATION RATE: 20 BRPM

## 2019-01-01 VITALS — SYSTOLIC BLOOD PRESSURE: 118 MMHG | HEART RATE: 80 BPM | DIASTOLIC BLOOD PRESSURE: 64 MMHG | RESPIRATION RATE: 20 BRPM

## 2019-01-01 VITALS — DIASTOLIC BLOOD PRESSURE: 64 MMHG | RESPIRATION RATE: 20 BRPM | SYSTOLIC BLOOD PRESSURE: 132 MMHG | HEART RATE: 80 BPM

## 2019-01-01 VITALS
DIASTOLIC BLOOD PRESSURE: 76 MMHG | SYSTOLIC BLOOD PRESSURE: 169 MMHG | TEMPERATURE: 96.2 F | RESPIRATION RATE: 16 BRPM | HEART RATE: 84 BPM

## 2019-01-01 VITALS
RESPIRATION RATE: 18 BRPM | TEMPERATURE: 98 F | HEART RATE: 70 BPM | SYSTOLIC BLOOD PRESSURE: 126 MMHG | DIASTOLIC BLOOD PRESSURE: 72 MMHG

## 2019-01-01 VITALS
DIASTOLIC BLOOD PRESSURE: 56 MMHG | TEMPERATURE: 98.6 F | SYSTOLIC BLOOD PRESSURE: 122 MMHG | RESPIRATION RATE: 18 BRPM | HEART RATE: 90 BPM

## 2019-01-01 VITALS
RESPIRATION RATE: 18 BRPM | TEMPERATURE: 98.3 F | HEART RATE: 80 BPM | DIASTOLIC BLOOD PRESSURE: 60 MMHG | SYSTOLIC BLOOD PRESSURE: 120 MMHG

## 2019-01-01 VITALS
RESPIRATION RATE: 22 BRPM | DIASTOLIC BLOOD PRESSURE: 70 MMHG | OXYGEN SATURATION: 99 % | HEART RATE: 80 BPM | SYSTOLIC BLOOD PRESSURE: 118 MMHG

## 2019-01-01 VITALS
RESPIRATION RATE: 18 BRPM | TEMPERATURE: 98.4 F | SYSTOLIC BLOOD PRESSURE: 120 MMHG | DIASTOLIC BLOOD PRESSURE: 70 MMHG | HEART RATE: 80 BPM

## 2019-01-01 VITALS — SYSTOLIC BLOOD PRESSURE: 120 MMHG | DIASTOLIC BLOOD PRESSURE: 64 MMHG | HEART RATE: 84 BPM | RESPIRATION RATE: 20 BRPM

## 2019-01-01 VITALS
RESPIRATION RATE: 18 BRPM | RESPIRATION RATE: 20 BRPM | SYSTOLIC BLOOD PRESSURE: 110 MMHG | TEMPERATURE: 98.6 F | SYSTOLIC BLOOD PRESSURE: 100 MMHG | DIASTOLIC BLOOD PRESSURE: 80 MMHG | HEART RATE: 88 BPM | DIASTOLIC BLOOD PRESSURE: 60 MMHG | HEART RATE: 80 BPM | TEMPERATURE: 98.4 F

## 2019-01-01 VITALS
TEMPERATURE: 97.7 F | DIASTOLIC BLOOD PRESSURE: 60 MMHG | HEART RATE: 72 BPM | SYSTOLIC BLOOD PRESSURE: 90 MMHG | RESPIRATION RATE: 12 BRPM

## 2019-01-01 VITALS
TEMPERATURE: 98.2 F | DIASTOLIC BLOOD PRESSURE: 74 MMHG | HEART RATE: 64 BPM | SYSTOLIC BLOOD PRESSURE: 100 MMHG | RESPIRATION RATE: 20 BRPM

## 2019-01-01 VITALS — RESPIRATION RATE: 20 BRPM | SYSTOLIC BLOOD PRESSURE: 140 MMHG | HEART RATE: 64 BPM | DIASTOLIC BLOOD PRESSURE: 74 MMHG

## 2019-01-01 VITALS
RESPIRATION RATE: 20 BRPM | HEART RATE: 76 BPM | DIASTOLIC BLOOD PRESSURE: 60 MMHG | SYSTOLIC BLOOD PRESSURE: 110 MMHG | TEMPERATURE: 97.8 F

## 2019-01-01 VITALS
DIASTOLIC BLOOD PRESSURE: 60 MMHG | TEMPERATURE: 97.6 F | SYSTOLIC BLOOD PRESSURE: 122 MMHG | RESPIRATION RATE: 20 BRPM | HEART RATE: 84 BPM

## 2019-01-01 VITALS — RESPIRATION RATE: 20 BRPM | HEART RATE: 72 BPM | SYSTOLIC BLOOD PRESSURE: 122 MMHG | DIASTOLIC BLOOD PRESSURE: 70 MMHG

## 2019-01-01 VITALS
HEART RATE: 80 BPM | TEMPERATURE: 96.3 F | SYSTOLIC BLOOD PRESSURE: 104 MMHG | RESPIRATION RATE: 20 BRPM | DIASTOLIC BLOOD PRESSURE: 70 MMHG

## 2019-01-01 VITALS
SYSTOLIC BLOOD PRESSURE: 129 MMHG | TEMPERATURE: 96.7 F | BODY MASS INDEX: 26.57 KG/M2 | DIASTOLIC BLOOD PRESSURE: 66 MMHG | WEIGHT: 150 LBS | RESPIRATION RATE: 16 BRPM | HEART RATE: 67 BPM

## 2019-01-01 VITALS — HEART RATE: 88 BPM | RESPIRATION RATE: 20 BRPM | DIASTOLIC BLOOD PRESSURE: 60 MMHG | SYSTOLIC BLOOD PRESSURE: 112 MMHG

## 2019-01-01 VITALS — SYSTOLIC BLOOD PRESSURE: 124 MMHG | DIASTOLIC BLOOD PRESSURE: 64 MMHG | HEART RATE: 84 BPM | RESPIRATION RATE: 20 BRPM

## 2019-01-01 VITALS — HEART RATE: 84 BPM | DIASTOLIC BLOOD PRESSURE: 64 MMHG | SYSTOLIC BLOOD PRESSURE: 118 MMHG

## 2019-01-01 VITALS
SYSTOLIC BLOOD PRESSURE: 100 MMHG | HEART RATE: 92 BPM | RESPIRATION RATE: 28 BRPM | TEMPERATURE: 98.3 F | DIASTOLIC BLOOD PRESSURE: 70 MMHG

## 2019-01-01 VITALS
HEART RATE: 76 BPM | RESPIRATION RATE: 18 BRPM | DIASTOLIC BLOOD PRESSURE: 70 MMHG | TEMPERATURE: 98.6 F | SYSTOLIC BLOOD PRESSURE: 110 MMHG

## 2019-01-01 VITALS
HEART RATE: 86 BPM | DIASTOLIC BLOOD PRESSURE: 80 MMHG | TEMPERATURE: 97.7 F | RESPIRATION RATE: 22 BRPM | SYSTOLIC BLOOD PRESSURE: 136 MMHG

## 2019-01-01 VITALS
TEMPERATURE: 98.2 F | SYSTOLIC BLOOD PRESSURE: 100 MMHG | RESPIRATION RATE: 18 BRPM | DIASTOLIC BLOOD PRESSURE: 60 MMHG | HEART RATE: 18 BPM

## 2019-01-01 VITALS — RESPIRATION RATE: 20 BRPM | DIASTOLIC BLOOD PRESSURE: 80 MMHG | HEART RATE: 88 BPM | SYSTOLIC BLOOD PRESSURE: 144 MMHG

## 2019-01-01 VITALS — HEART RATE: 84 BPM | SYSTOLIC BLOOD PRESSURE: 128 MMHG | DIASTOLIC BLOOD PRESSURE: 60 MMHG | RESPIRATION RATE: 20 BRPM

## 2019-01-01 VITALS — RESPIRATION RATE: 20 BRPM | SYSTOLIC BLOOD PRESSURE: 128 MMHG | HEART RATE: 84 BPM | DIASTOLIC BLOOD PRESSURE: 64 MMHG

## 2019-01-01 VITALS
SYSTOLIC BLOOD PRESSURE: 90 MMHG | RESPIRATION RATE: 18 BRPM | HEART RATE: 76 BPM | DIASTOLIC BLOOD PRESSURE: 50 MMHG | TEMPERATURE: 97.4 F

## 2019-01-01 VITALS — SYSTOLIC BLOOD PRESSURE: 118 MMHG | HEART RATE: 84 BPM | DIASTOLIC BLOOD PRESSURE: 60 MMHG | RESPIRATION RATE: 20 BRPM

## 2019-01-01 VITALS
HEART RATE: 80 BPM | RESPIRATION RATE: 18 BRPM | DIASTOLIC BLOOD PRESSURE: 55 MMHG | DIASTOLIC BLOOD PRESSURE: 70 MMHG | HEART RATE: 64 BPM | TEMPERATURE: 98.6 F | SYSTOLIC BLOOD PRESSURE: 100 MMHG | TEMPERATURE: 97.4 F | RESPIRATION RATE: 18 BRPM | SYSTOLIC BLOOD PRESSURE: 100 MMHG

## 2019-01-01 VITALS — HEART RATE: 84 BPM | SYSTOLIC BLOOD PRESSURE: 132 MMHG | RESPIRATION RATE: 20 BRPM | DIASTOLIC BLOOD PRESSURE: 70 MMHG

## 2019-01-01 VITALS — HEART RATE: 94 BPM | RESPIRATION RATE: 14 BRPM

## 2019-01-01 VITALS
HEART RATE: 80 BPM | RESPIRATION RATE: 20 BRPM | SYSTOLIC BLOOD PRESSURE: 90 MMHG | DIASTOLIC BLOOD PRESSURE: 60 MMHG | TEMPERATURE: 98.2 F

## 2019-01-01 VITALS — DIASTOLIC BLOOD PRESSURE: 70 MMHG | HEART RATE: 88 BPM | RESPIRATION RATE: 20 BRPM | SYSTOLIC BLOOD PRESSURE: 118 MMHG

## 2019-01-01 VITALS
DIASTOLIC BLOOD PRESSURE: 70 MMHG | RESPIRATION RATE: 20 BRPM | HEART RATE: 72 BPM | TEMPERATURE: 98.4 F | SYSTOLIC BLOOD PRESSURE: 100 MMHG

## 2019-01-01 VITALS — HEART RATE: 70 BPM | RESPIRATION RATE: 18 BRPM | SYSTOLIC BLOOD PRESSURE: 122 MMHG | DIASTOLIC BLOOD PRESSURE: 61 MMHG

## 2019-01-01 VITALS — RESPIRATION RATE: 10 BRPM

## 2019-01-01 VITALS — TEMPERATURE: 97.4 F | HEART RATE: 81 BPM | OXYGEN SATURATION: 98 % | RESPIRATION RATE: 16 BRPM

## 2019-01-01 VITALS
DIASTOLIC BLOOD PRESSURE: 60 MMHG | TEMPERATURE: 98.6 F | RESPIRATION RATE: 18 BRPM | HEART RATE: 80 BPM | SYSTOLIC BLOOD PRESSURE: 90 MMHG

## 2019-01-01 VITALS
RESPIRATION RATE: 16 BRPM | SYSTOLIC BLOOD PRESSURE: 116 MMHG | TEMPERATURE: 97.4 F | HEART RATE: 72 BPM | DIASTOLIC BLOOD PRESSURE: 68 MMHG

## 2019-01-01 VITALS
TEMPERATURE: 98.2 F | RESPIRATION RATE: 18 BRPM | DIASTOLIC BLOOD PRESSURE: 40 MMHG | HEART RATE: 76 BPM | SYSTOLIC BLOOD PRESSURE: 70 MMHG

## 2019-01-01 VITALS — DIASTOLIC BLOOD PRESSURE: 60 MMHG | HEART RATE: 84 BPM | RESPIRATION RATE: 20 BRPM | SYSTOLIC BLOOD PRESSURE: 122 MMHG

## 2019-01-01 PROCEDURE — 0651 HSPC ROUTINE HOME CARE

## 2019-01-01 PROCEDURE — G0156 HHCP-SVS OF AIDE,EA 15 MIN: HCPCS

## 2019-01-01 PROCEDURE — 3336590001 HSPC ROOM AND BOARD

## 2019-01-01 PROCEDURE — HOSPICE MEDICATION HC HH HOSPICE MEDICATION

## 2019-01-01 PROCEDURE — G0299 HHS/HOSPICE OF RN EA 15 MIN: HCPCS

## 2019-01-01 PROCEDURE — A6446 CONFORM BAND S W>=3" <5"/YD: HCPCS

## 2019-01-01 PROCEDURE — 0655 HSPC INPATIENT RESPITE

## 2019-01-01 PROCEDURE — G0155 HHCP-SVS OF CSW,EA 15 MIN: HCPCS

## 2019-01-01 PROCEDURE — 74011250637 HC RX REV CODE- 250/637: Performed by: NURSE PRACTITIONER

## 2019-01-01 PROCEDURE — A9270 NON-COVERED ITEM OR SERVICE: HCPCS

## 2019-01-01 PROCEDURE — 74011636637 HC RX REV CODE- 636/637: Performed by: NURSE PRACTITIONER

## 2019-01-01 PROCEDURE — A4927 NON-STERILE GLOVES: HCPCS

## 2019-01-01 PROCEDURE — T4527 ADULT SIZE PULL-ON LG: HCPCS

## 2019-01-01 PROCEDURE — A4649 SURGICAL SUPPLIES: HCPCS

## 2019-01-01 PROCEDURE — A6403 STERILE GAUZE>16 <= 48 SQ IN: HCPCS

## 2019-01-01 PROCEDURE — 3331090004 HSPC SERVICE INTENSITY ADD-ON

## 2019-01-01 PROCEDURE — HHS10554 SHAMPOO/BODY WASH 8 OZ ALOE VESTA

## 2019-01-01 PROCEDURE — 74011250637 HC RX REV CODE- 250/637: Performed by: INTERNAL MEDICINE

## 2019-01-01 RX ORDER — GABAPENTIN 300 MG/1
900 CAPSULE ORAL
Status: DISCONTINUED | OUTPATIENT
Start: 2019-01-01 | End: 2019-01-01 | Stop reason: HOSPADM

## 2019-01-01 RX ORDER — FUROSEMIDE 40 MG/1
40 TABLET ORAL DAILY
Status: DISCONTINUED | OUTPATIENT
Start: 2019-01-01 | End: 2019-01-01 | Stop reason: HOSPADM

## 2019-01-01 RX ORDER — HALOPERIDOL 2 MG/ML
1 SOLUTION ORAL
Status: DISCONTINUED | OUTPATIENT
Start: 2019-01-01 | End: 2019-01-01 | Stop reason: HOSPADM

## 2019-01-01 RX ORDER — ALBUTEROL SULFATE 90 UG/1
1 AEROSOL, METERED RESPIRATORY (INHALATION)
Status: DISCONTINUED | OUTPATIENT
Start: 2019-01-01 | End: 2019-01-01 | Stop reason: HOSPADM

## 2019-01-01 RX ORDER — TEMAZEPAM 15 MG/1
15 CAPSULE ORAL
Status: DISCONTINUED | OUTPATIENT
Start: 2019-01-01 | End: 2019-01-01 | Stop reason: HOSPADM

## 2019-01-01 RX ORDER — PREDNISONE 10 MG/1
5 TABLET ORAL DAILY
Status: DISCONTINUED | OUTPATIENT
Start: 2019-01-01 | End: 2019-01-01 | Stop reason: HOSPADM

## 2019-01-01 RX ORDER — HYDROCODONE BITARTRATE AND ACETAMINOPHEN 5; 325 MG/1; MG/1
1 TABLET ORAL
Status: DISCONTINUED | OUTPATIENT
Start: 2019-01-01 | End: 2019-01-01 | Stop reason: HOSPADM

## 2019-01-01 RX ORDER — LORATADINE 10 MG/1
10 TABLET ORAL DAILY
Status: DISCONTINUED | OUTPATIENT
Start: 2019-01-01 | End: 2019-01-01 | Stop reason: HOSPADM

## 2019-01-01 RX ORDER — GABAPENTIN 300 MG/1
600 CAPSULE ORAL 2 TIMES DAILY
Status: DISCONTINUED | OUTPATIENT
Start: 2019-01-01 | End: 2019-01-01

## 2019-01-01 RX ORDER — CLOPIDOGREL BISULFATE 75 MG/1
75 TABLET ORAL DAILY
Status: DISCONTINUED | OUTPATIENT
Start: 2019-01-01 | End: 2019-01-01 | Stop reason: HOSPADM

## 2019-01-01 RX ORDER — HALOPERIDOL 2 MG/ML
1 SOLUTION ORAL
Qty: 30 ML | Refills: 0 | Status: ON HOLD | OUTPATIENT
Start: 2019-01-01 | End: 2019-01-01 | Stop reason: SDUPTHER

## 2019-01-01 RX ORDER — AMLODIPINE BESYLATE 5 MG/1
5 TABLET ORAL DAILY
Status: DISCONTINUED | OUTPATIENT
Start: 2019-01-01 | End: 2019-01-01 | Stop reason: HOSPADM

## 2019-01-01 RX ORDER — GABAPENTIN 300 MG/1
600 CAPSULE ORAL 2 TIMES DAILY
Status: DISCONTINUED | OUTPATIENT
Start: 2019-01-01 | End: 2019-01-01 | Stop reason: HOSPADM

## 2019-01-01 RX ORDER — RANITIDINE 150 MG/1
150 TABLET, FILM COATED ORAL 2 TIMES DAILY
Status: DISCONTINUED | OUTPATIENT
Start: 2019-01-01 | End: 2019-01-01 | Stop reason: HOSPADM

## 2019-01-01 RX ORDER — HYDROCODONE BITARTRATE AND ACETAMINOPHEN 5; 325 MG/1; MG/1
2 TABLET ORAL
Status: DISCONTINUED | OUTPATIENT
Start: 2019-01-01 | End: 2019-01-01 | Stop reason: HOSPADM

## 2019-01-01 RX ORDER — DOXYLAMINE SUCCINATE 25 MG
TABLET ORAL DAILY
Status: DISCONTINUED | OUTPATIENT
Start: 2019-01-01 | End: 2019-01-01 | Stop reason: HOSPADM

## 2019-01-01 RX ORDER — ACETAMINOPHEN 325 MG/1
650 TABLET ORAL
Status: DISCONTINUED | OUTPATIENT
Start: 2019-01-01 | End: 2019-01-01 | Stop reason: HOSPADM

## 2019-01-01 RX ORDER — HALOPERIDOL 2 MG/ML
1 SOLUTION ORAL
Qty: 30 ML | Refills: 0 | Status: SHIPPED | OUTPATIENT
Start: 2019-01-01 | End: 2019-01-01

## 2019-01-01 RX ORDER — HALOPERIDOL 2 MG/ML
1 SOLUTION ORAL
Qty: 30 ML | Refills: 0 | Status: SHIPPED
Start: 2019-01-01 | End: 2019-01-01 | Stop reason: SDUPTHER

## 2019-01-01 RX ORDER — SENNOSIDES 8.6 MG/1
2 TABLET ORAL 2 TIMES DAILY
Status: DISCONTINUED | OUTPATIENT
Start: 2019-01-01 | End: 2019-01-01 | Stop reason: HOSPADM

## 2019-01-01 RX ORDER — ALLOPURINOL 100 MG/1
100 TABLET ORAL 2 TIMES DAILY
Status: DISCONTINUED | OUTPATIENT
Start: 2019-01-01 | End: 2019-01-01 | Stop reason: HOSPADM

## 2019-01-01 RX ORDER — POTASSIUM CHLORIDE 750 MG/1
10 CAPSULE, EXTENDED RELEASE ORAL DAILY
Status: DISCONTINUED | OUTPATIENT
Start: 2019-01-01 | End: 2019-01-01 | Stop reason: HOSPADM

## 2019-01-01 RX ORDER — CHOLECALCIFEROL (VITAMIN D3) 25 MCG
2500 TABLET,CHEWABLE ORAL DAILY
Qty: 1 TAB | Refills: 0 | Status: SHIPPED | OUTPATIENT
Start: 2019-01-01 | End: 2019-01-01 | Stop reason: SDUPTHER

## 2019-01-01 RX ORDER — HALOPERIDOL 2 MG/ML
1 SOLUTION ORAL
Status: DISCONTINUED | OUTPATIENT
Start: 2019-01-01 | End: 2019-01-01

## 2019-01-01 RX ORDER — GABAPENTIN 300 MG/1
600 CAPSULE ORAL
Status: DISCONTINUED | OUTPATIENT
Start: 2019-01-01 | End: 2019-01-01 | Stop reason: HOSPADM

## 2019-01-01 RX ORDER — ALBUTEROL SULFATE 0.83 MG/ML
2.5 SOLUTION RESPIRATORY (INHALATION)
Status: DISCONTINUED | OUTPATIENT
Start: 2019-01-01 | End: 2019-01-01 | Stop reason: HOSPADM

## 2019-01-01 RX ORDER — POTASSIUM CHLORIDE 750 MG/1
10 TABLET, EXTENDED RELEASE ORAL DAILY
Status: DISCONTINUED | OUTPATIENT
Start: 2019-01-01 | End: 2019-01-01 | Stop reason: HOSPADM

## 2019-01-01 RX ORDER — FACIAL-BODY WIPES
10 EACH TOPICAL AS NEEDED
Status: DISCONTINUED | OUTPATIENT
Start: 2019-01-01 | End: 2019-01-01 | Stop reason: HOSPADM

## 2019-01-01 RX ORDER — LANOLIN ALCOHOL/MO/W.PET/CERES
1000 CREAM (GRAM) TOPICAL DAILY
Status: DISCONTINUED | OUTPATIENT
Start: 2019-01-01 | End: 2019-01-01 | Stop reason: HOSPADM

## 2019-01-01 RX ORDER — LANOLIN ALCOHOL/MO/W.PET/CERES
2500 CREAM (GRAM) TOPICAL DAILY
Status: DISCONTINUED | OUTPATIENT
Start: 2019-01-01 | End: 2019-01-01 | Stop reason: HOSPADM

## 2019-01-01 RX ORDER — POLYVINYL ALCOHOL 14 MG/ML
1 SOLUTION/ DROPS OPHTHALMIC AS NEEDED
Status: DISCONTINUED | OUTPATIENT
Start: 2019-01-01 | End: 2019-01-01 | Stop reason: HOSPADM

## 2019-01-01 RX ORDER — HALOPERIDOL 1 MG/1
1 TABLET ORAL
Status: DISCONTINUED | OUTPATIENT
Start: 2019-01-01 | End: 2019-01-01

## 2019-01-01 RX ORDER — ALBUTEROL SULFATE 1.25 MG/3ML
2.5 SOLUTION RESPIRATORY (INHALATION)
Status: DISCONTINUED | OUTPATIENT
Start: 2019-01-01 | End: 2019-01-01 | Stop reason: HOSPADM

## 2019-01-01 RX ORDER — LANOLIN ALCOHOL/MO/W.PET/CERES
1000 CREAM (GRAM) TOPICAL DAILY
Status: DISCONTINUED | OUTPATIENT
Start: 2019-01-01 | End: 2019-01-01

## 2019-01-01 RX ORDER — GABAPENTIN 300 MG/1
300 CAPSULE ORAL 2 TIMES DAILY
Status: DISCONTINUED | OUTPATIENT
Start: 2019-01-01 | End: 2019-01-01 | Stop reason: HOSPADM

## 2019-01-01 RX ORDER — FAMOTIDINE 20 MG/1
20 TABLET, FILM COATED ORAL 2 TIMES DAILY
Status: DISCONTINUED | OUTPATIENT
Start: 2019-01-01 | End: 2019-01-01 | Stop reason: HOSPADM

## 2019-01-01 RX ORDER — DOXYLAMINE SUCCINATE 25 MG
TABLET ORAL DAILY
Qty: 15 G | Refills: 0 | Status: SHIPPED | OUTPATIENT
Start: 2019-01-01 | End: 2019-01-01

## 2019-01-01 RX ADMIN — Medication 2500 MCG: at 10:00

## 2019-01-01 RX ADMIN — POTASSIUM CHLORIDE 10 MEQ: 750 CAPSULE, EXTENDED RELEASE ORAL at 10:20

## 2019-01-01 RX ADMIN — FAMOTIDINE 20 MG: 20 TABLET ORAL at 09:19

## 2019-01-01 RX ADMIN — SENNOSIDES 17.2 MG: 8.6 TABLET, FILM COATED ORAL at 08:12

## 2019-01-01 RX ADMIN — RANITIDINE 150 MG: 150 TABLET, FILM COATED ORAL at 17:36

## 2019-01-01 RX ADMIN — ACETAMINOPHEN 650 MG: 325 TABLET ORAL at 19:21

## 2019-01-01 RX ADMIN — PREDNISONE 5 MG: 10 TABLET ORAL at 08:05

## 2019-01-01 RX ADMIN — POTASSIUM CHLORIDE 10 MEQ: 750 CAPSULE, EXTENDED RELEASE ORAL at 08:12

## 2019-01-01 RX ADMIN — GABAPENTIN 900 MG: 300 CAPSULE ORAL at 21:05

## 2019-01-01 RX ADMIN — FAMOTIDINE 20 MG: 20 TABLET ORAL at 17:19

## 2019-01-01 RX ADMIN — FUROSEMIDE 40 MG: 40 TABLET ORAL at 09:33

## 2019-01-01 RX ADMIN — GABAPENTIN 600 MG: 300 CAPSULE ORAL at 10:19

## 2019-01-01 RX ADMIN — HYDROCODONE BITARTRATE AND ACETAMINOPHEN 1 TABLET: 5; 325 TABLET ORAL at 14:22

## 2019-01-01 RX ADMIN — HYDROCODONE BITARTRATE AND ACETAMINOPHEN 1 TABLET: 5; 325 TABLET ORAL at 07:04

## 2019-01-01 RX ADMIN — CYANOCOBALAMIN TAB 500 MCG 1000 MCG: 500 TAB at 08:14

## 2019-01-01 RX ADMIN — RANITIDINE 150 MG: 150 TABLET, FILM COATED ORAL at 10:01

## 2019-01-01 RX ADMIN — PREDNISONE 5 MG: 10 TABLET ORAL at 09:35

## 2019-01-01 RX ADMIN — GABAPENTIN 900 MG: 300 CAPSULE ORAL at 20:12

## 2019-01-01 RX ADMIN — RANITIDINE 150 MG: 150 TABLET, FILM COATED ORAL at 17:57

## 2019-01-01 RX ADMIN — ALLOPURINOL 100 MG: 100 TABLET ORAL at 10:22

## 2019-01-01 RX ADMIN — POTASSIUM CHLORIDE 10 MEQ: 750 TABLET, EXTENDED RELEASE ORAL at 09:34

## 2019-01-01 RX ADMIN — PREDNISONE 5 MG: 10 TABLET ORAL at 10:23

## 2019-01-01 RX ADMIN — HYDROCODONE BITARTRATE AND ACETAMINOPHEN 1 TABLET: 5; 325 TABLET ORAL at 13:20

## 2019-01-01 RX ADMIN — AMLODIPINE BESYLATE 5 MG: 5 TABLET ORAL at 10:00

## 2019-01-01 RX ADMIN — GABAPENTIN 900 MG: 300 CAPSULE ORAL at 22:00

## 2019-01-01 RX ADMIN — FUROSEMIDE 40 MG: 40 TABLET ORAL at 08:05

## 2019-01-01 RX ADMIN — ALLOPURINOL 100 MG: 100 TABLET ORAL at 17:36

## 2019-01-01 RX ADMIN — CYANOCOBALAMIN TAB 500 MCG 1000 MCG: 500 TAB at 08:31

## 2019-01-01 RX ADMIN — ALLOPURINOL 100 MG: 100 TABLET ORAL at 08:19

## 2019-01-01 RX ADMIN — GABAPENTIN 600 MG: 300 CAPSULE ORAL at 20:13

## 2019-01-01 RX ADMIN — POLYVINYL ALCOHOL 1 DROP: 14 SOLUTION/ DROPS OPHTHALMIC at 08:15

## 2019-01-01 RX ADMIN — AMLODIPINE BESYLATE 5 MG: 5 TABLET ORAL at 10:21

## 2019-01-01 RX ADMIN — HYDROCODONE BITARTRATE AND ACETAMINOPHEN 1 TABLET: 5; 325 TABLET ORAL at 02:38

## 2019-01-01 RX ADMIN — AMLODIPINE BESYLATE 5 MG: 5 TABLET ORAL at 08:14

## 2019-01-01 RX ADMIN — Medication 2500 MCG: at 08:06

## 2019-01-01 RX ADMIN — GABAPENTIN 600 MG: 300 CAPSULE ORAL at 20:33

## 2019-01-01 RX ADMIN — BISACODYL 10 MG: 10 SUPPOSITORY RECTAL at 11:08

## 2019-01-01 RX ADMIN — ALLOPURINOL 100 MG: 100 TABLET ORAL at 17:50

## 2019-01-01 RX ADMIN — GABAPENTIN 600 MG: 300 CAPSULE ORAL at 08:34

## 2019-01-01 RX ADMIN — MICONAZOLE NITRATE: 20 CREAM TOPICAL at 09:30

## 2019-01-01 RX ADMIN — HYDROCODONE BITARTRATE AND ACETAMINOPHEN 1 TABLET: 5; 325 TABLET ORAL at 14:54

## 2019-01-01 RX ADMIN — ALLOPURINOL 100 MG: 100 TABLET ORAL at 21:34

## 2019-01-01 RX ADMIN — HYDROCODONE BITARTRATE AND ACETAMINOPHEN 1 TABLET: 5; 325 TABLET ORAL at 04:23

## 2019-01-01 RX ADMIN — GABAPENTIN 600 MG: 300 CAPSULE ORAL at 20:27

## 2019-01-01 RX ADMIN — ALLOPURINOL 100 MG: 100 TABLET ORAL at 09:32

## 2019-01-01 RX ADMIN — ALLOPURINOL 100 MG: 100 TABLET ORAL at 09:19

## 2019-01-01 RX ADMIN — HYDROCODONE BITARTRATE AND ACETAMINOPHEN 1 TABLET: 5; 325 TABLET ORAL at 15:48

## 2019-01-01 RX ADMIN — POTASSIUM CHLORIDE 10 MEQ: 750 TABLET, EXTENDED RELEASE ORAL at 08:16

## 2019-01-01 RX ADMIN — GABAPENTIN 300 MG: 300 CAPSULE ORAL at 10:22

## 2019-01-01 RX ADMIN — SENNOSIDES 17.2 MG: 8.6 TABLET, FILM COATED ORAL at 09:19

## 2019-01-01 RX ADMIN — HYDROCODONE BITARTRATE AND ACETAMINOPHEN 1 TABLET: 5; 325 TABLET ORAL at 00:17

## 2019-01-01 RX ADMIN — GABAPENTIN 600 MG: 300 CAPSULE ORAL at 20:49

## 2019-01-01 RX ADMIN — POTASSIUM CHLORIDE 10 MEQ: 750 TABLET, EXTENDED RELEASE ORAL at 10:01

## 2019-01-01 RX ADMIN — ALLOPURINOL 100 MG: 100 TABLET ORAL at 08:31

## 2019-01-01 RX ADMIN — ACETAMINOPHEN 650 MG: 325 TABLET ORAL at 23:16

## 2019-01-01 RX ADMIN — ALLOPURINOL 100 MG: 100 TABLET ORAL at 18:40

## 2019-01-01 RX ADMIN — HYDROCODONE BITARTRATE AND ACETAMINOPHEN 2 TABLET: 5; 325 TABLET ORAL at 21:05

## 2019-01-01 RX ADMIN — LORATADINE 10 MG: 10 TABLET ORAL at 08:34

## 2019-01-01 RX ADMIN — GABAPENTIN 900 MG: 300 CAPSULE ORAL at 20:16

## 2019-01-01 RX ADMIN — PREDNISONE 5 MG: 10 TABLET ORAL at 09:18

## 2019-01-01 RX ADMIN — FAMOTIDINE 20 MG: 20 TABLET ORAL at 08:36

## 2019-01-01 RX ADMIN — FUROSEMIDE 40 MG: 40 TABLET ORAL at 10:21

## 2019-01-01 RX ADMIN — GABAPENTIN 600 MG: 300 CAPSULE ORAL at 14:34

## 2019-01-01 RX ADMIN — PREDNISONE 5 MG: 10 TABLET ORAL at 08:19

## 2019-01-01 RX ADMIN — GABAPENTIN 300 MG: 300 CAPSULE ORAL at 09:33

## 2019-01-01 RX ADMIN — FAMOTIDINE 20 MG: 20 TABLET ORAL at 08:12

## 2019-01-01 RX ADMIN — GABAPENTIN 600 MG: 300 CAPSULE ORAL at 14:17

## 2019-01-01 RX ADMIN — SENNOSIDES 17.2 MG: 8.6 TABLET, FILM COATED ORAL at 20:08

## 2019-01-01 RX ADMIN — AMLODIPINE BESYLATE 5 MG: 5 TABLET ORAL at 08:13

## 2019-01-01 RX ADMIN — POTASSIUM CHLORIDE 10 MEQ: 750 CAPSULE, EXTENDED RELEASE ORAL at 09:19

## 2019-01-01 RX ADMIN — ALLOPURINOL 100 MG: 100 TABLET ORAL at 08:14

## 2019-01-01 RX ADMIN — ALLOPURINOL 100 MG: 100 TABLET ORAL at 18:02

## 2019-01-01 RX ADMIN — FAMOTIDINE 20 MG: 20 TABLET ORAL at 08:18

## 2019-01-01 RX ADMIN — GABAPENTIN 600 MG: 300 CAPSULE ORAL at 09:19

## 2019-01-01 RX ADMIN — LORATADINE 10 MG: 10 TABLET ORAL at 08:05

## 2019-01-01 RX ADMIN — CLOPIDOGREL 75 MG: 75 TABLET, FILM COATED ORAL at 08:31

## 2019-01-01 RX ADMIN — POTASSIUM CHLORIDE 10 MEQ: 750 CAPSULE, EXTENDED RELEASE ORAL at 08:36

## 2019-01-01 RX ADMIN — HYDROCODONE BITARTRATE AND ACETAMINOPHEN 1 TABLET: 5; 325 TABLET ORAL at 00:52

## 2019-01-01 RX ADMIN — AMLODIPINE BESYLATE 5 MG: 5 TABLET ORAL at 10:22

## 2019-01-01 RX ADMIN — FUROSEMIDE 40 MG: 40 TABLET ORAL at 08:34

## 2019-01-01 RX ADMIN — AMLODIPINE BESYLATE 5 MG: 5 TABLET ORAL at 08:20

## 2019-01-01 RX ADMIN — SENNOSIDES 17.2 MG: 8.6 TABLET, FILM COATED ORAL at 10:19

## 2019-01-01 RX ADMIN — RANITIDINE 150 MG: 150 TABLET, FILM COATED ORAL at 08:17

## 2019-01-01 RX ADMIN — PREDNISONE 5 MG: 10 TABLET ORAL at 08:13

## 2019-01-01 RX ADMIN — MICONAZOLE NITRATE: 20 CREAM TOPICAL at 09:38

## 2019-01-01 RX ADMIN — GABAPENTIN 600 MG: 300 CAPSULE ORAL at 08:18

## 2019-01-01 RX ADMIN — POTASSIUM CHLORIDE 10 MEQ: 750 CAPSULE, EXTENDED RELEASE ORAL at 08:17

## 2019-01-01 RX ADMIN — AMLODIPINE BESYLATE 5 MG: 5 TABLET ORAL at 08:31

## 2019-01-01 RX ADMIN — FAMOTIDINE 20 MG: 20 TABLET ORAL at 10:21

## 2019-01-01 RX ADMIN — PREDNISONE 5 MG: 10 TABLET ORAL at 08:36

## 2019-01-01 RX ADMIN — CYANOCOBALAMIN TAB 500 MCG 1000 MCG: 500 TAB at 09:19

## 2019-01-01 RX ADMIN — ALLOPURINOL 100 MG: 100 TABLET ORAL at 10:00

## 2019-01-01 RX ADMIN — SENNOSIDES 17.2 MG: 8.6 TABLET, FILM COATED ORAL at 22:00

## 2019-01-01 RX ADMIN — ALLOPURINOL 100 MG: 100 TABLET ORAL at 17:57

## 2019-01-01 RX ADMIN — MICONAZOLE NITRATE: 20 CREAM TOPICAL at 08:31

## 2019-01-01 RX ADMIN — GABAPENTIN 300 MG: 300 CAPSULE ORAL at 10:02

## 2019-01-01 RX ADMIN — LORATADINE 10 MG: 10 TABLET ORAL at 09:34

## 2019-01-01 RX ADMIN — SENNOSIDES 17.2 MG: 8.6 TABLET, FILM COATED ORAL at 21:05

## 2019-01-01 RX ADMIN — FUROSEMIDE 40 MG: 40 TABLET ORAL at 09:19

## 2019-01-01 RX ADMIN — FAMOTIDINE 20 MG: 20 TABLET ORAL at 18:02

## 2019-01-01 RX ADMIN — HYDROCODONE BITARTRATE AND ACETAMINOPHEN 1 TABLET: 5; 325 TABLET ORAL at 14:50

## 2019-01-01 RX ADMIN — GABAPENTIN 600 MG: 300 CAPSULE ORAL at 21:35

## 2019-01-01 RX ADMIN — LORATADINE 10 MG: 10 TABLET ORAL at 09:18

## 2019-01-01 RX ADMIN — HYDROCODONE BITARTRATE AND ACETAMINOPHEN 2 TABLET: 5; 325 TABLET ORAL at 20:16

## 2019-01-01 RX ADMIN — SENNOSIDES 17.2 MG: 8.6 TABLET, FILM COATED ORAL at 20:16

## 2019-01-01 RX ADMIN — LORATADINE 10 MG: 10 TABLET ORAL at 08:12

## 2019-01-01 RX ADMIN — SENNOSIDES 17.2 MG: 8.6 TABLET, FILM COATED ORAL at 08:36

## 2019-01-01 RX ADMIN — CLOPIDOGREL BISULFATE 75 MG: 75 TABLET ORAL at 08:15

## 2019-01-01 RX ADMIN — CLOPIDOGREL 75 MG: 75 TABLET, FILM COATED ORAL at 10:22

## 2019-01-01 RX ADMIN — ALLOPURINOL 100 MG: 100 TABLET ORAL at 08:06

## 2019-01-01 RX ADMIN — GABAPENTIN 600 MG: 300 CAPSULE ORAL at 14:01

## 2019-01-01 RX ADMIN — CLOPIDOGREL BISULFATE 75 MG: 75 TABLET ORAL at 10:22

## 2019-01-01 RX ADMIN — FAMOTIDINE 20 MG: 20 TABLET ORAL at 17:30

## 2019-01-01 RX ADMIN — Medication 2500 MCG: at 08:15

## 2019-01-01 RX ADMIN — FUROSEMIDE 40 MG: 40 TABLET ORAL at 08:12

## 2019-01-01 RX ADMIN — GABAPENTIN 300 MG: 300 CAPSULE ORAL at 17:36

## 2019-01-01 RX ADMIN — HYDROCODONE BITARTRATE AND ACETAMINOPHEN 1 TABLET: 5; 325 TABLET ORAL at 05:05

## 2019-01-01 RX ADMIN — HYDROCODONE BITARTRATE AND ACETAMINOPHEN 1 TABLET: 5; 325 TABLET ORAL at 14:00

## 2019-01-01 RX ADMIN — GABAPENTIN 600 MG: 300 CAPSULE ORAL at 08:12

## 2019-01-01 RX ADMIN — GABAPENTIN 300 MG: 300 CAPSULE ORAL at 17:58

## 2019-01-01 RX ADMIN — FAMOTIDINE 20 MG: 20 TABLET ORAL at 18:40

## 2019-01-01 RX ADMIN — CLOPIDOGREL 75 MG: 75 TABLET, FILM COATED ORAL at 08:21

## 2019-01-01 RX ADMIN — HYDROCODONE BITARTRATE AND ACETAMINOPHEN 1 TABLET: 5; 325 TABLET ORAL at 20:26

## 2019-01-01 RX ADMIN — RANITIDINE 150 MG: 150 TABLET, FILM COATED ORAL at 21:36

## 2019-01-01 RX ADMIN — ACETAMINOPHEN 650 MG: 325 TABLET ORAL at 16:49

## 2019-01-01 RX ADMIN — POTASSIUM CHLORIDE 10 MEQ: 750 TABLET, EXTENDED RELEASE ORAL at 10:23

## 2019-01-01 RX ADMIN — HYDROCODONE BITARTRATE AND ACETAMINOPHEN 1 TABLET: 5; 325 TABLET ORAL at 04:49

## 2019-01-01 RX ADMIN — RANITIDINE 150 MG: 150 TABLET, FILM COATED ORAL at 08:06

## 2019-01-01 RX ADMIN — LORATADINE 10 MG: 10 TABLET ORAL at 08:16

## 2019-01-01 RX ADMIN — HYDROCODONE BITARTRATE AND ACETAMINOPHEN 1 TABLET: 5; 325 TABLET ORAL at 14:38

## 2019-01-01 RX ADMIN — FUROSEMIDE 40 MG: 40 TABLET ORAL at 08:18

## 2019-01-01 RX ADMIN — ALLOPURINOL 100 MG: 100 TABLET ORAL at 18:00

## 2019-01-01 RX ADMIN — HYDROCODONE BITARTRATE AND ACETAMINOPHEN 1 TABLET: 5; 325 TABLET ORAL at 19:18

## 2019-01-01 RX ADMIN — SENNOSIDES 17.2 MG: 8.6 TABLET, FILM COATED ORAL at 08:18

## 2019-01-01 RX ADMIN — HYDROCODONE BITARTRATE AND ACETAMINOPHEN 1 TABLET: 5; 325 TABLET ORAL at 08:22

## 2019-01-01 RX ADMIN — GABAPENTIN 600 MG: 300 CAPSULE ORAL at 14:21

## 2019-01-01 RX ADMIN — CLOPIDOGREL BISULFATE 75 MG: 75 TABLET ORAL at 09:32

## 2019-01-01 RX ADMIN — FUROSEMIDE 40 MG: 40 TABLET ORAL at 08:15

## 2019-01-01 RX ADMIN — GABAPENTIN 900 MG: 300 CAPSULE ORAL at 20:07

## 2019-01-01 RX ADMIN — MICONAZOLE NITRATE: 20 CREAM TOPICAL at 09:00

## 2019-01-01 RX ADMIN — Medication 2500 MCG: at 09:33

## 2019-01-01 RX ADMIN — HYDROCODONE BITARTRATE AND ACETAMINOPHEN 1 TABLET: 5; 325 TABLET ORAL at 17:58

## 2019-01-01 RX ADMIN — HYDROCODONE BITARTRATE AND ACETAMINOPHEN 1 TABLET: 5; 325 TABLET ORAL at 19:58

## 2019-01-01 RX ADMIN — HYDROCODONE BITARTRATE AND ACETAMINOPHEN 2 TABLET: 5; 325 TABLET ORAL at 22:00

## 2019-01-01 RX ADMIN — RANITIDINE 150 MG: 150 TABLET, FILM COATED ORAL at 17:37

## 2019-01-01 RX ADMIN — AMLODIPINE BESYLATE 5 MG: 5 TABLET ORAL at 09:32

## 2019-01-01 RX ADMIN — HYDROCODONE BITARTRATE AND ACETAMINOPHEN 1 TABLET: 5; 325 TABLET ORAL at 01:44

## 2019-01-01 RX ADMIN — POTASSIUM CHLORIDE 10 MEQ: 750 TABLET, EXTENDED RELEASE ORAL at 08:06

## 2019-01-01 RX ADMIN — ALLOPURINOL 100 MG: 100 TABLET ORAL at 08:13

## 2019-01-01 RX ADMIN — GABAPENTIN 300 MG: 300 CAPSULE ORAL at 08:16

## 2019-01-01 RX ADMIN — CLOPIDOGREL BISULFATE 75 MG: 75 TABLET ORAL at 08:06

## 2019-01-01 RX ADMIN — HYDROCODONE BITARTRATE AND ACETAMINOPHEN 2 TABLET: 5; 325 TABLET ORAL at 20:12

## 2019-01-01 RX ADMIN — LORATADINE 10 MG: 10 TABLET ORAL at 10:22

## 2019-01-01 RX ADMIN — AMLODIPINE BESYLATE 5 MG: 5 TABLET ORAL at 08:07

## 2019-01-01 RX ADMIN — CLOPIDOGREL 75 MG: 75 TABLET, FILM COATED ORAL at 09:19

## 2019-01-01 RX ADMIN — ALLOPURINOL 100 MG: 100 TABLET ORAL at 17:20

## 2019-01-01 RX ADMIN — GABAPENTIN 300 MG: 300 CAPSULE ORAL at 17:51

## 2019-01-01 RX ADMIN — FUROSEMIDE 40 MG: 40 TABLET ORAL at 10:01

## 2019-01-01 RX ADMIN — TEMAZEPAM 15 MG: 15 CAPSULE ORAL at 21:05

## 2019-01-01 RX ADMIN — PREDNISONE 5 MG: 10 TABLET ORAL at 10:02

## 2019-01-01 RX ADMIN — GABAPENTIN 300 MG: 300 CAPSULE ORAL at 21:35

## 2019-01-01 RX ADMIN — HYDROCODONE BITARTRATE AND ACETAMINOPHEN 1 TABLET: 5; 325 TABLET ORAL at 10:01

## 2019-01-01 RX ADMIN — GABAPENTIN 300 MG: 300 CAPSULE ORAL at 08:05

## 2019-01-01 RX ADMIN — LORATADINE 10 MG: 10 TABLET ORAL at 10:20

## 2019-01-01 RX ADMIN — AMLODIPINE BESYLATE 5 MG: 5 TABLET ORAL at 09:19

## 2019-01-01 RX ADMIN — HYDROCODONE BITARTRATE AND ACETAMINOPHEN 1 TABLET: 5; 325 TABLET ORAL at 21:37

## 2019-01-01 RX ADMIN — PREDNISONE 5 MG: 10 TABLET ORAL at 10:20

## 2019-01-01 RX ADMIN — RANITIDINE 150 MG: 150 TABLET, FILM COATED ORAL at 17:50

## 2019-01-01 RX ADMIN — HYDROCODONE BITARTRATE AND ACETAMINOPHEN 1 TABLET: 5; 325 TABLET ORAL at 14:55

## 2019-01-01 RX ADMIN — HYDROCODONE BITARTRATE AND ACETAMINOPHEN 2 TABLET: 5; 325 TABLET ORAL at 20:07

## 2019-01-01 RX ADMIN — LORATADINE 10 MG: 10 TABLET ORAL at 10:01

## 2019-01-01 RX ADMIN — HYDROCODONE BITARTRATE AND ACETAMINOPHEN 1 TABLET: 5; 325 TABLET ORAL at 00:59

## 2019-01-01 RX ADMIN — HYDROCODONE BITARTRATE AND ACETAMINOPHEN 1 TABLET: 5; 325 TABLET ORAL at 23:17

## 2019-01-01 RX ADMIN — SENNOSIDES 17.2 MG: 8.6 TABLET, FILM COATED ORAL at 20:12

## 2019-01-01 RX ADMIN — RANITIDINE 150 MG: 150 TABLET, FILM COATED ORAL at 10:23

## 2019-01-01 RX ADMIN — FUROSEMIDE 40 MG: 40 TABLET ORAL at 10:22

## 2019-01-01 RX ADMIN — HYDROCODONE BITARTRATE AND ACETAMINOPHEN 1 TABLET: 5; 325 TABLET ORAL at 09:24

## 2019-01-01 RX ADMIN — CYANOCOBALAMIN TAB 500 MCG 1000 MCG: 500 TAB at 10:23

## 2019-01-01 RX ADMIN — CLOPIDOGREL BISULFATE 75 MG: 75 TABLET ORAL at 10:00

## 2019-01-01 RX ADMIN — CYANOCOBALAMIN TAB 500 MCG 1000 MCG: 500 TAB at 08:21

## 2019-01-01 RX ADMIN — CLOPIDOGREL 75 MG: 75 TABLET, FILM COATED ORAL at 08:13

## 2019-01-01 RX ADMIN — PREDNISONE 5 MG: 10 TABLET ORAL at 08:17

## 2019-01-01 RX ADMIN — LORATADINE 10 MG: 10 TABLET ORAL at 08:18

## 2019-01-01 RX ADMIN — HALOPERIDOL 1 MG: 2 SOLUTION ORAL at 22:47

## 2019-01-01 RX ADMIN — TEMAZEPAM 15 MG: 15 CAPSULE ORAL at 20:16

## 2019-01-01 RX ADMIN — RANITIDINE 150 MG: 150 TABLET, FILM COATED ORAL at 09:35

## 2019-01-01 RX ADMIN — HYDROCODONE BITARTRATE AND ACETAMINOPHEN 1 TABLET: 5; 325 TABLET ORAL at 15:45

## 2019-01-17 NOTE — [facility] IDG CHAPLAIN NOTES
Patient: Bryan Sol    Date: 01/17/19  Time: 11:22 AM    [facility]  Notes    Patient comes to [facility] for [address]. Spiritual care/support will be available and provided to patient/family as needed, requested, or referred.         Signed by: Roney Hernandez

## 2019-01-17 NOTE — [facility] IDG VOLUNTEER NOTES
[address] Review     Status Codes I = Initiated C=Continued R=Revised RS = Resolved     I.  Volunteer     Goal: Hospice house volunteer (s) enhances the quality of remaining life while patient is at the hospice house. Interventions: Bharath Palma Volunteer (s) will provide companionship to the patient and/or family by visiting at the hospice house       . Bharath Palma Volunteer (s) will provide respite as needed when requested by patient and/or family. Bharath Del Valle  Volunteer will provide activities such as music, reading, pet therapy, etc. as requested. Bharath Del Valle  Comfort bag delivered. Any other special requests or information regarding volunteer services:     No further needs identified at this time. These notes have been discussed in [facility] meeting.       Signed by: Tyler Alexander

## 2019-01-17 NOTE — PROGRESS NOTES
Problem: Falls - Risk of  Goal: *Absence of Falls  Document Vanda Fall Risk and appropriate interventions in the flowsheet.   Outcome: Progressing Towards Goal  Fall Risk Interventions:  Mobility Interventions: Bed/chair exit alarm, Patient to call before getting OOB, Utilize walker, cane, or other assistive device         Medication Interventions: Bed/chair exit alarm, Patient to call before getting OOB, Teach patient to arise slowly    Elimination Interventions: Bed/chair exit alarm, Call light in reach, Patient to call for help with toileting needs, Toileting schedule/hourly rounds

## 2019-01-17 NOTE — PROGRESS NOTES
Report received. Pt round. Pt identified by name. In bed with eyes closed. No s/s of pain, agitation or dyspnea. Bed low and SR up with tab alerts on.    1456 Norco x 1 for pain in feet. 1530 Pt states pain is better. 1748 Pt has required no further PRN medications. Has eaten most of each meal today. Pt is alert and oriented and denies needs at this time.      1847 Report given to oncoming RN

## 2019-01-17 NOTE — PROGRESS NOTES
Problem: Pressure Injury - Risk of  Goal: *Prevention of pressure injury  Document Ceasar Scale and appropriate interventions in the flowsheet.   Outcome: Progressing Towards Goal  Pressure Injury Interventions:  Sensory Interventions: Assess changes in LOC, Check visual cues for pain, Float heels, Keep linens dry and wrinkle-free, Minimize linen layers, Maintain/enhance activity level, Pad between skin to skin, Pressure redistribution bed/mattress (bed type)    Moisture Interventions: Absorbent underpads, Apply protective barrier, creams and emollients, Check for incontinence Q2 hours and as needed, Maintain skin hydration (lotion/cream), Minimize layers, Moisture barrier    Activity Interventions: Pressure redistribution bed/mattress(bed type), Increase time out of bed    Mobility Interventions: Float heels, HOB 30 degrees or less, Chair cushion, Pressure redistribution bed/mattress (bed type)    Nutrition Interventions: Document food/fluid/supplement intake, Offer support with meals,snacks and hydration    Friction and Shear Interventions: Apply protective barrier, creams and emollients, HOB 30 degrees or less, Lift sheet, Minimize layers

## 2019-01-17 NOTE — PROGRESS NOTES
DAVID went to the pt's room to visit with her. She was sleeping so I did not disturb her. I will return later.

## 2019-01-17 NOTE — PROGRESS NOTES
Pt arrived to unit via EMS w/o family. Pt alert, oriented x4. Pt pleasant and knowledgeable of situation and surroundings. No s/s of distress noted during transport. Pt wearing 3L/02 via NC. Patient arrived with a bag of home medications. 2100:  Home medications reconciled and stored per facility policy. Pt tolerated po fluids, fluids and meds. Pt thankful. Admission assessment completed. Home nebulizer at bedside. Garments hung up in personal closet. Door open for continuous monitoring. 2130:PRN norco given per patient requests for foot pain. 2250: Medication provided relief of pain. Pt requesting nasal cannula be checked. No abnormalities found with nasal cannula or oxygen. 2310: RN at bedside to check nasal cannula for patient. Pt requesting repositioning. 0100: Pt requesting RN at bedside. Pt up to bathroom x1 assist. Pt tolerated movement fairly well. Pt repositioned in bed.     0424: Pt had c/o shortness of breath. PRN given for dyspnea. Pt wearing oxygen via NC. Pt with concerns of oxygen. RN reassured patient that oxygen was effective and working.
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
## 2019-01-18 NOTE — PROGRESS NOTES
Problem: Falls - Risk of  Goal: *Absence of Falls  Document Vanda Fall Risk and appropriate interventions in the flowsheet. Outcome: Progressing Towards Goal  Fall Risk Interventions:  Mobility Interventions: Bed/chair exit alarm, Patient to call before getting OOB, Utilize walker, cane, or other assistive device         Medication Interventions: Bed/chair exit alarm, Patient to call before getting OOB, Teach patient to arise slowly    Elimination Interventions: Bed/chair exit alarm, Call light in reach, Patient to call for help with toileting needs, Toileting schedule/hourly rounds             Problem: Pressure Injury - Risk of  Goal: *Prevention of pressure injury  Document Ceasar Scale and appropriate interventions in the flowsheet.   Outcome: Progressing Towards Goal  Pressure Injury Interventions:  Sensory Interventions: Assess changes in LOC, Check visual cues for pain, Float heels, Keep linens dry and wrinkle-free, Minimize linen layers, Maintain/enhance activity level, Pad between skin to skin, Pressure redistribution bed/mattress (bed type)    Moisture Interventions: Absorbent underpads, Apply protective barrier, creams and emollients, Check for incontinence Q2 hours and as needed, Maintain skin hydration (lotion/cream), Minimize layers, Moisture barrier    Activity Interventions: Pressure redistribution bed/mattress(bed type), Increase time out of bed    Mobility Interventions: Float heels, HOB 30 degrees or less, Chair cushion, Pressure redistribution bed/mattress (bed type)    Nutrition Interventions: Document food/fluid/supplement intake, Offer support with meals,snacks and hydration    Friction and Shear Interventions: Apply protective barrier, creams and emollients, HOB 30 degrees or less, Lift sheet, Minimize layers

## 2019-01-18 NOTE — PROGRESS NOTES
Pt is alert, oriented, and able to make needs known, with intermittent mild confusion. Pt enjoys company of staff and volunteers, and visits pleasantly. Pt denies pain, nausea, or any other needs at present. Pt able to transfer with assist x 1 to [facility] and is continent of bowel and bladder. Pt has large formed brown BM, and voids without difficulty. Pt sits up in bed, watching television for most of shift. Able to feed self with setup, and appetite is good. Family calls, with security code, to check on pt, and report is given. Pt told of family call, and spirits good. Denies further needs at present. Safety measures in place.

## 2019-01-18 NOTE — [facility] IDG SOCIAL WORKER NOTES
Patient: Desiree Butler    Date: 01/18/19  Time: 10:48 AM    [facility]  Notes  Pt is here for a 5 day respite stay. Pt is set to discharge home on 1-21-19. LMSW will coordinate care with the in home team.  LMSW the care plan from the in home has been reviewed.            Signed by: Grace Watters

## 2019-01-19 PROBLEM — I51.89 LEFT VENTRICULAR SYSTOLIC DYSFUNCTION, NYHA CLASS 3: Status: ACTIVE | Noted: 2019-01-01

## 2019-01-19 NOTE — PROGRESS NOTES
Report received. Pt round. Pt identified by name. In bed with eyes open. Pt is alert and oriented x 3. Denies pain at this time. No s/s of pain, agitation or dyspnea. Bed low and SR up with tab alerts on.     0845 Pt ate 95% of breakfast.     0927 [place] PO for bilateral foot pain. Unable to rate numerically. \"It hurts pretty bad. \"    [address] for lissy foot pain. 1522 Pt requesting salve on feet. Barrier cream applied. [facility] [address] for pain in feet. 1842 Report given to oncoming RN.

## 2019-01-19 NOTE — PROGRESS NOTES
Pt I'd by name and . Pt A&O x3. Pt calm. No distress. No facial grimace. Flacc =0-1. Resp non labored on 3L n/c. Lungs diminished. BS diminished. HR irreg. No edema noted at this time. Pt up to [facility] with assist.   SR up x2. Bed low/locked. Call light with in reach. Door opened. Tab alerts on.

## 2019-01-20 NOTE — PROGRESS NOTES
Problem: Falls - Risk of  Goal: *Absence of Falls  Document Vnada Fall Risk and appropriate interventions in the flowsheet. Outcome: Progressing Towards Goal  Fall Risk Interventions:  Mobility Interventions: Bed/chair exit alarm, Communicate number of staff needed for ambulation/transfer, Patient to call before getting OOB    Mentation Interventions: Bed/chair exit alarm, Adequate sleep, hydration, pain control, Door open when patient unattended, Familiar objects from home, More frequent rounding, Room close to nurse's station    Medication Interventions: Patient to call before getting OOB, Bed/chair exit alarm    Elimination Interventions: Bed/chair exit alarm, Call light in reach, Patient to call for help with toileting needs             Problem: Patient Education: Go to Patient Education Activity  Goal: Patient/Family Education  Outcome: Progressing Towards Goal  Pt educate on need to call prior to attempting to get out of bed. Voices understanding. Problem: Pressure Injury - Risk of  Goal: *Prevention of pressure injury  Document Ceasar Scale and appropriate interventions in the flowsheet.   Outcome: Progressing Towards Goal  Pressure Injury Interventions:  Sensory Interventions: Assess changes in LOC, Check visual cues for pain, Discuss PT/OT consult with provider, Float heels, Keep linens dry and wrinkle-free, Minimize linen layers, Pad between skin to skin    Moisture Interventions: Absorbent underpads, Apply protective barrier, creams and emollients, Maintain skin hydration (lotion/cream), Minimize layers, Moisture barrier    Activity Interventions: Increase time out of bed, Pressure redistribution bed/mattress(bed type)    Mobility Interventions: Float heels, HOB 30 degrees or less, Pressure redistribution bed/mattress (bed type)    Nutrition Interventions: Document food/fluid/supplement intake, Offer support with meals,snacks and hydration    Friction and Shear Interventions: HOB 30 degrees or less, Lift sheet, Apply protective barrier, creams and emollients, Minimize layers

## 2019-01-20 NOTE — PROGRESS NOTES
Report received. Pt round. Pt identified by name. In bed with eyes closed. No s/s of pain, agitation or dyspnea. Bed low and SR up with tab alerts on.      0840 Pt continuing to sleep. Not awakened at this time. 1000 Awake. AM meds given. [place] PO for pain in feet. 1200 Pt was napping, but is now awake. Up to [facility] and then set up for lunch. [address] for lissy foot pain. 1649 Tylenol 650 mg PO for pain in lissy legs/feet    1748 Pt has no more Potassium in pill bottle. Inquired with family if any at home. None at home. Will notify oncoming RN to pass on in report to home RN tomorrow.      1849 Report given to oncoming RN

## 2019-01-20 NOTE — PROGRESS NOTES
Pt I'd by name and . Pt A&O x3. Pt calm. No distress. No facial grimace. Flacc =0-1. Resp non labored on 3L n/c. Lungs diminished. BS diminished. HR irreg. No edema noted at this time. Pt resting comfortably in bed watching TV. SR up x2. Bed low/locked. Call light with in reach. Door opened. Tab alerts on.

## 2019-01-21 NOTE — PROGRESS NOTES
Pt alert, oriented, able to make needs known and responds to questions appropriately. Pt has denied pain, nausea, or any other needs this shift, and visits pleasantly with volunteer this am, and staff. This nurse calls report to Tyler Mujica, and calls and reviews discharge instructions with son Kaykay Martin, prior to pt's discharge back to in home program today. DNR, meds, and discharge instructions for family received by [facility], and meds were signed for before receiving. Pt denies further needs at present, and is discharged at 1205. Encouraged to call 24/7 with needs.

## 2019-01-21 NOTE — PROGRESS NOTES
Problem: Falls - Risk of  Goal: *Absence of Falls  Document Vanda Fall Risk and appropriate interventions in the flowsheet. Outcome: Progressing Towards Goal  Fall Risk Interventions:  Mobility Interventions: Bed/chair exit alarm, Communicate number of staff needed for ambulation/transfer    Mentation Interventions: Bed/chair exit alarm, Door open when patient unattended, Evaluate medications/consider consulting pharmacy, Reorient patient, Room close to nurse's station    Medication Interventions: Bed/chair exit alarm, Evaluate medications/consider consulting pharmacy, Patient to call before getting OOB    Elimination Interventions: Bed/chair exit alarm, Call light in reach, Patient to call for help with toileting needs             Problem: Pressure Injury - Risk of  Goal: *Prevention of pressure injury  Document Ceasar Scale and appropriate interventions in the flowsheet.   Outcome: Progressing Towards Goal  Pressure Injury Interventions:  Sensory Interventions: Assess changes in LOC, Check visual cues for pain, Keep linens dry and wrinkle-free, Minimize linen layers    Moisture Interventions: Absorbent underpads, Limit adult briefs, Minimize layers, Moisture barrier, Apply protective barrier, creams and emollients    Activity Interventions: Pressure redistribution bed/mattress(bed type)    Mobility Interventions: Pressure redistribution bed/mattress (bed type)    Nutrition Interventions: Document food/fluid/supplement intake    Friction and Shear Interventions: Apply protective barrier, creams and emollients, Lift sheet, Minimize layers

## 2019-01-21 NOTE — DISCHARGE SUMMARY
Discharge Summary     Patient: Ritu Myers MRN: [id number]  SSN: [id number]    YOB: 1927  Age: 80 y.o.   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 ([facility]    Discharge Diagnoses:   Problem List as of 1/21/2019 Date Reviewed: 1/21/2019          Codes Class Noted - Resolved    Left ventricular systolic dysfunction, NYHA class 3 ICD-10-CM: I51.9  ICD-9-CM: 429.9  1/19/2019 - Present        Hospice care patient ICD-10-CM: Z51.5  ICD-9-CM: V66.7  5/23/2018 - Present        Acute urinary retention ICD-10-CM: R33.8  ICD-9-CM: 788.29  3/1/2018 - Present        Congestive heart failure (CHF) ([facility] ICD-10-CM: I50.9  ICD-9-CM: 428.0  3/1/2018 - Present        Chronic anemia ICD-10-CM: D64.9  ICD-9-CM: 285.9  2/25/2018 - Present        Edema, peripheral ICD-10-CM: R60.9  ICD-9-CM: 782.3  2/24/2018 - Present        Acute on chronic diastolic CHF (congestive heart failure), NYHA class 3 (HCC) ICD-10-CM: I50.33  ICD-9-CM: 428.33, 428.0  2/24/2018 - Present        HCAP (healthcare-associated pneumonia) ICD-10-CM: J18.9  ICD-9-CM: 486  2/18/2018 - Present        Acute metabolic encephalopathy VZG-67-YV: G93.41  ICD-9-CM: 348.31  2/15/2018 - Present        Pneumonia ICD-10-CM: J18.9  ICD-9-CM: 699  2/13/2018 - Present        Pleural effusion ICD-10-CM: J90  ICD-9-CM: 511.9  2/13/2018 - Present        Hypercapnia ICD-10-CM: R06.89  ICD-9-CM: 786.09  2/13/2018 - Present        UTI (urinary tract infection) ICD-10-CM: N39.0  ICD-9-CM: 599.0  2/7/2018 - Present        Hypotension ICD-10-CM: I95.9  ICD-9-CM: 458.9  2/6/2018 - Present        Chronic respiratory failure with hypoxia (HCC) ICD-10-CM: J96.11  ICD-9-CM: 518.83, 799.02  1/25/2018 - Present Gram-positive bacteremia ICD-10-CM: R78.81  ICD-9-CM: 790.7  12/25/2017 - Present        Sepsis ([address] ICD-10-CM: A41.9  ICD-9-CM: 038.9, 995.91  12/23/2017 - Present        Acute respiratory failure with hypoxia [facility]) ICD-10-CM: J96.01  ICD-9-CM: 518.81  12/23/2017 - Present        Fever ICD-10-CM: R50.9  ICD-9-CM: 780.60  12/23/2017 - Present        Elevated troponin ICD-10-CM: R74.8  ICD-9-CM: 790.6  12/23/2017 - Present        CKD (chronic kidney disease) stage 3, GFR 30-59 ml/min ([facility]) ICD-10-CM: N18.3  ICD-9-CM: 585.3  12/23/2017 - Present        Elevated lactic acid level ICD-10-CM: R79.89  ICD-9-CM: 276.2  12/23/2017 - Present        Hypernatremia ICD-10-CM: E87.0  ICD-9-CM: 276.0  12/23/2017 - Present        Macrocytic anemia ICD-10-CM: D53.9  ICD-9-CM: 281.9  12/23/2017 - Present        Pain of right lower extremity ICD-10-CM: M79.604  ICD-9-CM: 729.5  12/23/2017 - Present        Osteoarthritis (Chronic) ICD-10-CM: M19.90  ICD-9-CM: 715.90  7/20/2017 - Present        Pre-diabetes ICD-10-CM: R73.03  ICD-9-CM: 790.29  7/20/2017 - Present        Gout ICD-10-CM: M10.9  ICD-9-CM: 274.9  10/6/2016 - Present        Diastolic CHF, chronic (HCC) (Chronic) ICD-10-CM: I50.32  ICD-9-CM: 428.32, 428.0  5/17/2016 - Present        * (Principal) Pulmonary hypertension (HCC) ICD-10-CM: I27.20  ICD-9-CM: 416.8  5/17/2016 - Present        Essential hypertension, benign (Chronic) ICD-10-CM: I10  ICD-9-CM: 401.1  3/17/2015 - Present        Acute kidney failure, unspecified ([facility] ICD-10-CM: N17.9  ICD-9-CM: 584. 9  Unknown - Present        Reflux esophagitis ICD-10-CM: K21.0  ICD-9-CM: 530.11  Unknown - Present        Coronary atherosclerosis of native coronary artery ICD-10-CM: I25.10  ICD-9-CM: 414.01  Unknown - Present        Hypopotassemia ICD-10-CM: E87.6  ICD-9-CM: 276.8  Unknown - Present        Mixed hyperlipidemia ICD-10-CM: E78.2  ICD-9-CM: 272.2  Unknown - Present        Cellulitis and abscess of other specified site ICD-10-CM: L03.818, L02.818  ICD-9-CM: 682.8  Unknown - Present        SOB (shortness of breath) ICD-10-CM: R06.02  ICD-9-CM: 786.05  Unknown - Present        RESOLVED: CHF (congestive heart failure) (HCC) ICD-10-CM: I50.9  ICD-9-CM: 428.0  4/1/2018 - 4/2/2018        RESOLVED: CHF exacerbation ([facility] ICD-10-CM: I50.9  ICD-9-CM: 428.0  3/30/2018 - 4/2/2018        RESOLVED: NSTEMI (non-ST elevated myocardial infarction) ([facility] ICD-10-CM: I21.4  ICD-9-CM: 410.70  12/30/2017 - 1/25/2018        RESOLVED: Demand ischemia ([facility] ICD-10-CM: I24.8  ICD-9-CM: 411.89  12/23/2017 - 1/25/2018        RESOLVED: Systolic CHF, chronic ([facility] ICD-10-CM: I50.22  ICD-9-CM: 428.22, 428.0  4/21/2016 - 5/17/2016               Discharge Condition: Stable    Code Status:DNR    Hospital Course: Discharge from respite stay at [facility] to home with [facility]. DC 1/21/19 at 11:30 AM via ambulance. Status at time of discharge is routine. Consults: None    Significant Diagnostic Studies: None    Disposition: home    Discharge Medications:   Current Discharge Medication List      START taking these medications    Details   cyanocobalamin 2,500 mcg tab tablet Take 1 Tab by mouth daily. Patient own supply. Qty: 1 Tab, Refills: 0      miconazole (MICOTIN) 2 % topical cream Apply  to affected area daily. Patient has adequate supply. Qty: 15 g, Refills: 0      haloperidol (HALDOL) 2 mg/mL oral concentrate Take 0.5 mL by mouth every four (4) hours as needed. Patient has adequate supply of liquid and did not present with tablets. Qty: 30 mL, Refills: 0         CONTINUE these medications which have NOT CHANGED    Details   predniSONE (DELTASONE) 5 mg tablet Take 5 mg by mouth daily. HYDROcodone-acetaminophen (NORCO) 5-325 mg per tablet Take 1 Tab by mouth every four (4) hours as needed for Pain (or SOB). !! gabapentin (NEURONTIN) 300 mg capsule Take 300 mg by mouth two (2) times a day.  Indications: NEUROPATHIC PAIN      !! gabapentin (NEURONTIN) 300 mg capsule Take 600 mg by mouth nightly. Indications: NEUROPATHIC PAIN      polyvinyl alcohol (LIQUIFILM TEARS) 1.4 % ophthalmic solution Administer 1 Drop to both eyes as needed (dry eyes). Indications: Dry Eye      sennosides 8.6 mg cap Take 2 Tabs by mouth two (2) times a day. Indications: constipation      potassium chloride (KLOR-CON) 10 mEq tablet Take 10 mEq by mouth daily. pt found to be taking    Indications: hypokalemia prevention      acetaminophen (TYLENOL) 325 mg tablet Take 650 mg by mouth every six (6) hours as needed for Pain or Fever. albuterol (ACCUNEB) 1.25 mg/3 mL nebu Take 2.5 mg by inhalation every four (4) hours as needed (sob, wheezing). allopurinol (ZYLOPRIM) 100 mg tablet Take 100 mg by mouth two (2) times a day. amLODIPine (NORVASC) 5 mg tablet Take 5 mg by mouth daily. clopidogrel (PLAVIX) 75 mg tab Take 75 mg by mouth daily. CYANOCOBALAMIN, VITAMIN B-12, Take 1,000 mcg by mouth daily. furosemide (LASIX) 40 mg tablet Take 40 mg by mouth daily. loratadine (CLARITIN) 10 mg tablet Take 10 mg by mouth daily. raNITIdine (ZANTAC) 150 mg tablet Take 150 mg by mouth two (2) times a day. albuterol (PROVENTIL HFA, VENTOLIN HFA, PROAIR HFA) 90 mcg/actuation inhaler Take 1 Puff by inhalation every four (4) hours as needed for Wheezing or Shortness of Breath. OXYGEN-AIR DELIVERY SYSTEMS Take 3 L by inhalation continuous. via NC      increased to 3 L  Indications: DYSPNEA       ! ! - Potential duplicate medications found. Please discuss with provider. STOP taking these medications       haloperidol (HALDOL) 1 mg tablet Comments:   Reason for Stopping:               Activity: Activity as tolerated WITH ASSIST  Diet: Regular Diet  Wound Care: As directed  1. Wound Care:  Location: Left heel blister  Apply barrier cream to affected area BID and PRN if soiled. Elevate heels.     2. Wound Care:  Location: Jackie-area  Apply antifungal cream to affected area BID and PRN if soiled. Oxygen: 3 L/min via NC PRN respiratory distress. Equipment: As previously determined by Liss Joe. Meds: Changed haldol tablets to liquid, added antifungal cream, and changed Vitamin B12 from 1,000 mcg to 2,500 mcg. Follow-up Appointments   Procedures    FOLLOW UP VISIT Appointment in: Other (Specify) [facility]     [facility]     Standing Status:   Standing     Number of Occurrences:   1     Order Specific Question:   Appointment in     Answer:    Other (Specify)       Signed By: Kimmie Pro NP     January 21, 2019

## 2019-01-21 NOTE — PROGRESS NOTES
Pt I'd by name and . Pt A&O x3.  Pt c/o foot pain 5/10. Lortab 5/325 x 1 tab given po. Resp non labored on 3L n/c. Lungs diminished. BS diminished. HR irreg. No edema noted at this time. Pt with staff beginning personal care. SR up x2. Bed low/locked. Call light with in reach. Door opened. Tab alerts on.

## 2019-04-03 NOTE — PROGRESS NOTES
Problem: Falls - Risk of  Goal: *Absence of Falls  Description  Document Chino Ramirez Fall Risk and appropriate interventions in the flowsheet. Outcome: Progressing Towards Goal  Note:   Fall Risk Interventions:  Mobility Interventions: Bed/chair exit alarm, Communicate number of staff needed for ambulation/transfer, Patient to call before getting OOB, Utilize walker, cane, or other assistive device    Mentation Interventions: Adequate sleep, hydration, pain control, Bed/chair exit alarm, Door open when patient unattended, Evaluate medications/consider consulting pharmacy, More frequent rounding, Reorient patient, Toileting rounds, Update white board    Medication Interventions: Bed/chair exit alarm, Evaluate medications/consider consulting pharmacy, Patient to call before getting OOB, Teach patient to arise slowly    Elimination Interventions: Bed/chair exit alarm, Call light in reach, Patient to call for help with toileting needs              Problem: Risk for Falls  Goal: Free of falls during episode of care  Description  Patient will be free of falls during episode of care. Outcome: Progressing Towards Goal  Note:   Instructed patient on use of the call light, calling for assistance before getting out of bed. Problem: Pain  Goal: Verbalize satisfaction of level of comfort and symptom control  Outcome: Progressing Towards Goal  Note:   Patient uses norco for knee pain.

## 2019-04-03 NOTE — PROGRESS NOTES
1430 Patient arrived at [facility] room 113 via EMS transport, admitted here for 5 day respite stay as her son who is primary caregiver fell and broke some ribs. Report received from home hospice RN Irvin Leiva prior to patient's arrival.  No family accompanied patient to [facility] today. She is awake and alert, has occasional pleasant confusion/forgetfulness. RN oriented her to her new location, instructed her on use of the call light and calling for assistance to get to the bathroom. Patient wears pullup brief but has only occasional incontinence. Skin is intact. Patient is able to feed self, takes pills whole and eats quite well. Takes norco for knee pain. Wears oxygen at 3 liters continuously. 1800 Patient assisted to the bathroom twice this afternoon to empty bladder.   She has a little difficulty standing from sitting position due to bad knees, but once standing her gait is fairly steady with her walker and standby assist.

## 2019-04-04 NOTE — HSPC IDG CHAPLAIN NOTES
Patient: Antwon Lenz    Date: 04/04/19  Time: 3:00 PM    [facility]  Notes    Patient comes to [facility] for [address]. Janeth Moon is continuing care provided as assigned  in the [address] [place] [address] program.  Additional spiritual care/support will be available and provided to patient/family as needed, requested, or referred.         Signed by: Anibal Neal

## 2019-04-04 NOTE — PROGRESS NOTES
In home  visited patient in [facility] for respite care. See Remote Client for more detail.

## 2019-04-04 NOTE — PROGRESS NOTES
Received report from off-going nurse. Pt received bedbath and tolerated it well. She is alert to person and able to make needs known to staff. Safety measures in place, room close to nursing station. No c/o pain or respiratory distress. No distress present.

## 2019-04-04 NOTE — PROGRESS NOTES
Report received. Patient awake in bed resting quietly. No s/sx of pain or distress at this time. Bed low and locked, tab alert on, sr up and call light within reach. Door left open for continuous monitoring. Summary:    Patient c/o feet and knee pain. On call light numerous times for bathroom, pain, etc. Says she doesn't sleep well sometimes, and she thinks she is not sleeping well tonight because she is in a strange place. Requires assist x 2 to bathroom. Has a skin tear on L elbow that she says happened at home. Skin tear cleansed with wound cleanser and covered with allevyn. Tab alter remains in place, door open.

## 2019-04-04 NOTE — PROGRESS NOTES
Report received from off-going nurse, visual identification made, assumed care of pt. Pt resting quietly with eyes closed, no agitation or restlessness, no grimacing or groaning. Pt respirations unlabored. Tab alert in place, rails up x 2, bed in lowest position, safety maintained. FLACC 0.  2116 assisted pt up to toilet with use of walker and one assist. Pt able to use call bell to let needs known. Pt voided, assisted back to bed.   1100 pt resting quietly   1422 pt states she has pain in her feet especially left and toes. Administered norco po for pain. 1800 pt up frequently to bedside commode, using call light to call for small adjustments to bed and surroundings. Pt received on dose of norco and sates she received good relief.  Pt ate well for dinner but not for lunch and breakfast.

## 2019-04-05 NOTE — PROGRESS NOTES
Report received from off-going nurse,walking rounds completed. visual identification made, assumed care of pt. Pt resting quietly with eyes closed, no agitation or restlessness, no grimacing or groaning. Pt respirations unlabored. Tab alert in place, rails up x 2, bed in lowest position, safety maintained. FLACC 0.  0935 pt asleep at this time, will hold medications until she's awake  1022 pt awake at this time, administered oral medications, pt swallowed without difficulty  1110 administered bisacodyl pr for no bowel movement for greater than 2 days. [phone] assisted pt up to bedside commode, she voided a large amount of clear yellow urine. Pt transferred with use of walker and two assist.   1400 assisted pt up to bedside commode, pt was able to transfer with walker and two assist. Pt voided but no bowel movement. 1439 administered norco for pain in toes  1500 Follow up on prn medication, pt resting in bed with eyes closed, FLACC =0, no s/sx of dyspnea, agitation or n/v. Will continue to monitor. 1800 pt was more drowsy today, and slept until 1000 and then from 1500 until now. Pt was medicated x 1 for pain and appeared to receive good relief. Pt was given suppository without results.

## 2019-04-05 NOTE — PROGRESS NOTES
Pt I'd by name and . Pt calm. No distress. No facial grimace. C/O bilateral feet pain 4 . Tylenol 650 mg po given. Resp non labored on 3L n/c. Lungs diminished. BS diminished. HR reg. No edema noted at this time. Pt continent of B&B at this time. SR up x2. Bed low/locked. Call light with in reach. Door opened. Tab alerts on.

## 2019-04-05 NOTE — HSPC IDG SOCIAL WORKER NOTES
Patient: Licha Espinosa    Date: 04/05/19  Time: 10:26 AM    [facility]  Notes  Pt is here for a respite stay. LMSW was advised that the caregiver is in ICU currently. LMSW will continue to coordinate care with the in home program to come up with a discharge plan.         Signed by: Alisa Khalil

## 2019-04-05 NOTE — PROGRESS NOTES
Walking rounds completed with off going RN. Pt resting quietly with eyes closed. Respirations non labored. Bed in low and locked position with side rails up x2 and call light in reach. Door left open as pt is unaccompanied.

## 2019-04-05 NOTE — HSPC IDG VOLUNTEER NOTES
[address] Review     Status Codes I = Initiated C=Continued R=Revised RS = Resolved     I.  Volunteer     Goal: Hospice house volunteer (s) enhances the quality of remaining life while patient is at the hospice house. Interventions: Tara Mast Volunteer (s) will provide companionship to the patient and/or family by visiting at the hospice house       . Tara Mast Volunteer (s) will provide respite as needed when requested by patient and/or family. Tara Mazariegos  Volunteer will provide activities such as music, reading, pet therapy, etc. as requested. Tara Kos  Comfort bag delivered. Any other special requests or information regarding volunteer services:    Several visits for companionship are recorded. Volunteers will continue to visit per pt and staff request.  No further needs identified at this time. These notes have been discussed in [facility] meeting.           Signed by: Annie Olivier

## 2019-04-05 NOTE — HSPC IDG NURSE NOTES
Patient: Haven Tavarez    Date: 04/05/19  Time: 12:17 PM    [facility] Nurse Notes  81 yo female patient who [facility] day Respite stay. Hospice diagnosis is pulmonary hypertension. Pt will be discharged on 4/8/2019. Pt has not been sleeping at night and Haldol 2mg has been added q HS PRN for sleep. Left elbow skin tear is present. PT is up with 2 person assist to BR. Restoril 15 mg being added q hs prn for sleep this IDG. Comprehensive plan of care reviewed. POC from home has been reviewed and modified as necessary to meet patients needs. IDG and pt./family in agreement with plan of care. The IDG identifies through on-going assessment when a change is needed to the POC; the pt/family will receive care and services necessitated by changes in POC. Medications reviewed by the pharmacist and Medical Director.           Signed by: Bree Barrera RN

## 2019-04-05 NOTE — [facility] IDG MASTER NOTE
Hospice Interdisciplinary Group Collaborative  Date: 04/05/19  Time: 3:42 PM    ___________________    Patient: Ginna Sake    ___________________    Diagnoses: There were no encounter diagnoses.     Current Medications:    Current Facility-Administered Medications:     haloperidol (HALDOL) 2 mg/mL oral solution 1 mg, 1 mg, Oral, Q4H PRN, Margaret Odor, NP    temazepam (RESTORIL) capsule 15 mg, 15 mg, Oral, QHS PRN, Margaret Odor, NP    acetaminophen (TYLENOL) tablet 650 mg, 650 mg, Oral, Q6H PRN, Margaret Odor, NP    albuterol (PROVENTIL HFA, VENTOLIN HFA, PROAIR HFA) inhaler 1 Puff, 1 Puff, Inhalation, Q4H PRN, Margaret Odor, NP    allopurinol (ZYLOPRIM) tablet 100 mg (Patient Supplied), 100 mg, Oral, BID, Margaret Odor, NP, 100 mg at 04/05/19 1022    amLODIPine (NORVASC) tablet 5 mg (Patient Supplied), 5 mg, Oral, DAILY, Margaret Odor, NP, 5 mg at 04/05/19 1021    clopidogrel (PLAVIX) tablet 75 mg (Patient Supplied), 75 mg, Oral, DAILY, Margaret Odor, NP, 75 mg at 04/05/19 1022    cyanocobalamin (VITAMIN B12) tablet 1,000 mcg (Patient Supplied), 1,000 mcg, Oral, DAILY, Margaret Odor, NP, 1,000 mcg at 04/05/19 1023    furosemide (LASIX) tablet 40 mg, 40 mg, Oral, DAILY, Margaret Odor, NP, 40 mg at 04/05/19 1021    gabapentin (NEURONTIN) capsule 900 mg, 900 mg, Oral, QHS, Margaret Odor, NP, 900 mg at 04/04/19 2200    HYDROcodone-acetaminophen (NORCO) 5-325 mg per tablet 1 Tab, 1 Tab, Oral, Q4H PRN, Margaret Odor, NP, 1 Tab at 04/05/19 1438    HYDROcodone-acetaminophen (NORCO) 5-325 mg per tablet 2 Tab, 2 Tab, Oral, QHS, Margaret Odor, NP, 2 Tab at 04/04/19 2200    loratadine (CLARITIN) tablet 10 mg, 10 mg, Oral, DAILY, Margaret Odor, NP, 10 mg at 04/05/19 1020    polyvinyl alcohol (LIQUIFILM TEARS) 1.4 % ophthalmic solution 1 Drop, 1 Drop, Both Eyes, PRN, Margaret Odor, NP    potassium chloride SA (MICRO-K) capsule 10 mEq, 10 mEq, Oral, DAILY, Margaret Odor, NP, 10 mEq at 04/05/19 1020    predniSONE (DELTASONE) tablet 5 mg, 5 mg, Oral, DAILY, Sorin Dense, NP, 5 mg at 04/05/19 1020    senna (SENOKOT) tablet 17.2 mg, 2 Tab, Oral, BID, Sorin Dense, NP, 17.2 mg at 04/05/19 1019    famotidine (PEPCID) tablet 20 mg, 20 mg, Oral, BID, Sorin Dense, NP, 20 mg at 04/05/19 1021    miconazole (MICOTIN) 2 % cream, , Topical, DAILY, Marzolf, Marcelina Sami, NP    albuterol (PROVENTIL VENTOLIN) nebulizer solution 2.5 mg, 2.5 mg, Nebulization, Q4H PRN, Sorin Dense, NP    gabapentin (NEURONTIN) capsule 600 mg, 600 mg, Oral, BID, Sorin Dense, NP, 600 mg at 04/05/19 1434    bisacodyl (DULCOLAX) suppository 10 mg, 10 mg, Rectal, PRN, Sorin Dense, NP, 10 mg at 04/05/19 1108    lactulose (CHRONULAC) solution 20 g, 20 g, Oral, BID PRN, Sorin Dense, NP    Orders:  Orders Placed This Encounter    IP CONSULT TO SPIRITUAL CARE     Standing Status:   Standing     Number of Occurrences:   1     Order Specific Question:   Reason for Consult: Answer: Once on week one, then PRN. For [facility] Patients Only. For contracted patients, their primary hospice will continue to manage spiritual care needs.  DIET REGULAR     Standing Status:   Standing     Number of Occurrences:   1    VITAL SIGNS     Standing Status:   Standing     Number of Occurrences:   1    VITAL SIGNS     Standing Status:   Standing     Number of Occurrences:   1    NURSING-MISCELLANEOUS: Comfort Care Measures CONTINUOUS     Standing Status:   Standing     Number of Occurrences:   1     Order Specific Question:   Description of Order:     Answer:   Comfort Care Measures    GARCIA CATHETER, CARE     1. Garcia Catheter care every shift and PRN  2. Notify Physician of Gacria Catheter leakage, occlusion, gross adherent sediment or accidental removal  3. Change Garcia 30 days after insertion. 4. May flush catheter bid and prn leakage or gross adherent sediment or mucus.      Standing Status:   Standing Number of Occurrences:   1    BLADDER CHECKS     May scan bladder PRN for urinary retention and or patient discomfort     Standing Status:   Standing     Number of Occurrences:   1    NURSING ASSESSMENT:  SPECIFY Assess for GIP, routine, or respite level of care. Q SHIFT Routine     Standing Status:   Standing     Number of Occurrences:   1     Order Specific Question:   Please describe the test or procedure you would like to order. Answer:   Assess for GIP, routine, or respite level of care.  PAIN ASSESSMENT Pain and Symptoms: Assess ever 4 hours and PRN, for GIP level of care. PRN Routine     Standing Status:   Standing     Number of Occurrences:   1     Order Specific Question:   Please describe the test or procedure you would like to order. Answer:   Pain and Symptoms: Assess ever 4 hours and PRN, for GIP level of care.  NURSING-MISCELLANEOUS: admit 4/3: [facility] Olesya RN) Hospice diagnosis pulmonary hypertension. Admit for respite stay 4/3 and DC 4/8 at 1100 via EMS. Hospice Diagnosis: Hospice terminal diagnosis: Pulmonary hypertension ([address] (I27.20) Other Hospice d... 4/3: [facility] Olesya RN) Hospice diagnosis pulmonary hypertension. Admit for respite stay 4/3 and DC 4/8 at 1100 via EMS.   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)     Acute on chronic diastolic heart failure (HCC) (I50.33)     CKD (chronic kidney disease) stage 3, GFR 30-59 ml/min ([facility]) (N18.3)     Influenza, pneumonia (J11.00)     Delirium (R41.0)     Sepsis secondary to UTI ([facility]) (A41.9, N39.0)     Macrocytic anemia (D53.9)     Peripheral polyneuropathy (G62.9)     Weight loss (R63.4)     Benefit Period: Benefit Period 6  Start Date: 4/2/2019  End Date: 1/35/8737     I certify Dayan Houser has a prognosis for a life expectancy of 6 months or less if the terminal illness runs its normal course.     As evidenced bya 80year old woman with precipitous functional decline prior to hospice admission secondary to multiple problems amongst which pulmonary HTN has been the most prominently adverse. Patient had been living independently in December 2017; she is now dependent on others for the majority of her activities of daily living. Her last  echocardiogram demonstrated LV diastolic dysfunction, tricuspid regurgitation, and pulmonary HTN estimated at 60 mmHg. During this recertification period she has become increasingly agitated and is progressively weaker. She needs assistance with most adl care.  She has had increased pain with adjustment in opiods for relief. She has increased dyspnea at rest. Macrocytic anemia, severe symptom of osteoarthritis, and HTN are other chronic diagnoses unrelated to pulmonary HTN and functional decline which none the less adversely affect her prognosis.  This narrative is completed with the most recent face to face evaluation by HERIBERTO Fry on 3/11/2019. Her family remains on board with hospice philosophy.        Standing Status:   Standing     Number of Occurrences:   1     Order Specific Question:   Description of Order:     Answer:   admit    ACTIVITY AS TOLERATED W/ASSIST     May use walker from home     Standing Status:   Standing     Number of Occurrences:   1    WOUND CARE, DRESSING CHANGE     Wound Care:  Location: left elbow  Skin Tear: Cleanse skin with wound cleanser. Reposition loose skin flap with a sterile Q-Tip then secure with Steri-Strip prn. Cover with foam dressing and change every 3 days. Assess every shift and PRN. Standing Status:   Standing     Number of Occurrences:   1    NURSING-MISCELLANEOUS: Change to routine level of care on Monday 4/8 due to caregiver illness.  Discharge date to be determined. CONTINUOUS     Standing Status:   Standing     Number of Occurrences:   1     Order Specific Question:   Description of Order:     Answer:   Change to routine level of care on Monday 4/8 due to caregiver illness. Discharge date to be determined.  DO NOT RESUSCITATE     Standing Status:   Standing     Number of Occurrences:   1    OXYGEN CANNULA Liters per minute: 3; Indications for O2 therapy: RESPIRATORY DISTRESS PRN Routine     Standing Status:   Standing     Number of Occurrences:   1     Order Specific Question:   Liters per minute: Answer:   3     Order Specific Question:   Indications for O2 therapy     Answer:   RESPIRATORY DISTRESS    acetaminophen (TYLENOL) tablet 650 mg     OP SIG:Take 650 mg by mouth every six (6) hours as needed for Pain or Fever.  albuterol (PROVENTIL HFA, VENTOLIN HFA, PROAIR HFA) inhaler 1 Puff     OP SIG:Take 1 Puff by inhalation every four (4) hours as needed for Wheezing or Shortness of Breath. Order Specific Question:   MODE OF DELIVERY     Answer:   Spacer    allopurinol (ZYLOPRIM) tablet 100 mg (Patient Supplied)     OP SIG:Take 100 mg by mouth two (2) times a day.  amLODIPine (NORVASC) tablet 5 mg (Patient Supplied)     OP SIG:Take 5 mg by mouth daily.  clopidogrel (PLAVIX) tablet 75 mg (Patient Supplied)     OP SIG:Take 75 mg by mouth daily.  cyanocobalamin (VITAMIN B12) tablet 1,000 mcg (Patient Supplied)     OP SIG:Take 1,000 mcg by mouth daily.  furosemide (LASIX) tablet 40 mg     OP SIG:Take 40 mg by mouth daily.  DISCONTD: gabapentin (NEURONTIN) capsule 600 mg     OP SIG:Take 600 mg by mouth two (2) times a day. take 2 tabs=600 mg  AM and afternoon      gabapentin (NEURONTIN) capsule 900 mg     OP SIG:Take 900 mg by mouth nightly. take 3 tabs=900 mg at HS      DISCONTD: haloperidol (HALDOL) 2 mg/mL oral solution 1 mg     OP SIG:Take 0.5 mL by mouth every four (4) hours as needed for Other (jerking).  Patient has adequate supply of liquid and did not present with tablets     TABLETS BACKORDERED BY MANUFACTURE      HYDROcodone-acetaminophen (NORCO) 5-325 mg per tablet 1 Tab     OP SIG:Take 1 Tab by mouth every four (4) hours as needed for Pain (or SOB).  HYDROcodone-acetaminophen (NORCO) 5-325 mg per tablet 2 Tab     OP SIG:Take 2 Tabs by mouth nightly.  loratadine (CLARITIN) tablet 10 mg     OP SIG:Take 10 mg by mouth daily.  polyvinyl alcohol (LIQUIFILM TEARS) 1.4 % ophthalmic solution 1 Drop     OP SIG:Administer 1 Drop to both eyes as needed (dry eyes). Indications: Dry Eye      potassium chloride SA (MICRO-K) capsule 10 mEq    predniSONE (DELTASONE) tablet 5 mg     OP SIG:Take 5 mg by mouth daily.  senna (SENOKOT) tablet 17.2 mg    famotidine (PEPCID) tablet 20 mg     Order Specific Question:   H2RA INDICATION     Answer:   SUP Prophylaxis    miconazole (MICOTIN) 2 % cream     OP SIG:Apply  to affected area daily. Patient has adequate supply. Patient taking differently: Apply  to affected area daily. Patient has adequate supply OTC      albuterol (PROVENTIL VENTOLIN) nebulizer solution 2.5 mg     Order Specific Question:   MODE OF DELIVERY     Answer:   Nebulizer    gabapentin (NEURONTIN) capsule 600 mg     OP SIG:Take 600 mg by mouth two (2) times a day. take 2 tabs=600 mg  AM and afternoon      bisacodyl (DULCOLAX) suppository 10 mg    lactulose (CHRONULAC) solution 20 g    haloperidol (HALDOL) 2 mg/mL oral solution 1 mg     OP SIG:Take 0.5 mL by mouth every four (4) hours as needed for Other (jerking). Patient has adequate supply of liquid and did not present with tablets     TABLETS BACKORDERED BY MANUFACTURE      temazepam (RESTORIL) capsule 15 mg    INITIAL PHYSICIAN ORDER: HOSPICE Level Of Care: Respite; Reason for Admission: 4/3: [facility] pt-Nicole RN) Hospice diagnosis pulmonary hypertension. Admit for respite stay 4/3 and DC 4/8 at 1100 via EMS.      Standing Status:   Standing Number of Occurrences:   1     Order Specific Question:   Status     Answer:   Hospice     Order Specific Question:   Level Of Care     Answer:   Respite     Order Specific Question:   Reason for Admission     Answer:   4/3: [facility] pt-Nicole RN) Hospice diagnosis pulmonary hypertension. Admit for respite stay 4/3 and DC 4/8 at 1100 via EMS. Order Specific Question:   Inpatient Hospitalization Certified Necessary for the Following Reasons     Answer:   9. Other (further clarification in H&P documentation)     Order Specific Question:   Admitting Diagnosis     Answer:   Pulmonary hypertension [facility]) [[id number]]     Order Specific Question:   Terminal Prognosis Diagnosis(es)     Answer:   Pulmonary hypertension ([facility] [[id number]]     Order Specific Question:   Admitting Physician     Answer:   Genita Stade     Order Specific Question:   Attending Physician     Answer:   Genita Stade     Order Specific Question:   Discharge Plan:     Answer: Other (Specify)    IP CONSULT TO SOCIAL WORK     Standing Status:   Standing     Number of Occurrences:   1     Order Specific Question:   Reason for Consult: Answer: For [facility] Patients Only. For contracted patients, their primary hospice will continue to manage social work needs. Allergies: Allergies   Allergen Reactions    Bactrim [Sulfamethoxazole-Trimethoprim] Other (comments)     Caused delirum and hypermania.  Bee Pollen Swelling    Fire Ant Itching and Swelling       Care Plan:  Multidisciplinary Problems (Active)     Problem: Falls - Risk of     Dates: Start: 04/03/19       Description:     Disciplines: Interdisciplinary    Goal: *Absence of Falls     Dates: Start: 04/03/19   Expected End: 04/08/19       Description: Document Isaias Milena Fall Risk and appropriate interventions in the flowsheet.     Disciplines: Interdisciplinary                Problem: Hospice Orientation     Dates: Start: 04/03/19       Description:     Disciplines: Interdisciplinary    Goal: Demonstrate understanding of hospice philosophy, plan of care, and home hospice program     Dates: Start: 04/03/19   Expected End: 04/08/19       Description: The patient/family/caregiver will demonstrate understanding of hospice philosophy, plan of care and the home hospice program as evidenced by participation in meeting the patient's psychosocial, spiritual, medical, and physical needs inclusive of medical supplies/equipment focusing on symptoms. Disciplines: Interdisciplinary    Intervention: Instruct on hospice philosophy     Dates: Start: 04/03/19       Description:           Intervention: Instruct: hospice orientation     Dates: Start: 04/03/19       Description:           Intervention: Instruct: medical equipment     Dates: Start: 04/03/19       Description: Instruct patient/caregiver on medical equipment and supplies.           Intervention: Instruct: medical power of  and will     Dates: Start: 04/03/19       Description:           Intervention: Instruct:terminal illness     Dates: Start: 04/03/19       Description:                       Problem: Pain     Dates: Start: 04/03/19       Description:     Disciplines: Interdisciplinary    Goal: Verbalize satisfaction of level of comfort and symptom control     Dates: Start: 04/03/19   Expected End: 04/08/19       Description:     Disciplines: Interdisciplinary    Intervention: Assess effectiveness of pain management     Dates: Start: 04/03/19       Description:           Intervention: Assess for signs/symptoms of acute pain     Dates: Start: 04/03/19       Description:           Intervention: Assess for signs/symptoms of chronic pain     Dates: Start: 04/03/19       Description:           Intervention: Instruct on non-pharmacological pain management     Dates: Start: 04/03/19       Description:           Intervention: Instruct on pain scales     Dates: Start: 04/03/19       Description:           Intervention: Instruct on pharmacological pain management     Dates: Start: 04/03/19       Description:                       Problem: Patient Education: Go to Patient Education Activity     Dates: Start: 04/03/19       Description:     Disciplines: Interdisciplinary    Goal: Patient/Family Education     Dates: Start: 04/03/19   Expected End: 04/08/19       Description:     Disciplines: Interdisciplinary                Problem: Patient Education: Go to Patient Education Activity     Dates: Start: 04/05/19       Description:     Disciplines: Interdisciplinary    Goal: Patient/Family Education     Dates: Start: 04/05/19       Description:     Disciplines: Interdisciplinary                Problem: Pressure Injury - Risk of     Dates: Start: 04/05/19       Description:     Disciplines: Interdisciplinary    Goal: *Prevention of pressure injury     Dates: Start: 04/05/19       Description: Document Ceasar Scale and appropriate interventions in the flowsheet. Disciplines: Interdisciplinary                Problem: Risk for Falls     Dates: Start: 04/03/19       Description:     Disciplines: Interdisciplinary    Goal: Free of falls during episode of care     Dates: Start: 04/03/19   Expected End: 04/08/19       Description: Patient will be free of falls during episode of care. Disciplines: Interdisciplinary    Intervention: Assess fall risk     Dates: Start: 04/03/19       Description: Complete fall risk assessment on admission, recertification, and as indicated on fall. Intervention: Instruct mobility     Dates: Start: 04/03/19       Description: Teach patient/caregiver/family techniques to increase patient's mobility: range of motion exercises, assisting with ambulation, proper usage of mobility assistive devices, use of side rails to define bed perimeter and to assist with turning and positioning.           Intervention: Instruct on fall prevention     Dates: Start: 04/03/19       Description: Instruct patient/family in fall prevention strategies:  lighted hallways, remove throw rugs, monitor medication that may alter mental status. ___________________    Care Team Notes          POC/IDG Notes      [facility] IDG  Notes by Johanna Boxer at 04/05/19 1026  Version 2 of 2    Author:  Johanna Boxer Service:  Licensed Clinical  Author Type:      Filed:  04/05/19 1231 Date of Service:  04/05/19 1026 Status:  Addendum    :  Johanna Boxer ()    Related Notes:  Original Note by Johanna Boxer () filed at 04/05/19 1219       Patient: Jeanine Sauce    Date: 04/05/19  Time: 10:26 AM    [facility]  Notes  Pt is here for a respite stay. LMSW was advised that the caregiver is in ICU currently. LMSW will continue to coordinate care with the in home program to come up with a discharge plan. Signed by: Julia [facility]       [facility] IDG  Notes by Johanna Boxer at 04/05/19 1026  Version 1 of 2    Author:  Johanna Boxer Service:  Licensed Clinical  Author Type:      Filed:  04/05/19 1219 Date of Service:  04/05/19 1026 Status:  Signed    :  Johanna Boxer ()    Related Notes:  Addendum by Johanna Boxer () filed at 04/05/19 1231       Patient: Jeanine Sauce    Date: 04/05/19  Time: 10:26 AM    [facility]  Notes  Pt is here for a respite stay. Pt will be discharging back to her home on 4-8-19.   LMSW will continue to coordinate care with the in home program.        Signed by: Julia Mosley       [facility] IDG Nurse Notes by Ena Carl RN at 04/05/19 1217  Version 1 of 1    Author:  Ena Carl RN Service:  Dana Gray Author Type:  Registered Nurse    Filed:  04/05/19 1225 Date of Service:  04/05/19 1217 Status:  Signed    :  Ena Carl RN (Registered Nurse)       Patient: Jeanine Sauce    Date: 04/05/19  Time: 12:17 PM    [facility] CENTER Nurse Notes  81 yo female patient who is at [facility] for a 5 day Respite stay. Hospice diagnosis is pulmonary hypertension. Pt will be discharged on 4/8/2019. Pt has not been sleeping at night and Haldol 2mg has been added q HS PRN for sleep. Left elbow skin tear is present. PT is up with 2 person assist to BR. Restoril 15 mg being added q hs prn for sleep this IDG. Comprehensive plan of care reviewed. POC from home has been reviewed and modified as necessary to meet patients needs. IDG and pt./family in agreement with plan of care. The IDG identifies through on-going assessment when a change is needed to the POC; the pt/family will receive care and services necessitated by changes in POC. Medications reviewed by the pharmacist and Medical Director. Signed by: Jaguar Urban RN       [address] [place] [address] IDG Volunteer Notes by Melecio López at 04/05/19 1222  Version 1 of 1    Author:  Melecio López Service:  Gloria Jansen Author Type:  Hospice Volunteer/    Filed:  04/05/19 1223 Date of Service:  04/05/19 1222 Status:  Signed    :  Melecio López (Hospice Volunteer/)           [address] Review     Status Codes I = Initiated C=Continued R=Revised RS = Resolved     I.  Volunteer     Goal: Hospice house volunteer (s) enhances the quality of remaining life while patient is at the hospice house. Interventions: Kilo Bhatt Volunteer (s) will provide companionship to the patient and/or family by visiting at the hospice house       . Kilo Bhatt Volunteer (s) will provide respite as needed when requested by patient and/or family. Kilo Crain  Volunteer will provide activities such as music, reading, pet therapy, etc. as requested. Kilo Crain  Comfort bag delivered. Any other special requests or information regarding volunteer services:    Several visits for companionship are recorded.   Volunteers will continue to visit per pt and staff request.  No further needs identified at this time. These notes have been discussed in [facility] meeting. Signed by: Tremayne Gibbs       [facility] IDG  Notes by Regina Nicholas at 04/04/19 1500  Version 1 of 1    Author:  Regina Nicholas Service:  HOSPICE Author Type:  Pastoral Care    Filed:  04/05/19 1025 Date of Service:  04/04/19 1500 Status:  Signed    :  Regina Nicholas (Pastoral Care)       Patient: Peace Gonzalez    Date: 04/04/19  Time: 3:00 PM    [facility]  Notes    Patient comes to [facility] for Respite Care. Alysa Gilbert is continuing care provided as assigned  in the [address] [place] [address] program.  Additional spiritual care/support will be available and provided to patient/family as needed, requested, or referred.         Signed by: Elida Caicedo

## 2019-04-06 NOTE — PROGRESS NOTES
Pt I'd by name and . Pt calm. No distress. No facial grimace. Flacc =0-1. Denies pain at this time. Resp non labored on 3L n/c. Lungs diminished. BS diminished. HR reg. No edema noted at this time. Pt continent of B&B at this time. SR up x2. Bed low/locked. Call light with in reach. Door opened. Tab alerts on.

## 2019-04-06 NOTE — PROGRESS NOTES
0700 Report received from Thuan Gar RN. Patient identified by name and . Patient sleeping quietly in bed. No signs or symptoms of distress noted at present. Bed in low and locked position, side rails up x2, call bell within reach, tab alarm in place. No family present at bedside. Door open for continuous monitoring. 0900 Patient awake and asking for breakfast.  Reheated breakfast tray. Patient states \"I can't feed myself. \"  RN encourages her, stating I have seen her feed herself when she first arrived on Wednesday. She states \"No there's something wrong with my hands, you can ask my family. \"  RN set up breakfast tray for her and patient fed herself breakfast.    1000 Patient states she needs to use the bathroom but can't get out of bed to the bedside commode. RN reminded her that she has been using bedside commode since her arrival here and actually walked to the bathroom at least twice on Wednesday. CLAUDIA Vitale assisted patient to [facility] and then placed patient in agnieszka-chair and sat her up at the nurses station. 1145 Patient sitting in agnieszka chair at nurses' station, eating lunch. 1453 Patient back in bed, wants to take a nap. Patient refuses pillows for pressure relief. Norco given for pain in her feet, patient rates 8/10.

## 2019-04-06 NOTE — PROGRESS NOTES
Problem: Falls - Risk of  Goal: *Absence of Falls  Description  Document Easter Getting Fall Risk and appropriate interventions in the flowsheet. Outcome: Progressing Towards Goal  Note:   Fall Risk Interventions:  Mobility Interventions: Bed/chair exit alarm, Patient to call before getting OOB    Mentation Interventions: Bed/chair exit alarm, Adequate sleep, hydration, pain control, Door open when patient unattended, Evaluate medications/consider consulting pharmacy, Update white board, Toileting rounds    Medication Interventions: Bed/chair exit alarm, Evaluate medications/consider consulting pharmacy, Patient to call before getting OOB, Teach patient to arise slowly    Elimination Interventions: Bed/chair exit alarm, Call light in reach              Problem: Pain  Goal: Verbalize satisfaction of level of comfort and symptom control  Outcome: Progressing Towards Goal  Note:   Continue use of Norco for pain control. Problem: Pressure Injury - Risk of  Goal: *Prevention of pressure injury  Description  Document Ceasar Scale and appropriate interventions in the flowsheet.   Outcome: Progressing Towards Goal  Note:   Pressure Injury Interventions:  Sensory Interventions: Check visual cues for pain, Float heels, Maintain/enhance activity level, Minimize linen layers, Pressure redistribution bed/mattress (bed type)    Moisture Interventions: Absorbent underpads, Apply protective barrier, creams and emollients, Maintain skin hydration (lotion/cream), Moisture barrier, Minimize layers    Activity Interventions: Pressure redistribution bed/mattress(bed type)    Mobility Interventions: Float heels, Pressure redistribution bed/mattress (bed type)    Nutrition Interventions: Document food/fluid/supplement intake, Offer support with meals,snacks and hydration    Friction and Shear Interventions: Apply protective barrier, creams and emollients, Lift sheet, Minimize layers

## 2019-04-07 NOTE — PROGRESS NOTES
0700 Report received from Gaby Ahumada RN. Patient identified by name and . Patient sleeping quietly in bed. No signs or symptoms of distress noted at present. Bed in low and locked position, side rails up x2, call bell within reach, tab alarm in place. No family present at bedside. Door open for continuous monitoring. 0100 assisted patient to [facility] where she voided without difficulty. Patient ate breakfast in agnieszka-chair in her room. 0900 Patient in agnieszka-chair at nurses station. Patient napped most of the morning in the agnieszka chair, ate 90% of her lunch around 1330 pm and then wanted to get back in bed. Assisted patient to [facility], then back to bed. Removed Allevyn from left forearm, cleansed site with wound cleanser. Skin tear healing well, only a small opening remains. Patient refused to have a new dressing on it. NP Kelly Underwood aware. [address] given for pain \"in my toes\" patient rates 8/10.

## 2019-04-08 NOTE — DISCHARGE SUMMARY
Discharge Summary     Patient: Jesus Manuel Farah MRN: [id number]  SSN: [id number]    YOB: 1927  Age: 80 y.o.   Sex: female       Admit Date: 4/3/2019    Discharge Date: 4/8/2019 at 1400    Admission Diagnoses: Pulmonary hypertension ([facility] [I27.20]    Discharge Diagnoses:   Problem List as of 4/8/2019 Date Reviewed: 4/3/2019          Codes Class Noted - Resolved    Left ventricular systolic dysfunction, NYHA class 3 ICD-10-CM: I51.9  ICD-9-CM: 429.9  1/19/2019 - Present        Hospice care patient ICD-10-CM: Z51.5  ICD-9-CM: V66.7  5/23/2018 - Present        Acute urinary retention ICD-10-CM: R33.8  ICD-9-CM: 788.29  3/1/2018 - Present        Congestive heart failure (CHF) ([facility] ICD-10-CM: I50.9  ICD-9-CM: 428.0  3/1/2018 - Present        Chronic anemia ICD-10-CM: D64.9  ICD-9-CM: 285.9  2/25/2018 - Present        Edema, peripheral ICD-10-CM: R60.9  ICD-9-CM: 782.3  2/24/2018 - Present        Acute on chronic diastolic CHF (congestive heart failure), NYHA class 3 (HCC) ICD-10-CM: I50.33  ICD-9-CM: 428.33, 428.0  2/24/2018 - Present        HCAP (healthcare-associated pneumonia) ICD-10-CM: J18.9  ICD-9-CM: 492  2/18/2018 - Present        Acute metabolic encephalopathy BSL-20-EM: G93.41  ICD-9-CM: 348.31  2/15/2018 - Present        Pneumonia ICD-10-CM: J18.9  ICD-9-CM: 006  2/13/2018 - Present        Pleural effusion ICD-10-CM: J90  ICD-9-CM: 511.9  2/13/2018 - Present        Hypercapnia ICD-10-CM: R06.89  ICD-9-CM: 786.09  2/13/2018 - Present        UTI (urinary tract infection) ICD-10-CM: N39.0  ICD-9-CM: 599.0  2/7/2018 - Present        Hypotension ICD-10-CM: I95.9  ICD-9-CM: 458.9  2/6/2018 - Present        Chronic respiratory failure with hypoxia (HCC) ICD-10-CM: J96.11  ICD-9-CM: 518.83, 799.02  1/25/2018 - Present        Gram-positive bacteremia ICD-10-CM: R78.81  ICD-9-CM: 790.7  12/25/2017 - Present        Sepsis ([facility] ICD-10-CM: A41.9  ICD-9-CM: 038.9, 995.91  12/23/2017 - Present        Acute respiratory failure with hypoxia ([facility] ICD-10-CM: J96.01  ICD-9-CM: 518.81  12/23/2017 - Present        Fever ICD-10-CM: R50.9  ICD-9-CM: 780.60  12/23/2017 - Present        Elevated troponin ICD-10-CM: R74.8  ICD-9-CM: 790.6  12/23/2017 - Present        CKD (chronic kidney disease) stage 3, GFR 30-59 ml/min (HCC) ICD-10-CM: N18.3  ICD-9-CM: 585.3  12/23/2017 - Present        Elevated lactic acid level ICD-10-CM: R79.89  ICD-9-CM: 276.2  12/23/2017 - Present        Hypernatremia ICD-10-CM: E87.0  ICD-9-CM: 276.0  12/23/2017 - Present        Macrocytic anemia ICD-10-CM: D53.9  ICD-9-CM: 281.9  12/23/2017 - Present        Pain of right lower extremity ICD-10-CM: M79.604  ICD-9-CM: 729.5  12/23/2017 - Present        Osteoarthritis (Chronic) ICD-10-CM: M19.90  ICD-9-CM: 715.90  7/20/2017 - Present        Pre-diabetes ICD-10-CM: R73.03  ICD-9-CM: 790.29  7/20/2017 - Present        Gout ICD-10-CM: M10.9  ICD-9-CM: 274.9  10/6/2016 - Present        Diastolic CHF, chronic (HCC) (Chronic) ICD-10-CM: I50.32  ICD-9-CM: 428.32, 428.0  5/17/2016 - Present        * (Principal) Pulmonary hypertension (HCC) ICD-10-CM: I27.20  ICD-9-CM: 416.8  5/17/2016 - Present        Essential hypertension, benign (Chronic) ICD-10-CM: I10  ICD-9-CM: 401.1  3/17/2015 - Present        Acute kidney failure, unspecified ([facility] ICD-10-CM: N17.9  ICD-9-CM: 584. 9  Unknown - Present        Reflux esophagitis ICD-10-CM: K21.0  ICD-9-CM: 530.11  Unknown - Present        Coronary atherosclerosis of native coronary artery ICD-10-CM: I25.10  ICD-9-CM: 414.01  Unknown - Present        Hypopotassemia ICD-10-CM: E87.6  ICD-9-CM: 276.8  Unknown - Present        Mixed hyperlipidemia ICD-10-CM: E78.2  ICD-9-CM: 272.2  Unknown - Present        Cellulitis and abscess of other specified site ICD-10-CM: L03.818, L02.818  ICD-9-CM: 682.8  Unknown - Present        SOB (shortness of breath) ICD-10-CM: R06.02  ICD-9-CM: 786.05  Unknown - Present               Discharge Condition: [address] Course: Discharge from respite stay at [facility] to home with [facility]. DC 4/8/19 at 1400 via ambulance. Status at time of discharge is routine. Consults: None    Significant Diagnostic Studies: None    Disposition: home with [facility]    Discharge Medications:   Current Discharge Medication List      CONTINUE these medications which have CHANGED    Details   haloperidol (HALDOL) 2 mg/mL oral concentrate Take 0.5 mL=1mg by mouth every four (4) hours as needed for Other (jerking, nausea, agitation or sleep). CONTINUE these medications which have NOT CHANGED    Details   cyanocobalamin (VITAMIN B12) 500 mcg tablet Take 1,000 mcg by mouth daily. !! HYDROcodone-acetaminophen (NORCO) 5-325 mg per tablet Take 2 Tabs by mouth nightly. miconazole (MICOTIN) 2 % topical cream Apply  to affected area daily. predniSONE (DELTASONE) 5 mg tablet Take 5 mg by mouth daily. !! HYDROcodone-acetaminophen (NORCO) 5-325 mg per tablet Take 1 Tab by mouth every four (4) hours as needed for Pain or shortness of breath      !! gabapentin (NEURONTIN) 300 mg capsule Take 600 mg by mouth two (2) times a day. take 2 tabs=600 mg  AM and afternoon      !! gabapentin (NEURONTIN) 300 mg capsule Take 900 mg by mouth nightly. take 3 tabs=900 mg at HS      polyvinyl alcohol (LIQUIFILM TEARS) 1.4 % ophthalmic solution Administer 1 Drop to both eyes as needed (dry eyes). Indications: Dry Eye      sennosides 8.6 mg cap Take 2 Tabs by mouth two (2) times a day. Indications: constipation      potassium chloride (KLOR-CON) 10 mEq tablet Take 10 mEq by mouth daily. pt found to be taking    Indications: hypokalemia prevention      acetaminophen (TYLENOL) 325 mg tablet Take 650 mg by mouth every six (6) hours as needed for Pain or Fever. albuterol (ACCUNEB) 1.25 mg/3 mL nebu Take 2.5 mg by inhalation every four (4) hours as needed for shortness of breath or wheezing. allopurinol (ZYLOPRIM) 100 mg tablet Take 100 mg by mouth two (2) times a day. amLODIPine (NORVASC) 5 mg tablet Take 5 mg by mouth daily. clopidogrel (PLAVIX) 75 mg tab Take 75 mg by mouth daily. furosemide (LASIX) 40 mg tablet Take 40 mg by mouth daily. loratadine (CLARITIN) 10 mg tablet Take 10 mg by mouth daily. raNITIdine (ZANTAC) 150 mg tablet Take 150 mg by mouth two (2) times a day. albuterol (PROVENTIL HFA, VENTOLIN HFA, PROAIR HFA) 90 mcg/actuation inhaler Take 1 Puff by inhalation every four (4) hours as needed for Wheezing or Shortness of Breath. OXYGEN-AIR DELIVERY SYSTEMS Take 3 L by inhalation as needed  via NC  For shortness of breath       !! - Potential duplicate medications found. Please discuss with provider. Activity: Activity as tolerated with assist with walker  Diet: Regular Diet  Wound Care: None needed  Oxygen: 3L/min via nasal cannula as needed for shortness of breath  Equipment: Equipment as previously ordered in the home. Follow-up Appointments   Procedures    FOLLOW UP VISIT Appointment in: Other (Specify) Follow-up with home care RN on day of discharge. Follow-up with home care RN on day of discharge. Standing Status:   Standing     Number of Occurrences:   1     Order Specific Question:   Appointment in     Answer:    Other (Specify)       Signed By: Brenda Ford NP     April 8, 2019

## 2019-04-08 NOTE — HOSPICE
Report received from [place], [facility]. Pt resting in bed. Has used call lights multiple times since start of shift. Pt medicated for pain in bilateral feet 8/10. Pt swallows pills whole and several at a time without difficulty. Offered to get Pt up to chair and bring out to the unit for company but refused at this time. Pt is anxious and ready to go home today. Has asked multiple times about going home today. Discharge date uncertain at this time. 0930: Pt used [facility]; [place] [facility]. Transferred Pt to chair to join staff at [facility] station and to provide care as needed. 1210: Pt transferred back to bed for a nap and to use [facility] per request.     1400: EMS arrived to transport Pt back home. Meds given to EMS personnel and personal belongings. Message was left for Wagner Massey, in home RN.

## 2022-01-25 NOTE — PROGRESS NOTES
FAMILY PRACTICE HEALTH MAINTENANCE OFFICE VISIT  [facility]    NAME: Joanie Britt  AGE: 32 y o  SEX: male  : 1990     DATE: 2022    Assessment and Plan     1  Routine adult health maintenance    2  Encounter for PPD test  -     TB Skin Test        · Patient Counseling:   · Nutrition: Stressed importance of a well balanced diet, moderation of sodium/saturated fat, caloric balance and sufficient intake of fiber  · Exercise: Stressed the importance of regular exercise with a goal of 150 minutes per week  · Dental Health: Discussed daily flossing and brushing and regular dental visits     · Immunizations reviewed: Up To Date tdap  Declines flu and COVID  · Discussed benefits of:  Screening labs   BMI Counseling: Body mass index is 26 96 kg/m²  Discussed with patient's BMI with him  The BMI is above normal  Exercise recommendations include moderate aerobic physical activity for 150 minutes/week  Return in about 2 days (around 2022) for PPD read   Chief Complaint     Chief Complaint   Patient presents with    Establish Care     Needs PCP  mariano    Physical Exam       History of Present Illness     Here today for CPE  Starting a new job in [employer], needs a PPD for work  History of GERD, managed with diet      Had labs done within the past year with previous PCP  No health issues or pertinent PMH          Well Adult Physical   Patient here for a comprehensive physical exam       Diet and Physical Activity  Diet: well balanced diet  Exercise: frequently      Depression Screen  PHQ-2/9 Depression Screening    Little interest or pleasure in doing things: 0 - not at all  Feeling down, depressed, or hopeless: 0 - not at all  PHQ-2 Score: 0  PHQ-2 Interpretation: Negative depression screen          General Health  Hearing: Normal:  bilateral  Vision: no vision problems  Dental: regular dental visits and brushes teeth twice Progress Note    2/10/2018  Admit Date: 2018  3:34 PM   NAME: Yo Márquez   :  2/10/1927   MRN:  [id number]   Attending: Lawrence Laguna MD  PCP:  Serenity Hall MD  Treatment Team: Attending Provider: Lawrence Laguna MD; Utilization Review: Margareth Raphael; Consulting Provider: Ankush Bateman MD    Full Code   SUBJECTIVE:   Ms. Leonel Romero is a 81 yo female with PMH diastolic CHF, CKD stage 3, HTN, CAD, pulmonary HTN, gout, NSTEMI who presented from PCP office for hypotension, hypoxia on home O2 at 2.5 L NC. She has been generally weak and confused for 2 days which gradually worsened prior to admission. She was found to be hypotensive with systolic BP in the 61V. Her BNP was elevated with small pleural effusion in CXR. She has been on IVF and off her home diuretics. 18 BC with staph epidermis  BC likely contaminent. 18 repeat BC NGTD. Of note she was tx in December for strep bacteremia with 2 gm rocephin by ID EOT 18. She is confused. She is afebrile without leukocytosis. She was found to have a UTI, urine cx sent NGTD. She was started on rocephin on admission.         Past Medical History:   Diagnosis Date    Acute kidney failure, unspecified     Arthritis     CAD (coronary artery disease)     Cellulitis and abscess of other specified site     Coronary atherosclerosis of native coronary artery     Essential hypertension, benign 3/17/2015    Hypertension     Hypopotassemia     Mixed hyperlipidemia     Osteoarthritis 2017    Other and unspecified hyperlipidemia     Reflux esophagitis     Renal failure, unspecified     Shortness of breath     Unspecified essential hypertension      Recent Results (from the past 24 hour(s))   CULTURE, BLOOD    Collection Time: 18  1:49 PM   Result Value Ref Range    Special Requests: LEFT  Antecubital        Culture result: NO GROWTH AFTER 18 HOURS     CULTURE, BLOOD    Collection Time: 18  1:52 PM   Result Value Ref Range    Special Requests: LEFT  Antecubital        Culture result: NO GROWTH AFTER 18 HOURS     PLEASE READ & DOCUMENT PPD TEST IN 48 HRS    Collection Time: 02/09/18  5:58 PM   Result Value Ref Range    PPD No Redness Negative    mm Induration No Induration mm   METABOLIC PANEL, BASIC    Collection Time: 02/10/18  6:52 AM   Result Value Ref Range    Sodium 147 (H) 136 - 145 mmol/L    Potassium 3.7 3.5 - 5.1 mmol/L    Chloride 109 (H) 98 - 107 mmol/L    CO2 33 (H) 21 - 32 mmol/L    Anion gap 5 (L) 7 - 16 mmol/L    Glucose 88 65 - 100 mg/dL    BUN 24 (H) 8 - 23 MG/DL    Creatinine 1.11 (H) 0.6 - 1.0 MG/DL    GFR est AA 59 (L) >60 ml/min/1.73m2    GFR est non-AA 49 (L) >60 ml/min/1.73m2    Calcium 9.0 8.3 - 10.4 MG/DL   CBC W/O DIFF    Collection Time: 02/10/18  6:52 AM   Result Value Ref Range    WBC 6.0 4.3 - 11.1 K/uL    RBC 2.78 (L) 4.05 - 5.25 M/uL    HGB 9.2 (L) 11.7 - 15.4 g/dL    HCT 29.0 (L) 35.8 - 46.3 %    .3 (H) 79.6 - 97.8 FL    MCH 33.1 (H) 26.1 - 32.9 PG    MCHC 31.7 31.4 - 35.0 g/dL    RDW 15.4 (H) 11.9 - 14.6 %    PLATELET 826 014 - 897 K/uL    MPV 11.7 10.8 - 14.1 FL     Allergies   Allergen Reactions    Bee Pollen Swelling    Fire Ant Itching and Swelling     Current Facility-Administered Medications   Medication Dose Route Frequency Provider Last Rate Last Dose    docusate sodium (COLACE) capsule 100 mg  100 mg Oral BID SEDA Walsh   100 mg at 02/10/18 0843    albuterol (PROVENTIL VENTOLIN) nebulizer solution 2.5 mg  2.5 mg Nebulization Q4H PRN Vincent Olivier MD   2.5 mg at 02/10/18 0982    allopurinol (ZYLOPRIM) tablet 100 mg  100 mg Oral BID Vincent Olivier MD   100 mg at 02/10/18 0845    aspirin delayed-release tablet 81 mg  81 mg Oral DAILY Vincent Olivier MD   81 mg at 02/10/18 0843    clopidogrel (PLAVIX) tablet 75 mg  75 mg Oral DAILY Vincent Olivier MD   75 mg at 02/10/18 0843    colchicine tablet 0.3 mg  0.3 mg Oral DAILY Kim Claire MD   0.3 mg at 02/10/18 0843    daily    Reproductive Health  No issues       The following portions of the patient's history were reviewed and updated as appropriate: allergies, current medications, past family history, past medical history, past social history, past surgical history and problem list     Review of Systems     Review of Systems   Constitutional: Negative  Respiratory: Negative  Cardiovascular: Negative  Genitourinary: Negative  Musculoskeletal: Negative  Neurological: Negative  Psychiatric/Behavioral: Negative  Past Medical History     History reviewed  No pertinent past medical history      Past Surgical History     Past Surgical History:   Procedure Laterality Date    NO PAST SURGERIES         Social History     Social History     Socioeconomic History    Marital status: /Civil Union     Spouse name: None    Number of children: None    Years of education: None    Highest education level: None   Occupational History    None   Tobacco Use    Smoking status: Never Smoker    Smokeless tobacco: Never Used   Vaping Use    Vaping Use: Never used   Substance and Sexual Activity    Alcohol use: Never    Drug use: Never    Sexual activity: None   Other Topics Concern    None   Social History Narrative    None     Social Determinants of Health     Financial Resource Strain: Not on file   Food Insecurity: Not on file   Transportation Needs: Not on file   Physical Activity: Not on file   Stress: Not on file   Social Connections: Not on file   Intimate Partner Violence: Not on file   Housing Stability: Not on file       Family History     Family History   Problem Relation Age of Onset    Hypertension Mother     Diabetes Mother     No Known Problems Father        Current Medications       Current Outpatient Medications:     b complex vitamins capsule, Take 1 capsule by mouth daily, Disp: , Rfl:     Multiple Vitamin (multivitamin) tablet, Take 1 tablet by mouth daily, Disp: , Rfl:      Allergies No Known Allergies    Objective     /70   Pulse 77   Temp (!) 97 2 °F (36 2 °C)   Resp 18   Ht 5' 5" (1 651 m)   Wt 73 5 kg (162 lb)   SpO2 99%   BMI 26 96 kg/m²      Physical Exam  Vitals and nursing note reviewed  Constitutional:       Appearance: Normal appearance  He is well-developed  HENT:      Head: Normocephalic and atraumatic  Right Ear: Tympanic membrane, ear canal and external ear normal       Left Ear: Tympanic membrane, ear canal and external ear normal       Nose: No mucosal edema  Eyes:      Conjunctiva/sclera: Conjunctivae normal       Pupils: Pupils are equal, round, and reactive to light  Neck:      Thyroid: No thyromegaly  Vascular: No carotid bruit  Cardiovascular:      Rate and Rhythm: Normal rate and regular rhythm  Heart sounds: Normal heart sounds  No murmur heard  Pulmonary:      Effort: Pulmonary effort is normal       Breath sounds: Normal breath sounds  Abdominal:      General: Bowel sounds are normal  There is no distension  Palpations: Abdomen is soft  There is no hepatomegaly or splenomegaly  Tenderness: There is no abdominal tenderness  Musculoskeletal:         General: No deformity  Normal range of motion  Cervical back: Full passive range of motion without pain and normal range of motion  No edema  Lymphadenopathy:      Cervical:      Right cervical: No superficial cervical adenopathy  Left cervical: No superficial cervical adenopathy  Skin:     General: Skin is warm and dry  Findings: No rash  Neurological:      Mental Status: He is alert and oriented to person, place, and time  Sensory: No sensory deficit  Deep Tendon Reflexes: Reflexes are normal and symmetric     Psychiatric:         Mood and Affect: Mood normal          Behavior: Behavior normal             Visual Acuity Screening    Right eye Left eye Both eyes   Without correction: 20/20 20/20 20/15   With correction:              Samra Coronado gabapentin (NEURONTIN) capsule 200 mg  200 mg Oral QHS Rommie Kussmaul, MD   200 mg at 18    pravastatin (PRAVACHOL) tablet 20 mg  20 mg Oral QHS Rommie Kussmaul, MD   20 mg at 18    famotidine (PEPCID) tablet 20 mg  20 mg Oral DAILY Rommie Kussmaul, MD   20 mg at 02/10/18 0843    cefTRIAXone (ROCEPHIN) 1 g in sterile water (preservative free) 10 mL IV syringe  1 g IntraVENous Q24H [facility]   1 g at 18 1748    sodium chloride (NS) flush 5-10 mL  5-10 mL IntraVENous Q8H Rommie Kussmaul, MD   5 mL at 02/10/18 0524    sodium chloride (NS) flush 5-10 mL  5-10 mL IntraVENous PRN Rommie Kussmaul, MD        acetaminophen (TYLENOL) tablet 650 mg  650 mg Oral Q4H PRN Rommie Kussmaul, MD   650 mg at 18 0928    ondansetron (ZOFRAN) injection 4 mg  4 mg IntraVENous Q4H PRN Rommie Kussmaul, MD        heparin (porcine) injection 5,000 Units  5,000 Units SubCUTAneous Sherlyn Liang MD   5,000 Units at 02/10/18 1044       Review of Systems negative with exception of pertinent positives noted above  PHYSICAL EXAM     Visit Vitals    /72 (BP 1 Location: Left arm, BP Patient Position: At rest;Head of bed elevated (Comment degrees))    Pulse 81    Temp 98.6 °F (37 °C)    Resp 18    Ht 4' 11\" (1.499 m)    Wt 68 kg (150 lb)    SpO2 99%    BMI 30.3 kg/m2      Temp (24hrs), Av.1 °F (36.7 °C), Min:97.6 °F (36.4 °C), Max:98.6 °F (37 °C)    Oxygen Therapy  O2 Sat (%): 99 % (02/10/18 1214)  Pulse via Oximetry: 87 beats per minute (18 2201)  O2 Device: Nasal cannula (02/10/18 08)  O2 Flow Rate (L/min): 2 l/min (02/10/18 0822)  FIO2 (%): 28 % (18 2201)  No intake or output data in the 24 hours ending 02/10/18 1258       General:                 No acute distress, confused    Lungs:                    Diminished bases bilaterally. Resp even and nonlabored  Heart:                                S1S2 present without murmurs rubs gallops. RRR.  No bilateral LE pedal [place], [facility] edema  Abdomen:              Soft, non tender, non distended. BS present  Extremities:           Moves ext well  Neurologic:            Confused but follows commands. Results summary of Diagnostic Studies/Procedures copied from within [facility] EMR:      Anamouth Shemar    Diagnosis Date Noted    Acute UTI 02/07/2018    Hypotension 02/06/2018    Chronic respiratory failure with hypoxia ([facility] [address] 01/25/2018    CKD (chronic kidney disease) stage 3, GFR 30-59 ml/min 26/58/3780    Diastolic CHF, chronic ([facility] [address] 05/17/2016     Plan:      UTI:  Continue Rocephin. Follow urine cx NGTD     Hypotension:  Resolved     Chronic Diastolic CHF:  BNP elevated on 2/8/18. CXR with pleural effusion. Stopped IVF, one time dose lasix yesterday. Monitor I/O.   EF 55-60% in December 2017     [facility] Blood cultures:  BC from admission with staph epi 1/2, likely contaminant. Repeat BC 2/9/18 NGTD. Completed Rocephin course EOT 1/22/18 for strep bacteremia.   Consulted ID given recent bacteremia        Notes, labs, VS, diagnostic testing reviewed  Time spent with pt 20 min        DVT Prophylaxis: heparin sq  Plan of Care Discussed with: Supervising MD Dr. Peyton Garcia, care team, pt   Jose Antonio Rebolledo NP

## 2023-07-01 NOTE — IP AVS SNAPSHOT
[address] 
[phone] Patient: Carolina Null MRN: [id number] :2/10/1927 A check michel indicates which time of day the medication should be taken. My Medications START taking these medications Instructions Each Dose to Equal  
 Morning Noon Evening Bedtime  
 acetaminophen 325 mg tablet Commonly known as:  TYLENOL Take 2 Tabs by mouth every six (6) hours as needed. 650 mg  
    
   
   
   
  
 amLODIPine 5 mg tablet Commonly known as:  Cookie Boozer Your next dose is:  TOMORROW Take 1 Tab by mouth daily. 5 mg  
    
   
   
   
  
 furosemide 20 mg tablet Commonly known as:  LASIX Your next dose is:  TOMORROW Take 1 Tab by mouth daily. 20 mg  
    
   
   
   
  
 levoFLOXacin 750 mg tablet Commonly known as:  Kyaw Mendez Start taking on:  2018 Your next dose is:  TOMORROW Take 1 Tab by mouth every fourty-eight (48) hours for 2 days. 750 mg CHANGE how you take these medications Instructions Each Dose to Equal  
 Morning Noon Evening Bedtime * albuterol 2.5 mg /3 mL (0.083 %) nebulizer solution Commonly known as:  PROVENTIL VENTOLIN What changed:  Another medication with the same name was added. Make sure you understand how and when to take each. 3 mL by Nebulization route every four (4) hours as needed for Wheezing. 2.5 mg  
    
   
   
   
  
 * albuterol 90 mcg/actuation inhaler Commonly known as:  PROVENTIL HFA, VENTOLIN HFA, PROAIR HFA What changed: You were already taking a medication with the same name, and this prescription was added. Make sure you understand how and when to take each. Take 1 Puff by inhalation every six (6) hours as needed for Wheezing. 1 Puff * Notice:   This list has 2 medication(s) that are the same as other medications prescribed for you. Read the directions carefully, and ask your doctor or other care provider to review them with you. CONTINUE taking these medications Instructions Each Dose to Equal  
 Morning Noon Evening Bedtime  
 allopurinol 100 mg tablet Commonly known as:  Ozella Furrow Your next dose is:  TODAY Take 1 Tab by mouth two (2) times a day. 100 mg  
    
   
   
  
   
  
 aspirin 81 mg Cpdr  
Your next dose is:  Nusrat Take  by mouth daily. clopidogrel 75 mg Tab Commonly known as:  PLAVIX Your next dose is:  TOMORROW Take 1 Tab by mouth daily. 75 mg  
    
   
   
   
  
 clotrimazole 1 % topical cream  
Commonly known as:  LOTRIMIN AF (CLOTRIMAZOLE) Apply  to affected area two (2) times a day. colchicine 0.6 mg tablet Your next dose is:  TOMORROW Take 0.5 Tabs by mouth daily. 0.3 mg  
    
   
   
   
  
 cyanocobalamin 1,000 mcg tablet Notes to Patient:  TOMORROW Take 1 Tab by mouth daily. 1000 mcg  
    
   
   
   
  
 loratadine 10 mg tablet Commonly known as:  Kenny Lush Your next dose is:  TOMORROW Take 1 Tab by mouth daily. 10 mg  
    
   
   
   
  
 pravastatin 20 mg tablet Commonly known as:  PRAVACHOL Your next dose is:  TODAY Take 1 Tab by mouth nightly. 20 mg  
    
   
   
   
  
  
 raNITIdine 150 mg tablet Commonly known as:  ZANTAC Your next dose is:  TODAY Take 1 Tab by mouth two (2) times a day. 150 mg Saccharomyces boulardii 250 mg capsule Commonly known as:  Keron Controls Your next dose is:  TODAY Take 1 Cap by mouth two (2) times a day. 250 mg Senna 8.6 mg Cap Generic drug:  sennosides Take  by mouth. STOP taking these medications   
 gabapentin 100 mg capsule Commonly known as:  NEURONTIN  
   
  
 HYDROcodone-acetaminophen 7.5-325 mg per tablet Commonly known as:  Ziggy Fraction [employer]GEN-[employer]  
   
  
 potassium chloride 10 mEq tablet Commonly known as:  KLOR-CON M10 Where to Get Your Medications Information on where to get these meds will be given to you by the nurse or doctor. ! Ask your nurse or doctor about these medications  
  acetaminophen 325 mg tablet  
 albuterol 2.5 mg /3 mL (0.083 %) nebulizer solution  
 albuterol 90 mcg/actuation inhaler  
 amLODIPine 5 mg tablet  
 furosemide 20 mg tablet  
 levoFLOXacin 750 mg tablet 01-Jul-2023 13:30

## 2024-10-31 NOTE — PROGRESS NOTES
Hospitalist Progress Note    2018  Admit Date: 2017 11:21 PM   NAME: Eddie Hendrickson   :  2/10/1927   MRN:  [id number]   Attending: Segundo Garcia MD  PCP:  Maikel Ovalle MD    SUBJECTIVE:   As previously documented:  'Ms. Vinicio Chávez is a 81 yo female with PMH of CAD, HTN, admitted  with sepsis and elevated troponin. She has been found to have GPC bacteremia 1/2 BC showing strep anginosus, rocephin  to present and vancomycin  to . Seen by ID who recommends rocephin 2 gram daily EOT 18. CT urogram shows right  5 cm adnexal lesion that will need GYN/ US followup. ECHO no vegetation, mild AS, EF 55-60%. No plans for TRACY given sedation risk but empirically treating for endocarditis. Seen by cardio and felt likely had NSTEMI with plans for medical management/ heparin drip since stopped and DAPT continued. Receiving IV lasix. Plans for rehab pending.'      - seen without family at bedside. Her biggest complaint is that her feet and legs hurt all night long. States they had a numb type feeling and cramping, with sharp pains at times. States she had to move her legs frequently to help. She reports intermittent coughing. - reports she is feeling okay. Does report several episodes of diarrhea and RN called me earlier due to watery stools. Being sent for C diff. She reports she slept better last night and her feet did not hurt with the gabapentin. Also cough is much better. Cardiology planning to convert her to PO lasix tomorrow. She is awaiting insurance pre-cert for rehab.   - reports she is feeling okay and wants to go home to 'do some work.'  States she is coughing still but improved. Does report she feels food 'comes back up when she eats.'  Has been going on for a while. Denies coughing immediately after swallowing. Feet are feeling much better. She has diuresed well and is down 15 pounds since admission.    18- reports she is doing okay today, but complains of getting weaker as she is not getting out of bed. Last PT evaluation was on . Evaluated by ST and diet recommendation the same and she had no immediate cough/aspiration symptoms with swallowing. She denies new concerns. She is awaiting insurance pre-cert for STR. Review of Systems negative with exception of pertinent positives noted above  PHYSICAL EXAM     Visit Vitals    /76 (BP 1 Location: Left arm, BP Patient Position: At rest)    Pulse 72    Temp 97.2 °F (36.2 °C)    Resp 18    Ht 5' (1.524 m)    Wt 67.8 kg (149 lb 8 oz)    LMP Comment: hysterectomy    SpO2 95%    Breastfeeding No    BMI 29.2 kg/m2      Temp (24hrs), Av.1 °F (36.7 °C), Min:97.2 °F (36.2 °C), Max:99.5 °F (37.5 °C)    Patient Vitals for the past 24 hrs:   Temp Pulse Resp BP SpO2   18 0754 97.2 °F (36.2 °C) 72 18 122/76 95 %   18 0511 97.9 °F (36.6 °C) 70 20 133/78 96 %   17 2248 98.1 °F (36.7 °C) 70 20 126/73 95 %   17 97.9 °F (36.6 °C) 74 (!) 2 126/74 96 %   17 - - - - 95 %   17 1443 97.7 °F (36.5 °C) 77 18 122/75 95 %   17 1346 - - - - 95 %   17 1344 - - - - 95 %   17 1104 99.5 °F (37.5 °C) 68 18 119/74 94 %       Oxygen Therapy  O2 Sat (%): 95 % (18 0754)  Pulse via Oximetry: 71 beats per minute (17)  O2 Device: Room air (17)  O2 Flow Rate (L/min): 0 l/min (17 1408)  FIO2 (%): 21 % (17)    Intake/Output Summary (Last 24 hours) at 18 1035  Last data filed at 17 1748   Gross per 24 hour   Intake                0 ml   Output              500 ml   Net             -500 ml      General: No acute distress, elderly  Lungs:  CTA Bilaterally.    Heart:  Regular rate and rhythm,  No murmur, rub, or gallop  Abdomen: Soft, Non distended, Non tender, Positive bowel sounds  Extremities: No cyanosis, clubbing or edema, legs and feet warm  Neurologic:  No focal deficits    Recent Results (from the past 24 hour(s))   GLUCOSE, POC    Collection Time: 12/31/17 11:06 AM   Result Value Ref Range    Glucose (POC) 101 (H) 65 - 100 mg/dL   GLUCOSE, POC    Collection Time: 12/31/17  4:21 PM   Result Value Ref Range    Glucose (POC) 137 (H) 65 - 100 mg/dL   GLUCOSE, POC    Collection Time: 12/31/17  9:34 PM   Result Value Ref Range    Glucose (POC) 98 65 - 100 mg/dL   MAGNESIUM    Collection Time: 01/01/18  4:36 AM   Result Value Ref Range    Magnesium 2.0 1.8 - 2.4 mg/dL   METABOLIC PANEL, BASIC    Collection Time: 01/01/18  4:36 AM   Result Value Ref Range    Sodium 145 136 - 145 mmol/L    Potassium 3.3 (L) 3.5 - 5.1 mmol/L    Chloride 106 98 - 107 mmol/L    CO2 33 (H) 21 - 32 mmol/L    Anion gap 6 (L) 7 - 16 mmol/L    Glucose 89 65 - 100 mg/dL    BUN 32 (H) 8 - 23 MG/DL    Creatinine 0.98 0.6 - 1.0 MG/DL    GFR est AA >60 >60 ml/min/1.73m2    GFR est non-AA 57 (L) >60 ml/min/1.73m2    Calcium 8.9 8.3 - 10.4 MG/DL   GLUCOSE, POC    Collection Time: 01/01/18  7:57 AM   Result Value Ref Range    Glucose (POC) 130 (H) 65 - 100 mg/dL     Imaging:    XR CHEST PA LAT   Final Result   IMPRESSION: Small bilateral pleural effusions         CT UROGRAM WO CONT   Final Result   IMPRESSION:       1. No ureteral calculi or hydronephrosis. 2. Diverticulosis. 3. 5 cm cystic right adnexal lesion. Routine gynecologic follow-up is   recommended with routine follow-up pelvic sonography. XR CHEST SNGL V   Final Result   IMPRESSION:   Stable mild volume overload. CT CHEST W CONT   Final Result   IMPRESSION:      No evidence of pulmonary embolism. Coronary artery disease. Small bilateral pleural effusions. Dependent atelectasis in left lower lobe. Status post cholecystectomy. Date of Dictation: 12/23/2017 10:46 PM      DUPLEX LOWER EXT VENOUS BILAT   Final Result   IMPRESSION: No evidence of deep venous thrombosis in either lower extremity.          XR CHEST PORT   Final CC: f/u for group B strep bacteremia/osteo and GI complaints    Patient reports: abdominal distension and more frequent BM    REVIEW OF SYSTEMS:  All other review of systems negative (Comprehensive ROS)    Antimicrobials Day #  :day 2, day 27 Rx  vancomycin    Solution 125 milliGRAM(s) Oral every 6 hours  vancomycin  IVPB 1000 milliGRAM(s) IV Intermittent every 24 hours    Other Medications Reviewed  MEDICATIONS  (STANDING):  atorvastatin 10 milliGRAM(s) Oral at bedtime  famotidine    Tablet 20 milliGRAM(s) Oral daily  gabapentin 300 milliGRAM(s) Oral at bedtime  lactobacillus acidophilus 1 Tablet(s) Oral three times a day with meals  levomilnacipran ER Capsule 20 milliGRAM(s) Oral daily  potassium chloride   Powder 40 milliEquivalent(s) Oral once  sodium chloride 0.9% Bolus 1000 milliLiter(s) IV Bolus once  sodium chloride 0.9%. 1000 milliLiter(s) (100 mL/Hr) IV Continuous <Continuous>  sodium chloride 3%  Inhalation 4 milliLiter(s) Inhalation every 12 hours  vancomycin    Solution 125 milliGRAM(s) Oral every 6 hours  vancomycin  IVPB 1000 milliGRAM(s) IV Intermittent every 24 hours    T(F): 98.6 (10-31-24 @ 05:23), Max: 98.6 (10-31-24 @ 05:23)  HR: 77 (10-31-24 @ 12:42)  BP: 94/62 (10-31-24 @ 12:42)  RR: 16 (10-31-24 @ 12:42)  SpO2: 96% (10-31-24 @ 12:42)  Wt(kg): --    PHYSICAL EXAM:  General: alert, no acute distress  Eyes:  anicteric, no conjunctival injection, no discharge  Oropharynx: no lesions or injection 	  Neck: supple, without adenopathy  Lungs: clear to auscultation  Heart: regular rate and rhythm; no murmur, rubs or gallops  Abdomen: soft, mild distension,, nontender, without mass or organomegaly  Skin: no lesions  Extremities: no clubbing, cyanosis, or edema  Neurologic: alert, oriented, moves all extremities    LAB RESULTS:                        8.8    2.81  )-----------( 273      ( 31 Oct 2024 07:44 )             26.9     10-31    140  |  107  |  2[L]  ----------------------------<  85  3.2[L]   |  27  |  0.53    Ca    8.0[L]      31 Oct 2024 07:44    TPro  5.9[L]  /  Alb  2.8[L]  /  TBili  0.5  /  DBili  x   /  AST  11  /  ALT  15  /  AlkPhos  59  10-31    LIVER FUNCTIONS - ( 31 Oct 2024 07:44 )  Alb: 2.8 g/dL / Pro: 5.9 g/dL / ALK PHOS: 59 U/L / ALT: 15 U/L / AST: 11 U/L / GGT: x               MICROBIOLOGY:  RECENT CULTURES:  10-30 @ 11:15 .Stool     Testing in progress          RADIOLOGY REVIEWED:    < from: CT Abdomen and Pelvis w/ IV Cont (10.30.24 @ 12:34) >  IMPRESSION:  Pancolitis, either infectious or inflammatory. Also consider portal   colopathy.    Hepatosplenomegaly. Splenic varices. Rule out portal hypertension.    < end of copied text >   Result   IMPRESSION: Minimal linear atelectasis of left lower lobe. Results for orders placed or performed during the hospital encounter of 17   2D ECHO COMPLETE ADULT (TTE) W OR [address] [facility]  [address] [facility], [address] [facility]  [phone]    Transthoracic Echocardiogram  2D, M-mode, Doppler, and Color Doppler    Patient: Little Delgadillo  MR #: [id number]  : 10-Feb-1927  Age: 80 years  Gender: Female  Study date: 23-Dec-2017  Account #: [de-identified]  Height: 60 in  Weight: 152.7 lb  BSA: 1.67 mï¾²  Status:Routine  Location: 729  BP: 87/ 55    Allergies: BEE POLLEN, FIRE ANT    Sonographer:  [facility]  Group:  [facility]  Referring Physician:  Víctor Blanton. [facility]  Reading Physician:  Roselyn Jennings. [facility]    INDICATIONS: Chest pain    PROCEDURE: This was a routine study. A transthoracic echocardiogram was  performed. The study included complete 2D imaging, M-mode, complete spectral  Doppler, and color Doppler. Image quality was adequate. LEFT VENTRICLE: Size was normal. Systolic function was normal. Ejection  fraction was estimated in the range of 55 % to 60 %. There were no regional  wall motion abnormalities. Wall thickness was normal. Left ventricular  diastolic function was abnormal.    RIGHT VENTRICLE: The ventricle was mildly dilated. Systolic function was  normal. Estimated peak pressure was in the range of 55-60 mmHg. LEFT ATRIUM: The atrium was markedly dilated. RIGHT ATRIUM: The atrium was mildly to moderately dilated. SYSTEMIC VEINS: IVC: The inferior vena cava was normal in size and course. AORTIC VALVE: The valve was trileaflet. Leaflets exhibited moderate  calcification and reduced mobility. The aortic valve area by the continuity  equation was 1.6 cm2. The peak velocity was 2.28 m/s. The mean pressure  gradient was 11.92 mmHg.  The findings were most consistent with mild aortic  stenosis. The peak pressure gradient was 20.75 mmHg. There was no   insufficiency. MITRAL VALVE: There was moderate annular calcification. There was no evidence  for stenosis. There was moderate regurgitation. TRICUSPID VALVE: The valve structure was normal. There was no evidence for  stenosis. There was moderate to severe regurgitation. PULMONIC VALVE: The valve structure was normal. There was no evidence for  stenosis. There was no insufficiency. PERICARDIUM: There was no pericardial effusion. AORTA: The root exhibited normal size. SUMMARY:    -  Left ventricle: Systolic function was normal. Ejection fraction was  estimated in the range of 55 % to 60 %. There were no regional wall motion  abnormalities. -  Right ventricle: The ventricle was mildly dilated. -  Left atrium: The atrium was markedly dilated. -  Right atrium: The atrium was mildly to moderately dilated. -  Aortic valve: The valve was trileaflet. Leaflets exhibited moderate  calcification and reduced mobility. The aortic valve area by the continuity  equation was 1.6 cm2. The findings were most consistent with mild aortic  stenosis. -  Mitral valve: There was moderate annular calcification. There was moderate  regurgitation.    -  Tricuspid valve: There was moderate to severe regurgitation. SYSTEM MEASUREMENT TABLES    2D  Ao Diam: 2.5 cm  LA Diam: 3.7 cm  %FS: 40.2 %  IVSd: 0.9 cm  LVIDd: 4.6 cm  LVIDs: 2.8 cm  LVOT Diam: 2 cm  LVPWd: 1 cm    CW  TR Vmax: 3.6 m/s  TR maxP.9 mmHg    PW  RVSP: 53.9 mmHg    Prepared and signed by    Efren Brand.  [facility]  Signed 23-Dec-2017 16:26:05         ASSESSMENT      Active Hospital Problems    Diagnosis Date Noted    NSTEMI (non-ST elevated myocardial infarction) ([facility] [address] 2017    Gram-positive bacteremia 2017    Sepsis ([facility] [address] 2017    Fever 2017    Elevated troponin 2017    CKD (chronic kidney disease) stage 3, GFR 30-59 ml/min 12/23/2017    Elevated lactic acid level 12/23/2017    Hypernatremia 12/23/2017    Macrocytic anemia 12/23/2017    Pain of right lower extremity 19/66/1395    Diastolic CHF, chronic (HCC) 05/17/2016    Essential hypertension, benign 03/17/2015     Plan:  · Strep anginosus bacteremia- Continue rocephin until 1/22/18 to treat empirically for endocarditis per ID recs- appreciate assistance. · PICC placed 12/28. · Diastolic CHF/NSTEMI- down 15 pounds since admission. Appreciate cards input- switched to PO lasix 12/31. · Has weaned off of oxygen. · Continue DAPT. · Neuropathic foot pain- continue low dose gabapentin qhs, which has drastically improved her symptoms by her report. · Swallowing issues- ST following. · R adnexal cyst- outpatient GYN follow up. · Diarrhea- resolved per RN. Stool never sent for C diff testing. · Continue PT and have her up OOB TID with meals. Dispo- STR on discharge for PT and continued IV abx; awaiting insurance pre-cert    DVT Prophylaxis: Heparin    Signed By: Richi Arredondo MD     January 1, 2018